# Patient Record
Sex: MALE | Race: WHITE | NOT HISPANIC OR LATINO | URBAN - METROPOLITAN AREA
[De-identification: names, ages, dates, MRNs, and addresses within clinical notes are randomized per-mention and may not be internally consistent; named-entity substitution may affect disease eponyms.]

---

## 2023-03-22 ENCOUNTER — OFFICE VISIT (OUTPATIENT)
Dept: FAMILY MEDICINE CLINIC | Facility: CLINIC | Age: 78
End: 2023-03-22
Payer: MEDICAID

## 2023-03-22 VITALS
RESPIRATION RATE: 16 BRPM | WEIGHT: 169 LBS | BODY MASS INDEX: 24.2 KG/M2 | OXYGEN SATURATION: 99 % | HEIGHT: 70 IN | SYSTOLIC BLOOD PRESSURE: 132 MMHG | DIASTOLIC BLOOD PRESSURE: 62 MMHG | TEMPERATURE: 97.3 F | HEART RATE: 73 BPM

## 2023-03-22 DIAGNOSIS — R76.12 POSITIVE QUANTIFERON-TB GOLD TEST: ICD-10-CM

## 2023-03-22 DIAGNOSIS — R39.15 URINARY URGENCY: ICD-10-CM

## 2023-03-22 DIAGNOSIS — R35.0 URINARY FREQUENCY: ICD-10-CM

## 2023-03-22 DIAGNOSIS — M54.50 LUMBAR SPINE PAIN: ICD-10-CM

## 2023-03-22 DIAGNOSIS — M54.16 LUMBAR RADICULOPATHY: ICD-10-CM

## 2023-03-22 DIAGNOSIS — Z11.1 VISIT FOR TB SKIN TEST: ICD-10-CM

## 2023-03-22 DIAGNOSIS — I10 BENIGN ESSENTIAL HTN: Primary | ICD-10-CM

## 2023-03-22 DIAGNOSIS — Z12.5 SCREENING PSA (PROSTATE SPECIFIC ANTIGEN): ICD-10-CM

## 2023-03-22 DIAGNOSIS — E55.9 VITAMIN D DEFICIENCY: ICD-10-CM

## 2023-03-22 PROCEDURE — 1159F MED LIST DOCD IN RCRD: CPT | Performed by: PHYSICIAN ASSISTANT

## 2023-03-22 PROCEDURE — 1160F RVW MEDS BY RX/DR IN RCRD: CPT | Performed by: PHYSICIAN ASSISTANT

## 2023-03-22 PROCEDURE — 99204 OFFICE O/P NEW MOD 45 MIN: CPT | Performed by: PHYSICIAN ASSISTANT

## 2023-03-22 RX ORDER — INDAPAMIDE 1.25 MG/1
1.25 TABLET, FILM COATED ORAL DAILY
Qty: 30 TABLET | Refills: 1 | Status: SHIPPED | OUTPATIENT
Start: 2023-03-22

## 2023-03-22 RX ORDER — AMLODIPINE BESYLATE 10 MG/1
10 TABLET ORAL DAILY
Qty: 30 TABLET | Refills: 1 | Status: SHIPPED | OUTPATIENT
Start: 2023-03-22

## 2023-03-22 RX ORDER — LISINOPRIL 10 MG/1
10 TABLET ORAL DAILY
Qty: 30 TABLET | Refills: 1 | Status: SHIPPED | OUTPATIENT
Start: 2023-03-22

## 2023-03-22 NOTE — PROGRESS NOTES
"Subjective   Alex Negron is a 78 y.o. male.     History of Present Illness   Alex Negron 78 y.o. male who presents today for a new patient appointment.    he has a history of There is no problem list on file for this patient.  .  he is here to establish care I reviewed the PFSH recorded today by my MA/LPN staff.   he   He has been feeling well.   he has overall felt well.  He has Primary Hypertension and well controlled on current medication and Vitamin D deficiency and will update labs for continued management.  he has been compliant with current medications have reviewed them.  The patient denies medication side effects.  Will refill medications. /62   Pulse 73   Temp 97.3 °F (36.3 °C)   Resp 16   Ht 178 cm (70.08\")   Wt 76.7 kg (169 lb)   SpO2 99%   BMI 24.19 kg/m²     No results found for this or any previous visit.          Here from [facility] ---Feb 2   Daughter is translating today  No prior surgery    Concern about HTN---10mg Amlodpine, 2.5mg Indapimide, 10mg ?Lisinopril    Alex Negron 78 y.o. male presents for evaluation and treatment of  low back pain. The patient first noted symptoms 10 years ago. It was not related to nothing in particular but has a history of DDD of L-Spine and was .  The pain intensity is mild and moderate , and is located midline lower , lumbar, sacroiliac, posterior thigh, anterior thigh and right leg. The pain is described as burning and occurs prolonged walking and laying on back ---some pain ROM. The symptoms are progressive. Symptoms are exacerbated by prolonged walking and ROM; laying on back; . Factors which relieve the pain include Physical Therapy treatment and back exercises. Other associated symptoms include denies pain radiating down left leg, denies motor loss, denies sensory loss, denies N/T in legs and denies weakness in legs. Previous history of symptoms: the problem is long-standing. Treatment efforts have included Physical " Therapy treatment , with some relief.--helps to lay on his side.  He is taking Ibuprofen 200mg to 400mg    X-Ray  Interpretation report in house X-rays that I personally viewed    Relevant Clinical Issues/Diagnoses/Indications: Chronic low back pain        Clinical Findings: Atherosclerosis incidentally noted in his abdominal aorta, scoliosis of lumbar spine, significant degenerative changes at every level lumbar, retrolisthesis L1-3, no compression fracture          Comparative Data: None          Date of Previous X-ray:    Change on current X-ray:        Prolonged walking gets burning pain right lower leg.   Has to rest.  Has right lumbar pain  Burning right lower leg  Onset 10 yrs ago  Can get sciatic pain right --all down----posterior thigh and anterior posterior lower leg  No prior surgery  --some help PT  The following portions of the patient's history were reviewed and updated as appropriate: allergies, current medications, past family history, past medical history, past social history, past surgical history and problem list.    Review of Systems   Constitutional: Negative for activity change, appetite change and unexpected weight change.   HENT: Negative for nosebleeds and trouble swallowing.    Eyes: Negative for pain and visual disturbance.   Respiratory: Negative for chest tightness, shortness of breath and wheezing.    Cardiovascular: Negative for chest pain and palpitations.   Gastrointestinal: Negative for abdominal pain and blood in stool.   Endocrine: Negative.    Genitourinary: Positive for frequency and urgency. Negative for difficulty urinating and hematuria.   Musculoskeletal: Positive for back pain. Negative for joint swelling.   Skin: Negative for color change and rash.   Allergic/Immunologic: Negative.    Neurological: Negative for syncope and speech difficulty.   Hematological: Negative for adenopathy.   Psychiatric/Behavioral: Negative for agitation and confusion.   All other systems  reviewed and are negative.      Objective   Physical Exam  Vitals and nursing note reviewed.   Constitutional:       General: He is not in acute distress.     Appearance: Normal appearance. He is well-developed. He is not ill-appearing or diaphoretic.   HENT:      Head: Normocephalic.      Right Ear: Tympanic membrane, ear canal and external ear normal.      Left Ear: Tympanic membrane, ear canal and external ear normal.      Nose: Nose normal.      Mouth/Throat:      Pharynx: No oropharyngeal exudate or posterior oropharyngeal erythema.   Eyes:      General: No scleral icterus.     Conjunctiva/sclera: Conjunctivae normal.      Pupils: Pupils are equal, round, and reactive to light.   Neck:      Vascular: No carotid bruit.   Cardiovascular:      Rate and Rhythm: Normal rate and regular rhythm.      Pulses: Normal pulses.      Heart sounds: Normal heart sounds. No murmur heard.  Pulmonary:      Effort: Pulmonary effort is normal.      Breath sounds: Normal breath sounds. No rales.   Abdominal:      General: Abdomen is flat. Bowel sounds are normal.      Palpations: Abdomen is soft.      Tenderness: There is no abdominal tenderness.   Musculoskeletal:         General: No deformity. Normal range of motion.      Cervical back: Normal range of motion and neck supple.      Right lower leg: No edema.      Left lower leg: No edema.   Skin:     General: Skin is warm and dry.      Findings: No rash.   Neurological:      General: No focal deficit present.      Mental Status: He is alert and oriented to person, place, and time.      Sensory: No sensory deficit.      Motor: No weakness.   Psychiatric:         Mood and Affect: Mood normal. Affect is not inappropriate.         Behavior: Behavior normal.         Thought Content: Thought content normal.         Judgment: Judgment normal.         Assessment & Plan   Diagnoses and all orders for this visit:    1. Benign essential HTN (Primary)    2. Lumbar spine pain    3. Lumbar  radiculopathy    Other orders  -     indapamide (LOZOL) 1.25 MG tablet; Take 1 tablet by mouth Daily. For BP  Dispense: 30 tablet; Refill: 1  -     amLODIPine (NORVASC) 10 MG tablet; Take 1 tablet by mouth Daily. For BP  Dispense: 30 tablet; Refill: 1  -     lisinopril (Prinivil) 10 MG tablet; Take 1 tablet by mouth Daily. For BP  Dispense: 30 tablet; Refill: 1      Will write bp meds --do separate Rx for now  Need TB test  Has had vaccines  See urologist---suspect BPH  Plan, Alex Negron, was seen today.  he was seen for HTN and continue medication and Vitamin D deficiency and will update labs .  bp checks-update me  F/u few weeks  Refer PT

## 2023-03-25 LAB
25(OH)D3+25(OH)D2 SERPL-MCNC: 36.5 NG/ML (ref 30–100)
ALBUMIN SERPL-MCNC: 4.7 G/DL (ref 3.7–4.7)
ALBUMIN/GLOB SERPL: 1.9 {RATIO} (ref 1.2–2.2)
ALP SERPL-CCNC: 112 IU/L (ref 44–121)
ALT SERPL-CCNC: 7 IU/L (ref 0–44)
APPEARANCE UR: ABNORMAL
AST SERPL-CCNC: 12 IU/L (ref 0–40)
BACTERIA #/AREA URNS HPF: ABNORMAL /[HPF]
BACTERIA UR CULT: NO GROWTH
BACTERIA UR CULT: NORMAL
BASOPHILS # BLD AUTO: 0.1 X10E3/UL (ref 0–0.2)
BASOPHILS NFR BLD AUTO: 1 %
BILIRUB SERPL-MCNC: <0.2 MG/DL (ref 0–1.2)
BILIRUB UR QL STRIP: NEGATIVE
BUN SERPL-MCNC: 34 MG/DL (ref 8–27)
BUN/CREAT SERPL: 27 (ref 10–24)
CALCIUM SERPL-MCNC: 9.9 MG/DL (ref 8.6–10.2)
CASTS URNS QL MICRO: ABNORMAL /LPF
CHLORIDE SERPL-SCNC: 103 MMOL/L (ref 96–106)
CHOLEST SERPL-MCNC: 191 MG/DL (ref 100–199)
CO2 SERPL-SCNC: 23 MMOL/L (ref 20–29)
COLOR UR: YELLOW
CREAT SERPL-MCNC: 1.26 MG/DL (ref 0.76–1.27)
EGFRCR SERPLBLD CKD-EPI 2021: 59 ML/MIN/1.73
EOSINOPHIL # BLD AUTO: 0.2 X10E3/UL (ref 0–0.4)
EOSINOPHIL NFR BLD AUTO: 2 %
EPI CELLS #/AREA URNS HPF: ABNORMAL /HPF (ref 0–10)
ERYTHROCYTE [DISTWIDTH] IN BLOOD BY AUTOMATED COUNT: 13.7 % (ref 11.6–15.4)
FOLATE SERPL-MCNC: 19.2 NG/ML
GAMMA INTERFERON BACKGROUND BLD IA-ACNC: 7.84 IU/ML
GLOBULIN SER CALC-MCNC: 2.5 G/DL (ref 1.5–4.5)
GLUCOSE SERPL-MCNC: 101 MG/DL (ref 70–99)
GLUCOSE UR QL STRIP: NEGATIVE
HCT VFR BLD AUTO: 36.5 % (ref 37.5–51)
HDLC SERPL-MCNC: 38 MG/DL
HGB BLD-MCNC: 12.2 G/DL (ref 13–17.7)
HGB UR QL STRIP: ABNORMAL
IMM GRANULOCYTES # BLD AUTO: 0 X10E3/UL (ref 0–0.1)
IMM GRANULOCYTES NFR BLD AUTO: 0 %
KETONES UR QL STRIP: NEGATIVE
LDLC SERPL CALC-MCNC: 118 MG/DL (ref 0–99)
LEUKOCYTE ESTERASE UR QL STRIP: ABNORMAL
LYMPHOCYTES # BLD AUTO: 2.7 X10E3/UL (ref 0.7–3.1)
LYMPHOCYTES NFR BLD AUTO: 31 %
M TB IFN-G BLD-IMP: POSITIVE
M TB IFN-G CD4+ T-CELLS BLD-ACNC: >10 IU/ML
M TBIFN-G CD4+ CD8+T-CELLS BLD-ACNC: >10 IU/ML
MAGNESIUM SERPL-MCNC: 2.2 MG/DL (ref 1.6–2.3)
MCH RBC QN AUTO: 29 PG (ref 26.6–33)
MCHC RBC AUTO-ENTMCNC: 33.4 G/DL (ref 31.5–35.7)
MCV RBC AUTO: 87 FL (ref 79–97)
MICRO URNS: ABNORMAL
MITOGEN IGNF BLD-ACNC: >10 IU/ML
MONOCYTES # BLD AUTO: 0.6 X10E3/UL (ref 0.1–0.9)
MONOCYTES NFR BLD AUTO: 7 %
NEUTROPHILS # BLD AUTO: 5.2 X10E3/UL (ref 1.4–7)
NEUTROPHILS NFR BLD AUTO: 59 %
NITRITE UR QL STRIP: NEGATIVE
PH UR STRIP: 5.5 [PH] (ref 5–7.5)
PLATELET # BLD AUTO: 544 X10E3/UL (ref 150–450)
POTASSIUM SERPL-SCNC: 5.1 MMOL/L (ref 3.5–5.2)
PROT SERPL-MCNC: 7.2 G/DL (ref 6–8.5)
PROT UR QL STRIP: ABNORMAL
PSA SERPL-MCNC: 1.9 NG/ML (ref 0–4)
QUANTIFERON INCUBATION: ABNORMAL
RBC # BLD AUTO: 4.21 X10E6/UL (ref 4.14–5.8)
RBC #/AREA URNS HPF: ABNORMAL /HPF (ref 0–2)
SERVICE CMNT-IMP: ABNORMAL
SODIUM SERPL-SCNC: 143 MMOL/L (ref 134–144)
SP GR UR STRIP: 1.02 (ref 1–1.03)
TRIGL SERPL-MCNC: 200 MG/DL (ref 0–149)
TSH SERPL DL<=0.005 MIU/L-ACNC: 1.28 UIU/ML (ref 0.45–4.5)
UROBILINOGEN UR STRIP-MCNC: 0.2 MG/DL (ref 0.2–1)
VIT B12 SERPL-MCNC: 453 PG/ML (ref 232–1245)
VLDLC SERPL CALC-MCNC: 35 MG/DL (ref 5–40)
WBC # BLD AUTO: 8.7 X10E3/UL (ref 3.4–10.8)
WBC #/AREA URNS HPF: ABNORMAL /HPF (ref 0–5)

## 2023-03-27 ENCOUNTER — TELEPHONE (OUTPATIENT)
Dept: FAMILY MEDICINE CLINIC | Facility: CLINIC | Age: 78
End: 2023-03-27
Payer: MEDICAID

## 2023-03-27 NOTE — TELEPHONE ENCOUNTER
I added Labs.  I was not able to contact [facility] they were closed. I will not be in tomorrow to try back.  Thanks

## 2023-03-27 NOTE — TELEPHONE ENCOUNTER
----- Message from Jyothi Song PA-C sent at 3/27/2023  6:30 PM EDT -----  Add hemoglobin A1c, for elevated glucose of 101, add ferritin and iron profile to labs done. Concern for low hemoglobin and mildly elevated platelets.  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  Will have to copy Health Dept on this.  They have a TB clinic.  Urine culture is negative and he does have blood in his urine and definitely needs to see the urologist.  Mild impairment of kidney functions and advised observation with avoiding ibuprofen and Aleve, anti-inflammatories, NSAIDs.  Cholesterol is mildly elevated and will discuss at appointment.  I will have to send reportable disease form to state.  I will order CXR---filling out form----will need to be seen at TB clinic?------ need MA to call health department.  I called his daughter and will bring by for chest x-ray this week

## 2023-03-30 LAB
HBA1C MFR BLD: 6.4 % (ref 4.8–5.6)
SPECIMEN STATUS: NORMAL
WRITTEN AUTHORIZATION: NORMAL

## 2023-03-31 NOTE — PROGRESS NOTES
Called patient's daughter (Radha) and LVM to have her call back to let us know if her father would want an MRI ordered to further evaluate back problems.  Will let   Jyothi Song know, so she can order if they want

## 2023-04-12 ENCOUNTER — OFFICE VISIT (OUTPATIENT)
Dept: FAMILY MEDICINE CLINIC | Facility: CLINIC | Age: 78
End: 2023-04-12
Payer: MEDICAID

## 2023-04-12 VITALS
OXYGEN SATURATION: 97 % | HEART RATE: 71 BPM | TEMPERATURE: 97.2 F | SYSTOLIC BLOOD PRESSURE: 120 MMHG | DIASTOLIC BLOOD PRESSURE: 64 MMHG | RESPIRATION RATE: 16 BRPM | BODY MASS INDEX: 25.62 KG/M2 | HEIGHT: 70 IN | WEIGHT: 179 LBS

## 2023-04-12 DIAGNOSIS — R06.83 SNORING: ICD-10-CM

## 2023-04-12 DIAGNOSIS — I10 BENIGN ESSENTIAL HTN: Primary | ICD-10-CM

## 2023-04-12 DIAGNOSIS — E78.2 HYPERLIPIDEMIA, MIXED: ICD-10-CM

## 2023-04-12 DIAGNOSIS — D75.839 THROMBOCYTOSIS: ICD-10-CM

## 2023-04-12 DIAGNOSIS — N28.9 RENAL INSUFFICIENCY: ICD-10-CM

## 2023-04-12 DIAGNOSIS — M51.36 DDD (DEGENERATIVE DISC DISEASE), LUMBAR: ICD-10-CM

## 2023-04-12 DIAGNOSIS — R73.01 IMPAIRED FASTING GLUCOSE: ICD-10-CM

## 2023-04-12 DIAGNOSIS — D64.9 LOW HEMOGLOBIN: ICD-10-CM

## 2023-04-12 PROBLEM — M51.369 DDD (DEGENERATIVE DISC DISEASE), LUMBAR: Status: ACTIVE | Noted: 2023-04-12

## 2023-04-12 PROCEDURE — 1160F RVW MEDS BY RX/DR IN RCRD: CPT | Performed by: PHYSICIAN ASSISTANT

## 2023-04-12 PROCEDURE — 3074F SYST BP LT 130 MM HG: CPT | Performed by: PHYSICIAN ASSISTANT

## 2023-04-12 PROCEDURE — 3078F DIAST BP <80 MM HG: CPT | Performed by: PHYSICIAN ASSISTANT

## 2023-04-12 PROCEDURE — 1159F MED LIST DOCD IN RCRD: CPT | Performed by: PHYSICIAN ASSISTANT

## 2023-04-12 PROCEDURE — 99214 OFFICE O/P EST MOD 30 MIN: CPT | Performed by: PHYSICIAN ASSISTANT

## 2023-04-12 RX ORDER — LISINOPRIL 10 MG/1
10 TABLET ORAL DAILY
Qty: 90 TABLET | Refills: 1 | Status: SHIPPED | OUTPATIENT
Start: 2023-04-12

## 2023-04-12 RX ORDER — AMLODIPINE BESYLATE 10 MG/1
10 TABLET ORAL DAILY
Qty: 90 TABLET | Refills: 1 | Status: SHIPPED | OUTPATIENT
Start: 2023-04-12

## 2023-04-12 RX ORDER — ROSUVASTATIN CALCIUM 10 MG/1
10 TABLET, COATED ORAL DAILY
Qty: 90 TABLET | Refills: 3 | Status: SHIPPED | OUTPATIENT
Start: 2023-04-12

## 2023-04-12 RX ORDER — INDAPAMIDE 1.25 MG/1
1.25 TABLET, FILM COATED ORAL DAILY
Qty: 90 TABLET | Refills: 1 | Status: SHIPPED | OUTPATIENT
Start: 2023-04-12

## 2023-04-12 NOTE — LETTER
April 12, 2023     Patient: Alex Negron   YOB: 1945   Date of Visit: 4/12/2023       To Whom It May Concern:     Alex Negron received a chest xray in our office due to Positive Quantiferon test and xray was negative.  X-ray notes no active disease of the chest,       Sincerely,        Jyothi Song PA-C    CC:   No Recipients

## 2023-04-12 NOTE — PROGRESS NOTES
"Dottie Negron is a 78 y.o. male.     History of Present Illness    Since the last visit, he has overall felt well.  He has Primary Hypertension and well controlled on current medication, Impaired fasting glucose and will monitor labs to watch for DMII, Hyperlipidemia and will start medication plan for treatment.  I will order follow up labs and Asthma and remains under care of their specialist.  he has been compliant with current medications have reviewed them.  The patient denies medication side effects.  Will refill medications. /64   Pulse 71   Temp 97.2 °F (36.2 °C) (Oral)   Resp 16   Ht 178 cm (70.08\")   Wt 81.2 kg (179 lb)   SpO2 97%   BMI 25.63 kg/m²   Here from [facility] ---Feb 2   Daughter is translating today  No prior surgery  Results for orders placed or performed in visit on 03/22/23   Urine Culture - Urine, Urine, Clean Catch    Specimen: Urine, Clean Catch    UR   Result Value Ref Range    Urine Culture Final report     Result 1 No growth    Comprehensive metabolic panel    Specimen: Blood   Result Value Ref Range    Glucose 101 (H) 70 - 99 mg/dL    BUN 34 (H) 8 - 27 mg/dL    Creatinine 1.26 0.76 - 1.27 mg/dL    EGFR Result 59 (L) >59 mL/min/1.73    BUN/Creatinine Ratio 27 (H) 10 - 24    Sodium 143 134 - 144 mmol/L    Potassium 5.1 3.5 - 5.2 mmol/L    Chloride 103 96 - 106 mmol/L    Total CO2 23 20 - 29 mmol/L    Calcium 9.9 8.6 - 10.2 mg/dL    Total Protein 7.2 6.0 - 8.5 g/dL    Albumin 4.7 3.7 - 4.7 g/dL    Globulin 2.5 1.5 - 4.5 g/dL    A/G Ratio 1.9 1.2 - 2.2    Total Bilirubin <0.2 0.0 - 1.2 mg/dL    Alkaline Phosphatase 112 44 - 121 IU/L    AST (SGOT) 12 0 - 40 IU/L    ALT (SGPT) 7 0 - 44 IU/L   Lipid panel    Specimen: Blood   Result Value Ref Range    Total Cholesterol 191 100 - 199 mg/dL    Triglycerides 200 (H) 0 - 149 mg/dL    HDL Cholesterol 38 (L) >39 mg/dL    VLDL Cholesterol Christian 35 5 - 40 mg/dL    LDL Chol Calc (NIH) 118 (H) 0 - 99 mg/dL   TSH    Specimen: " Blood   Result Value Ref Range    TSH 1.280 0.450 - 4.500 uIU/mL   QuantiFERON TB Gold    Specimen: Blood   Result Value Ref Range    QuantiFERON Incubation Incubation performed.     QuantiFERON Criteria Comment     QUANTIFERON TB1 AG VALUE >10.00 IU/mL    QUANTIFERON TB2 AG VALUE >10.00 IU/mL    QuantiFERON Nil Value 7.84 IU/mL    QuantiFERON Mitogen Value >10.00 IU/mL    QUANTIFERON-TB GOLD PLUS Positive (A) Negative   PSA Screen    Specimen: Blood   Result Value Ref Range    PSA 1.9 0.0 - 4.0 ng/mL   Vitamin B12    Specimen: Blood   Result Value Ref Range    Vitamin B-12 453 232 - 1,245 pg/mL   Folate    Specimen: Blood   Result Value Ref Range    Folate 19.2 >3.0 ng/mL   Vitamin D,25-Hydroxy    Specimen: Blood   Result Value Ref Range    25 Hydroxy, Vitamin D 36.5 30.0 - 100.0 ng/mL   Magnesium    Specimen: Blood   Result Value Ref Range    Magnesium 2.2 1.6 - 2.3 mg/dL   Microscopic Examination -   Result Value Ref Range    WBC, UA 11-30 (A) 0 - 5 /hpf    RBC, UA 3-10 (A) 0 - 2 /hpf    Epithelial Cells (non renal) 0-10 0 - 10 /hpf    Casts None seen None seen /lpf    Bacteria, UA None seen None seen/Few   Hemoglobin A1c    UR   Result Value Ref Range    Hemoglobin A1C 6.4 (H) 4.8 - 5.6 %   Specimen Status Report    UR   Result Value Ref Range    Specimen Status Comment    Written Authorization    UR   Result Value Ref Range    Written Authorization Comment    CBC and Differential    Specimen: Blood   Result Value Ref Range    WBC 8.7 3.4 - 10.8 x10E3/uL    RBC 4.21 4.14 - 5.80 x10E6/uL    Hemoglobin 12.2 (L) 13.0 - 17.7 g/dL    Hematocrit 36.5 (L) 37.5 - 51.0 %    MCV 87 79 - 97 fL    MCH 29.0 26.6 - 33.0 pg    MCHC 33.4 31.5 - 35.7 g/dL    RDW 13.7 11.6 - 15.4 %    Platelets 544 (H) 150 - 450 x10E3/uL    Neutrophil Rel % 59 Not Estab. %    Lymphocyte Rel % 31 Not Estab. %    Monocyte Rel % 7 Not Estab. %    Eosinophil Rel % 2 Not Estab. %    Basophil Rel % 1 Not Estab. %    Neutrophils Absolute 5.2 1.4 - 7.0  x10E3/uL    Lymphocytes Absolute 2.7 0.7 - 3.1 x10E3/uL    Monocytes Absolute 0.6 0.1 - 0.9 x10E3/uL    Eosinophils Absolute 0.2 0.0 - 0.4 x10E3/uL    Basophils Absolute 0.1 0.0 - 0.2 x10E3/uL    Immature Granulocyte Rel % 0 Not Estab. %    Immature Grans Absolute 0.0 0.0 - 0.1 x10E3/uL   Urinalysis With Microscopic - Urine, Clean Catch    Specimen: Urine, Clean Catch   Result Value Ref Range    Specific Gravity, UA 1.018 1.005 - 1.030    pH, UA 5.5 5.0 - 7.5    Color, UA Yellow Yellow    Appearance, UA Cloudy (A) Clear    Leukocytes, UA 1+ (A) Negative    Protein 1+ (A) Negative/Trace    Glucose, UA Negative Negative    Ketones Negative Negative    Blood, UA 3+ (A) Negative    Bilirubin, UA Negative Negative    Urobilinogen, UA 0.2 0.2 - 1.0 mg/dL    Nitrite, UA Negative Negative    Microscopic Examination See below:    Here with daughter interpreting    144/85, 154/85, 132/88, 142,/92  Noted some chol atherosclerosis in his aorta on his chest x-ray.    The 10-year ASCVD risk score (Betty DK, et al., 2019) is: 29.1%    Values used to calculate the score:      Age: 78 years      Sex: Male      Is Non- : No      Diabetic: No      Tobacco smoker: No      Systolic Blood Pressure: 120 mmHg      Is BP treated: Yes      HDL Cholesterol: 38 mg/dL      Total Cholesterol: 191 mg/dL    Snoring----not restful sleep  The following portions of the patient's history were reviewed and updated as appropriate: allergies, current medications, past family history, past medical history, past social history, past surgical history and problem list.    Review of Systems   Constitutional: Negative for activity change, appetite change and unexpected weight change.   HENT: Negative for nosebleeds and trouble swallowing.    Eyes: Negative for pain and visual disturbance.   Respiratory: Negative for chest tightness, shortness of breath and wheezing.    Cardiovascular: Negative for chest pain and palpitations.    Gastrointestinal: Negative for abdominal pain and blood in stool.   Endocrine: Negative.    Genitourinary: Positive for frequency and urgency. Negative for difficulty urinating and hematuria.   Musculoskeletal: Positive for back pain. Negative for joint swelling.   Skin: Negative for color change and rash.   Allergic/Immunologic: Negative.    Neurological: Negative for syncope and speech difficulty.   Hematological: Negative for adenopathy.   Psychiatric/Behavioral: Negative for agitation and confusion.   All other systems reviewed and are negative.      Objective   Physical Exam  Vitals and nursing note reviewed.   Constitutional:       General: He is not in acute distress.     Appearance: Normal appearance. He is well-developed. He is not ill-appearing or diaphoretic.   HENT:      Head: Normocephalic.      Right Ear: Tympanic membrane, ear canal and external ear normal.      Left Ear: Tympanic membrane, ear canal and external ear normal.      Nose: Nose normal.      Mouth/Throat:      Pharynx: No oropharyngeal exudate or posterior oropharyngeal erythema.   Eyes:      General: No scleral icterus.     Conjunctiva/sclera: Conjunctivae normal.      Pupils: Pupils are equal, round, and reactive to light.   Neck:      Vascular: No carotid bruit.   Cardiovascular:      Rate and Rhythm: Normal rate and regular rhythm.      Pulses: Normal pulses.      Heart sounds: Normal heart sounds. No murmur heard.  Pulmonary:      Effort: Pulmonary effort is normal.      Breath sounds: Normal breath sounds. No rales.   Abdominal:      General: Abdomen is flat. Bowel sounds are normal.      Palpations: Abdomen is soft.      Tenderness: There is no abdominal tenderness.   Musculoskeletal:         General: No deformity. Normal range of motion.      Cervical back: Normal range of motion and neck supple.      Right lower leg: No edema.      Left lower leg: No edema.   Skin:     General: Skin is warm and dry.      Findings: No rash.    Neurological:      General: No focal deficit present.      Mental Status: He is alert and oriented to person, place, and time.      Sensory: No sensory deficit.      Motor: No weakness.   Psychiatric:         Mood and Affect: Mood normal. Affect is not inappropriate.         Behavior: Behavior normal.         Thought Content: Thought content normal.         Judgment: Judgment normal.         Assessment & Plan   Diagnoses and all orders for this visit:    1. Benign essential HTN (Primary)  -     Comprehensive metabolic panel; Future  -     Lipid panel; Future  -     Hemoglobin A1c; Future  -     CBC & Differential; Future    2. Hyperlipidemia, mixed  -     Comprehensive metabolic panel; Future  -     Lipid panel; Future  -     Hemoglobin A1c; Future  -     CBC & Differential; Future    3. DDD (degenerative disc disease), lumbar  -     Ambulatory Referral to Physical Therapy Evaluate and treat    4. Low hemoglobin  -     CBC & Differential  -     Ferritin  -     Iron Profile  -     Comprehensive metabolic panel; Future  -     Lipid panel; Future  -     Hemoglobin A1c; Future  -     CBC & Differential; Future    5. Impaired fasting glucose  -     Comprehensive metabolic panel; Future  -     Lipid panel; Future  -     Hemoglobin A1c; Future  -     CBC & Differential; Future    6. Renal insufficiency      Plan to repeat his CBC could not add on ferritin and iron profile with last labs from 3/22/2023 also want to recheck platelet count due to value being 544  Talked about the threshold of diabetes at 6.4% for his hemoglobin I4i----ka is already watching his diet and active--  Plan to check hemoglobin A1c again in 3 months  Health department in the [facility] has been contacted through the reportable disease form for the positive QuantiFERON gold test noting his chest x-ray to follow through was negative he has no active coughing or signs of TB  Due to the atherosclerosis noted in his aorta on his chest x-ray and  cholesterol score high  He does have an appointment with urology for his voiding symptoms from last visit and microscopic hematuria positive  Plan, Alex Negron, was seen today.  he was seen for HTN and continue medication, Imparied fasting glucose and plan follow up labs, diet, and exercise, Hyperlipidemia with new medication plan and Vitamin D deficiency and supplemented.

## 2023-04-13 LAB
BASOPHILS # BLD AUTO: 0.04 10*3/MM3 (ref 0–0.2)
BASOPHILS NFR BLD AUTO: 0.5 % (ref 0–1.5)
EOSINOPHIL # BLD AUTO: 0.17 10*3/MM3 (ref 0–0.4)
EOSINOPHIL NFR BLD AUTO: 2 % (ref 0.3–6.2)
ERYTHROCYTE [DISTWIDTH] IN BLOOD BY AUTOMATED COUNT: 14 % (ref 12.3–15.4)
FERRITIN SERPL-MCNC: 37 NG/ML (ref 30–400)
HCT VFR BLD AUTO: 36.9 % (ref 37.5–51)
HGB BLD-MCNC: 12.1 G/DL (ref 13–17.7)
IMM GRANULOCYTES # BLD AUTO: 0.01 10*3/MM3 (ref 0–0.05)
IMM GRANULOCYTES NFR BLD AUTO: 0.1 % (ref 0–0.5)
IRON SATN MFR SERPL: 30 % (ref 20–50)
IRON SERPL-MCNC: 121 MCG/DL (ref 59–158)
LYMPHOCYTES # BLD AUTO: 2.72 10*3/MM3 (ref 0.7–3.1)
LYMPHOCYTES NFR BLD AUTO: 31.8 % (ref 19.6–45.3)
MCH RBC QN AUTO: 28.9 PG (ref 26.6–33)
MCHC RBC AUTO-ENTMCNC: 32.8 G/DL (ref 31.5–35.7)
MCV RBC AUTO: 88.1 FL (ref 79–97)
MONOCYTES # BLD AUTO: 0.63 10*3/MM3 (ref 0.1–0.9)
MONOCYTES NFR BLD AUTO: 7.4 % (ref 5–12)
NEUTROPHILS # BLD AUTO: 4.99 10*3/MM3 (ref 1.7–7)
NEUTROPHILS NFR BLD AUTO: 58.2 % (ref 42.7–76)
NRBC BLD AUTO-RTO: 0 /100 WBC (ref 0–0.2)
PLATELET # BLD AUTO: 519 10*3/MM3 (ref 140–450)
RBC # BLD AUTO: 4.19 10*6/MM3 (ref 4.14–5.8)
TIBC SERPL-MCNC: 399 MCG/DL
UIBC SERPL-MCNC: 278 MCG/DL (ref 112–346)
WBC # BLD AUTO: 8.56 10*3/MM3 (ref 3.4–10.8)

## 2023-05-15 ENCOUNTER — LAB (OUTPATIENT)
Dept: LAB | Facility: HOSPITAL | Age: 78
End: 2023-05-15
Payer: MEDICAID

## 2023-05-15 ENCOUNTER — CONSULT (OUTPATIENT)
Dept: ONCOLOGY | Facility: CLINIC | Age: 78
End: 2023-05-15
Payer: MEDICAID

## 2023-05-15 VITALS
SYSTOLIC BLOOD PRESSURE: 120 MMHG | OXYGEN SATURATION: 95 % | WEIGHT: 169.8 LBS | BODY MASS INDEX: 24.31 KG/M2 | RESPIRATION RATE: 16 BRPM | TEMPERATURE: 98 F | DIASTOLIC BLOOD PRESSURE: 71 MMHG | HEIGHT: 70 IN | HEART RATE: 81 BPM

## 2023-05-15 DIAGNOSIS — D64.9 LOW HEMOGLOBIN: ICD-10-CM

## 2023-05-15 DIAGNOSIS — R79.89 ELEVATED PLATELET COUNT: Primary | ICD-10-CM

## 2023-05-15 DIAGNOSIS — D75.839 THROMBOCYTOSIS: Primary | ICD-10-CM

## 2023-05-15 LAB
BASOPHILS # BLD AUTO: 0.04 10*3/MM3 (ref 0–0.2)
BASOPHILS NFR BLD AUTO: 0.5 % (ref 0–1.5)
DEPRECATED RDW RBC AUTO: 44.1 FL (ref 37–54)
EOSINOPHIL # BLD AUTO: 0.19 10*3/MM3 (ref 0–0.4)
EOSINOPHIL NFR BLD AUTO: 2.2 % (ref 0.3–6.2)
ERYTHROCYTE [DISTWIDTH] IN BLOOD BY AUTOMATED COUNT: 13.4 % (ref 12.3–15.4)
HCT VFR BLD AUTO: 38.1 % (ref 37.5–51)
HGB BLD-MCNC: 12.5 G/DL (ref 13–17.7)
IMM GRANULOCYTES # BLD AUTO: 0.03 10*3/MM3 (ref 0–0.05)
IMM GRANULOCYTES NFR BLD AUTO: 0.3 % (ref 0–0.5)
LYMPHOCYTES # BLD AUTO: 2.42 10*3/MM3 (ref 0.7–3.1)
LYMPHOCYTES NFR BLD AUTO: 27.5 % (ref 19.6–45.3)
MCH RBC QN AUTO: 29.5 PG (ref 26.6–33)
MCHC RBC AUTO-ENTMCNC: 32.8 G/DL (ref 31.5–35.7)
MCV RBC AUTO: 89.9 FL (ref 79–97)
MONOCYTES # BLD AUTO: 0.67 10*3/MM3 (ref 0.1–0.9)
MONOCYTES NFR BLD AUTO: 7.6 % (ref 5–12)
NEUTROPHILS NFR BLD AUTO: 5.44 10*3/MM3 (ref 1.7–7)
NEUTROPHILS NFR BLD AUTO: 61.9 % (ref 42.7–76)
NRBC BLD AUTO-RTO: 0 /100 WBC (ref 0–0.2)
PLATELET # BLD AUTO: 474 10*3/MM3 (ref 140–450)
PMV BLD AUTO: 8.6 FL (ref 6–12)
RBC # BLD AUTO: 4.24 10*6/MM3 (ref 4.14–5.8)
WBC NRBC COR # BLD: 8.79 10*3/MM3 (ref 3.4–10.8)

## 2023-05-15 PROCEDURE — 99244 OFF/OP CNSLTJ NEW/EST MOD 40: CPT | Performed by: INTERNAL MEDICINE

## 2023-05-15 PROCEDURE — 3074F SYST BP LT 130 MM HG: CPT | Performed by: INTERNAL MEDICINE

## 2023-05-15 PROCEDURE — 85025 COMPLETE CBC W/AUTO DIFF WBC: CPT

## 2023-05-15 PROCEDURE — 3078F DIAST BP <80 MM HG: CPT | Performed by: INTERNAL MEDICINE

## 2023-05-15 PROCEDURE — 36415 COLL VENOUS BLD VENIPUNCTURE: CPT

## 2023-05-15 PROCEDURE — 1126F AMNT PAIN NOTED NONE PRSNT: CPT | Performed by: INTERNAL MEDICINE

## 2023-05-15 RX ORDER — FERROUS SULFATE 325(65) MG
325 TABLET ORAL
Qty: 30 TABLET | Refills: 2 | Status: SHIPPED | OUTPATIENT
Start: 2023-05-15

## 2023-05-15 RX ORDER — TAMSULOSIN HYDROCHLORIDE 0.4 MG/1
1 CAPSULE ORAL
COMMUNITY
Start: 2023-04-28

## 2023-05-15 NOTE — PROGRESS NOTES
I do advise that the you call the patient's daughter and ask if it is okay to refer to gastroenterology to evaluate source of bleeding in the GI tract?

## 2023-05-15 NOTE — LETTER
May 15, 2023       No Recipients    Patient: Alex Negron   YOB: 1945   Date of Visit: 5/15/2023       Dear Dr. Das Recipients:    Thank you for referring Alex Negron to me for evaluation. Below are the relevant portions of my assessment and plan of care.    If you have questions, please do not hesitate to call me. I look forward to following Alex along with you.         Sincerely,        Emil Mary MD        CC:   No Recipients    Emil Mary MD  05/15/23 1018  Sign when Signing Visit    Subjective      REASON FOR CONSULTATION: Elevated platelet count  Provide an opinion on any further workup or treatment                             Requesting practitioner: Jyothi Song    RECORDS OBTAINED:  Records of the patients history including those obtained from the referring provider were reviewed and summarized in detail.      History of Present Illness   This is a 78-year-old man with hypertension, hyperlipidemia, degenerative disc disease of the lumbar spine, impaired fasting glucose and mild renal insufficiency seen today at the request of his primary care provider for evaluation of an elevated platelet count.  Reviewing his recent data since 3/22/2023 the patient has had a mildly elevated platelet count in the upper 400s/lower 500,000 range.  He has been mildly anemic with hemoglobin 12.1-12.5 MCV 88-89.  White blood cell count and differential unremarkable.  Additional labs have recently shown iron saturation 30%, ferritin 37, B12 453, folate 19.2, creatinine 1.26/BUN 34, total protein 7.2/globulin 2.5.    The patient recently immigrated from [facility].  He feels well denies fevers, chills, shortness of breath, cough, weight loss, lymphadenopathy, changes in the bowel habits, blood in the stool.  He has urinary frequency undergoing evaluation by urology-scheduled for CT abdomen/pelvis and cystoscopy.  He smoked but quit smoking approximately 20 years ago.  He has never had a  "colonoscopy.      Past Medical History:   Diagnosis Date   • DDD (degenerative disc disease), lumbar    • History of low back pain    • Hypertension         History reviewed. No pertinent surgical history.     Current Outpatient Medications on File Prior to Visit   Medication Sig Dispense Refill   • amLODIPine (NORVASC) 10 MG tablet Take 1 tablet by mouth Daily. For BP 90 tablet 1   • indapamide (LOZOL) 1.25 MG tablet Take 1 tablet by mouth Daily. For BP 90 tablet 1   • lisinopril (Prinivil) 10 MG tablet Take 1 tablet by mouth Daily. For BP 90 tablet 1   • rosuvastatin (Crestor) 10 MG tablet Take 1 tablet by mouth Daily. For cholesterol 90 tablet 3     No current facility-administered medications on file prior to visit.        ALLERGIES:  No Known Allergies     Social History     Socioeconomic History   • Marital status:    Tobacco Use   • Smoking status: Former     Types: Cigarettes   • Smokeless tobacco: Never   Vaping Use   • Vaping Use: Never used   Substance and Sexual Activity   • Alcohol use: Not Currently   • Drug use: Never   • Sexual activity: Defer        History reviewed. No pertinent family history.     Review of Systems   Constitutional: Negative.    HENT: Negative.    Eyes: Negative.    Respiratory: Negative.    Cardiovascular: Negative.    Gastrointestinal: Negative.    Genitourinary: Positive for frequency. Negative for hematuria and urgency.   Musculoskeletal: Negative.    Allergic/Immunologic: Negative.    Neurological: Negative.    Hematological: Negative.    Psychiatric/Behavioral: Negative.         Objective      Vitals:    05/15/23 0952   BP: 120/71   Pulse: 81   Resp: 16   Temp: 98 °F (36.7 °C)   TempSrc: Infrared   SpO2: 95%   Weight: 77 kg (169 lb 12.8 oz)   Height: 178 cm (70.08\")   PainSc: 0-No pain         5/15/2023     9:57 AM   Current Status   ECOG score 0       Physical Exam    CONSTITUTIONAL: pleasant well-developed adult man  HEENT: no icterus, no thrush, moist " membranes  LYMPH: no cervical or supraclavicular lad  CV: RRR, S1S2, no murmur  RESP: cta bilat, no wheezing, no rales  GI: soft, non-tender, no splenomegaly, +bs  MUSC: no edema, normal gait  NEURO: alert and oriented x3, normal strength  PSYCH: normal mood and affect    RECENT LABS:  Hematology WBC   Date Value Ref Range Status   05/15/2023 8.79 3.40 - 10.80 10*3/mm3 Final   04/12/2023 8.56 3.40 - 10.80 10*3/mm3 Final     RBC   Date Value Ref Range Status   05/15/2023 4.24 4.14 - 5.80 10*6/mm3 Final   04/12/2023 4.19 4.14 - 5.80 10*6/mm3 Final     Hemoglobin   Date Value Ref Range Status   05/15/2023 12.5 (L) 13.0 - 17.7 g/dL Final     Hematocrit   Date Value Ref Range Status   05/15/2023 38.1 37.5 - 51.0 % Final     Platelets   Date Value Ref Range Status   05/15/2023 474 (H) 140 - 450 10*3/mm3 Final        Lab Results   Component Value Date    GLUCOSE 101 (H) 03/22/2023    BUN 34 (H) 03/22/2023    CREATININE 1.26 03/22/2023    EGFRRESULT 59 (L) 03/22/2023    BCR 27 (H) 03/22/2023    K 5.1 03/22/2023    CO2 23 03/22/2023    CALCIUM 9.9 03/22/2023    PROTENTOTREF 7.2 03/22/2023    ALBUMIN 4.7 03/22/2023    BILITOT <0.2 03/22/2023    AST 12 03/22/2023    ALT 7 03/22/2023         Assessment & Plan     *Mild thrombocytosis  *Mild normocytic anemia  *Low iron stores  *Urinary frequency under evaluation by urology (CT abdomen/pelvis and cystoscopy planned)    Hematology plan/recommendations:  I discussed with the patient and daughter that thrombocytosis of this degree can occur from multiple possible etiologies often inflammatory finding less likely myeloproliferative process given the normal white blood cell count and lack of polycythemia etc.  His iron stores are technically normal but low normal, and since iron deficiency as such, because of thrombocytosis I recommended as first course of action a trial of oral iron for 2 months to see if the platelet count improves.  I will repeat in 2 months his CBC ferritin iron  profile along with an ESR and DEBRA.  If there is no improvement in the platelet count with better iron stores further evaluation can be entertained.  Since malignancy can also cause thrombocytosis would recommend he be up-to-date on all cancer screening tests and will follow-up results of the urinary frequency evaluation per urology.    Thank you for allowing me to participate in the care of this pleasant patient.

## 2023-05-15 NOTE — PROGRESS NOTES
Subjective     REASON FOR CONSULTATION: Elevated platelet count  Provide an opinion on any further workup or treatment                             Requesting practitioner: Jyothi Song    RECORDS OBTAINED:  Records of the patients history including those obtained from the referring provider were reviewed and summarized in detail.      History of Present Illness   This is a 78-year-old man with hypertension, hyperlipidemia, degenerative disc disease of the lumbar spine, impaired fasting glucose and mild renal insufficiency seen today at the request of his primary care provider for evaluation of an elevated platelet count.  Reviewing his recent data since 3/22/2023 the patient has had a mildly elevated platelet count in the upper 400s/lower 500,000 range.  He has been mildly anemic with hemoglobin 12.1-12.5 MCV 88-89.  White blood cell count and differential unremarkable.  Additional labs have recently shown iron saturation 30%, ferritin 37, B12 453, folate 19.2, creatinine 1.26/BUN 34, total protein 7.2/globulin 2.5.    The patient recently immigrated from [facility].  He feels well denies fevers, chills, shortness of breath, cough, weight loss, lymphadenopathy, changes in the bowel habits, blood in the stool.  He has urinary frequency undergoing evaluation by urology-scheduled for CT abdomen/pelvis and cystoscopy.  He smoked but quit smoking approximately 20 years ago.  He has never had a colonoscopy.      Past Medical History:   Diagnosis Date   • DDD (degenerative disc disease), lumbar    • History of low back pain    • Hypertension         History reviewed. No pertinent surgical history.     Current Outpatient Medications on File Prior to Visit   Medication Sig Dispense Refill   • amLODIPine (NORVASC) 10 MG tablet Take 1 tablet by mouth Daily. For BP 90 tablet 1   • indapamide (LOZOL) 1.25 MG tablet Take 1 tablet by mouth Daily. For BP 90 tablet 1   • lisinopril (Prinivil) 10 MG tablet Take 1 tablet by mouth Daily.  "For BP 90 tablet 1   • rosuvastatin (Crestor) 10 MG tablet Take 1 tablet by mouth Daily. For cholesterol 90 tablet 3     No current facility-administered medications on file prior to visit.        ALLERGIES:  No Known Allergies     Social History     Socioeconomic History   • Marital status:    Tobacco Use   • Smoking status: Former     Types: Cigarettes   • Smokeless tobacco: Never   Vaping Use   • Vaping Use: Never used   Substance and Sexual Activity   • Alcohol use: Not Currently   • Drug use: Never   • Sexual activity: Defer        History reviewed. No pertinent family history.     Review of Systems   Constitutional: Negative.    HENT: Negative.    Eyes: Negative.    Respiratory: Negative.    Cardiovascular: Negative.    Gastrointestinal: Negative.    Genitourinary: Positive for frequency. Negative for hematuria and urgency.   Musculoskeletal: Negative.    Allergic/Immunologic: Negative.    Neurological: Negative.    Hematological: Negative.    Psychiatric/Behavioral: Negative.         Objective     Vitals:    05/15/23 0952   BP: 120/71   Pulse: 81   Resp: 16   Temp: 98 °F (36.7 °C)   TempSrc: Infrared   SpO2: 95%   Weight: 77 kg (169 lb 12.8 oz)   Height: 178 cm (70.08\")   PainSc: 0-No pain         5/15/2023     9:57 AM   Current Status   ECOG score 0       Physical Exam    CONSTITUTIONAL: pleasant well-developed adult man  HEENT: no icterus, no thrush, moist membranes  LYMPH: no cervical or supraclavicular lad  CV: RRR, S1S2, no murmur  RESP: cta bilat, no wheezing, no rales  GI: soft, non-tender, no splenomegaly, +bs  MUSC: no edema, normal gait  NEURO: alert and oriented x3, normal strength  PSYCH: normal mood and affect    RECENT LABS:  Hematology WBC   Date Value Ref Range Status   05/15/2023 8.79 3.40 - 10.80 10*3/mm3 Final   04/12/2023 8.56 3.40 - 10.80 10*3/mm3 Final     RBC   Date Value Ref Range Status   05/15/2023 4.24 4.14 - 5.80 10*6/mm3 Final   04/12/2023 4.19 4.14 - 5.80 10*6/mm3 Final "     Hemoglobin   Date Value Ref Range Status   05/15/2023 12.5 (L) 13.0 - 17.7 g/dL Final     Hematocrit   Date Value Ref Range Status   05/15/2023 38.1 37.5 - 51.0 % Final     Platelets   Date Value Ref Range Status   05/15/2023 474 (H) 140 - 450 10*3/mm3 Final        Lab Results   Component Value Date    GLUCOSE 101 (H) 03/22/2023    BUN 34 (H) 03/22/2023    CREATININE 1.26 03/22/2023    EGFRRESULT 59 (L) 03/22/2023    BCR 27 (H) 03/22/2023    K 5.1 03/22/2023    CO2 23 03/22/2023    CALCIUM 9.9 03/22/2023    PROTENTOTREF 7.2 03/22/2023    ALBUMIN 4.7 03/22/2023    BILITOT <0.2 03/22/2023    AST 12 03/22/2023    ALT 7 03/22/2023         Assessment & Plan     *Mild thrombocytosis  *Mild normocytic anemia  *Low iron stores  *Urinary frequency under evaluation by urology (CT abdomen/pelvis and cystoscopy planned)    Hematology plan/recommendations:  I discussed with the patient and daughter that thrombocytosis of this degree can occur from multiple possible etiologies often inflammatory finding less likely myeloproliferative process given the normal white blood cell count and lack of polycythemia etc.  His iron stores are technically normal but low normal, and since iron deficiency as such, because of thrombocytosis I recommended as first course of action a trial of oral iron for 2 months to see if the platelet count improves.  I will repeat in 2 months his CBC ferritin iron profile along with an ESR and DEBRA.  If there is no improvement in the platelet count with better iron stores further evaluation can be entertained.  Since malignancy can also cause thrombocytosis would recommend he be up-to-date on all cancer screening tests and will follow-up results of the urinary frequency evaluation per urology.    Thank you for allowing me to participate in the care of this pleasant patient.

## 2023-05-17 ENCOUNTER — TELEPHONE (OUTPATIENT)
Dept: FAMILY MEDICINE CLINIC | Facility: CLINIC | Age: 78
End: 2023-05-17

## 2023-05-30 DIAGNOSIS — I70.0 AORTIC ATHEROSCLEROSIS: ICD-10-CM

## 2023-05-30 DIAGNOSIS — I70.0 ABDOMINAL AORTIC ATHEROSCLEROSIS: Primary | ICD-10-CM

## 2023-05-31 ENCOUNTER — APPOINTMENT (OUTPATIENT)
Dept: SLEEP MEDICINE | Facility: HOSPITAL | Age: 78
End: 2023-05-31
Payer: MEDICAID

## 2023-05-31 ENCOUNTER — PRE-ADMISSION TESTING (OUTPATIENT)
Dept: PREADMISSION TESTING | Facility: HOSPITAL | Age: 78
End: 2023-05-31
Payer: MEDICAID

## 2023-05-31 VITALS
OXYGEN SATURATION: 95 % | HEIGHT: 70 IN | SYSTOLIC BLOOD PRESSURE: 150 MMHG | DIASTOLIC BLOOD PRESSURE: 82 MMHG | TEMPERATURE: 98.5 F | WEIGHT: 169.1 LBS | RESPIRATION RATE: 18 BRPM | BODY MASS INDEX: 24.21 KG/M2 | HEART RATE: 76 BPM

## 2023-05-31 LAB
ANION GAP SERPL CALCULATED.3IONS-SCNC: 13.5 MMOL/L (ref 5–15)
BUN SERPL-MCNC: 29 MG/DL (ref 8–23)
BUN/CREAT SERPL: 24.6 (ref 7–25)
CALCIUM SPEC-SCNC: 9.6 MG/DL (ref 8.6–10.5)
CHLORIDE SERPL-SCNC: 103 MMOL/L (ref 98–107)
CO2 SERPL-SCNC: 23.5 MMOL/L (ref 22–29)
CREAT SERPL-MCNC: 1.18 MG/DL (ref 0.76–1.27)
DEPRECATED RDW RBC AUTO: 40.3 FL (ref 37–54)
EGFRCR SERPLBLD CKD-EPI 2021: 64 ML/MIN/1.73
ERYTHROCYTE [DISTWIDTH] IN BLOOD BY AUTOMATED COUNT: 12.8 % (ref 12.3–15.4)
GLUCOSE SERPL-MCNC: 90 MG/DL (ref 65–99)
HCT VFR BLD AUTO: 35.1 % (ref 37.5–51)
HGB BLD-MCNC: 11.9 G/DL (ref 13–17.7)
MCH RBC QN AUTO: 29.5 PG (ref 26.6–33)
MCHC RBC AUTO-ENTMCNC: 33.9 G/DL (ref 31.5–35.7)
MCV RBC AUTO: 87.1 FL (ref 79–97)
PLATELET # BLD AUTO: 429 10*3/MM3 (ref 140–450)
PMV BLD AUTO: 8.6 FL (ref 6–12)
POTASSIUM SERPL-SCNC: 4.5 MMOL/L (ref 3.5–5.2)
QT INTERVAL: 399 MS
RBC # BLD AUTO: 4.03 10*6/MM3 (ref 4.14–5.8)
SODIUM SERPL-SCNC: 140 MMOL/L (ref 136–145)
WBC NRBC COR # BLD: 9.22 10*3/MM3 (ref 3.4–10.8)

## 2023-05-31 PROCEDURE — 93005 ELECTROCARDIOGRAM TRACING: CPT

## 2023-05-31 PROCEDURE — 85027 COMPLETE CBC AUTOMATED: CPT

## 2023-05-31 PROCEDURE — 80048 BASIC METABOLIC PNL TOTAL CA: CPT

## 2023-05-31 PROCEDURE — 36415 COLL VENOUS BLD VENIPUNCTURE: CPT

## 2023-05-31 NOTE — DISCHARGE INSTRUCTIONS
Take the following medications the morning of surgery with a small sip of water:  AMLODIPINE    ARRIVE FOR SURGERY AT 3:00 PM      If you are on prescription narcotic pain medication to control your pain you may also take that medication the morning of surgery.    General Instructions:  Do not eat or drink anything after 7:00 AM before surgery.  Infants may have breast milk up to four hours before surgery.  Infants drinking formula may drink formula up to six hours before surgery.   Patients who avoid smoking, chewing tobacco and alcohol for 4 weeks prior to surgery have a reduced risk of post-operative complications.  Quit smoking as many days before surgery as you can.  Do not smoke, use chewing tobacco or drink alcohol the day of surgery.   If applicable bring your C-PAP/ BI-PAP machine in with you to preop day of surgery.  Bring any papers given to you in the doctor’s office.  Wear clean comfortable clothes.  Do not wear contact lenses, false eyelashes or make-up.  Bring a case for your glasses.   Bring crutches or walker if applicable.  Remove all piercings.  Leave jewelry and any other valuables at home.  Hair extensions with metal clips must be removed prior to surgery.  The Pre-Admission Testing nurse will instruct you to bring medications if unable to obtain an accurate list in Pre-Admission Testing.        If you were given a blood bank ID arm band remember to bring it with you the day of surgery.    Preventing a Surgical Site Infection:  For 2 to 3 days before surgery, avoid shaving with a razor because the razor can irritate skin and make it easier to develop an infection.    Any areas of open skin can increase the risk of a post-operative wound infection by allowing bacteria to enter and travel throughout the body.  Notify your surgeon if you have any skin wounds / rashes even if it is not near the expected surgical site.  The area will need assessed to determine if surgery should be delayed until it is  healed.  The night prior to surgery shower using a fresh bar of anti-bacterial soap (such as Dial) and clean washcloth.  Sleep in a clean bed with clean clothing.  Do not allow pets to sleep with you.  Shower on the morning of surgery using a fresh bar of anti-bacterial soap (such as Dial) and clean washcloth.  Dry with a clean towel and dress in clean clothing.  Ask your surgeon if you will be receiving antibiotics prior to surgery.  Make sure you, your family, and all healthcare providers clean their hands with soap and water or an alcohol based hand  before caring for you or your wound.    Day of surgery:  Your arrival time is approximately two hours before your scheduled surgery time.  Upon arrival, a Pre-op nurse and Anesthesiologist will review your health history, obtain vital signs, and answer questions you may have.  The only belongings needed at this time will be your home medications and if applicable your C-PAP/BI-PAP machine.  A Pre-op nurse will start an IV and you may receive medication in preparation for surgery, including something to help you relax.      Please be aware that surgery does come with discomfort.  We want to make every effort to control your discomfort so please discuss any uncontrolled symptoms with your nurse.   Your doctor will most likely have prescribed pain medications.      If you are going home after surgery you will receive individualized written care instructions before being discharged.  A responsible adult must drive you to and from the hospital on the day of your surgery and stay with you for 24 hours.  Discharge prescriptions can be filled by the hospital pharmacy during regular pharmacy hours.  If you are having surgery late in the day/evening your prescription may be e-prescribed to your pharmacy.  Please verify your pharmacy hours or chose a 24 hour pharmacy to avoid not having access to your prescription because your pharmacy has closed for the day.    If you  are staying overnight following surgery, you will be transported to your hospital room following the recovery period.  [facility] has all private rooms.    If you have any questions please call Pre-Admission Testing at [phone].  Deductibles and co-payments are collected on the day of service. Please be prepared to pay the required co-pay, deductible or deposit on the day of service as defined by your plan.    Call your surgeon immediately if you experience any of the following symptoms:  Sore Throat  Shortness of Breath or difficulty breathing  Cough  Chills  Body soreness or muscle pain  Headache  Fever  New loss of taste or smell  Do not arrive for your surgery ill.  Your procedure will need to be rescheduled to another time.  You will need to call your physician before the day of surgery to avoid any unnecessary exposure to hospital staff as well as other patients.

## 2023-06-07 ENCOUNTER — ANESTHESIA EVENT (OUTPATIENT)
Dept: PERIOP | Facility: HOSPITAL | Age: 78
End: 2023-06-07
Payer: MEDICAID

## 2023-06-07 ENCOUNTER — HOSPITAL ENCOUNTER (OUTPATIENT)
Facility: HOSPITAL | Age: 78
Setting detail: OBSERVATION
Discharge: HOME | End: 2023-06-08
Attending: UROLOGY | Admitting: UROLOGY
Payer: MEDICAID

## 2023-06-07 ENCOUNTER — ANESTHESIA (OUTPATIENT)
Dept: PERIOP | Facility: HOSPITAL | Age: 78
End: 2023-06-07
Payer: MEDICAID

## 2023-06-07 DIAGNOSIS — N32.89 BLADDER MASS: Primary | ICD-10-CM

## 2023-06-07 PROCEDURE — 88307 TISSUE EXAM BY PATHOLOGIST: CPT | Performed by: UROLOGY

## 2023-06-07 PROCEDURE — G0378 HOSPITAL OBSERVATION PER HR: HCPCS

## 2023-06-07 PROCEDURE — 25010000002 CEFAZOLIN IN DEXTROSE 2-4 GM/100ML-% SOLUTION: Performed by: UROLOGY

## 2023-06-07 PROCEDURE — 25010000002 PROPOFOL 10 MG/ML EMULSION: Performed by: ANESTHESIOLOGY

## 2023-06-07 RX ORDER — ROSUVASTATIN CALCIUM 10 MG/1
10 TABLET, COATED ORAL DAILY
Status: DISCONTINUED | OUTPATIENT
Start: 2023-06-08 | End: 2023-06-08 | Stop reason: HOSPADM

## 2023-06-07 RX ORDER — HYDROMORPHONE HYDROCHLORIDE 1 MG/ML
0.25 INJECTION, SOLUTION INTRAMUSCULAR; INTRAVENOUS; SUBCUTANEOUS
Status: DISCONTINUED | OUTPATIENT
Start: 2023-06-07 | End: 2023-06-07 | Stop reason: HOSPADM

## 2023-06-07 RX ORDER — PROMETHAZINE HYDROCHLORIDE 25 MG/1
25 TABLET ORAL ONCE AS NEEDED
Status: DISCONTINUED | OUTPATIENT
Start: 2023-06-07 | End: 2023-06-07 | Stop reason: HOSPADM

## 2023-06-07 RX ORDER — HYDROCODONE BITARTRATE AND ACETAMINOPHEN 5; 325 MG/1; MG/1
1 TABLET ORAL ONCE AS NEEDED
Status: DISCONTINUED | OUTPATIENT
Start: 2023-06-07 | End: 2023-06-07 | Stop reason: HOSPADM

## 2023-06-07 RX ORDER — SODIUM CHLORIDE 0.9 % (FLUSH) 0.9 %
3-10 SYRINGE (ML) INJECTION AS NEEDED
Status: DISCONTINUED | OUTPATIENT
Start: 2023-06-07 | End: 2023-06-07 | Stop reason: HOSPADM

## 2023-06-07 RX ORDER — OXYCODONE AND ACETAMINOPHEN 7.5; 325 MG/1; MG/1
1 TABLET ORAL EVERY 4 HOURS PRN
Qty: 20 TABLET | Refills: 0 | Status: SHIPPED | OUTPATIENT
Start: 2023-06-07

## 2023-06-07 RX ORDER — AMLODIPINE BESYLATE 10 MG/1
10 TABLET ORAL DAILY
Status: DISCONTINUED | OUTPATIENT
Start: 2023-06-08 | End: 2023-06-08 | Stop reason: HOSPADM

## 2023-06-07 RX ORDER — SODIUM CHLORIDE 0.9 % (FLUSH) 0.9 %
3 SYRINGE (ML) INJECTION EVERY 12 HOURS SCHEDULED
Status: DISCONTINUED | OUTPATIENT
Start: 2023-06-07 | End: 2023-06-07 | Stop reason: HOSPADM

## 2023-06-07 RX ORDER — MAGNESIUM HYDROXIDE 1200 MG/15ML
LIQUID ORAL AS NEEDED
Status: DISCONTINUED | OUTPATIENT
Start: 2023-06-07 | End: 2023-06-07 | Stop reason: HOSPADM

## 2023-06-07 RX ORDER — AMOXICILLIN 250 MG
2 CAPSULE ORAL 2 TIMES DAILY
Status: DISCONTINUED | OUTPATIENT
Start: 2023-06-07 | End: 2023-06-08 | Stop reason: HOSPADM

## 2023-06-07 RX ORDER — SODIUM CHLORIDE 9 MG/ML
100 INJECTION, SOLUTION INTRAVENOUS CONTINUOUS
Status: DISCONTINUED | OUTPATIENT
Start: 2023-06-07 | End: 2023-06-08 | Stop reason: HOSPADM

## 2023-06-07 RX ORDER — DROPERIDOL 2.5 MG/ML
0.62 INJECTION, SOLUTION INTRAMUSCULAR; INTRAVENOUS
Status: DISCONTINUED | OUTPATIENT
Start: 2023-06-07 | End: 2023-06-07 | Stop reason: HOSPADM

## 2023-06-07 RX ORDER — HYDRALAZINE HYDROCHLORIDE 20 MG/ML
5 INJECTION INTRAMUSCULAR; INTRAVENOUS
Status: DISCONTINUED | OUTPATIENT
Start: 2023-06-07 | End: 2023-06-07 | Stop reason: HOSPADM

## 2023-06-07 RX ORDER — CEPHALEXIN 500 MG/1
500 CAPSULE ORAL 3 TIMES DAILY
Qty: 21 CAPSULE | Refills: 0 | Status: SHIPPED | OUTPATIENT
Start: 2023-06-07 | End: 2023-06-14

## 2023-06-07 RX ORDER — HYDROMORPHONE HYDROCHLORIDE 1 MG/ML
0.5 INJECTION, SOLUTION INTRAMUSCULAR; INTRAVENOUS; SUBCUTANEOUS
Status: DISCONTINUED | OUTPATIENT
Start: 2023-06-07 | End: 2023-06-08 | Stop reason: HOSPADM

## 2023-06-07 RX ORDER — DIPHENHYDRAMINE HYDROCHLORIDE 50 MG/ML
12.5 INJECTION INTRAMUSCULAR; INTRAVENOUS
Status: DISCONTINUED | OUTPATIENT
Start: 2023-06-07 | End: 2023-06-07 | Stop reason: HOSPADM

## 2023-06-07 RX ORDER — MIDAZOLAM HYDROCHLORIDE 1 MG/ML
0.5 INJECTION INTRAMUSCULAR; INTRAVENOUS
Status: DISCONTINUED | OUTPATIENT
Start: 2023-06-07 | End: 2023-06-07 | Stop reason: HOSPADM

## 2023-06-07 RX ORDER — FENTANYL CITRATE 50 UG/ML
50 INJECTION, SOLUTION INTRAMUSCULAR; INTRAVENOUS ONCE AS NEEDED
Status: DISCONTINUED | OUTPATIENT
Start: 2023-06-07 | End: 2023-06-07 | Stop reason: HOSPADM

## 2023-06-07 RX ORDER — LABETALOL HYDROCHLORIDE 5 MG/ML
5 INJECTION, SOLUTION INTRAVENOUS
Status: DISCONTINUED | OUTPATIENT
Start: 2023-06-07 | End: 2023-06-07 | Stop reason: HOSPADM

## 2023-06-07 RX ORDER — NALOXONE HCL 0.4 MG/ML
0.2 VIAL (ML) INJECTION AS NEEDED
Status: DISCONTINUED | OUTPATIENT
Start: 2023-06-07 | End: 2023-06-07 | Stop reason: HOSPADM

## 2023-06-07 RX ORDER — EPHEDRINE SULFATE 50 MG/ML
5 INJECTION, SOLUTION INTRAVENOUS ONCE AS NEEDED
Status: DISCONTINUED | OUTPATIENT
Start: 2023-06-07 | End: 2023-06-07 | Stop reason: HOSPADM

## 2023-06-07 RX ORDER — PHENAZOPYRIDINE HYDROCHLORIDE 200 MG/1
200 TABLET, FILM COATED ORAL 3 TIMES DAILY PRN
Qty: 30 TABLET | Refills: 0 | Status: SHIPPED | OUTPATIENT
Start: 2023-06-07

## 2023-06-07 RX ORDER — LIDOCAINE HYDROCHLORIDE 20 MG/ML
INJECTION, SOLUTION INFILTRATION; PERINEURAL AS NEEDED
Status: DISCONTINUED | OUTPATIENT
Start: 2023-06-07 | End: 2023-06-07 | Stop reason: SURG

## 2023-06-07 RX ORDER — SODIUM CHLORIDE 9 MG/ML
40 INJECTION, SOLUTION INTRAVENOUS AS NEEDED
Status: DISCONTINUED | OUTPATIENT
Start: 2023-06-07 | End: 2023-06-08 | Stop reason: HOSPADM

## 2023-06-07 RX ORDER — LIDOCAINE HYDROCHLORIDE 10 MG/ML
0.5 INJECTION, SOLUTION EPIDURAL; INFILTRATION; INTRACAUDAL; PERINEURAL ONCE AS NEEDED
Status: DISCONTINUED | OUTPATIENT
Start: 2023-06-07 | End: 2023-06-07 | Stop reason: HOSPADM

## 2023-06-07 RX ORDER — NALOXONE HCL 0.4 MG/ML
0.1 VIAL (ML) INJECTION
Status: DISCONTINUED | OUTPATIENT
Start: 2023-06-07 | End: 2023-06-08 | Stop reason: HOSPADM

## 2023-06-07 RX ORDER — SODIUM CHLORIDE 0.9 % (FLUSH) 0.9 %
10 SYRINGE (ML) INJECTION AS NEEDED
Status: DISCONTINUED | OUTPATIENT
Start: 2023-06-07 | End: 2023-06-08 | Stop reason: HOSPADM

## 2023-06-07 RX ORDER — PROMETHAZINE HYDROCHLORIDE 25 MG/1
25 SUPPOSITORY RECTAL ONCE AS NEEDED
Status: DISCONTINUED | OUTPATIENT
Start: 2023-06-07 | End: 2023-06-07 | Stop reason: HOSPADM

## 2023-06-07 RX ORDER — IPRATROPIUM BROMIDE AND ALBUTEROL SULFATE 2.5; .5 MG/3ML; MG/3ML
3 SOLUTION RESPIRATORY (INHALATION) ONCE AS NEEDED
Status: DISCONTINUED | OUTPATIENT
Start: 2023-06-07 | End: 2023-06-07 | Stop reason: HOSPADM

## 2023-06-07 RX ORDER — FENTANYL CITRATE 50 UG/ML
25 INJECTION, SOLUTION INTRAMUSCULAR; INTRAVENOUS
Status: DISCONTINUED | OUTPATIENT
Start: 2023-06-07 | End: 2023-06-07 | Stop reason: HOSPADM

## 2023-06-07 RX ORDER — ONDANSETRON 4 MG/1
4 TABLET, FILM COATED ORAL EVERY 6 HOURS PRN
Status: DISCONTINUED | OUTPATIENT
Start: 2023-06-07 | End: 2023-06-08 | Stop reason: HOSPADM

## 2023-06-07 RX ORDER — FLUMAZENIL 0.1 MG/ML
0.2 INJECTION INTRAVENOUS AS NEEDED
Status: DISCONTINUED | OUTPATIENT
Start: 2023-06-07 | End: 2023-06-07 | Stop reason: HOSPADM

## 2023-06-07 RX ORDER — OXYCODONE AND ACETAMINOPHEN 7.5; 325 MG/1; MG/1
1 TABLET ORAL EVERY 4 HOURS PRN
Status: DISCONTINUED | OUTPATIENT
Start: 2023-06-07 | End: 2023-06-08 | Stop reason: HOSPADM

## 2023-06-07 RX ORDER — ONDANSETRON 2 MG/ML
4 INJECTION INTRAMUSCULAR; INTRAVENOUS ONCE AS NEEDED
Status: DISCONTINUED | OUTPATIENT
Start: 2023-06-07 | End: 2023-06-07 | Stop reason: HOSPADM

## 2023-06-07 RX ORDER — FAMOTIDINE 10 MG/ML
20 INJECTION, SOLUTION INTRAVENOUS ONCE
Status: COMPLETED | OUTPATIENT
Start: 2023-06-07 | End: 2023-06-07

## 2023-06-07 RX ORDER — SODIUM CHLORIDE, SODIUM LACTATE, POTASSIUM CHLORIDE, CALCIUM CHLORIDE 600; 310; 30; 20 MG/100ML; MG/100ML; MG/100ML; MG/100ML
9 INJECTION, SOLUTION INTRAVENOUS CONTINUOUS
Status: DISCONTINUED | OUTPATIENT
Start: 2023-06-07 | End: 2023-06-07

## 2023-06-07 RX ORDER — SODIUM CHLORIDE 0.9 % (FLUSH) 0.9 %
3 SYRINGE (ML) INJECTION EVERY 12 HOURS SCHEDULED
Status: DISCONTINUED | OUTPATIENT
Start: 2023-06-07 | End: 2023-06-08 | Stop reason: HOSPADM

## 2023-06-07 RX ORDER — ONDANSETRON 2 MG/ML
4 INJECTION INTRAMUSCULAR; INTRAVENOUS EVERY 6 HOURS PRN
Status: DISCONTINUED | OUTPATIENT
Start: 2023-06-07 | End: 2023-06-08 | Stop reason: HOSPADM

## 2023-06-07 RX ORDER — HYDROCODONE BITARTRATE AND ACETAMINOPHEN 7.5; 325 MG/1; MG/1
1 TABLET ORAL EVERY 4 HOURS PRN
Status: DISCONTINUED | OUTPATIENT
Start: 2023-06-07 | End: 2023-06-07 | Stop reason: HOSPADM

## 2023-06-07 RX ORDER — TAMSULOSIN HYDROCHLORIDE 0.4 MG/1
0.4 CAPSULE ORAL DAILY
Status: DISCONTINUED | OUTPATIENT
Start: 2023-06-08 | End: 2023-06-08 | Stop reason: HOSPADM

## 2023-06-07 RX ORDER — PROPOFOL 10 MG/ML
VIAL (ML) INTRAVENOUS AS NEEDED
Status: DISCONTINUED | OUTPATIENT
Start: 2023-06-07 | End: 2023-06-07 | Stop reason: SURG

## 2023-06-07 RX ORDER — LISINOPRIL 10 MG/1
10 TABLET ORAL DAILY
Status: DISCONTINUED | OUTPATIENT
Start: 2023-06-08 | End: 2023-06-08 | Stop reason: HOSPADM

## 2023-06-07 RX ORDER — INDAPAMIDE 1.25 MG/1
1.25 TABLET, FILM COATED ORAL DAILY
Status: DISCONTINUED | OUTPATIENT
Start: 2023-06-08 | End: 2023-06-08 | Stop reason: HOSPADM

## 2023-06-07 RX ORDER — CEFAZOLIN SODIUM 2 G/100ML
2 INJECTION, SOLUTION INTRAVENOUS ONCE
Status: COMPLETED | OUTPATIENT
Start: 2023-06-07 | End: 2023-06-07

## 2023-06-07 RX ADMIN — FAMOTIDINE 20 MG: 10 INJECTION INTRAVENOUS at 18:11

## 2023-06-07 RX ADMIN — DOCUSATE SODIUM 50MG AND SENNOSIDES 8.6MG 2 TABLET: 8.6; 5 TABLET, FILM COATED ORAL at 21:02

## 2023-06-07 RX ADMIN — SODIUM CHLORIDE, POTASSIUM CHLORIDE, SODIUM LACTATE AND CALCIUM CHLORIDE 9 ML/HR: 600; 310; 30; 20 INJECTION, SOLUTION INTRAVENOUS at 18:11

## 2023-06-07 RX ADMIN — CEFAZOLIN SODIUM 2 G: 2 INJECTION, SOLUTION INTRAVENOUS at 18:11

## 2023-06-07 RX ADMIN — LIDOCAINE HYDROCHLORIDE 100 MG: 20 INJECTION, SOLUTION INFILTRATION; PERINEURAL at 18:24

## 2023-06-07 RX ADMIN — OXYCODONE HYDROCHLORIDE AND ACETAMINOPHEN 1 TABLET: 7.5; 325 TABLET ORAL at 21:03

## 2023-06-07 RX ADMIN — PROPOFOL 200 MG: 10 INJECTION, EMULSION INTRAVENOUS at 18:24

## 2023-06-07 NOTE — OP NOTE
CYSTOSCOPY TRANSURETHRAL RESECTION OF BLADDER TUMOR  Procedure Note    Alex Negron  6/7/2023    Pre-op Diagnosis:   Bladder Cancer    Post-op Diagnosis:     Post-Op Diagnosis Codes:     * Bladder cancer [C67.9]    Procedure(s):  TRANSURETHRAL RESECTION OF BLADDER TUMOR    Surgeon(s):  Robert Haque MD    Anesthesia: General    Staff:   Circulator: Marcella Aguilar RN  Scrub Person: Magalys Lou    Estimated Blood Loss: 100ml    Specimens:                Order Name Source Comment Collection Info Order Time   TISSUE PATHOLOGY EXAM Urinary Bladder  Collected By: Robert Haque MD 6/7/2023  6:53 PM     Release to patient   Routine Release              Drains:   Urethral Catheter Silicone 24 Fr. (Active)       Findings: extensive bladder tumor posterior wall with multiple bladder diverticulum.  Trigone not involved.  Thin-walled bladder with extensive trabeculation making resection difficult.    Complications: None apparent    Indications: 78-year-old gentleman with bladder cancer and gross hematuria.  Bladder mass measures about 4 cm involving the whole posterior wall and extensive diverticular involvement.  Much more appreciably larger than what his preoperative cystoscopy showed.    Procedure: Patient was taken the operative suite given general endotracheal anesthesia.  Placed in comfortable supine then lithotomy position.  Prepped and draped in a sterile fashion.  Panendoscopy was performed after surgical timeout.  The resectoscope was placed.  The urethra was normal.  The prostatic urethra showed lateral lobe enlargement.  High bladder neck.  The trigone was normal.  He had extensive vascularity throughout the bladder and the bladder wall and he was actively bleeding from this large tumor on the posterior wall.  It mid it did measure about 4 cm.  The resectoscope was utilized to resect the mass in its entirety.  The bladder wall was very thin with significant trabeculation multiple  diverticulum I was careful to resect as much of the tumor as I could.  We got pretty deep into a lot of the bladder muscle and probably into the extravesical space posteriorly but the bladder muscle was largely intact in most areas and I felt comfortable with our resection.  A lot of diverticulum had some tumor and that we had to gently scrape out and cauterize.  I could not appreciate anything involving the trigone and the lateral walls.  The tumor was sent off to pathology.  A 24 [place] three-way catheter was placed to continuous irrigation and he was taken recovery.  My initial plan was to let him be discharged but he will be staying tonight.      Robert Haque MD     Date: 6/7/2023  Time: 19:30 EDT

## 2023-06-07 NOTE — H&P
[facility] and Physical    Patient Care Team:  Jyothi Song PA-C as PCP - General (Family Medicine)  Jyothi Song PA-C as Referring Physician (Family Medicine)  Emil Mary MD as Consulting Physician (Hematology and Oncology)    Chief complaint blood in the urine    Subjective     Patient is a 78 y.o. male presents with male immigrant from [facility] who is accompanied by her daughter who translated for him.  He has had gross hematuria.  His work-up showed a bladder mass measured about 4 cm at the base the bladder.  He now presents for resection.  He has had mild irritable urinary symptoms.     Review of Systems   The following systems were reviewed and negative;  respiratory, cardiovascular, and gastrointestinal    Past Medical History:   Diagnosis Date    Anxiety     Bladder cancer     Blind left eye     Blood in urine     TRACE    Burning with urination     DDD (degenerative disc disease), lumbar     GERD (gastroesophageal reflux disease)     History of low back pain     Hyperlipidemia     Hypertension     Urine frequency      Past Surgical History:   Procedure Laterality Date    DENTAL PROCEDURE      NO PAST SURGERIES       Family History   Problem Relation Age of Onset    Malig Hyperthermia Neg Hx      Social History     Tobacco Use    Smoking status: Former     Types: Cigarettes    Smokeless tobacco: Never    Tobacco comments:     QUIT 20 YEARS AGO   Vaping Use    Vaping Use: Never used   Substance Use Topics    Alcohol use: Not Currently    Drug use: Never       Meds:  Medications Prior to Admission   Medication Sig Dispense Refill Last Dose    amLODIPine (NORVASC) 10 MG tablet Take 1 tablet by mouth Daily. For BP 90 tablet 1 6/7/2023 at 0800    indapamide (LOZOL) 1.25 MG tablet Take 1 tablet by mouth Daily. For BP 90 tablet 1 5/31/2023    rosuvastatin (Crestor) 10 MG tablet Take 1 tablet by mouth Daily. For cholesterol 90 tablet 3 6/7/2023    ferrous sulfate 325 (65 FE) MG  "tablet Take 1 tablet by mouth Daily With Breakfast. 30 tablet 2 5/31/2023    lisinopril (Prinivil) 10 MG tablet Take 1 tablet by mouth Daily. For BP 90 tablet 1 5/31/2023    tamsulosin (FLOMAX) 0.4 MG capsule 24 hr capsule Take 1 capsule by mouth every night at bedtime.   5/31/2023       Allergies:  Patient has no known allergies.    Debilities:  Language barrier only    Objective     Vital Signs  Temp:  [98 °F (36.7 °C)] 98 °F (36.7 °C)  Heart Rate:  [60] 60  Resp:  [16] 16  BP: (159)/(73) 159/73  No intake or output data in the 24 hours ending 06/07/23 1641  Flowsheet Rows      Flowsheet Row First Filed Value   Admission Height 172.7 cm (68\") Documented at 06/07/2023 1453   Admission Weight 76 kg (167 lb 8.8 oz) Documented at 06/07/2023 1453             Physical Exam:     General Appearance:    Alert, cooperative, NAD   HEENT:    No trauma, pupils reactive, hearing intact   Back:     No CVA tenderness   Lungs:     Respirations unlabored, no wheezing    Heart:    RRR, intact peripheral pulses   Abdomen:     Soft, NDNT, no masses, no guarding   :  Penis normal testes normal   Extremities:   No edema, no deformity   Lymphatic:   No neck or groin LAD   Skin:   No bleeding, bruising or rashes   Neuro/Psych:   Orientation intact, mood/affect pleasant, no focal findings     Results Review:    I reviewed the patient's new clinical results.  Results for orders placed or performed in visit on 05/31/23   Basic Metabolic Panel    Specimen: Blood   Result Value Ref Range    Glucose 90 65 - 99 mg/dL    BUN 29 (H) 8 - 23 mg/dL    Creatinine 1.18 0.76 - 1.27 mg/dL    Sodium 140 136 - 145 mmol/L    Potassium 4.5 3.5 - 5.2 mmol/L    Chloride 103 98 - 107 mmol/L    CO2 23.5 22.0 - 29.0 mmol/L    Calcium 9.6 8.6 - 10.5 mg/dL    BUN/Creatinine Ratio 24.6 7.0 - 25.0    Anion Gap 13.5 5.0 - 15.0 mmol/L    eGFR 64.0 >60.0 mL/min/1.73   CBC (No Diff)    Specimen: Blood   Result Value Ref Range    WBC 9.22 3.40 - 10.80 10*3/mm3    RBC " 4.03 (L) 4.14 - 5.80 10*6/mm3    Hemoglobin 11.9 (L) 13.0 - 17.7 g/dL    Hematocrit 35.1 (L) 37.5 - 51.0 %    MCV 87.1 79.0 - 97.0 fL    MCH 29.5 26.6 - 33.0 pg    MCHC 33.9 31.5 - 35.7 g/dL    RDW 12.8 12.3 - 15.4 %    RDW-SD 40.3 37.0 - 54.0 fl    MPV 8.6 6.0 - 12.0 fL    Platelets 429 140 - 450 10*3/mm3   ECG 12 Lead   Result Value Ref Range    QT Interval 399 ms        Assessment:  Bladder mass    Plan:    Cystoscopy transurethral resection of 4 cm bladder base mass    I discussed the patient's findings and my recommendations with patient.   Risks, complications, outcomes and alternatives discussed with the patient at the bedside and office.    Robert Haque MD  06/07/23  16:41 EDT

## 2023-06-07 NOTE — ANESTHESIA PREPROCEDURE EVALUATION
Anesthesia Evaluation     Patient summary reviewed   no history of anesthetic complications:   NPO Solid Status: > 8 hours  NPO Liquid Status: > 2 hours           Airway   Mallampati: II  TM distance: >3 FB  Neck ROM: full  No difficulty expected  Dental      Pulmonary     breath sounds clear to auscultation  (+) a smoker Former,  (-) shortness of breath, recent URI  Cardiovascular   Exercise tolerance: good (4-7 METS)    Rhythm: regular  Rate: normal    (+) hypertensionhyperlipidemia  (-) past MI, dysrhythmias, angina      Neuro/Psych  (+) psychiatric history Anxiety  (-) seizures, CVA  GI/Hepatic/Renal/Endo    (+) GERD  (-)  obesity, no renal diseaseDiabetes: IFG.    Musculoskeletal     (+) back pain  (-) neck stiffness  Abdominal    Substance History      OB/GYN          Other   arthritis,   history of cancer (bladder)                    Anesthesia Plan    ASA 2     general     intravenous induction     Anesthetic plan, risks, benefits, and alternatives have been provided, discussed and informed consent has been obtained with: patient.    Use of blood products discussed with patient .      CODE STATUS:

## 2023-06-07 NOTE — ANESTHESIA PROCEDURE NOTES
Airway  Urgency: elective    Date/Time: 6/7/2023 6:27 PM  End Time:6/7/2023 6:27 PM  Airway not difficult    General Information and Staff    Patient location during procedure: OR  Anesthesiologist: Robert Cabello MD    Indications and Patient Condition  Indications for airway management: airway protection    Preoxygenated: yes  Mask difficulty assessment: 0 - not attempted    Final Airway Details  Final airway type: supraglottic airway      Successful airway: classic  Size 5     Number of attempts at approach: 1  Assessment: lips, teeth, and gum same as pre-op and atraumatic intubation

## 2023-06-08 ENCOUNTER — READMISSION MANAGEMENT (OUTPATIENT)
Dept: CALL CENTER | Facility: HOSPITAL | Age: 78
End: 2023-06-08
Payer: MEDICAID

## 2023-06-08 VITALS
HEART RATE: 76 BPM | TEMPERATURE: 97.2 F | WEIGHT: 167.55 LBS | BODY MASS INDEX: 25.39 KG/M2 | OXYGEN SATURATION: 93 % | DIASTOLIC BLOOD PRESSURE: 74 MMHG | RESPIRATION RATE: 16 BRPM | HEIGHT: 68 IN | SYSTOLIC BLOOD PRESSURE: 154 MMHG

## 2023-06-08 LAB
ANION GAP SERPL CALCULATED.3IONS-SCNC: 6 MMOL/L (ref 5–15)
BUN SERPL-MCNC: 26 MG/DL (ref 8–23)
BUN/CREAT SERPL: 25.7 (ref 7–25)
CALCIUM SPEC-SCNC: 8.7 MG/DL (ref 8.6–10.5)
CHLORIDE SERPL-SCNC: 103 MMOL/L (ref 98–107)
CO2 SERPL-SCNC: 29 MMOL/L (ref 22–29)
CREAT SERPL-MCNC: 1.01 MG/DL (ref 0.76–1.27)
DEPRECATED RDW RBC AUTO: 41 FL (ref 37–54)
EGFRCR SERPLBLD CKD-EPI 2021: 77.1 ML/MIN/1.73
ERYTHROCYTE [DISTWIDTH] IN BLOOD BY AUTOMATED COUNT: 13 % (ref 12.3–15.4)
GLUCOSE SERPL-MCNC: 91 MG/DL (ref 65–99)
HCT VFR BLD AUTO: 32.4 % (ref 37.5–51)
HGB BLD-MCNC: 10.9 G/DL (ref 13–17.7)
MCH RBC QN AUTO: 29.7 PG (ref 26.6–33)
MCHC RBC AUTO-ENTMCNC: 33.6 G/DL (ref 31.5–35.7)
MCV RBC AUTO: 88.3 FL (ref 79–97)
PLATELET # BLD AUTO: 397 10*3/MM3 (ref 140–450)
PMV BLD AUTO: 8.7 FL (ref 6–12)
POTASSIUM SERPL-SCNC: 4.1 MMOL/L (ref 3.5–5.2)
RBC # BLD AUTO: 3.67 10*6/MM3 (ref 4.14–5.8)
SODIUM SERPL-SCNC: 138 MMOL/L (ref 136–145)
WBC NRBC COR # BLD: 8.81 10*3/MM3 (ref 3.4–10.8)

## 2023-06-08 PROCEDURE — 80048 BASIC METABOLIC PNL TOTAL CA: CPT | Performed by: UROLOGY

## 2023-06-08 PROCEDURE — G0378 HOSPITAL OBSERVATION PER HR: HCPCS

## 2023-06-08 PROCEDURE — 85027 COMPLETE CBC AUTOMATED: CPT | Performed by: UROLOGY

## 2023-06-08 RX ADMIN — AMLODIPINE BESYLATE 10 MG: 10 TABLET ORAL at 09:16

## 2023-06-08 RX ADMIN — TAMSULOSIN HYDROCHLORIDE 0.4 MG: 0.4 CAPSULE ORAL at 09:16

## 2023-06-08 RX ADMIN — LISINOPRIL 10 MG: 10 TABLET ORAL at 09:16

## 2023-06-08 RX ADMIN — INDAPAMIDE 1.25 MG: 1.25 TABLET, FILM COATED ORAL at 09:16

## 2023-06-08 RX ADMIN — DOCUSATE SODIUM 50MG AND SENNOSIDES 8.6MG 2 TABLET: 8.6; 5 TABLET, FILM COATED ORAL at 09:19

## 2023-06-08 RX ADMIN — ROSUVASTATIN CALCIUM 10 MG: 10 TABLET, FILM COATED ORAL at 09:16

## 2023-06-08 RX ADMIN — SODIUM CHLORIDE 100 ML/HR: 9 INJECTION, SOLUTION INTRAVENOUS at 00:12

## 2023-06-08 NOTE — OUTREACH NOTE
Prep Survey      Flowsheet Row Responses   [facility] patient discharged from? [place]   Is LACE score < 7 ? Yes   Eligibility [facility]   Date of Admission 06/07/23   Date of Discharge 06/08/23   Discharge Disposition Home or Self Care   Discharge diagnosis TRANSURETHRAL RESECTION OF BLADDER TUMOR   Does the patient have one of the following disease processes/diagnoses(primary or secondary)? General Surgery   Does the patient have Home health ordered? No   Is there a DME ordered? No   Prep survey completed? Yes            Letitia VIZCARRA - Registered Nurse

## 2023-06-08 NOTE — ANESTHESIA POSTPROCEDURE EVALUATION
"Patient: Alex Negron    Procedure Summary       Date: 06/07/23 Room / Location: [facility] OR 01 / [facility] MAIN OR    Anesthesia Start: 1821 Anesthesia Stop: 1924    Procedure: TRANSURETHRAL RESECTION OF BLADDER TUMOR Diagnosis:       Bladder cancer      (Bladder Cancer)    Surgeons: Robert Haque MD Provider: Robert Cabello MD    Anesthesia Type: general ASA Status: 2            Anesthesia Type: general    Vitals  Vitals Value Taken Time   /76 06/07/23 2015   Temp 36.4 °C (97.5 °F) 06/07/23 2015   Pulse 56 06/07/23 2020   Resp     SpO2 96 % 06/07/23 2020   Vitals shown include unvalidated device data.        Post Anesthesia Care and Evaluation    Patient location during evaluation: bedside  Patient participation: complete - patient participated  Level of consciousness: sleepy but conscious  Pain score: 0  Pain management: adequate    Airway patency: patent  Anesthetic complications: No anesthetic complications    Cardiovascular status: acceptable  Respiratory status: acceptable  Hydration status: acceptable    Comments: /80 (BP Location: Right arm, Patient Position: Lying)   Pulse 62   Temp 36.4 °C (97.5 °F) (Oral)   Resp 16   Ht 172.7 cm (68\")   Wt 76 kg (167 lb 8.8 oz)   SpO2 91%   BMI 25.48 kg/m²       "

## 2023-06-08 NOTE — PLAN OF CARE
Goal Outcome Evaluation:   Patient arrived 06/07 after resection of Bladder Tumor. Speaks Greek. Daughter and tablet  for communication.  On continuous bladder irrigation. Pt tolerating well. Small discomfort on arrival, 1 percocet given. No other issues during the night, Will continue to monitor.

## 2023-06-08 NOTE — PROGRESS NOTES
Discharge Planning Assessment  [facility]     Patient Name: Alex Negron  MRN: [id number]  Today's Date: 6/8/2023    Admit Date: 6/7/2023    Plan: home with family   Discharge Needs Assessment    No documentation.                  Discharge Plan       Row Name 06/08/23 1627       Plan    Plan home with family    Final Discharge Disposition Code 01 - home or self-care    Final Note Discharged to home on 6/8/23. FARAZ Payne RN, CCP.                  Continued Care and Services - Discharged on 6/8/2023 Admission date: 6/7/2023 - Discharge disposition: Home or Self Care   Coordination has not been started for this encounter.       Expected Discharge Date and Time       Expected Discharge Date Expected Discharge Time    Jun 8, 2023            Demographic Summary    No documentation.                  Functional Status    No documentation.                  Psychosocial    No documentation.                  Abuse/Neglect    No documentation.                  Legal    No documentation.                  Substance Abuse    No documentation.                  Patient Forms    No documentation.                     Anjali Payne, RN     negative...

## 2023-06-08 NOTE — PROGRESS NOTES
Case Management Discharge Note      Final Note: Discharged to home on 6/8/23. FARAZ Payne RN, CCP.         Selected Continued Care - Discharged on 6/8/2023 Admission date: 6/7/2023 - Discharge disposition: Home or Self Care      Destination    No services have been selected for the patient.                Durable Medical Equipment    No services have been selected for the patient.                Dialysis/Infusion    No services have been selected for the patient.                Home Medical Care    No services have been selected for the patient.                Therapy    No services have been selected for the patient.                Community Resources    No services have been selected for the patient.                Community & DME    No services have been selected for the patient.                    Transportation Services  Private: Car    Final Discharge Disposition Code: 01 - home or self-care

## 2023-06-08 NOTE — DISCHARGE SUMMARY
Date of Discharge:  06/08/2023    Discharge Diagnosis: Bladder cancer pathology pending    Presenting Problem/History of Present Illness  Bladder mass [N32.89]     78-year-old gent with gross hematuria found to have a large mass in the posterior wall of his bladder for resection.  Hospital Course  Patient is a 78 y.o. male presented with a large bladder mass.  Transurethral resection.  This was done fairly late yesterday and it was a fairly large long resection deep in his bladder so three-way catheter was placed and he was irrigated through the night.  His urine is clear today his drip is at a minimum.  His catheter will be changed out to be sent home with an 18 [place] catheter..      Procedures Performed  Procedure(s):  TRANSURETHRAL RESECTION OF BLADDER TUMOR       Consults:   Consults       No orders found for last 30 day(s).            Pertinent Test Results: labs: Routine with pathology pending      Condition on Discharge: Good    Vital Signs  Temp:  [97 °F (36.1 °C)-98 °F (36.7 °C)] 97.2 °F (36.2 °C)  Heart Rate:  [56-72] 57  Resp:  [16] 16  BP: (118-168)/(64-86) 118/64    Physical Exam:     General Appearance:    Alert, cooperative, in no acute distress   Head:    Normocephalic, without obvious abnormality, atraumatic   Eyes:            Lids and lashes normal, conjunctivae and sclerae normal, no   icterus, no pallor, corneas clear, PERRLA   Ears:    Ears appear intact with no abnormalities noted   Throat:   No oral lesions, no thrush, oral mucosa moist   Neck:   No adenopathy, supple, trachea midline, no thyromegaly, no   carotid bruit, no JVD   Back:     No kyphosis present, no scoliosis present, no skin lesions,      erythema or scars, no tenderness to percussion or                   palpation,   range of motion normal   Lungs:     Clear to auscultation,respirations regular, even and                  unlabored    Heart:    Regular rhythm and normal rate, normal S1 and S2, no            murmur, no  gallop, no rub, no click   Chest Wall:    No abnormalities observed   Abdomen:     Normal bowel sounds, no masses, no organomegaly, soft        non-tender, non-distended, no guarding, no rebound                tenderness   Rectal:     Deferred   Extremities:   Moves all extremities well, no edema, no cyanosis, no             redness   Pulses:   Pulses palpable and equal bilaterally   Skin:   No bleeding, bruising or rash   Lymph nodes:   No palpable adenopathy   Neurologic:   Cranial nerves 2 - 12 grossly intact, sensation intact, DTR       present and equal bilaterally       Discharge Disposition  Home or Self Care    Discharge Medications     Discharge Medications        New Medications        Instructions Start Date   cephalexin 500 MG capsule  Commonly known as: KEFLEX   500 mg, Oral, 3 Times Daily      oxyCODONE-acetaminophen 7.5-325 MG per tablet  Commonly known as: Percocet   1 tablet, Oral, Every 4 Hours PRN      phenazopyridine 200 MG tablet  Commonly known as: Pyridium   200 mg, Oral, 3 Times Daily PRN             Continue These Medications        Instructions Start Date   amLODIPine 10 MG tablet  Commonly known as: NORVASC   10 mg, Oral, Daily, For BP      ferrous sulfate 325 (65 FE) MG tablet   325 mg, Oral, Daily With Breakfast      indapamide 1.25 MG tablet  Commonly known as: LOZOL   1.25 mg, Oral, Daily, For BP      lisinopril 10 MG tablet  Commonly known as: Prinivil   10 mg, Oral, Daily, For BP      rosuvastatin 10 MG tablet  Commonly known as: Crestor   10 mg, Oral, Daily, For cholesterol      tamsulosin 0.4 MG capsule 24 hr capsule  Commonly known as: FLOMAX   1 capsule, Oral, Every Night at Bedtime               Discharge Diet: Regular    Activity at Discharge: No exertional activity for 1 week    Follow-up Appointments  Future Appointments   Date Time Provider Department Center   6/26/2023  7:30 AM [facility]  [facility]   6/27/2023 12:20 PM Marshall Mayes MD [facility] CD [id number] [facility]   7/5/2023  10:30 AM Jyothi Song PA-C [facility] PC [id number] [facility]   7/17/2023  8:20 AM LAB CHAIR 2 [facility]  LAB [facility]   7/17/2023  8:40 AM Emil Mary MD [facility] CBC [facility]   8/23/2023 11:00 AM Pamela Cuellar MD [facility] SLP [facility] None     Additional Instructions for the Follow-ups that You Need to Schedule       Discharge Follow-up with Specified Provider: Dr Robert Haque; 1 Week   As directed      To: Dr Robert Haque    Follow Up: 1 Week                 Test Results Pending at Discharge  Pending Labs       Order Current Status    Tissue Pathology Exam In process             Robert Haque MD  06/08/23  07:40 EDT    Time: Discharge 30 min

## 2023-06-09 ENCOUNTER — TRANSITIONAL CARE MANAGEMENT TELEPHONE ENCOUNTER (OUTPATIENT)
Dept: CALL CENTER | Facility: HOSPITAL | Age: 78
End: 2023-06-09
Payer: MEDICAID

## 2023-06-09 LAB
LAB AP CASE REPORT: NORMAL
LAB AP INTRADEPARTMENTAL CONSULT: NORMAL
LAB AP SYNOPTIC CHECKLIST: NORMAL
PATH REPORT.FINAL DX SPEC: NORMAL
PATH REPORT.GROSS SPEC: NORMAL

## 2023-06-09 NOTE — OUTREACH NOTE
Call Center TCM Note      Flowsheet Row Responses   [facility] patient discharged from? [place]   Does the patient have one of the following disease processes/diagnoses(primary or secondary)? General Surgery   TCM attempt successful? Yes   Call start time 1032   Call end time 1042   Discharge diagnosis TRANSURETHRAL RESECTION OF BLADDER TUMOR   If primary language is not English what is the name and relationship or agency of  used? [employer]- Alex (ID# [id number])   Is patient permission given to speak with other caregiver? Yes   List who call center can speak with Radha Singletary (daughter)   Person spoke with today (if not patient) and relationship Radha Singletary (daughter)   Meds reviewed with patient/caregiver? Yes   Is the patient having any side effects they believe may be caused by any medication additions or changes? No   Does the patient have all medications related to this admission filled (includes all antibiotics, pain medications, etc.) Yes   Is the patient taking all medications as directed (includes completed medication regime)? Yes   Does the patient have an appointment with their PCP within 7 days of discharge? No   Nursing Interventions Routed TCM call to PCP office  [No appts. within TCM timeframe listed.]   Has home health visited the patient within 72 hours of discharge? N/A   Psychosocial issues? No   Did the patient receive a copy of their discharge instructions? Yes   Nursing interventions Reviewed instructions with patient   What is the patient's perception of their health status since discharge? Improving   Nursing interventions Nurse provided patient education   Is the patient /caregiver able to teach back basic post-op care? Keep incision areas clean,dry and protected, No tub bath, swimming, or hot tub until instructed by MD, Take showers only when approved by MD-sponge bathe until then, Drive as instructed by MD in discharge instructions   Is the patient/caregiver able  to teach back signs and symptoms of incisional infection? Increased redness, swelling or pain at the incisonal site, Increased drainage or bleeding, Fever   Is the patient/caregiver able to teach back steps to recovery at home? Eat a well-balance diet, Rest and rebuild strength, gradually increase activity   If the patient is a current smoker, are they able to teach back resources for cessation? Not a smoker   Is the patient/caregiver able to teach back the hierarchy of who to call/visit for symptoms/problems? PCP, Specialist, Home health nurse, Urgent Care, ED, 911 Yes   TCM call completed? Yes   Wrap up additional comments Dtr. reports doing but have not been contacted by Urology(Dr. Haque) for hospital f/u appt. Advised pt. to contact office, v/u.   Call end time 1042   Would this patient benefit from a Referral to Amb Social Work? No   Is the patient interested in additional calls from an ambulatory ?  NOTE:  applies to high risk patients requiring additional follow-up. No            Bobbi Romero RN    6/9/2023, 10:48 EDT

## 2023-07-05 PROBLEM — C67.9 MALIGNANT NEOPLASM OF URINARY BLADDER: Chronic | Status: ACTIVE | Noted: 2023-07-05

## 2023-07-17 PROBLEM — D50.9 IRON DEFICIENCY ANEMIA: Status: ACTIVE | Noted: 2023-04-12

## 2023-08-02 ENCOUNTER — OFFICE VISIT (OUTPATIENT)
Dept: FAMILY MEDICINE CLINIC | Facility: CLINIC | Age: 78
End: 2023-08-02
Payer: MEDICAID

## 2023-08-02 VITALS
SYSTOLIC BLOOD PRESSURE: 133 MMHG | WEIGHT: 171 LBS | TEMPERATURE: 97.7 F | HEIGHT: 68 IN | BODY MASS INDEX: 25.91 KG/M2 | HEART RATE: 88 BPM | DIASTOLIC BLOOD PRESSURE: 76 MMHG | OXYGEN SATURATION: 97 % | RESPIRATION RATE: 16 BRPM

## 2023-08-02 DIAGNOSIS — E55.9 VITAMIN D DEFICIENCY: ICD-10-CM

## 2023-08-02 DIAGNOSIS — Z85.51 HX OF BLADDER CANCER: ICD-10-CM

## 2023-08-02 DIAGNOSIS — E11.9 TYPE 2 DIABETES MELLITUS WITHOUT COMPLICATION, WITHOUT LONG-TERM CURRENT USE OF INSULIN: Primary | ICD-10-CM

## 2023-08-02 DIAGNOSIS — E78.2 HYPERLIPIDEMIA, MIXED: ICD-10-CM

## 2023-08-02 DIAGNOSIS — M51.36 DDD (DEGENERATIVE DISC DISEASE), LUMBAR: ICD-10-CM

## 2023-08-02 DIAGNOSIS — I10 BENIGN ESSENTIAL HTN: ICD-10-CM

## 2023-08-02 RX ORDER — ROSUVASTATIN CALCIUM 20 MG/1
20 TABLET, COATED ORAL DAILY
Qty: 90 TABLET | Refills: 3 | Status: SHIPPED | OUTPATIENT
Start: 2023-08-02

## 2023-08-16 ENCOUNTER — APPOINTMENT (OUTPATIENT)
Dept: OTHER | Facility: HOSPITAL | Age: 78
End: 2023-08-16
Payer: MEDICAID

## 2023-08-16 ENCOUNTER — HOSPITAL ENCOUNTER (OUTPATIENT)
Dept: CT IMAGING | Facility: HOSPITAL | Age: 78
Discharge: HOME | End: 2023-08-16
Payer: MEDICAID

## 2023-08-16 ENCOUNTER — HOSPITAL ENCOUNTER (OUTPATIENT)
Dept: MRI IMAGING | Facility: HOSPITAL | Age: 78
Discharge: HOME | End: 2023-08-16
Payer: MEDICAID

## 2023-08-16 DIAGNOSIS — I70.0 ABDOMINAL AORTIC ATHEROSCLEROSIS: ICD-10-CM

## 2023-08-16 DIAGNOSIS — I10 BENIGN ESSENTIAL HTN: ICD-10-CM

## 2023-08-16 DIAGNOSIS — C67.9 MALIGNANT NEOPLASM OF URINARY BLADDER, UNSPECIFIED SITE: Chronic | ICD-10-CM

## 2023-08-16 DIAGNOSIS — R91.1 LUNG NODULE SEEN ON IMAGING STUDY: ICD-10-CM

## 2023-08-16 DIAGNOSIS — R91.1 LUNG NODULE SEEN ON IMAGING STUDY: Primary | ICD-10-CM

## 2023-08-16 DIAGNOSIS — E78.2 HYPERLIPIDEMIA, MIXED: ICD-10-CM

## 2023-08-16 DIAGNOSIS — Z09 FOLLOW-UP EXAM: ICD-10-CM

## 2023-08-16 DIAGNOSIS — R73.01 IMPAIRED FASTING GLUCOSE: ICD-10-CM

## 2023-08-16 DIAGNOSIS — D75.839 THROMBOCYTOSIS: ICD-10-CM

## 2023-08-16 DIAGNOSIS — M51.36 DDD (DEGENERATIVE DISC DISEASE), LUMBAR: ICD-10-CM

## 2023-08-16 DIAGNOSIS — E55.9 VITAMIN D DEFICIENCY: ICD-10-CM

## 2023-08-16 PROCEDURE — 71250 CT THORAX DX C-: CPT

## 2023-08-16 PROCEDURE — 72148 MRI LUMBAR SPINE W/O DYE: CPT

## 2023-08-17 ENCOUNTER — HOSPITAL ENCOUNTER (OUTPATIENT)
Dept: ULTRASOUND IMAGING | Facility: HOSPITAL | Age: 78
Discharge: HOME | End: 2023-08-17
Admitting: PHYSICIAN ASSISTANT
Payer: MEDICAID

## 2023-08-17 PROCEDURE — 76706 US ABDL AORTA SCREEN AAA: CPT

## 2023-08-17 NOTE — PROGRESS NOTES
When I saw the patient he was having atypical chest discomfort with a normal ECG.  In light of the coronary artery calcifications it is reasonable to do a plain treadmill.  You can order it or I will order it if you would like.

## 2023-08-20 DIAGNOSIS — M51.36 DDD (DEGENERATIVE DISC DISEASE), LUMBAR: Primary | ICD-10-CM

## 2023-08-23 ENCOUNTER — OFFICE VISIT (OUTPATIENT)
Dept: SLEEP MEDICINE | Facility: HOSPITAL | Age: 78
End: 2023-08-23
Payer: MEDICAID

## 2023-08-23 VITALS
SYSTOLIC BLOOD PRESSURE: 112 MMHG | WEIGHT: 173 LBS | HEIGHT: 70 IN | HEART RATE: 73 BPM | OXYGEN SATURATION: 93 % | BODY MASS INDEX: 24.77 KG/M2 | DIASTOLIC BLOOD PRESSURE: 71 MMHG

## 2023-08-23 DIAGNOSIS — Z91.89 AT RISK FOR SLEEP APNEA: Primary | ICD-10-CM

## 2023-08-23 DIAGNOSIS — G47.8 NON-RESTORATIVE SLEEP: ICD-10-CM

## 2023-08-23 DIAGNOSIS — R06.83 SNORING: ICD-10-CM

## 2023-08-23 DIAGNOSIS — R51.9 MORNING HEADACHE: ICD-10-CM

## 2023-08-23 PROCEDURE — 3074F SYST BP LT 130 MM HG: CPT | Performed by: FAMILY MEDICINE

## 2023-08-23 PROCEDURE — G0463 HOSPITAL OUTPT CLINIC VISIT: HCPCS

## 2023-08-23 PROCEDURE — 1159F MED LIST DOCD IN RCRD: CPT | Performed by: FAMILY MEDICINE

## 2023-08-23 PROCEDURE — 3078F DIAST BP <80 MM HG: CPT | Performed by: FAMILY MEDICINE

## 2023-08-23 PROCEDURE — 1160F RVW MEDS BY RX/DR IN RCRD: CPT | Performed by: FAMILY MEDICINE

## 2023-08-23 PROCEDURE — 99204 OFFICE O/P NEW MOD 45 MIN: CPT | Performed by: FAMILY MEDICINE

## 2023-08-23 NOTE — PROGRESS NOTES
[facility] New Patient/Consultation       Reason for Consultation: Snoring      Patient Care Team:  Jyothi Song PA-C as PCP - General (Family Medicine)  Jyothi Song PA-C as Referring Physician (Family Medicine)  Emil Mary MD as Consulting Physician (Hematology and Oncology)  Robert Haque MD as Consulting Physician (Urology)  Marshall Mayes MD as Consulting Physician (Cardiology)  Pamela Cuellar MD as Consulting Physician (Sleep Medicine)      History of present illness:  Thank you for asking me to see your patient.  The patient is a 78 y.o. male with hypertension type 2 diabetes mellitus presents today with concern for sleep disorder.  No history of prior sleep study or tonsillectomy.  Bedtime wake time varies.  Patient reports snoring morning headaches waking up screaming at night.  BMI 24.8.  No family history of sleep apnea patient is aware of.  ESS of 8.  Some nonrestorative sleep.      Social History: No tobacco use no alcohol use no caffeine use no drug use    Allergies:  Patient has no known allergies.    Family History: BLAZE no       Current Outpatient Medications:     amLODIPine (NORVASC) 10 MG tablet, Take 1 tablet by mouth Daily. For BP, Disp: 90 tablet, Rfl: 1    ferrous sulfate 325 (65 FE) MG tablet, Take 1 tablet by mouth Daily With Breakfast., Disp: 30 tablet, Rfl: 2    gabapentin (NEURONTIN) 300 MG capsule, Start with 1 p.o. at at bedtime x1 day then 1 p.o. twice daily x1 day then 1 p.o. 3 times daily for lumbar nerve pain, Disp: 90 capsule, Rfl: 2    indapamide (LOZOL) 1.25 MG tablet, Take 1 tablet by mouth Daily. For BP, Disp: 90 tablet, Rfl: 1    lisinopril (Prinivil) 10 MG tablet, Take 1 tablet by mouth Daily. For BP, Disp: 90 tablet, Rfl: 1    phenazopyridine (Pyridium) 200 MG tablet, Take 1 tablet by mouth 3 (Three) Times a Day As Needed for Bladder Spasms., Disp: 30 tablet, Rfl: 0    rosuvastatin (Crestor) 20 MG tablet, Take 1 tablet by mouth Daily. For  "cholesterol, Disp: 90 tablet, Rfl: 3    tamsulosin (FLOMAX) 0.4 MG capsule 24 hr capsule, Take 1 capsule by mouth every night at bedtime., Disp: , Rfl:     Vital Signs:    Vitals:    08/23/23 0900   BP: 112/71   Pulse: 73   SpO2: 93%   Weight: 78.5 kg (173 lb)   Height: 178 cm (70.08\")      Body mass index is 24.77 kg/mý.  Neck Circumference: 16.25 inches      REVIEW OF SYSTEMS.  Full review of systems available on the intake form which is scanned in the media tab.  The relevant positive are noted below  Daytime excessive sleepiness with [facility] Sleepiness Scale :Total score: 8   Snoring  Frequent urination      Physical exam:  Vitals:    08/23/23 0900   BP: 112/71   Pulse: 73   SpO2: 93%   Weight: 78.5 kg (173 lb)   Height: 178 cm (70.08\")    Body mass index is 24.77 kg/mý. Neck Circumference: 16.25 inches  HEENT: Head is atraumatic, normocephalic  Eyes: pupils are round equal and reacting to light and accommodation, conjunctiva normal  Throat: tongue normal  NECK:Neck Circumference: 16.25 inches  RESPIRATORY SYSTEM: Regular respirations  CARDIOVASULAR SYSTEM: Regular rate  EXTREMITES: No cyanosis, clubbing  NEUROLOGICAL SYSTEM: Oriented x 3, no gross motor defects, gait normal      Impression:  1. At risk for sleep apnea    2. Non-restorative sleep    3. Snoring    4. Morning headache        Plan:    Good sleep hygiene measures should be maintained.    I discussed the pathophysiology of obstructive sleep apnea with the patient.  We discussed the adverse outcomes associated with untreated sleep-disordered breathing.  We discussed treatment modalities of obstructive sleep apnea including CPAP device. Sleep study will be scheduled to establish definitive diagnosis of sleep disorder breathing.   Caution during activities that require prolonged concentration is strongly advised.  Patient will be notified of sleep study results after sleep study is completed.  The patient is not opposed to treatment with CPAP device if " we confirm significant obstructive sleep apnea on polysomnography.    Thank you for allowing me to participate in your patient's care.    Pamela Cuellar MD  Sleep Medicine  08/23/23  11:33 EDT

## 2023-09-13 ENCOUNTER — HOSPITAL ENCOUNTER (OUTPATIENT)
Dept: SLEEP MEDICINE | Facility: HOSPITAL | Age: 78
End: 2023-09-13
Payer: MEDICAID

## 2023-09-13 PROCEDURE — 95806 SLEEP STUDY UNATT&RESP EFFT: CPT

## 2023-09-22 RX ORDER — FERROUS SULFATE 325(65) MG
TABLET ORAL
Qty: 30 TABLET | Refills: 2 | Status: SHIPPED | OUTPATIENT
Start: 2023-09-22

## 2023-09-25 ENCOUNTER — TELEPHONE (OUTPATIENT)
Dept: SLEEP MEDICINE | Facility: HOSPITAL | Age: 78
End: 2023-09-25

## 2023-09-25 NOTE — TELEPHONE ENCOUNTER
LM with sleep study results.  Patient was asked to call the sleep center if he had any additional questions.

## 2023-10-11 ENCOUNTER — PRE-ADMISSION TESTING (OUTPATIENT)
Dept: PREADMISSION TESTING | Facility: HOSPITAL | Age: 78
End: 2023-10-11
Payer: MEDICAID

## 2023-10-11 VITALS
BODY MASS INDEX: 25.2 KG/M2 | SYSTOLIC BLOOD PRESSURE: 119 MMHG | HEIGHT: 70 IN | RESPIRATION RATE: 20 BRPM | HEART RATE: 75 BPM | TEMPERATURE: 97.1 F | WEIGHT: 176 LBS | DIASTOLIC BLOOD PRESSURE: 71 MMHG | OXYGEN SATURATION: 96 %

## 2023-10-11 LAB
ANION GAP SERPL CALCULATED.3IONS-SCNC: 9.8 MMOL/L (ref 5–15)
BUN SERPL-MCNC: 26 MG/DL (ref 8–23)
BUN/CREAT SERPL: 24.8 (ref 7–25)
CALCIUM SPEC-SCNC: 10.4 MG/DL (ref 8.6–10.5)
CHLORIDE SERPL-SCNC: 103 MMOL/L (ref 98–107)
CO2 SERPL-SCNC: 25.2 MMOL/L (ref 22–29)
CREAT SERPL-MCNC: 1.05 MG/DL (ref 0.76–1.27)
DEPRECATED RDW RBC AUTO: 39.9 FL (ref 37–54)
EGFRCR SERPLBLD CKD-EPI 2021: 73.6 ML/MIN/1.73
ERYTHROCYTE [DISTWIDTH] IN BLOOD BY AUTOMATED COUNT: 12.9 % (ref 12.3–15.4)
GLUCOSE SERPL-MCNC: 99 MG/DL (ref 65–99)
HCT VFR BLD AUTO: 37.3 % (ref 37.5–51)
HGB BLD-MCNC: 12.4 G/DL (ref 13–17.7)
MCH RBC QN AUTO: 28.7 PG (ref 26.6–33)
MCHC RBC AUTO-ENTMCNC: 33.2 G/DL (ref 31.5–35.7)
MCV RBC AUTO: 86.3 FL (ref 79–97)
PLATELET # BLD AUTO: 444 10*3/MM3 (ref 140–450)
PMV BLD AUTO: 8.9 FL (ref 6–12)
POTASSIUM SERPL-SCNC: 4.7 MMOL/L (ref 3.5–5.2)
RBC # BLD AUTO: 4.32 10*6/MM3 (ref 4.14–5.8)
SODIUM SERPL-SCNC: 138 MMOL/L (ref 136–145)
WBC NRBC COR # BLD: 8.67 10*3/MM3 (ref 3.4–10.8)

## 2023-10-11 PROCEDURE — 36415 COLL VENOUS BLD VENIPUNCTURE: CPT

## 2023-10-11 PROCEDURE — 85027 COMPLETE CBC AUTOMATED: CPT

## 2023-10-11 PROCEDURE — 80048 BASIC METABOLIC PNL TOTAL CA: CPT

## 2023-10-11 RX ORDER — CALCIUM CARBONATE 500 MG/1
1 TABLET, CHEWABLE ORAL AS NEEDED
COMMUNITY

## 2023-10-11 RX ORDER — ACETAMINOPHEN 500 MG
500 TABLET ORAL EVERY 6 HOURS PRN
COMMUNITY

## 2023-10-11 NOTE — DISCHARGE INSTRUCTIONS
Take the following medications the morning of surgery with a small sip of water:    Gabapentin   amlodipine   crestor   tamsulosin    If you are on prescription narcotic pain medication to control your pain you may also take that medication the morning of surgery.    General Instructions:  Do not eat or drink anything after midnight the night before surgery.  Infants may have breast milk up to four hours before surgery.  Infants drinking formula may drink formula up to six hours before surgery.   Patients who avoid smoking, chewing tobacco and alcohol for 4 weeks prior to surgery have a reduced risk of post-operative complications.  Quit smoking as many days before surgery as you can.  Do not smoke, use chewing tobacco or drink alcohol the day of surgery.   If applicable bring your C-PAP/ BI-PAP machine in with you to preop day of surgery.  Bring any papers given to you in the doctor’s office.  Wear clean comfortable clothes.  Do not wear contact lenses, false eyelashes or make-up.  Bring a case for your glasses.   Bring crutches or walker if applicable.  Remove all piercings.  Leave jewelry and any other valuables at home.  Hair extensions with metal clips must be removed prior to surgery.  The Pre-Admission Testing nurse will instruct you to bring medications if unable to obtain an accurate list in Pre-Admission Testing.        If you were given a blood bank ID arm band remember to bring it with you the day of surgery.    Preventing a Surgical Site Infection:  For 2 to 3 days before surgery, avoid shaving with a razor because the razor can irritate skin and make it easier to develop an infection.    Any areas of open skin can increase the risk of a post-operative wound infection by allowing bacteria to enter and travel throughout the body.  Notify your surgeon if you have any skin wounds / rashes even if it is not near the expected surgical site.  The area will need assessed to determine if surgery should be  delayed until it is healed.  The night prior to surgery shower using a fresh bar of anti-bacterial soap (such as Dial) and clean washcloth.  Sleep in a clean bed with clean clothing.  Do not allow pets to sleep with you.  Shower on the morning of surgery using a fresh bar of anti-bacterial soap (such as Dial) and clean washcloth.  Dry with a clean towel and dress in clean clothing.  Ask your surgeon if you will be receiving antibiotics prior to surgery.  Make sure you, your family, and all healthcare providers clean their hands with soap and water or an alcohol based hand  before caring for you or your wound.    Day of surgery:10/18/2023   2 pm  Your arrival time is approximately two hours before your scheduled surgery time.  Upon arrival, a Pre-op nurse and Anesthesiologist will review your health history, obtain vital signs, and answer questions you may have.  The only belongings needed at this time will be your home medications and if applicable your C-PAP/BI-PAP machine.  A Pre-op nurse will start an IV and you may receive medication in preparation for surgery, including something to help you relax.      Please be aware that surgery does come with discomfort.  We want to make every effort to control your discomfort so please discuss any uncontrolled symptoms with your nurse.   Your doctor will most likely have prescribed pain medications.      If you are going home after surgery you will receive individualized written care instructions before being discharged.  A responsible adult must drive you to and from the hospital on the day of your surgery and stay with you for 24 hours.  Discharge prescriptions can be filled by the hospital pharmacy during regular pharmacy hours.  If you are having surgery late in the day/evening your prescription may be e-prescribed to your pharmacy.  Please verify your pharmacy hours or chose a 24 hour pharmacy to avoid not having access to your prescription because your  pharmacy has closed for the day.    If you are staying overnight following surgery, you will be transported to your hospital room following the recovery period.  [facility] has all private rooms.    If you have any questions please call Pre-Admission Testing at [phone].  Deductibles and co-payments are collected on the day of service. Please be prepared to pay the required co-pay, deductible or deposit on the day of service as defined by your plan.    Call your surgeon immediately if you experience any of the following symptoms:  Sore Throat  Shortness of Breath or difficulty breathing  Cough  Chills  Body soreness or muscle pain  Headache  Fever  New loss of taste or smell  Do not arrive for your surgery ill.  Your procedure will need to be rescheduled to another time.  You will need to call your physician before the day of surgery to avoid any unnecessary exposure to hospital staff as well as other patients.

## 2023-10-17 RX ORDER — CEFAZOLIN SODIUM 2 G/100ML
2000 INJECTION, SOLUTION INTRAVENOUS ONCE
Status: DISCONTINUED | OUTPATIENT
Start: 2023-10-17 | End: 2023-10-17

## 2023-10-18 ENCOUNTER — ANESTHESIA EVENT (OUTPATIENT)
Dept: PERIOP | Facility: HOSPITAL | Age: 78
End: 2023-10-18
Payer: MEDICAID

## 2023-10-18 ENCOUNTER — HOSPITAL ENCOUNTER (OUTPATIENT)
Facility: HOSPITAL | Age: 78
Setting detail: OBSERVATION
Discharge: HOME | End: 2023-10-19
Attending: UROLOGY | Admitting: UROLOGY
Payer: MEDICAID

## 2023-10-18 ENCOUNTER — ANESTHESIA (OUTPATIENT)
Dept: PERIOP | Facility: HOSPITAL | Age: 78
End: 2023-10-18
Payer: MEDICAID

## 2023-10-18 DIAGNOSIS — D49.4 BLADDER TUMOR: ICD-10-CM

## 2023-10-18 DIAGNOSIS — C67.8 MALIGNANT NEOPLASM OF OVERLAPPING SITES OF BLADDER: Primary | Chronic | ICD-10-CM

## 2023-10-18 PROBLEM — C67.9 BLADDER CANCER: Status: ACTIVE | Noted: 2023-10-18

## 2023-10-18 LAB
GLUCOSE BLDC GLUCOMTR-MCNC: 96 MG/DL (ref 70–130)
GLUCOSE BLDC GLUCOMTR-MCNC: 99 MG/DL (ref 70–130)

## 2023-10-18 PROCEDURE — 25010000002 PROPOFOL 10 MG/ML EMULSION: Performed by: NURSE ANESTHETIST, CERTIFIED REGISTERED

## 2023-10-18 PROCEDURE — G0378 HOSPITAL OBSERVATION PER HR: HCPCS

## 2023-10-18 PROCEDURE — 25010000002 CEFAZOLIN IN DEXTROSE 2-4 GM/100ML-% SOLUTION: Performed by: UROLOGY

## 2023-10-18 PROCEDURE — 25810000003 SODIUM CHLORIDE 0.9 % SOLUTION: Performed by: UROLOGY

## 2023-10-18 PROCEDURE — 25010000002 HYDROMORPHONE PER 4 MG: Performed by: NURSE ANESTHETIST, CERTIFIED REGISTERED

## 2023-10-18 PROCEDURE — 25010000002 ONDANSETRON PER 1 MG: Performed by: NURSE ANESTHETIST, CERTIFIED REGISTERED

## 2023-10-18 PROCEDURE — 88307 TISSUE EXAM BY PATHOLOGIST: CPT | Performed by: UROLOGY

## 2023-10-18 PROCEDURE — 82948 REAGENT STRIP/BLOOD GLUCOSE: CPT

## 2023-10-18 PROCEDURE — 25810000003 LACTATED RINGERS PER 1000 ML: Performed by: ANESTHESIOLOGY

## 2023-10-18 RX ORDER — HYDROCODONE BITARTRATE AND ACETAMINOPHEN 7.5; 325 MG/1; MG/1
1 TABLET ORAL EVERY 4 HOURS PRN
Status: DISCONTINUED | OUTPATIENT
Start: 2023-10-18 | End: 2023-10-18 | Stop reason: HOSPADM

## 2023-10-18 RX ORDER — FLUMAZENIL 0.1 MG/ML
0.2 INJECTION INTRAVENOUS AS NEEDED
Status: DISCONTINUED | OUTPATIENT
Start: 2023-10-18 | End: 2023-10-18 | Stop reason: HOSPADM

## 2023-10-18 RX ORDER — LIDOCAINE HYDROCHLORIDE 20 MG/ML
INJECTION, SOLUTION INFILTRATION; PERINEURAL AS NEEDED
Status: DISCONTINUED | OUTPATIENT
Start: 2023-10-18 | End: 2023-10-18 | Stop reason: SURG

## 2023-10-18 RX ORDER — IPRATROPIUM BROMIDE AND ALBUTEROL SULFATE 2.5; .5 MG/3ML; MG/3ML
3 SOLUTION RESPIRATORY (INHALATION) ONCE AS NEEDED
Status: DISCONTINUED | OUTPATIENT
Start: 2023-10-18 | End: 2023-10-18 | Stop reason: HOSPADM

## 2023-10-18 RX ORDER — ONDANSETRON 4 MG/1
4 TABLET, FILM COATED ORAL EVERY 6 HOURS PRN
Status: DISCONTINUED | OUTPATIENT
Start: 2023-10-18 | End: 2023-10-20 | Stop reason: HOSPADM

## 2023-10-18 RX ORDER — SODIUM CHLORIDE 9 MG/ML
40 INJECTION, SOLUTION INTRAVENOUS AS NEEDED
Status: DISCONTINUED | OUTPATIENT
Start: 2023-10-18 | End: 2023-10-20 | Stop reason: HOSPADM

## 2023-10-18 RX ORDER — MIDAZOLAM HYDROCHLORIDE 1 MG/ML
0.5 INJECTION INTRAMUSCULAR; INTRAVENOUS
Status: DISCONTINUED | OUTPATIENT
Start: 2023-10-18 | End: 2023-10-18 | Stop reason: HOSPADM

## 2023-10-18 RX ORDER — FENTANYL CITRATE 50 UG/ML
25 INJECTION, SOLUTION INTRAMUSCULAR; INTRAVENOUS
Status: DISCONTINUED | OUTPATIENT
Start: 2023-10-18 | End: 2023-10-18 | Stop reason: HOSPADM

## 2023-10-18 RX ORDER — SODIUM CHLORIDE 0.9 % (FLUSH) 0.9 %
10 SYRINGE (ML) INJECTION AS NEEDED
Status: DISCONTINUED | OUTPATIENT
Start: 2023-10-18 | End: 2023-10-20 | Stop reason: HOSPADM

## 2023-10-18 RX ORDER — LISINOPRIL 10 MG/1
10 TABLET ORAL DAILY
Status: DISCONTINUED | OUTPATIENT
Start: 2023-10-19 | End: 2023-10-20 | Stop reason: HOSPADM

## 2023-10-18 RX ORDER — AMLODIPINE BESYLATE 10 MG/1
10 TABLET ORAL DAILY
Status: DISCONTINUED | OUTPATIENT
Start: 2023-10-19 | End: 2023-10-20 | Stop reason: HOSPADM

## 2023-10-18 RX ORDER — SODIUM CHLORIDE, SODIUM LACTATE, POTASSIUM CHLORIDE, CALCIUM CHLORIDE 600; 310; 30; 20 MG/100ML; MG/100ML; MG/100ML; MG/100ML
9 INJECTION, SOLUTION INTRAVENOUS CONTINUOUS
Status: DISCONTINUED | OUTPATIENT
Start: 2023-10-18 | End: 2023-10-20 | Stop reason: HOSPADM

## 2023-10-18 RX ORDER — ONDANSETRON 2 MG/ML
4 INJECTION INTRAMUSCULAR; INTRAVENOUS ONCE AS NEEDED
Status: DISCONTINUED | OUTPATIENT
Start: 2023-10-18 | End: 2023-10-18 | Stop reason: HOSPADM

## 2023-10-18 RX ORDER — HYDROMORPHONE HYDROCHLORIDE 1 MG/ML
0.25 INJECTION, SOLUTION INTRAMUSCULAR; INTRAVENOUS; SUBCUTANEOUS
Status: DISCONTINUED | OUTPATIENT
Start: 2023-10-18 | End: 2023-10-18 | Stop reason: HOSPADM

## 2023-10-18 RX ORDER — LABETALOL HYDROCHLORIDE 5 MG/ML
5 INJECTION, SOLUTION INTRAVENOUS
Status: DISCONTINUED | OUTPATIENT
Start: 2023-10-18 | End: 2023-10-18 | Stop reason: HOSPADM

## 2023-10-18 RX ORDER — OXYCODONE HYDROCHLORIDE AND ACETAMINOPHEN 5; 325 MG/1; MG/1
1 TABLET ORAL EVERY 4 HOURS PRN
Status: DISCONTINUED | OUTPATIENT
Start: 2023-10-18 | End: 2023-10-20 | Stop reason: HOSPADM

## 2023-10-18 RX ORDER — DROPERIDOL 2.5 MG/ML
0.62 INJECTION, SOLUTION INTRAMUSCULAR; INTRAVENOUS
Status: DISCONTINUED | OUTPATIENT
Start: 2023-10-18 | End: 2023-10-18 | Stop reason: HOSPADM

## 2023-10-18 RX ORDER — ACETAMINOPHEN 500 MG
500 TABLET ORAL EVERY 6 HOURS PRN
Status: DISCONTINUED | OUTPATIENT
Start: 2023-10-18 | End: 2023-10-20 | Stop reason: HOSPADM

## 2023-10-18 RX ORDER — SODIUM CHLORIDE 0.9 % (FLUSH) 0.9 %
3 SYRINGE (ML) INJECTION EVERY 12 HOURS SCHEDULED
Status: DISCONTINUED | OUTPATIENT
Start: 2023-10-18 | End: 2023-10-20 | Stop reason: HOSPADM

## 2023-10-18 RX ORDER — SODIUM CHLORIDE 0.9 % (FLUSH) 0.9 %
3-10 SYRINGE (ML) INJECTION AS NEEDED
Status: DISCONTINUED | OUTPATIENT
Start: 2023-10-18 | End: 2023-10-18 | Stop reason: HOSPADM

## 2023-10-18 RX ORDER — PROMETHAZINE HYDROCHLORIDE 25 MG/1
25 SUPPOSITORY RECTAL ONCE AS NEEDED
Status: DISCONTINUED | OUTPATIENT
Start: 2023-10-18 | End: 2023-10-18 | Stop reason: HOSPADM

## 2023-10-18 RX ORDER — ONDANSETRON 2 MG/ML
INJECTION INTRAMUSCULAR; INTRAVENOUS AS NEEDED
Status: DISCONTINUED | OUTPATIENT
Start: 2023-10-18 | End: 2023-10-18 | Stop reason: SURG

## 2023-10-18 RX ORDER — HYDROCODONE BITARTRATE AND ACETAMINOPHEN 5; 325 MG/1; MG/1
1 TABLET ORAL ONCE AS NEEDED
Status: DISCONTINUED | OUTPATIENT
Start: 2023-10-18 | End: 2023-10-18 | Stop reason: HOSPADM

## 2023-10-18 RX ORDER — INDAPAMIDE 1.25 MG/1
1.25 TABLET ORAL DAILY
Status: DISCONTINUED | OUTPATIENT
Start: 2023-10-19 | End: 2023-10-20 | Stop reason: HOSPADM

## 2023-10-18 RX ORDER — OXYCODONE HYDROCHLORIDE AND ACETAMINOPHEN 5; 325 MG/1; MG/1
1-2 TABLET ORAL EVERY 6 HOURS PRN
Qty: 30 TABLET | Refills: 0 | Status: SHIPPED | OUTPATIENT
Start: 2023-10-18

## 2023-10-18 RX ORDER — CEFAZOLIN SODIUM 2 G/100ML
2000 INJECTION, SOLUTION INTRAVENOUS ONCE
Status: COMPLETED | OUTPATIENT
Start: 2023-10-18 | End: 2023-10-18

## 2023-10-18 RX ORDER — ONDANSETRON 2 MG/ML
4 INJECTION INTRAMUSCULAR; INTRAVENOUS EVERY 6 HOURS PRN
Status: DISCONTINUED | OUTPATIENT
Start: 2023-10-18 | End: 2023-10-20 | Stop reason: HOSPADM

## 2023-10-18 RX ORDER — FENTANYL CITRATE 50 UG/ML
50 INJECTION, SOLUTION INTRAMUSCULAR; INTRAVENOUS ONCE AS NEEDED
Status: DISCONTINUED | OUTPATIENT
Start: 2023-10-18 | End: 2023-10-18 | Stop reason: HOSPADM

## 2023-10-18 RX ORDER — GABAPENTIN 300 MG/1
300 CAPSULE ORAL EVERY MORNING
Status: DISCONTINUED | OUTPATIENT
Start: 2023-10-19 | End: 2023-10-20 | Stop reason: HOSPADM

## 2023-10-18 RX ORDER — PROMETHAZINE HYDROCHLORIDE 25 MG/1
25 TABLET ORAL ONCE AS NEEDED
Status: DISCONTINUED | OUTPATIENT
Start: 2023-10-18 | End: 2023-10-18 | Stop reason: HOSPADM

## 2023-10-18 RX ORDER — HYDRALAZINE HYDROCHLORIDE 20 MG/ML
5 INJECTION INTRAMUSCULAR; INTRAVENOUS
Status: DISCONTINUED | OUTPATIENT
Start: 2023-10-18 | End: 2023-10-18 | Stop reason: HOSPADM

## 2023-10-18 RX ORDER — CEPHALEXIN 500 MG/1
500 CAPSULE ORAL 3 TIMES DAILY
Qty: 21 CAPSULE | Refills: 0 | Status: SHIPPED | OUTPATIENT
Start: 2023-10-18 | End: 2023-10-25

## 2023-10-18 RX ORDER — SODIUM CHLORIDE 0.9 % (FLUSH) 0.9 %
3 SYRINGE (ML) INJECTION EVERY 12 HOURS SCHEDULED
Status: DISCONTINUED | OUTPATIENT
Start: 2023-10-18 | End: 2023-10-18 | Stop reason: HOSPADM

## 2023-10-18 RX ORDER — ROSUVASTATIN CALCIUM 20 MG/1
20 TABLET, COATED ORAL DAILY
Status: DISCONTINUED | OUTPATIENT
Start: 2023-10-19 | End: 2023-10-20 | Stop reason: HOSPADM

## 2023-10-18 RX ORDER — TAMSULOSIN HYDROCHLORIDE 0.4 MG/1
0.4 CAPSULE ORAL EVERY MORNING
Status: DISCONTINUED | OUTPATIENT
Start: 2023-10-19 | End: 2023-10-20 | Stop reason: HOSPADM

## 2023-10-18 RX ORDER — NALOXONE HCL 0.4 MG/ML
0.2 VIAL (ML) INJECTION AS NEEDED
Status: DISCONTINUED | OUTPATIENT
Start: 2023-10-18 | End: 2023-10-18 | Stop reason: HOSPADM

## 2023-10-18 RX ORDER — DIPHENHYDRAMINE HYDROCHLORIDE 50 MG/ML
12.5 INJECTION INTRAMUSCULAR; INTRAVENOUS
Status: DISCONTINUED | OUTPATIENT
Start: 2023-10-18 | End: 2023-10-18 | Stop reason: HOSPADM

## 2023-10-18 RX ORDER — EPHEDRINE SULFATE 50 MG/ML
5 INJECTION, SOLUTION INTRAVENOUS ONCE AS NEEDED
Status: DISCONTINUED | OUTPATIENT
Start: 2023-10-18 | End: 2023-10-18 | Stop reason: HOSPADM

## 2023-10-18 RX ORDER — LIDOCAINE HYDROCHLORIDE 10 MG/ML
0.5 INJECTION, SOLUTION INFILTRATION; PERINEURAL ONCE AS NEEDED
Status: DISCONTINUED | OUTPATIENT
Start: 2023-10-18 | End: 2023-10-18 | Stop reason: HOSPADM

## 2023-10-18 RX ORDER — PROPOFOL 10 MG/ML
VIAL (ML) INTRAVENOUS AS NEEDED
Status: DISCONTINUED | OUTPATIENT
Start: 2023-10-18 | End: 2023-10-18 | Stop reason: SURG

## 2023-10-18 RX ORDER — AMOXICILLIN 250 MG
2 CAPSULE ORAL 2 TIMES DAILY
Status: DISCONTINUED | OUTPATIENT
Start: 2023-10-18 | End: 2023-10-20 | Stop reason: HOSPADM

## 2023-10-18 RX ORDER — NALOXONE HCL 0.4 MG/ML
0.1 VIAL (ML) INJECTION
Status: DISCONTINUED | OUTPATIENT
Start: 2023-10-18 | End: 2023-10-20 | Stop reason: HOSPADM

## 2023-10-18 RX ORDER — FAMOTIDINE 10 MG/ML
20 INJECTION, SOLUTION INTRAVENOUS ONCE
Status: COMPLETED | OUTPATIENT
Start: 2023-10-18 | End: 2023-10-18

## 2023-10-18 RX ORDER — SODIUM CHLORIDE 9 MG/ML
100 INJECTION, SOLUTION INTRAVENOUS CONTINUOUS
Status: DISCONTINUED | OUTPATIENT
Start: 2023-10-18 | End: 2023-10-20 | Stop reason: HOSPADM

## 2023-10-18 RX ADMIN — SODIUM CHLORIDE 100 ML/HR: 9 INJECTION, SOLUTION INTRAVENOUS at 22:04

## 2023-10-18 RX ADMIN — LIDOCAINE HYDROCHLORIDE 100 MG: 20 INJECTION, SOLUTION INFILTRATION; PERINEURAL at 18:17

## 2023-10-18 RX ADMIN — PROPOFOL 160 MG: 10 INJECTION, EMULSION INTRAVENOUS at 18:17

## 2023-10-18 RX ADMIN — CEFAZOLIN SODIUM 2000 MG: 2 INJECTION, SOLUTION INTRAVENOUS at 18:03

## 2023-10-18 RX ADMIN — FAMOTIDINE 20 MG: 10 INJECTION INTRAVENOUS at 16:27

## 2023-10-18 RX ADMIN — SODIUM CHLORIDE, POTASSIUM CHLORIDE, SODIUM LACTATE AND CALCIUM CHLORIDE 9 ML/HR: 600; 310; 30; 20 INJECTION, SOLUTION INTRAVENOUS at 16:27

## 2023-10-18 RX ADMIN — ONDANSETRON 4 MG: 2 INJECTION INTRAMUSCULAR; INTRAVENOUS at 18:17

## 2023-10-18 RX ADMIN — HYDROMORPHONE HYDROCHLORIDE 0.25 MG: 1 INJECTION, SOLUTION INTRAMUSCULAR; INTRAVENOUS; SUBCUTANEOUS at 19:08

## 2023-10-18 RX ADMIN — Medication 3 ML: at 21:38

## 2023-10-18 RX ADMIN — HYDROMORPHONE HYDROCHLORIDE 0.25 MG: 1 INJECTION, SOLUTION INTRAMUSCULAR; INTRAVENOUS; SUBCUTANEOUS at 19:00

## 2023-10-18 NOTE — ANESTHESIA PROCEDURE NOTES
Airway  Urgency: elective    Date/Time: 10/18/2023 6:19 PM    General Information and Staff    Patient location during procedure: OR  CRNA/CAA: Konstantin Romero CRNA    Indications and Patient Condition  Indications for airway management: airway protection    Preoxygenated: yes  MILS not maintained throughout  Mask difficulty assessment: 0 - not attempted    Final Airway Details  Final airway type: supraglottic airway      Successful airway: LMA  Size 4     Number of attempts at approach: 1  Assessment: lips, teeth, and gum same as pre-op

## 2023-10-18 NOTE — ANESTHESIA PREPROCEDURE EVALUATION
Anesthesia Evaluation     Patient summary reviewed and Nursing notes reviewed   no history of anesthetic complications:   NPO Solid Status: > 8 hours  NPO Liquid Status: > 2 hours           Airway   Mallampati: II  TM distance: >3 FB  Neck ROM: full  No difficulty expected  Dental - normal exam     Pulmonary - negative pulmonary ROS and normal exam    breath sounds clear to auscultation  (+) a smoker (25 pack years) Former,sleep apnea  (-) shortness of breath, recent URI  Cardiovascular - negative cardio ROS and normal exam  Exercise tolerance: good (4-7 METS)    ECG reviewed  Rhythm: regular  Rate: normal    (+) hypertension, hyperlipidemia  (-) past MI, dysrhythmias, angina, orthopnea, PND, ALMENDAREZ    ROS comment: NORMAL ECG -  Sinus rhythm  No Prior Tracing for Comparison      Neuro/Psych- negative ROS  (+) psychiatric history Anxiety and PTSD  (-) seizures, CVA  GI/Hepatic/Renal/Endo - negative ROS   (+) GERD, diabetes mellitus (IFG) type 2  (-)  obesity    Musculoskeletal (-) negative ROS    (+) back pain  (-) neck stiffness  Abdominal    Substance History - negative use     OB/GYN negative ob/gyn ROS         Other - negative ROS  arthritis,   history of cancer (Bladder)                      Anesthesia Plan    ASA 2     general     intravenous induction     Anesthetic plan, risks, benefits, and alternatives have been provided, discussed and informed consent has been obtained with: patient.        CODE STATUS:

## 2023-10-18 NOTE — OP NOTE
CYSTOSCOPY TRANSURETHRAL RESECTION OF BLADDER TUMOR  Procedure Note    Alex Negron  10/18/2023    Pre-op Diagnosis:   Recurrent Bladder Cancer    Post-op Diagnosis:     Post-Op Diagnosis Codes:     * Bladder cancer [C67.9]    Procedure(s):  CYSTOSCOPY TRANSURETHRAL RESECTION OF BLADDER TUMOR    Surgeon(s):  Robert Haque MD    Anesthesia: General    Staff:   Circulator: Konstantin Song RN  Scrub Person: Mayito Sanderson    Estimated Blood Loss: 100ml    Specimens:                Order Name Source Comment Collection Info Order Time   TISSUE PATHOLOGY EXAM Urinary Bladder  Collected By: Robert Haque MD 10/18/2023  6:28 PM     Release to patient   Routine Release              Drains:   [REMOVED] Urethral Catheter Silicone 18 Fr. (Removed)       Findings: 2 papillary tumors one 3 cm tumor at the posterior wall and a second 2 to 3 cm tumor at the posterior wall dome junction.  Both look superficial.  A deep aggressive resection was performed.  No bladder perforation noted.  Some concern about bleeding so we will keep him overnight.    Complications: None apparent    Indications: 78-year-old gentleman with bladder cancer recently underwent chemotherapy intravesical induction.  Now presents for resection of 2 recurrent tumors.    Procedure: She was taken the operative suite given general anesthesia.  Placed lithotomy.  Prepped and draped in a sterile fashion.  Surgical timeout was performed.  The resectoscope was placed.  Tumor was seen at the posterior wall and at the posterior wall junction of the dome.  Using the loop resectoscope we began resecting the tumor first at the posterior wall.  It was very papillary but aggressive in appearance Michael somewhat of a sessile component to it that was easily resected and I got into for him some very deep resection.  I do not think there is any muscle invasion.  It seems to be involving a diverticulum.  There is no bladder perforation.  I cauterized all the  edges and the base.  Second tumor measured little bit under 3 cm closer to 2-2 and half centimeters I resected this in a similar fashion.  Once hemostasis was achieved.  I placed a 20 Jordanian three-way Bharat catheter to continuous irrigation he was taken recovery in stable condition.  I discussed the findings with his daughter.      Robert Haque MD     Date: 10/18/2023  Time: 18:57 EDT

## 2023-10-18 NOTE — H&P
[facility] Surgical History and Physical    Patient Care Team:  Jyothi Song PA-C as PCP - General (Family Medicine)  Jyothi Song PA-C as Referring Physician (Family Medicine)  Emil Mary MD as Consulting Physician (Hematology and Oncology)  Robert Haque MD as Consulting Physician (Urology)  Marshall Mayes MD as Consulting Physician (Cardiology)    Chief complaint bladder cancer    Subjective     Patient is a 78 y.o. male presents with recurrent bladder cancer status post intravesical chemotherapy.     Review of Systems   Pertinent items are noted in HPI    Past Medical History:   Diagnosis Date    Anxiety     Aortic atherosclerosis     Bladder cancer     Bladder mass 2023    Bladder tumor 2023    chemical chemo in MD office  weekly    Blind left eye     Blood in urine     TRACE    Burning with urination     DDD (degenerative disc disease), lumbar     GERD (gastroesophageal reflux disease)     History of low back pain     Hyperlipidemia     Hypertension     PTSD (post-traumatic stress disorder)     [place] start of [place]    Renal insufficiency     Sleep apnea     diagnosed but following up with PCP    Urine frequency      Past Surgical History:   Procedure Laterality Date    DENTAL PROCEDURE      ENDOSCOPY      TRANSURETHRAL RESECTION OF BLADDER TUMOR N/A 2023    Procedure: TRANSURETHRAL RESECTION OF BLADDER TUMOR;  Surgeon: Robert Haque MD;  Location: [facility];  Service: Urology;  Laterality: N/A;     Family History   Problem Relation Age of Onset    Malig Hyperthermia Neg Hx      Social History     Tobacco Use    Smoking status: Former     Packs/day: 1.00     Years: 25.00     Additional pack years: 0.00     Total pack years: 25.00     Types: Cigarettes     Quit date:      Years since quittin.8    Smokeless tobacco: Never   Vaping Use    Vaping Use: Never used   Substance Use Topics    Alcohol use: Never    Drug  use: Never       Meds:  Medications Prior to Admission   Medication Sig Dispense Refill Last Dose    acetaminophen (TYLENOL) 500 MG tablet Take 1 tablet by mouth Every 6 (Six) Hours As Needed for Mild Pain.   10/17/2023    amLODIPine (NORVASC) 10 MG tablet Take 1 tablet by mouth Daily. For BP (Patient taking differently: Take 1 tablet by mouth Every Morning. For BP) 90 tablet 1 10/18/2023    calcium carbonate (TUMS) 500 MG chewable tablet Chew 1 tablet As Needed for Indigestion or Heartburn.   10/18/2023    FeroSul 325 (65 Fe) MG tablet TAKE 1 TABLET BY MOUTH EVERY DAY WITH BREAKFAST 30 tablet 2 10/17/2023    gabapentin (NEURONTIN) 300 MG capsule Start with 1 p.o. at at bedtime x1 day then 1 p.o. twice daily x1 day then 1 p.o. 3 times daily for lumbar nerve pain (Patient taking differently: Take 1 capsule by mouth Every Morning. Start with 1 p.o. at at bedtime x1 day then 1 p.o. twice daily x1 day then 1 p.o. 3 times daily for lumbar nerve pain) 90 capsule 2 10/18/2023    indapamide (LOZOL) 1.25 MG tablet Take 1 tablet by mouth Daily. For BP (Patient taking differently: Take 1 tablet by mouth Every Morning. For BP) 90 tablet 1 10/17/2023    lisinopril (Prinivil) 10 MG tablet Take 1 tablet by mouth Daily. For BP (Patient taking differently: Take 1 tablet by mouth Every Morning. For BP) 90 tablet 1 10/17/2023    rosuvastatin (Crestor) 20 MG tablet Take 1 tablet by mouth Daily. For cholesterol (Patient taking differently: Take 1 tablet by mouth Every Morning. For cholesterol) 90 tablet 3 10/18/2023    tamsulosin (FLOMAX) 0.4 MG capsule 24 hr capsule Take 1 capsule by mouth Every Morning.   10/18/2023    NON FORMULARY Take 2 each by mouth Every Morning. Eye health          Allergies:  Patient has no known allergies.    Debilities:  None    Objective     Vital Signs  Temp:  [98 °F (36.7 °C)] 98 °F (36.7 °C)  Heart Rate:  [82] 82  Resp:  [16] 16  BP: (150)/(98) 150/98  No intake or output data in the 24 hours ending  10/18/23 1655       Physical Exam:     General Appearance:    Alert, cooperative, NAD   HEENT:    No trauma, pupils reactive, hearing intact   Back:     No CVA tenderness   Lungs:     Respirations unlabored, no wheezing    Heart:    RRR, intact peripheral pulses   Abdomen:     Soft, NDNT, no masses, no guarding   :  Penis normal testes normal   Extremities:   No edema, no deformity   Lymphatic:   No neck or groin LAD   Skin:   No bleeding, bruising or rashes   Neuro/Psych:   Orientation intact, mood/affect pleasant, no focal findings     Results Review:    I reviewed the patient's new clinical results.  Results for orders placed or performed during the hospital encounter of 10/18/23   POC Glucose Once    Specimen: Blood   Result Value Ref Range    Glucose 96 70 - 130 mg/dL        Assessment:  Recurrent bladder cancer    Plan:    TURBT    I discussed the patient's findings and my recommendations with patient.   Risks, complications, outcomes and alternatives discussed with the patient at the bedside and office.    Robert Haque MD  10/18/23  16:55 EDT

## 2023-10-19 ENCOUNTER — TELEPHONE (OUTPATIENT)
Dept: ONCOLOGY | Facility: CLINIC | Age: 78
End: 2023-10-19
Payer: MEDICAID

## 2023-10-19 VITALS
TEMPERATURE: 98.1 F | HEIGHT: 70 IN | BODY MASS INDEX: 25.2 KG/M2 | SYSTOLIC BLOOD PRESSURE: 131 MMHG | OXYGEN SATURATION: 92 % | WEIGHT: 176 LBS | RESPIRATION RATE: 16 BRPM | DIASTOLIC BLOOD PRESSURE: 67 MMHG | HEART RATE: 68 BPM

## 2023-10-19 LAB
ANION GAP SERPL CALCULATED.3IONS-SCNC: 7.3 MMOL/L (ref 5–15)
BUN SERPL-MCNC: 23 MG/DL (ref 8–23)
BUN/CREAT SERPL: 22.3 (ref 7–25)
CALCIUM SPEC-SCNC: 9.2 MG/DL (ref 8.6–10.5)
CHLORIDE SERPL-SCNC: 106 MMOL/L (ref 98–107)
CO2 SERPL-SCNC: 25.7 MMOL/L (ref 22–29)
CREAT SERPL-MCNC: 1.03 MG/DL (ref 0.76–1.27)
DEPRECATED RDW RBC AUTO: 42.3 FL (ref 37–54)
EGFRCR SERPLBLD CKD-EPI 2021: 75.3 ML/MIN/1.73
ERYTHROCYTE [DISTWIDTH] IN BLOOD BY AUTOMATED COUNT: 13.1 % (ref 12.3–15.4)
GLUCOSE SERPL-MCNC: 97 MG/DL (ref 65–99)
HCT VFR BLD AUTO: 34.1 % (ref 37.5–51)
HGB BLD-MCNC: 11.3 G/DL (ref 13–17.7)
MCH RBC QN AUTO: 29.2 PG (ref 26.6–33)
MCHC RBC AUTO-ENTMCNC: 33.1 G/DL (ref 31.5–35.7)
MCV RBC AUTO: 88.1 FL (ref 79–97)
PLATELET # BLD AUTO: 416 10*3/MM3 (ref 140–450)
PMV BLD AUTO: 8.6 FL (ref 6–12)
POTASSIUM SERPL-SCNC: 4.6 MMOL/L (ref 3.5–5.2)
RBC # BLD AUTO: 3.87 10*6/MM3 (ref 4.14–5.8)
SODIUM SERPL-SCNC: 139 MMOL/L (ref 136–145)
WBC NRBC COR # BLD: 8.99 10*3/MM3 (ref 3.4–10.8)

## 2023-10-19 PROCEDURE — 25010000002 HYDROMORPHONE 1 MG/ML SOLUTION: Performed by: UROLOGY

## 2023-10-19 PROCEDURE — 25810000003 SODIUM CHLORIDE 0.9 % SOLUTION: Performed by: UROLOGY

## 2023-10-19 PROCEDURE — G0378 HOSPITAL OBSERVATION PER HR: HCPCS

## 2023-10-19 PROCEDURE — 80048 BASIC METABOLIC PNL TOTAL CA: CPT | Performed by: UROLOGY

## 2023-10-19 PROCEDURE — 85027 COMPLETE CBC AUTOMATED: CPT | Performed by: UROLOGY

## 2023-10-19 RX ADMIN — SODIUM CHLORIDE 100 ML/HR: 9 INJECTION, SOLUTION INTRAVENOUS at 07:57

## 2023-10-19 RX ADMIN — HYDROMORPHONE HYDROCHLORIDE 1 MG: 1 INJECTION, SOLUTION INTRAMUSCULAR; INTRAVENOUS; SUBCUTANEOUS at 17:29

## 2023-10-19 RX ADMIN — Medication 3 ML: at 08:55

## 2023-10-19 RX ADMIN — LISINOPRIL 10 MG: 10 TABLET ORAL at 08:50

## 2023-10-19 RX ADMIN — INDAPAMIDE 1.25 MG: 1.25 TABLET, FILM COATED ORAL at 08:50

## 2023-10-19 RX ADMIN — ROSUVASTATIN CALCIUM 20 MG: 20 TABLET, FILM COATED ORAL at 08:50

## 2023-10-19 RX ADMIN — DOCUSATE SODIUM 50MG AND SENNOSIDES 8.6MG 2 TABLET: 8.6; 5 TABLET, FILM COATED ORAL at 08:50

## 2023-10-19 RX ADMIN — TAMSULOSIN HYDROCHLORIDE 0.4 MG: 0.4 CAPSULE ORAL at 08:50

## 2023-10-19 RX ADMIN — AMLODIPINE BESYLATE 10 MG: 10 TABLET ORAL at 08:50

## 2023-10-19 RX ADMIN — OXYCODONE HYDROCHLORIDE AND ACETAMINOPHEN 1 TABLET: 5; 325 TABLET ORAL at 16:01

## 2023-10-19 RX ADMIN — GABAPENTIN 300 MG: 300 CAPSULE ORAL at 08:50

## 2023-10-19 NOTE — PLAN OF CARE
Goal Outcome Evaluation:                      Patient is alert and oriented x 4, POD#1 of a TURBT, needs , bradycardic at times but vitals stable otherwise, room air, IVF infusing, no complaints of pain, 3 way dunham, CBI infusing, slow and clear, good output, safety precautions in use, plan of care ongoing.

## 2023-10-19 NOTE — PLAN OF CARE
Goal Outcome Evaluation:   Patient alert. Daughter at bedside. Primary RN used  and explained care to this patient. Patient denies pain. CBI dripping slowly, urine running clear.

## 2023-10-19 NOTE — DISCHARGE SUMMARY
Date of Discharge:  10/19/2023    Discharge Diagnosis: Bladder cancer pathology pending    Presenting Problem/History of Present Illness  Bladder cancer [C67.9]     78-year-old [place] male with bladder cancer for resection.  Hospital Course  Patient is a 78 y.o. male presented with 2 large tumors at the posterior wall and dome of his bladder that were resected.  I was little concerned about bleeding issues there were little oozing and he had pretty deep resection due to the recurrence.  After resection three-way catheter was placed he was kept overnight for observation.  This morning he was doing well so we will change his catheter out and he will be discharged home this afternoon.  We sent captions for postoperative antibiotics and pain meds yesterday so his daughter is already pick those up.  I did talk to his daughter today on the phone.  She has my phone number to get a hold of me if she has problems and she is going to bring him in next week to get his catheter out and go over his pathology..      Procedures Performed  Procedure(s):  CYSTOSCOPY TRANSURETHRAL RESECTION OF BLADDER TUMOR       Consults:   Consults       No orders found for last 30 day(s).            Pertinent Test Results: labs: Routine      Condition on Discharge: Good    Vital Signs  Temp:  [97.5 °F (36.4 °C)-98.2 °F (36.8 °C)] 98.1 °F (36.7 °C)  Heart Rate:  [58-79] 68  Resp:  [12-18] 16  BP: (119-150)/(60-96) 131/67    Physical Exam:     General Appearance:    Alert, cooperative, in no acute distress   Head:    Normocephalic, without obvious abnormality, atraumatic   Eyes:            Lids and lashes normal, conjunctivae and sclerae normal, no   icterus, no pallor, corneas clear, PERRLA   Ears:    Ears appear intact with no abnormalities noted   Throat:   No oral lesions, no thrush, oral mucosa moist   Neck:   No adenopathy, supple, trachea midline, no thyromegaly, no   carotid bruit, no JVD   Back:     No kyphosis present, no scoliosis  present, no skin lesions,      erythema or scars, no tenderness to percussion or                   palpation,   range of motion normal   Lungs:     Clear to auscultation,respirations regular, even and                  unlabored    Heart:    Regular rhythm and normal rate, normal S1 and S2, no            murmur, no gallop, no rub, no click   Chest Wall:    No abnormalities observed   Abdomen:     Normal bowel sounds, no masses, no organomegaly, soft        non-tender, non-distended, no guarding, no rebound                tenderness   Rectal:     Deferred   Extremities:   Moves all extremities well, no edema, no cyanosis, no             redness   Pulses:   Pulses palpable and equal bilaterally   Skin:   No bleeding, bruising or rash   Lymph nodes:   No palpable adenopathy   Neurologic:   Cranial nerves 2 - 12 grossly intact, sensation intact, DTR       present and equal bilaterally       Discharge Disposition  Home or Self Care    Discharge Medications     Discharge Medications        New Medications        Instructions Start Date   cephalexin 500 MG capsule  Commonly known as: KEFLEX   500 mg, Oral, 3 Times Daily      oxyCODONE-acetaminophen 5-325 MG per tablet  Commonly known as: PERCOCET   1-2 tablets, Oral, Every 6 Hours PRN             Changes to Medications        Instructions Start Date   amLODIPine 10 MG tablet  Commonly known as: NORVASC  What changed: when to take this   10 mg, Oral, Daily, For BP      gabapentin 300 MG capsule  Commonly known as: NEURONTIN  What changed:   how much to take  how to take this  when to take this   Start with 1 p.o. at at bedtime x1 day then 1 p.o. twice daily x1 day then 1 p.o. 3 times daily for lumbar nerve pain      indapamide 1.25 MG tablet  Commonly known as: LOZOL  What changed: when to take this   1.25 mg, Oral, Daily, For BP      lisinopril 10 MG tablet  Commonly known as: Prinivil  What changed: when to take this   10 mg, Oral, Daily, For BP      rosuvastatin 20 MG  tablet  Commonly known as: Crestor  What changed: when to take this   20 mg, Oral, Daily, For cholesterol             Continue These Medications        Instructions Start Date   acetaminophen 500 MG tablet  Commonly known as: TYLENOL   500 mg, Oral, Every 6 Hours PRN      calcium carbonate 500 MG chewable tablet  Commonly known as: TUMS   1 tablet, Oral, As Needed      FeroSul 325 (65 Fe) MG tablet  Generic drug: ferrous sulfate   TAKE 1 TABLET BY MOUTH EVERY DAY WITH BREAKFAST      NON FORMULARY   2 each, Oral, Every Morning, Eye health      tamsulosin 0.4 MG capsule 24 hr capsule  Commonly known as: FLOMAX   1 capsule, Oral, Every Morning               Discharge Diet: Regular    Activity at Discharge: No exertional activity until catheter is out    Follow-up Appointments  Future Appointments   Date Time Provider Department Center   11/9/2023  9:50 AM LAB CHAIR 6 [facility]NA [facility]  [facility]   11/9/2023 10:20 AM Emil Mary MD [facility]   11/24/2023  9:45 AM JACQUELINE CT 2  [facility]   7/9/2024  1:20 PM Marshall Mayes MD [facility]     Follow-up my office 1 week      Test Results Pending at Discharge  Pending Labs       Order Current Status    Tissue Pathology Exam In process             Robert Haque MD  10/19/23  16:54 EDT    Time: Discharge 20 min

## 2023-10-19 NOTE — ANESTHESIA POSTPROCEDURE EVALUATION
Patient: Alex Negron    Procedure Summary       Date: 10/18/23 Room / Location: [facility] OR  / [facility] MAIN OR    Anesthesia Start: 1809 Anesthesia Stop: 1853    Procedure: CYSTOSCOPY TRANSURETHRAL RESECTION OF BLADDER TUMOR Diagnosis:       Bladder cancer      (Recurrent Bladder Cancer)    Surgeons: Robert Haque MD Provider: Buddy Luna MD    Anesthesia Type: general ASA Status: 2            Anesthesia Type: general    Vitals  Vitals Value Taken Time   /85 10/18/23 2001   Temp 36.4 °C (97.5 °F) 10/18/23 1850   Pulse 64 10/18/23 2010   Resp 12 10/18/23 2000   SpO2 95 % 10/18/23 2010   Vitals shown include unfiled device data.        Post Anesthesia Care and Evaluation    Patient location during evaluation: PACU  Patient participation: complete - patient participated  Level of consciousness: awake  Pain management: adequate    Airway patency: patent  Anesthetic complications: No anesthetic complications  PONV Status: none  Cardiovascular status: acceptable  Respiratory status: acceptable  Hydration status: acceptable

## 2023-10-19 NOTE — PROGRESS NOTES
"  [facility] Progress Note    Chief Complaint:  none    Catheter issues- unable to understand much, daughter to bee here later today    Review of Systems:    Review of systems could not be obtained due to  unable to tanslate          Vital Signs  /60 (BP Location: Right arm, Patient Position: Lying)   Pulse 63   Temp 98.2 °F (36.8 °C) (Oral)   Resp 18   Ht 178 cm (70.08\")   Wt 79.8 kg (176 lb)   SpO2 94%   BMI 25.20 kg/m²     Physical Exam:     General Appearance:    Alert, cooperative, NAD   HEENT:    No trauma, pupils reactive, hearing intact   Back:     No CVA tenderness   Lungs:     Respirations unlabored, no wheezing    Heart:    RRR, intact peripheral pulses   Abdomen:     Soft and benign   :    Phallus nl   Extremities:   No edema, no deformity   Lymphatic:   No neck or groin LAD   Skin:   No bleeding, bruising or rashes   Neuro/Psych:   Orientation intact, mood/affect pleasant, no focal findings        Results Review:     I reviewed the patient's new clinical results.  Lab Results (last 24 hours)       Procedure Component Value Units Date/Time    Basic Metabolic Panel [[id number]]  (Normal) Collected: 10/19/23 0639    Specimen: Blood Updated: 10/19/23 0717     Glucose 97 mg/dL      BUN 23 mg/dL      Creatinine 1.03 mg/dL      Sodium 139 mmol/L      Potassium 4.6 mmol/L      Chloride 106 mmol/L      CO2 25.7 mmol/L      Calcium 9.2 mg/dL      BUN/Creatinine Ratio 22.3     Anion Gap 7.3 mmol/L      eGFR 75.3 mL/min/1.73     Narrative:      GFR Normal >60  Chronic Kidney Disease <60  Kidney Failure <15    The GFR formula is only valid for adults with stable renal function between ages 18 and 70.    CBC (No Diff) [[id number]]  (Abnormal) Collected: 10/19/23 0639    Specimen: Blood Updated: 10/19/23 0705     WBC 8.99 10*3/mm3      RBC 3.87 10*6/mm3      Hemoglobin 11.3 g/dL      Hematocrit 34.1 %      MCV 88.1 fL      MCH 29.2 pg      MCHC 33.1 g/dL      RDW 13.1 %      RDW-SD 42.3 fl      MPV 8.6 " fL      Platelets 416 10*3/mm3     Tissue Pathology Exam [[id number]] Collected: 10/18/23 1828    Specimen: Tissue from Urinary Bladder, Tissue from Urinary Bladder Updated: 10/18/23 2216    POC Glucose Once [[id number]]  (Normal) Collected: 10/18/23 1901    Specimen: Blood Updated: 10/18/23 1903     Glucose 99 mg/dL     POC Glucose Once [[id number]]  (Normal) Collected: 10/18/23 1456    Specimen: Blood Updated: 10/18/23 1457     Glucose 96 mg/dL           Imaging Results (Last 24 Hours)       ** No results found for the last 24 hours. **            Medication Review:   I have personally reviewed    Current Facility-Administered Medications:     acetaminophen (TYLENOL) tablet 500 mg, 500 mg, Oral, Q6H PRN, Robert Haque MD    amLODIPine (NORVASC) tablet 10 mg, 10 mg, Oral, Daily, Robert Haque MD    gabapentin (NEURONTIN) capsule 300 mg, 300 mg, Oral, QAM, Robert Haque MD    HYDROmorphone (DILAUDID) injection 1 mg, 1 mg, Intravenous, Q4H PRN **AND** naloxone (NARCAN) injection 0.1 mg, 0.1 mg, Intravenous, Q5 Min PRN, Robert Haque MD    indapamide (LOZOL) tablet 1.25 mg, 1.25 mg, Oral, Daily, Robert Haque MD    lactated ringers infusion, 9 mL/hr, Intravenous, Continuous, Prince Hernandez MD, Stopped at 10/18/23 2204    lisinopril (PRINIVIL,ZESTRIL) tablet 10 mg, 10 mg, Oral, Daily, Robert Haque MD    ondansetron (ZOFRAN) tablet 4 mg, 4 mg, Oral, Q6H PRN **OR** ondansetron (ZOFRAN) injection 4 mg, 4 mg, Intravenous, Q6H PRN, Robert Haque MD    oxyCODONE-acetaminophen (PERCOCET) 5-325 MG per tablet 1 tablet, 1 tablet, Oral, Q4H PRN, Robert Haque MD    rosuvastatin (CRESTOR) tablet 20 mg, 20 mg, Oral, Daily, Robert Haque MD    sennosides-docusate (PERICOLACE) 8.6-50 MG per tablet 2 tablet, 2 tablet, Oral, BID, Robert Haque MD    sodium chloride 0.9 % flush 10 mL, 10 mL, Intravenous, PRN, Robert Haque MD    sodium chloride 0.9  % flush 3 mL, 3 mL, Intravenous, Q12H, Robert Haque MD, 3 mL at 10/18/23 2138    sodium chloride 0.9 % infusion 40 mL, 40 mL, Intravenous, PRN, Robert Haque MD    sodium chloride 0.9 % infusion, 100 mL/hr, Intravenous, Continuous, Robert Haque MD, Last Rate: 100 mL/hr at 10/19/23 0717, 100 mL/hr at 10/19/23 0717    tamsulosin (FLOMAX) 24 hr capsule 0.4 mg, 0.4 mg, Oral, QAM, Robert Haque MD    Allergies:    Patient has no known allergies.    Assessment:    Active Problems:    Malignant neoplasm of urinary bladder    Bladder cancer       POD#1 TURBt    Plan:    Home later today with roly Haque MD    10/19/2023  07:41 EDT

## 2023-10-19 NOTE — CASE MANAGEMENT/SOCIAL WORK
Discharge Planning Assessment  [facility]     Patient Name: Alex Negron  MRN: [id number]  Today's Date: 10/19/2023    Admit Date: 10/18/2023    Plan: Home with family.   Discharge Needs Assessment       Row Name 10/19/23 1517       Living Environment    Current Living Arrangements home    Potentially Unsafe Housing Conditions none    Quality of Family Relationships unable to assess       Resource/Environmental Concerns    Resource/Environmental Concerns none    Transportation Concerns none       Transition Planning    Patient/Family Anticipates Transition to home with family    Patient/Family Anticipated Services at Transition none    Transportation Anticipated family or friend will provide       Discharge Needs Assessment    Readmission Within the Last 30 Days no previous admission in last 30 days    Equipment Currently Used at Home none    Concerns to be Addressed denies needs/concerns at this time;discharge planning    Anticipated Changes Related to Illness none    Equipment Needed After Discharge none                   Discharge Plan       Row Name 10/19/23 1518       Plan    Plan Home with family.    Roadmap to Recovery Yes    Provided Post Acute Provider List? Yes    Post Acute Provider List Home Health    Provided Post Acute Provider Quality & Resource List? Yes    Post Acute Provider Quality and Resource List Home Health    Delivered To Patient    Method of Delivery In person    Plan Comments Spoke with patient with the use of  at bedside. Introduced self and explained CCP role. Verified face sheet and local pharmacy is [facility]; enrolled in [facility]. Patient denies difficulty with medication cost/ management. Patient lives with son in 1 ½ story home with 3 JOSH. Patient denies prior HH, DME, and SNF history. Patient is IADL and still drives. Patient plans to return home with family to transport. Patient given road to recovery and HH/ SNF list. Patient denies discharge needs. Used Monique/ [id number]  for translation.                  Continued Care and Services - Admitted Since 10/18/2023    Coordination has not been started for this encounter.       Expected Discharge Date and Time       Expected Discharge Date Expected Discharge Time    Oct 20, 2023            Demographic Summary       Row Name 10/19/23 1516       General Information    Admission Type observation    Referral Source admission list    Reason for Consult discharge planning    Preferred Language Cape Verdean       Contact Information    Permission Granted to Share Info With                    Functional Status       Row Name 10/19/23 1517       Functional Status    Usual Activity Tolerance good    Current Activity Tolerance good       Functional Status, IADL    Medications independent    Meal Preparation independent    Housekeeping independent    Laundry independent    Shopping independent       Mental Status    General Appearance WDL WDL       Mental Status Summary    Recent Changes in Mental Status/Cognitive Functioning no changes       Employment/    Employment Status retired                   Psychosocial    No documentation.                  Abuse/Neglect    No documentation.                  Legal    No documentation.                  Substance Abuse    No documentation.                  Patient Forms    No documentation.                     Lida Mendoza RN

## 2023-10-19 NOTE — TELEPHONE ENCOUNTER
Caller: JEREMÍAS GO    Relationship to patient: Emergency Contact    Best call back number: [phone]    Chief complaint: R/S    Type of visit: LAB AND F/U    Requested date: 10-30     If rescheduling, when is the original appointment: 10-23    Additional notes: HUB UNABLE TO R/S  PT IN THE HOSPITAL

## 2023-10-20 ENCOUNTER — TRANSITIONAL CARE MANAGEMENT TELEPHONE ENCOUNTER (OUTPATIENT)
Dept: CALL CENTER | Facility: HOSPITAL | Age: 78
End: 2023-10-20
Payer: MEDICAID

## 2023-10-20 ENCOUNTER — READMISSION MANAGEMENT (OUTPATIENT)
Dept: CALL CENTER | Facility: HOSPITAL | Age: 78
End: 2023-10-20
Payer: MEDICAID

## 2023-10-20 NOTE — OUTREACH NOTE
Call Center TCM Note      Flowsheet Row Responses   [facility] patient discharged from? [place]   Does the patient have one of the following disease processes/diagnoses(primary or secondary)? General Surgery   TCM attempt successful? Yes   Call start time 1035   Call end time 1041   Discharge diagnosis s/p CYSTOSCOPY TRANSURETHRAL RESECTION OF BLADDER TUMOR   Is patient permission given to speak with other caregiver? Yes   List who call center can speak with Daughter   Meds reviewed with patient/caregiver? Yes   Is the patient having any side effects they believe may be caused by any medication additions or changes? No   Does the patient have all medications related to this admission filled (includes all antibiotics, pain medications, etc.) Yes   Is the patient taking all medications as directed (includes completed medication regime)? Yes   Comments Daughter prefers to call the office for hospital f/u appointment.   Does the patient have an appointment with their PCP within 7-14 days of discharge? No   Nursing Interventions Patient declined scheduling/rescheduling appointment at this time   Has home health visited the patient within 72 hours of discharge? N/A   Home health comments Caregiver at home.   Psychosocial issues? No   Did the patient receive a copy of their discharge instructions? Yes   Nursing interventions Reviewed instructions with patient   What is the patient's perception of their health status since discharge? Improving   Nursing interventions Nurse provided patient education   Is the patient/caregiver able to teach back the hierarchy of who to call/visit for symptoms/problems? PCP, Specialist, Home health nurse, Urgent Care, ED, 911 Yes   TCM call completed? Yes   Wrap up additional comments Daughter reports pt is well. Pt has caregivers to the home daily. Pt does reside with spouse and son. Pt has all medications.   Call end time 1041   Would this patient benefit from a Referral to Amb  Social Work? No   Is the patient interested in additional calls from an ambulatory ? No            Rosa Payne RN    10/20/2023, 10:42 EDT

## 2023-10-20 NOTE — CASE MANAGEMENT/SOCIAL WORK
Case Management Discharge Note      Final Note: dc home    Provided Post Acute Provider List?: Yes  Post Acute Provider List: Home Health  Provided Post Acute Provider Quality & Resource List?: Yes  Post Acute Provider Quality and Resource List: Home Health  Delivered To: Patient  Method of Delivery: In person    Selected Continued Care - Discharged on 10/19/2023 Admission date: 10/18/2023 - Discharge disposition: Home or Self Care      Destination    No services have been selected for the patient.                Durable Medical Equipment    No services have been selected for the patient.                Dialysis/Infusion    No services have been selected for the patient.                Home Medical Care    No services have been selected for the patient.                Therapy    No services have been selected for the patient.                Community Resources    No services have been selected for the patient.                Community & DME    No services have been selected for the patient.                         Final Discharge Disposition Code: 01 - home or self-care

## 2023-10-20 NOTE — OUTREACH NOTE
Prep Survey      Flowsheet Row Responses   [facility] patient discharged from? [place]   Is LACE score < 7 ? Yes   Eligibility [facility]   Date of Admission 10/18/23   Date of Discharge 10/19/23   Discharge Disposition Home or Self Care   Discharge diagnosis s/p CYSTOSCOPY TRANSURETHRAL RESECTION OF BLADDER TUMOR   Does the patient have one of the following disease processes/diagnoses(primary or secondary)? General Surgery   Does the patient have Home health ordered? No   Is there a DME ordered? No   Prep survey completed? Yes            Jayda Fisher Registered Nurse

## 2023-10-23 LAB
LAB AP CASE REPORT: NORMAL
LAB AP DIAGNOSIS COMMENT: NORMAL
LAB AP SYNOPTIC CHECKLIST: NORMAL
PATH REPORT.FINAL DX SPEC: NORMAL
PATH REPORT.GROSS SPEC: NORMAL

## 2023-11-01 ENCOUNTER — OFFICE VISIT (OUTPATIENT)
Dept: FAMILY MEDICINE CLINIC | Facility: CLINIC | Age: 78
End: 2023-11-01
Payer: MEDICAID

## 2023-11-01 VITALS
RESPIRATION RATE: 16 BRPM | TEMPERATURE: 97.6 F | SYSTOLIC BLOOD PRESSURE: 100 MMHG | OXYGEN SATURATION: 93 % | HEIGHT: 70 IN | DIASTOLIC BLOOD PRESSURE: 60 MMHG | WEIGHT: 179 LBS | HEART RATE: 89 BPM | BODY MASS INDEX: 25.62 KG/M2

## 2023-11-01 DIAGNOSIS — Z09 HOSPITAL DISCHARGE FOLLOW-UP: Primary | ICD-10-CM

## 2023-11-01 DIAGNOSIS — E11.9 TYPE 2 DIABETES MELLITUS WITHOUT COMPLICATION, WITHOUT LONG-TERM CURRENT USE OF INSULIN: ICD-10-CM

## 2023-11-01 DIAGNOSIS — D50.0 IRON DEFICIENCY ANEMIA DUE TO CHRONIC BLOOD LOSS: ICD-10-CM

## 2023-11-01 DIAGNOSIS — I10 BENIGN ESSENTIAL HTN: ICD-10-CM

## 2023-11-01 DIAGNOSIS — E78.2 HYPERLIPIDEMIA, MIXED: ICD-10-CM

## 2023-11-01 DIAGNOSIS — C67.9 MALIGNANT NEOPLASM OF URINARY BLADDER, UNSPECIFIED SITE: Chronic | ICD-10-CM

## 2023-11-01 DIAGNOSIS — M51.36 DDD (DEGENERATIVE DISC DISEASE), LUMBAR: ICD-10-CM

## 2023-11-01 DIAGNOSIS — Z12.11 SCREEN FOR COLON CANCER: ICD-10-CM

## 2023-11-01 RX ORDER — AMLODIPINE BESYLATE 5 MG/1
5 TABLET ORAL DAILY
Qty: 90 TABLET | Refills: 1 | Status: SHIPPED | OUTPATIENT
Start: 2023-11-01

## 2023-11-01 RX ORDER — GABAPENTIN 300 MG/1
CAPSULE ORAL
Qty: 270 CAPSULE | Refills: 1 | Status: SHIPPED | OUTPATIENT
Start: 2023-11-01

## 2023-11-01 NOTE — PROGRESS NOTES
Subjective   Alex Negron is a 78 y.o. male.     History of Present Illness     Alex Negron 78 y.o. male presents today for hosptial follow up.  he was treated 10-18- to 10-19-23 for resection of bladder cancer.  Dr. Karthikeyan Haque--resected 2 large tumors in the posterior wall and dome of the bladder..  I reviewed all of the labs and diagnostic testing and noted:  labs  The patient's medications were not changed: He was prescribed oxycodone for pain and cephalexin by Dr. Haque  Current outpatient and discharge medications have been reconciled for the patient.  Reviewed by: Jyothi Song PA-C  Did see Dr Cuellar--she notes on his 9/14/2023 sleep study that he has positional sleep apnea  he does have a follow up appointment with a specialist:     My notes  I saw him on 7/5/2023 noting blood pressure was controlled and continue same regimen updated labs----here today because he has hemoglobin A1c now of 6.5% and that type 2 diabetes   mixed hyperlipidemia we will update labs today to make sure LDL is less than 70 due to the atherosclerotic changes in his renal and aortic iliac arteries on CT from urology.      Saw hematologist Dr. 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  Status post transurethral resection 6/7/2025  Undergoing intravesicular therapy by urology     Hematology plan/recommendations:  Continue oral iron daily to replace iron stores  Repeat CBC ferritin iron profile 3 months  Iron deficiency may be secondary to microscopic hematuria from bladder tumor but would recommend a colonoscopy at some point once he has completed  intravesicular therapy  Path report from 10/18/2023 with high-grade papillary urothelial carcinoma and also noted the same and the second lesion    He is to have bladder infused chemo next---met Dr Haque last week    Saw cardio DR Mayes 6-27-23  SUMMARY/DISCUSSION  History of heart retention patient's blood pressure is good.  Atypical chest discomfort.  Unfortunately does not speak English and I always worry about the loss of translation.  His daughter was very attentive and frequently spoke so the patient would understand.  His chest discomfort only last seconds are very atypical.  His ECG that was done before surgery was completely normal.  In light of that I would just follow him clinically and see what evolves.  We will see him back in 6 to 12 months sooner if things change.  I did go over with them signs and symptoms to look for and when to contact me.  Daughter translating    Due for colon cancer screening  Has lumbar DDD--- gabapentin is helping his pain and he is ready to start physical therapy  The following portions of the patient's history were reviewed and updated as appropriate: allergies, current medications, past family history, past medical history, past social history, past surgical history, and problem list.    Review of Systems   Constitutional:  Negative for diaphoresis.   HENT:  Negative for nosebleeds and trouble swallowing.    Eyes:  Negative for blurred vision and visual disturbance.   Respiratory:  Negative for choking.    Gastrointestinal:  Negative for blood in stool.   Allergic/Immunologic: Negative for immunocompromised state.   Neurological:  Negative for facial asymmetry and speech difficulty.   Psychiatric/Behavioral:  Negative for self-injury and suicidal ideas.        Objective   Physical Exam  Vitals and nursing note reviewed.   Constitutional:       General: He is not in acute distress.     Appearance: Normal appearance. He is well-developed. He is not diaphoretic.   HENT:       Head: Normocephalic.   Eyes:      General: No scleral icterus.     Conjunctiva/sclera: Conjunctivae normal.      Pupils: Pupils are equal, round, and reactive to light.   Cardiovascular:      Rate and Rhythm: Normal rate and regular rhythm.      Pulses: Normal pulses.      Heart sounds: Normal heart sounds. No murmur heard.  Pulmonary:      Effort: Pulmonary effort is normal.      Breath sounds: Normal breath sounds.   Musculoskeletal:         General: Normal range of motion.      Cervical back: Normal range of motion and neck supple.      Right lower leg: No edema.      Left lower leg: No edema.   Skin:     General: Skin is warm and dry.      Findings: No rash.   Neurological:      Mental Status: He is alert and oriented to person, place, and time.   Psychiatric:         Mood and Affect: Mood normal. Affect is not inappropriate.         Behavior: Behavior normal.         Thought Content: Thought content normal.         Judgment: Judgment normal.           Assessment & Plan   Diagnoses and all orders for this visit:    1. Hospital discharge follow-up (Primary)    2. Benign essential HTN    3. Hyperlipidemia, mixed    4. Type 2 diabetes mellitus without complication, without long-term current use of insulin    5. Malignant neoplasm of urinary bladder, unspecified site    6. Iron deficiency anemia due to chronic blood loss    7. Screen for colon cancer  -     Ambulatory Referral to Gastroenterology    8. DDD (degenerative disc disease), lumbar  -     Ambulatory Referral to Physical Therapy Evaluate and treat  -     gabapentin (NEURONTIN) 300 MG capsule; 1 PO 3 times daily for lumbar nerve pain  Dispense: 270 capsule; Refill: 1    He is now on Flomax---bp too low today--reduce dose Amlodipine to 5mg;  need systolic bp >115   He has not started metformin for type 2 diabetes per our discussion 8-23  Due for colon cancer screening  Due for follow-up labs for thrombocytosis and low iron stores with Dr. Mary this month,  hematologist  Plan to update labs did raise dose of statin to 20 mg last visit to have LDL less than 70 is my goal  Continue gabapentin working well for lumbar DDD and made referral to PT for the lumbar pain

## 2023-11-04 LAB
ALBUMIN SERPL-MCNC: 4.3 G/DL (ref 3.8–4.8)
ALBUMIN/CREAT UR: 26 MG/G CREAT (ref 0–29)
ALBUMIN/GLOB SERPL: 1.4 {RATIO} (ref 1.2–2.2)
ALP SERPL-CCNC: 105 IU/L (ref 44–121)
ALT SERPL-CCNC: 28 IU/L (ref 0–44)
AST SERPL-CCNC: 21 IU/L (ref 0–40)
BILIRUB SERPL-MCNC: 0.3 MG/DL (ref 0–1.2)
BUN SERPL-MCNC: 30 MG/DL (ref 8–27)
BUN/CREAT SERPL: 27 (ref 10–24)
CALCIUM SERPL-MCNC: 10 MG/DL (ref 8.6–10.2)
CHLORIDE SERPL-SCNC: 101 MMOL/L (ref 96–106)
CHOLEST SERPL-MCNC: 134 MG/DL (ref 100–199)
CO2 SERPL-SCNC: 23 MMOL/L (ref 20–29)
CREAT SERPL-MCNC: 1.12 MG/DL (ref 0.76–1.27)
CREAT UR-MCNC: 92.6 MG/DL
EGFRCR SERPLBLD CKD-EPI 2021: 68 ML/MIN/1.73
GLOBULIN SER CALC-MCNC: 3 G/DL (ref 1.5–4.5)
GLUCOSE SERPL-MCNC: 94 MG/DL (ref 70–99)
HBA1C MFR BLD: 6.5 % (ref 4.8–5.6)
HDLC SERPL-MCNC: 48 MG/DL
LDLC SERPL CALC-MCNC: 71 MG/DL (ref 0–99)
MICROALBUMIN UR-MCNC: 24.1 UG/ML
POTASSIUM SERPL-SCNC: 4.9 MMOL/L (ref 3.5–5.2)
PROT SERPL-MCNC: 7.3 G/DL (ref 6–8.5)
SODIUM SERPL-SCNC: 139 MMOL/L (ref 134–144)
TRIGL SERPL-MCNC: 75 MG/DL (ref 0–149)
VLDLC SERPL CALC-MCNC: 15 MG/DL (ref 5–40)

## 2023-11-15 ENCOUNTER — TELEPHONE (OUTPATIENT)
Dept: FAMILY MEDICINE CLINIC | Facility: CLINIC | Age: 78
End: 2023-11-15
Payer: MEDICAID

## 2023-11-15 NOTE — TELEPHONE ENCOUNTER
Caller: JEREMÍAS GO    Relationship: Emergency Contact    Best call back number: [phone]    What was the call regarding: PATIENTS DAUGHTER STATED PATIENT HAS A BITTER TASTE IN HIS MOUTH, AND SHE WOULD LIKE TO SPEAK WITH SOMEONE REGARDING WHAT TO DO.    PLEASE CALL TO ADVISE.

## 2023-11-15 NOTE — TELEPHONE ENCOUNTER
Would have him ask urologist if could be from his chemo?   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   Initially underwent antibiotic cement spacer exchange, I&D and revision gastroc flap by PRS on 1/6.  Patient already had multiple recurrence and he had flank pus in his R knee.  Now s/p AKA on 1/10.   Gallium scan negative for ICD infection.  Awaiting repeat JOHN PAUL.    - start vancomycin 1250mg IV q24h (1st dose tonight at [facility]) and check level before 3rd dose (Friday night)  - f/u JOHN PAUL to r/o endocarditis  - f/u BCx (clears since 1/6)  - duration of abx will be 4 vs 6 weeks, pending JOHN PAUL       Team 2 will follow you.    Case d/w primary team.    Marta Ruffin MD, MS  Infectious Disease attending  work cell [phone]   For any questions during evening/weekend/holiday, please page ID on call

## 2023-11-15 NOTE — TELEPHONE ENCOUNTER
Daughter states that pt has tasted the bitterness in his mouth for awhile.   Daughter denied us of any inhalers.   Pt denied SOA, mouth swelling, SUAZO  Pt wanted to know could his medication be causing the bitterness in the mouth?

## 2023-11-15 NOTE — TELEPHONE ENCOUNTER
Called and LVM for pt's daughter, Radha, to call Urologist and ask if bitterness could be from Chemo.  CB and let us know if she needs anything else

## 2023-11-17 ENCOUNTER — PREP FOR SURGERY (OUTPATIENT)
Dept: OTHER | Facility: HOSPITAL | Age: 78
End: 2023-11-17
Payer: MEDICAID

## 2023-11-17 ENCOUNTER — TELEPHONE (OUTPATIENT)
Dept: GASTROENTEROLOGY | Facility: CLINIC | Age: 78
End: 2023-11-17
Payer: MEDICAID

## 2023-11-17 DIAGNOSIS — Z12.11 ENCOUNTER FOR SCREENING FOR MALIGNANT NEOPLASM OF COLON: Primary | ICD-10-CM

## 2023-11-17 NOTE — TELEPHONE ENCOUNTER
NO PERSONAL HX OF POLYPS    NO FAMILY HX OF POLYPS    NO FAMILY HX OF COLON CA    NO ASA OR BLOOD THINNERS        LIST OF  MEDICATIONS    LUTEIN AND ZEAXANTNIN  ROSUVASTATIN  FERROUS  LISINOPRIL  AMLODIPINE  INDAPAMIDE  TAMSULOSIN  GABAPENTIN  ACETAMINOPHEN                      OA QUESTIONNAIRE SCANNED IN MEDIA

## 2023-11-20 ENCOUNTER — TELEPHONE (OUTPATIENT)
Dept: GASTROENTEROLOGY | Facility: CLINIC | Age: 78
End: 2023-11-20
Payer: MEDICAID

## 2023-11-21 ENCOUNTER — TELEPHONE (OUTPATIENT)
Dept: GASTROENTEROLOGY | Facility: CLINIC | Age: 78
End: 2023-11-21
Payer: MEDICAID

## 2023-11-21 PROBLEM — Z12.11 ENCOUNTER FOR SCREENING FOR MALIGNANT NEOPLASM OF COLON: Status: ACTIVE | Noted: 2023-11-17

## 2023-11-21 NOTE — TELEPHONE ENCOUNTER
COLONOSCOPY on  3/14/2024  arrive at  10;30 . Sent prep instructions to pt mail address ....[email or web address]

## 2023-11-24 ENCOUNTER — HOSPITAL ENCOUNTER (OUTPATIENT)
Dept: CT IMAGING | Facility: HOSPITAL | Age: 78
Discharge: HOME | End: 2023-11-24
Admitting: PHYSICIAN ASSISTANT
Payer: MEDICAID

## 2023-11-24 DIAGNOSIS — R91.1 LUNG NODULE SEEN ON IMAGING STUDY: ICD-10-CM

## 2023-11-24 PROCEDURE — 71250 CT THORAX DX C-: CPT

## 2023-11-28 ENCOUNTER — HOSPITAL ENCOUNTER (OUTPATIENT)
Dept: PHYSICAL THERAPY | Facility: HOSPITAL | Age: 78
Discharge: HOME | End: 2023-11-28
Admitting: PHYSICIAN ASSISTANT
Payer: MEDICAID

## 2023-11-28 DIAGNOSIS — Z74.09 IMPAIRED FUNCTIONAL MOBILITY, BALANCE, GAIT, AND ENDURANCE: ICD-10-CM

## 2023-11-28 DIAGNOSIS — R29.3 POOR POSTURE: ICD-10-CM

## 2023-11-28 DIAGNOSIS — M54.9 RADIATING BACK PAIN: Primary | ICD-10-CM

## 2023-11-28 PROCEDURE — 97161 PT EVAL LOW COMPLEX 20 MIN: CPT

## 2023-11-28 PROCEDURE — 97110 THERAPEUTIC EXERCISES: CPT

## 2023-11-30 DIAGNOSIS — Z85.51 HX OF BLADDER CANCER: ICD-10-CM

## 2023-11-30 DIAGNOSIS — R91.1 LUNG NODULE SEEN ON IMAGING STUDY: Primary | ICD-10-CM

## 2023-12-06 NOTE — PROGRESS NOTES
Subjective     REASON FOR CONSULTATION: Elevated platelet count  Provide an opinion on any further workup or treatment                             Requesting practitioner: Jyothi Song    RECORDS OBTAINED:  Records of the patients history including those obtained from the referring provider were reviewed and summarized in detail.      History of Present Illness   This is a 78-year-old man with hypertension, hyperlipidemia, degenerative disc disease of the lumbar spine, impaired fasting glucose and mild renal insufficiency seen today at the request of his primary care provider for evaluation of an elevated platelet count.  Reviewing his recent data since 3/22/2023 the patient has had a mildly elevated platelet count in the upper 400s/lower 500,000 range.  He has been mildly anemic with hemoglobin 12.1-12.5 MCV 88-89.  White blood cell count and differential unremarkable.  Additional labs have recently shown iron saturation 30%, ferritin 37, B12 453, folate 19.2, creatinine 1.26/BUN 34, total protein 7.2/globulin 2.5.    The patient recently immigrated from [facility].  He feels well denies fevers, chills, shortness of breath, cough, weight loss, lymphadenopathy, changes in the bowel habits, blood in the stool.   He smoked but quit smoking approximately 20 years ago.  He has never had a colonoscopy.  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  He is undergoing intravesicular therapy.    At the time of his initial visit 5/15/2023 the patient was noted to be mildly iron deficient and started oral iron therapy which she has been tolerating well.    The patient returned today for follow-up.  He had recurrence of noninvasive bladder cancer treated with transurethral resection 10/18/2023 now receiving intravesicular chemotherapy.  The  patient stopped oral iron because of a metallic taste in the mouth.  He is not noticing hematuria or changes in the bowel habits.    Past Medical History:   Diagnosis Date    Anxiety     Aortic atherosclerosis     Bladder cancer     Bladder mass 06/07/2023    Bladder tumor 05/2023    chemical chemo in [place]  weekly    Blind left eye     Blood in urine     TRACE    Burning with urination     DDD (degenerative disc disease), lumbar     GERD (gastroesophageal reflux disease)     History of low back pain     Hyperlipidemia     Hypertension     PTSD (post-traumatic stress disorder)     [place]    Renal insufficiency     Sleep apnea 2023    diagnosed but following up with PCP    Urine frequency         Past Surgical History:   Procedure Laterality Date    DENTAL PROCEDURE      ENDOSCOPY      TRANSURETHRAL RESECTION OF BLADDER TUMOR N/A 06/07/2023    Procedure: TRANSURETHRAL RESECTION OF BLADDER TUMOR;  Surgeon: Robert Haque MD;  Location: [facility];  Service: Urology;  Laterality: N/A;    TRANSURETHRAL RESECTION OF BLADDER TUMOR N/A 10/18/2023    Procedure: CYSTOSCOPY TRANSURETHRAL RESECTION OF BLADDER TUMOR;  Surgeon: Robert Haque MD;  Location: [facility];  Service: Urology;  Laterality: N/A;        Current Outpatient Medications on File Prior to Visit   Medication Sig Dispense Refill    acetaminophen (TYLENOL) 500 MG tablet Take 1 tablet by mouth Every 6 (Six) Hours As Needed for Mild Pain.      amLODIPine (NORVASC) 5 MG tablet Take 1 tablet by mouth Daily. New lower dose---for BP 90 tablet 1    calcium carbonate (TUMS) 500 MG chewable tablet Chew 1 tablet As Needed for Indigestion or Heartburn.      FeroSul 325 (65 Fe) MG tablet TAKE 1 TABLET BY MOUTH EVERY DAY WITH BREAKFAST 30 tablet 2    gabapentin (NEURONTIN) 300 MG capsule 1 PO 3 times daily for lumbar nerve pain 270 capsule 1    indapamide (LOZOL) 1.25 MG tablet Take 1 tablet by mouth Daily.  For BP (Patient taking differently: Take 1 tablet by mouth Every Morning. For BP) 90 tablet 1    lisinopril (Prinivil) 10 MG tablet Take 1 tablet by mouth Daily. For BP (Patient taking differently: Take 1 tablet by mouth Every Morning. For BP) 90 tablet 1    NON FORMULARY Take 2 each by mouth Every Morning. Eye health      oxyCODONE-acetaminophen (PERCOCET) 5-325 MG per tablet Take 1-2 tablets by mouth Every 6 (Six) Hours As Needed for Moderate Pain. 30 tablet 0    tamsulosin (FLOMAX) 0.4 MG capsule 24 hr capsule Take 1 capsule by mouth Every Morning.      rosuvastatin (Crestor) 20 MG tablet Take 1 tablet by mouth Daily. For cholesterol (Patient not taking: Reported on 2023) 90 tablet 3     No current facility-administered medications on file prior to visit.        ALLERGIES:  No Known Allergies     Social History     Socioeconomic History    Marital status:    Tobacco Use    Smoking status: Former     Packs/day: 1.00     Years: 25.00     Additional pack years: 0.00     Total pack years: 25.00     Types: Cigarettes     Quit date:      Years since quittin.9    Smokeless tobacco: Never   Vaping Use    Vaping Use: Never used   Substance and Sexual Activity    Alcohol use: Never    Drug use: Never    Sexual activity: Defer        Family History   Problem Relation Age of Onset    Malig Hyperthermia Neg Hx         Review of Systems   Constitutional: Negative.    HENT: Negative.     Eyes: Negative.    Respiratory: Negative.     Cardiovascular: Negative.    Gastrointestinal: Negative.    Genitourinary:  Positive for frequency. Negative for hematuria and urgency.   Musculoskeletal: Negative.    Allergic/Immunologic: Negative.    Neurological: Negative.    Hematological: Negative.    Psychiatric/Behavioral: Negative.        ROS unchanged-2023  Objective     Vitals:    23 1023   BP: 134/80   Pulse: 71   Resp: 16   Temp: 97.8 °F (36.6 °C)   TempSrc: Temporal   SpO2: 93%   Weight: 83.1 kg (183 lb  "3.2 oz)   Height: 178 cm (70.08\")   PainSc: 0-No pain           12/8/2023    10:07 AM   Current Status   ECOG score 0       Physical Exam    CONSTITUTIONAL: pleasant well-developed adult man  HEENT: no icterus, no thrush, moist membranes  LYMPH: no cervical or supraclavicular lad  CV: RRR, S1S2, no murmur  RESP: cta bilat, no wheezing, no rales  GI: soft, non-tender, no splenomegaly, +bs  MUSC: no edema, normal gait  NEURO: alert and oriented x3, normal strength  PSYCH: normal mood and affect  Exam unchanged-12/8/2023  RECENT LABS:  Hematology WBC   Date Value Ref Range Status   12/08/2023 7.64 3.40 - 10.80 10*3/mm3 Final   04/12/2023 8.56 3.40 - 10.80 10*3/mm3 Final     RBC   Date Value Ref Range Status   12/08/2023 4.10 (L) 4.14 - 5.80 10*6/mm3 Final   04/12/2023 4.19 4.14 - 5.80 10*6/mm3 Final     Hemoglobin   Date Value Ref Range Status   12/08/2023 12.5 (L) 13.0 - 17.7 g/dL Final     Hematocrit   Date Value Ref Range Status   12/08/2023 37.0 (L) 37.5 - 51.0 % Final     Platelets   Date Value Ref Range Status   12/08/2023 398 140 - 450 10*3/mm3 Final        Lab Results   Component Value Date    GLUCOSE 94 11/03/2023    BUN 30 (H) 11/03/2023    CREATININE 1.12 11/03/2023    EGFRRESULT 68 11/03/2023    EGFR 75.3 10/19/2023    BCR 27 (H) 11/03/2023    K 4.9 11/03/2023    CO2 23 11/03/2023    CALCIUM 10.0 11/03/2023    PROTENTOTREF 7.3 11/03/2023    ALBUMIN 4.3 11/03/2023    BILITOT 0.3 11/03/2023    AST 21 11/03/2023    ALT 28 11/03/2023         Assessment & Plan     *Mild thrombocytosis-platelet count normalized  after iron replacement and transurethral resection of bladder tumor  *Mild normocytic anemia-hemoglobin stable 12.5  Ferritin 68/iron sat 21%, B12 453, folate 19.2, creatinine 1.18  *Low iron stores-patient discontinued oral iron because of a metallic taste  *High-grade 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  Status post transurethral resection 6/7/2025 and again 10/18/2023 " nonmuscle invasive disease  Undergoing intravesicular therapy with gemcitabine by urology    Hematology plan/recommendations:  Check ferritin and iron profile today-we will call the patient with results; if iron stores are adequate discontinue oral iron and recheck studies 3 to 4 months.

## 2023-12-08 ENCOUNTER — OFFICE VISIT (OUTPATIENT)
Dept: ONCOLOGY | Facility: CLINIC | Age: 78
End: 2023-12-08
Payer: MEDICAID

## 2023-12-08 ENCOUNTER — LAB (OUTPATIENT)
Dept: LAB | Facility: HOSPITAL | Age: 78
End: 2023-12-08
Payer: MEDICAID

## 2023-12-08 ENCOUNTER — TELEPHONE (OUTPATIENT)
Dept: ONCOLOGY | Facility: CLINIC | Age: 78
End: 2023-12-08
Payer: MEDICAID

## 2023-12-08 VITALS
RESPIRATION RATE: 16 BRPM | TEMPERATURE: 97.8 F | SYSTOLIC BLOOD PRESSURE: 134 MMHG | HEART RATE: 71 BPM | OXYGEN SATURATION: 93 % | WEIGHT: 183.2 LBS | DIASTOLIC BLOOD PRESSURE: 80 MMHG | BODY MASS INDEX: 26.23 KG/M2 | HEIGHT: 70 IN

## 2023-12-08 DIAGNOSIS — R79.89 ELEVATED PLATELET COUNT: ICD-10-CM

## 2023-12-08 DIAGNOSIS — D50.0 IRON DEFICIENCY ANEMIA DUE TO CHRONIC BLOOD LOSS: Primary | ICD-10-CM

## 2023-12-08 DIAGNOSIS — D50.0 IRON DEFICIENCY ANEMIA DUE TO CHRONIC BLOOD LOSS: ICD-10-CM

## 2023-12-08 LAB
BASOPHILS # BLD AUTO: 0.03 10*3/MM3 (ref 0–0.2)
BASOPHILS NFR BLD AUTO: 0.4 % (ref 0–1.5)
DEPRECATED RDW RBC AUTO: 45.9 FL (ref 37–54)
EOSINOPHIL # BLD AUTO: 0.25 10*3/MM3 (ref 0–0.4)
EOSINOPHIL NFR BLD AUTO: 3.3 % (ref 0.3–6.2)
ERYTHROCYTE [DISTWIDTH] IN BLOOD BY AUTOMATED COUNT: 14.3 % (ref 12.3–15.4)
FERRITIN SERPL-MCNC: 53.8 NG/ML (ref 30–400)
HCT VFR BLD AUTO: 37 % (ref 37.5–51)
HGB BLD-MCNC: 12.5 G/DL (ref 13–17.7)
IMM GRANULOCYTES # BLD AUTO: 0.02 10*3/MM3 (ref 0–0.05)
IMM GRANULOCYTES NFR BLD AUTO: 0.3 % (ref 0–0.5)
IRON 24H UR-MRATE: 74 MCG/DL (ref 59–158)
IRON SATN MFR SERPL: 23 % (ref 20–50)
LYMPHOCYTES # BLD AUTO: 2.44 10*3/MM3 (ref 0.7–3.1)
LYMPHOCYTES NFR BLD AUTO: 31.9 % (ref 19.6–45.3)
MCH RBC QN AUTO: 30.5 PG (ref 26.6–33)
MCHC RBC AUTO-ENTMCNC: 33.8 G/DL (ref 31.5–35.7)
MCV RBC AUTO: 90.2 FL (ref 79–97)
MONOCYTES # BLD AUTO: 0.48 10*3/MM3 (ref 0.1–0.9)
MONOCYTES NFR BLD AUTO: 6.3 % (ref 5–12)
NEUTROPHILS NFR BLD AUTO: 4.42 10*3/MM3 (ref 1.7–7)
NEUTROPHILS NFR BLD AUTO: 57.8 % (ref 42.7–76)
NRBC BLD AUTO-RTO: 0 /100 WBC (ref 0–0.2)
PLATELET # BLD AUTO: 398 10*3/MM3 (ref 140–450)
PMV BLD AUTO: 8.3 FL (ref 6–12)
RBC # BLD AUTO: 4.1 10*6/MM3 (ref 4.14–5.8)
TIBC SERPL-MCNC: 326 MCG/DL (ref 298–536)
TRANSFERRIN SERPL-MCNC: 233 MG/DL (ref 200–360)
WBC NRBC COR # BLD AUTO: 7.64 10*3/MM3 (ref 3.4–10.8)

## 2023-12-08 PROCEDURE — 3075F SYST BP GE 130 - 139MM HG: CPT | Performed by: INTERNAL MEDICINE

## 2023-12-08 PROCEDURE — 84466 ASSAY OF TRANSFERRIN: CPT

## 2023-12-08 PROCEDURE — 99213 OFFICE O/P EST LOW 20 MIN: CPT | Performed by: INTERNAL MEDICINE

## 2023-12-08 PROCEDURE — 1126F AMNT PAIN NOTED NONE PRSNT: CPT | Performed by: INTERNAL MEDICINE

## 2023-12-08 PROCEDURE — 83540 ASSAY OF IRON: CPT

## 2023-12-08 PROCEDURE — 36415 COLL VENOUS BLD VENIPUNCTURE: CPT

## 2023-12-08 PROCEDURE — 82728 ASSAY OF FERRITIN: CPT

## 2023-12-08 PROCEDURE — 85025 COMPLETE CBC W/AUTO DIFF WBC: CPT

## 2023-12-08 PROCEDURE — 3079F DIAST BP 80-89 MM HG: CPT | Performed by: INTERNAL MEDICINE

## 2023-12-08 NOTE — TELEPHONE ENCOUNTER
----- Message from Emil Mary MD sent at 12/8/2023 12:57 PM EST -----  PIP daughter iron levels okay for now-f/u 3-4 month NP cbc ferritin iron prof

## 2023-12-16 RX ORDER — LISINOPRIL 10 MG/1
10 TABLET ORAL EVERY MORNING
Qty: 90 TABLET | Refills: 0 | Status: SHIPPED | OUTPATIENT
Start: 2023-12-16

## 2023-12-16 RX ORDER — INDAPAMIDE 1.25 MG/1
1.25 TABLET ORAL DAILY
Qty: 90 TABLET | Refills: 1 | Status: SHIPPED | OUTPATIENT
Start: 2023-12-16

## 2023-12-18 RX ORDER — FERROUS SULFATE 325(65) MG
TABLET ORAL
Qty: 30 TABLET | Refills: 2 | Status: SHIPPED | OUTPATIENT
Start: 2023-12-18

## 2023-12-20 ENCOUNTER — HOSPITAL ENCOUNTER (OUTPATIENT)
Dept: PHYSICAL THERAPY | Facility: HOSPITAL | Age: 78
Setting detail: THERAPIES SERIES
Discharge: HOME | End: 2023-12-20
Payer: MEDICAID

## 2023-12-20 DIAGNOSIS — R29.3 POOR POSTURE: ICD-10-CM

## 2023-12-20 DIAGNOSIS — M54.9 RADIATING BACK PAIN: Primary | ICD-10-CM

## 2023-12-20 DIAGNOSIS — Z74.09 IMPAIRED FUNCTIONAL MOBILITY, BALANCE, GAIT, AND ENDURANCE: ICD-10-CM

## 2023-12-20 PROCEDURE — 97110 THERAPEUTIC EXERCISES: CPT | Performed by: PHYSICAL THERAPIST

## 2023-12-20 NOTE — THERAPY TREATMENT NOTE
Outpatient Physical Therapy Ortho Treatment Note  [facility]     Patient Name: Alex Negron  : 1945  MRN: [id number]  Today's Date: 2023      Visit Date: 2023    Visit Dx:    ICD-10-CM ICD-9-CM   1. Radiating back pain  M54.9 724.5   2. Impaired functional mobility, balance, gait, and endurance  Z74.09 V49.89   3. Poor posture  R29.3 781.92       Patient Active Problem List   Diagnosis    Benign essential HTN    Hyperlipidemia, mixed    DDD (degenerative disc disease), lumbar    Iron deficiency anemia    Impaired fasting glucose    Elevated platelet count    Bladder mass    Malignant neoplasm of urinary bladder    Type 2 diabetes mellitus without complication, without long-term current use of insulin    Bladder cancer    Encounter for screening for malignant neoplasm of colon        Past Medical History:   Diagnosis Date    Anxiety     Aortic atherosclerosis     Bladder cancer     Bladder mass 2023    Bladder tumor 2023    chemical chemo in [place] office  weekly    Blind left eye     Blood in urine     TRACE    Burning with urination     DDD (degenerative disc disease), lumbar     GERD (gastroesophageal reflux disease)     History of low back pain     Hyperlipidemia     Hypertension     PTSD (post-traumatic stress disorder)     [place]    Renal insufficiency     Sleep apnea     diagnosed but following up with PCP    Urine frequency         Past Surgical History:   Procedure Laterality Date    DENTAL PROCEDURE      ENDOSCOPY      TRANSURETHRAL RESECTION OF BLADDER TUMOR N/A 2023    Procedure: TRANSURETHRAL RESECTION OF BLADDER TUMOR;  Surgeon: Robert Haque MD;  Location: [facility];  Service: Urology;  Laterality: N/A;    TRANSURETHRAL RESECTION OF BLADDER TUMOR N/A 10/18/2023    Procedure: CYSTOSCOPY TRANSURETHRAL RESECTION OF BLADDER TUMOR;  Surgeon: Robert Haque MD;  Location: [facility];  Service:  Urology;  Laterality: N/A;                        PT Assessment/Plan       Row Name 12/20/23 1326          PT Assessment    Assessment Comments Mr. Negron retuns for his first follow up PT session for LBP/RLE pain. I pad intreprter used for ukranian language.  He reports adherence to HEP and improvement in his condition. Initated several LE flexibility/mobility activities, updating HEP accourdingly.  Overal, he reports improved sleep with less pain.  Suspect degeneative changes of the L spine such as stenosis.  He reports finishing his last chemo session for bladder cancer on 12/18/2023.  Mr. Negron continues to be a good candidate for skilled physical therapy.  -GJ        PT Plan    PT Plan Comments ipad for ukranian, assess response to new exercises, need to work on posterior chain strengthening, ? shoulde ext, lateral stepping, sit to stands, ? prone over pillow superman, standing HS curl vs. 3 D hip B, Possible STM to R poterior/lateral  hip girdle tissue,  -GJ               User Key  (r) = Recorded By, (t) = Taken By, (c) = Cosigned By      Initials Name Provider Type    GJ Prince Erazo, PT Physical Therapist                       OP Exercises       Row Name 12/20/23 1316 12/20/23 1300          Subjective    Subjective Comments -- I've been doing my exercises for the last 10 days and i think it is helping. My back pain is less, but not gone. I have pain down my R leg  -GJ        Total Minutes    41535 - PT Therapeutic Exercise Minutes 40  -GJ --        Exercise 1    Exercise Name 1 -- LTR  -GJ     Cueing 1 -- Verbal;Demo  -GJ     Reps 1 -- 10  -GJ     Time 1 -- 5 seconds  -GJ        Exercise 2    Exercise Name 2 -- HS 90/90 with DF stretch  -GJ     Cueing 2 -- Verbal;Demo  -GJ     Sets 2 -- 2  -GJ     Reps 2 -- 10  -GJ     Time 2 -- B  -GJ        Exercise 4    Exercise Name 4 -- nustep  -GJ     Time 4 -- 5 min  -GJ        Exercise 5    Exercise Name 5 -- seates swiss ball roll outs (3 way)  -GJ      Reps 5 -- 10 each  -GJ     Time 5 -- 5 s  -GJ        Exercise 6    Exercise Name 6 -- bridge  -GJ     Cueing 6 -- Verbal;Demo  -GJ     Reps 6 -- 15  -GJ     Time 6 -- 5s  -GJ        Exercise 7    Exercise Name 7 -- piriformis stretch  -GJ     Cueing 7 -- Verbal;Demo  -GJ     Reps 7 -- 3  -GJ     Time 7 -- 20s  -GJ        Exercise 8    Exercise Name 8 -- gastroc stretch, from step  -GJ     Cueing 8 -- Verbal;Demo  -GJ     Reps 8 -- 3  -GJ     Time 8 -- 20s  -GJ               User Key  (r) = Recorded By, (t) = Taken By, (c) = Cosigned By      Initials Name Provider Type    GJ Prince Erazo, PT Physical Therapist                                  PT OP Goals       Row Name 12/20/23 1300          PT Short Term Goals    STG Date to Achieve 12/28/23  -GJ     STG 1 Pt will report pain rated 4/10 at worst in order to demonstrate ability to return to normalized ADLs and functional activities.  -GJ     STG 1 Progress Ongoing  -GJ     STG 1 Progress Comments reporting 2/10  -GJ     STG 2 Pt will be independent with initial HEP for symptom management.  -GJ     STG 2 Progress Ongoing;Partially Met  -GJ     STG 2 Progress Comments pt reports adherence, will continue to monitor  -GJ        Long Term Goals    LTG Date to Achieve 01/27/24  -GJ     LTG 1 Pt will be independent and compliant with advanced HEP for long term management of symptoms and prevention of future occurrence.  -GJ     LTG 1 Progress Ongoing  -GJ     LTG 2 Pt will reduce level of perceived disability as measured by the Modified oswestry  to 40% in order to improve QOL.  -GJ     LTG 2 Progress Ongoing  -GJ     LTG 3 Pt will report >/= 50% improvement in sleep quality to inidicate improvements in QOL  -GJ     LTG 3 Progress Ongoing;Partially Met  -GJ     LTG 3 Progress Comments pt reports sleeping nearly all night long,will continue to monitor  -GJ     LTG 4 Pt will report ability to walk 1/2 mile w/o exacerbation of symptoms to indicate improvements in community  ambulation endurance.  -     LTG 4 Progress Ongoing  -     LTG 4 Progress Comments reports walking 700 meters pior to pain/cramping  -               User Key  (r) = Recorded By, (t) = Taken By, (c) = Cosigned By      Initials Name Provider Type    Prince Hernandez, PT Physical Therapist                    Therapy Education  Education Details: [id number], updated HEP.   reviewed activity modifications  Given: HEP, Symptoms/condition management, Pain management, Posture/body mechanics, Fall prevention and home safety, Mobility training  Program: Reinforced, New, Progressed  How Provided: Verbal, Demonstration, Written  Provided to: Patient  Level of Understanding: Teach back education performed, Verbalized, Demonstrated              Time Calculation:   Start Time: 1316  Stop Time: 1400  Time Calculation (min): 44 min  Timed Charges  06446 - PT Therapeutic Exercise Minutes: 40  Total Minutes  Timed Charges Total Minutes: 40   Total Minutes: 40  Therapy Charges for Today       Code Description Service Date Service Provider Modifiers Qty    63835076367 HC PT THER PROC EA 15 MIN 12/20/2023 Prince Erazo, PT GP 3                      Prince Erazo, PT  12/20/2023

## 2023-12-21 ENCOUNTER — TELEPHONE (OUTPATIENT)
Dept: GASTROENTEROLOGY | Facility: CLINIC | Age: 78
End: 2023-12-21
Payer: MEDICAID

## 2023-12-22 ENCOUNTER — TELEPHONE (OUTPATIENT)
Dept: GASTROENTEROLOGY | Facility: CLINIC | Age: 78
End: 2023-12-22
Payer: MEDICAID

## 2023-12-22 ENCOUNTER — HOSPITAL ENCOUNTER (OUTPATIENT)
Dept: PHYSICAL THERAPY | Facility: HOSPITAL | Age: 78
Setting detail: THERAPIES SERIES
Discharge: HOME | End: 2023-12-22
Payer: MEDICAID

## 2023-12-22 DIAGNOSIS — Z74.09 IMPAIRED FUNCTIONAL MOBILITY, BALANCE, GAIT, AND ENDURANCE: ICD-10-CM

## 2023-12-22 DIAGNOSIS — M54.9 RADIATING BACK PAIN: Primary | ICD-10-CM

## 2023-12-22 DIAGNOSIS — R29.3 POOR POSTURE: ICD-10-CM

## 2023-12-22 PROCEDURE — 97530 THERAPEUTIC ACTIVITIES: CPT

## 2023-12-22 PROCEDURE — 97110 THERAPEUTIC EXERCISES: CPT

## 2023-12-22 NOTE — THERAPY TREATMENT NOTE
Outpatient Physical Therapy Ortho Treatment Note  [facility]     Patient Name: Alex Negron  : 1945  MRN: [id number]  Today's Date: 2023      Visit Date: 2023    Visit Dx:    ICD-10-CM ICD-9-CM   1. Radiating back pain  M54.9 724.5   2. Impaired functional mobility, balance, gait, and endurance  Z74.09 V49.89   3. Poor posture  R29.3 781.92       Patient Active Problem List   Diagnosis    Benign essential HTN    Hyperlipidemia, mixed    DDD (degenerative disc disease), lumbar    Iron deficiency anemia    Impaired fasting glucose    Elevated platelet count    Bladder mass    Malignant neoplasm of urinary bladder    Type 2 diabetes mellitus without complication, without long-term current use of insulin    Bladder cancer    Encounter for screening for malignant neoplasm of colon        Past Medical History:   Diagnosis Date    Anxiety     Aortic atherosclerosis     Bladder cancer     Bladder mass 2023    Bladder tumor 2023    chemical chemo in [place] office  weekly    Blind left eye     Blood in urine     TRACE    Burning with urination     DDD (degenerative disc disease), lumbar     GERD (gastroesophageal reflux disease)     History of low back pain     Hyperlipidemia     Hypertension     PTSD (post-traumatic stress disorder)     [place]    Renal insufficiency     Sleep apnea     diagnosed but following up with PCP    Urine frequency         Past Surgical History:   Procedure Laterality Date    DENTAL PROCEDURE      ENDOSCOPY      TRANSURETHRAL RESECTION OF BLADDER TUMOR N/A 2023    Procedure: TRANSURETHRAL RESECTION OF BLADDER TUMOR;  Surgeon: Robert Haque MD;  Location: [facility];  Service: Urology;  Laterality: N/A;    TRANSURETHRAL RESECTION OF BLADDER TUMOR N/A 10/18/2023    Procedure: CYSTOSCOPY TRANSURETHRAL RESECTION OF BLADDER TUMOR;  Surgeon: Robert Haque MD;  Location: [facility];  Service:  Urology;  Laterality: N/A;                        PT Assessment/Plan       Row Name 12/22/23 1300          PT Assessment    Assessment Comments Alex reports reduced pain levels this date 2-3/10 from 6/10 at evaluation, remains compliant with HEP. Added exercises focused on core and posterior chain strengthening via shoulder ext + TrA, A-R, and STS with chest press. Cueing for weight shift,  foot placement and core engagement throughout standing ther ex. Pt. tolerated progressions well, tension and pain with 90/90 floss but reduced when cued for smaller DF range. iPad  for Ukraininan language used throughout treatment.  -        PT Plan    PT Plan Comments standing HS curls, retro monster walk?  -               User Key  (r) = Recorded By, (t) = Taken By, (c) = Cosigned By      Initials Name Provider Type     Erica Swift, PT Physical Therapist                       OP Exercises       Row Name 12/22/23 1300             Subjective    Subjective Comments It's okay  -         Subjective Pain    Able to rate subjective pain? yes  -      Pre-Treatment Pain Level 3  -         Total Minutes    75845 - PT Therapeutic Exercise Minutes 28  -MH      65793 - PT Therapeutic Activity Minutes 10  -MH         Exercise 1    Exercise Name 1 LTR  -MH      Cueing 1 Verbal;Demo  -MH      Reps 1 10  -MH      Time 1 5 seconds  -MH         Exercise 2    Exercise Name 2 HS 90/90 with DF stretch  -MH      Cueing 2 Verbal;Demo  -MH      Sets 2 2  -MH      Reps 2 10  -MH      Time 2 B  -MH         Exercise 4    Exercise Name 4 nustep  -MH      Cueing 4 Verbal  -MH      Time 4 5 min  -MH         Exercise 5    Exercise Name 5 seated swiss ball roll out  -MH      Cueing 5 Verbal  -MH      Reps 5 10ea  -MH      Time 5 5s  -MH         Exercise 6    Exercise Name 6 bridge  -MH      Cueing 6 Verbal;Demo  -MH      Sets 6 2  -MH      Reps 6 15  -MH      Time 6 5s  -MH         Exercise 7    Exercise Name 7 piriformis stretch   -MH      Cueing 7 Verbal;Demo  -MH      Reps 7 3e  -MH      Time 7 20s  -MH         Exercise 9    Exercise Name 9 shoulder ext + TrA  -MH      Cueing 9 Verbal;Demo  -MH      Reps 9 15  -MH      Time 9 2-3s  -MH      Additional Comments GTB  -MH         Exercise 10    Exercise Name 10 A-R  -MH      Cueing 10 Verbal;Demo  -MH      Reps 10 15ea  -MH      Time 10 2-3s  -MH      Additional Comments GTB  -         Exercise 11    Exercise Name 11 STS low blue mat  -      Cueing 11 Verbal;Demo  -MH      Reps 11 15  -MH      Time 11 L2 med ball chest press  -                User Key  (r) = Recorded By, (t) = Taken By, (c) = Cosigned By      Initials Name Provider Type    Erica Can, PT Physical Therapist                                  PT OP Goals       Row Name 12/22/23 1300          PT Short Term Goals    STG Date to Achieve 12/28/23  -     STG 1 Pt will report pain rated 4/10 at worst in order to demonstrate ability to return to normalized ADLs and functional activities.  -     STG 1 Progress Ongoing  -     STG 2 Pt will be independent with initial HEP for symptom management.  -     STG 2 Progress Ongoing;Partially Met  -        Long Term Goals    LTG Date to Achieve 01/27/24  -     LTG 1 Pt will be independent and compliant with advanced HEP for long term management of symptoms and prevention of future occurrence.  -     LTG 1 Progress Ongoing  -     LTG 2 Pt will reduce level of perceived disability as measured by the Modified oswestry  to 40% in order to improve QOL.  -     LTG 2 Progress Ongoing  -     LTG 3 Pt will report >/= 50% improvement in sleep quality to inidicate improvements in QOL  -     LTG 3 Progress Ongoing;Partially Met  -     LTG 4 Pt will report ability to walk 1/2 mile w/o exacerbation of symptoms to indicate improvements in community ambulation endurance.  -     LTG 4 Progress Ongoing  -               User Key  (r) = Recorded By, (t) = Taken By, (c) =  Cosigned By      Initials Name Provider Type     Erica Swift, PT Physical Therapist                    Therapy Education  Education Details: Updated HEP [id number]  Given: HEP, Symptoms/condition management  Program: Reinforced, Progressed  How Provided: Verbal, Demonstration, Written  Provided to: Patient  Level of Understanding: Teach back education performed, Verbalized, Demonstrated              Time Calculation:   Start Time: 1300  Stop Time: 1338  Time Calculation (min): 38 min  Total Timed Code Minutes- PT: 38 minute(s)  Timed Charges  98891 - PT Therapeutic Exercise Minutes: 28  91568 - PT Therapeutic Activity Minutes: 10  Total Minutes  Timed Charges Total Minutes: 38   Total Minutes: 38  Therapy Charges for Today       Code Description Service Date Service Provider Modifiers Qty    38393184424  PT THERAPEUTIC ACT EA 15 MIN 12/22/2023 Erica Swift, PT GP 1    [id number]  PT THER PROC EA 15 MIN 12/22/2023 Erica Swift, PT GP 2                      Erica Swift PT  12/22/2023

## 2023-12-27 ENCOUNTER — HOSPITAL ENCOUNTER (OUTPATIENT)
Dept: PHYSICAL THERAPY | Facility: HOSPITAL | Age: 78
Setting detail: THERAPIES SERIES
Discharge: HOME | End: 2023-12-27
Payer: MEDICAID

## 2023-12-27 DIAGNOSIS — M54.9 RADIATING BACK PAIN: Primary | ICD-10-CM

## 2023-12-27 DIAGNOSIS — Z74.09 IMPAIRED FUNCTIONAL MOBILITY, BALANCE, GAIT, AND ENDURANCE: ICD-10-CM

## 2023-12-27 DIAGNOSIS — R29.3 POOR POSTURE: ICD-10-CM

## 2023-12-27 PROCEDURE — 97110 THERAPEUTIC EXERCISES: CPT

## 2023-12-27 PROCEDURE — 97530 THERAPEUTIC ACTIVITIES: CPT

## 2023-12-27 NOTE — THERAPY TREATMENT NOTE
Outpatient Physical Therapy Ortho Treatment Note  [facility]     Patient Name: Alex Negron  : 1945  MRN: [id number]  Today's Date: 2023      Visit Date: 2023    Visit Dx:    ICD-10-CM ICD-9-CM   1. Radiating back pain  M54.9 724.5   2. Impaired functional mobility, balance, gait, and endurance  Z74.09 V49.89   3. Poor posture  R29.3 781.92       Patient Active Problem List   Diagnosis    Benign essential HTN    Hyperlipidemia, mixed    DDD (degenerative disc disease), lumbar    Iron deficiency anemia    Impaired fasting glucose    Elevated platelet count    Bladder mass    Malignant neoplasm of urinary bladder    Type 2 diabetes mellitus without complication, without long-term current use of insulin    Bladder cancer    Encounter for screening for malignant neoplasm of colon        Past Medical History:   Diagnosis Date    Anxiety     Aortic atherosclerosis     Bladder cancer     Bladder mass 2023    Bladder tumor 2023    chemical chemo in [place]  weekly    Blind left eye     Blood in urine     TRACE    Burning with urination     DDD (degenerative disc disease), lumbar     GERD (gastroesophageal reflux disease)     History of low back pain     Hyperlipidemia     Hypertension     PTSD (post-traumatic stress disorder)     [place]    Renal insufficiency     Sleep apnea     diagnosed but following up with PCP    Urine frequency         Past Surgical History:   Procedure Laterality Date    DENTAL PROCEDURE      ENDOSCOPY      TRANSURETHRAL RESECTION OF BLADDER TUMOR N/A 2023    Procedure: TRANSURETHRAL RESECTION OF BLADDER TUMOR;  Surgeon: Robert Haque MD;  Location: [facility];  Service: Urology;  Laterality: N/A;    TRANSURETHRAL RESECTION OF BLADDER TUMOR N/A 10/18/2023    Procedure: CYSTOSCOPY TRANSURETHRAL RESECTION OF BLADDER TUMOR;  Surgeon: Robert Haque MD;  Location: [facility];  Service:  Urology;  Laterality: N/A;                        PT Assessment/Plan       Row Name 12/27/23 1400          PT Assessment    Assessment Comments Pt returns reporting symptoms about the same since previous visit, but slightly improved since starting physical therapy. Continued with ROM/flexibility interventions and progression of core/LE  strengthening with good tolerance. Ipad [place]  utilized for the entirity of today's visit.  -CC        PT Plan    PT Plan Comments Consider qped cat cow, bird dog LE; update HEP  -CC               User Key  (r) = Recorded By, (t) = Taken By, (c) = Cosigned By      Initials Name Provider Type    Mary Fox, PT Physical Therapist                       OP Exercises       Row Name 12/27/23 1400             Subjective    Subjective Comments about the same as last time  -CC         Subjective Pain    Able to rate subjective pain? yes  -CC      Pre-Treatment Pain Level 3  -CC         Total Minutes    18399 - PT Therapeutic Exercise Minutes 30  -CC      15020 - PT Therapeutic Activity Minutes 10  -CC         Exercise 1    Exercise Name 1 LTR  -CC      Cueing 1 Verbal;Demo  -CC      Reps 1 10  -CC      Time 1 5 seconds  -CC         Exercise 2    Exercise Name 2 HS 90/90 with DF stretch  -CC      Cueing 2 Verbal;Demo  -CC      Sets 2 1 ea rolando  -CC      Reps 2 3  -CC      Time 2 20s  -CC         Exercise 4    Exercise Name 4 nustep  -CC      Cueing 4 Verbal  -CC      Time 4 5 min  -CC         Exercise 5    Exercise Name 5 seated swiss ball roll out  -CC      Cueing 5 Verbal  -CC      Reps 5 10ea  -CC      Time 5 5s  -CC         Exercise 6    Exercise Name 6 bridge  -CC      Cueing 6 Verbal;Demo  -CC      Sets 6 2  -CC      Reps 6 15  -CC      Time 6 5s  -CC         Exercise 7    Exercise Name 7 piriformis stretch  -CC      Cueing 7 Verbal;Demo  -CC      Reps 7 3e  -CC      Time 7 20s  -CC         Exercise 8    Exercise Name 8 side steps  -CC      Cueing 8 Verbal  -CC       Reps 8 5 laps  -CC      Time 8 GTB  -CC         Exercise 9    Exercise Name 9 shoulder ext + TrA  -CC      Cueing 9 Verbal;Demo  -CC      Sets 9 2  -CC      Reps 9 10  -CC      Time 9 2-3s  -CC      Additional Comments GTB  -CC         Exercise 10    Exercise Name 10 A-R  -CC      Cueing 10 Verbal;Demo  -CC      Reps 10 15ea  -CC      Time 10 2-3s  -CC      Additional Comments GTB  -CC         Exercise 11    Exercise Name 11 STS low blue mat  -CC      Cueing 11 Verbal;Demo  -CC      Sets 11 2  -CC      Reps 11 10  -CC      Time 11 5# DB OH press  -CC         Exercise 12    Exercise Name 12 monster walk  -CC      Cueing 12 Demo  -CC      Reps 12 5 laps fwd/bkwd  -CC      Time 12 GTB  -CC         Exercise 13    Exercise Name 13 standing hamstring curl  -CC      Cueing 13 Verbal;Demo  -CC      Sets 13 2 ea leg  -CC      Reps 13 10  -CC      Time 13 3#  -CC      Additional Comments cues to avoid hip flexion  -CC                User Key  (r) = Recorded By, (t) = Taken By, (c) = Cosigned By      Initials Name Provider Type    CC Mary Donis, PT Physical Therapist                                                    Time Calculation:   Start Time: 1426  Stop Time: 1506  Time Calculation (min): 40 min  Total Timed Code Minutes- PT: 40 minute(s)  Timed Charges  63704 - PT Therapeutic Exercise Minutes: 30  99601 - PT Therapeutic Activity Minutes: 10  Total Minutes  Timed Charges Total Minutes: 40   Total Minutes: 40  Therapy Charges for Today       Code Description Service Date Service Provider Modifiers Qty    31935133175 HC PT THER PROC EA 15 MIN 12/27/2023 Mary Donis, PT GP 2    [id number] HC PT THERAPEUTIC ACT EA 15 MIN 12/27/2023 Mary Donis, PT GP 1                      Mary Donis PT  12/27/2023

## 2023-12-29 ENCOUNTER — HOSPITAL ENCOUNTER (OUTPATIENT)
Dept: PHYSICAL THERAPY | Facility: HOSPITAL | Age: 78
Setting detail: THERAPIES SERIES
Discharge: HOME | End: 2023-12-29
Payer: MEDICAID

## 2023-12-29 DIAGNOSIS — R29.3 POOR POSTURE: ICD-10-CM

## 2023-12-29 DIAGNOSIS — Z74.09 IMPAIRED FUNCTIONAL MOBILITY, BALANCE, GAIT, AND ENDURANCE: ICD-10-CM

## 2023-12-29 DIAGNOSIS — M54.9 RADIATING BACK PAIN: Primary | ICD-10-CM

## 2023-12-29 PROCEDURE — 97530 THERAPEUTIC ACTIVITIES: CPT

## 2023-12-29 PROCEDURE — 97110 THERAPEUTIC EXERCISES: CPT

## 2023-12-29 NOTE — THERAPY TREATMENT NOTE
Outpatient Physical Therapy Ortho Treatment Note  [facility]     Patient Name: Alex Negron  : 1945  MRN: [id number]  Today's Date: 2023      Visit Date: 2023    Visit Dx:    ICD-10-CM ICD-9-CM   1. Radiating back pain  M54.9 724.5   2. Impaired functional mobility, balance, gait, and endurance  Z74.09 V49.89   3. Poor posture  R29.3 781.92       Patient Active Problem List   Diagnosis    Benign essential HTN    Hyperlipidemia, mixed    DDD (degenerative disc disease), lumbar    Iron deficiency anemia    Impaired fasting glucose    Elevated platelet count    Bladder mass    Malignant neoplasm of urinary bladder    Type 2 diabetes mellitus without complication, without long-term current use of insulin    Bladder cancer    Encounter for screening for malignant neoplasm of colon        Past Medical History:   Diagnosis Date    Anxiety     Aortic atherosclerosis     Bladder cancer     Bladder mass 2023    Bladder tumor 2023    chemical chemo in [place]  weekly    Blind left eye     Blood in urine     TRACE    Burning with urination     DDD (degenerative disc disease), lumbar     GERD (gastroesophageal reflux disease)     History of low back pain     Hyperlipidemia     Hypertension     PTSD (post-traumatic stress disorder)     [place] start [place]    Renal insufficiency     Sleep apnea     diagnosed but following up with PCP    Urine frequency         Past Surgical History:   Procedure Laterality Date    DENTAL PROCEDURE      ENDOSCOPY      TRANSURETHRAL RESECTION OF BLADDER TUMOR N/A 2023    Procedure: TRANSURETHRAL RESECTION OF BLADDER TUMOR;  Surgeon: Robert Haque MD;  Location: [facility];  Service: Urology;  Laterality: N/A;    TRANSURETHRAL RESECTION OF BLADDER TUMOR N/A 10/18/2023    Procedure: CYSTOSCOPY TRANSURETHRAL RESECTION OF BLADDER TUMOR;  Surgeon: Robert Haque MD;  Location: [facility];  Service:  Urology;  Laterality: N/A;                        PT Assessment/Plan       Row Name 12/29/23 1443          PT Assessment    Assessment Comments Pt returns to therapy today stating that he feels good and that his symptoms improve following therapy sessions and completion of HEP but return in the gaps between. He does report that he has improved since the start of therapy, however. Continued to progress ROM/flexibility exercises with good response. iPad  used throughout session for communication. Updated HEP this date.  -ZB        PT Plan    PT Plan Comments Assess response to addition of HEP exercises. Progress bird dog to involve UE, consider dead bugs, increasing resistance of exercises.  -ZB               User Key  (r) = Recorded By, (t) = Taken By, (c) = Cosigned By      Initials Name Provider Type    Jai Gallegos, PT Physical Therapist                       OP Exercises       Row Name 12/29/23 1400             Subjective    Subjective Comments I feel good  -ZB         Subjective Pain    Able to rate subjective pain? yes  -ZB      Pre-Treatment Pain Level 3  -ZB         Total Minutes    03444 - PT Therapeutic Exercise Minutes 30  -ZB      80451 - PT Therapeutic Activity Minutes 10  -ZB         Exercise 1    Exercise Name 1 LTR  -ZB      Cueing 1 Verbal;Demo  -ZB      Reps 1 10  -ZB      Time 1 5 seconds  -ZB         Exercise 2    Exercise Name 2 HS 90/90 with DF stretch  -ZB      Cueing 2 Verbal;Demo  -ZB      Sets 2 1 ea rolando  -ZB      Reps 2 3  -ZB      Time 2 20s  -ZB         Exercise 4    Exercise Name 4 nustep  -ZB      Cueing 4 Verbal  -ZB      Time 4 5 min  -ZB         Exercise 5    Exercise Name 5 seated swiss ball roll out  -ZB      Cueing 5 Verbal  -ZB      Reps 5 10ea  -ZB      Time 5 5s  -ZB         Exercise 6    Exercise Name 6 bridge  -ZB      Cueing 6 Verbal;Demo  -ZB      Sets 6 2  -ZB      Reps 6 15  -ZB      Time 6 5s  -ZB         Exercise 7    Exercise Name 7 piriformis stretch   -ZB      Cueing 7 Verbal;Demo  -ZB      Reps 7 3e  -ZB      Time 7 20s  -ZB                User Key  (r) = Recorded By, (t) = Taken By, (c) = Cosigned By      Initials Name Provider Type    Jai Gallegos, SANTOSH Physical Therapist                                  PT OP Goals       Row Name 12/29/23 1400          PT Short Term Goals    STG Date to Achieve 12/28/23  -ZB     STG 1 Pt will report pain rated 4/10 at worst in order to demonstrate ability to return to normalized ADLs and functional activities.  -ZB     STG 1 Progress Ongoing  -ZB     STG 2 Pt will be independent with initial HEP for symptom management.  -ZB     STG 2 Progress Ongoing;Partially Met  -ZB        Long Term Goals    LTG Date to Achieve 01/27/24  -ZB     LTG 1 Pt will be independent and compliant with advanced HEP for long term management of symptoms and prevention of future occurrence.  -ZB     LTG 1 Progress Ongoing  -ZB     LTG 2 Pt will reduce level of perceived disability as measured by the Modified oswestry  to 40% in order to improve QOL.  -ZB     LTG 2 Progress Ongoing  -ZB     LTG 3 Pt will report >/= 50% improvement in sleep quality to inidicate improvements in QOL  -ZB     LTG 3 Progress Ongoing;Partially Met  -ZB     LTG 4 Pt will report ability to walk 1/2 mile w/o exacerbation of symptoms to indicate improvements in community ambulation endurance.  -ZB     LTG 4 Progress Ongoing  -ZB               User Key  (r) = Recorded By, (t) = Taken By, (c) = Cosigned By      Initials Name Provider Type    Jai Gallegos PT Physical Therapist                    Therapy Education  Education Details: Updated HEP: [id number]  Given: HEP, Symptoms/condition management  Program: Reinforced, Progressed  How Provided: Verbal, Demonstration, Written  Provided to: Patient  Level of Understanding: Teach back education performed, Verbalized, Demonstrated              Time Calculation:   Start Time: 1430  Stop Time: 1510  Time Calculation (min): 40  min  Total Timed Code Minutes- PT: 40 minute(s)  Timed Charges  95313 - PT Therapeutic Exercise Minutes: 30  94822 - PT Therapeutic Activity Minutes: 10  Total Minutes  Timed Charges Total Minutes: 40   Total Minutes: 40  Therapy Charges for Today       Code Description Service Date Service Provider Modifiers Qty    75225044603  PT THER PROC EA 15 MIN 12/29/2023 Jai Rodriguez, PT GP 2    [id number]  PT THERAPEUTIC ACT EA 15 MIN 12/29/2023 Jai Rodriguez, PT GP 1                      Nancy Rodriguez, PT  12/29/2023

## 2024-01-03 ENCOUNTER — HOSPITAL ENCOUNTER (OUTPATIENT)
Dept: PHYSICAL THERAPY | Facility: HOSPITAL | Age: 79
Setting detail: THERAPIES SERIES
Discharge: HOME | End: 2024-01-03
Payer: MEDICAID

## 2024-01-03 DIAGNOSIS — Z74.09 IMPAIRED FUNCTIONAL MOBILITY, BALANCE, GAIT, AND ENDURANCE: ICD-10-CM

## 2024-01-03 DIAGNOSIS — R29.3 POOR POSTURE: ICD-10-CM

## 2024-01-03 DIAGNOSIS — M54.9 RADIATING BACK PAIN: Primary | ICD-10-CM

## 2024-01-03 PROCEDURE — 97110 THERAPEUTIC EXERCISES: CPT

## 2024-01-03 NOTE — THERAPY TREATMENT NOTE
Outpatient Physical Therapy Ortho Treatment Note  [facility]     Patient Name: Alex Negron  : 1945  MRN: [id number]  Today's Date: 1/3/2024      Visit Date: 2024    Visit Dx:    ICD-10-CM ICD-9-CM   1. Radiating back pain  M54.9 724.5   2. Impaired functional mobility, balance, gait, and endurance  Z74.09 V49.89   3. Poor posture  R29.3 781.92       Patient Active Problem List   Diagnosis    Benign essential HTN    Hyperlipidemia, mixed    DDD (degenerative disc disease), lumbar    Iron deficiency anemia    Impaired fasting glucose    Elevated platelet count    Bladder mass    Malignant neoplasm of urinary bladder    Type 2 diabetes mellitus without complication, without long-term current use of insulin    Bladder cancer    Encounter for screening for malignant neoplasm of colon        Past Medical History:   Diagnosis Date    Anxiety     Aortic atherosclerosis     Bladder cancer     Bladder mass 2023    Bladder tumor 2023    chemical chemo in [place]  weekly    Blind left eye     Blood in urine     TRACE    Burning with urination     DDD (degenerative disc disease), lumbar     GERD (gastroesophageal reflux disease)     History of low back pain     Hyperlipidemia     Hypertension     PTSD (post-traumatic stress disorder)     [place]    Renal insufficiency     Sleep apnea     diagnosed but following up with PCP    Urine frequency         Past Surgical History:   Procedure Laterality Date    DENTAL PROCEDURE      ENDOSCOPY      TRANSURETHRAL RESECTION OF BLADDER TUMOR N/A 2023    Procedure: TRANSURETHRAL RESECTION OF BLADDER TUMOR;  Surgeon: Robert Haque MD;  Location: [facility];  Service: Urology;  Laterality: N/A;    TRANSURETHRAL RESECTION OF BLADDER TUMOR N/A 10/18/2023    Procedure: CYSTOSCOPY TRANSURETHRAL RESECTION OF BLADDER TUMOR;  Surgeon: Robert Haque MD;  Location: [facility];  Service: Urology;   Laterality: N/A;                        PT Assessment/Plan       Row Name 01/03/24 1500          PT Assessment    Assessment Comments Mr. Negron returns to PT reporting ongoing improvement in symptoms since the start of PT. Due to 10 year history of pain and multiple variation in treatment methods while living in [facility], he feels PT has helped him the most. He feels his hip strength has slowly improved and this has lead to his success. Continued with current routine with mild resistance progressions and patient with noted fatigue following lateral stepping and monster walks. Intermittent cues needed to improve TA stability with standing tasks. Encouraged carry over with standing/walking tasks and TA activation as pt reports his activity tolerance is limited to 30 minutes. Mr. Negron remains a candidate for skilled PT.  -AQUILES        PT Plan    PT Plan Comments update HEP, PN? consider bird dog, box stepping, lateral step up + knee   -AQUILES               User Key  (r) = Recorded By, (t) = Taken By, (c) = Cosigned By      Initials Name Provider Type    Michelle Islas, PT Physical Therapist                       OP Exercises       Row Name 01/03/24 1400             Subjective    Subjective Comments I do feel like therapy is helping some. I still have pain when I stand/walk more than 30 minutes  -AQUILES         Subjective Pain    Able to rate subjective pain? yes  -AQUILES      Pre-Treatment Pain Level 2  -AQUILES         Total Minutes    12162 - PT Therapeutic Exercise Minutes 40  -AQUILES         Exercise 1    Exercise Name 1 LTR  -AQUILES      Cueing 1 Verbal;Demo  -AQUILES      Reps 1 10  -AQUILES      Time 1 5 seconds  -AQUILES         Exercise 4    Exercise Name 4 nustep  -AQUILES      Cueing 4 Verbal  -AQUILES      Time 4 5 min  -AQUILES         Exercise 6    Exercise Name 6 bridge  -AQUILES      Cueing 6 Verbal;Demo  -AQUILES      Sets 6 2  -AQUILES      Reps 6 15  -AQUILES      Time 6 5s  -AQUILES         Exercise 7    Exercise Name 7 piriformis stretch  -AQUILES      Cueing  7 Verbal;Demo  -AQIULES      Reps 7 3e  -AQUILES      Time 7 20s  -AQUILES         Exercise 8    Exercise Name 8 side steps  -AQUILES      Cueing 8 Verbal  -AQUILES      Reps 8 5 laps  -AQUILES      Time 8 GTB, below knees  -AQUILES         Exercise 9    Exercise Name 9 shoulder ext + TrA  -AQUILES      Cueing 9 Verbal;Demo  -AQUILES      Sets 9 2  -AQUILES      Reps 9 10  -AQUILES      Time 9 2-3s  -AQUILES      Additional Comments GTB  -AQUILES         Exercise 10    Exercise Name 10 A-R  -AQUILES      Cueing 10 Verbal;Demo  -AQUILES      Sets 10 2  -AQUILES      Reps 10 10e  -AQUILES      Time 10 2-3s  -AQUILES      Additional Comments GTB  -AQUILES         Exercise 11    Exercise Name 11 STS low blue mat  -AQUILES      Cueing 11 Verbal;Demo  -AQUILES      Sets 11 2  -AQUILES      Reps 11 10  -AQUILES      Time 11 5# DB OH press  -AQUILES         Exercise 12    Exercise Name 12 monster walk  -AQUILES      Cueing 12 Demo  -AQUILES      Reps 12 5 laps fwd/bkwd  -AQUILES      Time 12 GTB, below knees  -AQUILES         Exercise 13    Exercise Name 13 standing hamstring curl  -AQUILES      Additional Comments resume next session  -AQUILES                User Key  (r) = Recorded By, (t) = Taken By, (c) = Cosigned By      Initials Name Provider Type    Michelle Islas, PT Physical Therapist                                  PT OP Goals       Row Name 01/03/24 1600          PT Short Term Goals    STG Date to Achieve 12/28/23  -     STG 1 Pt will report pain rated 4/10 at worst in order to demonstrate ability to return to normalized ADLs and functional activities.  -     STG 1 Progress Ongoing  -     STG 2 Pt will be independent with initial HEP for symptom management.  -     STG 2 Progress Ongoing;Partially Met  -        Long Term Goals    LTG Date to Achieve 01/27/24  -     LTG 1 Pt will be independent and compliant with advanced HEP for long term management of symptoms and prevention of future occurrence.  -     LTG 1 Progress Ongoing  -     LTG 2 Pt will reduce level of perceived disability as measured by the Modified oswestry  to 40% in  order to improve QOL.  -AQUILES     LTG 2 Progress Ongoing  -AQUILES     LTG 3 Pt will report >/= 50% improvement in sleep quality to inidicate improvements in QOL  -AQUILES     LTG 3 Progress Ongoing;Partially Met  -AQUILES     LTG 4 Pt will report ability to walk 1/2 mile w/o exacerbation of symptoms to indicate improvements in community ambulation endurance.  -AQUILES     LTG 4 Progress Ongoing  -               User Key  (r) = Recorded By, (t) = Taken By, (c) = Cosigned By      Initials Name Provider Type    Michelle Islas, PT Physical Therapist                                   Time Calculation:   Start Time: 1450  Stop Time: 1530  Time Calculation (min): 40 min  Timed Charges  68331 - PT Therapeutic Exercise Minutes: 40  Total Minutes  Timed Charges Total Minutes: 40   Total Minutes: 40  Therapy Charges for Today       Code Description Service Date Service Provider Modifiers Qty    [id number]  PT THER PROC EA 15 MIN 1/3/2024 Michelle Howard, PT GP 3                      Michelle Howard PT  1/3/2024

## 2024-01-05 ENCOUNTER — HOSPITAL ENCOUNTER (OUTPATIENT)
Dept: PHYSICAL THERAPY | Facility: HOSPITAL | Age: 79
Setting detail: THERAPIES SERIES
Discharge: HOME | End: 2024-01-05
Payer: MEDICAID

## 2024-01-05 DIAGNOSIS — R29.3 POOR POSTURE: ICD-10-CM

## 2024-01-05 DIAGNOSIS — Z74.09 IMPAIRED FUNCTIONAL MOBILITY, BALANCE, GAIT, AND ENDURANCE: ICD-10-CM

## 2024-01-05 DIAGNOSIS — M54.9 RADIATING BACK PAIN: Primary | ICD-10-CM

## 2024-01-05 PROCEDURE — 97110 THERAPEUTIC EXERCISES: CPT

## 2024-01-05 NOTE — THERAPY PROGRESS REPORT/RE-CERT
Outpatient Physical Therapy Ortho Progress Note  [facility]     Patient Name: Alex Negron  : 1945  MRN: [id number]  Today's Date: 2024      Visit Date: 2024    Visit Dx:    ICD-10-CM ICD-9-CM   1. Radiating back pain  M54.9 724.5   2. Impaired functional mobility, balance, gait, and endurance  Z74.09 V49.89   3. Poor posture  R29.3 781.92       Patient Active Problem List   Diagnosis    Benign essential HTN    Hyperlipidemia, mixed    DDD (degenerative disc disease), lumbar    Iron deficiency anemia    Impaired fasting glucose    Elevated platelet count    Bladder mass    Malignant neoplasm of urinary bladder    Type 2 diabetes mellitus without complication, without long-term current use of insulin    Bladder cancer    Encounter for screening for malignant neoplasm of colon        Past Medical History:   Diagnosis Date    Anxiety     Aortic atherosclerosis     Bladder cancer     Bladder mass 2023    Bladder tumor 2023    chemical chemo in [place]  weekly    Blind left eye     Blood in urine     TRACE    Burning with urination     DDD (degenerative disc disease), lumbar     GERD (gastroesophageal reflux disease)     History of low back pain     Hyperlipidemia     Hypertension     PTSD (post-traumatic stress disorder)     [place]    Renal insufficiency     Sleep apnea     diagnosed but following up with PCP    Urine frequency         Past Surgical History:   Procedure Laterality Date    DENTAL PROCEDURE      ENDOSCOPY      TRANSURETHRAL RESECTION OF BLADDER TUMOR N/A 2023    Procedure: TRANSURETHRAL RESECTION OF BLADDER TUMOR;  Surgeon: Robert Haque MD;  Location: [facility];  Service: Urology;  Laterality: N/A;    TRANSURETHRAL RESECTION OF BLADDER TUMOR N/A 10/18/2023    Procedure: CYSTOSCOPY TRANSURETHRAL RESECTION OF BLADDER TUMOR;  Surgeon: Robert Haque MD;  Location: [facility];  Service: Urology;   Laterality: N/A;                        PT Assessment/Plan       Row Name 01/05/24 1200          PT Assessment    Functional Limitations Impaired gait;Limitation in home management;Limitations in community activities;Limitations in functional capacity and performance;Performance in leisure activities  -     Impairments Motor function;Muscle strength;Pain;Poor body mechanics;Posture;Range of motion;Endurance;Gait  -     Assessment Comments Alex Negron has been seen for 7 physical therapy sessions for LBP. Treatment has included therapeutic exercise, therapeutic activity, and patient education with home exercise program . Progress to physical therapy goals is fair as pt. Has met 2/2 STGs, and 1/4 LTGs. He continues to report overall improvement in condition since beginning therapy including reduced pain levels and improving hip strength. He demonstrates increased tolerance to higher level  challenges in clinic and reports ability to walk 1 km (0.6 miles) prior to onset of pain. He reports continued activity tolerance to 30 minutes. He will benefit from continued skilled physical therapy to address remaining impairments and functional limitations.  -     Please refer to paper survey for additional self-reported information No  -     Rehab Potential Good  -     Patient/caregiver participated in establishment of treatment plan and goals Yes  -     Patient would benefit from skilled therapy intervention Yes  -MH        PT Plan    PT Frequency 1x/week;2x/week  -     Predicted Duration of Therapy Intervention (PT) 4 visits  -     Planned CPT's? PT RE-EVAL: 70526;PT THER PROC EA 15 MIN: 97263;PT THER ACT EA 15 MIN: 18999;PT MANUAL THERAPY EA 15 MIN: 79551;PT NEUROMUSC RE-EDUCATION EA 15 MIN: 41736;PT GAIT TRAINING EA 15 MIN: 05255;PT SELF CARE/HOME MGMT/TRAIN EA 15: 69104;PT HOT OR COLD PACK TREAT MCARE;PT ELECTRICAL STIM UNATTEND: ;PT TRACTION LUMBAR: 56560  -     PT Plan Comments pt. reports  feeling good with current program - increase resistance as appropriate and improve form/technique over coming visits - likely D/C after last visit scheduled?; consider demo of appropriate stretches to add to home  -MH               User Key  (r) = Recorded By, (t) = Taken By, (c) = Cosigned By      Initials Name Provider Type    Erica Can, PT Physical Therapist                       OP Exercises       Row Name 01/05/24 1200             Subjective    Subjective Comments I'm feeling good, its getting better  -         Subjective Pain    Able to rate subjective pain? yes  -MH      Pre-Treatment Pain Level 1  -MH         Total Minutes    66152 - PT Therapeutic Exercise Minutes 40  -MH         Exercise 1    Exercise Name 1 LTR  -MH      Cueing 1 Verbal;Demo  -MH      Reps 1 10  -MH      Time 1 5 seconds  -MH         Exercise 4    Exercise Name 4 nustep  -MH      Cueing 4 Verbal  -MH      Time 4 5 min  -MH         Exercise 7    Exercise Name 7 piriformis stretch  -MH      Cueing 7 Verbal;Demo  -MH      Reps 7 3e  -MH      Time 7 20s  -MH         Exercise 8    Exercise Name 8 box steps  -MH      Cueing 8 Verbal;Demo  -MH      Reps 8 10ea  -MH      Time 8 GTB, below knees  -MH         Exercise 9    Exercise Name 9 shoulder ext + TrA  -MH      Cueing 9 Verbal;Demo  -MH      Sets 9 2  -MH      Reps 9 10  -MH      Time 9 2-3s  -MH      Additional Comments GTB  -MH         Exercise 10    Exercise Name 10 A-R  -MH      Cueing 10 Verbal;Demo  -MH      Sets 10 2  -MH      Reps 10 10e  -MH      Time 10 2-3s  -MH      Additional Comments GTB  -MH         Exercise 12    Exercise Name 12 monster walk  -MH      Cueing 12 Demo  -MH      Reps 12 5 laps fwd/bkwd  -MH      Time 12 GTB, below knees  -MH         Exercise 14    Exercise Name 14 high to low chop  -MH      Cueing 14 Verbal;Demo  -MH      Sets 14 2  -MH      Reps 14 10  -MH      Time 14 GTB  -MH                User Key  (r) = Recorded By, (t) = Taken By, (c) =  Cosigned By      Initials Name Provider Type     Erica Swift PT Physical Therapist                                  PT OP Goals       Row Name 01/05/24 1200          PT Short Term Goals    STG Date to Achieve 12/28/23  -     STG 1 Pt will report pain rated 4/10 at worst in order to demonstrate ability to return to normalized ADLs and functional activities.  -     STG 1 Progress Met  -     STG 1 Progress Comments very mild, at most pain 3/10  -     STG 2 Pt will be independent with initial HEP for symptom management.  -     STG 2 Progress Met  -     STG 2 Progress Comments reports compliance  -        Long Term Goals    LTG Date to Achieve 01/27/24  -     LTG 1 Pt will be independent and compliant with advanced HEP for long term management of symptoms and prevention of future occurrence.  -     LTG 1 Progress Ongoing  -     LTG 2 Pt will reduce level of perceived disability as measured by the Modified oswestry  to 40% in order to improve QOL.  -     LTG 2 Progress Ongoing  -     LTG 3 Pt will report >/= 50% improvement in sleep quality to inidicate improvements in QOL  -     LTG 3 Progress Ongoing;Partially Met  -     LTG 4 Pt will report ability to walk 1/2 mile w/o exacerbation of symptoms to indicate improvements in community ambulation endurance.  -     LTG 4 Progress Met  -     LTG 4 Progress Comments 1 km = 0.6 miles  -               User Key  (r) = Recorded By, (t) = Taken By, (c) = Cosigned By      Initials Name Provider Type     Erica Swift PT Physical Therapist                    Therapy Education  Given: Symptoms/condition management, Posture/body mechanics  Program: Reinforced  How Provided: Verbal, Demonstration  Provided to: Patient  Level of Understanding: Verbalized, Demonstrated              Time Calculation:   Start Time: 1248  Stop Time: 1328  Time Calculation (min): 40 min  Total Timed Code Minutes- PT: 40 minute(s)  Timed Charges  15126 - PT  Therapeutic Exercise Minutes: 40  Total Minutes  Timed Charges Total Minutes: 40   Total Minutes: 40  Therapy Charges for Today       Code Description Service Date Service Provider Modifiers Qty    78666611597 HC PT THER PROC EA 15 MIN 1/5/2024 Erica Swift, PT GP 3                      Erica Swift, PT  1/5/2024

## 2024-01-10 ENCOUNTER — HOSPITAL ENCOUNTER (OUTPATIENT)
Dept: PHYSICAL THERAPY | Facility: HOSPITAL | Age: 79
Setting detail: THERAPIES SERIES
Discharge: HOME | End: 2024-01-10
Payer: MEDICAID

## 2024-01-10 DIAGNOSIS — Z74.09 IMPAIRED FUNCTIONAL MOBILITY, BALANCE, GAIT, AND ENDURANCE: ICD-10-CM

## 2024-01-10 DIAGNOSIS — R29.3 POOR POSTURE: ICD-10-CM

## 2024-01-10 DIAGNOSIS — M54.9 RADIATING BACK PAIN: Primary | ICD-10-CM

## 2024-01-10 PROCEDURE — 97110 THERAPEUTIC EXERCISES: CPT

## 2024-01-10 NOTE — THERAPY TREATMENT NOTE
Outpatient Physical Therapy Ortho Treatment Note  [facility]     Patient Name: Alex Negron  : 1945  MRN: [id number]  Today's Date: 1/10/2024      Visit Date: 01/10/2024    Visit Dx:    ICD-10-CM ICD-9-CM   1. Radiating back pain  M54.9 724.5   2. Impaired functional mobility, balance, gait, and endurance  Z74.09 V49.89   3. Poor posture  R29.3 781.92       Patient Active Problem List   Diagnosis    Benign essential HTN    Hyperlipidemia, mixed    DDD (degenerative disc disease), lumbar    Iron deficiency anemia    Impaired fasting glucose    Elevated platelet count    Bladder mass    Malignant neoplasm of urinary bladder    Type 2 diabetes mellitus without complication, without long-term current use of insulin    Bladder cancer    Encounter for screening for malignant neoplasm of colon        Past Medical History:   Diagnosis Date    Anxiety     Aortic atherosclerosis     Bladder cancer     Bladder mass 2023    Bladder tumor 2023    chemical chemo in [place] office  weekly    Blind left eye     Blood in urine     TRACE    Burning with urination     DDD (degenerative disc disease), lumbar     GERD (gastroesophageal reflux disease)     History of low back pain     Hyperlipidemia     Hypertension     PTSD (post-traumatic stress disorder)     [place] start of [place]    Renal insufficiency     Sleep apnea     diagnosed but following up with PCP    Urine frequency         Past Surgical History:   Procedure Laterality Date    DENTAL PROCEDURE      ENDOSCOPY      TRANSURETHRAL RESECTION OF BLADDER TUMOR N/A 2023    Procedure: TRANSURETHRAL RESECTION OF BLADDER TUMOR;  Surgeon: Robert Haque MD;  Location: [facility];  Service: Urology;  Laterality: N/A;    TRANSURETHRAL RESECTION OF BLADDER TUMOR N/A 10/18/2023    Procedure: CYSTOSCOPY TRANSURETHRAL RESECTION OF BLADDER TUMOR;  Surgeon: Robert Haque MD;  Location: [facility];  Service: Urology;   Laterality: N/A;                        PT Assessment/Plan       Row Name 01/10/24 1500          PT Assessment    Assessment Comments Mr. Negron continues to tolerate sessions well with intermittent rest breaks needed throughout session secondary to B hip fatigued. Added stir the pots with emphasis on core stability. No changes made to HEP at this tra. Mr. Negron remains a candidate for skilled PT.  -AQUILES        PT Plan    PT Plan Comments response to last session, consider gobelt with TB above knees  -AQUILES               User Key  (r) = Recorded By, (t) = Taken By, (c) = Cosigned By      Initials Name Provider Type    Michelle Islas, PT Physical Therapist                       OP Exercises       Row Name 01/10/24 1400             Subjective    Subjective Comments I am still feeling good  -AQUILES         Total Minutes    53416 - PT Therapeutic Exercise Minutes 42  -AQUILES         Exercise 4    Exercise Name 4 nustep  -AQUILES      Cueing 4 Verbal  -AQUILES      Time 4 5 min  -AQUILES         Exercise 6    Exercise Name 6 bridge  -AQUILES      Cueing 6 Verbal;Demo  -AQUILES      Sets 6 2  -AQUILES      Reps 6 15  -AQUILES      Time 6 5s  -AQUILES      Additional Comments GTB, 10# DB  -AQUILES         Exercise 7    Exercise Name 7 piriformis stretch  -AQUILES      Cueing 7 Verbal;Demo  -AQUILES      Reps 7 3e  -AQUILES      Time 7 20s  -AQUILES         Exercise 8    Exercise Name 8 box steps  -AQUILES      Cueing 8 Verbal;Demo  -AQUILES      Reps 8 10ea  -AQUILES      Time 8 GTB, below knees  -AQUILES         Exercise 9    Exercise Name 9 stir the pots  -AQUILES      Cueing 9 Verbal;Demo  -AQUILES      Sets 9 2  -AQUILES      Reps 9 10  -AQUILES      Time 9 GTB  -AQUILES         Exercise 10    Exercise Name 10 A-R  -AQUILES      Cueing 10 Verbal;Demo  -AQUILES      Sets 10 2  -AQUILES      Reps 10 10e  -AQUILES      Time 10 2-3s  -AQUILES         Exercise 12    Exercise Name 12 monster walk  -AQUILES      Cueing 12 Demo  -AQUILES      Reps 12 5 laps fwd/bkwd  -AQUILES      Time 12 GTB, below knees  -AQUILES         Exercise 14    Exercise Name 14 high to low chop   -      Cueing 14 Verbal;Demo  -AQUILES      Sets 14 2  -      Reps 14 10  -AQUILES      Time 14 GTB  -                User Key  (r) = Recorded By, (t) = Taken By, (c) = Cosigned By      Initials Name Provider Type    Michelle Islas, PT Physical Therapist                                  PT OP Goals       Row Name 01/10/24 1500          PT Short Term Goals    STG Date to Achieve 12/28/23  -     STG 1 Pt will report pain rated 4/10 at worst in order to demonstrate ability to return to normalized ADLs and functional activities.  -     STG 1 Progress Met  -     STG 2 Pt will be independent with initial HEP for symptom management.  -     STG 2 Progress Met  -        Long Term Goals    LTG Date to Achieve 01/27/24  -     LTG 1 Pt will be independent and compliant with advanced HEP for long term management of symptoms and prevention of future occurrence.  -     LTG 1 Progress Ongoing  -     LTG 2 Pt will reduce level of perceived disability as measured by the Modified oswestry  to 40% in order to improve QOL.  -     LTG 2 Progress Ongoing  -     LTG 3 Pt will report >/= 50% improvement in sleep quality to inidicate improvements in QOL  -     LTG 3 Progress Ongoing;Partially Met  -     LTG 4 Pt will report ability to walk 1/2 mile w/o exacerbation of symptoms to indicate improvements in community ambulation endurance.  -     LTG 4 Progress Met  -               User Key  (r) = Recorded By, (t) = Taken By, (c) = Cosigned By      Initials Name Provider Type    Michelle Islas, PT Physical Therapist                                   Time Calculation:   Start Time: 1450  Stop Time: 1532  Time Calculation (min): 42 min  Timed Charges  62294 - PT Therapeutic Exercise Minutes: 42  Total Minutes  Timed Charges Total Minutes: 42   Total Minutes: 42  Therapy Charges for Today       Code Description Service Date Service Provider Modifiers Qty    05990962673  PT THER PROC EA 15 MIN  1/10/2024 Anita, Michelle Forde, PT GP 3                      Michelle Howard, PT  1/10/2024

## 2024-01-17 ENCOUNTER — HOSPITAL ENCOUNTER (OUTPATIENT)
Dept: PHYSICAL THERAPY | Facility: HOSPITAL | Age: 79
Setting detail: THERAPIES SERIES
Discharge: HOME | End: 2024-01-17
Payer: MEDICAID

## 2024-01-17 DIAGNOSIS — Z74.09 IMPAIRED FUNCTIONAL MOBILITY, BALANCE, GAIT, AND ENDURANCE: ICD-10-CM

## 2024-01-17 DIAGNOSIS — R29.3 POOR POSTURE: ICD-10-CM

## 2024-01-17 DIAGNOSIS — M54.9 RADIATING BACK PAIN: Primary | ICD-10-CM

## 2024-01-17 PROCEDURE — 97110 THERAPEUTIC EXERCISES: CPT

## 2024-01-17 NOTE — THERAPY TREATMENT NOTE
Outpatient Physical Therapy Ortho Treatment Note  [facility]     Patient Name: Alex Negron  : 1945  MRN: [id number]  Today's Date: 2024      Visit Date: 2024    Visit Dx:    ICD-10-CM ICD-9-CM   1. Radiating back pain  M54.9 724.5   2. Impaired functional mobility, balance, gait, and endurance  Z74.09 V49.89   3. Poor posture  R29.3 781.92       Patient Active Problem List   Diagnosis    Benign essential HTN    Hyperlipidemia, mixed    DDD (degenerative disc disease), lumbar    Iron deficiency anemia    Impaired fasting glucose    Elevated platelet count    Bladder mass    Malignant neoplasm of urinary bladder    Type 2 diabetes mellitus without complication, without long-term current use of insulin    Bladder cancer    Encounter for screening for malignant neoplasm of colon        Past Medical History:   Diagnosis Date    Anxiety     Aortic atherosclerosis     Bladder cancer     Bladder mass 2023    Bladder tumor 2023    chemical chemo in [place] office  weekly    Blind left eye     Blood in urine     TRACE    Burning with urination     DDD (degenerative disc disease), lumbar     GERD (gastroesophageal reflux disease)     History of low back pain     Hyperlipidemia     Hypertension     PTSD (post-traumatic stress disorder)     [place] start [place]    Renal insufficiency     Sleep apnea     diagnosed but following up with PCP    Urine frequency         Past Surgical History:   Procedure Laterality Date    DENTAL PROCEDURE      ENDOSCOPY      TRANSURETHRAL RESECTION OF BLADDER TUMOR N/A 2023    Procedure: TRANSURETHRAL RESECTION OF BLADDER TUMOR;  Surgeon: Robert Haque MD;  Location: [facility];  Service: Urology;  Laterality: N/A;    TRANSURETHRAL RESECTION OF BLADDER TUMOR N/A 10/18/2023    Procedure: CYSTOSCOPY TRANSURETHRAL RESECTION OF BLADDER TUMOR;  Surgeon: Robert Haque MD;  Location: [facility];  Service: Urology;   Laterality: N/A;                        PT Assessment/Plan       Row Name 01/17/24 1600          PT Assessment    Assessment Comments Alex Shankar is a 79 year old male seen for physical therapy treatment session for LBP. Today, pt. demonstrated great tolerance to established therex plan. Added STS with overhead reach w/ 10# DB with good tolerance. Pt. requiring cueing for speed of completion with focus on eccentric control and body mechanics. Attempted lateral stepping with GTB, however pt. reports increased pain. Attempted standing hip ABD however pt. still reporting pain. Deferred ABD at this time. Pt. remains a good candidate for skilled PT intervention.  -ER        PT Plan    PT Plan Comments Consider wall squat, revisit lateral stepping, PPT with march, mini lunge? Update HEP? may d/c at end of scheduled appts?  -ER               User Key  (r) = Recorded By, (t) = Taken By, (c) = Cosigned By      Initials Name Provider Type    ER Avis Aguilar, PT Physical Therapist                       OP Exercises       Row Name 01/17/24 1400             Subjective    Subjective Comments I am still feeling good  -ER         Total Minutes    84161 - PT Therapeutic Exercise Minutes 40  -ER         Exercise 1    Exercise Name 1 LTR  -ER      Cueing 1 Verbal;Demo  -ER      Reps 1 10  -ER      Time 1 5 seconds  -ER         Exercise 4    Exercise Name 4 nustep  -ER      Cueing 4 Verbal  -ER      Time 4 5 min  -ER         Exercise 6    Exercise Name 6 bridge  -ER      Cueing 6 Verbal;Demo  -ER      Sets 6 2  -ER      Reps 6 15  -ER      Time 6 5s  -ER      Additional Comments GTB, 10# DB  -ER         Exercise 7    Exercise Name 7 piriformis stretch  -ER      Cueing 7 Verbal;Demo  -ER      Reps 7 3e  -ER      Time 7 20s  -ER         Exercise 8    Exercise Name 8 box steps  -ER      Cueing 8 Verbal;Demo  -ER      Reps 8 10ea  -ER      Time 8 GTB, below knees  -ER         Exercise 9    Exercise Name 9 stir the pots  -ER      Cueing  9 Verbal;Demo  -ER      Sets 9 2  -ER      Reps 9 10  -ER      Time 9 GTB  -ER         Exercise 10    Exercise Name 10 A-R  -ER      Cueing 10 Verbal;Demo  -ER      Sets 10 2  -ER      Reps 10 10e  -ER      Time 10 2-3s  -ER         Exercise 12    Exercise Name 12 monster walk  -ER      Cueing 12 Demo  -ER      Reps 12 5 laps fwd/bkwd  -ER      Time 12 GTB, below knees  -ER         Exercise 13    Exercise Name 13 STS with overhead reach  -ER      Cueing 13 Verbal;Demo  -ER      Sets 13 2  -ER      Reps 13 10  -ER      Time 13 10# DB, low mat  -ER         Exercise 14    Exercise Name 14 high to low chop  -ER      Cueing 14 Verbal;Demo  -ER      Sets 14 2  -ER      Reps 14 10  -ER      Time 14 GTB  -ER         Exercise 15    Exercise Name 15 lateral stepping  -ER      Cueing 15 Verbal;Demo  -ER      Reps 15 1 lap  -ER      Additional Comments deferred due to bilat hip pain  -ER                User Key  (r) = Recorded By, (t) = Taken By, (c) = Cosigned By      Initials Name Provider Type    ER Avis Aguilar, PT Physical Therapist                                  PT OP Goals       Row Name 01/17/24 1600          PT Short Term Goals    STG Date to Achieve 12/28/23  -ER     STG 1 Pt will report pain rated 4/10 at worst in order to demonstrate ability to return to normalized ADLs and functional activities.  -ER     STG 1 Progress Met  -ER     STG 2 Pt will be independent with initial HEP for symptom management.  -ER     STG 2 Progress Met  -ER        Long Term Goals    LTG Date to Achieve 01/27/24  -ER     LTG 1 Pt will be independent and compliant with advanced HEP for long term management of symptoms and prevention of future occurrence.  -ER     LTG 1 Progress Ongoing  -ER     LTG 2 Pt will reduce level of perceived disability as measured by the Modified oswestry  to 40% in order to improve QOL.  -ER     LTG 2 Progress Ongoing  -ER     LTG 3 Pt will report >/= 50% improvement in sleep quality to inidicate improvements in  QOL  -ER     LTG 3 Progress Ongoing;Partially Met  -ER     LTG 4 Pt will report ability to walk 1/2 mile w/o exacerbation of symptoms to indicate improvements in community ambulation endurance.  -ER     LTG 4 Progress Met  -ER               User Key  (r) = Recorded By, (t) = Taken By, (c) = Cosigned By      Initials Name Provider Type    ER Avis Aguilar, PT Physical Therapist                    Therapy Education  Education Details: Reinforced HEP, discussed body mechanics and pain tolerance  Given: HEP, Symptoms/condition management, Pain management, Posture/body mechanics  Program: Reinforced  How Provided: Verbal, Demonstration  Provided to: Patient  Level of Understanding: Verbalized, Demonstrated              Time Calculation:   Start Time: 1445  Stop Time: 1525  Time Calculation (min): 40 min  Total Timed Code Minutes- PT: 40 minute(s)  Timed Charges  09818 - PT Therapeutic Exercise Minutes: 40  Total Minutes  Timed Charges Total Minutes: 40   Total Minutes: 40  Therapy Charges for Today       Code Description Service Date Service Provider Modifiers Qty    [id number] HC PT THER PROC EA 15 MIN 1/17/2024 Avis Aguilar, PT GP 3                      Avis Aguilar PT  1/17/2024

## 2024-01-23 DIAGNOSIS — M51.36 DDD (DEGENERATIVE DISC DISEASE), LUMBAR: ICD-10-CM

## 2024-01-23 RX ORDER — GABAPENTIN 300 MG/1
CAPSULE ORAL
Qty: 90 CAPSULE | Refills: 0 | Status: SHIPPED | OUTPATIENT
Start: 2024-01-23

## 2024-01-23 NOTE — TELEPHONE ENCOUNTER
Rx Refill Note  Requested Prescriptions     Pending Prescriptions Disp Refills    gabapentin (NEURONTIN) 300 MG capsule [Pharmacy Med Name: GABAPENTIN 300MG CAPSULES] 90 capsule      Sig: TAKE 1 CAPSULE BY MOUTH EVERY NIGHT AT BEDTIME X 1DAY THEN TAKE 1 TWICE DAILY X 1 DAY, THEN 1 THREE TIMES DAILY AS NEEDED FOR LUMBAR NERVE PAIN      Last office visit with prescribing clinician: 11/1/2023   Last telemedicine visit with prescribing clinician: Visit date not found   Next office visit with prescribing clinician: 2/1/2024                         Would you like a call back once the refill request has been completed: [] Yes [] No    If the office needs to give you a call back, can they leave a voicemail: [] Yes [] No    Demi Kramer MA  01/23/24, 10:09 EST

## 2024-01-24 ENCOUNTER — HOSPITAL ENCOUNTER (OUTPATIENT)
Dept: PHYSICAL THERAPY | Facility: HOSPITAL | Age: 79
Setting detail: THERAPIES SERIES
Discharge: HOME | End: 2024-01-24
Payer: MEDICAID

## 2024-01-24 DIAGNOSIS — M54.9 RADIATING BACK PAIN: Primary | ICD-10-CM

## 2024-01-24 DIAGNOSIS — Z74.09 IMPAIRED FUNCTIONAL MOBILITY, BALANCE, GAIT, AND ENDURANCE: ICD-10-CM

## 2024-01-24 DIAGNOSIS — R29.3 POOR POSTURE: ICD-10-CM

## 2024-01-24 PROCEDURE — 97110 THERAPEUTIC EXERCISES: CPT

## 2024-01-24 NOTE — THERAPY TREATMENT NOTE
Outpatient Physical Therapy Ortho Treatment Note  [facility]     Patient Name: Alex Negron  : 1945  MRN: [id number]  Today's Date: 2024      Visit Date: 2024    Visit Dx:    ICD-10-CM ICD-9-CM   1. Radiating back pain  M54.9 724.5   2. Impaired functional mobility, balance, gait, and endurance  Z74.09 V49.89   3. Poor posture  R29.3 781.92       Patient Active Problem List   Diagnosis    Benign essential HTN    Hyperlipidemia, mixed    DDD (degenerative disc disease), lumbar    Iron deficiency anemia    Impaired fasting glucose    Elevated platelet count    Bladder mass    Malignant neoplasm of urinary bladder    Type 2 diabetes mellitus without complication, without long-term current use of insulin    Bladder cancer    Encounter for screening for malignant neoplasm of colon        Past Medical History:   Diagnosis Date    Anxiety     Aortic atherosclerosis     Bladder cancer     Bladder mass 2023    Bladder tumor 2023    chemical chemo in [place]  weekly    Blind left eye     Blood in urine     TRACE    Burning with urination     DDD (degenerative disc disease), lumbar     GERD (gastroesophageal reflux disease)     History of low back pain     Hyperlipidemia     Hypertension     PTSD (post-traumatic stress disorder)     [place]    Renal insufficiency     Sleep apnea     diagnosed but following up with PCP    Urine frequency         Past Surgical History:   Procedure Laterality Date    DENTAL PROCEDURE      ENDOSCOPY      TRANSURETHRAL RESECTION OF BLADDER TUMOR N/A 2023    Procedure: TRANSURETHRAL RESECTION OF BLADDER TUMOR;  Surgeon: Robert Haque MD;  Location: [facility];  Service: Urology;  Laterality: N/A;    TRANSURETHRAL RESECTION OF BLADDER TUMOR N/A 10/18/2023    Procedure: CYSTOSCOPY TRANSURETHRAL RESECTION OF BLADDER TUMOR;  Surgeon: Robert Haque MD;  Location: [facility];  Service: Urology;   Laterality: N/A;                        PT Assessment/Plan       Row Name 01/24/24 1400          PT Assessment    Assessment Comments Mr. Negron reports ongoing benefit of skilled PT. Reviewed all previously performed exercises without complaint. Will finalize HEP and transition to independent management following next session.  -AQUILES        PT Plan    PT Plan Comments anticipate d/c  -AQUILES               User Key  (r) = Recorded By, (t) = Taken By, (c) = Cosigned By      Initials Name Provider Type    Michelle Islas, PT Physical Therapist                       OP Exercises       Row Name 01/24/24 1400             Subjective    Subjective Comments I am doing good  -AQUILES         Total Minutes    84903 - PT Therapeutic Exercise Minutes 40  -AQUILES         Exercise 4    Exercise Name 4 nustep  -AQUILES      Cueing 4 Verbal  -AQUILES      Time 4 5 min  -AQUILES         Exercise 6    Exercise Name 6 bridge  -AQUILES      Cueing 6 Verbal;Demo  -AQUILES      Sets 6 2  -AQUILES      Reps 6 15  -AQUILES      Time 6 5s  -AQUILES      Additional Comments GTB, 10# DB  -AQUILES         Exercise 7    Exercise Name 7 piriformis stretch  -AUQILES      Cueing 7 Verbal;Demo  -AQUILES      Reps 7 3e  -AQUILES      Time 7 20s  -AQUILES         Exercise 8    Exercise Name 8 box steps  -AQUILES      Cueing 8 Verbal;Demo  -AQUILES      Reps 8 10ea  -AQUILES      Time 8 GTB, below knees  -AQUILES         Exercise 9    Exercise Name 9 stir the pots  -AQUILES      Cueing 9 Verbal;Demo  -AQUILES      Sets 9 2  -AQUILES      Reps 9 10  -AQUILES      Time 9 GTB  -AQUILES         Exercise 10    Exercise Name 10 A-R  -AQUILES      Cueing 10 Verbal;Demo  -AQUILES      Sets 10 2  -AQUILES      Reps 10 10e  -AQUILES      Time 10 2-3s  -AQUILES      Additional Comments GTB  -AQUILES         Exercise 12    Exercise Name 12 monster walk  -AQUILES      Cueing 12 Demo  -AQUILES      Reps 12 5 laps fwd/bkwd  -AQUILES      Time 12 GTB, below knees  -AQUILES         Exercise 13    Exercise Name 13 STS with overhead reach  -AQUILES      Cueing 13 Verbal;Demo  -AQUILES      Sets 13 2  -AQUILES      Reps 13 10  -AQUILES      Time  13 10# DB, low mat  -AQUILES         Exercise 14    Exercise Name 14 high to low chop  -AQUILES      Cueing 14 Verbal;Demo  -AQUILES      Sets 14 2  -AQUILES      Reps 14 10  -AQUILES      Time 14 GTB  -AQUILES         Exercise 15    Exercise Name 15 lateral stepping  -AQUILES      Cueing 15 Verbal;Demo  -AQUILES      Reps 15 4 lap  -AQUILES      Time 15 GTB  -AQUILES                User Key  (r) = Recorded By, (t) = Taken By, (c) = Cosigned By      Initials Name Provider Type    Michelle Islas PT Physical Therapist                                  PT OP Goals       Row Name 01/24/24 1400          PT Short Term Goals    STG Date to Achieve 12/28/23  -     STG 1 Pt will report pain rated 4/10 at worst in order to demonstrate ability to return to normalized ADLs and functional activities.  -     STG 1 Progress Met  -AQUILES     STG 2 Pt will be independent with initial HEP for symptom management.  -     STG 2 Progress Met  -AQUILES        Long Term Goals    LTG Date to Achieve 01/27/24  -     LTG 1 Pt will be independent and compliant with advanced HEP for long term management of symptoms and prevention of future occurrence.  -AQUILES     LTG 1 Progress Ongoing  -AQUILES     LTG 2 Pt will reduce level of perceived disability as measured by the Modified oswestry  to 40% in order to improve QOL.  -     LTG 2 Progress Ongoing  -AQUILES     LTG 3 Pt will report >/= 50% improvement in sleep quality to inidicate improvements in QOL  -     LTG 3 Progress Ongoing;Partially Met  -AQUILES     LTG 4 Pt will report ability to walk 1/2 mile w/o exacerbation of symptoms to indicate improvements in community ambulation endurance.  -     LTG 4 Progress Met  -AQUILES               User Key  (r) = Recorded By, (t) = Taken By, (c) = Cosigned By      Initials Name Provider Type    Michelle Islas PT Physical Therapist                                   Time Calculation:   Start Time: 1445  Stop Time: 1525  Time Calculation (min): 40 min  Timed Charges  82737 - PT Therapeutic Exercise  Minutes: 40  Total Minutes  Timed Charges Total Minutes: 40   Total Minutes: 40  Therapy Charges for Today       Code Description Service Date Service Provider Modifiers Qty    84168954312  PT THER PROC EA 15 MIN 1/24/2024 Michelle Howard, PT GP 3                      Michelle Howard, PT  1/24/2024

## 2024-01-31 ENCOUNTER — HOSPITAL ENCOUNTER (OUTPATIENT)
Dept: PHYSICAL THERAPY | Facility: HOSPITAL | Age: 79
Setting detail: THERAPIES SERIES
Discharge: HOME | End: 2024-01-31
Payer: MEDICAID

## 2024-01-31 DIAGNOSIS — R29.3 POOR POSTURE: ICD-10-CM

## 2024-01-31 DIAGNOSIS — M54.9 RADIATING BACK PAIN: Primary | ICD-10-CM

## 2024-01-31 DIAGNOSIS — Z74.09 IMPAIRED FUNCTIONAL MOBILITY, BALANCE, GAIT, AND ENDURANCE: ICD-10-CM

## 2024-01-31 PROCEDURE — 97110 THERAPEUTIC EXERCISES: CPT

## 2024-01-31 NOTE — THERAPY DISCHARGE NOTE
Outpatient Physical Therapy Ortho Progress Note/Discharge Summary  [facility]     Patient Name: Alex Negron  : 1945  MRN: [id number]  Today's Date: 2024      Visit Date: 2024    Visit Dx:    ICD-10-CM ICD-9-CM   1. Radiating back pain  M54.9 724.5   2. Impaired functional mobility, balance, gait, and endurance  Z74.09 V49.89   3. Poor posture  R29.3 781.92       Patient Active Problem List   Diagnosis    Benign essential HTN    Hyperlipidemia, mixed    DDD (degenerative disc disease), lumbar    Iron deficiency anemia    Impaired fasting glucose    Elevated platelet count    Bladder mass    Malignant neoplasm of urinary bladder    Type 2 diabetes mellitus without complication, without long-term current use of insulin    Bladder cancer    Encounter for screening for malignant neoplasm of colon        Past Medical History:   Diagnosis Date    Anxiety     Aortic atherosclerosis     Bladder cancer     Bladder mass 2023    Bladder tumor 2023    chemical chemo in [place] office  weekly    Blind left eye     Blood in urine     TRACE    Burning with urination     DDD (degenerative disc disease), lumbar     GERD (gastroesophageal reflux disease)     History of low back pain     Hyperlipidemia     Hypertension     PTSD (post-traumatic stress disorder)     [place]    Renal insufficiency     Sleep apnea     diagnosed but following up with PCP    Urine frequency         Past Surgical History:   Procedure Laterality Date    DENTAL PROCEDURE      ENDOSCOPY      TRANSURETHRAL RESECTION OF BLADDER TUMOR N/A 2023    Procedure: TRANSURETHRAL RESECTION OF BLADDER TUMOR;  Surgeon: Robert Haque MD;  Location: [facility];  Service: Urology;  Laterality: N/A;    TRANSURETHRAL RESECTION OF BLADDER TUMOR N/A 10/18/2023    Procedure: CYSTOSCOPY TRANSURETHRAL RESECTION OF BLADDER TUMOR;  Surgeon: Robert Haque MD;  Location: [facility];   Service: Urology;  Laterality: N/A;                        PT Assessment/Plan       Row Name 01/31/24 1400          PT Assessment    Assessment Comments Alex Negron has been seen for 12 physical therapy sessions for LBP. Patient reports overall 60% improvement since the start of PT. He continues to have pain with original complaints but feels his overall tolerance has improved. Patient aware if he continues performing HEP consistently, his pain shoulder continue to decrease. Throughout his POC, treatment has included therapeutic exercise, therapeutic activity, and patient education with home exercise program. Progress to physical therapy goals is good as pt. Has met 2/2 STGs, and 2/4 LTGs. Patient unable to complete MICHAEL outcome measure secondary to inability to read english language. Patient demonstrates improved mechanics and B hip strength. Time spent discussing advanced HEP and appropriate progressions/modifications to make based on response following discharge and optimal frequency/duration to complete HEP over next several weeks-months. Patient verbalized understanding. He was discharged to an independent HEP and provided patient education to self-manage condition.  -AQUILES        PT Plan    PT Plan Comments discharged  -AQUILES               User Key  (r) = Recorded By, (t) = Taken By, (c) = Cosigned By      Initials Name Provider Type    Michelle Islas, PT Physical Therapist                         OP Exercises       Row Name 01/31/24 1400             Subjective    Subjective Comments I feel like I am a little better and I can try some of the exercises on my own  -AQUILES         Total Minutes    66083 - PT Therapeutic Exercise Minutes 30  -AQUILES         Exercise 4    Exercise Name 4 nustep  -AQUILES      Cueing 4 Verbal  -AQUILES      Time 4 5 min  -AQUILES         Exercise 8    Exercise Name 8 box steps  -AQUILES      Cueing 8 Verbal;Demo  -AQUILES      Reps 8 10ea  -AQUILES      Time 8 GTB, below knees  -AQUILES         Exercise 9     Exercise Name 9 stir the pots  -AQUILES      Cueing 9 Verbal;Demo  -AQUILES      Sets 9 2  -AQUILES      Reps 9 10  -AQUILES      Time 9 GTB  -AQUILES         Exercise 10    Exercise Name 10 A-R  -AQUILES      Cueing 10 Verbal;Demo  -AQUILES      Sets 10 2  -AQUILES      Reps 10 10e  -AQUILES      Time 10 2-3s  -AQUIELS      Additional Comments GTB  -AQUILES         Exercise 12    Exercise Name 12 monster walk  -AQUILES      Cueing 12 Demo  -AQUILES      Reps 12 5 laps fwd/bkwd  -AQUILES      Time 12 GTB, below knees  -AQUILES         Exercise 15    Exercise Name 15 lateral stepping  -AQUILES      Cueing 15 Verbal;Demo  -AQUILES      Reps 15 4 lap  -AQUILES      Time 15 GTB  -AQUILES                User Key  (r) = Recorded By, (t) = Taken By, (c) = Cosigned By      Initials Name Provider Type    Michelle Islas, PT Physical Therapist                                    PT OP Goals       Row Name 01/31/24 1400          PT Short Term Goals    STG Date to Achieve 12/28/23  -     STG 1 Pt will report pain rated 4/10 at worst in order to demonstrate ability to return to normalized ADLs and functional activities.  -AQUILES     STG 1 Progress Met  -     STG 2 Pt will be independent with initial HEP for symptom management.  -     STG 2 Progress Met  -        Long Term Goals    LTG Date to Achieve 01/27/24  -AQUILES     LTG 1 Pt will be independent and compliant with advanced HEP for long term management of symptoms and prevention of future occurrence.  -AQUILES     LTG 1 Progress Met  -AQUILES     LTG 2 Pt will reduce level of perceived disability as measured by the Modified oswestry  to 40% in order to improve QOL.  -AQUILES     LTG 2 Progress Not Met  -AQUILES     LTG 2 Progress Comments pt unable to read outcome measure  -     LTG 3 Pt will report >/= 50% improvement in sleep quality to inidicate improvements in QOL  -AQUILES     LTG 3 Progress Not Met  -AQUILES     LTG 3 Progress Comments 20% improvement  -     LTG 4 Pt will report ability to walk 1/2 mile w/o exacerbation of symptoms to indicate improvements in community  ambulation endurance.  -AQUILES     LTG 4 Progress Met  -AQUILES               User Key  (r) = Recorded By, (t) = Taken By, (c) = Cosigned By      Initials Name Provider Type    Michelle Islas, PT Physical Therapist                    Therapy Education  Education Details: finalized HEP  Given: HEP, Symptoms/condition management, Pain management, Posture/body mechanics  Program: Reinforced, Progressed  How Provided: Verbal, Demonstration, Written  Provided to: Patient  Level of Understanding: Verbalized, Demonstrated    Outcome Measure Options: Modified Oswestry         Time Calculation:   Start Time: 1445  Stop Time: 1515  Time Calculation (min): 30 min  Timed Charges  33769 - PT Therapeutic Exercise Minutes: 30  Total Minutes  Timed Charges Total Minutes: 30   Total Minutes: 30  Therapy Charges for Today       Code Description Service Date Service Provider Modifiers Qty    88789491506 HC PT THER PROC EA 15 MIN 1/31/2024 Michelle Howard, PT GP 2            PT G-Codes  Outcome Measure Options: Modified Oswestry     OP PT Discharge Summary  Date of Discharge: 01/31/24  Reason for Discharge: Maximum functional potential achieved  Outcomes Achieved: Patient able to partially acheive established goals  Discharge Destination: Home with home program      Michelle Howard, PT  1/31/2024

## 2024-02-14 ENCOUNTER — OFFICE VISIT (OUTPATIENT)
Dept: FAMILY MEDICINE CLINIC | Facility: CLINIC | Age: 79
End: 2024-02-14
Payer: MEDICAID

## 2024-02-14 VITALS
SYSTOLIC BLOOD PRESSURE: 118 MMHG | DIASTOLIC BLOOD PRESSURE: 60 MMHG | TEMPERATURE: 97.4 F | OXYGEN SATURATION: 94 % | HEART RATE: 82 BPM | HEIGHT: 70 IN | RESPIRATION RATE: 16 BRPM | BODY MASS INDEX: 26.63 KG/M2 | WEIGHT: 186 LBS

## 2024-02-14 DIAGNOSIS — D50.9 IRON DEFICIENCY ANEMIA, UNSPECIFIED IRON DEFICIENCY ANEMIA TYPE: ICD-10-CM

## 2024-02-14 DIAGNOSIS — C67.9 MALIGNANT NEOPLASM OF URINARY BLADDER, UNSPECIFIED SITE: Chronic | ICD-10-CM

## 2024-02-14 DIAGNOSIS — E78.2 HYPERLIPIDEMIA, MIXED: ICD-10-CM

## 2024-02-14 DIAGNOSIS — E55.9 VITAMIN D DEFICIENCY: ICD-10-CM

## 2024-02-14 DIAGNOSIS — E11.9 TYPE 2 DIABETES MELLITUS WITHOUT COMPLICATION, WITHOUT LONG-TERM CURRENT USE OF INSULIN: ICD-10-CM

## 2024-02-14 DIAGNOSIS — I10 BENIGN ESSENTIAL HTN: ICD-10-CM

## 2024-02-14 DIAGNOSIS — M51.36 DDD (DEGENERATIVE DISC DISEASE), LUMBAR: Primary | ICD-10-CM

## 2024-02-14 RX ORDER — LISINOPRIL 10 MG/1
10 TABLET ORAL EVERY MORNING
Qty: 90 TABLET | Refills: 0 | Status: SHIPPED | OUTPATIENT
Start: 2024-02-14

## 2024-02-19 ENCOUNTER — TELEPHONE (OUTPATIENT)
Dept: PAIN MEDICINE | Facility: CLINIC | Age: 79
End: 2024-02-19
Payer: MEDICAID

## 2024-02-19 NOTE — TELEPHONE ENCOUNTER
Called and left message with daughter who the phone number provided went to, she speaks english, letting her know we only have an IPAD live person interperter for the appointment

## 2024-03-01 ENCOUNTER — TELEPHONE (OUTPATIENT)
Dept: PAIN MEDICINE | Facility: CLINIC | Age: 79
End: 2024-03-01
Payer: MEDICAID

## 2024-03-04 ENCOUNTER — HOSPITAL ENCOUNTER (OUTPATIENT)
Dept: GENERAL RADIOLOGY | Facility: HOSPITAL | Age: 79
Discharge: HOME | End: 2024-03-04
Admitting: PHYSICIAN ASSISTANT
Payer: MEDICAID

## 2024-03-04 ENCOUNTER — OFFICE VISIT (OUTPATIENT)
Dept: PAIN MEDICINE | Facility: CLINIC | Age: 79
End: 2024-03-04
Payer: MEDICAID

## 2024-03-04 VITALS
DIASTOLIC BLOOD PRESSURE: 74 MMHG | WEIGHT: 185.2 LBS | HEIGHT: 70 IN | SYSTOLIC BLOOD PRESSURE: 112 MMHG | BODY MASS INDEX: 26.51 KG/M2 | RESPIRATION RATE: 18 BRPM | HEART RATE: 70 BPM | TEMPERATURE: 97.1 F | OXYGEN SATURATION: 93 %

## 2024-03-04 DIAGNOSIS — M47.816 LUMBAR FACET ARTHROPATHY: ICD-10-CM

## 2024-03-04 DIAGNOSIS — M51.37 DEGENERATION OF LUMBAR OR LUMBOSACRAL INTERVERTEBRAL DISC: ICD-10-CM

## 2024-03-04 DIAGNOSIS — M25.551 BILATERAL HIP PAIN: Primary | ICD-10-CM

## 2024-03-04 DIAGNOSIS — M54.16 LUMBAR RADICULOPATHY: ICD-10-CM

## 2024-03-04 DIAGNOSIS — M25.551 BILATERAL HIP PAIN: ICD-10-CM

## 2024-03-04 DIAGNOSIS — M25.552 BILATERAL HIP PAIN: ICD-10-CM

## 2024-03-04 DIAGNOSIS — M25.552 BILATERAL HIP PAIN: Primary | ICD-10-CM

## 2024-03-04 PROBLEM — M51.379 DEGENERATION OF LUMBAR OR LUMBOSACRAL INTERVERTEBRAL DISC: Status: ACTIVE | Noted: 2023-04-12

## 2024-03-04 PROCEDURE — 73521 X-RAY EXAM HIPS BI 2 VIEWS: CPT

## 2024-03-04 PROCEDURE — 3078F DIAST BP <80 MM HG: CPT | Performed by: PHYSICIAN ASSISTANT

## 2024-03-04 PROCEDURE — 99215 OFFICE O/P EST HI 40 MIN: CPT | Performed by: PHYSICIAN ASSISTANT

## 2024-03-04 PROCEDURE — 1125F AMNT PAIN NOTED PAIN PRSNT: CPT | Performed by: PHYSICIAN ASSISTANT

## 2024-03-04 PROCEDURE — 1160F RVW MEDS BY RX/DR IN RCRD: CPT | Performed by: PHYSICIAN ASSISTANT

## 2024-03-04 PROCEDURE — 3074F SYST BP LT 130 MM HG: CPT | Performed by: PHYSICIAN ASSISTANT

## 2024-03-04 PROCEDURE — 1159F MED LIST DOCD IN RCRD: CPT | Performed by: PHYSICIAN ASSISTANT

## 2024-03-04 RX ORDER — FERROUS SULFATE 325(65) MG
1 TABLET ORAL
COMMUNITY
Start: 2024-02-15

## 2024-03-04 NOTE — PROGRESS NOTES
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  NO fractures of the hips and he has good blood supply.  His complaints of hip pain is more than likely referred from his lumbar spine.

## 2024-03-04 NOTE — PROGRESS NOTES
"CHIEF COMPLAINT  Back pain    Subjective   Alex Negron is a 79 y.o. male.   He presents to the office for initial evaluation of low back pain. He was referred here by Jyothi Song PA-C .  This patient reports onset of lumbosacral spine pain over 10 years ago without precipitating trauma or inciting event.  He has noted gradual worsening of pain which has progressively worsened.  Reports pain that usually originates in the midline region the lumbar spine and extends in a bandlike distribution into the bilateral paraspinal muscles and into the hips bilaterally, right greater than left, with pain that also radiates into the lateral aspect of the right lower extremity terminating at the foot.  His primary pain complaint or bilateral hip pain, right greater than left which is described as a nagging sensation when seated however becomes very sharp and more of a \"squeezing\" sensation with walking or any type of activity.  He reports the pain within the right lower extremity as a burning sharp sensation with concomitant numbness and tingling particularly within the plantar aspect of the right medial arch.      He denies any history of cervical or lumbar spine surgery.    Patient has not participated in any type of interventional pain management nor had any procedures.    He has recently completed a formalized course of physical therapy which she feels was helpful and continues with physician guided HEP on a daily basis.  He is also currently taking gabapentin 300 mg p.o. 3 times daily with mixed results.     Patient did PT which helped and he still does exercise at home, patient denies back surgery and pain management in the past, patient is taking gabapentin but does  not know if this is helping.    This patient has a history of bladder cancer and has completed chemotherapy and recent scan shows no more signs of cancer.  He will be seen by his hematologist, Dr. Mary, every 3 months with an upcoming appointment " scheduled for 3/18/2024 for recent elevated platelet counts.    Pain today 4/10 VAS in severity.    Back Pain  This is a chronic problem. The current episode started more than 1 year ago. The problem occurs constantly. The problem is unchanged. The pain is present in the lumbar spine. The quality of the pain is described as aching and burning (Nagging/sharp). The pain radiates to the right thigh and right anterolateral lower leg. The pain is at a severity of 4/10. The pain is moderate. The pain is The same all the time. The symptoms are aggravated by standing and position (Activity). Associated symptoms include leg pain and numbness (walking). Pertinent negatives include no abdominal pain, dysuria, headaches or weakness.   Hip Pain   There was no injury mechanism. The pain is present in the left hip and right hip. The quality of the pain is described as aching. The pain is at a severity of 4/10. The pain is moderate. The pain has been Constant since onset. Associated symptoms include numbness (walking). He reports no foreign bodies present.        PEG Assessment   What number best describes your pain on average in the past week?5  What number best describes how, during the past week, pain has interfered with your enjoyment of life?4  What number best describes how, during the past week, pain has interfered with your general activity?  4        Current Outpatient Medications:     acetaminophen (TYLENOL) 500 MG tablet, Take 1 tablet by mouth Every 6 (Six) Hours As Needed for Mild Pain., Disp: , Rfl:     amLODIPine (NORVASC) 5 MG tablet, Take 1 tablet by mouth Daily. New lower dose---for BP, Disp: 90 tablet, Rfl: 1    calcium carbonate (TUMS) 500 MG chewable tablet, Chew 1 tablet As Needed for Indigestion or Heartburn., Disp: , Rfl:     FeroSul 325 (65 Fe) MG tablet, Take 1 tablet by mouth Daily With Breakfast., Disp: , Rfl:     gabapentin (NEURONTIN) 300 MG capsule, 1 PO THREE TIMES DAILY AS NEEDED FOR LUMBAR NERVE  PAIN, Disp: 90 capsule, Rfl: 0    indapamide (LOZOL) 1.25 MG tablet, TAKE 1 TABLET BY MOUTH DAILY FOR BLOOD PRESSURE, Disp: 90 tablet, Rfl: 1    lisinopril (PRINIVIL,ZESTRIL) 10 MG tablet, Take 1 tablet by mouth Every Morning. For BP, Disp: 90 tablet, Rfl: 0    NON FORMULARY, Take 2 each by mouth Every Morning. Eye health, Disp: , Rfl:     rosuvastatin (Crestor) 20 MG tablet, Take 1 tablet by mouth Daily. For cholesterol, Disp: 90 tablet, Rfl: 3    tamsulosin (FLOMAX) 0.4 MG capsule 24 hr capsule, Take 1 capsule by mouth Every Morning., Disp: , Rfl:     The following portions of the patient's history were reviewed and updated as appropriate: allergies, current medications, past family history, past medical history, past social history, past surgical history, and problem list.      REVIEW OF PERTINENT MEDICAL DATA    MRI EXAMINATION LUMBAR SPINE WITHOUT CONTRAST 8/18/2023     HISTORY: Back pain, right radiculopathy.     COMPARISON: No prior MRI of the lumbar spine is available for  comparison.     FINDINGS: There is a grade 1 retrolisthesis of L1 upon L2, L2 upon L3  and grade 1 anterolisthesis of L5 upon S1. The conus is at L1 and the  caudal aspect of the spinal cord appears unremarkable.     T12-L1: There is no evidence of disc bulge or herniation.     L1-L2: A mild central broad-based disc osteophyte complex is present  with no evidence of herniation.     L3-L4: Mild facet degenerative disease is present bilaterally. Mild  foraminal stenosis is present on the right secondary to loss of disc  height, retrolisthesis of L5 upon S1 and extension of disc-osteophyte  complex into the neural foramen.     L4-L5: Mild-to-moderate facet degenerative disease is present  bilaterally. A mild central disc-osteophyte complex is present with no  evidence of herniation. Mild foraminal stenosis is present on the left  secondary to loss of disc height and extension of a disc aspect complex  into the neural foramen.     L5-S1:  "Moderate-to-severe facet degenerative disease is present  bilaterally. A mild central broad-based disc osteophyte complex is  present with no evidence of herniation. Mild foraminal stenosis is  present bilaterally secondary to loss of disc height and extension of  disc osteophyte complex into the neural foramen.     IMPRESSION:  Multilevel degenerative disease involving the lumbar spine  is noted as described above with no evidence of a focal herniation. This  includes multilevel loss of disc height, endplate degenerative changes  and facet degenerative disease. There is a grade 1 retrolisthesis of L1  upon L2, L2 upon L3 and anterolisthesis of L5 upon S1. See above.     This report was finalized on 8/18/2023 11:19 AM by Dr. Vivek Gudino M.D.    Review of Systems   Constitutional:  Positive for activity change and fatigue.   Gastrointestinal:  Negative for abdominal pain, constipation and diarrhea.   Genitourinary:  Negative for difficulty urinating and dysuria.   Musculoskeletal:  Positive for back pain.   Neurological:  Positive for numbness (walking). Negative for dizziness, weakness, light-headedness and headaches.   Psychiatric/Behavioral:  Positive for sleep disturbance. Negative for agitation and suicidal ideas. The patient is not nervous/anxious.        I have reviewed and confirmed the accuracy of the ROS as documented by the MA/ENRIQUETAN/RN KAYKAY Oro    Vitals:    03/04/24 1010   BP: 112/74   BP Location: Right arm   Patient Position: Sitting   Cuff Size: Large Adult   Pulse: 70   Resp: 18   Temp: 97.1 °F (36.2 °C)   SpO2: 93%   Weight: 84 kg (185 lb 3.2 oz)   Height: 178 cm (70.08\")   PainSc:   4   PainLoc: Back         Objective       Physical Exam  Vitals and nursing note reviewed. Exam conducted with a chaperone present (Daughter who interprets).   Constitutional:       Appearance: Normal appearance.   HENT:      Head: Normocephalic.   Pulmonary:      Effort: Pulmonary effort is normal. "   Musculoskeletal:      Lumbar back: Spasms and tenderness (moderate pain to palpation over the bilateral lumbar facet joint spaces, R>L and over right SI joint space) present. Decreased range of motion (increased pain with flexion). Positive right straight leg raise test (mildly to knee only).        Back:    Skin:     General: Skin is warm and dry.   Neurological:      General: No focal deficit present.      Mental Status: He is alert and oriented to person, place, and time.      Cranial Nerves: Cranial nerves 2-12 are intact.      Sensory: Sensation is intact.      Motor: Motor function is intact.      Gait: Gait is intact.      Deep Tendon Reflexes:      Reflex Scores:       Patellar reflexes are 1+ on the right side and 1+ on the left side.       Achilles reflexes are 1+ on the right side and 1+ on the left side.  Psychiatric:         Mood and Affect: Mood normal.         Behavior: Behavior normal.         Thought Content: Thought content normal.         Judgment: Judgment normal.         Assessment & Plan   Diagnoses and all orders for this visit:    1. Bilateral hip pain (Primary)  -     XR Hips Bilateral With or Without Pelvis 2 View; Future    2. Degeneration of lumbar or lumbosacral intervertebral disc    3. Lumbar radiculopathy    4. Lumbar facet arthropathy        --- Alex Negron reports a pain score of 4.  Given his pain assessment as noted, treatment options were discussed and the following options were decided upon as a follow-up plan to address the patient's pain: continuation of current treatment plan for pain, patient not interested in pharmacological measures, and use of non-medical modalities (ice, heat, stretching and/or behavior modifications).    --- Follow-up in 4 weeks or sooner for further evaluation  --- Due to patient's report of persistent bilateral hip pain, right greater than left, I recommend proceeding with bilateral hip x-rays  --- The patient has been referred back to his  hematologist, Dr. Gaurang Mary for further evaluation of recent elevated platelet counts.  I will reach out to Dr. Mary in order to determine if the patient will be a candidate to proceed with lumbar epidural steroid injections in the future.  --- Based on the patient's mechanical low back pain he is also a candidate for consideration of diagnostic lumbar MBB's with consideration of radiofrequency ablation       Pain / Disability Scale    The scale used for measurement of pain and/or disability for this patient was the [place] back pain disability scale.  The score was 26 on 03/04/2024        I spent 45 minutes caring for Alex on this date of service. This time includes time spent by me in the following activities: preparing for the visit, reviewing tests, obtaining and/or reviewing a separately obtained history, performing a medically appropriate examination and/or evaluation, counseling and educating the patient/family/caregiver, ordering medications, tests, or procedures, referring and communicating with other health care professionals, documenting information in the medical record, and care coordination         MARKOS REPORT    As part of the patient's treatment plan, I am prescribing controlled substances. The patient has been made aware of appropriate use of such medications, including potential risk of somnolence, limited ability to drive and/or work safely, and the potential for dependence or overdose. It has also bee made clear that these medications are for use by this patient only, without concomitant use of alcohol or other substances unless prescribed.     Patient has completed prescribing agreement detailing terms of continued prescribing of controlled substances, including monitoring MARKOS reports, urine drug screening, and pill counts if necessary. The patient is aware that inappropriate use will results in cessation of prescribing such medications.    MARKOS report has been reviewed and scanned  into the patient's chart.    As the clinician, I personally reviewed the MARKOS from 3/4/2024 while the patient was in the office today.    History and physical exam exhibit continued safe and appropriate use of controlled substances.       Dictated utilizing Dragon dictation.

## 2024-03-05 ENCOUNTER — TELEPHONE (OUTPATIENT)
Dept: FAMILY MEDICINE CLINIC | Facility: CLINIC | Age: 79
End: 2024-03-05
Payer: MEDICAID

## 2024-03-05 NOTE — TELEPHONE ENCOUNTER
PATIENT'S DAUGHTER CALLED FOR LAB APPOINTMENT.    ATTEMPTED WARM TRANSFER    PLEASE CALL [phone]

## 2024-03-14 ENCOUNTER — ANESTHESIA EVENT (OUTPATIENT)
Dept: GASTROENTEROLOGY | Facility: HOSPITAL | Age: 79
End: 2024-03-14
Payer: MEDICAID

## 2024-03-14 ENCOUNTER — HOSPITAL ENCOUNTER (OUTPATIENT)
Facility: HOSPITAL | Age: 79
Setting detail: HOSPITAL OUTPATIENT SURGERY
Discharge: HOME | End: 2024-03-14
Attending: INTERNAL MEDICINE | Admitting: INTERNAL MEDICINE
Payer: MEDICAID

## 2024-03-14 ENCOUNTER — ANESTHESIA (OUTPATIENT)
Dept: GASTROENTEROLOGY | Facility: HOSPITAL | Age: 79
End: 2024-03-14
Payer: MEDICAID

## 2024-03-14 VITALS
SYSTOLIC BLOOD PRESSURE: 125 MMHG | HEART RATE: 57 BPM | DIASTOLIC BLOOD PRESSURE: 78 MMHG | HEIGHT: 70 IN | WEIGHT: 182.3 LBS | RESPIRATION RATE: 12 BRPM | OXYGEN SATURATION: 96 % | BODY MASS INDEX: 26.1 KG/M2

## 2024-03-14 DIAGNOSIS — Z12.11 ENCOUNTER FOR SCREENING FOR MALIGNANT NEOPLASM OF COLON: ICD-10-CM

## 2024-03-14 PROCEDURE — 25010000002 PROPOFOL 10 MG/ML EMULSION: Performed by: ANESTHESIOLOGY

## 2024-03-14 PROCEDURE — 45385 COLONOSCOPY W/LESION REMOVAL: CPT | Performed by: INTERNAL MEDICINE

## 2024-03-14 PROCEDURE — 25810000003 LACTATED RINGERS PER 1000 ML: Performed by: INTERNAL MEDICINE

## 2024-03-14 PROCEDURE — 88305 TISSUE EXAM BY PATHOLOGIST: CPT | Performed by: INTERNAL MEDICINE

## 2024-03-14 PROCEDURE — S0260 H&P FOR SURGERY: HCPCS | Performed by: INTERNAL MEDICINE

## 2024-03-14 RX ORDER — SODIUM CHLORIDE 0.9 % (FLUSH) 0.9 %
10 SYRINGE (ML) INJECTION AS NEEDED
Status: DISCONTINUED | OUTPATIENT
Start: 2024-03-14 | End: 2024-03-14 | Stop reason: HOSPADM

## 2024-03-14 RX ORDER — SODIUM CHLORIDE 0.9 % (FLUSH) 0.9 %
10 SYRINGE (ML) INJECTION EVERY 12 HOURS SCHEDULED
Status: DISCONTINUED | OUTPATIENT
Start: 2024-03-14 | End: 2024-03-14 | Stop reason: HOSPADM

## 2024-03-14 RX ORDER — SODIUM CHLORIDE 9 MG/ML
40 INJECTION, SOLUTION INTRAVENOUS AS NEEDED
Status: DISCONTINUED | OUTPATIENT
Start: 2024-03-14 | End: 2024-03-14 | Stop reason: HOSPADM

## 2024-03-14 RX ORDER — PROPOFOL 10 MG/ML
VIAL (ML) INTRAVENOUS AS NEEDED
Status: DISCONTINUED | OUTPATIENT
Start: 2024-03-14 | End: 2024-03-14 | Stop reason: SURG

## 2024-03-14 RX ORDER — SODIUM CHLORIDE, SODIUM LACTATE, POTASSIUM CHLORIDE, CALCIUM CHLORIDE 600; 310; 30; 20 MG/100ML; MG/100ML; MG/100ML; MG/100ML
30 INJECTION, SOLUTION INTRAVENOUS CONTINUOUS PRN
Status: DISCONTINUED | OUTPATIENT
Start: 2024-03-14 | End: 2024-03-14 | Stop reason: HOSPADM

## 2024-03-14 RX ORDER — LIDOCAINE HYDROCHLORIDE 20 MG/ML
INJECTION, SOLUTION INFILTRATION; PERINEURAL AS NEEDED
Status: DISCONTINUED | OUTPATIENT
Start: 2024-03-14 | End: 2024-03-14 | Stop reason: SURG

## 2024-03-14 RX ADMIN — PROPOFOL 150 MG: 10 INJECTION, EMULSION INTRAVENOUS at 12:37

## 2024-03-14 RX ADMIN — LIDOCAINE HYDROCHLORIDE 30 MG: 20 INJECTION, SOLUTION INFILTRATION; PERINEURAL at 12:32

## 2024-03-14 RX ADMIN — SODIUM CHLORIDE, POTASSIUM CHLORIDE, SODIUM LACTATE AND CALCIUM CHLORIDE 30 ML/HR: 600; 310; 30; 20 INJECTION, SOLUTION INTRAVENOUS at 11:47

## 2024-03-14 RX ADMIN — PROPOFOL 140 MG: 10 INJECTION, EMULSION INTRAVENOUS at 12:33

## 2024-03-14 NOTE — ANESTHESIA POSTPROCEDURE EVALUATION
Patient: Alex Negron    Procedure Summary       Date: 03/14/24 Room / Location:  [facility]    Anesthesia Start: 1232 Anesthesia Stop: 1255    Procedure: COLONOSCOPY TO CECUM WITH COLD SNARE POLYPECTOMIES Diagnosis:       Encounter for screening for malignant neoplasm of colon      (Encounter for screening for malignant neoplasm of colon [Z12.11])    Surgeons: Magdaleno Mena MD Provider: Марина Haque MD    Anesthesia Type: MAC ASA Status: 3            Anesthesia Type: MAC    Vitals  Vitals Value Taken Time   /78 03/14/24 1312   Temp     Pulse 61 03/14/24 1313   Resp 12 03/14/24 1311   SpO2 91 % 03/14/24 1313   Vitals shown include unfiled device data.        Post Anesthesia Care and Evaluation    Anesthetic complications: No anesthetic complications

## 2024-03-14 NOTE — H&P
[facility]  Pre Procedure History & Physical    Chief Complaint:   Time for my colonoscopy    Subjective     HPI:   79 y.o. male presenting to endoscopy unit today for screening c/s    Past Medical History:   Past Medical History:   Diagnosis Date    Anxiety     Aortic atherosclerosis     Bladder cancer     Bladder mass 06/07/2023    Bladder tumor 05/2023    chemical chemo in [place] office  weekly    Blind left eye     Blood in urine     TRACE    Burning with urination     DDD (degenerative disc disease), lumbar     GERD (gastroesophageal reflux disease)     History of low back pain     Hyperlipidemia     Hypertension     PTSD (post-traumatic stress disorder)     [place] of [place]    Renal insufficiency     Sleep apnea 2023    diagnosed but following up with PCP    Urine frequency        Family History:  Family History   Problem Relation Age of Onset    Malig Hyperthermia Neg Hx        Social History:   reports that he quit smoking about 18 years ago. His smoking use included cigarettes. He started smoking about 43 years ago. He has a 25 pack-year smoking history. He has never used smokeless tobacco. He reports that he does not drink alcohol and does not use drugs.    Medications:   Medications Prior to Admission   Medication Sig Dispense Refill Last Dose    amLODIPine (NORVASC) 5 MG tablet Take 1 tablet by mouth Daily. New lower dose---for BP 90 tablet 1 3/14/2024    calcium carbonate (TUMS) 500 MG chewable tablet Chew 1 tablet As Needed for Indigestion or Heartburn.   3/13/2024    FeroSul 325 (65 Fe) MG tablet Take 1 tablet by mouth Daily With Breakfast.   Past Week    gabapentin (NEURONTIN) 300 MG capsule 1 PO THREE TIMES DAILY AS NEEDED FOR LUMBAR NERVE PAIN 90 capsule 0 3/13/2024    indapamide (LOZOL) 1.25 MG tablet TAKE 1 TABLET BY MOUTH DAILY FOR BLOOD PRESSURE 90 tablet 1 3/14/2024    lisinopril (PRINIVIL,ZESTRIL) 10 MG tablet Take 1 tablet by mouth Every  "Morning. For BP 90 tablet 0 3/14/2024    rosuvastatin (Crestor) 20 MG tablet Take 1 tablet by mouth Daily. For cholesterol 90 tablet 3 Past Week    tamsulosin (FLOMAX) 0.4 MG capsule 24 hr capsule Take 1 capsule by mouth Every Morning.   3/13/2024    acetaminophen (TYLENOL) 500 MG tablet Take 1 tablet by mouth Every 6 (Six) Hours As Needed for Mild Pain.       NON FORMULARY Take 2 each by mouth Every Morning. Eye health   Unknown       Allergies:  Patient has no known allergies.    Objective     Blood pressure 137/79, pulse 72, resp. rate 16, height 178 cm (70.08\"), weight 82.7 kg (182 lb 4.8 oz), SpO2 97%.  Physical Exam:   General: patient awake, alert and cooperative    Assessment & Plan     Diagnosis:  screening    Anticipated Surgical Procedure:  Colonoscopy    The risks, benefits, and alternatives of this procedure have been discussed with the patient or the responsible party- the patient understands and agrees to proceed.                                                                 "

## 2024-03-14 NOTE — DISCHARGE INSTRUCTIONS
For the next 24 hours patient needs to be with a responsible adult.    For 24 hours DO NOT drive, operate machinery, appliances, drink alcohol, make important decisions or sign legal documents.    Start with a light or bland diet if you are feeling sick to your stomach otherwise advance to regular diet as tolerated.    Follow recommendations on procedure report if provided by your doctor.    Call Dr Mena for problems [phone]    Problems may include but not limited to: large amounts of bleeding, trouble breathing, repeated vomiting, severe unrelieved pain, fever or chills.

## 2024-03-14 NOTE — ANESTHESIA PREPROCEDURE EVALUATION
Anesthesia Evaluation     no history of anesthetic complications:                Airway   Mallampati: II  TM distance: >3 FB  Neck ROM: full  Dental - normal exam     Pulmonary    (+) a smoker Former,sleep apnea  (-) shortness of breath  Cardiovascular     (+) hypertension, hyperlipidemia      Neuro/Psych  GI/Hepatic/Renal/Endo    (+) GERD, renal disease- CRI, diabetes mellitus    Musculoskeletal     Abdominal    Substance History      OB/GYN          Other                    Anesthesia Plan    ASA 3     MAC     intravenous induction       CODE STATUS:

## 2024-03-15 LAB
LAB AP CASE REPORT: NORMAL
PATH REPORT.FINAL DX SPEC: NORMAL
PATH REPORT.GROSS SPEC: NORMAL

## 2024-03-16 NOTE — PROGRESS NOTES
Tubular adenoma colon polyps  Colonoscopy recall 3 years  Please call his daughter as he does not speak English thank you

## 2024-03-18 ENCOUNTER — TELEPHONE (OUTPATIENT)
Dept: ONCOLOGY | Facility: CLINIC | Age: 79
End: 2024-03-18
Payer: MEDICAID

## 2024-03-18 ENCOUNTER — LAB (OUTPATIENT)
Dept: LAB | Facility: HOSPITAL | Age: 79
End: 2024-03-18
Payer: MEDICAID

## 2024-03-18 ENCOUNTER — OFFICE VISIT (OUTPATIENT)
Dept: ONCOLOGY | Facility: CLINIC | Age: 79
End: 2024-03-18
Payer: MEDICAID

## 2024-03-18 VITALS
TEMPERATURE: 98.4 F | WEIGHT: 187.8 LBS | SYSTOLIC BLOOD PRESSURE: 110 MMHG | HEART RATE: 93 BPM | BODY MASS INDEX: 26.88 KG/M2 | DIASTOLIC BLOOD PRESSURE: 67 MMHG | HEIGHT: 70 IN | OXYGEN SATURATION: 95 % | RESPIRATION RATE: 16 BRPM

## 2024-03-18 DIAGNOSIS — D50.0 IRON DEFICIENCY ANEMIA DUE TO CHRONIC BLOOD LOSS: ICD-10-CM

## 2024-03-18 DIAGNOSIS — D50.0 IRON DEFICIENCY ANEMIA DUE TO CHRONIC BLOOD LOSS: Primary | ICD-10-CM

## 2024-03-18 LAB
BASOPHILS # BLD AUTO: 0.03 10*3/MM3 (ref 0–0.2)
BASOPHILS NFR BLD AUTO: 0.4 % (ref 0–1.5)
DEPRECATED RDW RBC AUTO: 42.9 FL (ref 37–54)
EOSINOPHIL # BLD AUTO: 0.22 10*3/MM3 (ref 0–0.4)
EOSINOPHIL NFR BLD AUTO: 2.9 % (ref 0.3–6.2)
ERYTHROCYTE [DISTWIDTH] IN BLOOD BY AUTOMATED COUNT: 13.1 % (ref 12.3–15.4)
FERRITIN SERPL-MCNC: 85.6 NG/ML (ref 30–400)
HCT VFR BLD AUTO: 38.4 % (ref 37.5–51)
HGB BLD-MCNC: 12.7 G/DL (ref 13–17.7)
IMM GRANULOCYTES # BLD AUTO: 0.01 10*3/MM3 (ref 0–0.05)
IMM GRANULOCYTES NFR BLD AUTO: 0.1 % (ref 0–0.5)
IRON 24H UR-MRATE: 102 MCG/DL (ref 59–158)
IRON SATN MFR SERPL: 29 % (ref 20–50)
LYMPHOCYTES # BLD AUTO: 2.22 10*3/MM3 (ref 0.7–3.1)
LYMPHOCYTES NFR BLD AUTO: 29.1 % (ref 19.6–45.3)
MCH RBC QN AUTO: 30 PG (ref 26.6–33)
MCHC RBC AUTO-ENTMCNC: 33.1 G/DL (ref 31.5–35.7)
MCV RBC AUTO: 90.8 FL (ref 79–97)
MONOCYTES # BLD AUTO: 0.41 10*3/MM3 (ref 0.1–0.9)
MONOCYTES NFR BLD AUTO: 5.4 % (ref 5–12)
NEUTROPHILS NFR BLD AUTO: 4.73 10*3/MM3 (ref 1.7–7)
NEUTROPHILS NFR BLD AUTO: 62.1 % (ref 42.7–76)
NRBC BLD AUTO-RTO: 0 /100 WBC (ref 0–0.2)
PLATELET # BLD AUTO: 422 10*3/MM3 (ref 140–450)
PMV BLD AUTO: 8.7 FL (ref 6–12)
RBC # BLD AUTO: 4.23 10*6/MM3 (ref 4.14–5.8)
TIBC SERPL-MCNC: 353 MCG/DL (ref 298–536)
TRANSFERRIN SERPL-MCNC: 237 MG/DL (ref 200–360)
WBC NRBC COR # BLD AUTO: 7.62 10*3/MM3 (ref 3.4–10.8)

## 2024-03-18 PROCEDURE — 99214 OFFICE O/P EST MOD 30 MIN: CPT | Performed by: NURSE PRACTITIONER

## 2024-03-18 PROCEDURE — 3078F DIAST BP <80 MM HG: CPT | Performed by: NURSE PRACTITIONER

## 2024-03-18 PROCEDURE — 3074F SYST BP LT 130 MM HG: CPT | Performed by: NURSE PRACTITIONER

## 2024-03-18 PROCEDURE — 84466 ASSAY OF TRANSFERRIN: CPT

## 2024-03-18 PROCEDURE — 85025 COMPLETE CBC W/AUTO DIFF WBC: CPT

## 2024-03-18 PROCEDURE — 36415 COLL VENOUS BLD VENIPUNCTURE: CPT

## 2024-03-18 PROCEDURE — 82728 ASSAY OF FERRITIN: CPT

## 2024-03-18 PROCEDURE — 83540 ASSAY OF IRON: CPT

## 2024-03-18 PROCEDURE — 1125F AMNT PAIN NOTED PAIN PRSNT: CPT | Performed by: NURSE PRACTITIONER

## 2024-03-18 RX ORDER — FERROUS SULFATE 325(65) MG
1 TABLET ORAL 2 TIMES WEEKLY
Qty: 30 TABLET | Refills: 3 | Status: SHIPPED | OUTPATIENT
Start: 2024-03-18

## 2024-03-18 RX ORDER — FERROUS SULFATE 325(65) MG
1 TABLET ORAL
Qty: 30 TABLET | OUTPATIENT
Start: 2024-03-18

## 2024-03-18 NOTE — PROGRESS NOTES
Subjective     REASON FOR CONSULTATION: Elevated platelet count  Provide an opinion on any further workup or treatment                             Requesting practitioner: Jyothi Song    RECORDS OBTAINED:  Records of the patients history including those obtained from the referring provider were reviewed and summarized in detail.      History of Present Illness   This is a 78-year-old man with hypertension, hyperlipidemia, degenerative disc disease of the lumbar spine, impaired fasting glucose and mild renal insufficiency seen today at the request of his primary care provider for evaluation of an elevated platelet count.  Reviewing his recent data since 3/22/2023 the patient has had a mildly elevated platelet count in the upper 400s/lower 500,000 range.  He has been mildly anemic with hemoglobin 12.1-12.5 MCV 88-89.  White blood cell count and differential unremarkable.  Additional labs have recently shown iron saturation 30%, ferritin 37, B12 453, folate 19.2, creatinine 1.26/BUN 34, total protein 7.2/globulin 2.5.    The patient recently immigrated from [facility].  He feels well denies fevers, chills, shortness of breath, cough, weight loss, lymphadenopathy, changes in the bowel habits, blood in the stool.   He smoked but quit smoking approximately 20 years ago.  He has never had a colonoscopy.  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  He is undergoing intravesicular therapy.    At the time of his initial visit 5/15/2023 the patient was noted to be mildly iron deficient and started oral iron therapy which he has been tolerating well.    He had recurrence of noninvasive bladder cancer treated with transurethral resection 10/18/2023 now receiving intravesicular chemotherapy.  The patient stopped oral iron because of a  metallic taste in the mouth.  He is not noticing hematuria or changes in the bowel habits.    Patient returns 3/18/2024 stating he has not been taking his oral iron up until the past 2 weeks at which time he restarted this.  He states it does cause a metallic taste in his mouth though not daily.  He has completed his intravascular chemotherapy for his recurrent noninvasive bladder cancer and his daughter reports no tumors seen on the last cystoscopy.  He has cystoscopies every 3 months.  He denies any known bleeding.  He tolerates the oral iron with regards to his bowels.    Past Medical History:   Diagnosis Date    Anxiety     Aortic atherosclerosis     Bladder cancer     Bladder mass 06/07/2023    Bladder tumor 05/2023    chemical chemo in MD office  weekly    Blind left eye     Blood in urine     TRACE    Burning with urination     DDD (degenerative disc disease), lumbar     GERD (gastroesophageal reflux disease)     History of low back pain     Hyperlipidemia     Hypertension     PTSD (post-traumatic stress disorder)     [place]    Renal insufficiency     Sleep apnea 2023    diagnosed but following up with PCP    Urine frequency         Past Surgical History:   Procedure Laterality Date    COLONOSCOPY N/A 3/14/2024    Procedure: COLONOSCOPY TO CECUM WITH COLD SNARE POLYPECTOMIES;  Surgeon: Magdaleno Mena MD;  Location: [facility] ENDOSCOPY;  Service: Gastroenterology;  Laterality: N/A;  PRE OP - SCREENING  -POST OP - COLON POLYPS,HEMORRHOIDS    DENTAL PROCEDURE      ENDOSCOPY      TRANSURETHRAL RESECTION OF BLADDER TUMOR N/A 06/07/2023    Procedure: TRANSURETHRAL RESECTION OF BLADDER TUMOR;  Surgeon: Robert Haque MD;  Location: [facility] OR;  Service: Urology;  Laterality: N/A;    TRANSURETHRAL RESECTION OF BLADDER TUMOR N/A 10/18/2023    Procedure: CYSTOSCOPY TRANSURETHRAL RESECTION OF BLADDER TUMOR;  Surgeon: Robert Haque MD;  Location: [facility] OR;   Service: Urology;  Laterality: N/A;        Current Outpatient Medications on File Prior to Visit   Medication Sig Dispense Refill    acetaminophen (TYLENOL) 500 MG tablet Take 1 tablet by mouth Every 6 (Six) Hours As Needed for Mild Pain.      amLODIPine (NORVASC) 5 MG tablet Take 1 tablet by mouth Daily. New lower dose---for BP 90 tablet 1    calcium carbonate (TUMS) 500 MG chewable tablet Chew 1 tablet As Needed for Indigestion or Heartburn.      FeroSul 325 (65 Fe) MG tablet Take 1 tablet by mouth Daily With Breakfast.      gabapentin (NEURONTIN) 300 MG capsule 1 PO THREE TIMES DAILY AS NEEDED FOR LUMBAR NERVE PAIN 90 capsule 0    indapamide (LOZOL) 1.25 MG tablet TAKE 1 TABLET BY MOUTH DAILY FOR BLOOD PRESSURE 90 tablet 1    lisinopril (PRINIVIL,ZESTRIL) 10 MG tablet Take 1 tablet by mouth Every Morning. For BP 90 tablet 0    NON FORMULARY Take 2 each by mouth Every Morning. Eye health      rosuvastatin (Crestor) 20 MG tablet Take 1 tablet by mouth Daily. For cholesterol 90 tablet 3    tamsulosin (FLOMAX) 0.4 MG capsule 24 hr capsule Take 1 capsule by mouth Every Morning.       No current facility-administered medications on file prior to visit.        ALLERGIES:  No Known Allergies     Social History     Socioeconomic History    Marital status:    Tobacco Use    Smoking status: Former     Current packs/day: 0.00     Average packs/day: 1 pack/day for 25.0 years (25.0 ttl pk-yrs)     Types: Cigarettes     Start date:      Quit date: 2006     Years since quittin.2    Smokeless tobacco: Never   Vaping Use    Vaping status: Never Used   Substance and Sexual Activity    Alcohol use: Never    Drug use: Never    Sexual activity: Defer        Family History   Problem Relation Age of Onset    Malig Hyperthermia Neg Hx         Review of Systems   Constitutional: Negative.    HENT: Negative.     Eyes: Negative.    Respiratory: Negative.     Cardiovascular: Negative.    Gastrointestinal: Negative.   "  Genitourinary:  Positive for frequency. Negative for hematuria and urgency.   Musculoskeletal: Negative.    Allergic/Immunologic: Negative.    Neurological: Negative.    Hematological: Negative.    Psychiatric/Behavioral: Negative.        ROS unchanged-12/8/2023  Objective     Vitals:    03/18/24 1112   BP: 110/67   Pulse: 93   Resp: 16   Temp: 98.4 °F (36.9 °C)   TempSrc: Temporal   SpO2: 95%   Weight: 85.2 kg (187 lb 12.8 oz)   Height: 178 cm (70.08\")           3/18/2024    10:52 AM   Current Status   ECOG score 0       Physical Exam    CONSTITUTIONAL: pleasant well-developed adult man  HEENT: no icterus, no thrush, moist membranes  RESP: Normal respiratory effort, no distress  MUSC: no edema, normal gait  NEURO: alert and oriented x3, normal strength  PSYCH: normal mood and affect  Exam unchanged- 03/18/2024    RECENT LABS:  Hematology WBC   Date Value Ref Range Status   03/18/2024 7.62 3.40 - 10.80 10*3/mm3 Final   04/12/2023 8.56 3.40 - 10.80 10*3/mm3 Final     RBC   Date Value Ref Range Status   03/18/2024 4.23 4.14 - 5.80 10*6/mm3 Final   04/12/2023 4.19 4.14 - 5.80 10*6/mm3 Final     Hemoglobin   Date Value Ref Range Status   03/18/2024 12.7 (L) 13.0 - 17.7 g/dL Final     Hematocrit   Date Value Ref Range Status   03/18/2024 38.4 37.5 - 51.0 % Final     Platelets   Date Value Ref Range Status   03/18/2024 422 140 - 450 10*3/mm3 Final        Lab Results   Component Value Date    GLUCOSE 94 11/03/2023    BUN 30 (H) 11/03/2023    CREATININE 1.12 11/03/2023    EGFRRESULT 68 11/03/2023    EGFR 75.3 10/19/2023    BCR 27 (H) 11/03/2023    K 4.9 11/03/2023    CO2 23 11/03/2023    CALCIUM 10.0 11/03/2023    PROTENTOTREF 7.3 11/03/2023    ALBUMIN 4.3 11/03/2023    BILITOT 0.3 11/03/2023    AST 21 11/03/2023    ALT 28 11/03/2023         Assessment & Plan     *Mild thrombocytosis-platelet count normalized  after iron replacement and transurethral resection of bladder tumor  *Mild normocytic anemia-hemoglobin stable " 12.7  Ferritin 85/iron sat 29 %, B12 453, folate 19.2, creatinine 1.18  *Low iron stores-patient discontinued oral iron because of a metallic taste  *High-grade 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  Status post transurethral resection 6/7/2025 and again 10/18/2023 nonmuscle invasive disease  Has completed intravesicular therapy with gemcitabine by urology  *Colonoscopy 3/14/2024 tubular adenoma polyps 3 year repeat planned    Hematology plan/recommendations:  Patient can continue oral iron, now at twice weekly dosing.  Return to the office in 4 months for review by Dr. Mary with repeat CBC, ferritin, iron profile

## 2024-03-19 ENCOUNTER — TELEPHONE (OUTPATIENT)
Dept: GASTROENTEROLOGY | Facility: CLINIC | Age: 79
End: 2024-03-19
Payer: MEDICAID

## 2024-03-19 NOTE — TELEPHONE ENCOUNTER
Patient called. Advised as per Dr. Mena's note. She verb understanding and will relay message to the patient.     Repeat scope in 3 yrs added to recall list.

## 2024-03-19 NOTE — TELEPHONE ENCOUNTER
----- Message from Magdaleno Mena MD sent at 3/16/2024  2:17 PM EDT -----  Tubular adenoma colon polyps  Colonoscopy recall 3 years  Please call his daughter as he does not speak English thank you

## 2024-03-21 ENCOUNTER — TELEPHONE (OUTPATIENT)
Dept: PAIN MEDICINE | Facility: CLINIC | Age: 79
End: 2024-03-21
Payer: MEDICAID

## 2024-03-21 NOTE — TELEPHONE ENCOUNTER
----- Message from ESCOBAR Rosario sent at 3/21/2024  3:30 PM EDT -----      Hello,  When seen in the office earlier this week Alex's platelet count was in normal range and has been within our system since May 2023.  His current iron studies are good and from our standpoint with regards to his current blood counts there is no issue with an epidural injection. We will see him back in 4 months or sooner if needed.    Thanks,  Eufemia CODY      ----- Message -----  From: Baron Oswald PA  Sent: 3/4/2024  12:25 PM EDT  To: ESCOBAR Rosario,    I had the pleasure of seeing Alex in my pain clinic this morning for complaints of low back and right leg pain--he was accompanied by his daughter who interpreted during the visit.    I am considering having him return for lumbar epidural steroid injection however it looks like his recent labs showed an elevated platelet count.      He is scheduled to see you on 3/18/24 for this issue and for routine follow up. Please advise after his visit if he will be a candidate to proceed with lumbar epidural steroid injections.    Thank you so much!    Baron Oswald PA-C

## 2024-04-01 ENCOUNTER — ANESTHESIA (OUTPATIENT)
Dept: INTERVENTIONAL RADIOLOGY/VASCULAR | Facility: HOSPITAL | Age: 79
End: 2024-04-01
Payer: MEDICAID

## 2024-04-01 ENCOUNTER — APPOINTMENT (OUTPATIENT)
Dept: GENERAL RADIOLOGY | Facility: HOSPITAL | Age: 79
End: 2024-04-01
Payer: MEDICAID

## 2024-04-01 ENCOUNTER — APPOINTMENT (OUTPATIENT)
Dept: CT IMAGING | Facility: HOSPITAL | Age: 79
End: 2024-04-01
Payer: MEDICAID

## 2024-04-01 ENCOUNTER — HOSPITAL ENCOUNTER (INPATIENT)
Facility: HOSPITAL | Age: 79
LOS: 15 days | Discharge: NURSING, REHAB OR LONG-TERM CARE | End: 2024-04-17
Attending: EMERGENCY MEDICINE | Admitting: RADIOLOGY
Payer: MEDICAID

## 2024-04-01 DIAGNOSIS — M79.2 CHRONIC NEUROPATHIC PAIN: ICD-10-CM

## 2024-04-01 DIAGNOSIS — R13.10 DYSPHAGIA, UNSPECIFIED TYPE: ICD-10-CM

## 2024-04-01 DIAGNOSIS — I69.354 FLACCID HEMIPLEGIA OF LEFT NONDOMINANT SIDE AS LATE EFFECT OF CEREBRAL INFARCTION: ICD-10-CM

## 2024-04-01 DIAGNOSIS — K92.0 COFFEE GROUND EMESIS: ICD-10-CM

## 2024-04-01 DIAGNOSIS — G89.29 CHRONIC NEUROPATHIC PAIN: ICD-10-CM

## 2024-04-01 DIAGNOSIS — I63.9 CEREBROVASCULAR ACCIDENT (CVA), UNSPECIFIED MECHANISM: Primary | ICD-10-CM

## 2024-04-01 DIAGNOSIS — K21.9 GASTROESOPHAGEAL REFLUX DISEASE, UNSPECIFIED WHETHER ESOPHAGITIS PRESENT: ICD-10-CM

## 2024-04-01 LAB
ABO GROUP BLD: NORMAL
ALBUMIN SERPL-MCNC: 4.1 G/DL (ref 3.5–5.2)
ALBUMIN/GLOB SERPL: 1.5 G/DL
ALP SERPL-CCNC: 98 U/L (ref 39–117)
ALT SERPL W P-5'-P-CCNC: 24 U/L (ref 1–41)
ANION GAP SERPL CALCULATED.3IONS-SCNC: 12 MMOL/L (ref 5–15)
APTT PPP: 32.7 SECONDS (ref 22.7–35.4)
AST SERPL-CCNC: 21 U/L (ref 1–40)
BASOPHILS # BLD AUTO: 0.04 10*3/MM3 (ref 0–0.2)
BASOPHILS NFR BLD AUTO: 0.5 % (ref 0–1.5)
BILIRUB SERPL-MCNC: 0.2 MG/DL (ref 0–1.2)
BLD GP AB SCN SERPL QL: NEGATIVE
BUN SERPL-MCNC: 33 MG/DL (ref 8–23)
BUN/CREAT SERPL: 23.1 (ref 7–25)
CALCIUM SPEC-SCNC: 10.1 MG/DL (ref 8.2–9.6)
CHLORIDE SERPL-SCNC: 104 MMOL/L (ref 98–107)
CO2 SERPL-SCNC: 24 MMOL/L (ref 22–29)
CREAT SERPL-MCNC: 1.43 MG/DL (ref 0.76–1.27)
DEPRECATED RDW RBC AUTO: 41.7 FL (ref 37–54)
EGFRCR SERPLBLD CKD-EPI 2021: 26.1 ML/MIN/1.73
EOSINOPHIL # BLD AUTO: 0.4 10*3/MM3 (ref 0–0.4)
EOSINOPHIL NFR BLD AUTO: 4.9 % (ref 0.3–6.2)
ERYTHROCYTE [DISTWIDTH] IN BLOOD BY AUTOMATED COUNT: 12.7 % (ref 12.3–15.4)
GLOBULIN UR ELPH-MCNC: 2.8 GM/DL
GLUCOSE BLDC GLUCOMTR-MCNC: 101 MG/DL (ref 70–130)
GLUCOSE SERPL-MCNC: 112 MG/DL (ref 65–99)
HCT VFR BLD AUTO: 36.4 % (ref 37.5–51)
HGB BLD-MCNC: 12 G/DL (ref 13–17.7)
HOLD SPECIMEN: NORMAL
HOLD SPECIMEN: NORMAL
IMM GRANULOCYTES # BLD AUTO: 0.02 10*3/MM3 (ref 0–0.05)
IMM GRANULOCYTES NFR BLD AUTO: 0.2 % (ref 0–0.5)
INR PPP: 0.97 (ref 0.9–1.1)
LYMPHOCYTES # BLD AUTO: 3.06 10*3/MM3 (ref 0.7–3.1)
LYMPHOCYTES NFR BLD AUTO: 37.4 % (ref 19.6–45.3)
MCH RBC QN AUTO: 29.9 PG (ref 26.6–33)
MCHC RBC AUTO-ENTMCNC: 33 G/DL (ref 31.5–35.7)
MCV RBC AUTO: 90.8 FL (ref 79–97)
MONOCYTES # BLD AUTO: 0.68 10*3/MM3 (ref 0.1–0.9)
MONOCYTES NFR BLD AUTO: 8.3 % (ref 5–12)
NEUTROPHILS NFR BLD AUTO: 3.98 10*3/MM3 (ref 1.7–7)
NEUTROPHILS NFR BLD AUTO: 48.7 % (ref 42.7–76)
NRBC BLD AUTO-RTO: 0 /100 WBC (ref 0–0.2)
PLATELET # BLD AUTO: 434 10*3/MM3 (ref 140–450)
PMV BLD AUTO: 9.2 FL (ref 6–12)
POTASSIUM SERPL-SCNC: 4.3 MMOL/L (ref 3.5–5.2)
PROT SERPL-MCNC: 6.9 G/DL (ref 6–8.5)
PROTHROMBIN TIME: 13.1 SECONDS (ref 11.7–14.2)
RBC # BLD AUTO: 4.01 10*6/MM3 (ref 4.14–5.8)
RH BLD: POSITIVE
SODIUM SERPL-SCNC: 140 MMOL/L (ref 136–145)
T&S EXPIRATION DATE: NORMAL
TROPONIN T SERPL HS-MCNC: 12 NG/L
WBC NRBC COR # BLD AUTO: 8.18 10*3/MM3 (ref 3.4–10.8)
WHOLE BLOOD HOLD COAG: NORMAL
WHOLE BLOOD HOLD SPECIMEN: NORMAL

## 2024-04-01 PROCEDURE — 25510000001 IOPAMIDOL PER 1 ML: Performed by: EMERGENCY MEDICINE

## 2024-04-01 PROCEDURE — C1769 GUIDE WIRE: HCPCS

## 2024-04-01 PROCEDURE — 85025 COMPLETE CBC W/AUTO DIFF WBC: CPT | Performed by: EMERGENCY MEDICINE

## 2024-04-01 PROCEDURE — 61645 PERQ ART M-THROMBECT &/NFS: CPT | Performed by: RADIOLOGY

## 2024-04-01 PROCEDURE — C1725 CATH, TRANSLUMIN NON-LASER: HCPCS

## 2024-04-01 PROCEDURE — 99285 EMERGENCY DEPT VISIT HI MDM: CPT

## 2024-04-01 PROCEDURE — 82565 ASSAY OF CREATININE: CPT

## 2024-04-01 PROCEDURE — 25010000002 HEPARIN (PORCINE) PER 1000 UNITS: Performed by: RADIOLOGY

## 2024-04-01 PROCEDURE — 0042T HC CT CEREBRAL PERFUSION W/WO CONTRAST: CPT

## 2024-04-01 PROCEDURE — 93005 ELECTROCARDIOGRAM TRACING: CPT | Performed by: EMERGENCY MEDICINE

## 2024-04-01 PROCEDURE — 85610 PROTHROMBIN TIME: CPT | Performed by: EMERGENCY MEDICINE

## 2024-04-01 PROCEDURE — 25010000002 GLYCOPYRROLATE 0.2 MG/ML SOLUTION: Performed by: NURSE ANESTHETIST, CERTIFIED REGISTERED

## 2024-04-01 PROCEDURE — 61645 PERQ ART M-THROMBECT &/NFS: CPT

## 2024-04-01 PROCEDURE — 70498 CT ANGIOGRAPHY NECK: CPT

## 2024-04-01 PROCEDURE — 86900 BLOOD TYPING SEROLOGIC ABO: CPT | Performed by: EMERGENCY MEDICINE

## 2024-04-01 PROCEDURE — C1884 EMBOLIZATION PROTECT SYST: HCPCS

## 2024-04-01 PROCEDURE — C1894 INTRO/SHEATH, NON-LASER: HCPCS

## 2024-04-01 PROCEDURE — 71045 X-RAY EXAM CHEST 1 VIEW: CPT

## 2024-04-01 PROCEDURE — 25010000002 TENECTEPLASE PER 50 MG: Performed by: PSYCHIATRY & NEUROLOGY

## 2024-04-01 PROCEDURE — 03CG3ZZ EXTIRPATION OF MATTER FROM INTRACRANIAL ARTERY, PERCUTANEOUS APPROACH: ICD-10-PCS | Performed by: RADIOLOGY

## 2024-04-01 PROCEDURE — C1876 STENT, NON-COA/NON-COV W/DEL: HCPCS

## 2024-04-01 PROCEDURE — 70496 CT ANGIOGRAPHY HEAD: CPT

## 2024-04-01 PROCEDURE — 85730 THROMBOPLASTIN TIME PARTIAL: CPT | Performed by: EMERGENCY MEDICINE

## 2024-04-01 PROCEDURE — C1757 CATH, THROMBECTOMY/EMBOLECT: HCPCS

## 2024-04-01 PROCEDURE — C1760 CLOSURE DEV, VASC: HCPCS

## 2024-04-01 PROCEDURE — 25010000002 PROPOFOL 10 MG/ML EMULSION: Performed by: NURSE ANESTHETIST, CERTIFIED REGISTERED

## 2024-04-01 PROCEDURE — C1887 CATHETER, GUIDING: HCPCS

## 2024-04-01 PROCEDURE — B3131ZZ FLUOROSCOPY OF RIGHT COMMON CAROTID ARTERY USING LOW OSMOLAR CONTRAST: ICD-10-PCS | Performed by: RADIOLOGY

## 2024-04-01 PROCEDURE — 037K3DZ DILATION OF RIGHT INTERNAL CAROTID ARTERY WITH INTRALUMINAL DEVICE, PERCUTANEOUS APPROACH: ICD-10-PCS | Performed by: RADIOLOGY

## 2024-04-01 PROCEDURE — 25810000003 SODIUM CHLORIDE 0.9 % SOLUTION 250 ML FLEX CONT: Performed by: NURSE ANESTHETIST, CERTIFIED REGISTERED

## 2024-04-01 PROCEDURE — 86850 RBC ANTIBODY SCREEN: CPT | Performed by: EMERGENCY MEDICINE

## 2024-04-01 PROCEDURE — 82948 REAGENT STRIP/BLOOD GLUCOSE: CPT

## 2024-04-01 PROCEDURE — 25010000002 PHENYLEPHRINE 10 MG/ML SOLUTION 5 ML VIAL: Performed by: NURSE ANESTHETIST, CERTIFIED REGISTERED

## 2024-04-01 PROCEDURE — 80053 COMPREHEN METABOLIC PANEL: CPT | Performed by: EMERGENCY MEDICINE

## 2024-04-01 PROCEDURE — 86901 BLOOD TYPING SEROLOGIC RH(D): CPT | Performed by: EMERGENCY MEDICINE

## 2024-04-01 PROCEDURE — 37215 TRANSCATH STENT CCA W/EPS: CPT | Performed by: RADIOLOGY

## 2024-04-01 PROCEDURE — 25010000002 PHENYLEPHRINE 10 MG/ML SOLUTION: Performed by: NURSE ANESTHETIST, CERTIFIED REGISTERED

## 2024-04-01 PROCEDURE — 93010 ELECTROCARDIOGRAM REPORT: CPT | Performed by: INTERNAL MEDICINE

## 2024-04-01 PROCEDURE — 84484 ASSAY OF TROPONIN QUANT: CPT | Performed by: EMERGENCY MEDICINE

## 2024-04-01 PROCEDURE — 3E03317 INTRODUCTION OF OTHER THROMBOLYTIC INTO PERIPHERAL VEIN, PERCUTANEOUS APPROACH: ICD-10-PCS | Performed by: PSYCHIATRY & NEUROLOGY

## 2024-04-01 RX ORDER — SODIUM CHLORIDE, SODIUM LACTATE, POTASSIUM CHLORIDE, CALCIUM CHLORIDE 600; 310; 30; 20 MG/100ML; MG/100ML; MG/100ML; MG/100ML
INJECTION, SOLUTION INTRAVENOUS CONTINUOUS PRN
Status: DISCONTINUED | OUTPATIENT
Start: 2024-04-01 | End: 2024-04-02 | Stop reason: SURG

## 2024-04-01 RX ORDER — SODIUM CHLORIDE 0.9 % (FLUSH) 0.9 %
10 SYRINGE (ML) INJECTION
Status: COMPLETED | OUTPATIENT
Start: 2024-04-01 | End: 2024-04-01

## 2024-04-01 RX ORDER — SODIUM CHLORIDE 9 MG/ML
40 INJECTION, SOLUTION INTRAVENOUS AS NEEDED
Status: DISCONTINUED | OUTPATIENT
Start: 2024-04-01 | End: 2024-04-17 | Stop reason: HOSPADM

## 2024-04-01 RX ORDER — BISACODYL 10 MG
10 SUPPOSITORY, RECTAL RECTAL DAILY PRN
Status: DISCONTINUED | OUTPATIENT
Start: 2024-04-01 | End: 2024-04-02

## 2024-04-01 RX ORDER — PROPOFOL 10 MG/ML
VIAL (ML) INTRAVENOUS AS NEEDED
Status: DISCONTINUED | OUTPATIENT
Start: 2024-04-01 | End: 2024-04-02 | Stop reason: SURG

## 2024-04-01 RX ORDER — ROCURONIUM BROMIDE 10 MG/ML
INJECTION, SOLUTION INTRAVENOUS AS NEEDED
Status: DISCONTINUED | OUTPATIENT
Start: 2024-04-01 | End: 2024-04-02 | Stop reason: SURG

## 2024-04-01 RX ORDER — AMOXICILLIN 250 MG
2 CAPSULE ORAL 2 TIMES DAILY
Status: DISCONTINUED | OUTPATIENT
Start: 2024-04-01 | End: 2024-04-02

## 2024-04-01 RX ORDER — SODIUM CHLORIDE 9 MG/ML
75 INJECTION, SOLUTION INTRAVENOUS CONTINUOUS
Status: DISCONTINUED | OUTPATIENT
Start: 2024-04-01 | End: 2024-04-04

## 2024-04-01 RX ORDER — SODIUM CHLORIDE 0.9 % (FLUSH) 0.9 %
10 SYRINGE (ML) INJECTION ONCE
Status: COMPLETED | OUTPATIENT
Start: 2024-04-01 | End: 2024-04-01

## 2024-04-01 RX ORDER — POLYETHYLENE GLYCOL 3350 17 G/17G
17 POWDER, FOR SOLUTION ORAL DAILY PRN
Status: DISCONTINUED | OUTPATIENT
Start: 2024-04-01 | End: 2024-04-02

## 2024-04-01 RX ORDER — SODIUM CHLORIDE 0.9 % (FLUSH) 0.9 %
10 SYRINGE (ML) INJECTION AS NEEDED
Status: DISCONTINUED | OUTPATIENT
Start: 2024-04-01 | End: 2024-04-17 | Stop reason: HOSPADM

## 2024-04-01 RX ORDER — BISACODYL 5 MG/1
5 TABLET, DELAYED RELEASE ORAL DAILY PRN
Status: DISCONTINUED | OUTPATIENT
Start: 2024-04-01 | End: 2024-04-02

## 2024-04-01 RX ORDER — SODIUM CHLORIDE 0.9 % (FLUSH) 0.9 %
10 SYRINGE (ML) INJECTION EVERY 12 HOURS SCHEDULED
Status: DISCONTINUED | OUTPATIENT
Start: 2024-04-01 | End: 2024-04-17 | Stop reason: HOSPADM

## 2024-04-01 RX ORDER — PHENYLEPHRINE HYDROCHLORIDE 10 MG/ML
INJECTION INTRAVENOUS AS NEEDED
Status: DISCONTINUED | OUTPATIENT
Start: 2024-04-01 | End: 2024-04-02 | Stop reason: SURG

## 2024-04-01 RX ORDER — LIDOCAINE HYDROCHLORIDE 20 MG/ML
INJECTION, SOLUTION INFILTRATION; PERINEURAL AS NEEDED
Status: DISCONTINUED | OUTPATIENT
Start: 2024-04-01 | End: 2024-04-02 | Stop reason: SURG

## 2024-04-01 RX ORDER — NITROGLYCERIN 0.4 MG/1
0.4 TABLET SUBLINGUAL
Status: DISCONTINUED | OUTPATIENT
Start: 2024-04-01 | End: 2024-04-17 | Stop reason: HOSPADM

## 2024-04-01 RX ADMIN — LIDOCAINE HYDROCHLORIDE 40 MG: 20 INJECTION, SOLUTION INFILTRATION; PERINEURAL at 23:11

## 2024-04-01 RX ADMIN — Medication 10 ML: at 22:22

## 2024-04-01 RX ADMIN — HEPARIN SODIUM: 1000 INJECTION INTRAVENOUS; SUBCUTANEOUS at 23:25

## 2024-04-01 RX ADMIN — HEPARIN SODIUM: 1000 INJECTION INTRAVENOUS; SUBCUTANEOUS at 23:24

## 2024-04-01 RX ADMIN — ROCURONIUM BROMIDE 100 MG: 10 INJECTION, SOLUTION INTRAVENOUS at 23:11

## 2024-04-01 RX ADMIN — PHENYLEPHRINE HYDROCHLORIDE 100 MCG: 10 INJECTION INTRAVENOUS at 23:22

## 2024-04-01 RX ADMIN — PHENYLEPHRINE HYDROCHLORIDE 100 MCG: 10 INJECTION INTRAVENOUS at 23:16

## 2024-04-01 RX ADMIN — TENECTEPLASE 20 MG: KIT at 22:22

## 2024-04-01 RX ADMIN — PHENYLEPHRINE HYDROCHLORIDE 100 MCG: 10 INJECTION INTRAVENOUS at 23:25

## 2024-04-01 RX ADMIN — IOPAMIDOL 95 ML: 755 INJECTION, SOLUTION INTRAVENOUS at 22:22

## 2024-04-01 RX ADMIN — IOPAMIDOL 50 ML: 755 INJECTION, SOLUTION INTRAVENOUS at 22:20

## 2024-04-01 RX ADMIN — PHENYLEPHRINE HYDROCHLORIDE 0.5 MCG/KG/MIN: 10 INJECTION, SOLUTION INTRAVENOUS at 23:20

## 2024-04-01 RX ADMIN — SODIUM CHLORIDE, POTASSIUM CHLORIDE, SODIUM LACTATE AND CALCIUM CHLORIDE: 600; 310; 30; 20 INJECTION, SOLUTION INTRAVENOUS at 23:06

## 2024-04-01 RX ADMIN — PHENYLEPHRINE HYDROCHLORIDE 100 MCG: 10 INJECTION INTRAVENOUS at 23:18

## 2024-04-01 RX ADMIN — PROPOFOL 200 MG: 10 INJECTION, EMULSION INTRAVENOUS at 23:11

## 2024-04-01 RX ADMIN — GLYCOPYRROLATE 0.2 MG: 0.2 INJECTION INTRAMUSCULAR; INTRAVENOUS at 23:51

## 2024-04-01 RX ADMIN — PHENYLEPHRINE HYDROCHLORIDE 100 MCG: 10 INJECTION INTRAVENOUS at 23:20

## 2024-04-01 NOTE — LETTER
EMS Transport Request  For use at [facility], [place], [place], [place], and [place] only   Patient Name: Alex Negron : 1945   Weight:78.9 kg (173 lb 15.1 oz) Pick-up Location: P599-1 BLS/ALS: BLS/ALS: BLS   Insurance: [place] MEDICAID Auth End Date: N/A   Pre-Cert #: D/C Summary complete:    Destination: Other SNF [facility]   Contact Precautions: None   Equipment (O2, Fluids, etc.): None   Arrive By Date/Time: 24, 10:00 PM Stretcher/WC: Stretcher   CM Requesting: Michelle Sheehan LCSW Ext: [phone]   Notes/Medical Necessity: CVA, immobile     ______________________________________________________________________    *Only 2 patient bags OR 1 carry-on size bag are permitted.  Wheelchairs and walkers CANNOT transported with the patient. Acknowledge: Yes

## 2024-04-02 ENCOUNTER — APPOINTMENT (OUTPATIENT)
Dept: MRI IMAGING | Facility: HOSPITAL | Age: 79
End: 2024-04-02
Payer: MEDICAID

## 2024-04-02 DIAGNOSIS — I65.21 RIGHT-SIDED EXTRACRANIAL CAROTID ARTERY STENOSIS: Primary | ICD-10-CM

## 2024-04-02 DIAGNOSIS — I63.9 ACUTE CVA (CEREBROVASCULAR ACCIDENT): ICD-10-CM

## 2024-04-02 LAB
ANION GAP SERPL CALCULATED.3IONS-SCNC: 13.5 MMOL/L (ref 5–15)
ARTERIAL PATENCY WRIST A: POSITIVE
ATMOSPHERIC PRESS: 739.3 MMHG
BASE EXCESS BLDA CALC-SCNC: -1.6 MMOL/L (ref 0–2)
BASOPHILS # BLD AUTO: 0.03 10*3/MM3 (ref 0–0.2)
BASOPHILS NFR BLD AUTO: 0.3 % (ref 0–1.5)
BDY SITE: ABNORMAL
BUN SERPL-MCNC: 28 MG/DL (ref 8–23)
BUN/CREAT SERPL: 20.9 (ref 7–25)
CALCIUM SPEC-SCNC: 9.1 MG/DL (ref 8.6–10.5)
CHLORIDE SERPL-SCNC: 102 MMOL/L (ref 98–107)
CHOLEST SERPL-MCNC: 117 MG/DL (ref 0–200)
CO2 BLDA-SCNC: 25.6 MMOL/L (ref 23–27)
CO2 SERPL-SCNC: 22.5 MMOL/L (ref 22–29)
CREAT SERPL-MCNC: 1.34 MG/DL (ref 0.76–1.27)
DEPRECATED RDW RBC AUTO: 41.4 FL (ref 37–54)
DEVICE COMMENT: ABNORMAL
EGFRCR SERPLBLD CKD-EPI 2021: 54.6 ML/MIN/1.73
EOSINOPHIL # BLD AUTO: 0.03 10*3/MM3 (ref 0–0.4)
EOSINOPHIL NFR BLD AUTO: 0.3 % (ref 0.3–6.2)
ERYTHROCYTE [DISTWIDTH] IN BLOOD BY AUTOMATED COUNT: 12.6 % (ref 12.3–15.4)
GAS FLOW AIRWAY: 15 LPM
GLUCOSE BLDC GLUCOMTR-MCNC: 150 MG/DL (ref 70–130)
GLUCOSE BLDC GLUCOMTR-MCNC: 163 MG/DL (ref 70–130)
GLUCOSE BLDC GLUCOMTR-MCNC: 187 MG/DL (ref 70–130)
GLUCOSE SERPL-MCNC: 177 MG/DL (ref 65–99)
HBA1C MFR BLD: 6.2 % (ref 4.8–5.6)
HCO3 BLDA-SCNC: 24.3 MMOL/L (ref 22–28)
HCT VFR BLD AUTO: 33.8 % (ref 37.5–51)
HDLC SERPL-MCNC: 38 MG/DL (ref 40–60)
HEMODILUTION: NO
HGB BLD-MCNC: 11.1 G/DL (ref 13–17.7)
IMM GRANULOCYTES # BLD AUTO: 0.04 10*3/MM3 (ref 0–0.05)
IMM GRANULOCYTES NFR BLD AUTO: 0.3 % (ref 0–0.5)
LDLC SERPL CALC-MCNC: 69 MG/DL (ref 0–100)
LDLC/HDLC SERPL: 1.88 {RATIO}
LYMPHOCYTES # BLD AUTO: 2 10*3/MM3 (ref 0.7–3.1)
LYMPHOCYTES NFR BLD AUTO: 17.1 % (ref 19.6–45.3)
MCH RBC QN AUTO: 29.8 PG (ref 26.6–33)
MCHC RBC AUTO-ENTMCNC: 32.8 G/DL (ref 31.5–35.7)
MCV RBC AUTO: 90.9 FL (ref 79–97)
MODALITY: ABNORMAL
MONOCYTES # BLD AUTO: 0.38 10*3/MM3 (ref 0.1–0.9)
MONOCYTES NFR BLD AUTO: 3.2 % (ref 5–12)
NEUTROPHILS NFR BLD AUTO: 78.8 % (ref 42.7–76)
NEUTROPHILS NFR BLD AUTO: 9.25 10*3/MM3 (ref 1.7–7)
NRBC BLD AUTO-RTO: 0 /100 WBC (ref 0–0.2)
PA ADP PRP-ACNC: 7 PRU (ref 194–418)
PCO2 BLDA: 44.7 MM HG (ref 35–45)
PH BLDA: 7.34 PH UNITS (ref 7.35–7.45)
PLATELET # BLD AUTO: 343 10*3/MM3 (ref 140–450)
PMV BLD AUTO: 9.4 FL (ref 6–12)
PO2 BLDA: 77.8 MM HG (ref 80–100)
POTASSIUM SERPL-SCNC: 4.6 MMOL/L (ref 3.5–5.2)
QT INTERVAL: 430 MS
QTC INTERVAL: 433 MS
RBC # BLD AUTO: 3.72 10*6/MM3 (ref 4.14–5.8)
SAO2 % BLDCOA: 94.5 % (ref 92–98.5)
SET MECH RESP RATE: 24
SODIUM SERPL-SCNC: 138 MMOL/L (ref 136–145)
TRIGL SERPL-MCNC: 38 MG/DL (ref 0–150)
VLDLC SERPL-MCNC: 10 MG/DL (ref 5–40)
WBC NRBC COR # BLD AUTO: 11.73 10*3/MM3 (ref 3.4–10.8)

## 2024-04-02 PROCEDURE — 25010000002 ONDANSETRON PER 1 MG: Performed by: NURSE ANESTHETIST, CERTIFIED REGISTERED

## 2024-04-02 PROCEDURE — 85025 COMPLETE CBC W/AUTO DIFF WBC: CPT | Performed by: INTERNAL MEDICINE

## 2024-04-02 PROCEDURE — 25010000002 LABETALOL 5 MG/ML SOLUTION: Performed by: NURSE ANESTHETIST, CERTIFIED REGISTERED

## 2024-04-02 PROCEDURE — 25810000003 SODIUM CHLORIDE 0.9 % SOLUTION: Performed by: RADIOLOGY

## 2024-04-02 PROCEDURE — 25010000002 EPTIFIBATIDE PER 5 MG: Performed by: RADIOLOGY

## 2024-04-02 PROCEDURE — 82948 REAGENT STRIP/BLOOD GLUCOSE: CPT

## 2024-04-02 PROCEDURE — 4A03X5D MEASUREMENT OF ARTERIAL FLOW, INTRACRANIAL, EXTERNAL APPROACH: ICD-10-PCS | Performed by: RADIOLOGY

## 2024-04-02 PROCEDURE — 99223 1ST HOSP IP/OBS HIGH 75: CPT | Performed by: PSYCHIATRY & NEUROLOGY

## 2024-04-02 PROCEDURE — 0 IODIXANOL PER 1 ML: Performed by: RADIOLOGY

## 2024-04-02 PROCEDURE — 25010000002 ONDANSETRON PER 1 MG: Performed by: INTERNAL MEDICINE

## 2024-04-02 PROCEDURE — 80061 LIPID PANEL: CPT | Performed by: RADIOLOGY

## 2024-04-02 PROCEDURE — 36600 WITHDRAWAL OF ARTERIAL BLOOD: CPT

## 2024-04-02 PROCEDURE — 25010000002 SUGAMMADEX 200 MG/2ML SOLUTION: Performed by: NURSE ANESTHETIST, CERTIFIED REGISTERED

## 2024-04-02 PROCEDURE — 85576 BLOOD PLATELET AGGREGATION: CPT | Performed by: PSYCHIATRY & NEUROLOGY

## 2024-04-02 PROCEDURE — 80048 BASIC METABOLIC PNL TOTAL CA: CPT | Performed by: RADIOLOGY

## 2024-04-02 PROCEDURE — 0 GADOBENATE DIMEGLUMINE 529 MG/ML SOLUTION: Performed by: INTERNAL MEDICINE

## 2024-04-02 PROCEDURE — 70553 MRI BRAIN STEM W/O & W/DYE: CPT

## 2024-04-02 PROCEDURE — 25010000002 NICARDIPINE 2.5 MG/ML SOLUTION: Performed by: RADIOLOGY

## 2024-04-02 PROCEDURE — 82803 BLOOD GASES ANY COMBINATION: CPT

## 2024-04-02 PROCEDURE — 99024 POSTOP FOLLOW-UP VISIT: CPT | Performed by: NURSE PRACTITIONER

## 2024-04-02 PROCEDURE — 94799 UNLISTED PULMONARY SVC/PX: CPT

## 2024-04-02 PROCEDURE — 25810000003 LACTATED RINGERS PER 1000 ML: Performed by: NURSE ANESTHETIST, CERTIFIED REGISTERED

## 2024-04-02 PROCEDURE — A9577 INJ MULTIHANCE: HCPCS | Performed by: INTERNAL MEDICINE

## 2024-04-02 PROCEDURE — 83036 HEMOGLOBIN GLYCOSYLATED A1C: CPT | Performed by: RADIOLOGY

## 2024-04-02 PROCEDURE — 25010000002 EPTIFIBATIDE PER 5 MG: Performed by: NURSE ANESTHETIST, CERTIFIED REGISTERED

## 2024-04-02 RX ORDER — ASPIRIN 325 MG
325 TABLET ORAL DAILY
Status: DISCONTINUED | OUTPATIENT
Start: 2024-04-02 | End: 2024-04-03

## 2024-04-02 RX ORDER — PROMETHAZINE HYDROCHLORIDE 25 MG/1
25 TABLET ORAL ONCE AS NEEDED
Status: DISCONTINUED | OUTPATIENT
Start: 2024-04-02 | End: 2024-04-03

## 2024-04-02 RX ORDER — EPTIFIBATIDE 20 MG/10ML
135 INJECTION INTRAVENOUS ONCE
Status: DISCONTINUED | OUTPATIENT
Start: 2024-04-02 | End: 2024-04-02

## 2024-04-02 RX ORDER — LABETALOL HYDROCHLORIDE 5 MG/ML
INJECTION, SOLUTION INTRAVENOUS AS NEEDED
Status: DISCONTINUED | OUTPATIENT
Start: 2024-04-02 | End: 2024-04-02 | Stop reason: SURG

## 2024-04-02 RX ORDER — EPTIFIBATIDE 0.75 MG/ML
6.5 INJECTION, SOLUTION INTRAVENOUS CONTINUOUS
Status: DISCONTINUED | OUTPATIENT
Start: 2024-04-02 | End: 2024-04-02

## 2024-04-02 RX ORDER — BISACODYL 5 MG/1
5 TABLET, DELAYED RELEASE ORAL DAILY PRN
Status: DISCONTINUED | OUTPATIENT
Start: 2024-04-02 | End: 2024-04-14

## 2024-04-02 RX ORDER — ROSUVASTATIN CALCIUM 20 MG/1
20 TABLET, COATED ORAL DAILY
COMMUNITY
End: 2024-04-17 | Stop reason: HOSPADM

## 2024-04-02 RX ORDER — AMOXICILLIN 250 MG
2 CAPSULE ORAL 2 TIMES DAILY
Status: DISCONTINUED | OUTPATIENT
Start: 2024-04-02 | End: 2024-04-14

## 2024-04-02 RX ORDER — IPRATROPIUM BROMIDE AND ALBUTEROL SULFATE 2.5; .5 MG/3ML; MG/3ML
3 SOLUTION RESPIRATORY (INHALATION) ONCE AS NEEDED
Status: DISCONTINUED | OUTPATIENT
Start: 2024-04-02 | End: 2024-04-03

## 2024-04-02 RX ORDER — NALOXONE HCL 0.4 MG/ML
0.2 VIAL (ML) INJECTION AS NEEDED
Status: DISCONTINUED | OUTPATIENT
Start: 2024-04-02 | End: 2024-04-03

## 2024-04-02 RX ORDER — EPHEDRINE SULFATE 50 MG/ML
5 INJECTION, SOLUTION INTRAVENOUS ONCE AS NEEDED
Status: DISCONTINUED | OUTPATIENT
Start: 2024-04-02 | End: 2024-04-03

## 2024-04-02 RX ORDER — CALCIUM CARBONATE 500 MG/1
2 TABLET, CHEWABLE ORAL 3 TIMES DAILY PRN
Status: DISCONTINUED | OUTPATIENT
Start: 2024-04-02 | End: 2024-04-17 | Stop reason: HOSPADM

## 2024-04-02 RX ORDER — ATORVASTATIN CALCIUM 80 MG/1
80 TABLET, FILM COATED ORAL NIGHTLY
Status: DISCONTINUED | OUTPATIENT
Start: 2024-04-02 | End: 2024-04-14

## 2024-04-02 RX ORDER — EPTIFIBATIDE 0.75 MG/ML
INJECTION, SOLUTION INTRAVENOUS CONTINUOUS PRN
Status: DISCONTINUED | OUTPATIENT
Start: 2024-04-02 | End: 2024-04-02 | Stop reason: SURG

## 2024-04-02 RX ORDER — IODIXANOL 320 MG/ML
200 INJECTION, SOLUTION INTRAVASCULAR
Status: COMPLETED | OUTPATIENT
Start: 2024-04-02 | End: 2024-04-02

## 2024-04-02 RX ORDER — PROMETHAZINE HYDROCHLORIDE 25 MG/1
25 SUPPOSITORY RECTAL ONCE AS NEEDED
Status: DISCONTINUED | OUTPATIENT
Start: 2024-04-02 | End: 2024-04-03

## 2024-04-02 RX ORDER — GABAPENTIN 300 MG/1
300 CAPSULE ORAL 3 TIMES DAILY
COMMUNITY
End: 2024-04-17 | Stop reason: HOSPADM

## 2024-04-02 RX ORDER — ASPIRIN 325 MG
325 TABLET ORAL DAILY
Status: DISCONTINUED | OUTPATIENT
Start: 2024-04-03 | End: 2024-04-02

## 2024-04-02 RX ORDER — DROPERIDOL 2.5 MG/ML
0.62 INJECTION, SOLUTION INTRAMUSCULAR; INTRAVENOUS
Status: DISCONTINUED | OUTPATIENT
Start: 2024-04-02 | End: 2024-04-03

## 2024-04-02 RX ORDER — CLOPIDOGREL BISULFATE 75 MG/1
75 TABLET ORAL DAILY
Status: DISCONTINUED | OUTPATIENT
Start: 2024-04-03 | End: 2024-04-02

## 2024-04-02 RX ORDER — LABETALOL HYDROCHLORIDE 5 MG/ML
5 INJECTION, SOLUTION INTRAVENOUS
Status: DISCONTINUED | OUTPATIENT
Start: 2024-04-02 | End: 2024-04-03

## 2024-04-02 RX ORDER — POLYETHYLENE GLYCOL 3350 17 G/17G
17 POWDER, FOR SOLUTION ORAL DAILY PRN
Status: DISCONTINUED | OUTPATIENT
Start: 2024-04-02 | End: 2024-04-14

## 2024-04-02 RX ORDER — LISINOPRIL 10 MG/1
10 TABLET ORAL DAILY
COMMUNITY
End: 2024-04-17 | Stop reason: HOSPADM

## 2024-04-02 RX ORDER — FENTANYL CITRATE 50 UG/ML
25 INJECTION, SOLUTION INTRAMUSCULAR; INTRAVENOUS
Status: DISCONTINUED | OUTPATIENT
Start: 2024-04-02 | End: 2024-04-03

## 2024-04-02 RX ORDER — BISACODYL 10 MG
10 SUPPOSITORY, RECTAL RECTAL DAILY PRN
Status: DISCONTINUED | OUTPATIENT
Start: 2024-04-02 | End: 2024-04-14

## 2024-04-02 RX ORDER — DIPHENHYDRAMINE HYDROCHLORIDE 50 MG/ML
12.5 INJECTION INTRAMUSCULAR; INTRAVENOUS
Status: DISCONTINUED | OUTPATIENT
Start: 2024-04-02 | End: 2024-04-03

## 2024-04-02 RX ORDER — AMLODIPINE BESYLATE 10 MG/1
10 TABLET ORAL DAILY
Status: ON HOLD | COMMUNITY
End: 2024-04-17

## 2024-04-02 RX ORDER — ONDANSETRON 2 MG/ML
4 INJECTION INTRAMUSCULAR; INTRAVENOUS EVERY 4 HOURS PRN
Status: DISCONTINUED | OUTPATIENT
Start: 2024-04-02 | End: 2024-04-17 | Stop reason: HOSPADM

## 2024-04-02 RX ORDER — ONDANSETRON 2 MG/ML
INJECTION INTRAMUSCULAR; INTRAVENOUS AS NEEDED
Status: DISCONTINUED | OUTPATIENT
Start: 2024-04-02 | End: 2024-04-02 | Stop reason: SURG

## 2024-04-02 RX ORDER — ATORVASTATIN CALCIUM 80 MG/1
80 TABLET, FILM COATED ORAL NIGHTLY
Status: DISCONTINUED | OUTPATIENT
Start: 2024-04-02 | End: 2024-04-02

## 2024-04-02 RX ORDER — HYDRALAZINE HYDROCHLORIDE 20 MG/ML
5 INJECTION INTRAMUSCULAR; INTRAVENOUS
Status: DISCONTINUED | OUTPATIENT
Start: 2024-04-02 | End: 2024-04-03

## 2024-04-02 RX ORDER — ACETAMINOPHEN 160 MG/5ML
650 SOLUTION ORAL EVERY 6 HOURS PRN
Status: DISCONTINUED | OUTPATIENT
Start: 2024-04-02 | End: 2024-04-17 | Stop reason: HOSPADM

## 2024-04-02 RX ORDER — CLOPIDOGREL BISULFATE 75 MG/1
75 TABLET ORAL DAILY
Status: DISCONTINUED | OUTPATIENT
Start: 2024-04-03 | End: 2024-04-17 | Stop reason: HOSPADM

## 2024-04-02 RX ORDER — ASPIRIN 300 MG/1
300 SUPPOSITORY RECTAL DAILY
Status: DISCONTINUED | OUTPATIENT
Start: 2024-04-03 | End: 2024-04-02

## 2024-04-02 RX ORDER — INDAPAMIDE 1.25 MG/1
1.25 TABLET ORAL EVERY MORNING
COMMUNITY
End: 2024-04-17 | Stop reason: HOSPADM

## 2024-04-02 RX ORDER — FLUMAZENIL 0.1 MG/ML
0.2 INJECTION INTRAVENOUS AS NEEDED
Status: DISCONTINUED | OUTPATIENT
Start: 2024-04-02 | End: 2024-04-03

## 2024-04-02 RX ORDER — FERROUS SULFATE 325(65) MG
325 TABLET ORAL
COMMUNITY
End: 2024-04-17 | Stop reason: HOSPADM

## 2024-04-02 RX ORDER — FAMOTIDINE 20 MG/1
20 TABLET, FILM COATED ORAL
Status: DISCONTINUED | OUTPATIENT
Start: 2024-04-02 | End: 2024-04-05

## 2024-04-02 RX ORDER — CLOPIDOGREL BISULFATE 75 MG/1
300 TABLET ORAL ONCE
Status: COMPLETED | OUTPATIENT
Start: 2024-04-02 | End: 2024-04-02

## 2024-04-02 RX ORDER — TAMSULOSIN HYDROCHLORIDE 0.4 MG/1
1 CAPSULE ORAL NIGHTLY
COMMUNITY
End: 2024-04-17 | Stop reason: HOSPADM

## 2024-04-02 RX ORDER — ONDANSETRON 2 MG/ML
4 INJECTION INTRAMUSCULAR; INTRAVENOUS ONCE AS NEEDED
Status: COMPLETED | OUTPATIENT
Start: 2024-04-02 | End: 2024-04-02

## 2024-04-02 RX ORDER — ASPIRIN 300 MG/1
300 SUPPOSITORY RECTAL DAILY
Status: DISCONTINUED | OUTPATIENT
Start: 2024-04-02 | End: 2024-04-03

## 2024-04-02 RX ORDER — GLYCOPYRROLATE 0.2 MG/ML
INJECTION INTRAMUSCULAR; INTRAVENOUS AS NEEDED
Status: DISCONTINUED | OUTPATIENT
Start: 2024-04-01 | End: 2024-04-02 | Stop reason: SURG

## 2024-04-02 RX ADMIN — GADOBENATE DIMEGLUMINE 17 ML: 529 INJECTION, SOLUTION INTRAVENOUS at 09:50

## 2024-04-02 RX ADMIN — ANTACID TABLETS 2 TABLET: 500 TABLET, CHEWABLE ORAL at 20:09

## 2024-04-02 RX ADMIN — ACETAMINOPHEN 650 MG: 160 SOLUTION ORAL at 20:09

## 2024-04-02 RX ADMIN — ONDANSETRON 4 MG: 2 INJECTION INTRAMUSCULAR; INTRAVENOUS at 23:33

## 2024-04-02 RX ADMIN — ATORVASTATIN CALCIUM 80 MG: 80 TABLET, FILM COATED ORAL at 20:09

## 2024-04-02 RX ADMIN — SODIUM CHLORIDE, POTASSIUM CHLORIDE, SODIUM LACTATE AND CALCIUM CHLORIDE: 600; 310; 30; 20 INJECTION, SOLUTION INTRAVENOUS at 01:10

## 2024-04-02 RX ADMIN — EPTIFIBATIDE 6.5 ML/HR: 0.75 INJECTION INTRAVENOUS at 03:13

## 2024-04-02 RX ADMIN — LABETALOL HYDROCHLORIDE 5 MG: 5 INJECTION, SOLUTION INTRAVENOUS at 01:29

## 2024-04-02 RX ADMIN — Medication 10 ML: at 08:34

## 2024-04-02 RX ADMIN — Medication 10 ML: at 02:25

## 2024-04-02 RX ADMIN — NICARDIPINE HYDROCHLORIDE 5 MG/HR: 25 INJECTION, SOLUTION INTRAVENOUS at 20:55

## 2024-04-02 RX ADMIN — NICARDIPINE HYDROCHLORIDE 5 MG/HR: 25 INJECTION, SOLUTION INTRAVENOUS at 09:00

## 2024-04-02 RX ADMIN — SODIUM CHLORIDE 75 ML/HR: 9 INJECTION, SOLUTION INTRAVENOUS at 02:26

## 2024-04-02 RX ADMIN — FAMOTIDINE 20 MG: 20 TABLET, FILM COATED ORAL at 18:54

## 2024-04-02 RX ADMIN — SODIUM CHLORIDE 75 ML/HR: 9 INJECTION, SOLUTION INTRAVENOUS at 17:09

## 2024-04-02 RX ADMIN — IODIXANOL 164 ML: 320 INJECTION, SOLUTION INTRAVASCULAR at 02:07

## 2024-04-02 RX ADMIN — SUGAMMADEX 200 MG: 100 INJECTION, SOLUTION INTRAVENOUS at 01:34

## 2024-04-02 RX ADMIN — ASPIRIN 325 MG: 325 TABLET ORAL at 14:38

## 2024-04-02 RX ADMIN — EPTIFIBATIDE 6.5 ML/HR: 0.75 INJECTION INTRAVENOUS at 00:08

## 2024-04-02 RX ADMIN — NICARDIPINE HYDROCHLORIDE 5 MG/HR: 25 INJECTION, SOLUTION INTRAVENOUS at 17:08

## 2024-04-02 RX ADMIN — NICARDIPINE HYDROCHLORIDE 5 MG/HR: 25 INJECTION, SOLUTION INTRAVENOUS at 06:50

## 2024-04-02 RX ADMIN — ONDANSETRON 4 MG: 2 INJECTION INTRAMUSCULAR; INTRAVENOUS at 05:13

## 2024-04-02 RX ADMIN — ONDANSETRON 4 MG: 2 INJECTION INTRAMUSCULAR; INTRAVENOUS at 01:22

## 2024-04-02 RX ADMIN — ONDANSETRON 4 MG: 2 INJECTION INTRAMUSCULAR; INTRAVENOUS at 18:46

## 2024-04-02 RX ADMIN — Medication 10 ML: at 20:09

## 2024-04-02 RX ADMIN — CLOPIDOGREL BISULFATE 300 MG: 75 TABLET, FILM COATED ORAL at 14:38

## 2024-04-02 NOTE — NURSING NOTE
Patient remains in ICU. Patient MRI completed this morning. Patient is now Q1 hr neuro checks for stroke. Patient off integrelin and now on plavix and aspirin. Patient has a cortrak at 110.  Patient to have P2Y12 lab draw 8 hours after plavix given. Patient has had adequate UOP.  Patient alert and oriented X4. Patient follows commands. Patient left side weak and unable to move, however did report that he felt a little sensation on that side. Patient started on Diabetisource AC.

## 2024-04-02 NOTE — NURSING NOTE
Pre-procedure NIH: 23  Decision time: 2220  In room: 2304  Procedure start: 2318  Arterial access puncture time: 2318  Guide catheter placement time: 2323  First thrombectomy device placement time (first pass): 2333  Subsequent device placement time: 2nd- 0037, 3rd- 0056, 4th- 0109  Time of recanalization: 0056  Final TICI Flow: 2C  Procedure end time: 0123    **These values were verbally verified with physician performing procedure.**

## 2024-04-02 NOTE — ED PROVIDER NOTES
EMERGENCY DEPARTMENT MD ATTESTATION NOTE    Room Number:  03/03  PCP: Jyothi Song, PASHANAE  Independent Historians: Patient, Family, and EMS    HPI:  A complete HPI/ROS/PMH/PSH/SH/FH are unobtainable due to: Altered Mental Status    Chronic or social conditions impacting patient care (Social Determinants of Health): None      Context: Alex Negron is a 79 y.o. male with a medical history of unknown history who presents to the ED c/o acute left-sided weakness with slurred speech and facial droop.  EMS reports 45 minutes prior to arrival the patient developed slurred speech, left-sided weakness, and facial droop.  EMS reports the patient only speaks Greek.  He was not able to provide much history and route.  Family arrived shortly after his arrival and gave his history.  They report he is not on any blood thinners.  This was a witnessed event.        Review of prior external notes (non-ED) -and- Review of prior external test results outside of this encounter: Extensive review of the EPIC system as well as [facility] reveals no prior visit notes and no prior diagnostic studies available for review.    Prescription drug monitoring program review:     N/A      PHYSICAL EXAM    I have reviewed the triage vital signs and nursing notes.    ED Triage Vitals [04/01/24 2155]   Temp Heart Rate Resp BP SpO2   -- 63 28 130/85 91 %      Temp src Heart Rate Source Patient Position BP Location FiO2 (%)   -- Monitor Lying Right arm --       Physical Exam  GENERAL: Awake, alert, cutely ill appearing  SKIN: Warm, dry  HENT: Normocephalic, atraumatic  EYES: no scleral icterus  CV: regular rhythm, regular rate  RESPIRATORY: normal effort, lungs clear  ABDOMEN: soft, nontender, nondistended  MUSCULOSKELETAL: no deformity  NEURO: alert, slurred speech, left-sided facial droop, severe left arm and left leg weakness            MEDICATIONS GIVEN IN ER  Medications   sodium chloride 0.9 % flush 10 mL (has no administration in time  range)   sodium chloride 0.9 % bolus 500 mL (has no administration in time range)   iopamidol (ISOVUE-370) 76 % injection 50 mL (50 mL Intravenous Given 4/1/24 2220)   iopamidol (ISOVUE-370) 76 % injection 95 mL (95 mL Intravenous Given 4/1/24 2222)   sodium chloride 0.9 % flush 10 mL (10 mL Intravenous Given 4/1/24 2222)     Followed by   tenecteplase for stroke (TNKASE) injection 20 mg (20 mg Intravenous Given 4/1/24 2222)     Followed by   sodium chloride 0.9 % flush 10 mL (10 mL Intravenous Given 4/1/24 2222)         ORDERS PLACED DURING THIS VISIT:  Orders Placed This Encounter   Procedures    CT Angiogram Head w AI Analysis of LVO    CT Angiogram Neck    CT CEREBRAL PERFUSION WITH & WITHOUT CONTRAST    XR Chest 1 View    [place] Draw    Comprehensive Metabolic Panel    Protime-INR    aPTT    Single High Sensitivity Troponin T    CBC Auto Differential    NPO Diet NPO Type: Strict NPO    Initiate Department's Acute Stroke Process (Team D, Code 19, etc.)    Perform NIH Stroke Scale    Measure Actual Weight    Notify Provider    Notify Provider for SBP Greater Than 140 for Hemorrhagic Stroke Patient    Head of Bed 30 Degrees or Less    Undress and Gown    Continuous Pulse Oximetry    Vital Signs    Neuro Checks    No Hypotonic Fluids    Nursing Dysphagia Screening (Complete Prior to Giving anything PO)    RN to Place Order SLP Consult (IF swallow screen failed) - Eval & Treat Choosing Reason of RN Dysphagia Screen Failed    Follow Department Guidelines - Notify Pharmacy of Thrombolytic Therapy Administration    NIHSS Assessment - Prior to Thrombolytic Administration    Neuro Checks Per Post-Thrombolytic Therapy Flowsheet    Notify Provider and Activate Thrombolytic Induced Angioedema Order Set (see comments)    Vital Signs Per Post-Thrombolytic Therapy Flowsheet    Maintain Blood Pressure Per Listed Parameters    No Arterial Punctures, IM Injections or Invasive Procedures For 24 Hours    No Anticoagulant /  Antiplatelet Agents For 24 Hours    Inpatient Neurology Consult Stroke    Inpatient Neurology Consult Stroke    Pulmonology (on-call MD unless specified)    Oxygen Therapy- Nasal Cannula; Titrate 1-6 LPM Per SpO2; 90 - 95%    POC Glucose Once    POC Glucose Once    ECG 12 Lead Stroke Evaluation    Type & Screen    Insert Large-Bore Peripheral IV - RIGHT AC Preferred    CBC & Differential    Green Top (Gel)    Lavender Top    Gold Top - SST    Light Blue Top         PROCEDURES  Procedures      Critical care provider statement:    Critical care time (minutes): 50.   Critical care time was exclusive of:  Separately billable procedures and treating other patients   Critical care was necessary to treat or prevent imminent or life-threatening deterioration of the following conditions:  CNS Failure   Critical care was time spent personally by me on the following activities:  Development of treatment plan with patient or surrogate, discussions with consultants, evaluation of patient's response to treatment, examination of patient, obtaining history from patient or surrogate, ordering and performing treatments and interventions, ordering and review of laboratory studies, ordering and review of radiographic studies, pulse oximetry, re-evaluation of patient's condition and review of old charts. Critical Care indicators:        PROGRESS, DATA ANALYSIS, CONSULTS, AND MEDICAL DECISION MAKING  All labs have been independently interpreted by me.  All radiology studies have been reviewed by me. All EKG's have been independently viewed and interpreted by me.  Discussion below represents my analysis of pertinent findings related to patient's condition, differential diagnosis, treatment plan and final disposition.    Differential diagnosis includes but is not limited to intracranial hemorrhage, stroke, seizure, TIA.    Clinical Scores:                   ED Course as of 04/01/24 2248   Mon Apr 01, 2024 2158 I discussed with   Marcell with stroke neurology.  Team D process has already been initiated.  The patient is not on blood thinners and is normally functional.  He walks, talks, drives on his own.  He is going for CT imaging.  History obtained from son at the bedside [TR]   2222 The patient has an LVO.  They are activating OR for intervention. [TR]   2233 TNK has been given [TR]   2240 Discussing with Dr. Felipe with pulmonary ICU.  He agrees to admit. [TR]   2240 Discussing with Dr. Faith.  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  Request ICU admission. [TR]   2242 EKG          EKG time: 2240  Rhythm/Rate: Normal sinus, rate 61  P waves and NM: Normal P, prolonged NM  QRS, axis: Narrow QRS, normal axis  ST and T waves: No acute    Independently Interpreted by me  No prior EKG available for comparison   [TR]      ED Course User Index  [TR] Kleber Frank MD       MDM: Initiating our stroke process.  He is a tPA candidate and he is within the window.  We will evaluate for any cranial hemorrhage.  Will speak with stroke neurology.  He will require admission today.      COMPLEXITY OF CARE  The patient requires admission.    Please note that portions of this document were completed with a voice recognition program.    Note Disclaimer: At [facility], we believe that sharing information builds trust and better relationships. You are receiving this note because you recently visited [facility]. It is possible you will see health information before a provider has talked with you about it. This kind of information can be easy to misunderstand. To help you fully understand what it means for your health, we urge you to discuss this note with your provider.         Kleber Frank MD  04/01/24 8120

## 2024-04-02 NOTE — SIGNIFICANT NOTE
04/02/24 1213   OTHER   Discipline speech language pathologist   Rehab Time/Intention   Session Not Performed other (see comments)  (Discussed with RN. Patient not appropriate for swallow eval at this time. SLP to follow for eval readiness.)

## 2024-04-02 NOTE — ED PROVIDER NOTES
EMERGENCY DEPARTMENT ENCOUNTER  Room Number:  SARAH/SARAH  PCP: Jyothi Song, LUIS  Independent Historians: Family and EMS      HPI:  Chief Complaint: had concerns including Extremity Weakness.       A complete HPI/ROS/PMH/PSH/SH/FH are unobtainable due to: Altered Mental Status and Language barrier    Chronic or social conditions impacting patient care (Social Determinants of Health): Education (Literacy, Language, Early Childhood Education, Vocational Training, Higher Education)      Context: Alex Negron is a 79 y.o. male with a medical history of hypertension  who presents to the ED c/o acute left-sided weakness and slurred speech.  He arrives via EMS, speaks Brazilian, does not speak English.  Family arrived a short time after he did, his reported baseline is that he is active.  Lives with wife and son.  15 minutes prior to EMS arrival tonight family called because he acutely developed slurred speech, left-sided weakness, dropped something he was holding in his left hand.  He is not anticoagulated.  No history of stroke.  He did report a headache to family.    History and exam very difficult due to language barrier.      Review of prior external notes (non-ED) -and- Review of prior external test results outside of this encounter: Extensive [facility] EPIC system [facility] reveals no prior visit notes and no prior diagnostic studies available for review.        PAST MEDICAL HISTORY  Active Ambulatory Problems     Diagnosis Date Noted    No Active Ambulatory Problems     Resolved Ambulatory Problems     Diagnosis Date Noted    No Resolved Ambulatory Problems     No Additional Past Medical History         PAST SURGICAL HISTORY  No past surgical history on file.      FAMILY HISTORY  No family history on file.      SOCIAL HISTORY  Social History     Socioeconomic History    Marital status:          ALLERGIES  Patient has no known allergies.      REVIEW OF SYSTEMS  Review of  Systems  Included in HPI  All systems reviewed and negative except for those discussed in HPI.      PHYSICAL EXAM    I have reviewed the triage vital signs and nursing notes.    ED Triage Vitals [04/01/24 2155]   Temp Heart Rate Resp BP SpO2   -- 63 28 130/85 91 %      Temp src Heart Rate Source Patient Position BP Location FiO2 (%)   -- Monitor Lying Right arm --       Physical Exam  GENERAL: alert,  SKIN: Warm, dry  HENT: Normocephalic, atraumatic  EYES: no scleral icterus  CV: regular rhythm, regular rate  RESPIRATORY: normal effort, coarse lung sounds bilaterally  ABDOMEN: nondistended  MUSCULOSKELETAL: no deformity  NEURO: alert, oriented to self, aphasic, left-sided extremity weakness, left-sided neglect            LAB RESULTS  Recent Results (from the past 24 hour(s))   POC Glucose Once    Collection Time: 04/01/24 10:04 PM    Specimen: Blood   Result Value Ref Range    Glucose 101 70 - 130 mg/dL   Comprehensive Metabolic Panel    Collection Time: 04/01/24 10:06 PM    Specimen: Blood   Result Value Ref Range    Glucose 112 (H) 65 - 99 mg/dL    BUN 33 (H) 8 - 23 mg/dL    Creatinine 1.43 (H) 0.76 - 1.27 mg/dL    Sodium 140 136 - 145 mmol/L    Potassium 4.3 3.5 - 5.2 mmol/L    Chloride 104 98 - 107 mmol/L    CO2 24.0 22.0 - 29.0 mmol/L    Calcium 10.1 (H) 8.2 - 9.6 mg/dL    Total Protein 6.9 6.0 - 8.5 g/dL    Albumin 4.1 3.5 - 5.2 g/dL    ALT (SGPT) 24 1 - 41 U/L    AST (SGOT) 21 1 - 40 U/L    Alkaline Phosphatase 98 39 - 117 U/L    Total Bilirubin 0.2 0.0 - 1.2 mg/dL    Globulin 2.8 gm/dL    A/G Ratio 1.5 g/dL    BUN/Creatinine Ratio 23.1 7.0 - 25.0    Anion Gap 12.0 5.0 - 15.0 mmol/L    eGFR 26.1 (L) >60.0 mL/min/1.73   Protime-INR    Collection Time: 04/01/24 10:06 PM    Specimen: Blood   Result Value Ref Range    Protime 13.1 11.7 - 14.2 Seconds    INR 0.97 0.90 - 1.10   aPTT    Collection Time: 04/01/24 10:06 PM    Specimen: Blood   Result Value Ref Range    PTT 32.7 22.7 - 35.4 seconds   Single High  Sensitivity Troponin T    Collection Time: 04/01/24 10:06 PM    Specimen: Blood   Result Value Ref Range    HS Troponin T 12 <22 ng/L   Type & Screen    Collection Time: 04/01/24 10:06 PM    Specimen: Blood   Result Value Ref Range    ABO Type A     RH type Positive     Antibody Screen Negative     T&S Expiration Date 4/4/2024 11:59:59 PM    Green Top (Gel)    Collection Time: 04/01/24 10:06 PM   Result Value Ref Range    Extra Tube Hold for add-ons.    Lavender Top    Collection Time: 04/01/24 10:06 PM   Result Value Ref Range    Extra Tube hold for add-on    Gold Top - SST    Collection Time: 04/01/24 10:06 PM   Result Value Ref Range    Extra Tube Hold for add-ons.    Light Blue Top    Collection Time: 04/01/24 10:06 PM   Result Value Ref Range    Extra Tube Hold for add-ons.    CBC Auto Differential    Collection Time: 04/01/24 10:06 PM    Specimen: Blood   Result Value Ref Range    WBC 8.18 3.40 - 10.80 10*3/mm3    RBC 4.01 (L) 4.14 - 5.80 10*6/mm3    Hemoglobin 12.0 (L) 13.0 - 17.7 g/dL    Hematocrit 36.4 (L) 37.5 - 51.0 %    MCV 90.8 79.0 - 97.0 fL    MCH 29.9 26.6 - 33.0 pg    MCHC 33.0 31.5 - 35.7 g/dL    RDW 12.7 12.3 - 15.4 %    RDW-SD 41.7 37.0 - 54.0 fl    MPV 9.2 6.0 - 12.0 fL    Platelets 434 140 - 450 10*3/mm3    Neutrophil % 48.7 42.7 - 76.0 %    Lymphocyte % 37.4 19.6 - 45.3 %    Monocyte % 8.3 5.0 - 12.0 %    Eosinophil % 4.9 0.3 - 6.2 %    Basophil % 0.5 0.0 - 1.5 %    Immature Grans % 0.2 0.0 - 0.5 %    Neutrophils, Absolute 3.98 1.70 - 7.00 10*3/mm3    Lymphocytes, Absolute 3.06 0.70 - 3.10 10*3/mm3    Monocytes, Absolute 0.68 0.10 - 0.90 10*3/mm3    Eosinophils, Absolute 0.40 0.00 - 0.40 10*3/mm3    Basophils, Absolute 0.04 0.00 - 0.20 10*3/mm3    Immature Grans, Absolute 0.02 0.00 - 0.05 10*3/mm3    nRBC 0.0 0.0 - 0.2 /100 WBC         RADIOLOGY  XR Chest 1 View    Result Date: 4/1/2024  SINGLE VIEW OF THE CHEST  HISTORY: Acute stroke protocol  COMPARISON: None available.  FINDINGS: There is  cardiomegaly. There is no vascular congestion. There is calcification of the aorta. Bibasilar atelectasis versus scarring suspected. No pneumothorax or large effusion is seen.      Bibasilar atelectasis versus scarring.  This report was finalized on 4/1/2024 10:55 PM by Dr. Kristal Sanford M.D on Workstation: [id number]         MEDICATIONS GIVEN IN ER  Medications   sodium chloride 0.9 % flush 10 mL (has no administration in time range)   sodium chloride 0.9 % bolus 500 mL (has no administration in time range)   sodium chloride 0.9 % infusion (has no administration in time range)   nitroglycerin (NITROSTAT) SL tablet 0.4 mg (has no administration in time range)   sodium chloride 0.9 % flush 10 mL (has no administration in time range)   sodium chloride 0.9 % flush 10 mL (has no administration in time range)   sodium chloride 0.9 % infusion 40 mL (has no administration in time range)   sennosides-docusate (PERICOLACE) 8.6-50 MG per tablet 2 tablet (has no administration in time range)     And   polyethylene glycol (MIRALAX) packet 17 g (has no administration in time range)     And   bisacodyl (DULCOLAX) EC tablet 5 mg (has no administration in time range)     And   bisacodyl (DULCOLAX) suppository 10 mg (has no administration in time range)   iopamidol (ISOVUE-370) 76 % injection 50 mL (50 mL Intravenous Given 4/1/24 2220)   iopamidol (ISOVUE-370) 76 % injection 95 mL (95 mL Intravenous Given 4/1/24 2222)   sodium chloride 0.9 % flush 10 mL (10 mL Intravenous Given 4/1/24 2222)     Followed by   tenecteplase for stroke (TNKASE) injection 20 mg (20 mg Intravenous Given 4/1/24 2222)     Followed by   sodium chloride 0.9 % flush 10 mL (10 mL Intravenous Given 4/1/24 2222)         ORDERS PLACED DURING THIS VISIT:  Orders Placed This Encounter   Procedures    CT Angiogram Head w AI Analysis of LVO    CT Angiogram Neck    CT CEREBRAL PERFUSION WITH & WITHOUT CONTRAST    XR Chest 1 View    IR Perc Mech Thromb Prim Nonc  Art Ini    [facility] Draw    Comprehensive Metabolic Panel    Protime-INR    aPTT    Single High Sensitivity Troponin T    CBC Auto Differential    Basic Metabolic Panel    CBC Auto Differential    NPO Diet NPO Type: Strict NPO    Initiate Department's Acute Stroke Process (Team D, Code 19, etc.)    Perform NIH Stroke Scale    Measure Actual Weight    Notify Provider    Notify Provider for SBP Greater Than 140 for Hemorrhagic Stroke Patient    Head of Bed 30 Degrees or Less    Undress and Gown    Vital Signs    Neuro Checks    No Hypotonic Fluids    Nursing Dysphagia Screening (Complete Prior to Giving anything PO)    RN to Place Order SLP Consult (IF swallow screen failed) - Eval & Treat Choosing Reason of RN Dysphagia Screen Failed    Follow Department Guidelines - Notify Pharmacy of Thrombolytic Therapy Administration    NIHSS Assessment - Prior to Thrombolytic Administration    Neuro Checks Per Post-Thrombolytic Therapy Flowsheet    Notify Provider and Activate Thrombolytic Induced Angioedema Order Set (see comments)    Vital Signs Per Post-Thrombolytic Therapy Flowsheet    Maintain Blood Pressure Per Listed Parameters    No Arterial Punctures, IM Injections or Invasive Procedures For 24 Hours    No Anticoagulant / Antiplatelet Agents For 24 Hours    Vital Signs Every Hour and Per Hospital Policy Based on Patient Condition    Telemetry - Place Orders & Notify Provider of Results When Patient Experiences Acute Chest Pain, Dysrhythmia or Respiratory Distress    Continuous Pulse Oximetry    Height & Weight    Daily Weights    Intake & Output    Oral Care - Patient Not on NPPV & Not Intubated    Target Arousal Level RASS 0 to -1    Use Mobility Guidelines for Advancement of Activity    Saline Lock & Maintain IV Access    Place Sequential Compression Device    Maintain Sequential Compression Device    Code Status and Medical Interventions:    Inpatient Neurology Consult Stroke    Inpatient Neurology Consult Stroke     Pulmonology (on-call MD unless specified)    Oxygen Therapy- Nasal Cannula; Titrate 1-6 LPM Per SpO2; 90 - 95%    POC Glucose Once    POC Glucose Once    ECG 12 Lead Stroke Evaluation    Type & Screen    Insert Large-Bore Peripheral IV - RIGHT AC Preferred    Insert Peripheral IV    Inpatient Admission    CBC & Differential    Green Top (Gel)    Lavender Top    Gold Top - SST    Light Blue Top    CBC & Differential         OUTPATIENT MEDICATION MANAGEMENT:  Current Facility-Administered Medications Ordered in Epic   Medication Dose Route Frequency Provider Last Rate Last Admin    sennosides-docusate (PERICOLACE) 8.6-50 MG per tablet 2 tablet  2 tablet Oral BID Shiv Felipe MD        And    polyethylene glycol (MIRALAX) packet 17 g  17 g Oral Daily PRN Shiv Felipe MD        And    bisacodyl (DULCOLAX) EC tablet 5 mg  5 mg Oral Daily PRN Shiv Felipe MD        And    bisacodyl (DULCOLAX) suppository 10 mg  10 mg Rectal Daily PRN Shiv Felipe MD        nitroglycerin (NITROSTAT) SL tablet 0.4 mg  0.4 mg Sublingual Q5 Min PRN Shiv Felipe MD        sodium chloride 0.9 % bolus 500 mL  500 mL Intravenous Once Kleber Frank MD        sodium chloride 0.9 % flush 10 mL  10 mL Intravenous PRN Kleber Frank MD        sodium chloride 0.9 % flush 10 mL  10 mL Intravenous Q12H Shiv Felipe MD        sodium chloride 0.9 % flush 10 mL  10 mL Intravenous PRN Shiv Felipe MD        sodium chloride 0.9 % infusion 40 mL  40 mL Intravenous PRN Shiv Felipe MD        sodium chloride 0.9 % infusion  75 mL/hr Intravenous Continuous Shiv Felipe MD         No current [facility]-ordered outpatient medications on file.         PROCEDURES  Procedures            PROGRESS, DATA ANALYSIS, CONSULTS, AND MEDICAL DECISION MAKING  All labs have been independently interpreted by me.  All radiology studies have been reviewed by me. All EKG's have been independently viewed and interpreted by me.  Discussion below represents my  analysis of pertinent findings related to patient's condition, differential diagnosis, treatment plan and final disposition.    DIFFERENTIAL      Differential diagnosis for altered mental status includes but is not limited to:  - vital sign abnormalities such as HTN encephalopathy, hypotension, hypoxemia, hypercarbia, heat stroke  - toxic/metabolic pathology such as hypoglycemia, DKA, hypo/hyper-natremia, thyroid storm, myxedema coma, medication side effect (either intentional or accidental)  - infectious etiology  - intracranial pathology such as stroke, seizure, intracranial mass, intracranial hemorrhage  - psychiatric pathology        Clinical Scores:           Total (NIH Stroke Scale): 23      ED Course as of 04/01/24 2324   Mon Apr 01, 2024 2158 I discussed with Dr. Faith with stroke neurology.  Team D process has already been initiated.  The patient is not on blood thinners and is normally functional.  He walks, talks, drives on his own.  He is going for CT imaging.  History obtained from son at the bedside [TR]   2222 The patient has an LVO.  They are activating OR for intervention. [TR]   2233 TNK has been given [TR]   2240 Discussing with Dr. Felipe with pulmonary ICU.  He agrees to admit. [TR]   2240 Discussing with Dr. Faith.  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  Request ICU admission. [TR]   2242 EKG          EKG time: 2240  Rhythm/Rate: Normal sinus, rate 61  P waves and ND: Normal P, prolonged ND  QRS, axis: Narrow QRS, normal axis  ST and T waves: No acute    Independently Interpreted by me  No prior EKG available for comparison   [TR]      ED Course User Index  [TR] Kleber Frank MD       Patient was taken to the OR for attempted thrombectomy.      AS OF 23:24 EDT VITALS:    BP - 162/85  HR - 62  TEMP -    O2 SATS - 92%    COMPLEXITY OF CARE  The patient requires admission.      DIAGNOSIS  Final diagnoses:   Cerebrovascular accident (CVA), unspecified mechanism          DISPOSITION  ED Disposition       ED Disposition   Decision to Admit    Condition   --    Comment   Level of Care: Critical Care [6]   Diagnosis: Acute CVA (cerebrovascular accident) [[id number]]   Certification: I Certify That Inpatient Hospital Services Are Medically Necessary For Greater Than 2 Midnights                    FOLLOW UP  No follow-up provider specified.            Please note that portions of this document were completed with a voice recognition program.    Note Disclaimer: At [facility], we believe that sharing information builds trust and better relationships. You are receiving this note because you recently visited [facility]. It is possible you will see health information before a provider has talked with you about it. This kind of information can be easy to misunderstand. To help you fully understand what it means for your health, we urge you to discuss this note with your provider.         Cassia Morales PA-C  04/01/24 1390

## 2024-04-02 NOTE — PROGRESS NOTES
[facility] INPATIENT PROGRESS NOTE         [facility] INTENSIVE CARE    2024      PATIENT IDENTIFICATION:  Name: Alex Negron ADMIT: 2024   : 1945  PCP: Jyothi Song PA-C    MRN: [id number] LOS: 1 days   AGE/SEX: 79 y.o. male  ROOM: [facility]                     LOS 1    Reason for visit: Acute stroke      SUBJECTIVE:      Able to follow commands right.  Dysarthric no with new issues overnight.  Plan for MRI noted.  On integrilin and Cardene drip. I am seeing the patient for the first time today.  All patient problems are new to me.      Objective   OBJECTIVE:    Vital Sign Min/Max for last 24 hours  Temp  Min: 94.5 °F (34.7 °C)  Max: 100 °F (37.8 °C)   BP  Min: 108/68  Max: 162/85   Pulse  Min: 58  Max: 109   Resp  Min: 18  Max: 28   SpO2  Min: 87 %  Max: 100 %   No data recorded   Weight  Min: 81.7 kg (180 lb 3.2 oz)  Max: 84.5 kg (186 lb 4.6 oz)    Vitals:    24 0700 24 0730 24 0800 24 0830   BP: 140/65 131/67 134/66 131/70   Pulse: 80 81 81 78   Resp:       Temp:       TempSrc:       SpO2: 95% 93% 94% 91%   Weight:       Height:                24  2159 24  0418   Weight: 81.7 kg (180 lb 3.2 oz) 84.5 kg (186 lb 4.6 oz)       Body mass index is 25.98 kg/m².                          Body mass index is 25.98 kg/m².    Intake/Output Summary (Last 24 hours) at 2024 0938  Last data filed at 2024 0540  Gross per 24 hour   Intake 1184.99 ml   Output 350 ml   Net 834.99 ml         Exam:  GEN:  No distress, appears stated age  EYES:   PERRL, anicteric sclerae  ENT:    External ears/nose normal, OP clear  NECK:  No adenopathy, midline trachea  LUNGS: Normal chest on inspection, palpation and auscultation  CV:  Normal S1S2, without murmur  ABD:  Nontender, nondistended, no hepatosplenomegaly, +BS  EXT:  No edema.  No cyanosis or clubbing.  No mottling and normal cap refill.    Assessment     Scheduled meds:  [START ON 4/3/2024] aspirin, 325 mg, Oral,  Daily   Or  [START ON 4/3/2024] aspirin, 300 mg, Rectal, Daily  atorvastatin, 80 mg, Oral, Nightly  gadobenate dimeglumine, 17 mL, Intravenous, Once in imaging  senna-docusate sodium, 2 tablet, Oral, BID  sodium chloride, 500 mL, Intravenous, Once  sodium chloride, 10 mL, Intravenous, Q12H      IV meds:                      eptifibatide, 6.5 mL/hr, Last Rate: 6.5 mL/hr (04/02/24 0313)  niCARdipine, 5-15 mg/hr, Last Rate: 5 mg/hr (04/02/24 0650)  sodium chloride, 75 mL/hr, Last Rate: 75 mL/hr (04/02/24 0226)      Data Review:  Results from last 7 days   Lab Units 04/02/24 0333 04/01/24 2206   SODIUM mmol/L 138 140   POTASSIUM mmol/L 4.6 4.3   CHLORIDE mmol/L 102 104   CO2 mmol/L 22.5 24.0   BUN mg/dL 28* 33*   CREATININE mg/dL 1.34* 1.43*   GLUCOSE mg/dL 177* 112*   CALCIUM mg/dL 9.1 10.1*         Estimated Creatinine Clearance: 55.2 mL/min (A) (by C-G formula based on SCr of 1.34 mg/dL (H)).  Results from last 7 days   Lab Units 04/02/24 0333 04/01/24 2206   WBC 10*3/mm3 11.73* 8.18   HEMOGLOBIN g/dL 11.1* 12.0*   PLATELETS 10*3/mm3 343 434     Results from last 7 days   Lab Units 04/01/24 2206   INR  0.97     Results from last 7 days   Lab Units 04/01/24 2206   ALT (SGPT) U/L 24   AST (SGOT) U/L 21     Results from last 7 days   Lab Units 04/02/24  0251   PH, ARTERIAL pH units 7.343*   PO2 ART mm Hg 77.8*   PCO2, ARTERIAL mm Hg 44.7   HCO3 ART mmol/L 24.3             Hemoglobin A1C   Date/Time Value Ref Range Status   04/02/2024 0333 6.20 (H) 4.80 - 5.60 % Final     Glucose   Date/Time Value Ref Range Status   04/02/2024 0204 150 (H) 70 - 130 mg/dL Final   04/01/2024 2204 101 70 - 130 mg/dL Final         Imaging reviewed  Chest x-ray 4/1 shows bibasilar atelectasis    CT head 4/1 reviewed  1. No acute intracranial hemorrhage. However, the patient has a  hyperdense right MCA.  2. Area of cerebral blood flow less than 30% within the right MCA  territory measuring 55 cc, with corresponding Tmax greater than  6  seconds, measuring up to 173 cc.  3. 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 The  patient has some faint opacification of the right M1, although there is  thrombus identified within it. There is opacification of the right M2  branches.  4. Marked irregularity of the intracranial distal right vertebral  artery, which may reflect dissection, as well as a bulbous outpouching  which may represent pseudoaneurysm.              Active Hospital Problems    Diagnosis  POA    **Acute CVA (cerebrovascular accident) [I63.9]  Yes    Right-sided extracranial carotid artery stenosis [I65.21]  Yes      Resolved Hospital Problems   No resolved problems to display.         ASSESSMENT:  Acute CVA with right carotid occlusion: Status post tPA and right carotid stent  Left-sided hemiparesis  Acute renal insufficiency  Elevated blood pressure      PLAN:  Has shown some improvement neurologically.  Continue neurochecks per protocol.  Goal blood pressure systolic less than 140.  On Cardene drip.  Aspirin and statin for secondary stroke prevention.  On Integrilin drip.  Plan for MRI.  Discussed with nursing staff at bedside.      CCT: 35 min    Martin Garnett MD  Pulmonary and Critical Care Medicine  [facility], [facility]  4/2/2024    09:38 EDT

## 2024-04-02 NOTE — SIGNIFICANT NOTE
04/02/24 1313   OTHER   Discipline occupational therapist   Rehab Time/Intention   Session Not Performed other (see comments)  (Bedrest orders in chart, will f/u tomorrow pending clearance for OT eval.)   Recommendation   OT - Next Appointment 04/03/24

## 2024-04-02 NOTE — CONSULTS
"Nutrition Services    Patient Name:  Alex Negron  YOB: 1945  MRN: [id number]  Admit Date:  4/1/2024    Assessment Date:  04/02/24    Comment: Nutrition assessment initiated due to TF/Cortrak consult. Pt was admitted with Acute CVA, now S/P ALEA Origin Stent with distal protection after M1 and A2 Mechanical Thrombectomy, L sided hemiparesis. Not safe for po intake, ND Cortrak placed with daughter at bedside to help give him directions in his native language. Labs, meds, skin reviewed. Glucoses are elevated (150-187), Hgba1c 6.2    Plan/Recommendations:  Begin TF's with Diabetisource AC at 25mL/hr, adv as tolerates to goal of 70m/hr  Diet per SLP when appropriate  May need some SSI coverage    Will follow clinical course, nutrition needs.    CLINICAL NUTRITION ASSESSMENT      Reason for Assessment Cortrak Placement, Physician Consult, TF Assessment    Diagnosis/Problem Acute CVA, now S/P ALEA Origin Stent with distal protection after M1 and A2 Mechanical Thrombectomy, L sided hemiparesis   Medical & Surgical Hx History reviewed. No pertinent past medical history.    History reviewed. No pertinent surgical history.     Current Problems dysphagia     Encounter Information        Nutrition History    Food Preferences    Supplements    Factors Affecting Intake altered mental status, swallow impairment   Tests/Procedures CT scan, MRI, X-Ray     Anthropometrics        Current Height   Current Weight  BMI kg/m2 Height: 180.3 cm (71\")  Weight: 84.5 kg (186 lb 4.6 oz) (04/02/24 0418)  Body mass index is 25.98 kg/m².     Adj BMI (if applicable)    BMI Category Overweight (25 - 29.9)       Admission Weight 81.7kg   Ideal Body Weight (IBW) 166lb     Adj IBW (if applicable)    Usual Body Weight (UBW) unknown   Weight Change/Trend Unknown       Weight history: Wt Readings from Last 30 Encounters:   04/02/24 0418 84.5 kg (186 lb 4.6 oz)   04/01/24 2159 81.7 kg (180 lb 3.2 oz)        Estimated/Assessed Needs      " "  Energy Requirements    Weight for Calculation 81.7kg   Method for Estimation  25 kcal/kg   EST Needs (kcal/day) 2042       Protein Requirements    Weight for Calculation 81.7kg   EST Protein Needs (g/kg) 1.0 - 1.2 gm/kg   EST Daily Needs (g/day) 82-98       Fluid Requirements     Method for Estimation 1 mL/kcal    Estimated Needs (mL/day)        Fluid Deficit    Current Na Level (mEq/L)    Desired Na Level (mEq/L)    Estimated Fluid Deficit (L)       Labs        Pertinent Labs    Results from last 7 days   Lab Units 04/02/24  0333 04/01/24  2206   SODIUM mmol/L 138 140   POTASSIUM mmol/L 4.6 4.3   CHLORIDE mmol/L 102 104   CO2 mmol/L 22.5 24.0   BUN mg/dL 28* 33*   CREATININE mg/dL 1.34* 1.43*   CALCIUM mg/dL 9.1 10.1*   BILIRUBIN mg/dL  --  0.2   ALK PHOS U/L  --  98   ALT (SGPT) U/L  --  24   AST (SGOT) U/L  --  21   GLUCOSE mg/dL 177* 112*     Results from last 7 days   Lab Units 04/02/24  0333 04/01/24  2206   HEMOGLOBIN g/dL 11.1* 12.0*   HEMATOCRIT % 33.8* 36.4*   WBC 10*3/mm3 11.73* 8.18   TRIGLYCERIDES mg/dL 38  --    ALBUMIN g/dL  --  4.1     Results from last 7 days   Lab Units 04/02/24 0333 04/01/24  2206   INR   --  0.97   APTT seconds  --  32.7   PLATELETS 10*3/mm3 343 434     No results found for: \"COVID19\"  Lab Results   Component Value Date    HGBA1C 6.20 (H) 04/02/2024          Medications            Scheduled Medications aspirin, 325 mg, Oral, Daily   Or  aspirin, 300 mg, Rectal, Daily  atorvastatin, 80 mg, Oral, Nightly  clopidogrel, 300 mg, Oral, Once  [START ON 4/3/2024] clopidogrel, 75 mg, Oral, Daily  senna-docusate sodium, 2 tablet, Oral, BID  sodium chloride, 500 mL, Intravenous, Once  sodium chloride, 10 mL, Intravenous, Q12H        Infusions niCARdipine, 5-15 mg/hr, Last Rate: 5 mg/hr (04/02/24 0650)  sodium chloride, 75 mL/hr, Last Rate: 75 mL/hr (04/02/24 0226)        PRN Medications   senna-docusate sodium **AND** polyethylene glycol **AND** bisacodyl **AND** bisacodyl    Calcium " Replacement - Follow Nurse / BPA Driven Protocol    diphenhydrAMINE    droperidol **OR** droperidol    ePHEDrine    fentanyl    flumazenil    hydrALAZINE    ipratropium-albuterol    labetalol    Magnesium Standard Dose Replacement - Follow Nurse / BPA Driven Protocol    naloxone    nitroglycerin    Phosphorus Replacement - Follow Nurse / BPA Driven Protocol    Potassium Replacement - Follow Nurse / BPA Driven Protocol    promethazine **OR** promethazine    sodium chloride    sodium chloride    sodium chloride     Physical Findings          Physical Appearance disoriented, hemiparesis , on oxygen therapy, somnolent   Oral/Mouth Cavity tooth or teeth missing   Edema  not assessed   Gastrointestinal hypoactive bowel sounds, last bowel movement: pending   Skin  bruising   Tubes/Drains/Lines Cortrak, ND tube, bridle in place   NFPE No clinical signs of muscle wasting or fat loss   --  Current Nutrition Orders & Evaluation of Intake       Oral Nutrition     Food Allergies NKFA   Current PO Diet NPO Diet NPO Type: Tube Feeding   Supplement    PO Evaluation     Trending % PO Intake    --  Nutrition Diagnosis        Nutrition Dx Problem 1 Problem: Inadequate Oral Intake  Etiology: Medical Diagnosis - CVA  Dysphagia  Signs/Symptoms: NPO    Comment:      INTERVENTION / PLAN OF CARE  Intervention Goal        Intervention Goal(s) Maintain nutrition status, Meet estimated needs, Disease management/therapy, Initiate feeding/diet, Initiate TF/PN, Transition TF to PO, and Maintain weight     Nutrition Intervention        RD Action Continue to monitor, Care plan reviewed, and Recommend/order: EN     Prescription         Diet     Supplement/Snack    EN/PN     Prescription Ordered       Enteral Prescription:     Enteral Route ND    TF Delivery Method Continuous    Enteral Product Diabetisource AC    Modular None    Propofol Rate/Kcal     TF Start Rate 20    TF Goal Rate 70    Free Water Flush 30mL q 4 hr    TF Provision at Goal: 2016  kcal, 101 gm protein, 1377 mL free water + 180 mL water flushes         Calories 99 % needs met         Protein  102 % needs met         Fluid (mL) 1557    Prescription Ordered Yes     Monitor/Evaluation        Monitor Per protocol   Discharge Plan Pending clinical course   Education Will instruct as appropriate     RD to follow per protocol.       Electronically signed by:  Lise Song RD  04/02/24 13:51 EDT

## 2024-04-02 NOTE — PROGRESS NOTES
[facility] Acute Inpt Rehab Note     Referral received via stroke order set.  Please note this is a screening only, rehab admissions will not actively be evaluating this patient.  If felt patient is appropriate for our services once therapies begin, please call our office at [phone], to initiate a full referral.    Thank you,   Rizwana Serrato RN   Rehab Admission Nurse

## 2024-04-02 NOTE — ANESTHESIA PROCEDURE NOTES
Airway  Urgency: elective    Date/Time: 4/1/2024 11:13 PM  Airway not difficult    General Information and Staff    Patient location during procedure: OR  Anesthesiologist: Triston Andrade MD  CRNA/CAA: Jennie Rice CRNA    Indications and Patient Condition  Indications for airway management: airway protection    Preoxygenated: yes  Mask difficulty assessment: 1 - vent by mask    Final Airway Details  Final airway type: endotracheal airway      Successful airway: ETT  Cuffed: yes   Successful intubation technique: direct laryngoscopy  Facilitating devices/methods: intubating stylet  Endotracheal tube insertion site: oral  Blade: Serrato  Blade size: 2  ETT size (mm): 7.5  Cormack-Lehane Classification: grade I - full view of glottis  Placement verified by: chest auscultation and capnometry   Cuff volume (mL): 7  Measured from: gums  ETT/EBT to gums (cm): 21  Number of attempts at approach: 1  Assessment: lips, teeth, and gum same as pre-op and atraumatic intubation

## 2024-04-02 NOTE — PROGRESS NOTES
[facility] NEUROSURGERY INTRACRANIAL PROGRESS NOTE    PATIENT IDENTIFICATION:   Name:  Alex Negron      MRN:  [id number]     79 y.o.  male               Cc: POD # 1 s/p ALEA Origin Stent with distal protection after M1 and A2 Mechanical Thrombectomy       Subjective     Kosovan  used at bedside    Interval History: No significant overnight events.  Patient does report intermittent headache.      Objective     Vital signs in last 24 hours:  Temp:  [94.5 °F (34.7 °C)-100 °F (37.8 °C)] 99.1 °F (37.3 °C)  Heart Rate:  [] 85  Resp:  [16-28] 16  BP: (108-162)/(59-92) 141/59      Intake/Output this shift:  I/O this shift:  In: -   Out: 400 [Urine:400]      Intake/Output last 3 shifts:  I/O last 3 completed shifts:  In: 1185 [I.V.:1185]  Out: 350 [Urine:350]    LABS:  Results from last 7 days   Lab Units 04/02/24  0333 04/01/24  2206   WBC 10*3/mm3 11.73* 8.18   HEMOGLOBIN g/dL 11.1* 12.0*   HEMATOCRIT % 33.8* 36.4*   PLATELETS 10*3/mm3 343 434     Results from last 7 days   Lab Units 04/02/24  0333 04/01/24  2206   SODIUM mmol/L 138 140   POTASSIUM mmol/L 4.6 4.3   CHLORIDE mmol/L 102 104   CO2 mmol/L 22.5 24.0   BUN mg/dL 28* 33*   CREATININE mg/dL 1.34* 1.43*   CALCIUM mg/dL 9.1 10.1*   BILIRUBIN mg/dL  --  0.2   ALK PHOS U/L  --  98   ALT (SGPT) U/L  --  24   AST (SGOT) U/L  --  21   GLUCOSE mg/dL 177* 112*          IMAGING STUDIES:  MRI Brain:  IMPRESSION:  Multiple acute infarcts are appreciated, the largest of  which involves the parietal occipital and occipital temporal region on  the right posterolaterally. Smaller acute infarcts are appreciated  involving the head of the caudate nucleus on the right involving the  medial aspect of the thalamus on the right. There is mild-to-moderate  enhancement related to the infarcts noted above. Smaller infarcts  without enhancement are appreciated involving the right cerebellar  hemisphere posteriorly, medial aspect of the left cerebellar hemisphere  and  "the posterior and medial aspects of the left temporal lobe.    I personally viewed the patient's chart, it was also reviewed by and discussed with Dr Rubio    Meds reviewed/changed: Yes  Lipitor 80 mg p.o. nightly  Cardene drip    Physical Exam:    General:  Awake, alert & oriented x 3.  Patient speaks Mosotho,   CN III, IV, VI:  PERRL, right gaze preference  CN VII: Left-sided facial droop  Motor: Moves right side spontaneously, withdraws to pain to the left upper and lower extremity  Station & Gait: Bedrest  Extremities:  Wearing SCD's  Skin:  Right groin site-dressing CDI, soft, no bruising or hematoma noted      Assessment & Plan     ASSESSMENT:      Acute CVA (cerebrovascular accident)    Right-sided extracranial carotid artery stenosis    POD # 1 s/p ALEA Origin Stent with distal protection after M1 and A2 Mechanical Thrombectomy.  Mosotho  used, patient has complained of intermittent headache but otherwise appears to be doing well.  Does have left-sided weakness.  Neurology following for stroke.    PLAN:   -Medical management per ICU  -Patient will need office follow-up in 6 months with Dr. Rubio with a carotid Doppler at that time    I discussed the patient's findings and my recommendations with patient, nursing staff, and Dr. Rubio    During patient visit, I utilized appropriate personal protective equipment including  gloves.  Appropriate PPE was worn during the entire visit.  Hand hygiene was completed before and after.      LOS: 1 day       Kristina Urena, APRN  4/2/2024  10:24 EDT    \"Dictated utilizing Dragon dictation\".      "

## 2024-04-02 NOTE — ED NOTES
Pt taken to IR via stretcher, O2 and monitor. Family members updated on status. Some minor movement noted to pt's LLE, but still cannot move it on command. No admission assignment for pt at this time.

## 2024-04-02 NOTE — ANESTHESIA POSTPROCEDURE EVALUATION
Patient: Alex Negron    Procedure Summary       Date: 04/01/24 Room / Location: [facility] INTERVENTIONAL    Anesthesia Start: 2305 Anesthesia Stop: 04/02/24 0212    Procedure: IR PERC MECH THROMB PRIM NONC ART INI Diagnosis: (Emergent stroke)    Scheduled Providers:  Provider: Triston Andrade MD    Anesthesia Type: general ASA Status: 4 - Emergent            Anesthesia Type: general    Vitals  Vitals Value Taken Time   /70 04/02/24 0346   Temp 35.7 °C (96.26 °F) 04/02/24 0348   Pulse 94 04/02/24 0348   Resp 24 04/02/24 0245   SpO2 98 % 04/02/24 0348   Vitals shown include unfiled device data.        Post Anesthesia Care and Evaluation    Patient location during evaluation: bedside  Pain management: adequate    Airway patency: patent  Anesthetic complications: No anesthetic complications    Cardiovascular status: acceptable  Respiratory status: acceptable  Hydration status: acceptable

## 2024-04-02 NOTE — BRIEF OP NOTE
Progress Note    Alex Negron      Pre-op Diagnosis:   Acute Stroke       Post-Op Diagnosis Codes:  ALEA Occlusion with underlying Carotid Artery Stenosis    Procedure/CPT® Codes:    ALEA Origin Stent with distal protection after M1 and A2 Mechanical Thrombectomy    Anesthesia: General ETT Anesthesia    Staff:   Radiology Nurse: Dipika Mireles RN  Radiologist: Dago Rubio MD  Vascular Radiology Technician: Bayron Guadalupe         Estimated Blood Loss: 200ml    Urine Voided: * No values recorded between 4/1/2024 12:00 AM and 4/2/2024  1:44 AM *    Specimens:                None          Drains: * No LDAs found *    Findings: Occluded ALEA origin with underlying 85% stenosis. Occluded distal M1 and A2 segments with successful MT and TICI 2c flow achieved. Integrilin drip started after bolus for stent placement. Official report to follow.    Complications: None encountered.          Dago Rubio MD     Date: 4/2/2024  Time: 01:44 EDT

## 2024-04-02 NOTE — CONSULTS
"Neurology Consult Note    Consult Date: 4/2/2024    Referring MD: Dr. Felipe    Reason for Consult I have been asked to see the patient in neurological consultation to render advice and opinion regarding stroke    Alex Negron is a 79 y.o. male refugee from [facility] with no significant medical history who presented to the emergency department yesterday after a witnessed onset of right MCA syndrome.  His daughter reports that he collapsed with left-sided weakness.  He was brought to the emergency department and underwent team D imaging with CT head, CT perfusion and CTA.  The studies revealed a right proximal internal carotid artery occlusion with terminal ICA occlusion extending into the middle cerebral artery.  Perfusion showed approximately 55 cc core infarct.  Patient received tenecteplase.  He underwent endovascular intervention with Dr. Rubio who placed a right carotid stent cleared the ICA and MCA with TICI 3C flow.  Postoperatively he was extubated.  This morning he has persistent neglect and left-sided weakness.    History reviewed. No pertinent past medical history.    Exam  /66   Pulse 81   Temp 99.3 °F (37.4 °C) (Rectal)   Resp 24   Ht 180.3 cm (71\")   Wt 84.5 kg (186 lb 4.6 oz)   SpO2 94%   BMI 25.98 kg/m²   Gen: NAD, vitals reviewed  MS: Arouses to voice, left neglect, severe  CN: Pupils 3 mm, reactive, right gaze preference, left facial droop to moderate dysarthria  Motor: Moving right side spontaneously, trace withdrawal to pain left upper extremity, triple flexion to pain left lower extremity  Reflexes: Right plantar downgoing, left plantar upgoing with triple flexion    DATA:    Lab Results   Component Value Date    GLUCOSE 177 (H) 04/02/2024    CALCIUM 9.1 04/02/2024     04/02/2024    K 4.6 04/02/2024    CO2 22.5 04/02/2024     04/02/2024    BUN 28 (H) 04/02/2024    CREATININE 1.34 (H) 04/02/2024    BCR 20.9 04/02/2024    ANIONGAP 13.5 04/02/2024     Lab Results "   Component Value Date    WBC 11.73 (H) 04/02/2024    HGB 11.1 (L) 04/02/2024    HCT 33.8 (L) 04/02/2024    MCV 90.9 04/02/2024     04/02/2024       Lab review: Creatinine 1.34, WBC 11.7 hemoglobin 11.2    Imaging review: I personally reviewed his CT head, CTA and CT perfusion performed last night as discussed above and discussed the studies with the reading radiologist.    Diagnoses:  Stroke due to right carotid occlusion  Received tenecteplase in the emergency department  Status post right carotid stent    Pre-stroke MRS: 0  NIHSS: 12    Comment: Right carotid occlusion with tandem lesion in the MCA status post thrombectomy and stenting.  Neurologically severely affected but much improved compared to initial presentation.    PLAN:  BP goal less than 140 systolic  Stat MRI this morning  Depending on MRI results consideration of transitioning from Integrilin to DAPT    High risk    Addendum: MRI reviewed. Predominantly right temporal stroke. No significant ICH. Ok to stop Integrilin and start Plavix 300mg and . Check P2Y12 tonight 8 hours after plavix administration.

## 2024-04-02 NOTE — ED NOTES
Nursing report ED to floor  Alex Negron  79 y.o.  male    HPI :  HPI (Adult)  Stated Reason for Visit: Called EMS for pt slurring works and having left sided weakness starting at approx 2015 tonight.  Pt is [place]e speaking and not able to follow any commands for EMS  History Obtained From: EMS  Duration (Hours): 1  Medications/Treatments Prior to Arrival: none    Chief Complaint  Chief Complaint   Patient presents with    Extremity Weakness       Admitting doctor:   No admitting provider for patient encounter.    Admitting diagnosis:   The encounter diagnosis was Cerebrovascular accident (CVA), unspecified mechanism.    Code status:   Current Code Status       Date Active Code Status Order ID Comments User Context       4/1/2024 2308 CPR (Attempt to Resuscitate) [id number]  Shiv Felipe MD ED        Question Answer    Code Status (Patient has no pulse and is not breathing) CPR (Attempt to Resuscitate)    Medical Interventions (Patient has pulse or is breathing) Full Support                    Allergies:   Patient has no known allergies.    Isolation:   No active isolations    Intake and Output  No intake or output data in the 24 hours ending 04/01/24 2310    Weight:       04/01/24 2159   Weight: 81.7 kg (180 lb 3.2 oz)       Most recent vitals:   Vitals:    04/01/24 2255 04/01/24 2256 04/01/24 2300 04/01/24 2301   BP:  124/84  162/85   BP Location:       Patient Position:       Pulse: 58 59 70 62   Resp:       SpO2: 95% (!) 87% 94% 92%   Weight:           Active LDAs/IV Access:   Lines, Drains & Airways       Active LDAs       Name Placement date Placement time Site Days    Peripheral IV 04/01/24 2158 Right Antecubital 04/01/24 2158  Antecubital  less than 1    Peripheral IV 04/01/24 2158 Anterior;Left Forearm 04/01/24 2158  Forearm  less than 1                    Labs (abnormal labs have a star):   Labs Reviewed   COMPREHENSIVE METABOLIC PANEL - Abnormal; Notable for the following components:        Result Value    Glucose 112 (*)     BUN 33 (*)     Creatinine 1.43 (*)     Calcium 10.1 (*)     eGFR 26.1 (*)     All other components within normal limits    Narrative:     GFR Normal >60  Chronic Kidney Disease <60  Kidney Failure <15    The GFR formula is only valid for adults with stable renal function between ages 18 and 70.   CBC WITH AUTO DIFFERENTIAL - Abnormal; Notable for the following components:    RBC 4.01 (*)     Hemoglobin 12.0 (*)     Hematocrit 36.4 (*)     All other components within normal limits   PROTIME-INR - Normal   APTT - Normal   SINGLE HS TROPONIN T - Normal    Narrative:     High Sensitive Troponin T Reference Range:  <14.0 ng/L- Negative Female for AMI  <22.0 ng/L- Negative Male for AMI  >=14 - Abnormal Female indicating possible myocardial injury.  >=22 - Abnormal Male indicating possible myocardial injury.   Clinicians would have to utilize clinical acumen, EKG, Troponin, and serial changes to determine if it is an Acute Myocardial Infarction or myocardial injury due to an underlying chronic condition.        POCT GLUCOSE FINGERSTICK - Normal   RAINBOW DRAW    Narrative:     The following orders were created for panel order [place] Draw.  Procedure                               Abnormality         Status                     ---------                               -----------         ------                     Green Top (Gel)[[id number]]                                  In process                 Lavender Top[[id number]]                                     In process                 Gold Top - SST[[id number]]                                   In process                 Light Blue Top[[id number]]                                   In process                   Please view results for these tests on the individual orders.   BASIC METABOLIC PANEL   BASIC METABOLIC PANEL   CBC WITH AUTO DIFFERENTIAL   POCT GLUCOSE FINGERSTICK   TYPE AND SCREEN   CBC AND DIFFERENTIAL    Narrative:     The  following orders were created for panel order CBC & Differential.  Procedure                               Abnormality         Status                     ---------                               -----------         ------                     CBC Auto Differential[[id number]]        Abnormal            Final result                 Please view results for these tests on the individual orders.   GREEN TOP   LAVENDER TOP   GOLD TOP - SST   LIGHT BLUE TOP   CBC AND DIFFERENTIAL    Narrative:     The following orders were created for panel order CBC & Differential.  Procedure                               Abnormality         Status                     ---------                               -----------         ------                     CBC Auto Differential[[id number]]                                                         Please view results for these tests on the individual orders.       EKG:   ECG 12 Lead Stroke Evaluation    (Results Pending)       Meds given in ED:   Medications   sodium chloride 0.9 % flush 10 mL (has no administration in time range)   sodium chloride 0.9 % bolus 500 mL (has no administration in time range)   sodium chloride 0.9 % infusion (has no administration in time range)   nitroglycerin (NITROSTAT) SL tablet 0.4 mg (has no administration in time range)   sodium chloride 0.9 % flush 10 mL (has no administration in time range)   sodium chloride 0.9 % flush 10 mL (has no administration in time range)   sodium chloride 0.9 % infusion 40 mL (has no administration in time range)   sennosides-docusate (PERICOLACE) 8.6-50 MG per tablet 2 tablet (has no administration in time range)     And   polyethylene glycol (MIRALAX) packet 17 g (has no administration in time range)     And   bisacodyl (DULCOLAX) EC tablet 5 mg (has no administration in time range)     And   bisacodyl (DULCOLAX) suppository 10 mg (has no administration in time range)   iopamidol (ISOVUE-370) 76 % injection 50 mL (50 mL Intravenous  Given 4/1/24 2220)   iopamidol (ISOVUE-370) 76 % injection 95 mL (95 mL Intravenous Given 4/1/24 2222)   sodium chloride 0.9 % flush 10 mL (10 mL Intravenous Given 4/1/24 2222)     Followed by   tenecteplase for stroke (TNKASE) injection 20 mg (20 mg Intravenous Given 4/1/24 2222)     Followed by   sodium chloride 0.9 % flush 10 mL (10 mL Intravenous Given 4/1/24 2222)       Imaging results:  XR Chest 1 View    Result Date: 4/1/2024  Bibasilar atelectasis versus scarring.  This report was finalized on 4/1/2024 10:55 PM by Dr. Kristal Sanford M.D on Workstation: [id number]       Ambulatory status:   - bedrest    Social issues:   Social History     Socioeconomic History    Marital status:        Peripheral Neurovascular  Peripheral Neurovascular (Adult)  Peripheral Neurovascular WDL: WDL    Neuro Cognitive  Neuro Cognitive (Adult)  Cognitive/Neuro/Behavioral WDL: .WDL except, speech, motor response  Level of Consciousness: Responds to Voice  Speech: slurred  Mood/Behavior: hypoactive (quiet, withdrawn)  Last Known Well Date: 04/01/24  Last Known Well Time: 2200 (witnessed by caregiver)  Additional Documentation: Last Known Well Date (Row), Last Known Well Time (Row)  Motor Response  Motor Response: left motor response, LUE motor response, LLE motor response  Left Motor Response: facial droop  LUE Motor Response: no response to stimuli  LLE Motor Response: no response to stimuli  [place] Coma Scale  Best Eye Response: 4-->(E4) spontaneous  Best Motor Response: 6-->(M6) obeys commands  Best Verbal Response: 2-->(V2) incomprehensible speech  [place] Coma Scale Score: 12  NIH Stroke Scale  Interval: baseline  1a. Level of Consciousness: 1-->Not alert, but arousable by minor stimulation to obey, answer, or respond  1b. LOC Questions: 2-->Answers neither question correctly  1c. LOC Commands: 1-->Performs one task correctly  2. Best Gaze: 1-->Partial gaze palsy, gaze is abnormal in one or both eyes, but forced  deviation or total gaze paresis is not present  3. Visual: 0-->No visual loss  4. Facial Palsy: 3-->Complete paralysis of one or both sides (absence of facial movement in the upper and lower face)  5a. Motor Arm, Left: 4-->No movement  5b. Motor Arm, Right: 0-->No drift, limb holds 90 (or 45) degrees for full 10 secs  6a. Motor Leg, Left: 4-->No movement  6b. Motor Leg, Right: 0-->No drift, leg holds 30 degree position for full 5 secs  7. Limb Ataxia: 1-->Present in one limb  8. Sensory: 0-->Normal, no sensory loss  9. Best Language: 2-->Severe aphasia, all communication is through fragmentary expression, great need for inference, questioning, and guessing by the listener. Range of information that can be exchanged is limited, listener carries burden of. . . (see row details)  10. Dysarthria: 2-->Severe dysarthria, patients speech is so slurred as to be unintelligible in the absence of or out of proportion to any dysphasia, or is mute/anarthric  11. Extinction and Inattention (formerly Neglect): 2-->Profound theresa-inattention/extinction more than 1 modality  Total (NIH Stroke Scale): 23    Learning  Learning Assessment (Adult)  Learning Readiness and Ability: cognitive limitation noted, language barrier identified  Education Provided  Person Taught: family member/friend  Teaching Method: verbal instruction  Teaching Focus: symptom/problem overview, diagnostic test, medical device/equipment use  Education Outcome Evaluation: eager to learn    Respiratory  Respiratory (Adult)  Airway WDL: WDL  Respiratory WDL  Respiratory WDL: .WDL except, cough  Cough Type: loose    Abdominal Pain       Pain Assessments       NIH Stroke Scale  NIH Stroke Scale  Interval: baseline  1a. Level of Consciousness: 1-->Not alert, but arousable by minor stimulation to obey, answer, or respond  1b. LOC Questions: 2-->Answers neither question correctly  1c. LOC Commands: 1-->Performs one task correctly  2. Best Gaze: 1-->Partial gaze palsy,  gaze is abnormal in one or both eyes, but forced deviation or total gaze paresis is not present  3. Visual: 0-->No visual loss  4. Facial Palsy: 3-->Complete paralysis of one or both sides (absence of facial movement in the upper and lower face)  5a. Motor Arm, Left: 4-->No movement  5b. Motor Arm, Right: 0-->No drift, limb holds 90 (or 45) degrees for full 10 secs  6a. Motor Leg, Left: 4-->No movement  6b. Motor Leg, Right: 0-->No drift, leg holds 30 degree position for full 5 secs  7. Limb Ataxia: 1-->Present in one limb  8. Sensory: 0-->Normal, no sensory loss  9. Best Language: 2-->Severe aphasia, all communication is through fragmentary expression, great need for inference, questioning, and guessing by the listener. Range of information that can be exchanged is limited, listener carries burden of. . . (see row details)  10. Dysarthria: 2-->Severe dysarthria, patients speech is so slurred as to be unintelligible in the absence of or out of proportion to any dysphasia, or is mute/anarthric  11. Extinction and Inattention (formerly Neglect): 2-->Profound theresa-inattention/extinction more than 1 modality  Total (NIH Stroke Scale): 23    Rosa Angeles RN  04/01/24 23:10 EDT

## 2024-04-02 NOTE — PLAN OF CARE
Goal Outcome Evaluation:  Nutrition assessment complete, Cortrak placed, TF's to start today, RD following. Diet per SLP when appropriate     Problem: Aspiration (Enteral Nutrition)  Goal: Absence of Aspiration Signs and Symptoms  Outcome: Ongoing, Progressing     Problem: Device-Related Complication Risk (Enteral Nutrition)  Goal: Safe, Effective Therapy Delivery  Outcome: Ongoing, Progressing     Problem: Feeding Intolerance (Enteral Nutrition)  Goal: Feeding Tolerance  Outcome: Ongoing, Progressing  Intervention: Prevent and Manage Feeding Intolerance  Flowsheets (Taken 4/2/2024 8226)  Nutrition Support Management: tube feeding initiated

## 2024-04-02 NOTE — ANESTHESIA PREPROCEDURE EVALUATION
Anesthesia Evaluation     NPO Solid Status: Waived due to emergency  NPO Liquid Status: Waived due to emergency           Airway   Mallampati: II  Dental    (+) lower dentures    Pulmonary - normal exam   Cardiovascular - normal exam        Neuro/Psych  (+) CVA residual symptoms  GI/Hepatic/Renal/Endo      Musculoskeletal     Abdominal    Substance History      OB/GYN          Other                    Anesthesia Plan    ASA 4 - emergent     general       Anesthetic plan, risks, benefits, and alternatives have been provided, discussed and informed consent has been obtained with: patient.    CODE STATUS:

## 2024-04-02 NOTE — H&P
H&P NOTE    Patient Identification:  Alex Negron  79 y.o.  male  1945  [id number]                CC: Acute left-sided weakness    History of Present Illness:  39-year-old gentleman with no known medical history as such presented with left-sided weakness slurred speech and left-sided facial droop.  Symptoms started 45 minutes prior to arrival to the ER.  He is not on any anticoagulation.  This was a witnessed event.  In the ER patient was deemed a team D protocol.  Thrombolytics were administered after consultation with neurology.  Patient now going for mechanical thrombectomy.  We were asked to admit to the ICU.  Currently patient unable to give me any meaningful history due to language barrier but was able to communicate through patient's daughter.  Patient did have headache on per ER physician admission.  CTA per ER physician did not show any acute bleed.  Denied any chest pain or nausea or vomiting at this time      Review of Systems  Limited with patient's current condition and language barrier.  Past Medical History:  No past medical history on file.    Past Surgical History:  No past surgical history on file.     Home Meds:  (Not in a hospital admission)      Allergies:  No Known Allergies    Social History:   Social History     Socioeconomic History    Marital status:        Family History:  No family history on file.    Physical Exam:  /87   Pulse 63   Resp 28   Wt 81.7 kg (180 lb 3.2 oz)   SpO2 91%  There is no height or weight on file to calculate BMI. 91% 81.7 kg (180 lb 3.2 oz)  Physical Exam  Patient is examined using the personal protective equipment as per guidelines from infection control for this particular patient as enacted.  Hand hygiene was performed before and after patient interaction.  Well-developed normal body habitus  Eyes normal conjunctivae and pupils reactive to light  ENT Mallampati between 3 and 4 normal nasal exam  Neck midline trachea no thyromegaly  Chest  "no labored breathing clear  CVS regular rate and rhythm no lower extremity edema  Abdomen soft nontender no hepatosplenomegaly  CNS left hemiparesis with aphasia  Skin no rashes no nodules  Psych Limited due to aphasia  Musculoskeletal no cyanosis no clubbing normal range of motion        LABS:  Lab Results   Component Value Date    CALCIUM 10.1 (H) 04/01/2024     Results from last 7 days   Lab Units 04/01/24  2206   SODIUM mmol/L 140   POTASSIUM mmol/L 4.3   CHLORIDE mmol/L 104   CO2 mmol/L 24.0   BUN mg/dL 33*   CREATININE mg/dL 1.43*   GLUCOSE mg/dL 112*   CALCIUM mg/dL 10.1*   WBC 10*3/mm3 8.18   HEMOGLOBIN g/dL 12.0*   PLATELETS 10*3/mm3 434   ALT (SGPT) U/L 24   AST (SGOT) U/L 21     Lab Results   Component Value Date    TROPONINT 12 04/01/2024     Results from last 7 days   Lab Units 04/01/24  2206   HSTROP T ng/L 12                     Results from last 7 days   Lab Units 04/01/24  2206   INR  0.97         No results found for: \"TSH\"  CrCl cannot be calculated (Unknown ideal weight.).         Imaging: I personally visualized the images of scans/x-rays performed within last 3 days.      Assessment:  Acute CVA  Left-sided hemiparesis  Acute renal insufficiency  Elevated blood pressure    Recommendations:  Post tPA protocol  Neurochecks every hours per protocol  Neurology has been consulted in the emergency room.  Further stroke workup per neurology  Blood pressure management keep systolic less than 180  Normal saline will be initiated watch creatinine closely  ICU core measures  Discussed with patient's family at bedside in detail          Shiv Felipe MD  4/1/2024  23:04 EDT      Much of this encounter note is an electronic transcription/translation of spoken language to printed text using Dragon Software.  "

## 2024-04-02 NOTE — SIGNIFICANT NOTE
04/02/24 0836   OTHER   Discipline physical therapist   Rehab Time/Intention   Session Not Performed other (see comments)  (PT will wait for 24 hours post intervention per protocol to initiate therapy, will follow up tomorrow)   Recommendation   PT - Next Appointment 04/03/24

## 2024-04-03 ENCOUNTER — APPOINTMENT (OUTPATIENT)
Dept: GENERAL RADIOLOGY | Facility: HOSPITAL | Age: 79
End: 2024-04-03
Payer: MEDICAID

## 2024-04-03 ENCOUNTER — ANESTHESIA (OUTPATIENT)
Dept: GASTROENTEROLOGY | Facility: HOSPITAL | Age: 79
End: 2024-04-03
Payer: MEDICAID

## 2024-04-03 ENCOUNTER — TELEPHONE (OUTPATIENT)
Dept: NEUROSURGERY | Facility: CLINIC | Age: 79
End: 2024-04-03
Payer: MEDICAID

## 2024-04-03 ENCOUNTER — ANESTHESIA EVENT (OUTPATIENT)
Dept: GASTROENTEROLOGY | Facility: HOSPITAL | Age: 79
End: 2024-04-03
Payer: MEDICAID

## 2024-04-03 VITALS — HEART RATE: 95 BPM | SYSTOLIC BLOOD PRESSURE: 89 MMHG | OXYGEN SATURATION: 92 % | DIASTOLIC BLOOD PRESSURE: 58 MMHG

## 2024-04-03 PROBLEM — K21.9 GASTROESOPHAGEAL REFLUX DISEASE: Status: ACTIVE | Noted: 2024-04-01

## 2024-04-03 PROBLEM — K92.0 COFFEE GROUND EMESIS: Status: ACTIVE | Noted: 2024-04-01

## 2024-04-03 LAB
ALBUMIN SERPL-MCNC: 3.1 G/DL (ref 3.5–5.2)
ALBUMIN/GLOB SERPL: 1.1 G/DL
ALP SERPL-CCNC: 71 U/L (ref 39–117)
ALT SERPL W P-5'-P-CCNC: 15 U/L (ref 1–41)
ANION GAP SERPL CALCULATED.3IONS-SCNC: 10 MMOL/L (ref 5–15)
ANION GAP SERPL CALCULATED.3IONS-SCNC: 11 MMOL/L (ref 5–15)
ARTERIAL PATENCY WRIST A: ABNORMAL
AST SERPL-CCNC: 30 U/L (ref 1–40)
ATMOSPHERIC PRESS: 734.8 MMHG
BASE EXCESS BLDA CALC-SCNC: 1.6 MMOL/L (ref 0–2)
BASOPHILS # BLD AUTO: 0.02 10*3/MM3 (ref 0–0.2)
BASOPHILS # BLD AUTO: 0.04 10*3/MM3 (ref 0–0.2)
BASOPHILS NFR BLD AUTO: 0.1 % (ref 0–1.5)
BASOPHILS NFR BLD AUTO: 0.2 % (ref 0–1.5)
BDY SITE: ABNORMAL
BILIRUB SERPL-MCNC: 0.4 MG/DL (ref 0–1.2)
BUN SERPL-MCNC: 23 MG/DL (ref 8–23)
BUN SERPL-MCNC: 26 MG/DL (ref 8–23)
BUN/CREAT SERPL: 15.6 (ref 7–25)
BUN/CREAT SERPL: 17.8 (ref 7–25)
CALCIUM SPEC-SCNC: 7.9 MG/DL (ref 8.6–10.5)
CALCIUM SPEC-SCNC: 8.5 MG/DL (ref 8.6–10.5)
CHLORIDE SERPL-SCNC: 105 MMOL/L (ref 98–107)
CHLORIDE SERPL-SCNC: 109 MMOL/L (ref 98–107)
CO2 BLDA-SCNC: 27.1 MMOL/L (ref 23–27)
CO2 SERPL-SCNC: 21 MMOL/L (ref 22–29)
CO2 SERPL-SCNC: 23 MMOL/L (ref 22–29)
CREAT BLDA-MCNC: 1.4 MG/DL (ref 0.6–1.3)
CREAT SERPL-MCNC: 1.46 MG/DL (ref 0.76–1.27)
CREAT SERPL-MCNC: 1.47 MG/DL (ref 0.76–1.27)
D-LACTATE SERPL-SCNC: 1.8 MMOL/L (ref 0.5–2)
DEPRECATED RDW RBC AUTO: 42.2 FL (ref 37–54)
DEPRECATED RDW RBC AUTO: 42.8 FL (ref 37–54)
DEVICE COMMENT: ABNORMAL
EGFRCR SERPLBLD CKD-EPI 2021: 48.8 ML/MIN/1.73
EGFRCR SERPLBLD CKD-EPI 2021: 49.2 ML/MIN/1.73
EOSINOPHIL # BLD AUTO: 0 10*3/MM3 (ref 0–0.4)
EOSINOPHIL # BLD AUTO: 0 10*3/MM3 (ref 0–0.4)
EOSINOPHIL NFR BLD AUTO: 0 % (ref 0.3–6.2)
EOSINOPHIL NFR BLD AUTO: 0 % (ref 0.3–6.2)
ERYTHROCYTE [DISTWIDTH] IN BLOOD BY AUTOMATED COUNT: 12.7 % (ref 12.3–15.4)
ERYTHROCYTE [DISTWIDTH] IN BLOOD BY AUTOMATED COUNT: 13 % (ref 12.3–15.4)
GASTROCULT GAST QL: POSITIVE
GLOBULIN UR ELPH-MCNC: 2.7 GM/DL
GLUCOSE BLDC GLUCOMTR-MCNC: 124 MG/DL (ref 70–130)
GLUCOSE BLDC GLUCOMTR-MCNC: 130 MG/DL (ref 70–130)
GLUCOSE BLDC GLUCOMTR-MCNC: 148 MG/DL (ref 70–130)
GLUCOSE BLDC GLUCOMTR-MCNC: 179 MG/DL (ref 70–130)
GLUCOSE BLDC GLUCOMTR-MCNC: 194 MG/DL (ref 70–130)
GLUCOSE SERPL-MCNC: 126 MG/DL (ref 65–99)
GLUCOSE SERPL-MCNC: 191 MG/DL (ref 65–99)
HCO3 BLDA-SCNC: 25.9 MMOL/L (ref 22–28)
HCT VFR BLD AUTO: 28.8 % (ref 37.5–51)
HCT VFR BLD AUTO: 28.8 % (ref 37.5–51)
HCT VFR BLD AUTO: 31.9 % (ref 37.5–51)
HEMODILUTION: NO
HGB BLD-MCNC: 10.5 G/DL (ref 13–17.7)
HGB BLD-MCNC: 9.4 G/DL (ref 13–17.7)
HGB BLD-MCNC: 9.5 G/DL (ref 13–17.7)
IMM GRANULOCYTES # BLD AUTO: 0.06 10*3/MM3 (ref 0–0.05)
IMM GRANULOCYTES # BLD AUTO: 0.1 10*3/MM3 (ref 0–0.05)
IMM GRANULOCYTES NFR BLD AUTO: 0.3 % (ref 0–0.5)
IMM GRANULOCYTES NFR BLD AUTO: 0.6 % (ref 0–0.5)
INHALED O2 CONCENTRATION: 100 %
LYMPHOCYTES # BLD AUTO: 1.31 10*3/MM3 (ref 0.7–3.1)
LYMPHOCYTES # BLD AUTO: 1.65 10*3/MM3 (ref 0.7–3.1)
LYMPHOCYTES NFR BLD AUTO: 6.8 % (ref 19.6–45.3)
LYMPHOCYTES NFR BLD AUTO: 9.8 % (ref 19.6–45.3)
MCH RBC QN AUTO: 29.8 PG (ref 26.6–33)
MCH RBC QN AUTO: 30.2 PG (ref 26.6–33)
MCHC RBC AUTO-ENTMCNC: 32.9 G/DL (ref 31.5–35.7)
MCHC RBC AUTO-ENTMCNC: 33 G/DL (ref 31.5–35.7)
MCV RBC AUTO: 90.6 FL (ref 79–97)
MCV RBC AUTO: 91.4 FL (ref 79–97)
MODALITY: ABNORMAL
MONOCYTES # BLD AUTO: 0.63 10*3/MM3 (ref 0.1–0.9)
MONOCYTES # BLD AUTO: 0.93 10*3/MM3 (ref 0.1–0.9)
MONOCYTES NFR BLD AUTO: 3.3 % (ref 5–12)
MONOCYTES NFR BLD AUTO: 5.5 % (ref 5–12)
NEUTROPHILS NFR BLD AUTO: 14.2 10*3/MM3 (ref 1.7–7)
NEUTROPHILS NFR BLD AUTO: 17.16 10*3/MM3 (ref 1.7–7)
NEUTROPHILS NFR BLD AUTO: 83.9 % (ref 42.7–76)
NEUTROPHILS NFR BLD AUTO: 89.5 % (ref 42.7–76)
NRBC BLD AUTO-RTO: 0 /100 WBC (ref 0–0.2)
NRBC BLD AUTO-RTO: 0 /100 WBC (ref 0–0.2)
O2 A-A PPRESDIFF RESPIRATORY: 0.4 MMHG
PCO2 BLDA: 38.8 MM HG (ref 35–45)
PEEP RESPIRATORY: 10 CM[H2O]
PH BLDA: 7.43 PH UNITS (ref 7.35–7.45)
PH GAST: 7 [PH]
PLATELET # BLD AUTO: 311 10*3/MM3 (ref 140–450)
PLATELET # BLD AUTO: 351 10*3/MM3 (ref 140–450)
PMV BLD AUTO: 9.5 FL (ref 6–12)
PMV BLD AUTO: 9.6 FL (ref 6–12)
PO2 BLDA: 287 MM HG (ref 80–100)
POTASSIUM SERPL-SCNC: 4.1 MMOL/L (ref 3.5–5.2)
POTASSIUM SERPL-SCNC: 4.3 MMOL/L (ref 3.5–5.2)
PROCALCITONIN SERPL-MCNC: 1.06 NG/ML (ref 0–0.25)
PROT SERPL-MCNC: 5.8 G/DL (ref 6–8.5)
RBC # BLD AUTO: 3.15 10*6/MM3 (ref 4.14–5.8)
RBC # BLD AUTO: 3.52 10*6/MM3 (ref 4.14–5.8)
SAO2 % BLDCOA: 99.9 % (ref 92–98.5)
SET MECH RESP RATE: 14
SODIUM SERPL-SCNC: 138 MMOL/L (ref 136–145)
SODIUM SERPL-SCNC: 141 MMOL/L (ref 136–145)
TOTAL RATE: 20 BREATHS/MINUTE
VENTILATOR MODE: ABNORMAL
VT ON VENT VENT: 500 ML
WBC NRBC COR # BLD AUTO: 16.92 10*3/MM3 (ref 3.4–10.8)
WBC NRBC COR # BLD AUTO: 19.18 10*3/MM3 (ref 3.4–10.8)

## 2024-04-03 PROCEDURE — 94761 N-INVAS EAR/PLS OXIMETRY MLT: CPT

## 2024-04-03 PROCEDURE — 94003 VENT MGMT INPAT SUBQ DAY: CPT

## 2024-04-03 PROCEDURE — 25010000002 PROPOFOL 10 MG/ML EMULSION: Performed by: ANESTHESIOLOGY

## 2024-04-03 PROCEDURE — 84145 PROCALCITONIN (PCT): CPT | Performed by: INTERNAL MEDICINE

## 2024-04-03 PROCEDURE — 43235 EGD DIAGNOSTIC BRUSH WASH: CPT | Performed by: INTERNAL MEDICINE

## 2024-04-03 PROCEDURE — 25810000003 SODIUM CHLORIDE 0.9 % SOLUTION: Performed by: RADIOLOGY

## 2024-04-03 PROCEDURE — 25010000002 FENTANYL CITRATE (PF) 50 MCG/ML SOLUTION: Performed by: INTERNAL MEDICINE

## 2024-04-03 PROCEDURE — 71045 X-RAY EXAM CHEST 1 VIEW: CPT

## 2024-04-03 PROCEDURE — 82271 OCCULT BLOOD OTHER SOURCES: CPT | Performed by: INTERNAL MEDICINE

## 2024-04-03 PROCEDURE — 94799 UNLISTED PULMONARY SVC/PX: CPT

## 2024-04-03 PROCEDURE — 97163 PT EVAL HIGH COMPLEX 45 MIN: CPT

## 2024-04-03 PROCEDURE — 99221 1ST HOSP IP/OBS SF/LOW 40: CPT | Performed by: INTERNAL MEDICINE

## 2024-04-03 PROCEDURE — 85025 COMPLETE CBC W/AUTO DIFF WBC: CPT | Performed by: RADIOLOGY

## 2024-04-03 PROCEDURE — 85018 HEMOGLOBIN: CPT | Performed by: INTERNAL MEDICINE

## 2024-04-03 PROCEDURE — 97167 OT EVAL HIGH COMPLEX 60 MIN: CPT

## 2024-04-03 PROCEDURE — 82948 REAGENT STRIP/BLOOD GLUCOSE: CPT

## 2024-04-03 PROCEDURE — 25010000002 PHENYLEPHRINE 10 MG/ML SOLUTION: Performed by: ANESTHESIOLOGY

## 2024-04-03 PROCEDURE — 0DJ08ZZ INSPECTION OF UPPER INTESTINAL TRACT, VIA NATURAL OR ARTIFICIAL OPENING ENDOSCOPIC: ICD-10-PCS | Performed by: INTERNAL MEDICINE

## 2024-04-03 PROCEDURE — 82803 BLOOD GASES ANY COMBINATION: CPT | Performed by: INTERNAL MEDICINE

## 2024-04-03 PROCEDURE — 87040 BLOOD CULTURE FOR BACTERIA: CPT | Performed by: INTERNAL MEDICINE

## 2024-04-03 PROCEDURE — 80053 COMPREHEN METABOLIC PANEL: CPT | Performed by: INTERNAL MEDICINE

## 2024-04-03 PROCEDURE — 97535 SELF CARE MNGMENT TRAINING: CPT

## 2024-04-03 PROCEDURE — 85014 HEMATOCRIT: CPT | Performed by: INTERNAL MEDICINE

## 2024-04-03 PROCEDURE — 25010000002 PIPERACILLIN SOD-TAZOBACTAM PER 1 G: Performed by: INTERNAL MEDICINE

## 2024-04-03 PROCEDURE — 25010000002 SUCCINYLCHOLINE PER 20 MG: Performed by: ANESTHESIOLOGY

## 2024-04-03 PROCEDURE — 97110 THERAPEUTIC EXERCISES: CPT

## 2024-04-03 PROCEDURE — 99232 SBSQ HOSP IP/OBS MODERATE 35: CPT | Performed by: PSYCHIATRY & NEUROLOGY

## 2024-04-03 PROCEDURE — 85025 COMPLETE CBC W/AUTO DIFF WBC: CPT | Performed by: INTERNAL MEDICINE

## 2024-04-03 PROCEDURE — 36600 WITHDRAWAL OF ARTERIAL BLOOD: CPT | Performed by: INTERNAL MEDICINE

## 2024-04-03 PROCEDURE — 25010000002 PROPOFOL 10 MG/ML EMULSION: Performed by: INTERNAL MEDICINE

## 2024-04-03 PROCEDURE — 25010000002 NICARDIPINE 2.5 MG/ML SOLUTION: Performed by: RADIOLOGY

## 2024-04-03 PROCEDURE — 83605 ASSAY OF LACTIC ACID: CPT | Performed by: INTERNAL MEDICINE

## 2024-04-03 PROCEDURE — 94002 VENT MGMT INPAT INIT DAY: CPT

## 2024-04-03 RX ORDER — PROPOFOL 10 MG/ML
VIAL (ML) INTRAVENOUS AS NEEDED
Status: DISCONTINUED | OUTPATIENT
Start: 2024-04-03 | End: 2024-04-03 | Stop reason: SURG

## 2024-04-03 RX ORDER — FENTANYL CITRATE 50 UG/ML
50 INJECTION, SOLUTION INTRAMUSCULAR; INTRAVENOUS
Status: DISCONTINUED | OUTPATIENT
Start: 2024-04-03 | End: 2024-04-05

## 2024-04-03 RX ORDER — SODIUM CHLORIDE, SODIUM LACTATE, POTASSIUM CHLORIDE, CALCIUM CHLORIDE 600; 310; 30; 20 MG/100ML; MG/100ML; MG/100ML; MG/100ML
30 INJECTION, SOLUTION INTRAVENOUS CONTINUOUS
Status: CANCELLED | OUTPATIENT
Start: 2024-04-03

## 2024-04-03 RX ORDER — SODIUM CHLORIDE 9 MG/ML
INJECTION, SOLUTION INTRAVENOUS CONTINUOUS PRN
Status: DISCONTINUED | OUTPATIENT
Start: 2024-04-03 | End: 2024-04-03 | Stop reason: SURG

## 2024-04-03 RX ORDER — LIDOCAINE HYDROCHLORIDE 20 MG/ML
INJECTION, SOLUTION INFILTRATION; PERINEURAL AS NEEDED
Status: DISCONTINUED | OUTPATIENT
Start: 2024-04-03 | End: 2024-04-03 | Stop reason: SURG

## 2024-04-03 RX ORDER — PHENYLEPHRINE HYDROCHLORIDE 10 MG/ML
INJECTION INTRAVENOUS AS NEEDED
Status: DISCONTINUED | OUTPATIENT
Start: 2024-04-03 | End: 2024-04-03 | Stop reason: SURG

## 2024-04-03 RX ORDER — SUCCINYLCHOLINE CHLORIDE 20 MG/ML
INJECTION INTRAMUSCULAR; INTRAVENOUS AS NEEDED
Status: DISCONTINUED | OUTPATIENT
Start: 2024-04-03 | End: 2024-04-03 | Stop reason: SURG

## 2024-04-03 RX ORDER — CHLORHEXIDINE GLUCONATE ORAL RINSE 1.2 MG/ML
15 SOLUTION DENTAL EVERY 12 HOURS SCHEDULED
Status: DISCONTINUED | OUTPATIENT
Start: 2024-04-03 | End: 2024-04-09

## 2024-04-03 RX ORDER — PANTOPRAZOLE SODIUM 40 MG/10ML
80 INJECTION, POWDER, LYOPHILIZED, FOR SOLUTION INTRAVENOUS ONCE
Status: COMPLETED | OUTPATIENT
Start: 2024-04-03 | End: 2024-04-03

## 2024-04-03 RX ORDER — ASPIRIN 81 MG/1
81 TABLET, CHEWABLE ORAL DAILY
Status: DISCONTINUED | OUTPATIENT
Start: 2024-04-03 | End: 2024-04-17 | Stop reason: HOSPADM

## 2024-04-03 RX ADMIN — PANTOPRAZOLE SODIUM 40 MG: 40 INJECTION, POWDER, LYOPHILIZED, FOR SOLUTION INTRAVENOUS at 18:05

## 2024-04-03 RX ADMIN — PROPOFOL INJECTABLE EMULSION 35 MCG/KG/MIN: 10 INJECTION, EMULSION INTRAVENOUS at 23:50

## 2024-04-03 RX ADMIN — DOCUSATE SODIUM 50MG AND SENNOSIDES 8.6MG 2 TABLET: 8.6; 5 TABLET, FILM COATED ORAL at 08:36

## 2024-04-03 RX ADMIN — ATORVASTATIN CALCIUM 80 MG: 80 TABLET, FILM COATED ORAL at 20:22

## 2024-04-03 RX ADMIN — PROPOFOL INJECTABLE EMULSION 35 MCG/KG/MIN: 10 INJECTION, EMULSION INTRAVENOUS at 18:43

## 2024-04-03 RX ADMIN — PIPERACILLIN SODIUM AND TAZOBACTAM SODIUM 3.38 G: 3; .375 INJECTION, POWDER, LYOPHILIZED, FOR SOLUTION INTRAVENOUS at 20:22

## 2024-04-03 RX ADMIN — CHLORHEXIDINE GLUCONATE 15 ML: 1.2 RINSE ORAL at 20:29

## 2024-04-03 RX ADMIN — SODIUM CHLORIDE 75 ML/HR: 9 INJECTION, SOLUTION INTRAVENOUS at 04:41

## 2024-04-03 RX ADMIN — PROPOFOL INJECTABLE EMULSION 25 MCG/KG/MIN: 10 INJECTION, EMULSION INTRAVENOUS at 13:42

## 2024-04-03 RX ADMIN — ACETAMINOPHEN 650 MG: 160 SOLUTION ORAL at 18:05

## 2024-04-03 RX ADMIN — NICARDIPINE HYDROCHLORIDE 15 MG/HR: 25 INJECTION, SOLUTION INTRAVENOUS at 04:37

## 2024-04-03 RX ADMIN — PROPOFOL 50 MG: 10 INJECTION, EMULSION INTRAVENOUS at 13:27

## 2024-04-03 RX ADMIN — SODIUM CHLORIDE: 9 INJECTION, SOLUTION INTRAVENOUS at 13:20

## 2024-04-03 RX ADMIN — NICARDIPINE HYDROCHLORIDE 15 MG/HR: 25 INJECTION, SOLUTION INTRAVENOUS at 03:00

## 2024-04-03 RX ADMIN — CLOPIDOGREL BISULFATE 75 MG: 75 TABLET, FILM COATED ORAL at 08:36

## 2024-04-03 RX ADMIN — PANTOPRAZOLE SODIUM 40 MG: 40 INJECTION, POWDER, LYOPHILIZED, FOR SOLUTION INTRAVENOUS at 08:35

## 2024-04-03 RX ADMIN — LIDOCAINE HYDROCHLORIDE 60 MG: 20 INJECTION, SOLUTION INFILTRATION; PERINEURAL at 13:24

## 2024-04-03 RX ADMIN — PIPERACILLIN AND TAZOBACTAM 3.38 G: 3; .375 INJECTION, POWDER, FOR SOLUTION INTRAVENOUS at 16:40

## 2024-04-03 RX ADMIN — NICARDIPINE HYDROCHLORIDE 5 MG/HR: 25 INJECTION, SOLUTION INTRAVENOUS at 10:20

## 2024-04-03 RX ADMIN — PHENYLEPHRINE HYDROCHLORIDE 200 MCG: 10 INJECTION INTRAVENOUS at 13:25

## 2024-04-03 RX ADMIN — FAMOTIDINE 20 MG: 20 TABLET, FILM COATED ORAL at 18:05

## 2024-04-03 RX ADMIN — SODIUM CHLORIDE 75 ML/HR: 9 INJECTION, SOLUTION INTRAVENOUS at 18:31

## 2024-04-03 RX ADMIN — FENTANYL CITRATE 50 MCG: 50 INJECTION, SOLUTION INTRAMUSCULAR; INTRAVENOUS at 22:21

## 2024-04-03 RX ADMIN — SUCCINYLCHOLINE CHLORIDE 140 MG: 20 INJECTION, SOLUTION INTRAMUSCULAR; INTRAVENOUS; PARENTERAL at 13:24

## 2024-04-03 RX ADMIN — PROPOFOL 200 MCG/KG/MIN: 10 INJECTION, EMULSION INTRAVENOUS at 13:26

## 2024-04-03 RX ADMIN — PROPOFOL 150 MG: 10 INJECTION, EMULSION INTRAVENOUS at 13:24

## 2024-04-03 RX ADMIN — Medication 10 ML: at 08:36

## 2024-04-03 RX ADMIN — DOCUSATE SODIUM 50MG AND SENNOSIDES 8.6MG 2 TABLET: 8.6; 5 TABLET, FILM COATED ORAL at 20:22

## 2024-04-03 RX ADMIN — ACETAMINOPHEN 650 MG: 160 SOLUTION ORAL at 03:01

## 2024-04-03 RX ADMIN — Medication 10 ML: at 20:23

## 2024-04-03 RX ADMIN — FAMOTIDINE 20 MG: 20 TABLET, FILM COATED ORAL at 06:33

## 2024-04-03 RX ADMIN — ASPIRIN 81 MG: 81 TABLET, CHEWABLE ORAL at 08:36

## 2024-04-03 RX ADMIN — PANTOPRAZOLE SODIUM 80 MG: 40 INJECTION, POWDER, LYOPHILIZED, FOR SOLUTION INTRAVENOUS at 08:34

## 2024-04-03 NOTE — PROGRESS NOTES
Was called by the nursing staff because patient has coffee-ground emesis  Hemoglobin is still stable at 10  Patient is hemodynamically stable if any he is hypertensive  Patient supposed to be started on aspirin and Plavix today because he just had a stent in the distal portion of the right internal carotid artery  A gastric occult test was done and it came back positive confirming the upper GI bleed  Patient is supposed to have the aspirin and the Plavix this early afternoon  Fusion indicated at this point but need to have a GI evaluation ASAP because patient has a recent stent and antiplatelet therapy is very essential for the fresh stent patency

## 2024-04-03 NOTE — PROGRESS NOTES
"Nutrition Services    Patient Name:  Alex Negron  YOB: 1945  MRN: [id number]  Admit Date:  4/1/2024    Assessment Date:  04/03/24    Comment: TF's stopped early this am due to coffee grd emesis. GI consulted and plans to do an EGD.  Cortrak was also occluded earlier today - I was able to unkink it and it is now patent for RN to give his meds. Glucoses remain elevated (191-194/148), On protonix drip and IVF    Plan/Recommendations:  Restart TF's with Diabetisource AC at 25mL/hr, adv as tolerates to goal of 70m/hr when cleared by GI  Antiemetics as needed  Diet per SLP when appropriate  May need some SSI coverage    Will follow clinical course, nutrition needs.    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, L sided hemiparesis   Medical & Surgical Hx History reviewed. No pertinent past medical history.    History reviewed. No pertinent surgical history.     Current Problems Dysphagia, GI Bleed     Encounter Information        Nutrition History    Food Preferences    Supplements    Factors Affecting Intake altered mental status, swallow impairment   Tests/Procedures EGD     Anthropometrics        Current Height   Current Weight  BMI kg/m2 Height: 180.3 cm (71\")  Weight: 85.6 kg (188 lb 11.4 oz) (04/03/24 0434)  Body mass index is 26.32 kg/m².     Adj BMI (if applicable)    BMI Category Overweight (25 - 29.9)       Admission Weight 81.7kg   Ideal Body Weight (IBW) 166lb     Adj IBW (if applicable)    Usual Body Weight (UBW) unknown   Weight Change/Trend Unknown       Weight history: Wt Readings from Last 30 Encounters:   04/03/24 0434 85.6 kg (188 lb 11.4 oz)   04/02/24 1344 84.5 kg (186 lb 4.6 oz)   04/02/24 0418 84.5 kg (186 lb 4.6 oz)   04/01/24 2159 81.7 kg (180 lb 3.2 oz)        Estimated/Assessed Needs        Energy Requirements    Weight for " "Calculation 81.7kg   Method for Estimation  25 kcal/kg   EST Needs (kcal/day) 2042       Protein Requirements    Weight for Calculation 81.7kg   EST Protein Needs (g/kg) 1.0 - 1.2 gm/kg   EST Daily Needs (g/day) 82-98       Fluid Requirements     Method for Estimation 1 mL/kcal    Estimated Needs (mL/day)        Fluid Deficit    Current Na Level (mEq/L)    Desired Na Level (mEq/L)    Estimated Fluid Deficit (L)       Labs        Pertinent Labs    Results from last 7 days   Lab Units 04/03/24  0205 04/02/24 0333 04/01/24 2209 04/01/24 2206   SODIUM mmol/L 138 138  --  140   POTASSIUM mmol/L 4.3 4.6  --  4.3   CHLORIDE mmol/L 105 102  --  104   CO2 mmol/L 23.0 22.5  --  24.0   BUN mg/dL 26* 28*  --  33*   CREATININE mg/dL 1.46* 1.34* 1.40* 1.43*   CALCIUM mg/dL 8.5* 9.1  --  10.1*   BILIRUBIN mg/dL  --   --   --  0.2   ALK PHOS U/L  --   --   --  98   ALT (SGPT) U/L  --   --   --  24   AST (SGOT) U/L  --   --   --  21   GLUCOSE mg/dL 191* 177*  --  112*     Results from last 7 days   Lab Units 04/03/24  1032 04/03/24 0205 04/02/24 0333 04/01/24 2206   HEMOGLOBIN g/dL 9.4* 10.5* 11.1* 12.0*   HEMATOCRIT % 28.8* 31.9* 33.8* 36.4*   WBC 10*3/mm3  --  19.18* 11.73* 8.18   TRIGLYCERIDES mg/dL  --   --  38  --    ALBUMIN g/dL  --   --   --  4.1     Results from last 7 days   Lab Units 04/03/24 0205 04/02/24 0333 04/01/24 2206   INR   --   --  0.97   APTT seconds  --   --  32.7   PLATELETS 10*3/mm3 351 343 434     No results found for: \"COVID19\"  Lab Results   Component Value Date    HGBA1C 6.20 (H) 04/02/2024          Medications            Scheduled Medications aspirin, 81 mg, Nasogastric, Daily  atorvastatin, 80 mg, Nasogastric, Nightly  clopidogrel, 75 mg, Nasogastric, Daily  famotidine, 20 mg, Oral, BID AC  senna-docusate sodium, 2 tablet, Nasogastric, BID  sodium chloride, 500 mL, Intravenous, Once  sodium chloride, 10 mL, Intravenous, Q12H        Infusions niCARdipine, 5-15 mg/hr, Last Rate: 5 mg/hr (04/03/24 " 1020)  pantoprazole, 40 mg, Last Rate: 40 mg (04/03/24 8548)  sodium chloride, 75 mL/hr, Last Rate: 75 mL/hr (04/03/24 2829)        PRN Medications   acetaminophen    senna-docusate sodium **AND** polyethylene glycol **AND** bisacodyl **AND** bisacodyl    calcium carbonate    Calcium Replacement - Follow Nurse / BPA Driven Protocol    diphenhydrAMINE    droperidol **OR** droperidol    ePHEDrine    fentanyl    flumazenil    hydrALAZINE    ipratropium-albuterol    labetalol    Magnesium Standard Dose Replacement - Follow Nurse / BPA Driven Protocol    naloxone    nitroglycerin    ondansetron    Phosphorus Replacement - Follow Nurse / BPA Driven Protocol    Potassium Replacement - Follow Nurse / BPA Driven Protocol    promethazine **OR** promethazine    sodium chloride    sodium chloride    sodium chloride     Physical Findings          Physical Appearance disoriented, hemiparesis , on oxygen therapy, somnolent   Oral/Mouth Cavity tooth or teeth missing   Edema  not assessed   Gastrointestinal hypoactive bowel sounds, last bowel movement: pending   Skin  bruising   Tubes/Drains/Lines Cortrak, ND tube, bridle in place   NFPE No clinical signs of muscle wasting or fat loss   --  Current Nutrition Orders & Evaluation of Intake       Oral Nutrition     Food Allergies NKFA   Current PO Diet NPO Diet NPO Type: Tube Feeding   Supplement    PO Evaluation     Trending % PO Intake    --  Nutrition Diagnosis        Nutrition Dx Problem 1 Problem: Inadequate Oral Intake  Etiology: Medical Diagnosis - CVA  Dysphagia  Signs/Symptoms: NPO    Comment:      INTERVENTION / PLAN OF CARE  Intervention Goal        Intervention Goal(s) Maintain nutrition status, Meet estimated needs, Disease management/therapy, Initiate feeding/diet, Initiate TF/PN, Transition TF to PO, and Maintain weight     Nutrition Intervention        RD Action Continue to monitor, Care plan reviewed, and Recommend/order: EN     Prescription         Diet      Supplement/Snack    EN/PN     Prescription Ordered       Enteral Prescription:     Enteral Route ND    TF Delivery Method Continuous    Enteral Product Diabetisource AC    Modular None    Propofol Rate/Kcal     TF Start Rate 20    TF Goal Rate 70    Free Water Flush 30mL q 4 hr    TF Provision at Goal: 2016 kcal, 101 gm protein, 1377 mL free water + 180 mL water flushes         Calories 99 % needs met         Protein  102 % needs met         Fluid (mL) 1557    Prescription Ordered No, recommended     Monitor/Evaluation        Monitor Per protocol   Discharge Plan Pending clinical course   Education Will instruct as appropriate     RD to follow per protocol.       Electronically signed by:  Lise Song RD  04/03/24 11:44 EDT

## 2024-04-03 NOTE — PROGRESS NOTES
Nutrition Services    Patient Name:  Alex Negron  YOB: 1945  MRN: [id number]  Admit Date:  4/1/2024    RN contacted RD in regards to pt tube coiling in pt's mouth after EGD was completed. This RD repositioned tube back to ND @ 110 cm via Cortrak, bridled in place. Restart TF's with Diabetisource AC @ 25 mL/hr and advance as tolerated to goal of 70 mL/hr when cleared by GI.     Electronically signed by:  Leslie Bunch RDN, K[place]LEY  04/03/24 14:52 EDT

## 2024-04-03 NOTE — NURSING NOTE
Provided [place] language stroke education handout (with English translation) to Alexa RN caring for this pt. Alexa stated she would give it to pt's wife.

## 2024-04-03 NOTE — ANESTHESIA PREPROCEDURE EVALUATION
Anesthesia Evaluation     no history of anesthetic complications:   NPO Solid Status: > 8 hours  NPO Liquid Status: > 2 hours           Airway   Mallampati: IV  Small opening and Possible difficult intubation  Dental - normal exam     Pulmonary - negative pulmonary ROS   (+) ,decreased breath sounds  (-) COPD, asthma, sleep apnea, not a smoker    PE comment: Nonlabored; SpO2 85-89% on O2 at 10L/min per NC  Cardiovascular - negative cardio ROS    Rhythm: regular  Rate: abnormal    (-) hypertension, valvular problems/murmurs, past MI, CAD, dysrhythmias, angina    PE comment: tachycardic    Neuro/Psych- negative ROS  (-) seizures, TIA, CVA  GI/Hepatic/Renal/Endo    (+) GERD, GI bleeding (Coffee Ground Emesis)   (-) liver disease, no renal disease, diabetes, no thyroid disorder    Musculoskeletal (-) negative ROS    Abdominal    Substance History      OB/GYN          Other        ROS/Med Hx Other: Pt speaks B[place]an and history obtained with assistance of                Anesthesia Plan    ASA 4 - emergent     general     (History and consent (including discussed of R/B/A) obtained with assistance of  via iPad video conference)  intravenous induction     Anesthetic plan, risks, benefits, and alternatives have been provided, discussed and informed consent has been obtained with: patient and spouse/significant other.    CODE STATUS:    Code Status (Patient has no pulse and is not breathing): CPR (Attempt to Resuscitate)  Medical Interventions (Patient has pulse or is breathing): Full Support

## 2024-04-03 NOTE — TELEPHONE ENCOUNTER
----- Message from ESCOBAR Dutton sent at 4/2/2024 12:49 PM EDT -----  Patient will need 6 month hospital follow up appointment with Dr Rubio. She will need carotid doppler(I will order).    Thank you,    Mag

## 2024-04-03 NOTE — THERAPY EVALUATION
Patient Name: Alex Negron  : 1945    MRN: [id number]                              Today's Date: 4/3/2024       Admit Date: 2024    Visit Dx:     ICD-10-CM ICD-9-CM   1. Cerebrovascular accident (CVA), unspecified mechanism  I63.9 434.91   2. Coffee ground emesis  K92.0 578.0   3. Gastroesophageal reflux disease, unspecified whether esophagitis present  K21.9 530.81     Patient Active Problem List   Diagnosis    Acute CVA (cerebrovascular accident)    Right-sided extracranial carotid artery stenosis    Coffee ground emesis    Gastroesophageal reflux disease     History reviewed. No pertinent past medical history.  History reviewed. No pertinent surgical history.   General Information       Row Name 24 1014          Physical Therapy Time and Intention    Document Type evaluation  -PC     Mode of Treatment physical therapy;occupational therapy;co-treatment  -       Row Name 24 1014          General Information    Patient Profile Reviewed yes  -PC     Prior Level of Function independent:  -PC     Existing Precautions/Restrictions fall  -PC     Barriers to Rehab medically complex;language barrier  -PC       Row Name 24 1014          Living Environment    People in Home spouse  -PC       Row Name 24 1014          Cognition    Orientation Status (Cognition) unable/difficult to assess  language barrier, using ipad , per spouse pt is aware of what happened and where he is  -PC       Row Name 24 1014          Safety Issues, Functional Mobility    Safety Issues Affecting Function (Mobility) impulsivity;insight into deficits/self-awareness  -     Impairments Affecting Function (Mobility) balance;coordination;endurance/activity tolerance;grasp;strength;postural/trunk control;muscle tone abnormal;motor control  -     Comment, Safety Issues/Impairments (Mobility) Co treatment medically appropriate and necessary due to patient acuity level, activity tolerance and safety  of patient and staff. Evaluation established to achieve all goals in POC.  -PC               User Key  (r) = Recorded By, (t) = Taken By, (c) = Cosigned By      Initials Name Provider Type    PC Sidra Arce, PT Physical Therapist                   Mobility       Row Name 04/03/24 1016          Bed Mobility    Bed Mobility supine-sit;sit-supine  -PC     Supine-Sit [place] (Bed Mobility) maximum assist (25% patient effort);2 person assist  -PC     Sit-Supine [place] (Bed Mobility) maximum assist (25% patient effort);2 person assist  -PC     Assistive Device (Bed Mobility) draw sheet;head of bed elevated  -PC       Row Name 04/03/24 1016          Transfers    Comment, (Transfers) not safe to attempt due to lethargy, poor sitting balance, weakness  -PC               User Key  (r) = Recorded By, (t) = Taken By, (c) = Cosigned By      Initials Name Provider Type    PC Sidra Arce, PT Physical Therapist                   Obj/Interventions       Row Name 04/03/24 1017          Range of Motion Comprehensive    General Range of Motion bilateral lower extremity ROM WNL  -PC     Comment, General Range of Motion PROM LLE WFL, AROM RLE WFL  -PC       Row Name 04/03/24 1017          Strength Comprehensive (MMT)    Comment, General Manual Muscle Testing (MMT) Assessment RLE WNL, LLE no active movement, L side inattention, neglect, cannot track past midline  -PC       Row Name 04/03/24 1017          Motor Skills    Motor Skills coordination;functional endurance;muscle tone;neuro-muscular function  -PC     Coordination gross motor deficit;left;lower extremity;severe impairment  -PC     Functional Endurance poor  -PC     Muscle Tone left;lower extremity(s);severe impairment;flaccidity  -PC     Neuromuscular Function left;lower extremity;severe impairment  -PC     Therapeutic Exercise --  PROM L UE and LLE with heel cord stretch  -PC       Row Name 04/03/24 1017          Balance    Balance Assessment sitting static  balance;sitting dynamic balance  -     Static Sitting Balance moderate assist  -PC     Dynamic Sitting Balance moderate assist;maximum assist  -PC     Position, Sitting Balance supported;sitting edge of bed  -PC     Balance Interventions occupation based/functional task;sitting;moderate challenge;core stability exercise;dynamic reaching;motor strategy training;multisensory training  -     Comment, Balance worked in sitting on balance, trunk control  -       Row Name 04/03/24 1017          Sensory Assessment (Somatosensory)    Sensory Assessment (Somatosensory) left LE  -PC     Left LE Sensory Assessment general sensation;impaired  -PC               User Key  (r) = Recorded By, (t) = Taken By, (c) = Cosigned By      Initials Name Provider Type    PC Sidra Arce, PT Physical Therapist                   Goals/Plan       Row Name 04/03/24 1027          Bed Mobility Goal 1 (PT)    Activity/Assistive Device (Bed Mobility Goal 1, PT) sit to supine/supine to sit  -PC     [place] Level/Cues Needed (Bed Mobility Goal 1, PT) moderate assist (50-74% patient effort)  -PC     Time Frame (Bed Mobility Goal 1, PT) 2 weeks  -       Row Name 04/03/24 1027          Transfer Goal 1 (PT)    Activity/Assistive Device (Transfer Goal 1, PT) sit-to-stand/stand-to-sit  -PC     [place] Level/Cues Needed (Transfer Goal 1, PT) moderate assist (50-74% patient effort)  -PC     Time Frame (Transfer Goal 1, PT) 2 weeks  -       Row Name 04/03/24 1027          Problem Specific Goal 1 (PT)    Problem Specific Goal 1 (PT) Pt will sit edge of bed for 5 min with CGA  -PC     Time Frame (Problem Specific Goal 1, PT) 2 weeks  -       Row Name 04/03/24 1027          Therapy Assessment/Plan (PT)    Planned Therapy Interventions (PT) balance training;bed mobility training;gait training;transfer training;strengthening;neuromuscular re-education;stretching;patient/family education  -               User Key  (r) = Recorded By, (t) =  Taken By, (c) = Cosigned By      Initials Name Provider Type    PC Sidra Arce, PT Physical Therapist                   Clinical Impression       Row Name 04/03/24 1022          Pain    Pre/Posttreatment Pain Comment pt did not c/o pain, or show outward appearance of pain  -PC       Row Name 04/03/24 1022          Plan of Care Review    Plan of Care Reviewed With patient  -PC     Outcome Evaluation Pt is a 78 yo male adm with R CVA, underwent R carotid stent and mechanical thrombectomy, presents with L side weakness, impaired sensation, impaired balance, deficits with trunk control, contributing to severe impairment with functional mobility, he is also having some vomiting with blood present and will be having an EGD. Per wife, pt was independent prior to this event. Ipad  used to communicate with wife and pt, pt was able to follow simple commands with R side, needed max a x 2 to sit edge of bed and mod a to maintain balance with L lean. Pt will cont to benefit from PT to address deficits and anticipate need for rehab at discharge.  -PC       Row Name 04/03/24 1022          Therapy Assessment/Plan (PT)    Rehab Potential (PT) fair, will monitor progress closely  -PC     Criteria for Skilled Interventions Met (PT) yes;meets criteria  -PC     Therapy Frequency (PT) 6 times/wk  -PC       Row Name 04/03/24 1022          Positioning and Restraints    Pre-Treatment Position in bed  -PC     Post Treatment Position bed  -PC     In Bed supine;call light within reach;encouraged to call for assist;exit alarm on;with family/caregiver  -PC               User Key  (r) = Recorded By, (t) = Taken By, (c) = Cosigned By      Initials Name Provider Type    PC Sidra Arce, PT Physical Therapist                   Outcome Measures       Row Name 04/03/24 1029          How much help from another person do you currently need...    Turning from your back to your side while in flat bed without using bedrails? 1  -PC      Moving from lying on back to sitting on the side of a flat bed without bedrails? 1  -PC     Moving to and from a bed to a chair (including a wheelchair)? 1  -PC     Standing up from a chair using your arms (e.g., wheelchair, bedside chair)? 1  -PC     Climbing 3-5 steps with a railing? 1  -PC     To walk in hospital room? 1  -PC     AM-PAC 6 Clicks Score (PT) 6  -PC     Highest Level of Mobility Goal 2 --> Bed activities/dependent transfer  -PC       Row Name 04/03/24 1030          Modified Lilian Scale    Modified [place] Scale 5 - Severe disability.  Bedridden, incontinent, and requiring constant nursing care and attention.  -PC       Row Name 04/03/24 1030          Functional Assessment    Outcome Measure Options Modified Lilian  -PC               User Key  (r) = Recorded By, (t) = Taken By, (c) = Cosigned By      Initials Name Provider Type    PC Sidra Arce, PT Physical Therapist                                 Physical Therapy Education       Title: PT OT SLP Therapies (In Progress)       Topic: Physical Therapy (In Progress)       Point: Mobility training (In Progress)       Learning Progress Summary             Patient Acceptance, E,D, NR by PC at 4/3/2024 1030   Family Acceptance, E,D, NR by PC at 4/3/2024 1030                         Point: Home exercise program (In Progress)       Learning Progress Summary             Patient Acceptance, E,D, NR by PC at 4/3/2024 1030   Family Acceptance, E,D, NR by PC at 4/3/2024 1030                         Point: Body mechanics (In Progress)       Learning Progress Summary             Patient Acceptance, E,D, NR by PC at 4/3/2024 1030   Family Acceptance, E,D, NR by PC at 4/3/2024 1030                         Point: Precautions (In Progress)       Learning Progress Summary             Patient Acceptance, E,D, NR by PC at 4/3/2024 1030   Family Acceptance, E,D, NR by PC at 4/3/2024 1030                                         User Key       Initials Effective Dates  Name Provider Type Discipline     06/16/21 -  Sidra Arce PT Physical Therapist PT                  PT Recommendation and Plan  Planned Therapy Interventions (PT): balance training, bed mobility training, gait training, transfer training, strengthening, neuromuscular re-education, stretching, patient/family education  Plan of Care Reviewed With: patient  Outcome Evaluation: Pt is a 78 yo male adm with R CVA, underwent R carotid stent and mechanical thrombectomy, presents with L side weakness, impaired sensation, impaired balance, deficits with trunk control, contributing to severe impairment with functional mobility, he is also having some vomiting with blood present and will be having an EGD. Per wife, pt was independent prior to this event. Ipad  used to communicate with wife and pt, pt was able to follow simple commands with R side, needed max a x 2 to sit edge of bed and mod a to maintain balance with L lean. Pt will cont to benefit from PT to address deficits and anticipate need for rehab at discharge.     Time Calculation:         PT Charges       Row Name 04/03/24 1031             Time Calculation    Start Time 0940  -PC      Stop Time 1003  -PC      Time Calculation (min) 23 min  -PC      PT Received On 04/03/24  -PC      PT - Next Appointment 04/04/24  -PC      PT Goal Re-Cert Due Date 04/17/24  -PC                User Key  (r) = Recorded By, (t) = Taken By, (c) = Cosigned By      Initials Name Provider Type    PC Sidra Arce, PT Physical Therapist                  Therapy Charges for Today       Code Description Service Date Service Provider Modifiers Qty    02141051978 HC PT EVAL HIGH COMPLEXITY 3 4/3/2024 Sidra Arce, PT GP 1    [id number]  PT THER PROC EA 15 MIN 4/3/2024 Sidra Arce PT GP 1            PT G-Codes  Outcome Measure Options: Modified [place]  AM-PAC 6 Clicks Score (PT): 6  Modified Lilian Scale: 5 - Severe disability.  Bedridden, incontinent, and requiring  constant nursing care and attention.  PT Discharge Summary  Anticipated Discharge Disposition (PT): inpatient rehabilitation facility    Sidra Arce, PT  4/3/2024

## 2024-04-03 NOTE — ANESTHESIA PROCEDURE NOTES
Airway  Urgency: elective    Date/Time: 4/3/2024 1:24 PM  Airway not difficult    General Information and Staff    Patient location during procedure: OR  Anesthesiologist: Joseph Barrow MD  CRNA/CAA: Rodolfo Ram CRNA    Indications and Patient Condition  Indications for airway management: airway protection    Preoxygenated: yes  Mask difficulty assessment: 1 - vent by mask    Final Airway Details  Final airway type: endotracheal airway      Successful airway: ETT  Cuffed: yes   Successful intubation technique: direct laryngoscopy  Endotracheal tube insertion site: oral  Blade: [place]  Blade size: 3  Cormack-Lehane Classification: grade I - full view of glottis  Placement verified by: chest auscultation and capnometry   Measured from: lips  ETT/EBT  to lips (cm): 22  Number of attempts at approach: 1

## 2024-04-03 NOTE — PLAN OF CARE
Goal Outcome Evaluation:              Outcome Evaluation: Hold swallow eval. Will check back Friday.

## 2024-04-03 NOTE — PROGRESS NOTES
"DOS: 4/3/2024  NAME: Alex Negron   : 1945  PCP: Jyothi Song, LUIS  Chief Complaint   Patient presents with    Extremity Weakness       Chief complaint: Stroke  Subjective: Coffee-ground emesis overnight.  On aspirin and Plavix for carotid stent.    Objective:  Vital signs: /68   Pulse 116   Temp 100 °F (37.8 °C) (Oral)   Resp 18   Ht 180.3 cm (71\")   Wt 85.6 kg (188 lb 11.4 oz)   SpO2 90%   BMI 26.32 kg/m²    Gen: NAD, vitals reviewed, tachycardic  MS: Asleep  Motor: Spontaneously moving the left upper extremity    Lab Results   Component Value Date    GLUCOSE 191 (H) 2024    CALCIUM 8.5 (L) 2024     2024    K 4.3 2024    CO2 23.0 2024     2024    BUN 26 (H) 2024    CREATININE 1.46 (H) 2024    BCR 17.8 2024    ANIONGAP 10.0 2024     Lab Results   Component Value Date    WBC 19.18 (H) 2024    HGB 10.5 (L) 2024    HCT 31.9 (L) 2024    MCV 90.6 2024     2024       Imaging results: MRI yesterday personally reviewed and shows predominantly right MCA inferior division stroke which is fairly patchy without significant hemorrhagic conversion.    Lab results: WBC 19, hemoglobin 10.5, creatinine 1.5. P2Y12 7    Diagnoses:  Stroke due to right carotid occlusion  Received tenecteplase in the emergency department  Status post right carotid stent  Coffee-ground emesis    Comment: From a stroke standpoint has been stable.  Medically he has appeared more comfortable and overnight complained of some burning chest pain.  He had coffee-ground emesis but hemoglobin is fairly stable.  I suspect he had some irritation from the core track which was placed around the time of stopping Integrilin. P2Y12 likely falsely low since we checked it around 8 hours since stopping Integrilin. I will repeat this tomorrow to make sure it remains therapeutic.    Plan:  Continue aspirin and Plavix for new carotid " stent  GI consulted for coffee ground emesis. I suspect esophagitis or some sort of irritation from the Cor-trak which was placed on Integrilin  Statin  Continue supportive care    Management discussed with nursing staff.

## 2024-04-03 NOTE — PLAN OF CARE
Goal Outcome Evaluation:  Plan of Care Reviewed With: patient           Outcome Evaluation: Pt is a 80 yo male adm with R CVA, underwent R carotid stent and mechanical thrombectomy, presents with L side weakness, impaired sensation, impaired balance, deficits with trunk control, contributing to severe impairment with functional mobility, he is also having some vomiting with blood present and will be having an EGD. Per wife, pt was independent prior to this event. Ipad  used to communicate with wife and pt, pt was able to follow simple commands with R side, needed max a x 2 to sit edge of bed and mod a to maintain balance with L lean. Pt will cont to benefit from PT to address deficits and anticipate need for rehab at discharge.      Anticipated Discharge Disposition (PT): inpatient rehabilitation facility

## 2024-04-03 NOTE — PLAN OF CARE
Goal Outcome Evaluation:         Patient remains in ICU. Patient had an EGD completed today due to second episode of coffee ground emesis. Sample drawn, positive for occult blood. Patient Sats hanging out in mid 80 range on non rebreather prior to procedure. Procedure completed and patient remains intubated due to sats. Patient peep on 7.5 on 50% O2. Patient has cortrak at 110. TF's restarted per GI. Patient in restraints. Adequate UOP. Patient had 17 beat run of multifocal pvc's.

## 2024-04-03 NOTE — CONSULTS
[facility]  Initial Inpatient Consult Note    Referring Provider: [facility] intensive [facility]    Reason for Consultation: Hematemesis, heme positive gastric aspirate, anticoagulation needs for stent placement    Subjective     History of present illness:    79 y.o. male had a recent stroke requiring neurosurgical intervention for an R ICA occlusion with stent placement and thrombectomy.  Patient is [place] and  is used to converse with patient and his family.  Evidently has history of reflux for which he uses Tums routinely and upon initiation of tube feeds yesterday experienced heartburn with subsequent episode of coffee-ground emesis last p.m. and this a.m.  Hemoglobin of 10.5 with hematocrit of 31.9.  Per RN patient is currently on Plavix.  Patient denies prior history of peptic ulcer disease or current abdominal pain.    Past Medical History:  History reviewed. No pertinent past medical history.  Past Surgical History:  History reviewed. No pertinent surgical history.   Social History:   Social History     Tobacco Use    Smoking status: Former     Current packs/day: 0.00     Types: Cigarettes     Quit date:      Years since quittin.2    Smokeless tobacco: Never   Substance Use Topics    Alcohol use: Not Currently      Family History:  History reviewed. No pertinent family history.    Home Meds:  Medications Prior to Admission   Medication Sig Dispense Refill Last Dose    amLODIPine (NORVASC) 10 MG tablet Take 1 tablet by mouth Daily.       ferrous sulfate 325 (65 FE) MG tablet Take 1 tablet by mouth Daily With Breakfast.       gabapentin (NEURONTIN) 300 MG capsule Take 1 capsule by mouth 3 (Three) Times a Day.       indapamide (LOZOL) 1.25 MG tablet Take 1 tablet by mouth Every Morning.       lisinopril (PRINIVIL,ZESTRIL) 10 MG tablet Take 1 tablet by mouth Daily.       rosuvastatin (CRESTOR) 20 MG tablet Take 1 tablet by mouth Daily.       tamsulosin (FLOMAX) 0.4 MG capsule  24 hr capsule Take 1 capsule by mouth Every Night.        Current Meds:   aspirin, 81 mg, Nasogastric, Daily  atorvastatin, 80 mg, Nasogastric, Nightly  clopidogrel, 75 mg, Nasogastric, Daily  famotidine, 20 mg, Oral, BID AC  senna-docusate sodium, 2 tablet, Nasogastric, BID  sodium chloride, 500 mL, Intravenous, Once  sodium chloride, 10 mL, Intravenous, Q12H      Allergies:  No Known Allergies  Review of Systems  Review of systems could not be obtained due to   patient sedation status.     Objective     Vital Signs  Temp:  [98.3 °F (36.8 °C)-100 °F (37.8 °C)] 100 °F (37.8 °C)  Heart Rate:  [] 116  Resp:  [16-18] 18  BP: (123-154)/(58-86) 136/68  Physical Exam:  General Appearance:    Alert, cooperative, in no acute distress   Head:    Normocephalic, without obvious abnormality, atraumatic   Eyes:          conjunctivae and sclerae normal, no   icterus   Throat:   no thrush, oral mucosa moist   Neck:   Supple, no adenopathy   Lungs:     Clear to auscultation bilaterally    Heart:    Regular rhythm and normal rate    Chest Wall:    No abnormalities observed   Abdomen:     Soft, nondistended, nontender; normal bowel sounds   Extremities:   no edema, no redness   Skin:   No bruising or rash   Psychiatric:  normal mood and insight     Results Review:   I reviewed the patient's new clinical results.    Results from last 7 days   Lab Units 04/03/24  0205 04/02/24 0333 04/01/24 2206   WBC 10*3/mm3 19.18* 11.73* 8.18   HEMOGLOBIN g/dL 10.5* 11.1* 12.0*   HEMATOCRIT % 31.9* 33.8* 36.4*   PLATELETS 10*3/mm3 351 343 434     Results from last 7 days   Lab Units 04/03/24  0205 04/02/24  0333 04/01/24  2209 04/01/24  2206   SODIUM mmol/L 138 138  --  140   POTASSIUM mmol/L 4.3 4.6  --  4.3   CHLORIDE mmol/L 105 102  --  104   CO2 mmol/L 23.0 22.5  --  24.0   BUN mg/dL 26* 28*  --  33*   CREATININE mg/dL 1.46* 1.34* 1.40* 1.43*   CALCIUM mg/dL 8.5* 9.1  --  10.1*   BILIRUBIN mg/dL  --   --   --  0.2   ALK PHOS U/L  --   --  "  --  98   ALT (SGPT) U/L  --   --   --  24   AST (SGOT) U/L  --   --   --  21   GLUCOSE mg/dL 191* 177*  --  112*     Results from last 7 days   Lab Units 04/01/24  2206   INR  0.97     No results found for: \"LIPASE\"    Radiology:  MRI Brain With & Without Contrast   Final Result   Multiple acute infarcts are appreciated, the largest of   which involves the parietal occipital and occipital temporal region on   the right posterolaterally. Smaller acute infarcts are appreciated   involving the head of the caudate nucleus on the right involving the   medial aspect of the thalamus on the right. There is mild-to-moderate   enhancement related to the infarcts noted above. Smaller infarcts   without enhancement are appreciated involving the right cerebellar   hemisphere posteriorly, medial aspect of the left cerebellar hemisphere   and the posterior and medial aspects of the left temporal lobe.       This report was finalized on 4/2/2024 12:49 PM by Dr. Vivek Gudino M.D   on Workstation: [id number]          XR Chest 1 View   Final Result   Bibasilar atelectasis versus scarring.       This report was finalized on 4/1/2024 10:55 PM by Dr. Kristal Sanford M.D on Workstation: [id number]          CT Angiogram Head w AI Analysis of LVO   Final Result   1. No acute intracranial hemorrhage. However, the patient has a   hyperdense right MCA.   2. Area of cerebral blood flow less than 30% within the right MCA   territory measuring 55 cc, with corresponding Tmax greater than 6   seconds, measuring up to 173 cc.   3. 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 The   patient has some faint opacification of the right M1, although there is   thrombus identified within it. There is opacification of the right M2   branches.   4. Marked irregularity of the intracranial distal right vertebral   artery, which may reflect dissection, as well as a bulbous outpouching   which may represent pseudoaneurysm.           This " report was finalized on 4/2/2024 12:42 AM by Dr. Kristal Sanford M.D on Workstation: [email or web address]          CT Angiogram Neck   Final Result   1. No acute intracranial hemorrhage. However, the patient has a   hyperdense right MCA.   2. Area of cerebral blood flow less than 30% within the right MCA   territory measuring 55 cc, with corresponding Tmax greater than 6   seconds, measuring up to 173 cc.   3. 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 The   patient has some faint opacification of the right M1, although there is   thrombus identified within it. There is opacification of the right M2   branches.   4. Marked irregularity of the intracranial distal right vertebral   artery, which may reflect dissection, as well as a bulbous outpouching   which may represent pseudoaneurysm.           This report was finalized on 4/2/2024 12:42 AM by Dr. Kristla Sanford M.D on Workstation: [email or web address]          CT CEREBRAL PERFUSION WITH & WITHOUT CONTRAST   Final Result   1. No acute intracranial hemorrhage. However, the patient has a   hyperdense right MCA.   2. Area of cerebral blood flow less than 30% within the right MCA   territory measuring 55 cc, with corresponding Tmax greater than 6   seconds, measuring up to 173 cc.   3. 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 The   patient has some faint opacification of the right M1, although there is   thrombus identified within it. There is opacification of the right M2   branches.   4. Marked irregularity of the intracranial distal right vertebral   artery, which may reflect dissection, as well as a bulbous outpouching   which may represent pseudoaneurysm.           This report was finalized on 4/2/2024 12:42 AM by Dr. Kristal Sanford M.D on Workstation: [id number]          [facility] Thromb Prim Nonc Art Ini    (Results Pending)       Assessment & Plan   Active Hospital Problems    Diagnosis     **Acute CVA  (cerebrovascular accident)     Right-sided extracranial carotid artery stenosis        Assessment:  Coffee-ground emesis, heme positive aspirate: Suspect upper GI tract bleeding source with possible Tarsha-Morataya tear, peptic ulcer, or of other etiology.  Recent R ICA stent placement requiring an coagulation therapy    Plan:  Offered EGD to further assess with the understanding there may be some limitations given his anticoagulated status.  Reviewed procedure with patient and family at bedside explained the potential risk involved they voiced understanding and agreed to proceed.  Monitor H&H  Pending endoscopic findings may initiate PPI      I discussed the patients findings and my recommendations with patient, family, and nursing staff.    Redd Guidry MD

## 2024-04-03 NOTE — ANESTHESIA POSTPROCEDURE EVALUATION
"Patient: Alex Negron    Procedure Summary       Date: 04/03/24 Room / Location:  [facility]    Anesthesia Start: 1315 Anesthesia Stop: 1335    Procedure: ESOPHAGOGASTRODUODENOSCOPY AT BEDSIDE (Esophagus) Diagnosis:       Esophageal ulceration      Hiatal hernia      History of pyloroplasty      (Coffee ground emesis [K92.0])      (Gastroesophageal reflux disease, unspecified whether esophagitis present [K21.9])    Surgeons: Redd Guidry MD Provider: Joseph Barrow MD    Anesthesia Type: general ASA Status: 4 - Emergent            Anesthesia Type: general    Vitals  Vitals Value Taken Time   /71 04/03/24 1340   Temp     Pulse 85 04/03/24 1346   Resp     SpO2 86 % 04/03/24 1346   Vitals shown include unfiled device data.        Post Anesthesia Care and Evaluation    Patient location during evaluation: bedside  Patient participation: complete - patient cannot participate  Post-procedure mental status: sedated.  Pain management: adequate    Airway patency: patent  Anesthetic complications: No anesthetic complications  PONV Status: none  Cardiovascular status: acceptable  Respiratory status: acceptable, ventilator and ETT  Hydration status: acceptable    Comments: /65   Pulse 107   Temp 37.7 °C (99.9 °F) (Oral)   Resp 18   Ht 180.3 cm (71\")   Wt 85.6 kg (188 lb 11.4 oz)   SpO2 91%   BMI 26.32 kg/m²       "

## 2024-04-03 NOTE — THERAPY EVALUATION
"Patient Name: Alex Negron  : 1945    MRN: [id number]                              Today's Date: 4/3/2024       Admit Date: 2024    Visit Dx:     ICD-10-CM ICD-9-CM   1. Cerebrovascular accident (CVA), unspecified mechanism  I63.9 434.91   2. Coffee ground emesis  K92.0 578.0   3. Gastroesophageal reflux disease, unspecified whether esophagitis present  K21.9 530.81     Patient Active Problem List   Diagnosis    Acute CVA (cerebrovascular accident)    Right-sided extracranial carotid artery stenosis    Coffee ground emesis    Gastroesophageal reflux disease     History reviewed. No pertinent past medical history.  History reviewed. No pertinent surgical history.   General Information       [facility] Name 24 1224          OT Time and Intention    Document Type evaluation  -     Mode of Treatment physical therapy;occupational therapy;co-treatment  2/2 acuity of stroke, ICU setting, safe mobilization of pt  -       Row Name 24 1224          General Information    Patient Profile Reviewed yes  -SM     Prior Level of Function independent:;ADL's;community mobility  \"no issues\" prior to stroke per spouse.  -     Existing Precautions/Restrictions fall;NPO;oxygen therapy device and L/min  10L HF O2  -     Barriers to Rehab medically complex;language barrier  Japanese  used (ID#[id number])  -       Row Name 24 1224          Living Environment    People in Home spouse  -       Row Name 24 1224          Cognition    Orientation Status (Cognition) unable/difficult to assess;oriented x 3   utlized, spouse reports pt is aware of details of his situation and where he is.  -       Row Name 24 1224          Safety Issues, Functional Mobility    Safety Issues Affecting Function (Mobility) impulsivity;insight into deficits/self-awareness;judgment;problem-solving;sequencing abilities  -     Impairments Affecting Function (Mobility) " balance;coordination;endurance/activity tolerance;grasp;strength;postural/trunk control;muscle tone abnormal;motor control;cognition;visual/perceptual;sensation/sensory awareness;shortness of breath  -     Comment, Safety Issues/Impairments (Mobility) PT/OT cotreatment medically appropriate and necessary due to patient acuity level, to maximize therapeutic benefit due to impaired act tolerance, and for safety of patient and staff. Treatment focused on progression of care and goals established in POC.  -               User Key  (r) = Recorded By, (t) = Taken By, (c) = Cosigned By      Initials Name Provider Type     Yasmine Martin OT Occupational Therapist                     Mobility/ADL's       Row Name 04/03/24 1227          Bed Mobility    Supine-Sit [place] (Bed Mobility) maximum assist (25% patient effort);2 person assist  -     Sit-Supine [place] (Bed Mobility) maximum assist (25% patient effort);2 person assist  -     Assistive Device (Bed Mobility) draw sheet;head of bed elevated  -       Row Name 04/03/24 1227          Transfers    Comment, (Transfers) not appropriate to attempt today due to sitting balance, weakness.  -       Row Name 04/03/24 1227          Activities of Daily Living    BADL Assessment/Intervention lower body dressing;grooming;upper body dressing;toileting  -       Row Name 04/03/24 1227          Lower Body Dressing Assessment/Training    [place] Level (Lower Body Dressing) don;socks;dependent (less than 25% patient effort)  -     Position (Lower Body Dressing) supine  -       Row Name 04/03/24 1227          Grooming Assessment/Training    [place] Level (Grooming) wash face, hands;verbal cues;nonverbal cues (demo/gesture);minimum assist (75% patient effort)  -     Assistive Devices (Grooming) hand over hand  -     Position (Grooming) edge of bed sitting  -       Row Name 04/03/24 1227          Upper Body Dressing Assessment/Training     [place] Level (Upper Body Dressing) don;pull-over garment;maximum assist (25% patient effort);verbal cues;nonverbal cues (demo/gesture)  -     Position (Upper Body Dressing) edge of bed sitting  -[facility] Name 04/03/24 1227          Toileting Assessment/Training    [place] Level (Toileting) dependent (less than 25% patient effort)  -     Position (Toileting) supine  -     Comment, (Toileting) not currently safe to transfer to bathroom or BSC  -               User Key  (r) = Recorded By, (t) = Taken By, (c) = Cosigned By      Initials Name Provider Type     Yasmine Martin OT Occupational Therapist                   Obj/Interventions       [facility] Name 04/03/24 1228          Sensory Assessment (Somatosensory)    Sensory Assessment (Somatosensory) left UE  -     Left UE Sensory Assessment general sensation;light touch awareness;absent;unable/difficult to assess  no response to painful stimuli  -[facility] Name 04/03/24 1228          Vision Assessment/Intervention    Visual Processing Deficit theresa-inattention/neglect, left  -     Vision Assessment Comment correctly able to identify how many fingers are held up on R side, only minimal attention to L side today with cues.  -[facility] Name 04/03/24 1228          Range of Motion Comprehensive    Comment, General Range of Motion LUE no AROM, PROM WFL, RUE AROM WFL.  -[facility] Name 04/03/24 1228          Strength Comprehensive (MMT)    Comment, General Manual Muscle Testing (MMT) Assessment LUE 0/5, RUE appears strong with mobility but not formally MMT.  -[facility] Name 04/03/24 1228          Motor Skills    Motor Skills functional endurance;muscle tone;glenohumeral joint subluxation;neuro-muscular function  -     Functional Endurance poor  -     Muscle Tone left;upper extremity(s);flaccidity  -     Neuromuscular Function left;upper extremity;severe impairment  -[facility] Name 04/03/24 1228          Balance    Static Sitting  Balance minimal assist  -SM     Dynamic Sitting Balance moderate assist;maximum assist  -SM     Position, Sitting Balance sitting edge of bed  -       Row Name 04/03/24 1228          Glenohumeral Joint Subluxation    Glenohumeral Joint Subluxation left;anterior;minimal subluxation  positioning in bed on pillow  -SM               User Key  (r) = Recorded By, (t) = Taken By, (c) = Cosigned By      Initials Name Provider Type    Yasmine Kirkpatrick OT Occupational Therapist                   Goals/Plan       Row Name 04/03/24 1235          Bed Mobility Goal 1 (OT)    Activity/Assistive Device (Bed Mobility Goal 1, OT) sit to supine/supine to sit  -SM     [place] Level/Cues Needed (Bed Mobility Goal 1, OT) moderate assist (50-74% patient effort)  -SM     Time Frame (Bed Mobility Goal 1, OT) short term goal (STG);2 weeks  -SM     Strategies/Barriers (Bed Mobility Goal 1, OT) in prep for ADLs EOB and in prep to transfers.  -SM     Progress/Outcomes (Bed Mobility Goal 1, OT) goal ongoing  -[facility] Name 04/03/24 1235          Transfer Goal 1 (OT)    Activity/Assistive Device (Transfer Goal 1, OT) sit-to-stand/stand-to-sit;bed-to-chair/chair-to-bed;commode, bedside without drop arms  -SM     [place] Level/Cues Needed (Transfer Goal 1, OT) maximum assist (25-49% patient effort)  -SM     Time Frame (Transfer Goal 1, OT) short term goal (STG);2 weeks  -SM     Progress/Outcome (Transfer Goal 1, OT) goal ongoing  -[facility] Name 04/03/24 1235          Dressing Goal 1 (OT)    Activity/Device (Dressing Goal 1, OT) upper body dressing  -SM     [place]/Cues Needed (Dressing Goal 1, OT) moderate assist (50-74% patient effort)  -SM     Time Frame (Dressing Goal 1, OT) short term goal (STG);2 weeks  -SM     Progress/Outcome (Dressing Goal 1, OT) goal ongoing  -[facility] Name 04/03/24 1235          Grooming Goal 1 (OT)    Activity/Device (Grooming Goal 1, OT) grooming skills, all  -SM     [place]  (Grooming Goal 1, OT) standby assist  -     Time Frame (Grooming Goal 1, OT) short term goal (STG);2 weeks  -SM     Progress/Outcome (Grooming Goal 1, OT) goal ongoing  -[facility] Name 04/03/24 1235          Strength Goal 1 (OT)    Strength Goal 1 (OT) Pt to improve LUE strength to 1/5 MMT (trace) in prepatory to participate in functional tasks.  -SM     Time Frame (Strength Goal 1, OT) short term goal (STG);2 weeks  -SM     Progress/Outcome (Strength Goal 1, OT) goal ongoing  -[facility] Name 04/03/24 1235          Therapy Assessment/Plan (OT)    Planned Therapy Interventions (OT) activity tolerance training;adaptive equipment training;functional balance retraining;occupation/activity based interventions;cognitive/visual perception retraining;neuromuscular control/coordination retraining;patient/caregiver education/training;transfer/mobility retraining;strengthening exercise;ROM/therapeutic exercise;orthotic fabrication/fitting/training;IADL retraining;BADL retraining  -               User Key  (r) = Recorded By, (t) = Taken By, (c) = Cosigned By      Initials Name Provider Type     Yasmine Martin OT Occupational Therapist                   Clinical Impression       Row Name 04/03/24 1232          Pain Assessment    Additional Documentation Pain Scale: FACES Pre/Post-Treatment (Group)  -SM       Row Name 04/03/24 1232          Pain Scale: FACES Pre/Post-Treatment    Pain: FACES Scale, Pretreatment 0-->no hurt  -     Posttreatment Pain Rating 0-->no hurt  -SM       Row Name 04/03/24 1232          Plan of Care Review    Plan of Care Reviewed With patient;spouse  -     Outcome Evaluation Pt is a 79 y.o male admitted 4/1 with acute L sided weakness, slurred speech, facial droop. He is s/p mechanical thrombectomy amd R carotid stent on 4/1. Now found to have GI bleed. Pt participated in OT today. He is following 1 step commands via the . He has multiple deficits from CVA including flaccid  LUE/LLE, decreased L sided sensation, L neglect, decreased postural control overall limiting his ADL and functional mobility engagement/independence. Pt would benefit from continued OT to address deficits. Pt was able to sit EOB this session with X2 person assist.  -       Row Name 04/03/24 1232          Therapy Assessment/Plan (OT)    Rehab Potential (OT) good, to achieve stated therapy goals  -     Criteria for Skilled Therapeutic Interventions Met (OT) yes;skilled treatment is necessary  -     Therapy Frequency (OT) 5 times/wk  -       Row Name 04/03/24 1232          Therapy Plan Review/Discharge Plan (OT)    Anticipated Discharge Disposition (OT) inpatient rehabilitation facility  -       Row Name 04/03/24 1232          Vital Signs    Pre Systolic BP Rehab 133  -SM     Pre Treatment Diastolic BP 69  -SM     Pretreatment Heart Rate (beats/min) 101  -SM     Posttreatment Heart Rate (beats/min) 96  -SM     Pre SpO2 (%) 88  -SM     O2 Delivery Pre Treatment hi-flow  -SM     Post SpO2 (%) 91  -SM     O2 Delivery Post Treatment hi-flow  -       Row Name 04/03/24 1232          Positioning and Restraints    Pre-Treatment Position in bed  -SM     Post Treatment Position bed  -SM     In Bed fowlers;call light within reach;encouraged to call for assist;exit alarm on;notified nsg;with family/caregiver  -               User Key  (r) = Recorded By, (t) = Taken By, (c) = Cosigned By      Initials Name Provider Type     Yasmine Martin, OT Occupational Therapist                   Outcome Measures       Row Name 04/03/24 1238          How much help from another is currently needed...    Putting on and taking off regular lower body clothing? 1  -SM     Bathing (including washing, rinsing, and drying) 2  -SM     Toileting (which includes using toilet bed pan or urinal) 1  -SM     Putting on and taking off regular upper body clothing 2  -SM     Taking care of personal grooming (such as brushing teeth) 2  -SM      Eating meals 1  -SM     AM-PAC 6 Clicks Score (OT) 9  -SM       Row Name 04/03/24 1029          How much help from another person do you currently need...    Turning from your back to your side while in flat bed without using bedrails? 1  -PC     Moving from lying on back to sitting on the side of a flat bed without bedrails? 1  -PC     Moving to and from a bed to a chair (including a wheelchair)? 1  -PC     Standing up from a chair using your arms (e.g., wheelchair, bedside chair)? 1  -PC     Climbing 3-5 steps with a railing? 1  -PC     To walk in hospital room? 1  -PC     AM-PAC 6 Clicks Score (PT) 6  -PC     Highest Level of Mobility Goal 2 --> Bed activities/dependent transfer  -PC       Row Name 04/03/24 1238 04/03/24 1030       Modified Louann Scale    Modified Louann Scale 4 - Moderately severe disability.  Unable to walk without assistance, and unable to attend to own bodily needs without assistance.  - 5 - Severe disability.  Bedridden, incontinent, and requiring constant nursing care and attention.  -PC      Row Name 04/03/24 1238 04/03/24 1030       Functional Assessment    Outcome Measure Options AM-PAC 6 Clicks Daily Activity (OT)  - Modified Louann  -PC              User Key  (r) = Recorded By, (t) = Taken By, (c) = Cosigned By      Initials Name Provider Type    PC Sidra Arce, PT Physical Therapist    Yasmine Kirkpatrick, OT Occupational Therapist                    Occupational Therapy Education       Title: PT OT SLP Therapies (In Progress)       Topic: Occupational Therapy (In Progress)       Point: ADL training (Not Started)       Description:   Instruct learner(s) on proper safety adaptation and remediation techniques during self care or transfers.   Instruct in proper use of assistive devices.                  Learner Progress:  Not documented in this visit.              Point: Home exercise program (Not Started)       Description:   Instruct learner(s) on appropriate technique for  monitoring, assisting and/or progressing therapeutic exercises/activities.                  Learner Progress:  Not documented in this visit.              Point: Precautions (Not Started)       Description:   Instruct learner(s) on prescribed precautions during self-care and functional transfers.                  Learner Progress:  Not documented in this visit.              Point: Body mechanics (Done)       Description:   Instruct learner(s) on proper positioning and spine alignment during self-care, functional mobility activities and/or exercises.                  Learning Progress Summary             Family Acceptance, E, VU by  at 4/3/2024 7324    Comment: OT goals, POC, positioning of LUE for safety post CVA for subluxation                                         User Key       Initials Effective Dates Name Provider Type Discipline    DRE 04/02/20 -  Yasmine Martin, BUDDY Occupational Therapist OT                  OT Recommendation and Plan  Planned Therapy Interventions (OT): activity tolerance training, adaptive equipment training, functional balance retraining, occupation/activity based interventions, cognitive/visual perception retraining, neuromuscular control/coordination retraining, patient/caregiver education/training, transfer/mobility retraining, strengthening exercise, ROM/therapeutic exercise, orthotic fabrication/fitting/training, IADL retraining, BADL retraining  Therapy Frequency (OT): 5 times/wk  Plan of Care Review  Plan of Care Reviewed With: patient, spouse  Outcome Evaluation: Pt is a 79 y.o male admitted 4/1 with acute L sided weakness, slurred speech, facial droop. He is s/p mechanical thrombectomy amd R carotid stent on 4/1. Now found to have GI bleed. Pt participated in OT today. He is following 1 step commands via the . He has multiple deficits from CVA including flaccid LUE/LLE, decreased L sided sensation, L neglect, decreased postural control overall limiting his ADL and  functional mobility engagement/independence. Pt would benefit from continued OT to address deficits. Pt was able to sit EOB this session with X2 person assist.     Time Calculation:   Evaluation Complexity (OT)  Review Occupational Profile/Medical/Therapy History Complexity: extensive/high complexity  Assessment, Occupational Performance/Identification of Deficit Complexity: 5 or more performance deficits  Clinical Decision Making Complexity (OT): comprehensive assessment/high complexity  Overall Complexity of Evaluation (OT): high complexity     Time Calculation- OT       Row Name 04/03/24 1240             Time Calculation- OT    OT Start Time 0940  -SM      OT Stop Time 1003  -SM      OT Time Calculation (min) 23 min  -SM      Total Timed Code Minutes- OT 15 minute(s)  -SM      OT Received On 04/03/24  -      OT - Next Appointment 04/04/24  -      OT Goal Re-Cert Due Date 04/17/24  -         Timed Charges    59825 - OT Self Care/Mgmt Minutes 15  -SM         Untimed Charges    OT Eval/Re-eval Minutes 8  -SM         Total Minutes    Timed Charges Total Minutes 15  -SM      Untimed Charges Total Minutes 8  -SM       Total Minutes 23  -SM                User Key  (r) = Recorded By, (t) = Taken By, (c) = Cosigned By      Initials Name Provider Type     Yasmine Martin, OT Occupational Therapist                  Therapy Charges for Today       Code Description Service Date Service Provider Modifiers Qty    [id number]  OT SELF CARE/MGMT/TRAIN EA 15 MIN 4/3/2024 Yasmine Martin OT GO 1    [id number]  OT EVAL HIGH COMPLEXITY 3 4/3/2024 Yasmine Martin OT GO 1                 Yasmine Martin OT  4/3/2024

## 2024-04-03 NOTE — PLAN OF CARE
Goal Outcome Evaluation:  Plan of Care Reviewed With: patient, spouse           Outcome Evaluation: Pt is a 79 y.o male admitted 4/1 with acute L sided weakness, slurred speech, facial droop. He is s/p mechanical thrombectomy amd R carotid stent on 4/1. Now found to have GI bleed. Pt participated in OT today. He is following 1 step commands via the . He has multiple deficits from CVA including flaccid LUE/LLE, decreased L sided sensation, L neglect, decreased postural control overall limiting his ADL and functional mobility engagement/independence. Pt would benefit from continued OT to address deficits. Pt was able to sit EOB this session with X2 person assist.      Anticipated Discharge Disposition (OT): inpatient rehabilitation facility

## 2024-04-03 NOTE — PROGRESS NOTES
[facility] INPATIENT PROGRESS NOTE         [facility] INTENSIVE CARE    4/3/2024      PATIENT IDENTIFICATION:  Name: Alex Negron ADMIT: 2024   : 1945  PCP: Jyothi Song PA-C    MRN: [id number] LOS: 2 days   AGE/SEX: 79 y.o. male  ROOM: [facility]                     LOS 2    Reason for visit: Acute stroke      SUBJECTIVE:      Noted issues with coffee-ground emesis overnight.  Plan for PPI drip.  Awaiting GI input.  Aspirin and Plavix on hold currently.  Likely secondary to gastritis or placement of Cortrak while on Integrilin.  Resume aspirin and Plavix if okay with GI.      Objective   OBJECTIVE:    Vital Sign Min/Max for last 24 hours  Temp  Min: 98.3 °F (36.8 °C)  Max: 100 °F (37.8 °C)   BP  Min: 123/59  Max: 154/75   Pulse  Min: 70  Max: 120   Resp  Min: 16  Max: 18   SpO2  Min: 86 %  Max: 98 %   No data recorded   Weight  Min: 84.5 kg (186 lb 4.6 oz)  Max: 85.6 kg (188 lb 11.4 oz)    Vitals:    24 0830 24 0900 24 0930 24 1020   BP: 133/70 134/72 133/69 125/69   Pulse: 100 103 97 96   Resp:       Temp:       TempSrc:       SpO2: 92% 90% 90%    Weight:       Height:                24  0418 24  1344 24  0434   Weight: 84.5 kg (186 lb 4.6 oz) 84.5 kg (186 lb 4.6 oz) 85.6 kg (188 lb 11.4 oz)       Body mass index is 26.32 kg/m².                          Body mass index is 26.32 kg/m².    Intake/Output Summary (Last 24 hours) at 4/3/2024 1020  Last data filed at 4/3/2024 0438  Gross per 24 hour   Intake 2355 ml   Output 1125 ml   Net 1230 ml         Exam:  GEN:  No distress, appears stated age  EYES:   PERRL, anicteric sclerae  ENT:    External ears/nose normal, OP clear  NECK:  No adenopathy, midline trachea  LUNGS: Normal chest on inspection, palpation and auscultation  CV:  Normal S1S2, without murmur  ABD:  Nontender, nondistended, no hepatosplenomegaly, +BS  EXT:  No edema.  No cyanosis or clubbing.  No mottling and normal cap  refill.    Assessment     Scheduled meds:  aspirin, 81 mg, Nasogastric, Daily  atorvastatin, 80 mg, Nasogastric, Nightly  clopidogrel, 75 mg, Nasogastric, Daily  famotidine, 20 mg, Oral, BID AC  senna-docusate sodium, 2 tablet, Nasogastric, BID  sodium chloride, 500 mL, Intravenous, Once  sodium chloride, 10 mL, Intravenous, Q12H      IV meds:                      niCARdipine, 5-15 mg/hr, Last Rate: 5 mg/hr (04/03/24 1020)  pantoprazole, 40 mg, Last Rate: 40 mg (04/03/24 0835)  sodium chloride, 75 mL/hr, Last Rate: 75 mL/hr (04/03/24 0441)      Data Review:  Results from last 7 days   Lab Units 04/03/24 0205 04/02/24 0333 04/01/24 2209 04/01/24 2206   SODIUM mmol/L 138 138  --  140   POTASSIUM mmol/L 4.3 4.6  --  4.3   CHLORIDE mmol/L 105 102  --  104   CO2 mmol/L 23.0 22.5  --  24.0   BUN mg/dL 26* 28*  --  33*   CREATININE mg/dL 1.46* 1.34* 1.40* 1.43*   GLUCOSE mg/dL 191* 177*  --  112*   CALCIUM mg/dL 8.5* 9.1  --  10.1*         Estimated Creatinine Clearance: 51.3 mL/min (A) (by C-G formula based on SCr of 1.46 mg/dL (H)).  Results from last 7 days   Lab Units 04/03/24 0205 04/02/24 0333 04/01/24 2206   WBC 10*3/mm3 19.18* 11.73* 8.18   HEMOGLOBIN g/dL 10.5* 11.1* 12.0*   PLATELETS 10*3/mm3 351 343 434     Results from last 7 days   Lab Units 04/01/24 2206   INR  0.97     Results from last 7 days   Lab Units 04/01/24 2206   ALT (SGPT) U/L 24   AST (SGOT) U/L 21     Results from last 7 days   Lab Units 04/02/24  0251   PH, ARTERIAL pH units 7.343*   PO2 ART mm Hg 77.8*   PCO2, ARTERIAL mm Hg 44.7   HCO3 ART mmol/L 24.3             Hemoglobin A1C   Date/Time Value Ref Range Status   04/02/2024 0333 6.20 (H) 4.80 - 5.60 % Final     Glucose   Date/Time Value Ref Range Status   04/03/2024 0554 194 (H) 70 - 130 mg/dL Final   04/03/2024 0004 179 (H) 70 - 130 mg/dL Final   04/02/2024 1752 163 (H) 70 - 130 mg/dL Final   04/02/2024 1108 187 (H) 70 - 130 mg/dL Final   04/02/2024 0204 150 (H) 70 - 130 mg/dL Final    04/01/2024 2204 101 70 - 130 mg/dL Final         Imaging reviewed  Chest x-ray 4/1 shows bibasilar atelectasis      MRI brain 4/2 reviewed            Active Hospital Problems    Diagnosis  POA    **Acute CVA (cerebrovascular accident) [I63.9]  Yes    Right-sided extracranial carotid artery stenosis [I65.21]  Yes    Coffee ground emesis [K92.0]  Unknown    Gastroesophageal reflux disease [K21.9]  Unknown      Resolved Hospital Problems   No resolved problems to display.         ASSESSMENT:  Acute CVA with right carotid occlusion: Status post tPA and right carotid stent  Left-sided hemiparesis  Acute renal insufficiency  Elevated blood pressure   coffee-ground emesis                PLAN:  Has shown some improvement neurologically.  Continue neurochecks per protocol.  Goal blood pressure systolic less than 140.  On Cardene drip.  Aspirin and statin for secondary stroke prevention.  Suspect coffee-ground emesis due to gastritis versus bleeding from Cortrak placed while on blood thinner.  If okay with GI will resume aspirin and Plavix.  Started on PPI drip.  GI input pending.  Discussed with Dr. Faith.  Discussed with family.  Discussed with nursing staff at bedside.      CCT: 36 min    Martin Garnett MD  Pulmonary and Critical Care Medicine  [facility], [facility]  4/3/2024    10:20 EDT       Addendum:  Post endoscopy procedure with gastroenterology anesthesia left patient on the ventilator.  Said that he had significant oxygen requirements.  Chest x-ray shows increased vascular congestion versus developing consolidation.  Will send sputum for culture and go ahead and start on antibiotic with Zosyn.  Check procalcitonin and lactic acid.  Sepsis order bundle.    Electronically signed by Martin Garnett MD, 04/03/24, 2:23 PM EDT.

## 2024-04-04 ENCOUNTER — APPOINTMENT (OUTPATIENT)
Dept: CARDIOLOGY | Facility: HOSPITAL | Age: 79
End: 2024-04-04
Payer: MEDICAID

## 2024-04-04 ENCOUNTER — APPOINTMENT (OUTPATIENT)
Dept: GENERAL RADIOLOGY | Facility: HOSPITAL | Age: 79
End: 2024-04-04
Payer: MEDICAID

## 2024-04-04 LAB
ANION GAP SERPL CALCULATED.3IONS-SCNC: 9.2 MMOL/L (ref 5–15)
AORTIC DIMENSIONLESS INDEX: 0.9 (DI)
ASCENDING AORTA: 3.1 CM
ATMOSPHERIC PRESS: 736.3 MMHG
BASE EXCESS BLDV CALC-SCNC: -1.6 MMOL/L (ref -2–2)
BDY SITE: ABNORMAL
BH CV ECHO MEAS - ACS: 2.15 CM
BH CV ECHO MEAS - AO MAX PG: 13 MMHG
BH CV ECHO MEAS - AO MEAN PG: 7 MMHG
BH CV ECHO MEAS - AO ROOT DIAM: 3.4 CM
BH CV ECHO MEAS - AO V2 MAX: 180 CM/SEC
BH CV ECHO MEAS - AO V2 VTI: 35.5 CM
BH CV ECHO MEAS - AVA(I,D): 2.4 CM2
BH CV ECHO MEAS - EDV(CUBED): 43.5 ML
BH CV ECHO MEAS - EDV(MOD-SP2): 99 ML
BH CV ECHO MEAS - EDV(MOD-SP4): 107 ML
BH CV ECHO MEAS - EF(MOD-BP): 59.7 %
BH CV ECHO MEAS - EF(MOD-SP2): 58.6 %
BH CV ECHO MEAS - EF(MOD-SP4): 58.9 %
BH CV ECHO MEAS - ESV(CUBED): 11.2 ML
BH CV ECHO MEAS - ESV(MOD-SP2): 41 ML
BH CV ECHO MEAS - ESV(MOD-SP4): 44 ML
BH CV ECHO MEAS - FS: 36.4 %
BH CV ECHO MEAS - IVS/LVPW: 0.97 CM
BH CV ECHO MEAS - IVSD: 1.19 CM
BH CV ECHO MEAS - LAT PEAK E' VEL: 10.1 CM/SEC
BH CV ECHO MEAS - LV MASS(C)D: 137.6 GRAMS
BH CV ECHO MEAS - LV MAX PG: 10.6 MMHG
BH CV ECHO MEAS - LV MEAN PG: 6 MMHG
BH CV ECHO MEAS - LV V1 MAX: 163 CM/SEC
BH CV ECHO MEAS - LV V1 VTI: 30.5 CM
BH CV ECHO MEAS - LVIDD: 3.5 CM
BH CV ECHO MEAS - LVIDS: 2.24 CM
BH CV ECHO MEAS - LVOT AREA: 2.8 CM2
BH CV ECHO MEAS - LVOT DIAM: 1.88 CM
BH CV ECHO MEAS - LVPWD: 1.22 CM
BH CV ECHO MEAS - MED PEAK E' VEL: 6.3 CM/SEC
BH CV ECHO MEAS - MR MAX PG: 104.2 MMHG
BH CV ECHO MEAS - MR MAX VEL: 510.4 CM/SEC
BH CV ECHO MEAS - MV A DUR: 0.12 SEC
BH CV ECHO MEAS - MV A MAX VEL: 128 CM/SEC
BH CV ECHO MEAS - MV DEC SLOPE: 525.6 CM/SEC2
BH CV ECHO MEAS - MV DEC TIME: 0.2 SEC
BH CV ECHO MEAS - MV E MAX VEL: 78.4 CM/SEC
BH CV ECHO MEAS - MV E/A: 0.61
BH CV ECHO MEAS - MV MAX PG: 8.1 MMHG
BH CV ECHO MEAS - MV MEAN PG: 2.29 MMHG
BH CV ECHO MEAS - MV P1/2T: 53.1 MSEC
BH CV ECHO MEAS - MV V2 VTI: 27 CM
BH CV ECHO MEAS - MVA(P1/2T): 4.1 CM2
BH CV ECHO MEAS - MVA(VTI): 3.2 CM2
BH CV ECHO MEAS - PA ACC TIME: 0.08 SEC
BH CV ECHO MEAS - PA V2 MAX: 118.3 CM/SEC
BH CV ECHO MEAS - PULM A REVS DUR: 0.1 SEC
BH CV ECHO MEAS - PULM A REVS VEL: 47.7 CM/SEC
BH CV ECHO MEAS - PULM DIAS VEL: 56.1 CM/SEC
BH CV ECHO MEAS - PULM S/D: 0.91
BH CV ECHO MEAS - PULM SYS VEL: 50.8 CM/SEC
BH CV ECHO MEAS - QP/QS: 0.39
BH CV ECHO MEAS - RAP SYSTOLE: 3 MMHG
BH CV ECHO MEAS - RV MAX PG: 3.2 MMHG
BH CV ECHO MEAS - RV V1 MAX: 90.1 CM/SEC
BH CV ECHO MEAS - RV V1 VTI: 14.1 CM
BH CV ECHO MEAS - RVOT DIAM: 1.74 CM
BH CV ECHO MEAS - RVSP: 30 MMHG
BH CV ECHO MEAS - SUP REN AO DIAM: 2.9 CM
BH CV ECHO MEAS - SV(LVOT): 85.1 ML
BH CV ECHO MEAS - SV(MOD-SP2): 58 ML
BH CV ECHO MEAS - SV(MOD-SP4): 63 ML
BH CV ECHO MEAS - SV(RVOT): 33.4 ML
BH CV ECHO MEAS - TAPSE (>1.6): 2.25 CM
BH CV ECHO MEAS - TR MAX PG: 26.9 MMHG
BH CV ECHO MEAS - TR MAX VEL: 259.5 CM/SEC
BH CV ECHO MEASUREMENTS AVERAGE E/E' RATIO: 9.56
BH CV XLRA - RV BASE: 3.1 CM
BH CV XLRA - RV LENGTH: 8.2 CM
BH CV XLRA - RV MID: 2.8 CM
BH CV XLRA - TDI S': 14.5 CM/SEC
BUN SERPL-MCNC: 26 MG/DL (ref 8–23)
BUN/CREAT SERPL: 18.3 (ref 7–25)
CALCIUM SPEC-SCNC: 7.5 MG/DL (ref 8.6–10.5)
CHLORIDE SERPL-SCNC: 112 MMOL/L (ref 98–107)
CO2 BLDA-SCNC: 24.6 MMOL/L (ref 23–27)
CO2 SERPL-SCNC: 19.8 MMOL/L (ref 22–29)
CREAT SERPL-MCNC: 1.42 MG/DL (ref 0.76–1.27)
DEPRECATED RDW RBC AUTO: 40.2 FL (ref 37–54)
DEVICE COMMENT: ABNORMAL
EGFRCR SERPLBLD CKD-EPI 2021: 50.9 ML/MIN/1.73
ERYTHROCYTE [DISTWIDTH] IN BLOOD BY AUTOMATED COUNT: 12.3 % (ref 12.3–15.4)
GLUCOSE BLDC GLUCOMTR-MCNC: 120 MG/DL (ref 70–130)
GLUCOSE BLDC GLUCOMTR-MCNC: 121 MG/DL (ref 70–130)
GLUCOSE BLDC GLUCOMTR-MCNC: 132 MG/DL (ref 70–130)
GLUCOSE BLDC GLUCOMTR-MCNC: 141 MG/DL (ref 70–130)
GLUCOSE SERPL-MCNC: 126 MG/DL (ref 65–99)
HCO3 BLDV-SCNC: 23.4 MMOL/L (ref 22–28)
HCT VFR BLD AUTO: 26.6 % (ref 37.5–51)
HCT VFR BLD AUTO: 27.3 % (ref 37.5–51)
HGB BLD-MCNC: 8.8 G/DL (ref 13–17.7)
HGB BLD-MCNC: 8.8 G/DL (ref 13–17.7)
INHALED O2 CONCENTRATION: 50 %
LEFT ATRIUM VOLUME INDEX: 19.8 ML/M2
MCH RBC QN AUTO: 29.1 PG (ref 26.6–33)
MCHC RBC AUTO-ENTMCNC: 32.2 G/DL (ref 31.5–35.7)
MCV RBC AUTO: 90.4 FL (ref 79–97)
MODALITY: ABNORMAL
MRSA DNA SPEC QL NAA+PROBE: NORMAL
PCO2 BLDV: 39.8 MM HG (ref 41–51)
PEEP RESPIRATORY: 8 CM[H2O]
PH BLDV: 7.38 PH UNITS (ref 7.31–7.41)
PLATELET # BLD AUTO: 274 10*3/MM3 (ref 140–450)
PMV BLD AUTO: 10.1 FL (ref 6–12)
PO2 BLDV: 49.4 MM HG (ref 35–45)
POTASSIUM SERPL-SCNC: 4.1 MMOL/L (ref 3.5–5.2)
RBC # BLD AUTO: 3.02 10*6/MM3 (ref 4.14–5.8)
SAO2 % BLDCOV: 83.8 % (ref 45–75)
SET MECH RESP RATE: 14
SINUS: 3.1 CM
SODIUM SERPL-SCNC: 141 MMOL/L (ref 136–145)
STJ: 2.6 CM
TOTAL RATE: 23 BREATHS/MINUTE
VENTILATOR MODE: AC
VT ON VENT VENT: 500 ML
WBC NRBC COR # BLD AUTO: 13.56 10*3/MM3 (ref 3.4–10.8)

## 2024-04-04 PROCEDURE — 99232 SBSQ HOSP IP/OBS MODERATE 35: CPT | Performed by: PSYCHIATRY & NEUROLOGY

## 2024-04-04 PROCEDURE — 87077 CULTURE AEROBIC IDENTIFY: CPT | Performed by: INTERNAL MEDICINE

## 2024-04-04 PROCEDURE — 87641 MR-STAPH DNA AMP PROBE: CPT | Performed by: INTERNAL MEDICINE

## 2024-04-04 PROCEDURE — 25010000002 PROPOFOL 10 MG/ML EMULSION: Performed by: ANESTHESIOLOGY

## 2024-04-04 PROCEDURE — 71045 X-RAY EXAM CHEST 1 VIEW: CPT

## 2024-04-04 PROCEDURE — 85014 HEMATOCRIT: CPT | Performed by: INTERNAL MEDICINE

## 2024-04-04 PROCEDURE — 94799 UNLISTED PULMONARY SVC/PX: CPT

## 2024-04-04 PROCEDURE — 82803 BLOOD GASES ANY COMBINATION: CPT

## 2024-04-04 PROCEDURE — 87186 SC STD MICRODIL/AGAR DIL: CPT | Performed by: INTERNAL MEDICINE

## 2024-04-04 PROCEDURE — 87070 CULTURE OTHR SPECIMN AEROBIC: CPT | Performed by: INTERNAL MEDICINE

## 2024-04-04 PROCEDURE — 85027 COMPLETE CBC AUTOMATED: CPT | Performed by: RADIOLOGY

## 2024-04-04 PROCEDURE — 25010000002 FENTANYL CITRATE (PF) 50 MCG/ML SOLUTION: Performed by: INTERNAL MEDICINE

## 2024-04-04 PROCEDURE — 82948 REAGENT STRIP/BLOOD GLUCOSE: CPT

## 2024-04-04 PROCEDURE — 93306 TTE W/DOPPLER COMPLETE: CPT | Performed by: INTERNAL MEDICINE

## 2024-04-04 PROCEDURE — 80048 BASIC METABOLIC PNL TOTAL CA: CPT | Performed by: INTERNAL MEDICINE

## 2024-04-04 PROCEDURE — 87205 SMEAR GRAM STAIN: CPT | Performed by: INTERNAL MEDICINE

## 2024-04-04 PROCEDURE — 93306 TTE W/DOPPLER COMPLETE: CPT

## 2024-04-04 PROCEDURE — 94761 N-INVAS EAR/PLS OXIMETRY MLT: CPT

## 2024-04-04 PROCEDURE — 25010000002 FUROSEMIDE PER 20 MG: Performed by: INTERNAL MEDICINE

## 2024-04-04 PROCEDURE — 99232 SBSQ HOSP IP/OBS MODERATE 35: CPT | Performed by: INTERNAL MEDICINE

## 2024-04-04 PROCEDURE — 94003 VENT MGMT INPAT SUBQ DAY: CPT

## 2024-04-04 PROCEDURE — 85018 HEMOGLOBIN: CPT | Performed by: INTERNAL MEDICINE

## 2024-04-04 PROCEDURE — 25010000002 PIPERACILLIN SOD-TAZOBACTAM PER 1 G: Performed by: INTERNAL MEDICINE

## 2024-04-04 RX ORDER — LIDOCAINE HYDROCHLORIDE 20 MG/ML
JELLY TOPICAL ONCE
Status: COMPLETED | OUTPATIENT
Start: 2024-04-04 | End: 2024-04-04

## 2024-04-04 RX ORDER — FUROSEMIDE 10 MG/ML
40 INJECTION INTRAMUSCULAR; INTRAVENOUS EVERY 12 HOURS
Status: COMPLETED | OUTPATIENT
Start: 2024-04-04 | End: 2024-04-04

## 2024-04-04 RX ADMIN — FENTANYL CITRATE 50 MCG: 50 INJECTION, SOLUTION INTRAMUSCULAR; INTRAVENOUS at 08:32

## 2024-04-04 RX ADMIN — FAMOTIDINE 20 MG: 20 TABLET, FILM COATED ORAL at 08:17

## 2024-04-04 RX ADMIN — PANTOPRAZOLE SODIUM 40 MG: 40 INJECTION, POWDER, LYOPHILIZED, FOR SOLUTION INTRAVENOUS at 10:21

## 2024-04-04 RX ADMIN — FUROSEMIDE 40 MG: 10 INJECTION, SOLUTION INTRAMUSCULAR; INTRAVENOUS at 20:11

## 2024-04-04 RX ADMIN — PANTOPRAZOLE SODIUM 40 MG: 40 INJECTION, POWDER, LYOPHILIZED, FOR SOLUTION INTRAVENOUS at 22:14

## 2024-04-04 RX ADMIN — FENTANYL CITRATE 50 MCG: 50 INJECTION, SOLUTION INTRAMUSCULAR; INTRAVENOUS at 18:37

## 2024-04-04 RX ADMIN — ACETAMINOPHEN 650 MG: 160 SOLUTION ORAL at 23:04

## 2024-04-04 RX ADMIN — PROPOFOL INJECTABLE EMULSION 45 MCG/KG/MIN: 10 INJECTION, EMULSION INTRAVENOUS at 04:00

## 2024-04-04 RX ADMIN — CLOPIDOGREL BISULFATE 75 MG: 75 TABLET, FILM COATED ORAL at 08:17

## 2024-04-04 RX ADMIN — ACETAMINOPHEN 650 MG: 160 SOLUTION ORAL at 13:53

## 2024-04-04 RX ADMIN — PANTOPRAZOLE SODIUM 40 MG: 40 INJECTION, POWDER, LYOPHILIZED, FOR SOLUTION INTRAVENOUS at 04:57

## 2024-04-04 RX ADMIN — PANTOPRAZOLE SODIUM 40 MG: 40 INJECTION, POWDER, LYOPHILIZED, FOR SOLUTION INTRAVENOUS at 16:25

## 2024-04-04 RX ADMIN — FENTANYL CITRATE 50 MCG: 50 INJECTION, SOLUTION INTRAMUSCULAR; INTRAVENOUS at 04:00

## 2024-04-04 RX ADMIN — CHLORHEXIDINE GLUCONATE 15 ML: 1.2 RINSE ORAL at 20:56

## 2024-04-04 RX ADMIN — PROPOFOL INJECTABLE EMULSION 35 MCG/KG/MIN: 10 INJECTION, EMULSION INTRAVENOUS at 17:39

## 2024-04-04 RX ADMIN — FUROSEMIDE 40 MG: 10 INJECTION, SOLUTION INTRAMUSCULAR; INTRAVENOUS at 08:17

## 2024-04-04 RX ADMIN — FENTANYL CITRATE 50 MCG: 50 INJECTION, SOLUTION INTRAMUSCULAR; INTRAVENOUS at 11:26

## 2024-04-04 RX ADMIN — DOCUSATE SODIUM 50MG AND SENNOSIDES 8.6MG 2 TABLET: 8.6; 5 TABLET, FILM COATED ORAL at 20:11

## 2024-04-04 RX ADMIN — FENTANYL CITRATE 50 MCG: 50 INJECTION, SOLUTION INTRAMUSCULAR; INTRAVENOUS at 06:09

## 2024-04-04 RX ADMIN — PIPERACILLIN SODIUM AND TAZOBACTAM SODIUM 3.38 G: 3; .375 INJECTION, POWDER, LYOPHILIZED, FOR SOLUTION INTRAVENOUS at 04:57

## 2024-04-04 RX ADMIN — FENTANYL CITRATE 50 MCG: 50 INJECTION, SOLUTION INTRAMUSCULAR; INTRAVENOUS at 01:47

## 2024-04-04 RX ADMIN — PIPERACILLIN SODIUM AND TAZOBACTAM SODIUM 3.38 G: 3; .375 INJECTION, POWDER, LYOPHILIZED, FOR SOLUTION INTRAVENOUS at 13:34

## 2024-04-04 RX ADMIN — LIDOCAINE HYDROCHLORIDE: 20 JELLY TOPICAL at 16:54

## 2024-04-04 RX ADMIN — CHLORHEXIDINE GLUCONATE 15 ML: 1.2 RINSE ORAL at 08:16

## 2024-04-04 RX ADMIN — DOCUSATE SODIUM 50MG AND SENNOSIDES 8.6MG 2 TABLET: 8.6; 5 TABLET, FILM COATED ORAL at 08:17

## 2024-04-04 RX ADMIN — PIPERACILLIN SODIUM AND TAZOBACTAM SODIUM 3.38 G: 3; .375 INJECTION, POWDER, LYOPHILIZED, FOR SOLUTION INTRAVENOUS at 20:22

## 2024-04-04 RX ADMIN — PANTOPRAZOLE SODIUM 40 MG: 40 INJECTION, POWDER, LYOPHILIZED, FOR SOLUTION INTRAVENOUS at 00:17

## 2024-04-04 RX ADMIN — FENTANYL CITRATE 50 MCG: 50 INJECTION, SOLUTION INTRAMUSCULAR; INTRAVENOUS at 23:18

## 2024-04-04 RX ADMIN — PROPOFOL INJECTABLE EMULSION 35 MCG/KG/MIN: 10 INJECTION, EMULSION INTRAVENOUS at 13:34

## 2024-04-04 RX ADMIN — ATORVASTATIN CALCIUM 80 MG: 80 TABLET, FILM COATED ORAL at 20:11

## 2024-04-04 RX ADMIN — FAMOTIDINE 20 MG: 20 TABLET, FILM COATED ORAL at 17:39

## 2024-04-04 RX ADMIN — PROPOFOL INJECTABLE EMULSION 35 MCG/KG/MIN: 10 INJECTION, EMULSION INTRAVENOUS at 08:32

## 2024-04-04 RX ADMIN — PROPOFOL INJECTABLE EMULSION 40 MCG/KG/MIN: 10 INJECTION, EMULSION INTRAVENOUS at 22:42

## 2024-04-04 RX ADMIN — ASPIRIN 81 MG: 81 TABLET, CHEWABLE ORAL at 08:17

## 2024-04-04 RX ADMIN — FENTANYL CITRATE 50 MCG: 50 INJECTION, SOLUTION INTRAMUSCULAR; INTRAVENOUS at 20:56

## 2024-04-04 RX ADMIN — Medication 10 ML: at 08:18

## 2024-04-04 RX ADMIN — Medication 10 ML: at 20:11

## 2024-04-04 NOTE — PROGRESS NOTES
[facility]  Inpatient Progress Note    Reason for Follow Up: Coffee-ground emesis, need for anticoagulation    Subjective     Interval History:   Hemoglobin 8.8 hematocrit 27.3.  Discussed with RN.  Discussed with neurology, discussed with daughter at bedside.  Patient still intubated on a weaning protocol.  Tube feeds running at 40 cc/h.    Current Facility-Administered Medications:     acetaminophen (TYLENOL) 160 MG/5ML oral solution 650 mg, 650 mg, Oral, Q6H PRN, Kristina Urena APRN, 650 mg at 04/03/24 1805    aspirin chewable tablet 81 mg, 81 mg, Nasogastric, Daily, Emil Faith MD, 81 mg at 04/04/24 0817    atorvastatin (LIPITOR) tablet 80 mg, 80 mg, Nasogastric, Nightly, Shiv Felipe MD, 80 mg at 04/03/24 2022    sennosides-docusate (PERICOLACE) 8.6-50 MG per tablet 2 tablet, 2 tablet, Nasogastric, BID, 2 tablet at 04/04/24 0817 **AND** polyethylene glycol (MIRALAX) packet 17 g, 17 g, Oral, Daily PRN **AND** bisacodyl (DULCOLAX) EC tablet 5 mg, 5 mg, Oral, Daily PRN **AND** bisacodyl (DULCOLAX) suppository 10 mg, 10 mg, Rectal, Daily PRN, Shiv Felipe MD    calcium carbonate (TUMS) chewable tablet 500 mg (200 mg elemental), 2 tablet, Oral, TID PRN, Gina Power MD, 2 tablet at 04/02/24 2009    Calcium Replacement - Follow Nurse / BPA Driven Protocol, , Does not apply, PRN, Martin Garnett MD    chlorhexidine (PERIDEX) 0.12 % solution 15 mL, 15 mL, Mouth/Throat, Q12H, Martin Garnett MD, 15 mL at 04/04/24 0816    clopidogrel (PLAVIX) tablet 75 mg, 75 mg, Nasogastric, Daily, Shiv Felipe MD, 75 mg at 04/04/24 0817    famotidine (PEPCID) tablet 20 mg, 20 mg, Oral, BID AC, Gina Power MD, 20 mg at 04/04/24 0817    fentaNYL citrate (PF) (SUBLIMAZE) injection 50 mcg, 50 mcg, Intravenous, Q2H PRN, Martin Garnett MD, 50 mcg at 04/04/24 0832    furosemide (LASIX) injection 40 mg, 40 mg, Intravenous, Q12H, Martin Garnett MD, 40 mg at  04/04/24 0817    Magnesium Standard Dose Replacement - Follow Nurse / BPA Driven Protocol, , Does not apply, PRN, Martin Garnett MD    niCARdipine (CARDENE) 25 mg in 250 mL NS infusion kit, 5-15 mg/hr, Intravenous, Titrated, Dago Rubio MD, Stopped at 04/03/24 1300    nitroglycerin (NITROSTAT) SL tablet 0.4 mg, 0.4 mg, Sublingual, Q5 Min PRN, Dago Rubio MD    ondansetron (ZOFRAN) injection 4 mg, 4 mg, Intravenous, Q4H PRN, Gina Power MD, 4 mg at 04/02/24 2333    [COMPLETED] pantoprazole (PROTONIX) injection 80 mg, 80 mg, Intravenous, Once, 80 mg at 04/03/24 0834 **AND** pantoprazole (PROTONIX) 40 mg in sodium chloride 0.9 % 100 mL (0.4 mg/mL) MBP, 40 mg, Intravenous, Continuous, Martin Garnett MD, Last Rate: 20 mL/hr at 04/04/24 0457, 40 mg at 04/04/24 0457    Phosphorus Replacement - Follow Nurse / BPA Driven Protocol, , Does not apply, PRN, Martin Garnett MD    piperacillin-tazobactam (ZOSYN) 3.375 g IVPB in 100 mL NS MBP (CD), 3.375 g, Intravenous, Q8H, Martin Garnett MD, 3.375 g at 04/04/24 0457    Potassium Replacement - Follow Nurse / BPA Driven Protocol, , Does not apply, PRNTamir Mark Edwin, MD    propofol (DIPRIVAN) infusion 10 mg/mL 100 mL, 5-50 mcg/kg/min, Intravenous, Titrated, Joseph Barrow MD, Last Rate: 17.98 mL/hr at 04/04/24 0832, 35 mcg/kg/min at 04/04/24 0832    propofol (DIPRIVAN) infusion 10 mg/mL 100 mL, 5-50 mcg/kg/min, Intravenous, Titrated, Martin Garnett MD, Last Rate: 17.98 mL/hr at 04/03/24 1843, 35 mcg/kg/min at 04/03/24 1843    sodium chloride 0.9 % bolus 500 mL, 500 mL, Intravenous, Once, Dago Rubio MD    sodium chloride 0.9 % flush 10 mL, 10 mL, Intravenous, PRN, Dago Rubio MD    sodium chloride 0.9 % flush 10 mL, 10 mL, Intravenous, Q12H, Dago Rubio MD, 10 mL at 04/04/24 0818    sodium chloride 0.9 % flush 10 mL, 10 mL, Intravenous, PRN, Dago Rubio MD    sodium chloride 0.9 % infusion 40 mL, 40  mL, Intravenous, PRN, Dago Rubio MD    sodium chloride 0.9 % infusion, 75 mL/hr, Intravenous, Continuous, Dago Rubio MD, Last Rate: 75 mL/hr at 04/03/24 1831, 75 mL/hr at 04/03/24 1831  Review of Systems:    Review of systems could not be obtained due to  patient intubated.    Objective     Vital Signs  Temp:  [98.6 °F (37 °C)-101.2 °F (38.4 °C)] 99.9 °F (37.7 °C)  Heart Rate:  [] 61  Resp:  [16-22] 16  BP: ()/(56-79) 125/79  FiO2 (%):  [50 %-98 %] 50 %  Body mass index is 26.97 kg/m².    Intake/Output Summary (Last 24 hours) at 4/4/2024 0833  Last data filed at 4/4/2024 0500  Gross per 24 hour   Intake 3427.54 ml   Output 750 ml   Net 2677.54 ml     No intake/output data recorded.     Physical Exam:   General: patient awake, alert and cooperative   Eyes: Normal lids and lashes, no scleral icterus   Neck: supple, normal ROM   Skin: warm and dry, not jaundiced   Cardiovascular: regular rhythm and rate, no murmurs auscultated   Pulm: clear to auscultation bilaterally, regular and unlabored   Abdomen: soft, nontender, nondistended; normal bowel sounds   Extremities: no rash or edema   Psychiatric: Normal mood and behavior; memory intact     Results Review:     I reviewed the patient's new clinical results.    Results from last 7 days   Lab Units 04/04/24  0402 04/03/24 1612 04/03/24  1032 04/03/24  0205   WBC 10*3/mm3 13.56* 16.92*  --  19.18*   HEMOGLOBIN g/dL 8.8* 9.5* 9.4* 10.5*   HEMATOCRIT % 27.3* 28.8* 28.8* 31.9*   PLATELETS 10*3/mm3 274 311  --  351     Results from last 7 days   Lab Units 04/04/24  0402 04/03/24  1612 04/03/24  0205 04/01/24 2209 04/01/24 2206   SODIUM mmol/L 141 141 138   < > 140   POTASSIUM mmol/L 4.1 4.1 4.3   < > 4.3   CHLORIDE mmol/L 112* 109* 105   < > 104   CO2 mmol/L 19.8* 21.0* 23.0   < > 24.0   BUN mg/dL 26* 23 26*   < > 33*   CREATININE mg/dL 1.42* 1.47* 1.46*   < > 1.43*   CALCIUM mg/dL 7.5* 7.9* 8.5*   < > 10.1*   BILIRUBIN mg/dL  --  0.4  --   --  0.2  "  ALK PHOS U/L  --  71  --   --  98   ALT (SGPT) U/L  --  15  --   --  24   AST (SGOT) U/L  --  30  --   --  21   GLUCOSE mg/dL 126* 126* 191*   < > 112*    < > = values in this interval not displayed.     Results from last 7 days   Lab Units 04/01/24  2206   INR  0.97     No results found for: \"LIPASE\"    Radiology:  XR Chest 1 View   Final Result   No significant interval change.       This report was finalized on 4/4/2024 5:28 AM by Dr. Kristal Sanford M.D on Workstation: [id number]          XR Chest 1 View   Final Result   As described.       This report was finalized on 4/3/2024 2:24 PM by Dr. Prince So M.D on Workstation: [id number]          MRI Brain With & Without Contrast   Final Result   Multiple acute infarcts are appreciated, the largest of   which involves the parietal occipital and occipital temporal region on   the right posterolaterally. Smaller acute infarcts are appreciated   involving the head of the caudate nucleus on the right involving the   medial aspect of the thalamus on the right. There is mild-to-moderate   enhancement related to the infarcts noted above. Smaller infarcts   without enhancement are appreciated involving the right cerebellar   hemisphere posteriorly, medial aspect of the left cerebellar hemisphere   and the posterior and medial aspects of the left temporal lobe.       This report was finalized on 4/2/2024 12:49 PM by Dr. Vivek Gudino M.D   on Workstation: [id number]           Perc [facility] Thromb Prim Nonc Art Ini   Final Result   Acute occlusion of the right internal carotid artery, right   A2 segment, right distal PCA and distal right M1 segment with subsequent   successful mechanical thrombectomy in all 3 territories and TICI 2c flow   achieved. Underlying 85% stenosis of the right ICA origin treated with   successful carotid artery stent placement under distal protection with   no residual narrowing seen. Initiation of dual antiplatelet therapy   required " with follow-up carotid Doppler sonography as described above.       All carotid stenosis measurements were made using NASCET criteria.           This report was finalized on 4/3/2024 11:52 AM by Dr. Dago Rubio M.D on Workstation: [id number]          XR Chest 1 View   Final Result   Bibasilar atelectasis versus scarring.       This report was finalized on 4/1/2024 10:55 PM by Dr. Kristal Sanford M.D on Workstation: [id number]          CT Angiogram Head w AI Analysis of LVO   Final Result   1. No acute intracranial hemorrhage. However, the patient has a   hyperdense right MCA.   2. Area of cerebral blood flow less than 30% within the right MCA   territory measuring 55 cc, with corresponding Tmax greater than 6   seconds, measuring up to 173 cc.   3. 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 The   patient has some faint opacification of the right M1, although there is   thrombus identified within it. There is opacification of the right M2   branches.   4. Marked irregularity of the intracranial distal right vertebral   artery, which may reflect dissection, as well as a bulbous outpouching   which may represent pseudoaneurysm.           This report was finalized on 4/2/2024 12:42 AM by Dr. Kristal Sanford M.D on Workstation: [id number]          CT Angiogram Neck   Final Result   1. No acute intracranial hemorrhage. However, the patient has a   hyperdense right MCA.   2. Area of cerebral blood flow less than 30% within the right MCA   territory measuring 55 cc, with corresponding Tmax greater than 6   seconds, measuring up to 173 cc.   3. 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 The   patient has some faint opacification of the right M1, although there is   thrombus identified within it. There is opacification of the right M2   branches.   4. Marked irregularity of the intracranial distal right vertebral   artery, which may reflect dissection, as well as a  bulbous outpouching   which may represent pseudoaneurysm.           This report was finalized on 4/2/2024 12:42 AM by Dr. Kristal Sanford M.D on Workstation: [id number]          CT CEREBRAL PERFUSION WITH & WITHOUT CONTRAST   Final Result   1. No acute intracranial hemorrhage. However, the patient has a   hyperdense right MCA.   2. Area of cerebral blood flow less than 30% within the right MCA   territory measuring 55 cc, with corresponding Tmax greater than 6   seconds, measuring up to 173 cc.   3. 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 The   patient has some faint opacification of the right M1, although there is   thrombus identified within it. There is opacification of the right M2   branches.   4. Marked irregularity of the intracranial distal right vertebral   artery, which may reflect dissection, as well as a bulbous outpouching   which may represent pseudoaneurysm.           This report was finalized on 4/2/2024 12:42 AM by Dr. Kristal Sanford M.D on Workstation: [id number]          XR Chest 1 View    (Results Pending)   XR Chest 1 View    (Results Pending)       Assessment & Plan     Active Hospital Problems    Diagnosis     **Acute CVA (cerebrovascular accident)     Right-sided extracranial carotid artery stenosis     Coffee ground emesis     Gastroesophageal reflux disease        Assessment:  Coffee-ground emesis: Evidence of distal esophageal ulcers on endoscopy, no biopsies obtained due to anticoagulated status.  R ICA stent placement that requires anticoagulation  Respiratory issues that warranted intubation        Plan:  Continue PPI  Continue Carafate suspension  Monitor H&H   continue tube feeds to meet nutritional requirements  I discussed the patients findings and my recommendations with family, nursing staff, and consulting provider.    Redd Guidry MD

## 2024-04-04 NOTE — SIGNIFICANT NOTE
04/04/24 1304   OTHER   Discipline physical therapist   Rehab Time/Intention   Session Not Performed unable to treat, medical status change  (pt on vent following EGD, PT will follow up tomorrow)   Recommendation   PT - Next Appointment 04/05/24

## 2024-04-04 NOTE — SIGNIFICANT NOTE
04/04/24 1246   OTHER   Discipline occupational therapist   Rehab Time/Intention   Session Not Performed unable to treat, medical status change  (Pt on vent following EGD. Will check back for OT when pt is medically appropriate.)   Recommendation   OT - Next Appointment 04/05/24

## 2024-04-04 NOTE — PROGRESS NOTES
"DOS: 2024  NAME: Alex Negron   : 1945  PCP: Jyothi Song, LUIS  Chief Complaint   Patient presents with    Extremity Weakness       Chief complaint: stroke  Subjective: EGD yesterday showed no source of bleeding. Remains intubated this morning    Examined with the help of the patient's daughter for translation  Objective:  Vital signs: /79   Pulse 61   Temp 99.9 °F (37.7 °C)   Resp 16   Ht 180.3 cm (71\")   Wt 87.7 kg (193 lb 5.5 oz)   SpO2 93%   BMI 26.97 kg/m²    Gen: NAD, vitals reviewed, propofol paused  MS: Arouses to voice, follows commands on the right side  CN: Pupils equal, conjugate gaze  Motor: Moving right side spontaneously, no response to pain left upper extremity, triple flexion to pain left lower extremity  Reflexes: Right plantar downgoing, left plantar upgoing    Laboratory results:  Lab Results   Component Value Date    GLUCOSE 126 (H) 2024    CALCIUM 7.5 (L) 2024     2024    K 4.1 2024    CO2 19.8 (L) 2024     (H) 2024    BUN 26 (H) 2024    CREATININE 1.42 (H) 2024    BCR 18.3 2024    ANIONGAP 9.2 2024     Lab Results   Component Value Date    WBC 13.56 (H) 2024    HGB 8.8 (L) 2024    HCT 27.3 (L) 2024    MCV 90.4 2024     2024     Lab Results   Component Value Date    LDL 69 2024         Lab 24  0333   HEMOGLOBIN A1C 6.20*        Review of labs: Creatinine 1.4, WBC 13.5, hemoglobin 8.8, LDL 69    I reviewed the EGD report and discussed with the performing physician as above.    Diagnoses:  Stroke due to right carotid occlusion  Received tenecteplase in the emergency department  Status post right carotid stent  Coffee-ground emesis    Comment: EGD showed some esophageal ulceration without evidence of recent bleeding.    Plan:  1.  Continue aspirin and Plavix, repeat P2Y12 tomorrow AM  2. High dose statin  3. Extubate when able    Management " discussed with Dr. Guidry.

## 2024-04-04 NOTE — PLAN OF CARE
Goal Outcome Evaluation:      Pt remains oett to Vent.  50% Fi02, 7.5 PEEP.  Pt had urinary retention, urology consult placed for dunham placement, Pt had hematuria post procedure, appears to be clearing. Family has been at bedside throuout the day, Diueresed with 40mg Lasix.

## 2024-04-04 NOTE — PROGRESS NOTES
[facility] INPATIENT PROGRESS NOTE         [facility] INTENSIVE CARE    2024      PATIENT IDENTIFICATION:  Name: Alex Negron ADMIT: 2024   : 1945  PCP: Jyothi Song PA-C    MRN: [id number] LOS: 3 days   AGE/SEX: 79 y.o. male  ROOM: [facility]                     LOS 3    Reason for visit: Acute stroke      SUBJECTIVE:      Got intubated during the EGD yesterday.  Looking at his x-ray is difficult to discern if this is a component of aspiration pneumonia or more likely pulmonary edema.  Chest x-ray shows more vascular congestion no true dense infiltrates.  Could have left basilar opacity developing.  Started on antibiotics yesterday.  Diuretics today and wean ventilator settings as able.  Discussed with patient's daughter at bedside.      Objective   OBJECTIVE:    Vital Sign Min/Max for last 24 hours  Temp  Min: 98.6 °F (37 °C)  Max: 101.2 °F (38.4 °C)   BP  Min: 89/58  Max: 138/75   Pulse  Min: 61  Max: 118   Resp  Min: 16  Max: 22   SpO2  Min: 88 %  Max: 100 %   No data recorded   Weight  Min: 87.7 kg (193 lb 5.5 oz)  Max: 87.7 kg (193 lb 5.5 oz)    Vitals:    24 0427 24 0500 24 0600 24 0705   BP:  106/57 125/79    Pulse: 64 61 73 61   Resp:    16   Temp:    99.9 °F (37.7 °C)   TempSrc:       SpO2: 92% 93% 93% 93%   Weight:  87.7 kg (193 lb 5.5 oz)     Height:                24  1344 24  0434 24  0500   Weight: 84.5 kg (186 lb 4.6 oz) 85.6 kg (188 lb 11.4 oz) 87.7 kg (193 lb 5.5 oz)       Body mass index is 26.97 kg/m².        Mode: VC+/AC  FiO2 (%):  [50 %-98 %] 50 %  S RR:  [14] 14  S VT:  [500 mL] 500 mL  PEEP/CPAP (cm H2O):  [7.5 cm H20-10 cm H20] 7.5 cm H20  MAP (cm H2O):  [10-16] 11           FiO2 (%): 50 %     Body mass index is 26.97 kg/m².    Intake/Output Summary (Last 24 hours) at 2024 0934  Last data filed at 2024 0500  Gross per 24 hour   Intake 3427.54 ml   Output 750 ml   Net 2677.54 ml         Exam:  GEN:  No distress,  appears stated age  EYES:   PERRL, anicteric sclerae  ENT:    External ears/nose normal, OP clear.  Orally intubated  NECK:  No adenopathy, midline trachea  LUNGS: Normal chest on inspection, palpation and diminished breath sounds bilaterally auscultation  CV:  Normal S1S2, without murmur  ABD:  Nontender, nondistended, no hepatosplenomegaly, +BS  EXT:  Trace edema.  No cyanosis or clubbing.  No mottling and normal cap refill.    Assessment     Scheduled meds:  aspirin, 81 mg, Nasogastric, Daily  atorvastatin, 80 mg, Nasogastric, Nightly  chlorhexidine, 15 mL, Mouth/Throat, Q12H  clopidogrel, 75 mg, Nasogastric, Daily  famotidine, 20 mg, Oral, BID AC  furosemide, 40 mg, Intravenous, Q12H  piperacillin-tazobactam, 3.375 g, Intravenous, Q8H  senna-docusate sodium, 2 tablet, Nasogastric, BID  sodium chloride, 500 mL, Intravenous, Once  sodium chloride, 10 mL, Intravenous, Q12H      IV meds:                      niCARdipine, 5-15 mg/hr, Last Rate: Stopped (04/03/24 1300)  pantoprazole, 40 mg, Last Rate: 40 mg (04/04/24 0457)  propofol, 5-50 mcg/kg/min, Last Rate: 35 mcg/kg/min (04/04/24 0832)  propofol, 5-50 mcg/kg/min, Last Rate: 35 mcg/kg/min (04/03/24 1843)  sodium chloride, 75 mL/hr, Last Rate: 75 mL/hr (04/03/24 1831)      Data Review:  Results from last 7 days   Lab Units 04/04/24  0402 04/03/24  1612 04/03/24  0205 04/02/24  0333 04/01/24  2209 04/01/24  2206   SODIUM mmol/L 141 141 138 138  --  140   POTASSIUM mmol/L 4.1 4.1 4.3 4.6  --  4.3   CHLORIDE mmol/L 112* 109* 105 102  --  104   CO2 mmol/L 19.8* 21.0* 23.0 22.5  --  24.0   BUN mg/dL 26* 23 26* 28*  --  33*   CREATININE mg/dL 1.42* 1.47* 1.46* 1.34* 1.40* 1.43*   GLUCOSE mg/dL 126* 126* 191* 177*  --  112*   CALCIUM mg/dL 7.5* 7.9* 8.5* 9.1  --  10.1*         Estimated Creatinine Clearance: 54 mL/min (A) (by C-G formula based on SCr of 1.42 mg/dL (H)).  Results from last 7 days   Lab Units 04/04/24  0402 04/03/24  1612 04/03/24  1032 04/03/24  0205  04/02/24  0333 04/01/24 2206   WBC 10*3/mm3 13.56* 16.92*  --  19.18* 11.73* 8.18   HEMOGLOBIN g/dL 8.8* 9.5* 9.4* 10.5* 11.1* 12.0*   PLATELETS 10*3/mm3 274 311  --  351 343 434     Results from last 7 days   Lab Units 04/01/24  2206   INR  0.97     Results from last 7 days   Lab Units 04/03/24  1612 04/01/24  2206   ALT (SGPT) U/L 15 24   AST (SGOT) U/L 30 21     Results from last 7 days   Lab Units 04/03/24  1532 04/02/24  0251   PH, ARTERIAL pH units 7.433 7.343*   PO2 ART mm Hg 287.0* 77.8*   PCO2, ARTERIAL mm Hg 38.8 44.7   HCO3 ART mmol/L 25.9 24.3     Results from last 7 days   Lab Units 04/03/24  1612   PROCALCITONIN ng/mL 1.06*   LACTATE mmol/L 1.8         Hemoglobin A1C   Date/Time Value Ref Range Status   04/02/2024 0333 6.20 (H) 4.80 - 5.60 % Final     Glucose   Date/Time Value Ref Range Status   04/04/2024 0613 132 (H) 70 - 130 mg/dL Final   04/03/2024 2343 124 70 - 130 mg/dL Final   04/03/2024 1842 130 70 - 130 mg/dL Final   04/03/2024 1128 148 (H) 70 - 130 mg/dL Final   04/03/2024 0554 194 (H) 70 - 130 mg/dL Final   04/03/2024 0004 179 (H) 70 - 130 mg/dL Final   04/02/2024 1752 163 (H) 70 - 130 mg/dL Final         Imaging reviewed  Chest x-ray 4/4 shows basilar atelectasis versus left infiltrate developing and vascular congestion.  Lines and tubes stable.      MRI brain 4/2 reviewed          Microbiology reviewed              Active Hospital Problems    Diagnosis  POA    **Acute CVA (cerebrovascular accident) [I63.9]  Yes    Right-sided extracranial carotid artery stenosis [I65.21]  Yes    Coffee ground emesis [K92.0]  Unknown    Gastroesophageal reflux disease [K21.9]  Unknown      Resolved Hospital Problems   No resolved problems to display.         ASSESSMENT:  Acute CVA with right carotid occlusion: Status post tPA and right carotid stent  Left-sided hemiparesis  Acute renal insufficiency  Elevated blood pressure   coffee-ground emesis  Left lower lobe pneumonia versus atelectasis  Pulmonary  edema              PLAN:  Required intubation and mechanical ventilation during EGD yesterday.  Chest x-ray suggest possibility of infiltrate.  Was started on antibiotic for possible pneumonia but chest x-ray this morning looks more like vascular congestion.  Will give dose of diuretics and see how he responds.  Will also get a 2D echo for further evaluation.  Make appropriate ventilator changes based on blood gas results.  Not ready for ventilator weaning this morning.  Off Cardene drip.  Discontinue sodium chloride infusion.  Continue neurochecks per protocol.  Goal blood pressure systolic less than 140.    Aspirin and statin for secondary stroke prevention.  Suspect coffee-ground emesis due to gastritis versus bleeding from Cortrak placed while on blood thinner.  Hemodynamically stable.  Monitor H&H.  Continue aspirin and Plavix  On PPI drip.  GI input appreciated.  Discussed with family.        CCT: 35 min    Martin Garnett MD  Pulmonary and Critical Care Medicine  [facility], [facility]  4/4/2024    09:34 EDT

## 2024-04-04 NOTE — CONSULTS
FIRST UROLOGY CONSULT      Patient Identification:  NAME:  Alex Negron  Age:  79 y.o.   Sex:  male   :  1945   MRN:  [id number]     Chief complaint: Left sided weakness    History of present illness:      Alex Negron is a 79 y.o. male with no documented medical history that presented to the ER on 24 with acute left sided weakness and slurred speech. Pt diagnosed and admitted to the hospital with an acute stroke and underwent an right carotid  stent and mechanical thrombectomy. Currently in ICU.  Bladder scan results yielded 586 cc in bladder this afternoon. Staff attempted catheterization with a 16 fr  coude but was unsuccessful. Urology was consulted for evaluation and treatment of urinary retention and difficult catheter placement. Unable to obtain medical history. Patient intubated and sedated. Family at bedside.     In hospital:  -XUAN, good UOP  -WBC - 13.56  -Creat - 1.42    Past medical history:  History reviewed. No pertinent past medical history.    Past surgical history:  History reviewed. No pertinent surgical history.    Allergies:  Patient has no known allergies.    Home medications:  Medications Prior to Admission   Medication Sig Dispense Refill Last Dose    amLODIPine (NORVASC) 10 MG tablet Take 1 tablet by mouth Daily.       ferrous sulfate 325 (65 FE) MG tablet Take 1 tablet by mouth Daily With Breakfast.       gabapentin (NEURONTIN) 300 MG capsule Take 1 capsule by mouth 3 (Three) Times a Day.       indapamide (LOZOL) 1.25 MG tablet Take 1 tablet by mouth Every Morning.       lisinopril (PRINIVIL,ZESTRIL) 10 MG tablet Take 1 tablet by mouth Daily.       rosuvastatin (CRESTOR) 20 MG tablet Take 1 tablet by mouth Daily.       tamsulosin (FLOMAX) 0.4 MG capsule 24 hr capsule Take 1 capsule by mouth Every Night.           Hospital medications:  aspirin, 81 mg, Nasogastric, Daily  atorvastatin, 80 mg, Nasogastric, Nightly  chlorhexidine, 15 mL, Mouth/Throat,  Q12H  clopidogrel, 75 mg, Nasogastric, Daily  famotidine, 20 mg, Oral, BID AC  furosemide, 40 mg, Intravenous, Q12H  lidocaine, , Topical, Once  piperacillin-tazobactam, 3.375 g, Intravenous, Q8H  senna-docusate sodium, 2 tablet, Nasogastric, BID  sodium chloride, 500 mL, Intravenous, Once  sodium chloride, 10 mL, Intravenous, Q12H      niCARdipine, 5-15 mg/hr, Last Rate: Stopped (24 1300)  pantoprazole, 40 mg, Last Rate: 40 mg (24 1021)  propofol, 5-50 mcg/kg/min, Last Rate: 35 mcg/kg/min (24 1334)  propofol, 5-50 mcg/kg/min, Last Rate: 35 mcg/kg/min (24 1843)  sodium chloride, 75 mL/hr, Last Rate: 75 mL/hr (24 1831)        acetaminophen    senna-docusate sodium **AND** polyethylene glycol **AND** bisacodyl **AND** bisacodyl    calcium carbonate    Calcium Replacement - Follow Nurse / BPA Driven Protocol    fentaNYL citrate (PF)    Magnesium Standard Dose Replacement - Follow Nurse / BPA Driven Protocol    nitroglycerin    ondansetron    Phosphorus Replacement - Follow Nurse / BPA Driven Protocol    Potassium Replacement - Follow Nurse / BPA Driven Protocol    sodium chloride    sodium chloride    sodium chloride    Family history:  History reviewed. No pertinent family history.    Social history:  Social History     Tobacco Use    Smoking status: Former     Current packs/day: 0.00     Types: Cigarettes     Quit date:      Years since quittin.2    Smokeless tobacco: Never   Substance Use Topics    Alcohol use: Not Currently    Drug use: Never       Review of systems:      12 point negative except as in HPI    Objective:  TMax 24 hours:   Temp (24hrs), Av.9 °F (37.7 °C), Min:98.6 °F (37 °C), Max:101.2 °F (38.4 °C)      Vitals Ranges:   Temp:  [98.6 °F (37 °C)-101.2 °F (38.4 °C)] 101.1 °F (38.4 °C)  Heart Rate:  [61-91] 78  Resp:  [16-22] 17  BP: ()/(56-83) 135/68  FiO2 (%):  [50 %] 50 %    Intake/Output Last 3 shifts:  I/O last 3 completed shifts:  In: 5762.5  [I.V.:5367.5; Other:150; NG/GT:245]  Out: 1175 [Urine:1175]     Physical Exam:    General Appearance:    Intubated, NAD   :    Bladder distended, pad saturation with urine   Neuro/Psych:   Sedated        Procedure     Family was instructed on need for urethral catheter for urinary retention  Perineal hygiene given  Patient was prepped and draped in sterile fashion.  Lidocaine topical jelly applied to urethral meatus  Using sterile technique a 20 f coude catheter was placed.  Balloon inflated with 10 mL of water  Closed drainage system attached  1000 mL of clear, orange urine immediately returned to bag  Patient tolerated well    Results review:   I reviewed the patient's new clinical results.    Data review:  Lab Results (last 24 hours)       Procedure Component Value Units Date/Time    POC Glucose Once [[id number]]  (Normal) Collected: 04/04/24 1149    Specimen: Blood Updated: 04/04/24 1150     Glucose 120 mg/dL     MRSA Screen, PCR (Inpatient) - Swab, Nares [[id number]]  (Normal) Collected: 04/04/24 1017    Specimen: Swab from Nares Updated: 04/04/24 1141     MRSA PCR No MRSA Detected    Narrative:      The negative predictive value of this diagnostic test is high and should only be used to consider de-escalating anti-MRSA therapy. A positive result may indicate colonization with MRSA and must be correlated clinically.    POC Glucose Once [[id number]]  (Abnormal) Collected: 04/04/24 0613    Specimen: Blood Updated: 04/04/24 0614     Glucose 132 mg/dL     Basic Metabolic Panel [[id number]]  (Abnormal) Collected: 04/04/24 0402    Specimen: Blood Updated: 04/04/24 0519     Glucose 126 mg/dL      BUN 26 mg/dL      Creatinine 1.42 mg/dL      Sodium 141 mmol/L      Potassium 4.1 mmol/L      Comment: Slight hemolysis detected by analyzer. Result may be falsely elevated.        Chloride 112 mmol/L      CO2 19.8 mmol/L      Calcium 7.5 mg/dL      BUN/Creatinine Ratio 18.3     Anion Gap 9.2 mmol/L      eGFR 50.9 mL/min/1.73      Narrative:      GFR Normal >60  Chronic Kidney Disease <60  Kidney Failure <15    The GFR formula is only valid for adults with stable renal function between ages 18 and 70.    CBC (No Diff) [[id number]]  (Abnormal) Collected: 04/04/24 0402    Specimen: Blood Updated: 04/04/24 0500     WBC 13.56 10*3/mm3      RBC 3.02 10*6/mm3      Hemoglobin 8.8 g/dL      Hematocrit 27.3 %      MCV 90.4 fL      MCH 29.1 pg      MCHC 32.2 g/dL      RDW 12.3 %      RDW-SD 40.2 fl      MPV 10.1 fL      Platelets 274 10*3/mm3     Blood Gas, Venous - [[id number]]  (Abnormal) Collected: 04/04/24 0350    Specimen: Venous Blood Updated: 04/04/24 0353     Site Right Brachial     pH, Venous 7.378 pH Units      pCO2, Venous 39.8 mm Hg      pO2, Venous 49.4 mm Hg      HCO3, Venous 23.4 mmol/L      Base Excess, Venous -1.6 mmol/L      Comment: Serial Number: [id number]:  [id number]        O2 Saturation, Venous 83.8 %      CO2 Content 24.6 mmol/L      Barometric Pressure for Blood Gas 736.3000 mmHg      Modality Adult Vent     FIO2 50 %      Ventilator Mode AC     Set Tidal Volume 500     Set Mech Resp Rate 14     Rate 23 Breaths/minute      PEEP 8     Device Comment etco2 33 sats 92%    POC Glucose Once [[id number]]  (Normal) Collected: 04/03/24 2343    Specimen: Blood Updated: 04/03/24 2344     Glucose 124 mg/dL     Blood Culture - Blood, Arm, Left [[id number]] Collected: 04/03/24 1621    Specimen: Blood from Arm, Left Updated: 04/03/24 2025    POC Glucose Once [[id number]]  (Normal) Collected: 04/03/24 1842    Specimen: Blood Updated: 04/03/24 1854     Glucose 130 mg/dL     Procalcitonin [[id number]]  (Abnormal) Collected: 04/03/24 1612    Specimen: Blood Updated: 04/03/24 1708     Procalcitonin 1.06 ng/mL     Narrative:      As a Marker for Sepsis (Non-Neonates):    1. <0.5 ng/mL represents a low risk of severe sepsis and/or septic shock.  2. >2 ng/mL represents a high risk of severe sepsis and/or septic shock.    As a Marker for Lower  "Respiratory Tract Infections that require antibiotic therapy:    PCT on Admission    Antibiotic Therapy       6-12 Hrs later    >0.5                Strongly Recommended  >0.25 - <0.5        Recommended   0.1 - 0.25          Discouraged              Remeasure/reassess PCT  <0.1                Strongly Discouraged     Remeasure/reassess PCT    As 28 day mortality risk marker: \"Change in Procalcitonin Result\" (>80% or <=80%) if Day 0 (or Day 1) and Day 4 values are available. Refer to [email or web address]    Change in PCT <=80%  A decrease of PCT levels below or equal to 80% defines a positive change in PCT test result representing a higher risk for 28-day all-cause mortality of patients diagnosed with severe sepsis for septic shock.    Change in PCT >80%  A decrease of PCT levels of more than 80% defines a negative change in PCT result representing a lower risk for 28-day all-cause mortality of patients diagnosed with severe sepsis or septic shock.       Comprehensive Metabolic Panel [[id number]]  (Abnormal) Collected: 04/03/24 1612    Specimen: Blood Updated: 04/03/24 1704     Glucose 126 mg/dL      BUN 23 mg/dL      Creatinine 1.47 mg/dL      Sodium 141 mmol/L      Potassium 4.1 mmol/L      Comment: Slight hemolysis detected by analyzer. Result may be falsely elevated.        Chloride 109 mmol/L      CO2 21.0 mmol/L      Calcium 7.9 mg/dL      Total Protein 5.8 g/dL      Albumin 3.1 g/dL      ALT (SGPT) 15 U/L      AST (SGOT) 30 U/L      Alkaline Phosphatase 71 U/L      Total Bilirubin 0.4 mg/dL      Globulin 2.7 gm/dL      A/G Ratio 1.1 g/dL      BUN/Creatinine Ratio 15.6     Anion Gap 11.0 mmol/L      eGFR 48.8 mL/min/1.73     Narrative:      GFR Normal >60  Chronic Kidney Disease <60  Kidney Failure <15    The GFR formula is only valid for adults with stable renal function between ages 18 and 70.    Lactic Acid, Plasma [[id number]]  (Normal) Collected: 04/03/24 1612    Specimen: Blood Updated: " 04/03/24 1648     Lactate 1.8 mmol/L     CBC & Differential [[id number]]  (Abnormal) Collected: 04/03/24 1612    Specimen: Blood Updated: 04/03/24 1639    Narrative:      The following orders were created for panel order CBC & Differential.  Procedure                               Abnormality         Status                     ---------                               -----------         ------                     CBC Auto Differential[[id number]]        Abnormal            Final result                 Please view results for these tests on the individual orders.    CBC Auto Differential [[id number]]  (Abnormal) Collected: 04/03/24 1612    Specimen: Blood Updated: 04/03/24 1639     WBC 16.92 10*3/mm3      RBC 3.15 10*6/mm3      Hemoglobin 9.5 g/dL      Hematocrit 28.8 %      MCV 91.4 fL      MCH 30.2 pg      MCHC 33.0 g/dL      RDW 12.7 %      RDW-SD 42.2 fl      MPV 9.6 fL      Platelets 311 10*3/mm3      Neutrophil % 83.9 %      Lymphocyte % 9.8 %      Monocyte % 5.5 %      Eosinophil % 0.0 %      Basophil % 0.2 %      Immature Grans % 0.6 %      Neutrophils, Absolute 14.20 10*3/mm3      Lymphocytes, Absolute 1.65 10*3/mm3      Monocytes, Absolute 0.93 10*3/mm3      Eosinophils, Absolute 0.00 10*3/mm3      Basophils, Absolute 0.04 10*3/mm3      Immature Grans, Absolute 0.10 10*3/mm3      nRBC 0.0 /100 WBC     Blood Culture - Blood, Arm, Left [[id number]] Collected: 04/03/24 1612    Specimen: Blood from Arm, Left Updated: 04/03/24 1620    Blood Gas, Arterial -Obtain on: Current Settings [[id number]]  (Abnormal) Collected: 04/03/24 1532    Specimen: Arterial Blood Updated: 04/03/24 1541     Site Left Brachial     Armando's Test N/A     pH, Arterial 7.433 pH units      pCO2, Arterial 38.8 mm Hg      pO2, Arterial 287.0 mm Hg      HCO3, Arterial 25.9 mmol/L      Base Excess, Arterial 1.6 mmol/L      Comment: Serial Number: [id number]:  [id number]        O2 Saturation, Arterial 99.9 %      A-a DO2 0.4 mmHg      CO2 Content 27.1  mmol/L      Barometric Pressure for Blood Gas 734.8000 mmHg      Modality Adult Vent     FIO2 100 %      Ventilator Mode VC     Set Tidal Volume 500     Set Mech Resp Rate 14     Rate 20 Breaths/minute      PEEP 10     Hemodilution No     Device Comment sat etco2 32             Imaging:  Imaging Results (Last 24 Hours)       Procedure Component Value Units Date/Time    XR Chest 1 View [[id number]] Collected: 04/04/24 0527     Updated: 04/04/24 0531    Narrative:      SINGLE VIEW OF THE CHEST     HISTORY: Stroke     COMPARISON: April 30, 2024     FINDINGS:  Tubes and lines are stable in position. There is cardiomegaly. There is  vascular congestion. Bibasilar consolidation is unchanged. There is a  left pleural effusion. No pneumothorax is seen.    Impression:      No significant interval change.     This report was finalized on 4/4/2024 5:28 AM by Dr. Kristal Sanford M.D on Workstation: [id number]                Assessment:     Acute urinary retention    Plan:     - No acute urologic surgical intervention recommended. Urinary retention is common for hospitalized patients due to the effects of recumbency, general anesthesia and polypharmacy.  - Recommend starting Tamsulosin 0.4 mg QD when patient able to tolerate PO medication as he may have an underlying large prostate  - Continue indwelling catheter for now  - Recommend voiding trial early AM of day of discharge or once patient is ambulatory.   - If unable to void and/or PVR >400 mL, replace dunham  - If the patient is discharged with an indwelling catheter, please arrange an outpatient consult in the urology office: [phone].  - Urology will sign off; please call with any questions/concerns or clinical change

## 2024-04-04 NOTE — PLAN OF CARE
Goal Outcome Evaluation:      When sedation turned down, patient will move right sided extremities spontaneously, and slight movemnet on the left side. Patient will withdraw in all extremities when awake and sedated, but does not follow commands. Labs stable, no acute events overnight. Bath done at 0100, no BM          Problem: Adult Inpatient Plan of Care  Goal: Plan of Care Review  Outcome: Ongoing, Progressing  Goal: Patient-Specific Goal (Individualized)  Outcome: Ongoing, Progressing  Goal: Absence of Hospital-Acquired Illness or Injury  Outcome: Ongoing, Progressing  Intervention: Identify and Manage Fall Risk  Recent Flowsheet Documentation  Taken 4/4/2024 0600 by Monique Mary RN  Safety Promotion/Fall Prevention:   safety round/check completed   room organization consistent   lighting adjusted   clutter free environment maintained  Taken 4/4/2024 0500 by Monique Mary RN  Safety Promotion/Fall Prevention:   safety round/check completed   room organization consistent   lighting adjusted   clutter free environment maintained  Taken 4/4/2024 0400 by Monique Mary RN  Safety Promotion/Fall Prevention:   safety round/check completed   room organization consistent   lighting adjusted   clutter free environment maintained  Taken 4/4/2024 0300 by Monique Mary RN  Safety Promotion/Fall Prevention:   room organization consistent   safety round/check completed   lighting adjusted   clutter free environment maintained  Taken 4/4/2024 0200 by Monique Mary RN  Safety Promotion/Fall Prevention:   room organization consistent   safety round/check completed   lighting adjusted   clutter free environment maintained  Taken 4/4/2024 0100 by Monique Mary RN  Safety Promotion/Fall Prevention:   room organization consistent   safety round/check completed   lighting adjusted   clutter free environment maintained  Taken 4/4/2024 0000 by Monique Mary RN  Safety Promotion/Fall Prevention:   room organization consistent    safety round/check completed   lighting adjusted   clutter free environment maintained  Taken 4/3/2024 2300 by Monique Mary RN  Safety Promotion/Fall Prevention:   safety round/check completed   room organization consistent   lighting adjusted   clutter free environment maintained  Taken 4/3/2024 2200 by Monique Mary RN  Safety Promotion/Fall Prevention:   safety round/check completed   room organization consistent   lighting adjusted   clutter free environment maintained  Taken 4/3/2024 2100 by Monique Mary RN  Safety Promotion/Fall Prevention:   safety round/check completed   room organization consistent   lighting adjusted   clutter free environment maintained  Taken 4/3/2024 2000 by Monique Mary RN  Safety Promotion/Fall Prevention:   safety round/check completed   room organization consistent   lighting adjusted   clutter free environment maintained  Intervention: Prevent Skin Injury  Recent Flowsheet Documentation  Taken 4/4/2024 0400 by Monique Mary RN  Body Position:   turned   tilted   left  Taken 4/4/2024 0200 by Monique Mary RN  Body Position:   turned   supine  Taken 4/4/2024 0000 by Monique Mary RN  Body Position:   turned   tilted   left  Taken 4/3/2024 2200 by Monique Mary RN  Body Position:   turned   tilted   right  Taken 4/3/2024 2000 by Monique Mary RN  Body Position:   turned   supine  Intervention: Prevent and Manage VTE (Venous Thromboembolism) Risk  Recent Flowsheet Documentation  Taken 4/4/2024 0400 by Monique Mary RN  VTE Prevention/Management:   bilateral   sequential compression devices on  Range of Motion: ROM (range of motion) performed  Taken 4/4/2024 0000 by Monique Mary RN  VTE Prevention/Management:   bilateral   sequential compression devices on  Range of Motion: ROM (range of motion) performed  Taken 4/3/2024 2000 by Moniuqe Mary RN  VTE Prevention/Management:   bilateral   sequential compression devices on  Range of Motion: ROM (range of motion)  performed  Intervention: Prevent Infection  Recent Flowsheet Documentation  Taken 4/4/2024 0600 by Monique Mary RN  Infection Prevention:   environmental surveillance performed   equipment surfaces disinfected   single patient room provided   rest/sleep promoted   personal protective equipment utilized   hand hygiene promoted  Taken 4/4/2024 0500 by Monique Mary RN  Infection Prevention:   environmental surveillance performed   equipment surfaces disinfected   single patient room provided   rest/sleep promoted   personal protective equipment utilized   hand hygiene promoted  Taken 4/4/2024 0400 by Monique Mary RN  Infection Prevention:   environmental surveillance performed   equipment surfaces disinfected   single patient room provided   rest/sleep promoted   personal protective equipment utilized   hand hygiene promoted  Taken 4/4/2024 0300 by Monique Mary RN  Infection Prevention:   environmental surveillance performed   equipment surfaces disinfected   hand hygiene promoted   personal protective equipment utilized   rest/sleep promoted   single patient room provided  Taken 4/4/2024 0200 by Monique Mary RN  Infection Prevention:   environmental surveillance performed   equipment surfaces disinfected   hand hygiene promoted   personal protective equipment utilized   rest/sleep promoted   single patient room provided  Taken 4/4/2024 0100 by Monique Mary RN  Infection Prevention:   environmental surveillance performed   equipment surfaces disinfected   hand hygiene promoted   personal protective equipment utilized   rest/sleep promoted   single patient room provided  Taken 4/4/2024 0000 by Monique Mary RN  Infection Prevention:   environmental surveillance performed   equipment surfaces disinfected   hand hygiene promoted   personal protective equipment utilized   rest/sleep promoted   single patient room provided  Taken 4/3/2024 2300 by Monique Mary RN  Infection Prevention:   environmental  surveillance performed   equipment surfaces disinfected   hand hygiene promoted   personal protective equipment utilized   single patient room provided   rest/sleep promoted  Taken 4/3/2024 2200 by Monique Mary RN  Infection Prevention:   environmental surveillance performed   equipment surfaces disinfected   hand hygiene promoted   personal protective equipment utilized   single patient room provided   rest/sleep promoted  Taken 4/3/2024 2100 by Monique Mary RN  Infection Prevention:   environmental surveillance performed   equipment surfaces disinfected   hand hygiene promoted   personal protective equipment utilized   single patient room provided   rest/sleep promoted  Taken 4/3/2024 2000 by Monique Mary RN  Infection Prevention:   environmental surveillance performed   equipment surfaces disinfected   hand hygiene promoted   personal protective equipment utilized   single patient room provided   rest/sleep promoted  Goal: Optimal Comfort and Wellbeing  Outcome: Ongoing, Progressing  Intervention: Provide Person-Centered Care  Recent Flowsheet Documentation  Taken 4/4/2024 0400 by Monique Mary RN  Trust Relationship/Rapport:   care explained   choices provided   emotional support provided   empathic listening provided   questions answered   reassurance provided   questions encouraged   thoughts/feelings acknowledged  Taken 4/4/2024 0000 by Monique Mary RN  Trust Relationship/Rapport:   care explained   choices provided   emotional support provided   empathic listening provided   reassurance provided   thoughts/feelings acknowledged   questions answered   questions encouraged  Taken 4/3/2024 2000 by Monique Mary RN  Trust Relationship/Rapport:   care explained   choices provided   emotional support provided   empathic listening provided   questions answered   questions encouraged   reassurance provided   thoughts/feelings acknowledged  Goal: Readiness for Transition of Care  Outcome: Ongoing,  Progressing     Problem: Skin Injury Risk Increased  Goal: Skin Health and Integrity  Outcome: Ongoing, Progressing  Intervention: Optimize Skin Protection  Recent Flowsheet Documentation  Taken 4/4/2024 0400 by Monique Mary RN  Head of Bed ([facility]) Positioning: HOB at 30-45 degrees  Taken 4/4/2024 0200 by Monique Mary RN  Head of Bed ([facility]) Positioning: HOB at 30-45 degrees  Taken 4/4/2024 0000 by Monique Mary RN  Head of Bed ([facility]) Positioning: HOB at 30-45 degrees  Taken 4/3/2024 2200 by Monique Mary RN  Head of Bed ([facility]) Positioning: HOB at 30-45 degrees  Taken 4/3/2024 2000 by Monique Mary RN  Head of Bed ([facility]) Positioning: HOB at 30-45 degrees     Problem: Aspiration (Enteral Nutrition)  Goal: Absence of Aspiration Signs and Symptoms  Outcome: Ongoing, Progressing  Intervention: Minimize Aspiration Risk  Recent Flowsheet Documentation  Taken 4/4/2024 0400 by Monique Mary RN  Head of Bed ([facility]) Positioning: HOB at 30-45 degrees  Taken 4/4/2024 0200 by Monique Mary RN  Head of Bed ([facility]) Positioning: HOB at 30-45 degrees  Taken 4/4/2024 0100 by Monique Mary RN  Oral Care:   tongue brushed   lip/mouth moisturizer applied   suction provided   teeth brushed - suction toothbrush  Taken 4/4/2024 0000 by Monique Mary RN  Head of Bed ([facility]) Positioning: HOB at 30-45 degrees  Taken 4/3/2024 2200 by Monique Mary RN  Head of Bed ([facility]) Positioning: HOB at 30-45 degrees  Taken 4/3/2024 2000 by Monique Mary RN  Head of Bed ([facility]) Positioning: HOB at 30-45 degrees     Problem: Device-Related Complication Risk (Enteral Nutrition)  Goal: Safe, Effective Therapy Delivery  Outcome: Ongoing, Progressing     Problem: Feeding Intolerance (Enteral Nutrition)  Goal: Feeding Tolerance  Outcome: Ongoing, Progressing     Problem: Fall Injury Risk  Goal: Absence of Fall and Fall-Related Injury  Outcome: Ongoing, Progressing  Intervention: Identify and Manage Contributors  Recent Flowsheet Documentation  Taken 4/4/2024 0600 by  Monique Mary RN  Medication Review/Management: medications reviewed  Taken 4/4/2024 0500 by Monique Mary RN  Medication Review/Management: medications reviewed  Taken 4/4/2024 0400 by Monique Mary RN  Medication Review/Management: medications reviewed  Taken 4/4/2024 0300 by Monique Mary RN  Medication Review/Management: medications reviewed  Taken 4/4/2024 0200 by Monique Mary RN  Medication Review/Management: medications reviewed  Taken 4/4/2024 0100 by Monique Mary RN  Medication Review/Management: medications reviewed  Taken 4/4/2024 0000 by Monique Mary RN  Medication Review/Management: medications reviewed  Taken 4/3/2024 2300 by Monique Mary RN  Medication Review/Management: medications reviewed  Taken 4/3/2024 2200 by Monique Mary RN  Medication Review/Management: medications reviewed  Taken 4/3/2024 2100 by Monique Mary RN  Medication Review/Management: medications reviewed  Taken 4/3/2024 2000 by Monique Mary RN  Medication Review/Management: medications reviewed  Intervention: Promote Injury-Free Environment  Recent Flowsheet Documentation  Taken 4/4/2024 0600 by Monique Mary RN  Safety Promotion/Fall Prevention:   safety round/check completed   room organization consistent   lighting adjusted   clutter free environment maintained  Taken 4/4/2024 0500 by Monique Mary RN  Safety Promotion/Fall Prevention:   safety round/check completed   room organization consistent   lighting adjusted   clutter free environment maintained  Taken 4/4/2024 0400 by Monique Mary RN  Safety Promotion/Fall Prevention:   safety round/check completed   room organization consistent   lighting adjusted   clutter free environment maintained  Taken 4/4/2024 0300 by Monique Mary RN  Safety Promotion/Fall Prevention:   room organization consistent   safety round/check completed   lighting adjusted   clutter free environment maintained  Taken 4/4/2024 0200 by Monique Mary RN  Safety Promotion/Fall  Prevention:   room organization consistent   safety round/check completed   lighting adjusted   clutter free environment maintained  Taken 4/4/2024 0100 by Monique Mary RN  Safety Promotion/Fall Prevention:   room organization consistent   safety round/check completed   lighting adjusted   clutter free environment maintained  Taken 4/4/2024 0000 by Monique Mary RN  Safety Promotion/Fall Prevention:   room organization consistent   safety round/check completed   lighting adjusted   clutter free environment maintained  Taken 4/3/2024 2300 by Monique Mary RN  Safety Promotion/Fall Prevention:   safety round/check completed   room organization consistent   lighting adjusted   clutter free environment maintained  Taken 4/3/2024 2200 by Monique Mary RN  Safety Promotion/Fall Prevention:   safety round/check completed   room organization consistent   lighting adjusted   clutter free environment maintained  Taken 4/3/2024 2100 by Monique Mary RN  Safety Promotion/Fall Prevention:   safety round/check completed   room organization consistent   lighting adjusted   clutter free environment maintained  Taken 4/3/2024 2000 by Monique Mary RN  Safety Promotion/Fall Prevention:   safety round/check completed   room organization consistent   lighting adjusted   clutter free environment maintained     Problem: Restraint, Nonviolent  Goal: Absence of Harm or Injury  Outcome: Ongoing, Progressing  Intervention: Implement Least Restrictive Safety Strategies  Recent Flowsheet Documentation  Taken 4/4/2024 0600 by Monique Mary RN  Medical Device Protection: IV pole/bag removed from visual field  Less Restrictive Alternative:   bed alarm in use   emotional support provided   sensory stimulation limited  De-Escalation Techniques:   increased round frequency   quiet time facilitated   stimulation decreased  Diversional Activities: television  Taken 4/4/2024 0500 by Monique Mary RN  Medical Device Protection: IV pole/bag  removed from visual field  Taken 4/4/2024 0400 by Monique Mary RN  Medical Device Protection: IV pole/bag removed from visual field  Less Restrictive Alternative:   bed alarm in use   emotional support provided   sensory stimulation limited  De-Escalation Techniques:   increased round frequency   quiet time facilitated   stimulation decreased  Diversional Activities: television  Taken 4/4/2024 0300 by Monique Mary RN  Medical Device Protection: IV pole/bag removed from visual field  Taken 4/4/2024 0200 by Monique Mary RN  Medical Device Protection: IV pole/bag removed from visual field  Less Restrictive Alternative:   bed alarm in use   emotional support provided   sensory stimulation limited  De-Escalation Techniques:   increased round frequency   quiet time facilitated   stimulation decreased  Diversional Activities: television  Taken 4/4/2024 0100 by Monique Mary RN  Medical Device Protection: IV pole/bag removed from visual field  Taken 4/4/2024 0000 by Monique Mary RN  Medical Device Protection: IV pole/bag removed from visual field  Less Restrictive Alternative:   bed alarm in use   emotional support provided  De-Escalation Techniques:   increased round frequency   quiet time facilitated   stimulation decreased  Diversional Activities: television  Taken 4/3/2024 2300 by Monique Mary RN  Medical Device Protection: IV pole/bag removed from visual field  Taken 4/3/2024 2200 by Monique Mary RN  Medical Device Protection: IV pole/bag removed from visual field  Less Restrictive Alternative:   bed alarm in use   emotional support provided  De-Escalation Techniques:   increased round frequency   quiet time facilitated   stimulation decreased  Diversional Activities: television  Taken 4/3/2024 2100 by Monique Mary RN  Medical Device Protection: IV pole/bag removed from visual field  Taken 4/3/2024 2000 by Monique Mary RN  Medical Device Protection: IV pole/bag removed from visual field  Less  Restrictive Alternative:   bed alarm in use   emotional support provided  De-Escalation Techniques:   increased round frequency   quiet time facilitated   stimulation decreased  Diversional Activities: television  Intervention: Protect Dignity, Rights, and Personal Wellbeing  Recent Flowsheet Documentation  Taken 4/4/2024 0400 by Monique Mary RN  Trust Relationship/Rapport:   care explained   choices provided   emotional support provided   empathic listening provided   questions answered   reassurance provided   questions encouraged   thoughts/feelings acknowledged  Taken 4/4/2024 0000 by Monique Mary RN  Trust Relationship/Rapport:   care explained   choices provided   emotional support provided   empathic listening provided   reassurance provided   thoughts/feelings acknowledged   questions answered   questions encouraged  Taken 4/3/2024 2000 by Monique Mary RN  Trust Relationship/Rapport:   care explained   choices provided   emotional support provided   empathic listening provided   questions answered   questions encouraged   reassurance provided   thoughts/feelings acknowledged  Intervention: Protect Skin and Joint Integrity  Recent Flowsheet Documentation  Taken 4/4/2024 0400 by Monique Mary RN  Body Position:   turned   tilted   left  Range of Motion: ROM (range of motion) performed  Taken 4/4/2024 0200 by Monique Mary RN  Body Position:   turned   supine  Taken 4/4/2024 0000 by Monique Mary RN  Body Position:   turned   tilted   left  Range of Motion: ROM (range of motion) performed  Taken 4/3/2024 2200 by Monique Mary RN  Body Position:   turned   tilted   right  Taken 4/3/2024 2000 by Monique Mary RN  Body Position:   turned   supine  Range of Motion: ROM (range of motion) performed     Problem: Communication Impairment (Mechanical Ventilation, Invasive)  Goal: Effective Communication  Outcome: Ongoing, Progressing     Problem: Device-Related Complication Risk (Mechanical Ventilation,  Invasive)  Goal: Optimal Device Function  Outcome: Ongoing, Progressing  Intervention: Optimize Device Care and Function  Recent Flowsheet Documentation  Taken 4/4/2024 0600 by Monique Mary RN  Airway Safety Measures:   manual resuscitator/mask at bedside   oxygen flowmeter at bedside   suction at bedside  Taken 4/4/2024 0500 by Monique Mary RN  Airway Safety Measures:   manual resuscitator/mask at bedside   oxygen flowmeter at bedside   suction at bedside  Taken 4/4/2024 0400 by Monique Mary RN  Airway Safety Measures:   manual resuscitator/mask at bedside   oxygen flowmeter at bedside   suction at bedside  Taken 4/4/2024 0300 by Monique Mary RN  Airway Safety Measures:   manual resuscitator/mask at bedside   oxygen flowmeter at bedside   suction at bedside  Taken 4/4/2024 0200 by Monique Mary RN  Airway Safety Measures:   manual resuscitator/mask at bedside   oxygen flowmeter at bedside   suction at bedside  Taken 4/4/2024 0100 by Monique Mary RN  Airway Safety Measures:   manual resuscitator/mask at bedside   oxygen flowmeter at bedside   suction at bedside  Taken 4/4/2024 0000 by Monique Mary RN  Airway Safety Measures:   manual resuscitator/mask at bedside   oxygen flowmeter at bedside   suction at bedside  Taken 4/3/2024 2300 by Monique Mary RN  Airway Safety Measures:   manual resuscitator/mask at bedside   oxygen flowmeter at bedside   suction at bedside  Taken 4/3/2024 2200 by Monique Mary RN  Airway Safety Measures:   manual resuscitator/mask at bedside   oxygen flowmeter at bedside   suction at bedside  Taken 4/3/2024 2100 by Monique Mary RN  Airway Safety Measures:   manual resuscitator/mask at bedside   oxygen flowmeter at bedside   suction at bedside  Taken 4/3/2024 2000 by Monique Mary RN  Airway Safety Measures:   manual resuscitator/mask at bedside   oxygen flowmeter at bedside   suction at bedside     Problem: Inability to Wean (Mechanical Ventilation, Invasive)  Goal:  Mechanical Ventilation Liberation  Outcome: Ongoing, Progressing  Intervention: Promote Extubation and Mechanical Ventilation Liberation  Recent Flowsheet Documentation  Taken 4/4/2024 0600 by Monique Mary RN  Medication Review/Management: medications reviewed  Taken 4/4/2024 0500 by Monique Mary RN  Medication Review/Management: medications reviewed  Taken 4/4/2024 0400 by Monique Mary RN  Medication Review/Management: medications reviewed  Taken 4/4/2024 0300 by Monique Mary RN  Medication Review/Management: medications reviewed  Taken 4/4/2024 0200 by Monique Mary RN  Medication Review/Management: medications reviewed  Taken 4/4/2024 0100 by Monique Mary RN  Medication Review/Management: medications reviewed  Taken 4/4/2024 0000 by Monique Mary RN  Medication Review/Management: medications reviewed  Taken 4/3/2024 2300 by Monique Mary RN  Medication Review/Management: medications reviewed  Taken 4/3/2024 2200 by Monique Mary RN  Medication Review/Management: medications reviewed  Taken 4/3/2024 2100 by Monique Mary RN  Medication Review/Management: medications reviewed  Taken 4/3/2024 2000 by Monique Mary RN  Medication Review/Management: medications reviewed     Problem: Nutrition Impairment (Mechanical Ventilation, Invasive)  Goal: Optimal Nutrition Delivery  Outcome: Ongoing, Progressing     Problem: Skin and Tissue Injury (Mechanical Ventilation, Invasive)  Goal: Absence of Device-Related Skin and Tissue Injury  Outcome: Ongoing, Progressing     Problem: Ventilator-Induced Lung Injury (Mechanical Ventilation, Invasive)  Goal: Absence of Ventilator-Induced Lung Injury  Outcome: Ongoing, Progressing  Intervention: Prevent Ventilator-Associated Pneumonia  Recent Flowsheet Documentation  Taken 4/4/2024 0400 by Monique Mary RN  Head of Bed ([facility]) Positioning: HOB at 30-45 degrees  Taken 4/4/2024 0200 by Monique Mary RN  Head of Bed ([facility]) Positioning: HOB at 30-45 degrees  Taken 4/4/2024  0100 by Monique Mary RN  Oral Care:   tongue brushed   lip/mouth moisturizer applied   suction provided   teeth brushed - suction toothbrush  Taken 4/4/2024 0000 by Monique Mary RN  Head of Bed ([facility]) Positioning: HOB at 30-45 degrees  Taken 4/3/2024 2200 by Monique Mary RN  Head of Bed ([facility]) Positioning: HOB at 30-45 degrees  Taken 4/3/2024 2000 by Monique Mary RN  Head of Bed ([facility]) Positioning: HOB at 30-45 degrees

## 2024-04-04 NOTE — PROGRESS NOTES
"Nutrition Services    Patient Name:  Alex Negron  YOB: 1945  MRN: [id number]  Admit Date:  4/1/2024    Assessment Date:  04/04/24    Comment: Results of EGD noted \"distal esophageal ulcers\".  He is now on the vent, sedated with propofol (providing ~475 lipid calories). Per MD note, plan on weaing as able, concern for aspiration vs pulm edema.  TF's resumed last night at 25mL/hr and adv to 40mL/hr this am. Seems to be tolerating - No further emesis. On protonix drip, pepcid. No BM yet.  Labsb, meds, skin reviewed.    Plan/Recommendations:  Continue TF's with Diabetisource AC, new goal with propofol on board is 55mL/hr  Continue minimal free water while on IVF  Diet per SLP when appropriate after extubation  May need some SSI coverage    Will follow clinical course, nutrition needs.    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, L sided hemiparesis   Medical & Surgical Hx History reviewed. No pertinent past medical history.    History reviewed. No pertinent surgical history.     Current Problems Dysphagia, GI Bleed     Encounter Information        Nutrition History    Food Preferences    Supplements    Factors Affecting Intake altered mental status, altered respiratory status, swallow impairment   Tests/Procedures EGD     Anthropometrics        Current Height   Current Weight  BMI kg/m2 Height: 180.3 cm (71\")  Weight: 87.7 kg (193 lb 5.5 oz) (04/04/24 0500)  Body mass index is 26.97 kg/m².     Adj BMI (if applicable)    BMI Category Overweight (25 - 29.9)       Admission Weight 81.7kg   Ideal Body Weight (IBW) 166lb     Adj IBW (if applicable)    Usual Body Weight (UBW) unknown   Weight Change/Trend Unknown       Weight history: Wt Readings from Last 30 Encounters:   04/04/24 0500 87.7 kg (193 lb 5.5 oz)   04/03/24 0434 85.6 kg (188 lb 11.4 oz) " "  04/02/24 1344 84.5 kg (186 lb 4.6 oz)   04/02/24 0418 84.5 kg (186 lb 4.6 oz)   04/01/24 2159 81.7 kg (180 lb 3.2 oz)        Estimated/Assessed Needs        Energy Requirements    Weight for Calculation 81.7kg   Method for Estimation  25 kcal/kg   EST Needs (kcal/day) 2042       Protein Requirements    Weight for Calculation 81.7kg   EST Protein Needs (g/kg) 1.0 - 1.2 gm/kg   EST Daily Needs (g/day) 82-98       Fluid Requirements     Method for Estimation 1 mL/kcal    Estimated Needs (mL/day)        Fluid Deficit    Current Na Level (mEq/L)    Desired Na Level (mEq/L)    Estimated Fluid Deficit (L)       Labs        Pertinent Labs    Results from last 7 days   Lab Units 04/04/24 0402 04/03/24 1612 04/03/24 0205 04/01/24 2209 04/01/24 2206   SODIUM mmol/L 141 141 138   < > 140   POTASSIUM mmol/L 4.1 4.1 4.3   < > 4.3   CHLORIDE mmol/L 112* 109* 105   < > 104   CO2 mmol/L 19.8* 21.0* 23.0   < > 24.0   BUN mg/dL 26* 23 26*   < > 33*   CREATININE mg/dL 1.42* 1.47* 1.46*   < > 1.43*   CALCIUM mg/dL 7.5* 7.9* 8.5*   < > 10.1*   BILIRUBIN mg/dL  --  0.4  --   --  0.2   ALK PHOS U/L  --  71  --   --  98   ALT (SGPT) U/L  --  15  --   --  24   AST (SGOT) U/L  --  30  --   --  21   GLUCOSE mg/dL 126* 126* 191*   < > 112*    < > = values in this interval not displayed.     Results from last 7 days   Lab Units 04/04/24 0402 04/03/24 1612 04/03/24 0205 04/02/24  0333   HEMOGLOBIN g/dL 8.8* 9.5*   < > 11.1*   HEMATOCRIT % 27.3* 28.8*   < > 33.8*   WBC 10*3/mm3 13.56* 16.92*   < > 11.73*   TRIGLYCERIDES mg/dL  --   --   --  38   ALBUMIN g/dL  --  3.1*  --   --     < > = values in this interval not displayed.     Results from last 7 days   Lab Units 04/04/24  0402 04/03/24  1612 04/03/24  0205 04/02/24  0333 04/01/24  2206   INR   --   --   --   --  0.97   APTT seconds  --   --   --   --  32.7   PLATELETS 10*3/mm3 274 311 351 343 434     No results found for: \"COVID19\"  Lab Results   Component Value Date    HGBA1C 6.20 " (H) 04/02/2024          Medications            Scheduled Medications aspirin, 81 mg, Nasogastric, Daily  atorvastatin, 80 mg, Nasogastric, Nightly  chlorhexidine, 15 mL, Mouth/Throat, Q12H  clopidogrel, 75 mg, Nasogastric, Daily  famotidine, 20 mg, Oral, BID AC  furosemide, 40 mg, Intravenous, Q12H  piperacillin-tazobactam, 3.375 g, Intravenous, Q8H  senna-docusate sodium, 2 tablet, Nasogastric, BID  sodium chloride, 500 mL, Intravenous, Once  sodium chloride, 10 mL, Intravenous, Q12H        Infusions niCARdipine, 5-15 mg/hr, Last Rate: Stopped (04/03/24 1300)  pantoprazole, 40 mg, Last Rate: 40 mg (04/04/24 0457)  propofol, 5-50 mcg/kg/min, Last Rate: 35 mcg/kg/min (04/04/24 0832)  propofol, 5-50 mcg/kg/min, Last Rate: 35 mcg/kg/min (04/03/24 1843)  sodium chloride, 75 mL/hr, Last Rate: 75 mL/hr (04/03/24 1831)        PRN Medications   acetaminophen    senna-docusate sodium **AND** polyethylene glycol **AND** bisacodyl **AND** bisacodyl    calcium carbonate    Calcium Replacement - Follow Nurse / BPA Driven Protocol    fentaNYL citrate (PF)    Magnesium Standard Dose Replacement - Follow Nurse / BPA Driven Protocol    nitroglycerin    ondansetron    Phosphorus Replacement - Follow Nurse / BPA Driven Protocol    Potassium Replacement - Follow Nurse / BPA Driven Protocol    sodium chloride    sodium chloride    sodium chloride     Physical Findings          Physical Appearance disoriented, hemiparesis , sedate, ventilator support   Oral/Mouth Cavity tooth or teeth missing   Edema  not assessed   Gastrointestinal hypoactive bowel sounds, last bowel movement: pending   Skin  bruising   Tubes/Drains/Lines Cortrak, ND tube, bridle in place   NFPE No clinical signs of muscle wasting or fat loss   --  Current Nutrition Orders & Evaluation of Intake       Oral Nutrition     Food Allergies NKFA   Current PO Diet NPO Diet NPO Type: Strict NPO   Supplement    PO Evaluation     Trending % PO Intake       Enteral Nutrition      Enteral Route ND    TF Delivery Method Continuous    Propofol Rate/Kcal 17.9 (475 kcals)    Current TF Order/Rate  Diabetisource AC @ 40 mL/hr    TF Goal Rate 70 mL/hr    Current Water Flush 30 mL Q 4 hr    Modular None    TF Residual  no or minimal residual    TF Tolerance tolerating    TF Observation Verified correct TF and water flush infusing per orders     Nutrition Diagnosis        Nutrition Dx Problem 1 Problem: Inadequate Oral Intake  Etiology: Medical Diagnosis - CVA  Dysphagia  Signs/Symptoms: NPO    Comment:      INTERVENTION / PLAN OF CARE  Intervention Goal        Intervention Goal(s) Maintain nutrition status, Meet estimated needs, Disease management/therapy, Initiate feeding/diet, Initiate TF/PN, Transition TF to PO, and Maintain weight     Nutrition Intervention        RD Action Continue to monitor, Care plan reviewed, and Recommend/order: EN     Prescription         Diet     Supplement/Snack    EN/PN     Prescription Ordered       Enteral Prescription:     Enteral Route ND    TF Delivery Method Continuous    Enteral Product Diabetisource AC    Modular None    Propofol Rate/Kcal 17.9 (475 kcals)    TF Start Rate 20    TF Goal Rate 55    Free Water Flush 30mL q 4 hr    TF Provision at Goal: 1584 kcal, 2059 kcals with propofol, 79 gm protein, 1082 mL free water + 180 mL water flushes         Calories 101 % needs met         Protein  96 % needs met         Fluid (mL) 1262mL    Prescription Ordered Yes     Monitor/Evaluation        Monitor Per protocol   Discharge Plan Pending clinical course   Education Will instruct as appropriate     RD to follow per protocol.       Electronically signed by:  Lise Song RD  04/04/24 09:16 EDT

## 2024-04-05 ENCOUNTER — APPOINTMENT (OUTPATIENT)
Dept: CT IMAGING | Facility: HOSPITAL | Age: 79
End: 2024-04-05
Payer: MEDICAID

## 2024-04-05 ENCOUNTER — APPOINTMENT (OUTPATIENT)
Dept: GENERAL RADIOLOGY | Facility: HOSPITAL | Age: 79
End: 2024-04-05
Payer: MEDICAID

## 2024-04-05 LAB
ANION GAP SERPL CALCULATED.3IONS-SCNC: 10 MMOL/L (ref 5–15)
ARTERIAL PATENCY WRIST A: POSITIVE
ATMOSPHERIC PRESS: 746.8 MMHG
ATMOSPHERIC PRESS: 747.5 MMHG
ATMOSPHERIC PRESS: 752.3 MMHG
BASE EXCESS BLDA CALC-SCNC: -4.1 MMOL/L (ref 0–2)
BASE EXCESS BLDA CALC-SCNC: 1 MMOL/L (ref 0–2)
BASE EXCESS BLDA CALC-SCNC: 1.1 MMOL/L (ref 0–2)
BDY SITE: ABNORMAL
BUN SERPL-MCNC: 38 MG/DL (ref 8–23)
BUN/CREAT SERPL: 19.5 (ref 7–25)
CALCIUM SPEC-SCNC: 7.3 MG/DL (ref 8.6–10.5)
CHLORIDE SERPL-SCNC: 108 MMOL/L (ref 98–107)
CO2 BLDA-SCNC: 22.1 MMOL/L (ref 23–27)
CO2 BLDA-SCNC: 25.3 MMOL/L (ref 23–27)
CO2 BLDA-SCNC: 26.4 MMOL/L (ref 23–27)
CO2 SERPL-SCNC: 24 MMOL/L (ref 22–29)
CREAT SERPL-MCNC: 1.95 MG/DL (ref 0.76–1.27)
DEPRECATED RDW RBC AUTO: 41 FL (ref 37–54)
DEVICE COMMENT: ABNORMAL
DEVICE COMMENT: ABNORMAL
EGFRCR SERPLBLD CKD-EPI 2021: 34.8 ML/MIN/1.73
ERYTHROCYTE [DISTWIDTH] IN BLOOD BY AUTOMATED COUNT: 12.6 % (ref 12.3–15.4)
GAS FLOW AIRWAY: 11 LPM
GLUCOSE BLDC GLUCOMTR-MCNC: 150 MG/DL (ref 70–130)
GLUCOSE BLDC GLUCOMTR-MCNC: 161 MG/DL (ref 70–130)
GLUCOSE BLDC GLUCOMTR-MCNC: 167 MG/DL (ref 70–130)
GLUCOSE SERPL-MCNC: 187 MG/DL (ref 65–99)
HCO3 BLDA-SCNC: 21 MMOL/L (ref 22–28)
HCO3 BLDA-SCNC: 24.3 MMOL/L (ref 22–28)
HCO3 BLDA-SCNC: 25.2 MMOL/L (ref 22–28)
HCT VFR BLD AUTO: 27.3 % (ref 37.5–51)
HEMODILUTION: NO
HGB BLD-MCNC: 8.9 G/DL (ref 13–17.7)
INHALED O2 CONCENTRATION: 30 %
INHALED O2 CONCENTRATION: 50 %
Lab: ABNORMAL
MCH RBC QN AUTO: 29.7 PG (ref 26.6–33)
MCHC RBC AUTO-ENTMCNC: 32.6 G/DL (ref 31.5–35.7)
MCV RBC AUTO: 91 FL (ref 79–97)
MODALITY: ABNORMAL
NOTIFIED WHO: ABNORMAL
O2 A-A PPRESDIFF RESPIRATORY: 0.3 MMHG
O2 A-A PPRESDIFF RESPIRATORY: 0.4 MMHG
PA ADP PRP-ACNC: 34 PRU (ref 194–418)
PAW @ PEAK INSP FLOW SETTING VENT: 16 CMH2O
PCO2 BLDA: 32.4 MM HG (ref 35–45)
PCO2 BLDA: 37.5 MM HG (ref 35–45)
PCO2 BLDA: 37.5 MM HG (ref 35–45)
PEEP RESPIRATORY: 8 CM[H2O]
PEEP RESPIRATORY: 8 CM[H2O]
PH BLDA: 7.36 PH UNITS (ref 7.35–7.45)
PH BLDA: 7.44 PH UNITS (ref 7.35–7.45)
PH BLDA: 7.48 PH UNITS (ref 7.35–7.45)
PLATELET # BLD AUTO: 270 10*3/MM3 (ref 140–450)
PMV BLD AUTO: 9.6 FL (ref 6–12)
PO2 BLDA: 104.8 MM HG (ref 80–100)
PO2 BLDA: 50.6 MM HG (ref 80–100)
PO2 BLDA: 62.7 MM HG (ref 80–100)
POTASSIUM SERPL-SCNC: 3.1 MMOL/L (ref 3.5–5.2)
POTASSIUM SERPL-SCNC: 3.4 MMOL/L (ref 3.5–5.2)
PSV: 8 CMH2O
RBC # BLD AUTO: 3 10*6/MM3 (ref 4.14–5.8)
READ BACK: YES
SAO2 % BLDCOA: 88.5 % (ref 92–98.5)
SAO2 % BLDCOA: 92.5 % (ref 92–98.5)
SAO2 % BLDCOA: 97.8 % (ref 92–98.5)
SET MECH RESP RATE: 14
SET MECH RESP RATE: 20
SODIUM SERPL-SCNC: 142 MMOL/L (ref 136–145)
TOTAL RATE: 16 BREATHS/MINUTE
TOTAL RATE: 18 BREATHS/MINUTE
TOTAL RATE: 20 BREATHS/MINUTE
VENTILATOR MODE: ABNORMAL
VENTILATOR MODE: AC
VT ON VENT VENT: 500 ML
WBC NRBC COR # BLD AUTO: 11.54 10*3/MM3 (ref 3.4–10.8)

## 2024-04-05 PROCEDURE — 25010000002 PIPERACILLIN SOD-TAZOBACTAM PER 1 G: Performed by: INTERNAL MEDICINE

## 2024-04-05 PROCEDURE — 74176 CT ABD & PELVIS W/O CONTRAST: CPT

## 2024-04-05 PROCEDURE — 97530 THERAPEUTIC ACTIVITIES: CPT

## 2024-04-05 PROCEDURE — 25010000002 HYDRALAZINE PER 20 MG: Performed by: INTERNAL MEDICINE

## 2024-04-05 PROCEDURE — 94761 N-INVAS EAR/PLS OXIMETRY MLT: CPT

## 2024-04-05 PROCEDURE — 82803 BLOOD GASES ANY COMBINATION: CPT

## 2024-04-05 PROCEDURE — 71045 X-RAY EXAM CHEST 1 VIEW: CPT

## 2024-04-05 PROCEDURE — 25810000003 SODIUM CHLORIDE 0.9 % SOLUTION: Performed by: RADIOLOGY

## 2024-04-05 PROCEDURE — 25010000002 PROPOFOL 10 MG/ML EMULSION: Performed by: ANESTHESIOLOGY

## 2024-04-05 PROCEDURE — 97535 SELF CARE MNGMENT TRAINING: CPT

## 2024-04-05 PROCEDURE — 25010000002 NICARDIPINE 2.5 MG/ML SOLUTION: Performed by: RADIOLOGY

## 2024-04-05 PROCEDURE — 92610 EVALUATE SWALLOWING FUNCTION: CPT

## 2024-04-05 PROCEDURE — 36600 WITHDRAWAL OF ARTERIAL BLOOD: CPT | Performed by: INTERNAL MEDICINE

## 2024-04-05 PROCEDURE — 80048 BASIC METABOLIC PNL TOTAL CA: CPT | Performed by: INTERNAL MEDICINE

## 2024-04-05 PROCEDURE — 36600 WITHDRAWAL OF ARTERIAL BLOOD: CPT

## 2024-04-05 PROCEDURE — 99232 SBSQ HOSP IP/OBS MODERATE 35: CPT | Performed by: INTERNAL MEDICINE

## 2024-04-05 PROCEDURE — 85576 BLOOD PLATELET AGGREGATION: CPT | Performed by: PSYCHIATRY & NEUROLOGY

## 2024-04-05 PROCEDURE — 82803 BLOOD GASES ANY COMBINATION: CPT | Performed by: INTERNAL MEDICINE

## 2024-04-05 PROCEDURE — 84132 ASSAY OF SERUM POTASSIUM: CPT | Performed by: INTERNAL MEDICINE

## 2024-04-05 PROCEDURE — 85027 COMPLETE CBC AUTOMATED: CPT | Performed by: RADIOLOGY

## 2024-04-05 PROCEDURE — 25010000002 FENTANYL CITRATE (PF) 50 MCG/ML SOLUTION: Performed by: INTERNAL MEDICINE

## 2024-04-05 PROCEDURE — 82948 REAGENT STRIP/BLOOD GLUCOSE: CPT

## 2024-04-05 PROCEDURE — 94799 UNLISTED PULMONARY SVC/PX: CPT

## 2024-04-05 PROCEDURE — 94003 VENT MGMT INPAT SUBQ DAY: CPT

## 2024-04-05 PROCEDURE — 99232 SBSQ HOSP IP/OBS MODERATE 35: CPT | Performed by: STUDENT IN AN ORGANIZED HEALTH CARE EDUCATION/TRAINING PROGRAM

## 2024-04-05 RX ORDER — POTASSIUM CHLORIDE 1.5 G/1.58G
40 POWDER, FOR SOLUTION ORAL EVERY 4 HOURS
Status: COMPLETED | OUTPATIENT
Start: 2024-04-05 | End: 2024-04-05

## 2024-04-05 RX ORDER — HYDROCODONE BITARTRATE AND ACETAMINOPHEN 5; 325 MG/1; MG/1
1 TABLET ORAL EVERY 6 HOURS PRN
Status: DISPENSED | OUTPATIENT
Start: 2024-04-05 | End: 2024-04-10

## 2024-04-05 RX ORDER — HYDRALAZINE HYDROCHLORIDE 20 MG/ML
10 INJECTION INTRAMUSCULAR; INTRAVENOUS EVERY 4 HOURS PRN
Status: DISCONTINUED | OUTPATIENT
Start: 2024-04-05 | End: 2024-04-08

## 2024-04-05 RX ORDER — FENTANYL CITRATE 50 UG/ML
50 INJECTION, SOLUTION INTRAMUSCULAR; INTRAVENOUS
Status: DISCONTINUED | OUTPATIENT
Start: 2024-04-05 | End: 2024-04-10

## 2024-04-05 RX ORDER — AMLODIPINE BESYLATE 5 MG/1
5 TABLET ORAL
Status: DISCONTINUED | OUTPATIENT
Start: 2024-04-06 | End: 2024-04-09

## 2024-04-05 RX ORDER — FAMOTIDINE 20 MG/1
20 TABLET, FILM COATED ORAL
Status: DISCONTINUED | OUTPATIENT
Start: 2024-04-05 | End: 2024-04-08

## 2024-04-05 RX ORDER — HYDRALAZINE HYDROCHLORIDE 20 MG/ML
20 INJECTION INTRAMUSCULAR; INTRAVENOUS EVERY 6 HOURS PRN
Status: DISCONTINUED | OUTPATIENT
Start: 2024-04-05 | End: 2024-04-05

## 2024-04-05 RX ADMIN — ATORVASTATIN CALCIUM 80 MG: 80 TABLET, FILM COATED ORAL at 21:31

## 2024-04-05 RX ADMIN — PROPOFOL INJECTABLE EMULSION 30 MCG/KG/MIN: 10 INJECTION, EMULSION INTRAVENOUS at 03:06

## 2024-04-05 RX ADMIN — Medication 10 ML: at 08:30

## 2024-04-05 RX ADMIN — FENTANYL CITRATE 50 MCG: 50 INJECTION, SOLUTION INTRAMUSCULAR; INTRAVENOUS at 23:59

## 2024-04-05 RX ADMIN — HYDRALAZINE HYDROCHLORIDE 20 MG: 20 INJECTION, SOLUTION INTRAMUSCULAR; INTRAVENOUS at 16:50

## 2024-04-05 RX ADMIN — Medication 10 ML: at 21:42

## 2024-04-05 RX ADMIN — FAMOTIDINE 20 MG: 20 TABLET, FILM COATED ORAL at 07:48

## 2024-04-05 RX ADMIN — HYDRALAZINE HYDROCHLORIDE 20 MG: 20 INJECTION, SOLUTION INTRAMUSCULAR; INTRAVENOUS at 10:06

## 2024-04-05 RX ADMIN — HYDRALAZINE HYDROCHLORIDE 10 MG: 20 INJECTION, SOLUTION INTRAMUSCULAR; INTRAVENOUS at 21:44

## 2024-04-05 RX ADMIN — FENTANYL CITRATE 50 MCG: 50 INJECTION, SOLUTION INTRAMUSCULAR; INTRAVENOUS at 15:45

## 2024-04-05 RX ADMIN — POTASSIUM CHLORIDE 40 MEQ: 1.5 POWDER, FOR SOLUTION ORAL at 15:27

## 2024-04-05 RX ADMIN — FENTANYL CITRATE 50 MCG: 50 INJECTION, SOLUTION INTRAMUSCULAR; INTRAVENOUS at 18:13

## 2024-04-05 RX ADMIN — POTASSIUM CHLORIDE 40 MEQ: 1.5 POWDER, FOR SOLUTION ORAL at 04:31

## 2024-04-05 RX ADMIN — PANTOPRAZOLE SODIUM 40 MG: 40 INJECTION, POWDER, LYOPHILIZED, FOR SOLUTION INTRAVENOUS at 14:31

## 2024-04-05 RX ADMIN — NICARDIPINE HYDROCHLORIDE 5 MG/HR: 25 INJECTION, SOLUTION INTRAVENOUS at 20:10

## 2024-04-05 RX ADMIN — POTASSIUM CHLORIDE 40 MEQ: 1.5 POWDER, FOR SOLUTION ORAL at 08:30

## 2024-04-05 RX ADMIN — ACETAMINOPHEN 650 MG: 160 SOLUTION ORAL at 10:07

## 2024-04-05 RX ADMIN — CHLORHEXIDINE GLUCONATE 15 ML: 1.2 RINSE ORAL at 21:39

## 2024-04-05 RX ADMIN — PANTOPRAZOLE SODIUM 40 MG: 40 INJECTION, POWDER, LYOPHILIZED, FOR SOLUTION INTRAVENOUS at 08:51

## 2024-04-05 RX ADMIN — HYDROCODONE BITARTRATE AND ACETAMINOPHEN 1 TABLET: 5; 325 TABLET ORAL at 13:37

## 2024-04-05 RX ADMIN — NICARDIPINE HYDROCHLORIDE 15 MG/HR: 25 INJECTION, SOLUTION INTRAVENOUS at 23:06

## 2024-04-05 RX ADMIN — PANTOPRAZOLE SODIUM 40 MG: 40 INJECTION, POWDER, LYOPHILIZED, FOR SOLUTION INTRAVENOUS at 20:26

## 2024-04-05 RX ADMIN — ASPIRIN 81 MG: 81 TABLET, CHEWABLE ORAL at 08:30

## 2024-04-05 RX ADMIN — POTASSIUM CHLORIDE 40 MEQ: 1.5 POWDER, FOR SOLUTION ORAL at 23:40

## 2024-04-05 RX ADMIN — CLOPIDOGREL BISULFATE 75 MG: 75 TABLET, FILM COATED ORAL at 08:30

## 2024-04-05 RX ADMIN — PIPERACILLIN SODIUM AND TAZOBACTAM SODIUM 3.38 G: 3; .375 INJECTION, POWDER, LYOPHILIZED, FOR SOLUTION INTRAVENOUS at 04:31

## 2024-04-05 RX ADMIN — PIPERACILLIN SODIUM AND TAZOBACTAM SODIUM 3.38 G: 3; .375 INJECTION, POWDER, LYOPHILIZED, FOR SOLUTION INTRAVENOUS at 13:27

## 2024-04-05 RX ADMIN — PANTOPRAZOLE SODIUM 40 MG: 40 INJECTION, POWDER, LYOPHILIZED, FOR SOLUTION INTRAVENOUS at 03:05

## 2024-04-05 RX ADMIN — POTASSIUM CHLORIDE 40 MEQ: 1.5 POWDER, FOR SOLUTION ORAL at 21:31

## 2024-04-05 RX ADMIN — CHLORHEXIDINE GLUCONATE 15 ML: 1.2 RINSE ORAL at 08:30

## 2024-04-05 RX ADMIN — FAMOTIDINE 20 MG: 20 TABLET, FILM COATED ORAL at 17:45

## 2024-04-05 RX ADMIN — ACETAMINOPHEN 650 MG: 160 SOLUTION ORAL at 21:47

## 2024-04-05 RX ADMIN — PIPERACILLIN SODIUM AND TAZOBACTAM SODIUM 3.38 G: 3; .375 INJECTION, POWDER, LYOPHILIZED, FOR SOLUTION INTRAVENOUS at 21:30

## 2024-04-05 RX ADMIN — FENTANYL CITRATE 50 MCG: 50 INJECTION, SOLUTION INTRAMUSCULAR; INTRAVENOUS at 05:28

## 2024-04-05 NOTE — PLAN OF CARE
Goal Outcome Evaluation:            Pt is Canadian speaking pt, able to follow commands on R, L side w/d only. Will not open eyes. Facial droop. Intubated w/ #8ETT, 23 lip-50%/7.5peep. cortrak@110 w/ diabetasource@55ml/h, w/ 30cc free water. Propofol@25ml/h, protonix gtt@8mg/h. Fentanyl prn given-see MAR. T-max-100.4-tylenol administered. Generalized edema (LUE >RUE) Bloody oral secretions. Pt had coude placed by urology yesterday. Dark red hematuria-req irrigation mult times throughout night.@0615 pt very distended and leaking blood/clots around catheter-bladder qtcd=798gw. Attempted irrigation w/ 10ccNS-unable to aspirate irrigation. Performed sterile technique-it took 60 cc w/ aggressive manipulation of catherter (deflate/reinflate balloon-now w/ 20cc NS in balloon) large amt of clots aspirated from bladder and immediate relief of distention. Paged Dr. Garnett-awaiting call back. Bmx2-large. SCD's in place. Family@BS and educated on dunham and irrigation needs.

## 2024-04-05 NOTE — CASE MANAGEMENT/SOCIAL WORK
Discharge Planning Assessment  [facility]     Patient Name: Alex Negron  MRN: [id number]  Today's Date: 4/5/2024    Admit Date: 4/1/2024    Plan: Pending referral to [facility]   Discharge Needs Assessment       Row Name 04/05/24 1035       Living Environment    People in Home spouse;child(maricarmen), adult;grandchild(maricarmen)    Current Living Arrangements home    Potentially Unsafe Housing Conditions none    Primary Care Provided by self    Provides Primary Care For no one    Family Caregiver if Needed child(maricarmen), adult    Quality of Family Relationships helpful;involved;supportive    Able to Return to Prior Arrangements yes       Resource/Environmental Concerns    Resource/Environmental Concerns none       Transition Planning    Patient/Family Anticipates Transition to home with family    Patient/Family Anticipated Services at Transition     Transportation Anticipated family or friend will provide       Discharge Needs Assessment    Readmission Within the Last 30 Days no previous admission in last 30 days    Equipment Currently Used at Home none    Concerns to be Addressed denies needs/concerns at this time;no discharge needs identified    Anticipated Changes Related to Illness none    Equipment Needed After Discharge none    Outpatient/Agency/Support Group Needs inpatient rehabilitation facility                   Discharge Plan       Row Name 04/05/24 1037       Plan    Plan Pending referral to [facility]    Plan Comments CCP met with patient's daughterRadha at bedside. Patient extubated this morning. CCP role explained and face sheet verified. Patient's PCP is DR. Song. Patient lives with his wife, son, daughter in law and grandson. Patient has 6 steps to the entrance of the home. Patient's bedroom and bathroom are on the main level. Patient is independent with his ADLs. Patient has in home caregivers 5-7 days/40 hours a week. CCP discussed acute rehab vs. subacute rehab. Patient's daughter is interested in BAR.  CCP provided SNF list incase patient does not meet criteria for BAR; patient's daughter verbalized understanding. Referral to [facility]. PT/OT eval pending since being extubated. Nayeli KIM                  Continued Care and Services - Admitted Since 4/1/2024    No active coordination exists for this encounter.          Demographic Summary       Row Name 04/05/24 1034       General Information    Admission Type inpatient    Arrived From emergency department    Referral Source admission list    Reason for Consult discharge planning    Preferred Language English                   Functional Status       Row Name 04/05/24 1035       Functional Status    Usual Activity Tolerance good    Current Activity Tolerance fair       Functional Status, IADL    Medications independent    Meal Preparation independent    Housekeeping independent    Laundry independent    Shopping independent       Mental Status    General Appearance WDL WDL       Mental Status Summary    Recent Changes in Mental Status/Cognitive Functioning no changes                   Psychosocial    No documentation.                  Abuse/Neglect    No documentation.                  Legal    No documentation.                  Substance Abuse    No documentation.                  Patient Forms    No documentation.                     JUDY Blackburn

## 2024-04-05 NOTE — PROGRESS NOTES
[facility] Progress Note  4/5/2024    CT results showing heterogeneous high attenuation contents in the dependent base of the  urinary bladder likely representing hemorrhage.    Continue CBI. Will place patient NPO at midnight in the event of worsening hematuria overnight requiring intervention. Urology will assess in the AM.     Suzan Briggs, ESCOBAR  04/05/24

## 2024-04-05 NOTE — PROGRESS NOTES
"      [place] [facility]         Dr Chaney Sayied   LOS: 4 days   Patient Care Team:  Jyothi Song PA-C as PCP - General (Family Medicine)    Chief Complaint: Acute hypoxemic respiratory failure with acute CVA multiple issues as listed below    Interval History: Patient currently on the vent 40% FiO2 PEEP 7.5.  On propofol and Protonix drip ongoing.  No signs of overt bleeding reported.  Patient currently tolerating SBT trial well.  Following simple commands.    REVIEW OF SYSTEMS:   Sedated intubated     Ventilator/Non-Invasive Ventilation Settings (From admission, onward)       Start     Ordered    04/04/24 0430  Ventilator - Vent Mode: AC/VC+; Rate: Other; Rate: 14; FiO2: 50%; PEEP: 7.5; Tidal Volume: mL; TV: 500  Continuous,   Status:  Canceled        Question Answer Comment   Vent Mode AC/VC+    Rate Other    Rate 14    FiO2 50%    PEEP 7.5    Tidal Volume mL            04/04/24 0429    04/03/24 1745  Ventilator - Vent Mode: AC/VC; Rate: Other; Rate: 14; FiO2: 50%; PEEP: 7.5; Tidal Volume: mL; TV: 500  Continuous,   Status:  Canceled        Question Answer Comment   Vent Mode AC/VC    Rate Other    Rate 14    FiO2 50%    PEEP 7.5    Tidal Volume mL            04/03/24 1746                      Vital Signs  Temp:  [99.1 °F (37.3 °C)-101.1 °F (38.4 °C)] 101.1 °F (38.4 °C)  Heart Rate:  [65-95] 87  Resp:  [15-18] 18  BP: ()/(57-87) 109/66  FiO2 (%):  [29 %-96 %] 29 %    Intake/Output Summary (Last 24 hours) at 4/5/2024 0932  Last data filed at 4/5/2024 0619  Gross per 24 hour   Intake 3482.5 ml   Output 3450 ml   Net 32.5 ml     Flowsheet Rows      Flowsheet Row First Filed Value   Admission Height 180.3 cm (71\") Documented at 04/02/2024 0430   Admission Weight 81.7 kg (180 lb 3.2 oz) Documented at 04/01/2024 2155            Physical Exam:  Patient is examined using the personal protective equipment as per guidelines from infection control for this particular patient as enacted.  Hand " hygiene was performed before and after patient interaction.   General Appearance:  Sedated intubated.  ET tube good position orally.  Following simple commands.  ENT ET tube good position orally  Neck midline trachea, no thyromegaly   Lungs:   Equal breath sounds on the vent    Heart:    Regular rhythm and normal rate, normal S1 and S2, no            murmur, no gallop, no rub, no click   Chest Wall:    No abnormalities observed   Abdomen:     Normal bowel sounds, no masses, no organomegaly, soft        nontender, nondistended, no guarding, no rebound                tenderness   Extremities:   Moves all extremities well, no edema, no cyanosis, no             redness  CNS Left hemiparesis  Skin no rashes no nodules  Musculoskeletal no cyanosis no clubbing      Results Review:        Results from last 7 days   Lab Units 04/05/24 0317 04/04/24 0402 04/03/24  1612   SODIUM mmol/L 142 141 141   POTASSIUM mmol/L 3.4* 4.1 4.1   CHLORIDE mmol/L 108* 112* 109*   CO2 mmol/L 24.0 19.8* 21.0*   BUN mg/dL 38* 26* 23   CREATININE mg/dL 1.95* 1.42* 1.47*   GLUCOSE mg/dL 187* 126* 126*   CALCIUM mg/dL 7.3* 7.5* 7.9*     Results from last 7 days   Lab Units 04/01/24  2206   HSTROP T ng/L 12     Results from last 7 days   Lab Units 04/05/24 0317 04/04/24 2020 04/04/24  0402 04/03/24  1612   WBC 10*3/mm3 11.54*  --  13.56* 16.92*   HEMOGLOBIN g/dL 8.9* 8.8* 8.8* 9.5*   HEMATOCRIT % 27.3* 26.6* 27.3* 28.8*   PLATELETS 10*3/mm3 270  --  274 311     Results from last 7 days   Lab Units 04/01/24  2206   INR  0.97   APTT seconds 32.7     Results from last 7 days   Lab Units 04/02/24  0333   CHOLESTEROL mg/dL 117         Results from last 7 days   Lab Units 04/02/24  0333   CHOLESTEROL mg/dL 117   TRIGLYCERIDES mg/dL 38   HDL CHOL mg/dL 38*   LDL CHOL mg/dL 69     Results from last 7 days   Lab Units 04/05/24  0844   PH, ARTERIAL pH units 7.436   PO2 ART mm Hg 62.7*   PCO2, ARTERIAL mm Hg 37.5   HCO3 ART mmol/L 25.2       I reviewed the  patient's new clinical results.  I personally viewed and interpreted the patient's chest x-ray.        Medication Review:   aspirin, 81 mg, Nasogastric, Daily  atorvastatin, 80 mg, Nasogastric, Nightly  chlorhexidine, 15 mL, Mouth/Throat, Q12H  clopidogrel, 75 mg, Nasogastric, Daily  famotidine, 20 mg, Nasogastric, BID AC  piperacillin-tazobactam, 3.375 g, Intravenous, Q8H  senna-docusate sodium, 2 tablet, Nasogastric, BID  sodium chloride, 500 mL, Intravenous, Once  sodium chloride, 10 mL, Intravenous, Q12H        niCARdipine, 5-15 mg/hr, Last Rate: Stopped (04/03/24 1300)  pantoprazole, 40 mg, Last Rate: 40 mg (04/05/24 0851)  propofol, 5-50 mcg/kg/min, Last Rate: Stopped (04/05/24 0725)        ASSESSMENT:   Acute CVA with right carotid occlusion: Status post tPA and right carotid stent  Left-sided hemiparesis  Acute renal insufficiency  Elevated blood pressure   coffee-ground emesis  Left lower lobe pneumonia versus atelectasis  Pulmonary edema    PLAN:  Events noted chart reviewed.  Attempt spontaneous breathing trial this morning currently tolerating well.  Reviewed weaning parameters.  Appear to be adequate.  Patient following simple commands.  Will wean and extubate from the vent as tolerated.  Diet per speech  Discontinue IV fluids  Concerns for GI bleed.  Hemoglobin stable.  Protonix drip per GI.  Stroke management per neurology.  On aspirin and Plavix per neurology.  Continue current antibiotics to cover for possible aspiration pneumonia  ICU core measures  Discussed with patient's daughter    Critical care time 35 minutes          Shiv Felipe MD  04/05/24  09:32 EDT

## 2024-04-05 NOTE — PROGRESS NOTES
"DOS: 2024  NAME: Alex Negron   : 1945  PCP: Jyothi Song, LUIS  Chief Complaint   Patient presents with    Extremity Weakness       Chief complaint: Left-sided weakness    Subjective: Patient has been seen and evaluated, no acute events overnight.  This is my first time evaluating the patient.  Patient has been extubated this morning.  GI following for possible GI bleed is on Protonix drip.    Initial consult note  Patient is a 79-year-old with no past medical history recent refugee from [facility] was initially brought to the ER on 2024 with right MCA syndrome he had a right ICA terminal occlusion with a perfusion deficits and core of 55 mL he was taken up for intervention and had a right ICA stent placed with Tici3 recanalization.    Objective:  Vital signs: /91   Pulse 90   Temp (!) 101.1 °F (38.4 °C)   Resp 18   Ht 180.3 cm (71\")   Wt 86.6 kg (190 lb 14.7 oz)   SpO2 91%   BMI 26.63 kg/m²    Gen: NAD, vitals reviewed  MS: Lethargic and drowsy, able to follow commands, comprehension intact, potation and fluency hard to assess  CN: visual acuity grossly normal, PERRL, right gaze preference crosses midline, left facial droop and moderate dysarthria  Motor: Right upper and right lower extremity at least 4+/5  Left upper and left lower extremity slight withdrawal    Laboratory results:  Lab Results   Component Value Date    GLUCOSE 187 (H) 2024    CALCIUM 7.3 (L) 2024     2024    K 3.4 (L) 2024    CO2 24.0 2024     (H) 2024    BUN 38 (H) 2024    CREATININE 1.95 (H) 2024    BCR 19.5 2024    ANIONGAP 10.0 2024     Lab Results   Component Value Date    WBC 11.54 (H) 2024    HGB 8.9 (L) 2024    HCT 27.3 (L) 2024    MCV 91.0 2024     2024     Lab Results   Component Value Date    LDL 69 2024         Lab 24  0333   HEMOGLOBIN A1C 6.20*        Review of labs:   Sodium " 142  Creatinine 1.95  Normal LFTs    P2 Y inhibition 34    WBC 11.54  Hemoglobin 8.9 and platelets 270    Hemoglobin on the lower end and Hemoccult occult positive    Review and interpretation of imaging:   CT head initially done on 4/1/2024 no acute hypodensity or hypodensity  CTA head and neck showed right ICA terminus occlusion  CT perfusion showed a core of 55 and a penumbra of 173 mL    MRI brain acute diffusion restriction along the cerebellar region and also the right MCA region with T2 flair changes no changes on the SWI    TTE 60% EF normal LAE no clot no thrombus    Diagnoses:  Acute right MCA infarct s/p mechanical thrombectomy with right ICA stent and Tici3 recanalization  Acute renal insufficiency  Coffee-ground hematemesis  Left lower lobe pneumonia    Acute right MCA infarct s/p mechanical thrombectomy with right ICA stent and Tici3   Considering patient also has right cerebellar infarct versus different vascular territory, neurology of the stroke is most likely ESUS  Systolic blood pressure goal less than 140  Patient had a right ICA stent placement so currently on aspirin and Plavix  Will check APLS labs and also need Zio patch on discharge    Patient does have low hemoglobin and coffee-ground habitus with fecal occult positive, has cooperated show esophageal ulcers high risk for GI bleed but he needs to continue aspirin Plavix because of the risk of stent closure.    Okay to transfer the patient to [facility] once medically stable as per ICU.      MDM   Reviewed: Previous charts, nursing notes and vitals   Reviewed: Previous labs and CT scan    Interpretation: Labs and CT scan   Total time providing critical care is :30-74 minutes. This excluded time spent performing separately reportable procedures and services  Consults :Neurology/Stroke    Please note that portions of this note were completed with a voice recognition program.     Sumanth Bryant MD  Neuro Hospitalist /Vascular  Neurology.

## 2024-04-05 NOTE — THERAPY TREATMENT NOTE
Patient Name: Alex Negron  : 1945    MRN: [id number]                              Today's Date: 2024       Admit Date: 2024    Visit Dx:     ICD-10-CM ICD-9-CM   1. Cerebrovascular accident (CVA), unspecified mechanism  I63.9 434.91   2. Coffee ground emesis  K92.0 578.0   3. Gastroesophageal reflux disease, unspecified whether esophagitis present  K21.9 530.81   4. 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 No pertinent past medical history.  Past Surgical History:   Procedure Laterality Date    ENDOSCOPY N/A 4/3/2024    Procedure: ESOPHAGOGASTRODUODENOSCOPY AT BEDSIDE;  Surgeon: Redd Guidry MD;  Location: [facility] ENDOSCOPY;  Service: Gastroenterology;  Laterality: N/A;  PRE-  GI BLEED  POST- HIATAL HERNIA, DISTAL ESOPHAGITIS ULCERS, GASTRITIS, PYLOROPLASTY      General Information       Row Name 24 1418          OT Time and Intention    Document Type therapy note (daily note)  -SM     Mode of Treatment occupational therapy;co-treatment  2/2 acuity of stroke, ICU setting, safe mobilization of pt, extubated this morning  -       Row Name 24 1418          General Information    Patient Profile Reviewed yes  -SM     Existing Precautions/Restrictions fall;NPO;oxygen therapy device and L/min  -SM     Barriers to Rehab language barrier  Syrian  used via ipad (#[id number])  -SM       Row Name 24 1418          Cognition    Orientation Status (Cognition) oriented x 3  -SM       Row Name 24 1418          Safety Issues, Functional Mobility    Impairments Affecting Function (Mobility) balance;coordination;endurance/activity tolerance;grasp;strength;postural/trunk control;muscle tone abnormal;motor  control;cognition;visual/perceptual;sensation/sensory awareness;shortness of breath  -               User Key  (r) = Recorded By, (t) = Taken By, (c) = Cosigned By      Initials Name Provider Type    Yasmine Kirkpatrick OT Occupational Therapist                     Mobility/ADL's       Row Name 04/05/24 1419          Bed Mobility    Supine-Sit [place] (Bed Mobility) maximum assist (25% patient effort);2 person assist;verbal cues  -     Sit-Supine [place] (Bed Mobility) maximum assist (25% patient effort);2 person assist;verbal cues  -     Assistive Device (Bed Mobility) draw sheet;head of bed elevated  -       Row Name 04/05/24 1419          Transfers    Comment, (Transfers) not yet safe to attempt but anticipate pt being able to initate in next few sessions.  -       Row Name 04/05/24 1419          Lower Body Dressing Assessment/Training    [place] Level (Lower Body Dressing) don;socks;dependent (less than 25% patient effort)  -       Row Name 04/05/24 1419          Grooming Assessment/Training    [place] Level (Grooming) wash face, hands;contact guard assist;verbal cues;nonverbal cues (demo/gesture)  -     Position (Grooming) edge of bed sitting  -     Comment, (Grooming) washes face to command, washed LUE to increase attention to LUE as well as sensory feedback with min cues.  -       Row Name 04/05/24 1419          Toileting Assessment/Training    [place] Level (Toileting) dependent (less than 25% patient effort);adjust/manage clothing;change pad/brief  -     Position (Toileting) supine  -               User Key  (r) = Recorded By, (t) = Taken By, (c) = Cosigned By      Initials Name Provider Type    Yasmine Kirkpatrick OT Occupational Therapist                   Obj/Interventions       Row Name 04/05/24 1421          Vision Assessment/Intervention    Visual Processing Deficit theresa-inattention/neglect, left  -     Vision Assessment Comment multiple cues for  visual scanning to L side, tactile cues for L cervical rotation, positioning aids used in bed to maintain midline.  -       Row Name 04/05/24 1421          Shoulder (Therapeutic Exercise)    Shoulder (Therapeutic Exercise) PROM (passive range of motion)  -     Shoulder PROM (Therapeutic Exercise) left;flexion;extension;aBduction;aDduction;10 repetitions;supine  -       Row Name 04/05/24 1421          Elbow/Forearm (Therapeutic Exercise)    Elbow/Forearm (Therapeutic Exercise) PROM (passive range of motion)  -     Elbow/Forearm PROM (Therapeutic Exercise) left;flexion;extension;10 repetitions;supine  -[facility] Name 04/05/24 1421          Wrist (Therapeutic Exercise)    Wrist (Therapeutic Exercise) PROM (passive range of motion)  -     Wrist PROM (Therapeutic Exercise) left;flexion;extension;10 repetitions  -[facility] Name 04/05/24 1421          Hand (Therapeutic Exercise)    Hand (Therapeutic Exercise) PROM (passive range of motion)  -     Hand PROM (Therapeutic Exercise) left;finger flexion;finger extension;10 repetitions  -[facility] Name 04/05/24 1421          Motor Skills    Functional Endurance poor  -     Therapeutic Exercise shoulder;elbow/forearm;wrist;hand  -[facility] Name 04/05/24 1421          Balance    Static Sitting Balance --  initally max A but quickly progressed to min A with occasional CGA with cues for RUE placement, gentle weight bearing through RUE.  -     Position, Sitting Balance sitting edge of bed  -     Balance Interventions sitting;occupation based/functional task  -               User Key  (r) = Recorded By, (t) = Taken By, (c) = Cosigned By      Initials Name Provider Type     Yasmine Martin OT Occupational Therapist                   Goals/Plan    No documentation.                  Clinical Impression       [facility] Name 04/05/24 1423          Pain Assessment    Pretreatment Pain Rating 0/10 - no pain  -     Posttreatment Pain Rating 0/10 - no pain  -        Row Name 04/05/24 1423          Plan of Care Review    Plan of Care Reviewed With patient;family  -     Outcome Evaluation Pt is able to participate in OT today, he was extubated a few hours prior to participation. He is following single step commands with use of Rwandan . Pt is able to sit EOB today with focus on activites to improve midline postural control, L side attention/scanning, bADL engagement. Pt continues to be weak and require X2 assist. LUE remains flaccid.  -       Row Name 04/05/24 1423          Therapy Plan Review/Discharge Plan (OT)    Anticipated Discharge Disposition (OT) inpatient rehabilitation facility  -       Row Name 04/05/24 1423          Vital Signs    Pre Systolic BP Rehab 138  -SM     Pre Treatment Diastolic BP 77  -SM     Pretreatment Heart Rate (beats/min) 93  -SM     Posttreatment Heart Rate (beats/min) 95  -SM     Pre SpO2 (%) 93  -SM     O2 Delivery Pre Treatment supplemental O2  -SM     Post SpO2 (%) 96  -SM     O2 Delivery Post Treatment supplemental O2  -SM       Row Name 04/05/24 1423          Positioning and Restraints    Pre-Treatment Position in bed  -SM     Post Treatment Position bed  -SM     In Bed fowlers;with nsg  -SM               User Key  (r) = Recorded By, (t) = Taken By, (c) = Cosigned By      Initials Name Provider Type    Yasmine Kirkpatrick, OT Occupational Therapist                   Outcome Measures       Row Name 04/05/24 1426          How much help from another is currently needed...    Putting on and taking off regular lower body clothing? 1  -SM     Bathing (including washing, rinsing, and drying) 2  -SM     Toileting (which includes using toilet bed pan or urinal) 1  -SM     Putting on and taking off regular upper body clothing 2  -SM     Taking care of personal grooming (such as brushing teeth) 2  -SM     Eating meals 1  -SM     AM-PAC 6 Clicks Score (OT) 9  -SM       Row Name 04/05/24 1427          Functional Assessment    Outcome  Measure Options AM-PAC 6 Clicks Daily Activity (OT)  -               User Key  (r) = Recorded By, (t) = Taken By, (c) = Cosigned By      Initials Name Provider Type    Yasmine Kirkpatrick OT Occupational Therapist                    Occupational Therapy Education       Title: PT OT SLP Therapies (In Progress)       Topic: Occupational Therapy (In Progress)       Point: ADL training (In Progress)       Description:   Instruct learner(s) on proper safety adaptation and remediation techniques during self care or transfers.   Instruct in proper use of assistive devices.                  Learning Progress Summary             Patient Nonacceptance, E, NL by KOFI at 4/4/2024 0014                         Point: Home exercise program (In Progress)       Description:   Instruct learner(s) on appropriate technique for monitoring, assisting and/or progressing therapeutic exercises/activities.                  Learning Progress Summary             Patient Nonacceptance, E, NL by KOFI at 4/4/2024 0014                         Point: Precautions (In Progress)       Description:   Instruct learner(s) on prescribed precautions during self-care and functional transfers.                  Learning Progress Summary             Patient Nonacceptance, E, NL by KOFI at 4/4/2024 0014                         Point: Body mechanics (In Progress)       Description:   Instruct learner(s) on proper positioning and spine alignment during self-care, functional mobility activities and/or exercises.                  Learning Progress Summary             Patient Nonacceptance, E, NL by KOFI at 4/4/2024 0014   Family Acceptance, E, VU by  at 4/3/2024 1239    Comment: OT goals, POC, positioning of LUE for safety post CVA for subluxation                                         User Key       Initials Effective Dates Name Provider Type Discipline    KOFI 08/01/22 -  Monique Mary, DYLON Registered Nurse Nurse     04/02/20 -  Yasmine Martin OT Occupational  Therapist OT                  OT Recommendation and Plan  Planned Therapy Interventions (OT): activity tolerance training, adaptive equipment training, functional balance retraining, occupation/activity based interventions, cognitive/visual perception retraining, neuromuscular control/coordination retraining, patient/caregiver education/training, transfer/mobility retraining, strengthening exercise, ROM/therapeutic exercise, orthotic fabrication/fitting/training, IADL retraining, BADL retraining  Therapy Frequency (OT): 5 times/wk  Plan of Care Review  Plan of Care Reviewed With: patient, family  Outcome Evaluation: Pt is able to participate in OT today, he was extubated a few hours prior to participation. He is following single step commands with use of Surinamese . Pt is able to sit EOB today with focus on activites to improve midline postural control, L side attention/scanning, bADL engagement. Pt continues to be weak and require X2 assist. LUE remains flaccid.     Time Calculation:   Evaluation Complexity (OT)  Review Occupational Profile/Medical/Therapy History Complexity: extensive/high complexity  Assessment, Occupational Performance/Identification of Deficit Complexity: 5 or more performance deficits  Clinical Decision Making Complexity (OT): comprehensive assessment/high complexity  Overall Complexity of Evaluation (OT): high complexity     Time Calculation- OT       Row Name 04/05/24 1426             Time Calculation- OT    OT Start Time 1123  -SM      OT Stop Time 1149  -SM      OT Time Calculation (min) 26 min  -SM      Total Timed Code Minutes- OT 26 minute(s)  -SM      OT Received On 04/05/24  -SM      OT - Next Appointment 04/08/24  -SM         Timed Charges    10405 - OT Therapeutic Activity Minutes 18  -SM      65499 - OT Self Care/Mgmt Minutes 8  -SM         Total Minutes    Timed Charges Total Minutes 26  -SM       Total Minutes 26  -SM                User Key  (r) = Recorded By, (t) =  Taken By, (c) = Cosigned By      Initials Name Provider Type     Yasmine Martin OT Occupational Therapist                  Therapy Charges for Today       Code Description Service Date Service Provider Modifiers Qty    59260786143  OT THERAPEUTIC ACT EA 15 MIN 4/5/2024 Yasmine Martin OT GO 1    01215622531  OT SELF CARE/MGMT/TRAIN EA 15 MIN 4/5/2024 Yasmine Martin OT GO 1                 Yasmine Martin OT  4/5/2024

## 2024-04-05 NOTE — THERAPY TREATMENT NOTE
Patient Name: Alex Negron  : 1945    MRN: [id number]                              Today's Date: 2024       Admit Date: 2024    Visit Dx:     ICD-10-CM ICD-9-CM   1. Cerebrovascular accident (CVA), unspecified mechanism  I63.9 434.91   2. Coffee ground emesis  K92.0 578.0   3. Gastroesophageal reflux disease, unspecified whether esophagitis present  K21.9 530.81   4. 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 No pertinent past medical history.  Past Surgical History:   Procedure Laterality Date    ENDOSCOPY N/A 4/3/2024    Procedure: ESOPHAGOGASTRODUODENOSCOPY AT BEDSIDE;  Surgeon: Redd Guidry MD;  Location: [facility] ENDOSCOPY;  Service: Gastroenterology;  Laterality: N/A;  PRE-  GI BLEED  POST- HIATAL HERNIA, DISTAL ESOPHAGITIS ULCERS, GASTRITIS, PYLOROPLASTY      General Information       Row Name 24 1614          Physical Therapy Time and Intention    Document Type therapy note (daily note)  -EM     Mode of Treatment co-treatment;occupational therapy;physical therapy;other (see comments)  -EM       Row Name 24 1614          General Information    Existing Precautions/Restrictions fall  pt extubated earlier this am  -EM     Barriers to Rehab language barrier  used  ipad  -EM       Row Name 24 1614          Cognition    Orientation Status (Cognition) oriented x 3  -EM       Row Name 24 1614          Safety Issues, Functional Mobility    Comment, Safety Issues/Impairments (Mobility) Co treatment medically appropriate and necessary due to patient acuity level, activity tolerance and safety of patient and staff. Treatment is focusing on progression of care and goals established in the POC.   -EM               User Key  (r) = Recorded By, (t) = Taken By, (c) = Cosigned By      Initials Name Provider Type    Lesia Vanegas PT Physical Therapist                   Mobility       Row Name 04/05/24 1616          Bed Mobility    Supine-Sit [place] (Bed Mobility) maximum assist (25% patient effort);2 person assist;verbal cues  -EM     Sit-Supine [place] (Bed Mobility) maximum assist (25% patient effort);2 person assist;verbal cues  -EM     Assistive Device (Bed Mobility) head of bed elevated;draw sheet  -EM               User Key  (r) = Recorded By, (t) = Taken By, (c) = Cosigned By      Initials Name Provider Type    Lesia Vanegas PT Physical Therapist                   Obj/Interventions       Row Name 04/05/24 1617          Motor Skills    Therapeutic Exercise other (see comments)  in supine: AAROM/PROM rolando AP, heel cord stretch  -EM       Row Name 04/05/24 1617          Balance    Static Sitting Balance minimal assist  initially maxA until positioned fully on EOB, pushing to L with RUE at times, better with R hand positioned in lap  -EM     Dynamic Sitting Balance moderate assist;maximum assist  -EM               User Key  (r) = Recorded By, (t) = Taken By, (c) = Cosigned By      Initials Name Provider Type    Lesia Vanegas PT Physical Therapist                   Goals/Plan    No documentation.                  Clinical Impression       Row Name 04/05/24 1619          Pain    Pretreatment Pain Rating 0/10 - no pain  -EM       Row Name 04/05/24 1619          Plan of Care Review    Plan of Care Reviewed With patient  -EM     Outcome Evaluation Pt in supine, used ipad , pt oriented x3. patient required maxAx2 for bed mobility, initial sitting balance was maxA but improved to Ming once positioned on EOB. Patient keeps L eye closed, does not attend to L side, decreased pushing to L when R hand positioned in lap. Recommend rehab at d/c, PT will continue to  follow.  -EM       Row Name 04/05/24 1619          Positioning and Restraints    Pre-Treatment Position in bed  -EM     Post Treatment Position bed  -EM     In Bed supine;call light within reach;with nsg  -EM               User Key  (r) = Recorded By, (t) = Taken By, (c) = Cosigned By      Initials Name Provider Type    Lesia Vanegas, PT Physical Therapist                   Outcome Measures       Row Name 04/05/24 1621          How much help from another person do you currently need...    Turning from your back to your side while in flat bed without using bedrails? 2  -EM     Moving from lying on back to sitting on the side of a flat bed without bedrails? 2  -EM     Moving to and from a bed to a chair (including a wheelchair)? 1  -EM     Standing up from a chair using your arms (e.g., wheelchair, bedside chair)? 1  -EM     Climbing 3-5 steps with a railing? 1  -EM     To walk in hospital room? 1  -EM     AM-PAC 6 Clicks Score (PT) 8  -EM     Highest Level of Mobility Goal 3 --> Sit at edge of bed  -EM       Row Name 04/05/24 1426          Functional Assessment    Outcome Measure Options AM-PAC 6 Clicks Daily Activity (OT)  -               User Key  (r) = Recorded By, (t) = Taken By, (c) = Cosigned By      Initials Name Provider Type    Lesia Vanegas, PT Physical Therapist    Yasmine Kirkpatrick, OT Occupational Therapist                                 Physical Therapy Education       Title: PT OT SLP Therapies (In Progress)       Topic: Physical Therapy (In Progress)       Point: Mobility training (In Progress)       Learning Progress Summary             Patient Acceptance, E, NR by EM at 4/5/2024 1622    Nonacceptance, E, NL by EN at 4/4/2024 0014    Acceptance, E,D, NR by PC at 4/3/2024 1030   Family Acceptance, E,D, NR by PC at 4/3/2024 1030                         Point: Home exercise program (In Progress)       Learning Progress Summary             Patient Nonacceptance, E, NL by EN at  4/4/2024 0014    Acceptance, E,D, NR by PC at 4/3/2024 1030   Family Acceptance, E,D, NR by PC at 4/3/2024 1030                         Point: Body mechanics (In Progress)       Learning Progress Summary             Patient Nonacceptance, E, NL by EN at 4/4/2024 0014    Acceptance, E,D, NR by PC at 4/3/2024 1030   Family Acceptance, E,D, NR by PC at 4/3/2024 1030                         Point: Precautions (In Progress)       Learning Progress Summary             Patient Nonacceptance, E, NL by EN at 4/4/2024 0014    Acceptance, E,D, NR by PC at 4/3/2024 1030   Family Acceptance, E,D, NR by PC at 4/3/2024 1030                                         User Key       Initials Effective Dates Name Provider Type Discipline    PC 06/16/21 -  Sidra Arce, PT Physical Therapist PT    EM 06/16/21 -  Lesia Judd, PT Physical Therapist PT    EN 08/01/22 -  Monique Mary RN Registered Nurse Nurse                  PT Recommendation and Plan     Plan of Care Reviewed With: patient  Outcome Evaluation: Pt in supine, used ipad , pt oriented x3. patient required maxAx2 for bed mobility, initial sitting balance was maxA but improved to Ming once positioned on EOB. Patient keeps L eye closed, does not attend to L side, decreased pushing to L when R hand positioned in lap. Recommend rehab at d/c, PT will continue to follow.     Time Calculation:         PT Charges       Row Name 04/05/24 1622             Time Calculation    Start Time 1123  -EM      Stop Time 1149  -EM      Time Calculation (min) 26 min  -EM      PT Received On 04/05/24  -EM      PT - Next Appointment 04/06/24  -EM         Time Calculation- PT    Total Timed Code Minutes- PT 26 minute(s)  -EM         Timed Charges    68738 - PT Therapeutic Activity Minutes 26  -EM         Total Minutes    Timed Charges Total Minutes 26  -EM       Total Minutes 26  -EM                User Key  (r) = Recorded By, (t) = Taken By, (c) = Cosigned By      Initials  Name Provider Type     Lesia Judd, PT Physical Therapist                  Therapy Charges for Today       Code Description Service Date Service Provider Modifiers Qty    72866942865 HC PT THERAPEUTIC ACT EA 15 MIN 4/5/2024 Lesia Judd, PT GP 2            PT G-Codes  Outcome Measure Options: AM-PAC 6 Clicks Daily Activity (OT)  AM-PAC 6 Clicks Score (PT): 8  AM-PAC 6 Clicks Score (OT): 9  Modified Etoile Scale: 4 - Moderately severe disability.  Unable to walk without assistance, and unable to attend to own bodily needs without assistance.       Lesia Judd, PT  4/5/2024

## 2024-04-05 NOTE — PROGRESS NOTES
[facility] Progress Note    Patient Identification:  NAME:  Alex Negron  Age:  79 y.o.   Sex:  male   :  1945   MRN:  [id number]     Patient's daughter at bedside. Speaks and understands English very well. After speaking with daughter she informed me that patient currently sees Dr. Haque with [facility] for a history of bladder cancer and BPH. Patient was recently rescheduled to have a routine outpatient cystoscopy but had to cancel due to patient being hospitalized. Nurse taking care of patient reports that patient started to developed GH overnight with a moderate amount of clots. Required frequent manual irrigation. Nurses note reports that patient began having clots around catheter and eventually unable to irrigate. The catheter balloon was deflated and catheter was aggressively manipulated and balloon reinflated. Catheter was then irrigated with a large amount of clots removed and immediate relief of distention. Urine output appears to be dark red with moderate size clots in catheter bag. Attempted irrigation but catheter would not flush. Opted for removal and initiation of CBI. Discussed with daughter at bedside. Patient recently started on Plavix and ASA following his stroke.     Procedure   20 cc removed from balloon and existing dunham catheter removed- large clot noted at catheter tip   Perineal hygiene given  Patient was prepped and draped in sterile fashion  Lidocaine topical jelly applied directly into urethral meatus  Using sterile technique a 22 ch 3-way Jana dunham catheter was placed with immediate return of light red urine  Balloon inflated with 30 mL of water  Bladder manually irrigated with 200 cc of sterile water with full return of light red output with no visible clots  Closed drainage system attached  CBI initiated   Catheter secured to leg with strap  Patient tolerated well    Assessment   Urinary retention  Gross hematuria  Hx CaP  BPH     Plan  - Continue CBI, titrate  to maintain a clear/light pink output, wean to off  - Manually irrigate PRN for suspected catheter obstruction  - CT abd/pelvis ordered to assess for any residual clot in bladder  - Urology will continue to follow. Call for any questions, concerns, or clinical change    Suzan Briggs, APRN  04/05/24  11:07 EDT

## 2024-04-05 NOTE — PLAN OF CARE
Goal Outcome Evaluation:  Plan of Care Reviewed With: patient           Outcome Evaluation: Clinical swallow eval completed. Recommend NPO, continued temporary means of alternate nutrition/meds alternate route. Ice chips with RN. SLP to follow for re-eval at bedside.      Anticipated Discharge Disposition (SLP): anticipate therapy at next level of care          SLP Swallowing Diagnosis: oral dysphagia, R/O pharyngeal dysphagia (04/05/24 3806)

## 2024-04-05 NOTE — PROGRESS NOTES
[facility]  Inpatient Progress Note    Reason for Follow Up:   Coffee-ground emesis, need for anticoagulation       Subjective     Interval History:        His hemoglobin is stable at 8.9.  I discussed his case with his daughter.  He has no abdominal pain though does not communicate well.  Patient was extubated today.  Tolerating his core track tube feedings.    Current Facility-Administered Medications:     acetaminophen (TYLENOL) 160 MG/5ML oral solution 650 mg, 650 mg, Oral, Q6H PRN, Kristina Urena APRN, 650 mg at 04/05/24 1007    aspirin chewable tablet 81 mg, 81 mg, Nasogastric, Daily, Emil Faith MD, 81 mg at 04/05/24 0830    atorvastatin (LIPITOR) tablet 80 mg, 80 mg, Nasogastric, Nightly, Shiv Felipe MD, 80 mg at 04/04/24 2011    sennosides-docusate (PERICOLACE) 8.6-50 MG per tablet 2 tablet, 2 tablet, Nasogastric, BID, 2 tablet at 04/04/24 2011 **AND** polyethylene glycol (MIRALAX) packet 17 g, 17 g, Oral, Daily PRN **AND** bisacodyl (DULCOLAX) EC tablet 5 mg, 5 mg, Oral, Daily PRN **AND** bisacodyl (DULCOLAX) suppository 10 mg, 10 mg, Rectal, Daily PRN, Shiv Felipe MD    calcium carbonate (TUMS) chewable tablet 500 mg (200 mg elemental), 2 tablet, Oral, TID PRN, Gina Power MD, 2 tablet at 04/02/24 2009    Calcium Replacement - Follow Nurse / BPA Driven Protocol, , Does not apply, PRN, Martin Garnett MD    chlorhexidine (PERIDEX) 0.12 % solution 15 mL, 15 mL, Mouth/Throat, Q12H, Martin Garnett MD, 15 mL at 04/05/24 0830    clopidogrel (PLAVIX) tablet 75 mg, 75 mg, Nasogastric, Daily, Shiv Felipe MD, 75 mg at 04/05/24 0830    famotidine (PEPCID) tablet 20 mg, 20 mg, Nasogastric, BID AC, Shiv Felipe MD    fentaNYL citrate (PF) (SUBLIMAZE) injection 50 mcg, 50 mcg, Intravenous, Q2H PRN, Martin Garnett MD, 50 mcg at 04/05/24 0528    hydrALAZINE (APRESOLINE) injection 20 mg, 20 mg, Intravenous, Q6H PRN, Shiv Felipe MD, 20  mg at 04/05/24 1006    Magnesium Standard Dose Replacement - Follow Nurse / BPA Driven Protocol, , Does not apply, PRN, Martin Garnett MD    niCARdipine (CARDENE) 25 mg in 250 mL NS infusion kit, 5-15 mg/hr, Intravenous, Titrated, Dago Rubio MD, Stopped at 04/03/24 1300    nitroglycerin (NITROSTAT) SL tablet 0.4 mg, 0.4 mg, Sublingual, Q5 Min PRN, Dago Rubio MD    ondansetron (ZOFRAN) injection 4 mg, 4 mg, Intravenous, Q4H PRN, Gina Power MD, 4 mg at 04/02/24 2333    [COMPLETED] pantoprazole (PROTONIX) injection 80 mg, 80 mg, Intravenous, Once, 80 mg at 04/03/24 0834 **AND** pantoprazole (PROTONIX) 40 mg in sodium chloride 0.9 % 100 mL (0.4 mg/mL) MBP, 40 mg, Intravenous, Continuous, Martin Garnett MD, Last Rate: 20 mL/hr at 04/05/24 0851, 40 mg at 04/05/24 0851    Phosphorus Replacement - Follow Nurse / BPA Driven Protocol, , Does not apply, PRTamir PEREZ Mark Edwin, MD    piperacillin-tazobactam (ZOSYN) 3.375 g IVPB in 100 mL NS MBP (CD), 3.375 g, Intravenous, Q8H, Martin Garnett MD, 3.375 g at 04/05/24 0431    Potassium Replacement - Follow Nurse / BPA Driven Protocol, , Does not apply, PRNTamir Mark Edwin, MD    propofol (DIPRIVAN) infusion 10 mg/mL 100 mL, 5-50 mcg/kg/min, Intravenous, Titrated, Joseph Barrow MD, Stopped at 04/05/24 0725    sodium chloride 0.9 % bolus 500 mL, 500 mL, Intravenous, Once, Dago Rubio MD    sodium chloride 0.9 % flush 10 mL, 10 mL, Intravenous, PRN, Dago Rubio MD    sodium chloride 0.9 % flush 10 mL, 10 mL, Intravenous, Q12H, Dago Rubio MD, 10 mL at 04/05/24 0830    sodium chloride 0.9 % flush 10 mL, 10 mL, Intravenous, PRN, Dago Rubio MD    sodium chloride 0.9 % infusion 40 mL, 40 mL, Intravenous, PRN, Dago Rubio MD  Review of Systems:    Review of systems could not be obtained due to  patient nonverbal.    Objective     Vital Signs  Temp:  [99.1 °F (37.3 °C)-101.1 °F (38.4 °C)] 101.1 °F (38.4  "°C)  Heart Rate:  [65-95] 90  Resp:  [15-18] 18  BP: ()/(57-91) 154/91  FiO2 (%):  [29 %-96 %] 29 %  Body mass index is 26.63 kg/m².    Intake/Output Summary (Last 24 hours) at 4/5/2024 1017  Last data filed at 4/5/2024 0619  Gross per 24 hour   Intake 3482.5 ml   Output 3450 ml   Net 32.5 ml     No intake/output data recorded.     Physical Exam:   General: patient not terribly verbal.   Eyes: Normal lids and lashes, no scleral icterus   Neck: supple, normal ROM   Skin: warm and dry, not jaundiced   Cardiovascular: regular rhythm and rate, no murmurs auscultated   Pulm: clear to auscultation bilaterally, regular and unlabored   Abdomen: soft, nontender, nondistended; normal bowel sounds   Extremities: no rash or edema   Psychiatric: Patient not terribly verbal.     Results Review:     I reviewed the patient's new clinical results.    Results from last 7 days   Lab Units 04/05/24 0317 04/04/24 2020 04/04/24 0402 04/03/24  1612   WBC 10*3/mm3 11.54*  --  13.56* 16.92*   HEMOGLOBIN g/dL 8.9* 8.8* 8.8* 9.5*   HEMATOCRIT % 27.3* 26.6* 27.3* 28.8*   PLATELETS 10*3/mm3 270  --  274 311     Results from last 7 days   Lab Units 04/05/24 0317 04/04/24 0402 04/03/24  1612 04/01/24  2209 04/01/24  2206   SODIUM mmol/L 142 141 141   < > 140   POTASSIUM mmol/L 3.4* 4.1 4.1   < > 4.3   CHLORIDE mmol/L 108* 112* 109*   < > 104   CO2 mmol/L 24.0 19.8* 21.0*   < > 24.0   BUN mg/dL 38* 26* 23   < > 33*   CREATININE mg/dL 1.95* 1.42* 1.47*   < > 1.43*   CALCIUM mg/dL 7.3* 7.5* 7.9*   < > 10.1*   BILIRUBIN mg/dL  --   --  0.4  --  0.2   ALK PHOS U/L  --   --  71  --  98   ALT (SGPT) U/L  --   --  15  --  24   AST (SGOT) U/L  --   --  30  --  21   GLUCOSE mg/dL 187* 126* 126*   < > 112*    < > = values in this interval not displayed.     Results from last 7 days   Lab Units 04/01/24  2206   INR  0.97     No results found for: \"LIPASE\"    Radiology:  XR Chest 1 View   Final Result   No significant interval change.       This " report was finalized on 4/5/2024 4:18 AM by Dr. Kristal Sanford M.D on Workstation: [id number]          XR Chest 1 View   Final Result   No significant interval change.       This report was finalized on 4/4/2024 5:28 AM by Dr. Kristal Sanford M.D on Workstation: [id number]          XR Chest 1 View   Final Result   As described.       This report was finalized on 4/3/2024 2:24 PM by Dr. Prince So M.D on Workstation: [id number]          MRI Brain With & Without Contrast   Final Result   Multiple acute infarcts are appreciated, the largest of   which involves the parietal occipital and occipital temporal region on   the right posterolaterally. Smaller acute infarcts are appreciated   involving the head of the caudate nucleus on the right involving the   medial aspect of the thalamus on the right. There is mild-to-moderate   enhancement related to the infarcts noted above. Smaller infarcts   without enhancement are appreciated involving the right cerebellar   hemisphere posteriorly, medial aspect of the left cerebellar hemisphere   and the posterior and medial aspects of the left temporal lobe.       This report was finalized on 4/2/2024 12:49 PM by Dr. Vivek Gudino M.D   on Workstation: [id number]          IR Perc [facility] Thromb Prim Nonc Art Ini   Final Result   Acute occlusion of the right internal carotid artery, right   A2 segment, right distal PCA and distal right M1 segment with subsequent   successful mechanical thrombectomy in all 3 territories and TICI 2c flow   achieved. Underlying 85% stenosis of the right ICA origin treated with   successful carotid artery stent placement under distal protection with   no residual narrowing seen. Initiation of dual antiplatelet therapy   required with follow-up carotid Doppler sonography as described above.       All carotid stenosis measurements were made using NASCET criteria.           This report was finalized on 4/3/2024 11:52 AM by Dr. Loza  LARA Rubio on Workstation: [id number]          XR Chest 1 View   Final Result   Bibasilar atelectasis versus scarring.       This report was finalized on 4/1/2024 10:55 PM by Dr. Kristal Sanford M.D on Workstation: [id number]          CT Angiogram Head w AI Analysis of LVO   Final Result   1. No acute intracranial hemorrhage. However, the patient has a   hyperdense right MCA.   2. Area of cerebral blood flow less than 30% within the right MCA   territory measuring 55 cc, with corresponding Tmax greater than 6   seconds, measuring up to 173 cc.   3. 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 The   patient has some faint opacification of the right M1, although there is   thrombus identified within it. There is opacification of the right M2   branches.   4. Marked irregularity of the intracranial distal right vertebral   artery, which may reflect dissection, as well as a bulbous outpouching   which may represent pseudoaneurysm.           This report was finalized on 4/2/2024 12:42 AM by Dr. Kristal Sanford M.D on Workstation: [id number]          CT Angiogram Neck   Final Result   1. No acute intracranial hemorrhage. However, the patient has a   hyperdense right MCA.   2. Area of cerebral blood flow less than 30% within the right MCA   territory measuring 55 cc, with corresponding Tmax greater than 6   seconds, measuring up to 173 cc.   3. 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 The   patient has some faint opacification of the right M1, although there is   thrombus identified within it. There is opacification of the right M2   branches.   4. Marked irregularity of the intracranial distal right vertebral   artery, which may reflect dissection, as well as a bulbous outpouching   which may represent pseudoaneurysm.           This report was finalized on 4/2/2024 12:42 AM by Dr. Kristal Sanford M.D on Workstation: [id number]          CT CEREBRAL PERFUSION WITH &  WITHOUT CONTRAST   Final Result   1. No acute intracranial hemorrhage. However, the patient has a   hyperdense right MCA.   2. Area of cerebral blood flow less than 30% within the right MCA   territory measuring 55 cc, with corresponding Tmax greater than 6   seconds, measuring up to 173 cc.   3. 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 The   patient has some faint opacification of the right M1, although there is   thrombus identified within it. There is opacification of the right M2   branches.   4. Marked irregularity of the intracranial distal right vertebral   artery, which may reflect dissection, as well as a bulbous outpouching   which may represent pseudoaneurysm.           This report was finalized on 4/2/2024 12:42 AM by Dr. Kristal Sanford M.D on Workstation: [id number]          XR Chest 1 View    (Results Pending)   XR Chest 1 View    (Results Pending)       Assessment & Plan     Active Hospital Problems    Diagnosis     **Acute CVA (cerebrovascular accident)     Right-sided extracranial carotid artery stenosis     Coffee ground emesis     Gastroesophageal reflux disease        Assessment/Recommendations:    Assessment:  Coffee-ground emesis: Evidence of distal esophageal ulcers on endoscopy, no biopsies obtained due to anticoagulated status.  R ICA stent placement that requires anticoagulation  The patient has recently been extubated.           Plan:  Continue PPI  Continue Carafate suspension  Monitor H&H   continue tube feeds to meet nutritional requirements  Consider having a swallow evaluation?    I discussed the patients findings and my recommendations with patient, family, and nursing staff.    Bayron Richard MD

## 2024-04-05 NOTE — PROGRESS NOTES
"Nutrition Services    Patient Name:  Alex Negron  YOB: 1945  MRN: [id number]  Admit Date:  4/1/2024    Assessment Date:  04/05/24    Comment: Extubated this morning. Cortrak was occluded this am, I was able to manipulate it and get it working again. TF resumed feeds and he is tolerating them. On protonix drip, pepcid.  Large BM today.  Labs, meds, skin reviewed. Glucoses 187/167/161. ? Add SSI.   Await readiness to try po.    Plan/Recommendations:  Increase TF's goal rate back to 70mL/hr with Diabetisource AC  Adjust free water to 25mL/hr  Diet per SLP when appropriate  May need some SSI coverage    Will follow clinical course, nutrition needs.    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, L sided hemiparesis   Medical & Surgical Hx History reviewed. No pertinent past medical history.    Past Surgical History:   Procedure Laterality Date    ENDOSCOPY N/A 4/3/2024    Procedure: ESOPHAGOGASTRODUODENOSCOPY AT BEDSIDE;  Surgeon: Redd Guidry MD;  Location: [facility] ENDOSCOPY;  Service: Gastroenterology;  Laterality: N/A;  PRE-  GI BLEED  POST- HIATAL HERNIA, DISTAL ESOPHAGITIS ULCERS, GASTRITIS, PYLOROPLASTY        Current Problems Dysphagia, GI Bleed     Encounter Information        Nutrition History    Food Preferences    Supplements    Factors Affecting Intake altered mental status, altered respiratory status, swallow impairment   Tests/Procedures EGD     Anthropometrics        Current Height   Current Weight  BMI kg/m2 Height: 180.3 cm (71\")  Weight: 86.6 kg (190 lb 14.7 oz) (04/05/24 0434)  Body mass index is 26.63 kg/m².     Adj BMI (if applicable)    BMI Category Overweight (25 - 29.9)       Admission Weight 81.7kg   Ideal Body Weight (IBW) 166lb     Adj IBW (if applicable)    Usual Body Weight (UBW) unknown   Weight Change/Trend " Unknown       Weight history: Wt Readings from Last 30 Encounters:   04/05/24 0434 86.6 kg (190 lb 14.7 oz)   04/04/24 1344 87.5 kg (193 lb)   04/04/24 0500 87.7 kg (193 lb 5.5 oz)   04/03/24 0434 85.6 kg (188 lb 11.4 oz)   04/02/24 1344 84.5 kg (186 lb 4.6 oz)   04/02/24 0418 84.5 kg (186 lb 4.6 oz)   04/01/24 2159 81.7 kg (180 lb 3.2 oz)        Estimated/Assessed Needs        Energy Requirements    Weight for Calculation 81.7kg   Method for Estimation  25 kcal/kg   EST Needs (kcal/day) 2042       Protein Requirements    Weight for Calculation 81.7kg   EST Protein Needs (g/kg) 1.0 - 1.2 gm/kg   EST Daily Needs (g/day) 82-98       Fluid Requirements     Method for Estimation 1 mL/kcal    Estimated Needs (mL/day)        Fluid Deficit    Current Na Level (mEq/L)    Desired Na Level (mEq/L)    Estimated Fluid Deficit (L)       Labs        Pertinent Labs    Results from last 7 days   Lab Units 04/05/24  0317 04/04/24  0402 04/03/24  1612 04/01/24  2209 04/01/24  2206   SODIUM mmol/L 142 141 141   < > 140   POTASSIUM mmol/L 3.4* 4.1 4.1   < > 4.3   CHLORIDE mmol/L 108* 112* 109*   < > 104   CO2 mmol/L 24.0 19.8* 21.0*   < > 24.0   BUN mg/dL 38* 26* 23   < > 33*   CREATININE mg/dL 1.95* 1.42* 1.47*   < > 1.43*   CALCIUM mg/dL 7.3* 7.5* 7.9*   < > 10.1*   BILIRUBIN mg/dL  --   --  0.4  --  0.2   ALK PHOS U/L  --   --  71  --  98   ALT (SGPT) U/L  --   --  15  --  24   AST (SGOT) U/L  --   --  30  --  21   GLUCOSE mg/dL 187* 126* 126*   < > 112*    < > = values in this interval not displayed.     Results from last 7 days   Lab Units 04/05/24  0317 04/04/24  0402 04/03/24  1612 04/03/24  0205 04/02/24  0333   HEMOGLOBIN g/dL 8.9*   < > 9.5*   < > 11.1*   HEMATOCRIT % 27.3*   < > 28.8*   < > 33.8*   WBC 10*3/mm3 11.54*   < > 16.92*   < > 11.73*   TRIGLYCERIDES mg/dL  --   --   --   --  38   ALBUMIN g/dL  --   --  3.1*  --   --     < > = values in this interval not displayed.     Results from last 7 days   Lab Units  "04/05/24  0317 04/04/24  0402 04/03/24  1612 04/03/24  0205 04/02/24  0333 04/01/24  2206   INR   --   --   --   --   --  0.97   APTT seconds  --   --   --   --   --  32.7   PLATELETS 10*3/mm3 270 274 311 351 343 434     No results found for: \"COVID19\"  Lab Results   Component Value Date    HGBA1C 6.20 (H) 04/02/2024          Medications            Scheduled Medications aspirin, 81 mg, Nasogastric, Daily  atorvastatin, 80 mg, Nasogastric, Nightly  chlorhexidine, 15 mL, Mouth/Throat, Q12H  clopidogrel, 75 mg, Nasogastric, Daily  famotidine, 20 mg, Nasogastric, BID AC  piperacillin-tazobactam, 3.375 g, Intravenous, Q8H  senna-docusate sodium, 2 tablet, Nasogastric, BID  sodium chloride, 500 mL, Intravenous, Once  sodium chloride, 10 mL, Intravenous, Q12H        Infusions niCARdipine, 5-15 mg/hr, Last Rate: Stopped (04/03/24 1300)  pantoprazole, 40 mg, Last Rate: 40 mg (04/05/24 0851)  propofol, 5-50 mcg/kg/min, Last Rate: Stopped (04/05/24 0725)        PRN Medications   acetaminophen    senna-docusate sodium **AND** polyethylene glycol **AND** bisacodyl **AND** bisacodyl    calcium carbonate    Calcium Replacement - Follow Nurse / BPA Driven Protocol    fentaNYL citrate (PF)    hydrALAZINE    Magnesium Standard Dose Replacement - Follow Nurse / BPA Driven Protocol    nitroglycerin    ondansetron    Phosphorus Replacement - Follow Nurse / BPA Driven Protocol    Potassium Replacement - Follow Nurse / BPA Driven Protocol    sodium chloride    sodium chloride    sodium chloride     Physical Findings          Physical Appearance alert, disoriented, hemiparesis , on oxygen therapy   Oral/Mouth Cavity tooth or teeth missing   Edema  2+ (mild)   Gastrointestinal hypoactive bowel sounds, last bowel movement: 4/5   Skin  bruising   Tubes/Drains/Lines Cortrak, ND tube, bridle in place   NFPE No clinical signs of muscle wasting or fat loss   --  Current Nutrition Orders & Evaluation of Intake       Oral Nutrition     Food " Allergies NKFA   Current PO Diet NPO Diet NPO Type: Strict NPO   Supplement    PO Evaluation     Trending % PO Intake       Enteral Nutrition     Enteral Route ND    TF Delivery Method Continuous    Propofol Rate/Kcal     Current TF Order/Rate  Diabetisource AC @ 55 mL/hr    TF Goal Rate 70 mL/hr    Current Water Flush 30 mL Q 4 hr    Modular None    TF Residual  no or minimal residual    TF Tolerance tolerating    TF Observation Verified correct TF and water flush infusing per orders     Nutrition Diagnosis        Nutrition Dx Problem 1 Problem: Inadequate Oral Intake  Etiology: Medical Diagnosis - CVA  Dysphagia  Signs/Symptoms: NPO    Comment:      INTERVENTION / PLAN OF CARE  Intervention Goal        Intervention Goal(s) Maintain nutrition status, Meet estimated needs, Disease management/therapy, Initiate feeding/diet, Initiate TF/PN, Transition TF to PO, and Maintain weight     Nutrition Intervention        RD Action Continue to monitor, Care plan reviewed, and Recommend/order: EN     Prescription         Diet     Supplement/Snack    EN/PN     Prescription Ordered       Enteral Prescription:     Enteral Route ND    TF Delivery Method Continuous    Enteral Product Diabetisource AC    Modular None    Propofol Rate/Kcal     TF Start Rate 20    TF Goal Rate 70    Free Water Flush 25mL q 1 hr    TF Provision at Goal: 2016kcal,  100=1 gm protein, 1377 mL free water + 600 mL water flushes         Calories 100 % needs met         Protein  102 % needs met         Fluid (mL) 1977mL    Prescription Ordered Yes     Monitor/Evaluation        Monitor Per protocol   Discharge Plan Pending clinical course   Education Will instruct as appropriate     RD to follow per protocol.       Electronically signed by:  Lise Song RD  04/05/24 10:52 EDT

## 2024-04-05 NOTE — PLAN OF CARE
Goal Outcome Evaluation:  Plan of Care Reviewed With: patient, family           Outcome Evaluation: Pt is able to participate in OT today, he was extubated a few hours prior to participation. He is following single step commands with use of Taiwanese . Pt is able to sit EOB today with focus on activites to improve midline postural control, L side attention/scanning, bADL engagement. Pt continues to be weak and require X2 assist. LUE remains flaccid.      Anticipated Discharge Disposition (OT): inpatient rehabilitation facility

## 2024-04-05 NOTE — THERAPY EVALUATION
Acute Care - Speech Language Pathology   Swallow Initial Evaluation [facility]     Patient Name: Alex Negron  : 1945  MRN: [id number]  Today's Date: 2024               Admit Date: 2024    Visit Dx:     ICD-10-CM ICD-9-CM   1. Cerebrovascular accident (CVA), unspecified mechanism  I63.9 434.91   2. Coffee ground emesis  K92.0 578.0   3. Gastroesophageal reflux disease, unspecified whether esophagitis present  K21.9 530.81   4. 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 No pertinent past medical history.  Past Surgical History:   Procedure Laterality Date    ENDOSCOPY N/A 4/3/2024    Procedure: ESOPHAGOGASTRODUODENOSCOPY AT BEDSIDE;  Surgeon: Redd Guidry MD;  Location: [facility] ENDOSCOPY;  Service: Gastroenterology;  Laterality: N/A;  PRE-  GI BLEED  POST- HIATAL HERNIA, DISTAL ESOPHAGITIS ULCERS, GASTRITIS, PYLOROPLASTY       SLP Recommendation and Plan  SLP Swallowing Diagnosis: oral dysphagia, R/O pharyngeal dysphagia (24)  SLP Diet Recommendation: NPO, other (see comments) (ice chips with RN only) (24)     SLP Rec. for Method of Medication Administration: meds via alternate route (24)     Monitor for Signs of Aspiration: yes, notify SLP if any concerns (24)  Recommended Diagnostics: reassess via clinical swallow evaluation (24)  Swallow Criteria for Skilled Therapeutic Interventions Met: demonstrates skilled criteria (24)  Anticipated Discharge Disposition (SLP): anticipate therapy at next level of care (24)  Rehab Potential/Prognosis, Swallowing: good, to achieve stated therapy goals (24)  Therapy Frequency (Swallow): PRN  "(04/05/24 1515)  Predicted Duration Therapy Intervention (Days): until discharge (04/05/24 1515)  Oral Care Recommendations: Oral Care BID/PRN (04/05/24 1515)                                        Plan of Care Reviewed With: patient  Outcome Evaluation: Clinical swallow eval completed. Recommend NPO, continued temporary means of alternate nutrition/meds alternate route. Ice chips with RN. SLP to follow for re-eval at bedside.      SWALLOW EVALUATION (Last 72 Hours)       SLP Adult Swallow Evaluation       Row Name 04/05/24 1515                   Rehab Evaluation    Document Type evaluation  -OC        Subjective Information no complaints  -OC        Patient Observations alert;cooperative;agree to therapy  -OC        Patient Effort good  -OC           General Information    Patient Profile Reviewed yes  -OC        Pertinent History Of Current Problem \"Patient is a 79-year-old with no past medical history [place] [facility] [place] [facility] [facility] [place] [facility] the ER on 4/2/2024 with right MCA syndrome he had a right ICA terminal occlusion with a perfusion deficits and core of 55 mL he was taken up for intervention and had a right ICA stent placed with Tici3 recanalization.\"  -OC        Current Method of Nutrition NPO;nasogastric feedings  -OC        Precautions/Limitations, Vision difficult to assess  -OC        Precautions/Limitations, Hearing WFL;for purposes of eval  -OC        Prior Level of Function-Communication unknown  -OC        Prior Level of Function-Swallowing unknown  -OC        Plans/Goals Discussed with patient and family  -OC        Barriers to Rehab medically complex  -OC        Patient's Goals for Discharge return to PO diet  -OC        Family Goals for Discharge patient able to return to PO diet  -OC           Pain Scale: Numbers Pre/Post-Treatment    Pretreatment Pain Rating 0/10 - no pain  -OC        Posttreatment Pain Rating 0/10 - no pain  -OC           Oral Motor Structure and Function    " Dentition Assessment natural, present and adequate;missing teeth  -OC        Secretion Management problems swallowing secretions  -OC        Mucosal Quality moist, healthy  -OC        Volitional Swallow delayed;weak  -OC        Volitional Cough weak  -OC           Oral Musculature and Cranial Nerve Assessment    Oral Motor General Assessment generalized oral motor weakness  -OC           Clinical Swallow Eval    Clinical Swallow Evaluation Summary  Ipad utilized for eval. Patient demonstrated generalized oral motor weakness. Weak vocal quality with limited verbalizations. Patient demonstrated adequate acceptance and manipulation with ice chips. Multiple swallows with ice chips, dealyed throat clearing. Cough X1. Reported the need/sensation to sneeze. further trials held at this time.  -OC           SLP Evaluation Clinical Impression    SLP Swallowing Diagnosis oral dysphagia;R/O pharyngeal dysphagia  -OC        Functional Impact risk of aspiration/pneumonia  -OC        Rehab Potential/Prognosis, Swallowing good, to achieve stated therapy goals  -OC        Swallow Criteria for Skilled Therapeutic Interventions Met demonstrates skilled criteria  -OC           Recommendations    Therapy Frequency (Swallow) PRN  -OC        Predicted Duration Therapy Intervention (Days) until discharge  -OC        SLP Diet Recommendation NPO;other (see comments)  ice chips with RN only  -OC        Recommended Diagnostics reassess via clinical swallow evaluation  -OC        Oral Care Recommendations Oral Care BID/PRN  -OC        SLP Rec. for Method of Medication Administration meds via alternate route  -OC        Monitor for Signs of Aspiration yes;notify SLP if any concerns  -OC        Anticipated Discharge Disposition (SLP) anticipate therapy at next level of care  -OC                  User Key  (r) = Recorded By, (t) = Taken By, (c) = Cosigned By      Initials Name Effective Dates    OC Card, LV Livingston 08/28/23 -                      EDUCATION  The patient has been educated in the following areas:   Dysphagia (Swallowing Impairment).              Time Calculation:    Time Calculation- SLP       Row Name 04/05/24 1610             Time Calculation- SLP    SLP Start Time 1610  -OC      SLP Received On 04/05/24  -OC         Untimed Charges    SLP Eval/Re-eval  ST Eval Oral Pharyng Swallow - 82000  -OC      56536-AW Eval Oral Pharyng Swallow Minutes 60  -OC         Total Minutes    Untimed Charges Total Minutes 60  -OC       Total Minutes 60  -OC                User Key  (r) = Recorded By, (t) = Taken By, (c) = Cosigned By      Initials Name Provider Type    Yara Solorzano SLP Speech and Language Pathologist                    Therapy Charges for Today       Code Description Service Date Service Provider Modifiers Qty    90269947219  ST EVAL ORAL PHARYNG SWALLOW 4 4/5/2024 Yara Claire SLP GN 1                 LV Ward  4/5/2024

## 2024-04-05 NOTE — PLAN OF CARE
Goal Outcome Evaluation:  Plan of Care Reviewed With: patient           Outcome Evaluation: Pt in supine, used ipad , pt oriented x3. patient required maxAx2 for bed mobility, initial sitting balance was maxA but improved to Ming once positioned on EOB. Patient keeps L eye closed, does not attend to L side, decreased pushing to L when R hand positioned in lap. Recommend rehab at d/c, PT will continue to follow.

## 2024-04-05 NOTE — PLAN OF CARE
Goal Outcome Evaluation:      Pt extubated this am, O2 sats in the low 90s on 4l per NC.  3-way dunham placed by urology and hooked to cbi, now draining pink urine.  No visible clots.  Plan for possible clot evacuation as ct showed hemmorhage in the bladder.  Pt failed bedside swallow with speech today, plan to reevaluate on Monday.  Worked with OT this afternoon.  Family has been  @ bedside.  Prn meds ordered for htn. PRN pain meds.

## 2024-04-06 ENCOUNTER — APPOINTMENT (OUTPATIENT)
Dept: GENERAL RADIOLOGY | Facility: HOSPITAL | Age: 79
End: 2024-04-06
Payer: MEDICAID

## 2024-04-06 LAB
BACTERIA SPEC RESP CULT: ABNORMAL
BACTERIA SPEC RESP CULT: ABNORMAL
DEPRECATED RDW RBC AUTO: 40.5 FL (ref 37–54)
ERYTHROCYTE [DISTWIDTH] IN BLOOD BY AUTOMATED COUNT: 13 % (ref 12.3–15.4)
GLUCOSE BLDC GLUCOMTR-MCNC: 146 MG/DL (ref 70–130)
GLUCOSE BLDC GLUCOMTR-MCNC: 158 MG/DL (ref 70–130)
GLUCOSE BLDC GLUCOMTR-MCNC: 196 MG/DL (ref 70–130)
GLUCOSE BLDC GLUCOMTR-MCNC: 208 MG/DL (ref 70–130)
GRAM STN SPEC: ABNORMAL
HCT VFR BLD AUTO: 25.9 % (ref 37.5–51)
HGB BLD-MCNC: 8.7 G/DL (ref 13–17.7)
MCH RBC QN AUTO: 29.1 PG (ref 26.6–33)
MCHC RBC AUTO-ENTMCNC: 33.6 G/DL (ref 31.5–35.7)
MCV RBC AUTO: 86.6 FL (ref 79–97)
PLATELET # BLD AUTO: 343 10*3/MM3 (ref 140–450)
PMV BLD AUTO: 8.9 FL (ref 6–12)
RBC # BLD AUTO: 2.99 10*6/MM3 (ref 4.14–5.8)
WBC NRBC COR # BLD AUTO: 14 10*3/MM3 (ref 3.4–10.8)

## 2024-04-06 PROCEDURE — 25010000002 FENTANYL CITRATE (PF) 50 MCG/ML SOLUTION: Performed by: INTERNAL MEDICINE

## 2024-04-06 PROCEDURE — 85598 HEXAGNAL PHOSPH PLTLT NEUTRL: CPT | Performed by: STUDENT IN AN ORGANIZED HEALTH CARE EDUCATION/TRAINING PROGRAM

## 2024-04-06 PROCEDURE — 85730 THROMBOPLASTIN TIME PARTIAL: CPT | Performed by: STUDENT IN AN ORGANIZED HEALTH CARE EDUCATION/TRAINING PROGRAM

## 2024-04-06 PROCEDURE — 99232 SBSQ HOSP IP/OBS MODERATE 35: CPT | Performed by: INTERNAL MEDICINE

## 2024-04-06 PROCEDURE — 71045 X-RAY EXAM CHEST 1 VIEW: CPT

## 2024-04-06 PROCEDURE — 85670 THROMBIN TIME PLASMA: CPT | Performed by: STUDENT IN AN ORGANIZED HEALTH CARE EDUCATION/TRAINING PROGRAM

## 2024-04-06 PROCEDURE — 86146 BETA-2 GLYCOPROTEIN ANTIBODY: CPT | Performed by: STUDENT IN AN ORGANIZED HEALTH CARE EDUCATION/TRAINING PROGRAM

## 2024-04-06 PROCEDURE — 25010000002 HYDRALAZINE PER 20 MG: Performed by: INTERNAL MEDICINE

## 2024-04-06 PROCEDURE — 86147 CARDIOLIPIN ANTIBODY EA IG: CPT | Performed by: STUDENT IN AN ORGANIZED HEALTH CARE EDUCATION/TRAINING PROGRAM

## 2024-04-06 PROCEDURE — 25010000002 PIPERACILLIN SOD-TAZOBACTAM PER 1 G: Performed by: INTERNAL MEDICINE

## 2024-04-06 PROCEDURE — 25010000002 NICARDIPINE 2.5 MG/ML SOLUTION: Performed by: RADIOLOGY

## 2024-04-06 PROCEDURE — 25810000003 SODIUM CHLORIDE 0.9 % SOLUTION: Performed by: RADIOLOGY

## 2024-04-06 PROCEDURE — 94761 N-INVAS EAR/PLS OXIMETRY MLT: CPT

## 2024-04-06 PROCEDURE — 99232 SBSQ HOSP IP/OBS MODERATE 35: CPT | Performed by: STUDENT IN AN ORGANIZED HEALTH CARE EDUCATION/TRAINING PROGRAM

## 2024-04-06 PROCEDURE — C9248 INJ, CLEVIDIPINE BUTYRATE: HCPCS | Performed by: INTERNAL MEDICINE

## 2024-04-06 PROCEDURE — 97530 THERAPEUTIC ACTIVITIES: CPT

## 2024-04-06 PROCEDURE — 85610 PROTHROMBIN TIME: CPT | Performed by: STUDENT IN AN ORGANIZED HEALTH CARE EDUCATION/TRAINING PROGRAM

## 2024-04-06 PROCEDURE — 25010000002 CLEVIDIPINE BUTYRATE PER 1 MG: Performed by: INTERNAL MEDICINE

## 2024-04-06 PROCEDURE — 85613 RUSSELL VIPER VENOM DILUTED: CPT | Performed by: STUDENT IN AN ORGANIZED HEALTH CARE EDUCATION/TRAINING PROGRAM

## 2024-04-06 PROCEDURE — 94799 UNLISTED PULMONARY SVC/PX: CPT

## 2024-04-06 PROCEDURE — 82948 REAGENT STRIP/BLOOD GLUCOSE: CPT

## 2024-04-06 PROCEDURE — 85027 COMPLETE CBC AUTOMATED: CPT | Performed by: INTERNAL MEDICINE

## 2024-04-06 RX ORDER — IPRATROPIUM BROMIDE AND ALBUTEROL SULFATE 2.5; .5 MG/3ML; MG/3ML
3 SOLUTION RESPIRATORY (INHALATION)
Status: DISCONTINUED | OUTPATIENT
Start: 2024-04-07 | End: 2024-04-11

## 2024-04-06 RX ADMIN — CLEVIPIDINE 21 MG/HR: 0.5 EMULSION INTRAVENOUS at 22:25

## 2024-04-06 RX ADMIN — NICARDIPINE HYDROCHLORIDE 10 MG/HR: 25 INJECTION, SOLUTION INTRAVENOUS at 05:07

## 2024-04-06 RX ADMIN — CHLORHEXIDINE GLUCONATE 15 ML: 1.2 RINSE ORAL at 20:49

## 2024-04-06 RX ADMIN — PIPERACILLIN SODIUM AND TAZOBACTAM SODIUM 3.38 G: 3; .375 INJECTION, POWDER, LYOPHILIZED, FOR SOLUTION INTRAVENOUS at 13:45

## 2024-04-06 RX ADMIN — FAMOTIDINE 20 MG: 20 TABLET, FILM COATED ORAL at 16:33

## 2024-04-06 RX ADMIN — PANTOPRAZOLE SODIUM 40 MG: 40 INJECTION, POWDER, LYOPHILIZED, FOR SOLUTION INTRAVENOUS at 05:53

## 2024-04-06 RX ADMIN — ASPIRIN 81 MG: 81 TABLET, CHEWABLE ORAL at 08:00

## 2024-04-06 RX ADMIN — HYDRALAZINE HYDROCHLORIDE 10 MG: 20 INJECTION, SOLUTION INTRAMUSCULAR; INTRAVENOUS at 22:18

## 2024-04-06 RX ADMIN — PANTOPRAZOLE SODIUM 40 MG: 40 INJECTION, POWDER, LYOPHILIZED, FOR SOLUTION INTRAVENOUS at 11:50

## 2024-04-06 RX ADMIN — CLEVIPIDINE 21 MG/HR: 0.5 EMULSION INTRAVENOUS at 20:48

## 2024-04-06 RX ADMIN — AMLODIPINE BESYLATE 5 MG: 5 TABLET ORAL at 08:00

## 2024-04-06 RX ADMIN — NICARDIPINE HYDROCHLORIDE 15 MG/HR: 25 INJECTION, SOLUTION INTRAVENOUS at 14:13

## 2024-04-06 RX ADMIN — CLEVIPIDINE 23 MG/HR: 0.5 EMULSION INTRAVENOUS at 18:24

## 2024-04-06 RX ADMIN — HYDRALAZINE HYDROCHLORIDE 10 MG: 20 INJECTION, SOLUTION INTRAMUSCULAR; INTRAVENOUS at 12:26

## 2024-04-06 RX ADMIN — PANTOPRAZOLE SODIUM 40 MG: 40 INJECTION, POWDER, LYOPHILIZED, FOR SOLUTION INTRAVENOUS at 22:18

## 2024-04-06 RX ADMIN — PANTOPRAZOLE SODIUM 40 MG: 40 INJECTION, POWDER, LYOPHILIZED, FOR SOLUTION INTRAVENOUS at 18:14

## 2024-04-06 RX ADMIN — Medication 10 ML: at 20:50

## 2024-04-06 RX ADMIN — CHLORHEXIDINE GLUCONATE 15 ML: 1.2 RINSE ORAL at 08:00

## 2024-04-06 RX ADMIN — Medication 10 ML: at 08:01

## 2024-04-06 RX ADMIN — PIPERACILLIN SODIUM AND TAZOBACTAM SODIUM 3.38 G: 3; .375 INJECTION, POWDER, LYOPHILIZED, FOR SOLUTION INTRAVENOUS at 20:49

## 2024-04-06 RX ADMIN — ACETAMINOPHEN 650 MG: 160 SOLUTION ORAL at 23:29

## 2024-04-06 RX ADMIN — NICARDIPINE HYDROCHLORIDE 10 MG/HR: 25 INJECTION, SOLUTION INTRAVENOUS at 00:52

## 2024-04-06 RX ADMIN — CLEVIPIDINE 2 MG/HR: 0.5 EMULSION INTRAVENOUS at 15:53

## 2024-04-06 RX ADMIN — NICARDIPINE HYDROCHLORIDE 15 MG/HR: 25 INJECTION, SOLUTION INTRAVENOUS at 11:35

## 2024-04-06 RX ADMIN — PIPERACILLIN SODIUM AND TAZOBACTAM SODIUM 3.38 G: 3; .375 INJECTION, POWDER, LYOPHILIZED, FOR SOLUTION INTRAVENOUS at 05:00

## 2024-04-06 RX ADMIN — NICARDIPINE HYDROCHLORIDE 10 MG/HR: 25 INJECTION, SOLUTION INTRAVENOUS at 08:57

## 2024-04-06 RX ADMIN — ATORVASTATIN CALCIUM 80 MG: 80 TABLET, FILM COATED ORAL at 20:49

## 2024-04-06 RX ADMIN — CLEVIPIDINE 15 MG/HR: 0.5 EMULSION INTRAVENOUS at 23:29

## 2024-04-06 RX ADMIN — CLOPIDOGREL BISULFATE 75 MG: 75 TABLET, FILM COATED ORAL at 08:00

## 2024-04-06 RX ADMIN — FENTANYL CITRATE 50 MCG: 50 INJECTION, SOLUTION INTRAMUSCULAR; INTRAVENOUS at 02:37

## 2024-04-06 RX ADMIN — PANTOPRAZOLE SODIUM 40 MG: 40 INJECTION, POWDER, LYOPHILIZED, FOR SOLUTION INTRAVENOUS at 00:51

## 2024-04-06 RX ADMIN — CLEVIPIDINE 23 MG/HR: 0.5 EMULSION INTRAVENOUS at 19:27

## 2024-04-06 NOTE — PROGRESS NOTES
CC: Spoke with Daughter using Welsh  Video    Patient resting in bed  NAD  In restraints    Afebrile,   WBC 14  Hgb 8.8 (8.9)   Cr 1.95    Abdomen soft  3 way dunham intact, phallus and srotum wnl  Hand irrigated 3 small-med clots    Plan:  Continue CBI, hand irrigate every 2 hours and prn for clots, titrate to keep clear  No acute urologic surgical intervention at this time   Will follow

## 2024-04-06 NOTE — PROGRESS NOTES
"DOS: 2024  NAME: Alex Negron   : 1945  PCP: Jyothi Song, PASHANAE  Chief Complaint   Patient presents with    Extremity Weakness       Chief complaint: Left-sided weakness    Subjective: Patient has been seen and evaluated, no acute events overnight.  Did not pass SLP evaluation has a Dobbhoff in place.  No more coffee-ground hematemesis and hemoglobin is fairly stable.  Patient is on nicardipine drip, okay to wean him off nicardipine drip and start him on oral medications.    Objective:  Vital signs: /79   Pulse 114   Temp 98.8 °F (37.1 °C)   Resp 20   Ht 180.3 cm (71\")   Wt 86.6 kg (190 lb 14.7 oz)   SpO2 92%   BMI 26.63 kg/m²    Gen: NAD, vitals reviewed  MS: Lethargic and drowsy, able to follow commands, comprehension intact, potation and fluency hard to assess  CN: visual acuity grossly normal, PERRL, right gaze preference crosses midline, left facial droop and moderate dysarthria  Motor: Right upper and right lower extremity at least 4+/5  Left upper and left lower extremity slight withdrawal    Laboratory results:  Lab Results   Component Value Date    GLUCOSE 187 (H) 2024    CALCIUM 7.3 (L) 2024     2024    K 3.1 (L) 2024    CO2 24.0 2024     (H) 2024    BUN 38 (H) 2024    CREATININE 1.95 (H) 2024    BCR 19.5 2024    ANIONGAP 10.0 2024     Lab Results   Component Value Date    WBC 14.00 (H) 2024    HGB 8.7 (L) 2024    HCT 25.9 (L) 2024    MCV 86.6 2024     2024     Lab Results   Component Value Date    LDL 69 2024         Lab 24  0333   HEMOGLOBIN A1C 6.20*        Review of labs:   Sodium 142  Creatinine 1.95  Potassium 3.1  Blood glucose 158  Normal LFTs    WBC 14 and hemoglobin 8.7 platelets 343    A1c 6.2 and LDL 69    Review and interpretation of imaging:   CT head initially done on 2024 no acute hypodensity or hypodensity  CTA head and neck showed right " ICA terminus occlusion  CT perfusion showed a core of 55 and a penumbra of 173 mL     MRI brain acute diffusion restriction along the right MCA and right MCA PCA region, right cerebellar region and also left PCA region     TTE 60% EF normal LAE no clot no thrombus     Diagnoses:  Acute right MCA infarct s/p mechanical thrombectomy with right ICA stent and Tici3 recanalization  Acute renal insufficiency  Hyperlipidemia on Lipitor 80 mg daily  Coffee-ground hematemesis  Left lower lobe pneumonia     Acute right MCA infarct s/p mechanical thrombectomy with right ICA stent and Tici3   Considering patient also has right cerebellar infarct/left PCA with different vascular territory involvement, etiology of the stroke is most likely ESUS  Systolic blood pressure goal less than 160  Patient had a right ICA stent placement so currently on aspirin and Plavix  APLS labs pending and will also need Zio patch on discharge she has been ordered  Continue PT OT eval's  I think patient's diet will progressively if we give him more time before evaluating for PEG     Patient does have low hemoglobin and coffee-ground habitus with fecal occult positive, has EGD which showed esophageal ulcers high risk for GI bleed but he needs to continue aspirin Plavix because of the risk of stent closure.     [place] [facility] [place] as per [place].  Okay to wean him off nicardipine drip and start him on oral medications    Patient's stroke workup is complete, neurology will sign off kindly call us back with any questions if necessary    MDM   Reviewed: Previous charts, nursing notes and vitals   Reviewed: Previous labs   Interpretation: Labs   Total time providing critical care is :30-74 minutes. This excluded time spent performing separately reportable procedures and services  Consults :Neurology/Stroke    Please note that portions of this note were completed with a voice recognition program.     Sumanth Arana  MD Manny  Neuro Hospitalist /Vascular Neurology.

## 2024-04-06 NOTE — THERAPY TREATMENT NOTE
Patient Name: Alex Negron  : 1945    MRN: [id number]                              Today's Date: 2024       Admit Date: 2024    Visit Dx:     ICD-10-CM ICD-9-CM   1. Cerebrovascular accident (CVA), unspecified mechanism  I63.9 434.91   2. Coffee ground emesis  K92.0 578.0   3. Gastroesophageal reflux disease, unspecified whether esophagitis present  K21.9 530.81   4. 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 No pertinent past medical history.  Past Surgical History:   Procedure Laterality Date    ENDOSCOPY N/A 4/3/2024    Procedure: ESOPHAGOGASTRODUODENOSCOPY AT BEDSIDE;  Surgeon: Redd Guidry MD;  Location: [facility] ENDOSCOPY;  Service: Gastroenterology;  Laterality: N/A;  PRE-  GI BLEED  POST- HIATAL HERNIA, DISTAL ESOPHAGITIS ULCERS, GASTRITIS, PYLOROPLASTY      General Information       Row Name 24 1131          Physical Therapy Time and Intention    Document Type therapy note (daily note)  -LW     Mode of Treatment individual therapy;physical therapy  -LW       Row Name 24 1131          General Information    Patient Profile Reviewed yes  -LW     Existing Precautions/Restrictions fall  -LW     Barriers to Rehab language barrier  used  ipad  -LW       Row Name 24 1131          Cognition    Orientation Status (Cognition) oriented x 3  -LW       Row Name 24 1131          Safety Issues, Functional Mobility    Safety Issues Affecting Function (Mobility) ability to follow commands;impulsivity;insight into deficits/self-awareness;judgment;safety precaution awareness;safety precautions follow-through/compliance;problem-solving;sequencing abilities  -LW     Impairments Affecting Function  (Mobility) endurance/activity tolerance;strength;pain  -LW               User Key  (r) = Recorded By, (t) = Taken By, (c) = Cosigned By      Initials Name Provider Type    Isa Machado PT Physical Therapist                   Mobility       Row Name 04/06/24 1134          Bed Mobility    Bed Mobility supine-sit;sit-supine  -LW     Supine-Sit Smyth (Bed Mobility) maximum assist (25% patient effort);2 person assist;verbal cues  -LW     Sit-Supine Smyth (Bed Mobility) maximum assist (25% patient effort);2 person assist;verbal cues  -LW     Assistive Device (Bed Mobility) head of bed elevated;draw sheet  -LW       Row Name 04/06/24 1134          Transfers    Comment, (Transfers) transfers not safe to attempt this date, sitting balance required maxA, strong L lean  -LW               User Key  (r) = Recorded By, (t) = Taken By, (c) = Cosigned By      Initials Name Provider Type    Isa Machado PT Physical Therapist                   Obj/Interventions       Row Name 04/06/24 1135          Motor Skills    Therapeutic Exercise other (see comments)  AP RLE x10  -LW       Row Name 04/06/24 1135          Balance    Static Sitting Balance maximum assist;moderate assist  improvement with RUE in lap, pushing towards L side  -LW     Position, Sitting Balance sitting edge of bed  -LW               User Key  (r) = Recorded By, (t) = Taken By, (c) = Cosigned By      Initials Name Provider Type    Isa Machado PT Physical Therapist                   Goals/Plan    No documentation.                  Clinical Impression       Row Name 04/06/24 1136          Pain    Pretreatment Pain Rating 0/10 - no pain  -LW     Posttreatment Pain Rating 0/10 - no pain  -LW       Row Name 04/06/24 1136          Plan of Care Review    Plan of Care Reviewed With patient  -LW     Progress no change  -LW     Outcome Evaluation Pt agreeable to working with PT, ipad  used. Pt required maxAx2 for bed mobility and sat EOB with  mod/maxA. His sitting balance improve with RUE in lap, other a strong pusher towads the left. Pt will benefit from continued skilled PT addressing limitations in functional mobility to maximize safety and independence.  -LW       Row Name 04/06/24 1136          Positioning and Restraints    Pre-Treatment Position in bed  -LW     Post Treatment Position bed  -LW     In Bed supine;call light within reach;encouraged to call for assist;exit alarm on;notified nsg  -LW               User Key  (r) = Recorded By, (t) = Taken By, (c) = Cosigned By      Initials Name Provider Type    Isa Machado, SANTOSH Physical Therapist                   Outcome Measures       Row Name 04/06/24 1138 04/06/24 0800       How much help from another person do you currently need...    Turning from your back to your side while in flat bed without using bedrails? 2  -LW 2  -VB    Moving from lying on back to sitting on the side of a flat bed without bedrails? 2  -LW 2  -VB    Moving to and from a bed to a chair (including a wheelchair)? 1  -LW 1  -VB    Standing up from a chair using your arms (e.g., wheelchair, bedside chair)? 1  -LW 1  -VB    Climbing 3-5 steps with a railing? 1  -LW 1  -VB    To walk in hospital room? 1  -LW 1  -VB    AM-PAC 6 Clicks Score (PT) 8  -LW 8  -VB    Highest Level of Mobility Goal 3 --> Sit at edge of bed  -LW 3 --> Sit at edge of bed  -VB      Row Name 04/06/24 1138          Functional Assessment    Outcome Measure Options AM-PAC 6 Clicks Basic Mobility (PT)  -LW               User Key  (r) = Recorded By, (t) = Taken By, (c) = Cosigned By      Initials Name Provider Type    VB Sloane Riley, RN Registered Nurse    Isa Machado, PT Physical Therapist                                 Physical Therapy Education       Title: PT OT SLP Therapies (In Progress)       Topic: Physical Therapy (In Progress)       Point: Mobility training (In Progress)       Learning Progress Summary             Patient Acceptance, E, NR by  EM at 4/5/2024 1622    Nonacceptance, E, NL by EN at 4/4/2024 0014    Acceptance, E,D, NR by PC at 4/3/2024 1030   Family Acceptance, E,D, NR by PC at 4/3/2024 1030                         Point: Home exercise program (In Progress)       Learning Progress Summary             Patient Acceptance, E,D, NR,NL by LW at 4/6/2024 1138    Nonacceptance, E, NL by EN at 4/4/2024 0014    Acceptance, E,D, NR by PC at 4/3/2024 1030   Family Acceptance, E,D, NR by PC at 4/3/2024 1030                         Point: Body mechanics (In Progress)       Learning Progress Summary             Patient Acceptance, E,D, NR,NL by LW at 4/6/2024 1138    Nonacceptance, E, NL by EN at 4/4/2024 0014    Acceptance, E,D, NR by PC at 4/3/2024 1030   Family Acceptance, E,D, NR by PC at 4/3/2024 1030                         Point: Precautions (In Progress)       Learning Progress Summary             Patient Acceptance, E,D, NR,NL by LW at 4/6/2024 1138    Nonacceptance, E, NL by EN at 4/4/2024 0014    Acceptance, E,D, NR by PC at 4/3/2024 1030   Family Acceptance, E,D, NR by PC at 4/3/2024 1030                                         User Key       Initials Effective Dates Name Provider Type Discipline    PC 06/16/21 -  Sidra Arce, PT Physical Therapist PT    EM 06/16/21 -  Lesia Judd, PT Physical Therapist PT    EN 08/01/22 -  Monique Mayr, RN Registered Nurse Nurse    LW 05/08/23 -  Isa Pierce, SANTOSH Physical Therapist PT                  PT Recommendation and Plan     Plan of Care Reviewed With: patient  Progress: no change  Outcome Evaluation: Pt agreeable to working with PT, ipad  used. Pt required maxAx2 for bed mobility and sat EOB with mod/maxA. His sitting balance improve with RUE in lap, other a strong pusher towads the left. Pt will benefit from continued skilled PT addressing limitations in functional mobility to maximize safety and independence.     Time Calculation:         PT Charges       Row Name 04/06/24  1139             Time Calculation    Start Time 1059  -LW      Stop Time 1117  -LW      Time Calculation (min) 18 min  -LW      PT Received On 04/06/24  -LW      PT - Next Appointment 04/08/24  -LW         Time Calculation- PT    Total Timed Code Minutes- PT 18 minute(s)  -LW         Timed Charges    03111 - PT Therapeutic Activity Minutes 18  -LW         Total Minutes    Timed Charges Total Minutes 18  -LW       Total Minutes 18  -LW                User Key  (r) = Recorded By, (t) = Taken By, (c) = Cosigned By      Initials Name Provider Type    Isa Machado, PT Physical Therapist                  Therapy Charges for Today       Code Description Service Date Service Provider Modifiers Qty    47893938934 HC PT THERAPEUTIC ACT EA 15 MIN 4/6/2024 Isa Pierce, PT GP 1            PT G-Codes  Outcome Measure Options: AM-PAC 6 Clicks Basic Mobility (PT)  AM-PAC 6 Clicks Score (PT): 8  AM-PAC 6 Clicks Score (OT): 9  Modified Lilian Scale: 4 - Moderately severe disability.  Unable to walk without assistance, and unable to attend to own bodily needs without assistance.       Isa Pierce PT  4/6/2024

## 2024-04-06 NOTE — PROGRESS NOTES
[facility]  Inpatient Progress Note    Reason for Follow Up: Coffee-ground emesis, esophageal ulcer    Subjective     Interval History:   Patient extubated doing well no further bleeding    Current Facility-Administered Medications:     acetaminophen (TYLENOL) 160 MG/5ML oral solution 650 mg, 650 mg, Oral, Q6H PRN, Kristina Urena APRN, 650 mg at 04/05/24 2147    amLODIPine (NORVASC) tablet 5 mg, 5 mg, Per PEG Tube, Q24H, Ja Tucker MD, 5 mg at 04/06/24 0800    aspirin chewable tablet 81 mg, 81 mg, Nasogastric, Daily, Emil Faith MD, 81 mg at 04/06/24 0800    atorvastatin (LIPITOR) tablet 80 mg, 80 mg, Nasogastric, Nightly, Shiv Felipe MD, 80 mg at 04/05/24 2131    sennosides-docusate (PERICOLACE) 8.6-50 MG per tablet 2 tablet, 2 tablet, Nasogastric, BID, 2 tablet at 04/04/24 2011 **AND** polyethylene glycol (MIRALAX) packet 17 g, 17 g, Oral, Daily PRN **AND** bisacodyl (DULCOLAX) EC tablet 5 mg, 5 mg, Oral, Daily PRN **AND** bisacodyl (DULCOLAX) suppository 10 mg, 10 mg, Rectal, Daily PRN, Shiv Felipe MD    calcium carbonate (TUMS) chewable tablet 500 mg (200 mg elemental), 2 tablet, Oral, TID PRN, Gina Power MD, 2 tablet at 04/02/24 2009    Calcium Replacement - Follow Nurse / BPA Driven Protocol, , Does not apply, PRN, Martin Garnett MD    chlorhexidine (PERIDEX) 0.12 % solution 15 mL, 15 mL, Mouth/Throat, Q12H, Martin Garnett MD, 15 mL at 04/06/24 0800    clopidogrel (PLAVIX) tablet 75 mg, 75 mg, Nasogastric, Daily, Shiv Felipe MD, 75 mg at 04/06/24 0800    famotidine (PEPCID) tablet 20 mg, 20 mg, Nasogastric, BID AC, Shiv Felipe MD, 20 mg at 04/05/24 1745    fentaNYL citrate (PF) (SUBLIMAZE) injection 50 mcg, 50 mcg, Intravenous, Q2H PRN, Shiv Felipe MD, 50 mcg at 04/06/24 0237    hydrALAZINE (APRESOLINE) injection 10 mg, 10 mg, Intravenous, Q4H PRN, Ja Tucker MD, 10 mg at 04/05/24 7237    HYDROcodone-acetaminophen (NORCO)  5-325 MG per tablet 1 tablet, 1 tablet, Oral, Q6H PRN, Shiv Felipe MD, 1 tablet at 04/05/24 1337    Magnesium Standard Dose Replacement - Follow Nurse / BPA Driven Protocol, , Does not apply, PRN, Martin Garnett MD    niCARdipine (CARDENE) 25 mg in 250 mL NS infusion kit, 5-15 mg/hr, Intravenous, Titrated, Dago Rubio MD, Last Rate: 150 mL/hr at 04/06/24 1135, 15 mg/hr at 04/06/24 1135    nitroglycerin (NITROSTAT) SL tablet 0.4 mg, 0.4 mg, Sublingual, Q5 Min PRN, Dago Rubio MD    ondansetron (ZOFRAN) injection 4 mg, 4 mg, Intravenous, Q4H PRN, Gina Power MD, 4 mg at 04/02/24 2333    [COMPLETED] pantoprazole (PROTONIX) injection 80 mg, 80 mg, Intravenous, Once, 80 mg at 04/03/24 0834 **AND** pantoprazole (PROTONIX) 40 mg in sodium chloride 0.9 % 100 mL (0.4 mg/mL) MBP, 40 mg, Intravenous, Continuous, Martin Garnett MD, Last Rate: 20 mL/hr at 04/06/24 1150, 40 mg at 04/06/24 1150    Phosphorus Replacement - Follow Nurse / BPA Driven Protocol, , Does not apply, PRTamir PEREZ Mark Edwin, MD    piperacillin-tazobactam (ZOSYN) 3.375 g IVPB in 100 mL NS MBP (CD), 3.375 g, Intravenous, Q8H, Martin Garnett MD, 3.375 g at 04/06/24 0500    Potassium Replacement - Follow Nurse / BPA Driven Protocol, , Does not apply, PRN, Martin Garnett MD    sodium chloride 0.9 % bolus 500 mL, 500 mL, Intravenous, Once, Dago Rubio MD    sodium chloride 0.9 % flush 10 mL, 10 mL, Intravenous, PRN, Dago Rubio MD    sodium chloride 0.9 % flush 10 mL, 10 mL, Intravenous, Q12H, Dago Rubio MD, 10 mL at 04/06/24 0801    sodium chloride 0.9 % flush 10 mL, 10 mL, Intravenous, PRN, Dago Rubio MD    sodium chloride 0.9 % infusion 40 mL, 40 mL, Intravenous, PRN, Dago Rubio MD  Review of Systems:    There is weakness and fatigue all other systems reviewed and negative    Objective     Vital Signs  Temp:  [98.1 °F (36.7 °C)-100.2 °F (37.9 °C)] 98.8 °F (37.1 °C)  Heart Rate:   "[] 123  Resp:  [20] 20  BP: (126-171)/() 168/89  Body mass index is 26.63 kg/m².    Intake/Output Summary (Last 24 hours) at 4/6/2024 1221  Last data filed at 4/6/2024 1135  Gross per 24 hour   Intake 2957 ml   Output 6075 ml   Net -3118 ml     I/O this shift:  In: -   Out: 500 [Urine:500]     Physical Exam:   General: patient awake, alert and cooperative   Eyes: Normal lids and lashes, no scleral icterus   Neck: supple, normal ROM   Skin: warm and dry, not jaundiced   Cardiovascular: regular rhythm and rate, no murmurs auscultated   Pulm: clear to auscultation bilaterally, regular and unlabored   Abdomen: soft, nontender, nondistended; normal bowel sounds   Extremities: no rash or edema   Psychiatric: Normal mood and behavior; memory intact     Results Review:     I reviewed the patient's new clinical results.    Results from last 7 days   Lab Units 04/06/24  0515 04/05/24 0317 04/04/24 2020 04/04/24  0402   WBC 10*3/mm3 14.00* 11.54*  --  13.56*   HEMOGLOBIN g/dL 8.7* 8.9* 8.8* 8.8*   HEMATOCRIT % 25.9* 27.3* 26.6* 27.3*   PLATELETS 10*3/mm3 343 270  --  274     Results from last 7 days   Lab Units 04/05/24  1451 04/05/24  0317 04/04/24  0402 04/03/24  1612 04/01/24  2209 04/01/24  2206   SODIUM mmol/L  --  142 141 141   < > 140   POTASSIUM mmol/L 3.1* 3.4* 4.1 4.1   < > 4.3   CHLORIDE mmol/L  --  108* 112* 109*   < > 104   CO2 mmol/L  --  24.0 19.8* 21.0*   < > 24.0   BUN mg/dL  --  38* 26* 23   < > 33*   CREATININE mg/dL  --  1.95* 1.42* 1.47*   < > 1.43*   CALCIUM mg/dL  --  7.3* 7.5* 7.9*   < > 10.1*   BILIRUBIN mg/dL  --   --   --  0.4  --  0.2   ALK PHOS U/L  --   --   --  71  --  98   ALT (SGPT) U/L  --   --   --  15  --  24   AST (SGOT) U/L  --   --   --  30  --  21   GLUCOSE mg/dL  --  187* 126* 126*   < > 112*    < > = values in this interval not displayed.     Results from last 7 days   Lab Units 04/01/24  2206   INR  0.97     No results found for: \"LIPASE\"    Radiology:  XR Chest 1 View "   Final Result   No significant change.       This report was finalized on 4/6/2024 6:53 AM by Dr. Prince So M.D on Workstation: [id number]          XR Chest 1 View   Final Result      CT Abdomen Pelvis Without Contrast   Final Result       1. Heterogeneous high attenuation contents in the dependent base of the   urinary bladder, likely hemorrhage. Air in the urinary bladder may be   iatrogenic. Correlate with urinalysis and possible cystoscopy.   2. Incomplete evaluated but simple appearing 1 cm left renal cyst with   scarring in the posterior left kidney. No renal or ureteral stone is   identified. No hydronephrosis or hydroureter.   3. Small hiatal hernia.   4. Mild diffuse anasarca.   5. Multifocal bibasilar opacities, right greater than left, likely   atelectasis and superimposed pneumonia with multifocal bronchial   plugging in the base of each lower lobe.       This report was finalized on 4/5/2024 1:31 PM by Parth Wasserman MD on   Workstation: [id number]          XR Chest 1 View   Final Result   No significant interval change.       This report was finalized on 4/5/2024 4:18 AM by Dr. Kristal Sanford M.D on Workstation: [id number]          XR Chest 1 View   Final Result   No significant interval change.       This report was finalized on 4/4/2024 5:28 AM by Dr. Kristal Sanford M.D on Workstation: [email or web address]          XR Chest 1 View   Final Result   As described.       This report was finalized on 4/3/2024 2:24 PM by Dr. Prince So M.D on Workstation: [id number]          MRI Brain With & Without Contrast   Final Result   Multiple acute infarcts are appreciated, the largest of   which involves the parietal occipital and occipital temporal region on   the right posterolaterally. Smaller acute infarcts are appreciated   involving the head of the caudate nucleus on the right involving the   medial aspect of the thalamus on the right. There is mild-to-moderate   enhancement related to  the infarcts noted above. Smaller infarcts   without enhancement are appreciated involving the right cerebellar   hemisphere posteriorly, medial aspect of the left cerebellar hemisphere   and the posterior and medial aspects of the left temporal lobe.       This report was finalized on 4/2/2024 12:49 PM by Dr. Vivek Gudino M.D   on Workstation: [id number]          IR Perc [facility] Thromb Prim Nonc Art Ini   Final Result   Acute occlusion of the right internal carotid artery, right   A2 segment, right distal PCA and distal right M1 segment with subsequent   successful mechanical thrombectomy in all 3 territories and TICI 2c flow   achieved. Underlying 85% stenosis of the right ICA origin treated with   successful carotid artery stent placement under distal protection with   no residual narrowing seen. Initiation of dual antiplatelet therapy   required with follow-up carotid Doppler sonography as described above.       All carotid stenosis measurements were made using NASCET criteria.           This report was finalized on 4/3/2024 11:52 AM by Dr. Dago Rubio M.D on Workstation: [id number]          XR Chest 1 View   Final Result   Bibasilar atelectasis versus scarring.       This report was finalized on 4/1/2024 10:55 PM by Dr. Kristal Sanford M.D on Workstation: [id number]          CT Angiogram Head w AI Analysis of LVO   Final Result   1. No acute intracranial hemorrhage. However, the patient has a   hyperdense right MCA.   2. Area of cerebral blood flow less than 30% within the right MCA   territory measuring 55 cc, with corresponding Tmax greater than 6   seconds, measuring up to 173 cc.   3. 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 The   patient has some faint opacification of the right M1, although there is   thrombus identified within it. There is opacification of the right M2   branches.   4. Marked irregularity of the intracranial distal right vertebral   artery, which may reflect  dissection, as well as a bulbous outpouching   which may represent pseudoaneurysm.           This report was finalized on 4/2/2024 12:42 AM by Dr. Kristal Sanford M.D on Workstation: [id number]          CT Angiogram Neck   Final Result   1. No acute intracranial hemorrhage. However, the patient has a   hyperdense right MCA.   2. Area of cerebral blood flow less than 30% within the right MCA   territory measuring 55 cc, with corresponding Tmax greater than 6   seconds, measuring up to 173 cc.   3. 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 The   patient has some faint opacification of the right M1, although there is   thrombus identified within it. There is opacification of the right M2   branches.   4. Marked irregularity of the intracranial distal right vertebral   artery, which may reflect dissection, as well as a bulbous outpouching   which may represent pseudoaneurysm.           This report was finalized on 4/2/2024 12:42 AM by Dr. Kristal Sanford M.D on Workstation: [id number]          CT CEREBRAL PERFUSION WITH & WITHOUT CONTRAST   Final Result   1. No acute intracranial hemorrhage. However, the patient has a   hyperdense right MCA.   2. Area of cerebral blood flow less than 30% within the right MCA   territory measuring 55 cc, with corresponding Tmax greater than 6   seconds, measuring up to 173 cc.   3. 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 The   patient has some faint opacification of the right M1, although there is   thrombus identified within it. There is opacification of the right M2   branches.   4. Marked irregularity of the intracranial distal right vertebral   artery, which may reflect dissection, as well as a bulbous outpouching   which may represent pseudoaneurysm.           This report was finalized on 4/2/2024 12:42 AM by Dr. Kristal Sanford M.D on Workstation: [id number]          XR Chest 1 View    (Results Pending)   XR Chest 1 View     (Results Pending)       Assessment & Plan     Active Hospital Problems    Diagnosis     **Acute CVA (cerebrovascular accident)     Right-sided extracranial carotid artery stenosis     Coffee ground emesis     Gastroesophageal reflux disease      Assessment:  Coffee-ground emesis: Evidence of distal esophageal ulcers on endoscopy, no biopsies obtained due to anticoagulated status.  R ICA stent placement that requires anticoagulation  The patient has recently been extubated.           Plan:  Continue PPI  Continue Carafate suspension  Monitor H&H   continue tube feeds to meet nutritional requirements  Swallow eval per primary team  I discussed the patients findings and my recommendations with patient and nursing staff.    Magdaleno Mena MD

## 2024-04-06 NOTE — PROGRESS NOTES
[facility] INPATIENT PROGRESS NOTE         [facility] INTENSIVE CARE    2024      PATIENT IDENTIFICATION:  Name: Alex Negron ADMIT: 2024   : 1945  PCP: Jyothi Song PA-C    MRN: [id number] LOS: 5 days   AGE/SEX: 79 y.o. male  ROOM: [facility]                     LOS 5    Reason for visit: Acute stroke      SUBJECTIVE:      Off ventilator and doing well.  Discussed with family at bedside.  Cardene drip for blood pressure control.  Bladder irrigation ongoing.      Objective   OBJECTIVE:    Vital Sign Min/Max for last 24 hours  Temp  Min: 98.1 °F (36.7 °C)  Max: 101.1 °F (38.4 °C)   BP  Min: 126/74  Max: 171/94   Pulse  Min: 87  Max: 147   Resp  Min: 18  Max: 20   SpO2  Min: 86 %  Max: 95 %   No data recorded   No data recorded    Vitals:    24 0645 24 0700 24 0715 24 0857   BP: 139/86 144/82 144/80 145/82   Pulse: 120 112 118 113   Resp:       Temp: 98.4 °F (36.9 °C) 98.8 °F (37.1 °C) 98.8 °F (37.1 °C)    TempSrc:       SpO2:  91%     Weight:       Height:                24  0500 24  1344 24  0434   Weight: 87.7 kg (193 lb 5.5 oz) 87.5 kg (193 lb) 86.6 kg (190 lb 14.7 oz)       Body mass index is 26.63 kg/m².                    FiO2 (%): 29 %     Body mass index is 26.63 kg/m².    Intake/Output Summary (Last 24 hours) at 2024 0906  Last data filed at 2024 0846  Gross per 24 hour   Intake 3032 ml   Output 6525 ml   Net -3493 ml         Exam:  GEN:  No distress, appears stated age  EYES:   PERRL, anicteric sclerae  ENT:    External ears/nose normal, OP clear.   NECK:  No adenopathy, midline trachea  LUNGS: Normal chest on inspection, palpation and diminished breath sounds bilaterally auscultation  CV:  Normal S1S2, without murmur  ABD:  Nontender, nondistended, no hepatosplenomegaly, +BS  EXT:  Trace edema.  No cyanosis or clubbing.  No mottling and normal cap refill.    Assessment     Scheduled meds:  amLODIPine, 5 mg, Per PEG Tube,  Q24H  aspirin, 81 mg, Nasogastric, Daily  atorvastatin, 80 mg, Nasogastric, Nightly  chlorhexidine, 15 mL, Mouth/Throat, Q12H  clopidogrel, 75 mg, Nasogastric, Daily  famotidine, 20 mg, Nasogastric, BID AC  piperacillin-tazobactam, 3.375 g, Intravenous, Q8H  senna-docusate sodium, 2 tablet, Nasogastric, BID  sodium chloride, 500 mL, Intravenous, Once  sodium chloride, 10 mL, Intravenous, Q12H      IV meds:                      niCARdipine, 5-15 mg/hr, Last Rate: 10 mg/hr (04/06/24 0857)  pantoprazole, 40 mg, Last Rate: 40 mg (04/06/24 0553)      Data Review:  Results from last 7 days   Lab Units 04/05/24  1451 04/05/24 0317 04/04/24 0402 04/03/24  1612 04/03/24  0205 04/02/24  0333   SODIUM mmol/L  --  142 141 141 138 138   POTASSIUM mmol/L 3.1* 3.4* 4.1 4.1 4.3 4.6   CHLORIDE mmol/L  --  108* 112* 109* 105 102   CO2 mmol/L  --  24.0 19.8* 21.0* 23.0 22.5   BUN mg/dL  --  38* 26* 23 26* 28*   CREATININE mg/dL  --  1.95* 1.42* 1.47* 1.46* 1.34*   GLUCOSE mg/dL  --  187* 126* 126* 191* 177*   CALCIUM mg/dL  --  7.3* 7.5* 7.9* 8.5* 9.1         Estimated Creatinine Clearance: 38.9 mL/min (A) (by C-G formula based on SCr of 1.95 mg/dL (H)).  Results from last 7 days   Lab Units 04/06/24  0515 04/05/24 0317 04/04/24 2020 04/04/24  0402 04/03/24  1612 04/03/24  1032 04/03/24  0205   WBC 10*3/mm3 14.00* 11.54*  --  13.56* 16.92*  --  19.18*   HEMOGLOBIN g/dL 8.7* 8.9* 8.8* 8.8* 9.5*   < > 10.5*   PLATELETS 10*3/mm3 343 270  --  274 311  --  351    < > = values in this interval not displayed.     Results from last 7 days   Lab Units 04/01/24  2206   INR  0.97     Results from last 7 days   Lab Units 04/03/24  1612 04/01/24  2206   ALT (SGPT) U/L 15 24   AST (SGOT) U/L 30 21     Results from last 7 days   Lab Units 04/05/24  2343 04/05/24  0844 04/05/24  0339 04/03/24  1532 04/02/24  0251   PH, ARTERIAL pH units 7.482* 7.436 7.356 7.433 7.343*   PO2 ART mm Hg 50.6* 62.7* 104.8* 287.0* 77.8*   PCO2, ARTERIAL mm Hg 32.4*  37.5 37.5 38.8 44.7   HCO3 ART mmol/L 24.3 25.2 21.0* 25.9 24.3     Results from last 7 days   Lab Units 04/03/24  1612   PROCALCITONIN ng/mL 1.06*   LACTATE mmol/L 1.8         Glucose   Date/Time Value Ref Range Status   04/06/2024 0622 158 (H) 70 - 130 mg/dL Final   04/06/2024 0022 196 (H) 70 - 130 mg/dL Final   04/05/2024 1745 150 (H) 70 - 130 mg/dL Final   04/05/2024 1046 161 (H) 70 - 130 mg/dL Final   04/05/2024 0623 167 (H) 70 - 130 mg/dL Final   04/04/2024 2321 141 (H) 70 - 130 mg/dL Final   04/04/2024 1816 121 70 - 130 mg/dL Final         Imaging reviewed  Chest x-ray 4/6 reviewed shows mild cardiac enlargement.  Vascular congestion is seen.  Bilateral pulmonary infiltrates.  Stable compared to previous.            2D echo reviewed: EF 60%.  Normal diastolic function.  RVSP less than 35.        MRI brain 4/2 reviewed          Microbiology reviewed  Gram-negative bacilli from respiratory culture 4/4  MRSA screen negative.  Blood cultures no growth to date            Active Hospital Problems    Diagnosis  POA    **Acute CVA (cerebrovascular accident) [I63.9]  Yes    Right-sided extracranial carotid artery stenosis [I65.21]  Yes    Coffee ground emesis [K92.0]  Unknown    Gastroesophageal reflux disease [K21.9]  Unknown      Resolved Hospital Problems   No resolved problems to display.         ASSESSMENT:  Acute CVA with right carotid occlusion: Status post tPA and right carotid stent  Left-sided hemiparesis  Bilateral infiltrates/pneumonia: Gram-negative in culture  Sepsis with shock  Acute renal insufficiency  Elevated blood pressure   Acute blood loss anemia  coffee-ground emesis  Left lower lobe pneumonia versus atelectasis  Pulmonary edema with volume overload  Gross hematuria            PLAN:  Successfully extubated doing well off ventilator.  Off Cardene drip.  Still with vascular congestion on chest x-ray but patient has responded well to diuretics.  Noted elevated creatinine yesterday.  Repeat labs are  pending.  Hold on further diuretic.  Continue antibiotic and encourage pulmonary toilet.  Keep head of the bed elevated for aspiration risk.  Continue neurochecks per protocol.  Goal blood pressure systolic less than 140.    Aspirin and statin for secondary stroke prevention.    Three-way Murillo placed by urology with continuous drainage for hematuria.  Continue PPI.  Control glucose.  Correct electrolytes as needed.  Repeat CMP this morning is still pending.  Mechanical DVT prophylaxis.  Discussed with family.  Encourage work with therapy.      CCT: 41 min    Martin Garnett MD  Pulmonary and Critical Care Medicine  [facility], [facility]  4/6/2024    09:06 EDT

## 2024-04-06 NOTE — PLAN OF CARE
Goal Outcome Evaluation:  Plan of Care Reviewed With: patient        Progress: no change  Outcome Evaluation: Pt agreeable to working with PT, ipad  used. Pt required maxAx2 for bed mobility and sat EOB with mod/maxA. His sitting balance improve with RUE in lap, other a strong pusher towads the left. Pt will benefit from continued skilled PT addressing limitations in functional mobility to maximize safety and independence.

## 2024-04-06 NOTE — PROGRESS NOTES
[facility]    Evaluation initiated. Chart reviewed and events noted. Currently remains on CBI. Pt. Max a x 2 for supine to sit and required Max A with sitting balance due to strong L lean. Trnasfer not safe to attempt.   Will continue to monitor for patient medical status and progress with therapies for determination of most appropriate level rehab at time of discharge.    Angelica Corbin RN  Rehab Admissions Coordinator  [phone]

## 2024-04-06 NOTE — PROGRESS NOTES
Elevated blood pressure despite Cardene drip.   adjusted as needed hydralazine to every 4 hours.  Added amlodipine 5 mg starting now.    Electronically signed by Ja Tucker MD, 04/05/24, 9:39 PM EDT.

## 2024-04-07 LAB
ANION GAP SERPL CALCULATED.3IONS-SCNC: 13 MMOL/L (ref 5–15)
BUN SERPL-MCNC: 27 MG/DL (ref 8–23)
BUN/CREAT SERPL: 25 (ref 7–25)
CALCIUM SPEC-SCNC: 7.8 MG/DL (ref 8.6–10.5)
CHLORIDE SERPL-SCNC: 114 MMOL/L (ref 98–107)
CO2 SERPL-SCNC: 23 MMOL/L (ref 22–29)
CREAT SERPL-MCNC: 1.08 MG/DL (ref 0.76–1.27)
DEPRECATED RDW RBC AUTO: 41.1 FL (ref 37–54)
EGFRCR SERPLBLD CKD-EPI 2021: 70.7 ML/MIN/1.73
ERYTHROCYTE [DISTWIDTH] IN BLOOD BY AUTOMATED COUNT: 12.9 % (ref 12.3–15.4)
GLUCOSE BLDC GLUCOMTR-MCNC: 130 MG/DL (ref 70–130)
GLUCOSE BLDC GLUCOMTR-MCNC: 142 MG/DL (ref 70–130)
GLUCOSE BLDC GLUCOMTR-MCNC: 153 MG/DL (ref 70–130)
GLUCOSE BLDC GLUCOMTR-MCNC: 235 MG/DL (ref 70–130)
GLUCOSE SERPL-MCNC: 144 MG/DL (ref 65–99)
HCT VFR BLD AUTO: 25.3 % (ref 37.5–51)
HGB BLD-MCNC: 8.3 G/DL (ref 13–17.7)
MAGNESIUM SERPL-MCNC: 2.4 MG/DL (ref 1.6–2.4)
MCH RBC QN AUTO: 28.7 PG (ref 26.6–33)
MCHC RBC AUTO-ENTMCNC: 32.8 G/DL (ref 31.5–35.7)
MCV RBC AUTO: 87.5 FL (ref 79–97)
PHOSPHATE SERPL-MCNC: 1.1 MG/DL (ref 2.5–4.5)
PLATELET # BLD AUTO: 407 10*3/MM3 (ref 140–450)
PMV BLD AUTO: 9.4 FL (ref 6–12)
POTASSIUM SERPL-SCNC: 3.4 MMOL/L (ref 3.5–5.2)
RBC # BLD AUTO: 2.89 10*6/MM3 (ref 4.14–5.8)
SODIUM SERPL-SCNC: 150 MMOL/L (ref 136–145)
WBC NRBC COR # BLD AUTO: 14.37 10*3/MM3 (ref 3.4–10.8)

## 2024-04-07 PROCEDURE — 25810000003 SODIUM CHLORIDE 0.9 % SOLUTION: Performed by: INTERNAL MEDICINE

## 2024-04-07 PROCEDURE — 25010000002 HYDRALAZINE PER 20 MG: Performed by: INTERNAL MEDICINE

## 2024-04-07 PROCEDURE — 94799 UNLISTED PULMONARY SVC/PX: CPT

## 2024-04-07 PROCEDURE — 85027 COMPLETE CBC AUTOMATED: CPT | Performed by: INTERNAL MEDICINE

## 2024-04-07 PROCEDURE — 25010000002 CEFTRIAXONE PER 250 MG: Performed by: INTERNAL MEDICINE

## 2024-04-07 PROCEDURE — 94761 N-INVAS EAR/PLS OXIMETRY MLT: CPT

## 2024-04-07 PROCEDURE — 94640 AIRWAY INHALATION TREATMENT: CPT

## 2024-04-07 PROCEDURE — 80048 BASIC METABOLIC PNL TOTAL CA: CPT | Performed by: INTERNAL MEDICINE

## 2024-04-07 PROCEDURE — 84100 ASSAY OF PHOSPHORUS: CPT | Performed by: INTERNAL MEDICINE

## 2024-04-07 PROCEDURE — 82948 REAGENT STRIP/BLOOD GLUCOSE: CPT

## 2024-04-07 PROCEDURE — 99232 SBSQ HOSP IP/OBS MODERATE 35: CPT | Performed by: INTERNAL MEDICINE

## 2024-04-07 PROCEDURE — 94760 N-INVAS EAR/PLS OXIMETRY 1: CPT

## 2024-04-07 PROCEDURE — 25010000002 FUROSEMIDE PER 20 MG: Performed by: INTERNAL MEDICINE

## 2024-04-07 PROCEDURE — 25010000002 PIPERACILLIN SOD-TAZOBACTAM PER 1 G: Performed by: INTERNAL MEDICINE

## 2024-04-07 PROCEDURE — 83735 ASSAY OF MAGNESIUM: CPT | Performed by: INTERNAL MEDICINE

## 2024-04-07 PROCEDURE — 94664 DEMO&/EVAL PT USE INHALER: CPT

## 2024-04-07 RX ORDER — POTASSIUM CHLORIDE 1.5 G/1.58G
40 POWDER, FOR SOLUTION ORAL EVERY 4 HOURS
Status: COMPLETED | OUTPATIENT
Start: 2024-04-07 | End: 2024-04-07

## 2024-04-07 RX ORDER — FUROSEMIDE 10 MG/ML
40 INJECTION INTRAMUSCULAR; INTRAVENOUS EVERY 12 HOURS
Status: DISCONTINUED | OUTPATIENT
Start: 2024-04-07 | End: 2024-04-08

## 2024-04-07 RX ORDER — FENTANYL/ROPIVACAINE/NS/PF 2-625MCG/1
15 PLASTIC BAG, INJECTION (ML) EPIDURAL
Qty: 750 ML | Refills: 0 | Status: DISPENSED | OUTPATIENT
Start: 2024-04-07 | End: 2024-04-07

## 2024-04-07 RX ADMIN — PANTOPRAZOLE SODIUM 40 MG: 40 INJECTION, POWDER, LYOPHILIZED, FOR SOLUTION INTRAVENOUS at 20:00

## 2024-04-07 RX ADMIN — CEFTRIAXONE SODIUM 1000 MG: 1 INJECTION, POWDER, FOR SOLUTION INTRAMUSCULAR; INTRAVENOUS at 10:25

## 2024-04-07 RX ADMIN — Medication 10 ML: at 20:46

## 2024-04-07 RX ADMIN — HYDRALAZINE HYDROCHLORIDE 10 MG: 20 INJECTION, SOLUTION INTRAMUSCULAR; INTRAVENOUS at 05:04

## 2024-04-07 RX ADMIN — IPRATROPIUM BROMIDE AND ALBUTEROL SULFATE 3 ML: .5; 3 SOLUTION RESPIRATORY (INHALATION) at 00:11

## 2024-04-07 RX ADMIN — ASPIRIN 81 MG: 81 TABLET, CHEWABLE ORAL at 08:27

## 2024-04-07 RX ADMIN — IPRATROPIUM BROMIDE AND ALBUTEROL SULFATE 3 ML: .5; 3 SOLUTION RESPIRATORY (INHALATION) at 19:28

## 2024-04-07 RX ADMIN — DOCUSATE SODIUM 50MG AND SENNOSIDES 8.6MG 2 TABLET: 8.6; 5 TABLET, FILM COATED ORAL at 08:27

## 2024-04-07 RX ADMIN — PANTOPRAZOLE SODIUM 40 MG: 40 INJECTION, POWDER, LYOPHILIZED, FOR SOLUTION INTRAVENOUS at 16:49

## 2024-04-07 RX ADMIN — CLOPIDOGREL BISULFATE 75 MG: 75 TABLET, FILM COATED ORAL at 08:27

## 2024-04-07 RX ADMIN — PIPERACILLIN SODIUM AND TAZOBACTAM SODIUM 3.38 G: 3; .375 INJECTION, POWDER, LYOPHILIZED, FOR SOLUTION INTRAVENOUS at 04:15

## 2024-04-07 RX ADMIN — POTASSIUM PHOSPHATE, MONOBASIC POTASSIUM PHOSPHATE, DIBASIC 15 MMOL: 224; 236 INJECTION, SOLUTION, CONCENTRATE INTRAVENOUS at 17:19

## 2024-04-07 RX ADMIN — PANTOPRAZOLE SODIUM 40 MG: 40 INJECTION, POWDER, LYOPHILIZED, FOR SOLUTION INTRAVENOUS at 03:45

## 2024-04-07 RX ADMIN — PANTOPRAZOLE SODIUM 40 MG: 40 INJECTION, POWDER, LYOPHILIZED, FOR SOLUTION INTRAVENOUS at 13:11

## 2024-04-07 RX ADMIN — AMLODIPINE BESYLATE 5 MG: 5 TABLET ORAL at 08:27

## 2024-04-07 RX ADMIN — CHLORHEXIDINE GLUCONATE 15 ML: 1.2 RINSE ORAL at 20:49

## 2024-04-07 RX ADMIN — IPRATROPIUM BROMIDE AND ALBUTEROL SULFATE 3 ML: .5; 3 SOLUTION RESPIRATORY (INHALATION) at 15:17

## 2024-04-07 RX ADMIN — FAMOTIDINE 20 MG: 20 TABLET, FILM COATED ORAL at 06:44

## 2024-04-07 RX ADMIN — Medication 10 ML: at 09:00

## 2024-04-07 RX ADMIN — POTASSIUM CHLORIDE 40 MEQ: 1.5 POWDER, FOR SOLUTION ORAL at 10:25

## 2024-04-07 RX ADMIN — FUROSEMIDE 40 MG: 10 INJECTION, SOLUTION INTRAMUSCULAR; INTRAVENOUS at 21:00

## 2024-04-07 RX ADMIN — IPRATROPIUM BROMIDE AND ALBUTEROL SULFATE 3 ML: .5; 3 SOLUTION RESPIRATORY (INHALATION) at 23:07

## 2024-04-07 RX ADMIN — IPRATROPIUM BROMIDE AND ALBUTEROL SULFATE 3 ML: .5; 3 SOLUTION RESPIRATORY (INHALATION) at 07:00

## 2024-04-07 RX ADMIN — FUROSEMIDE 40 MG: 10 INJECTION, SOLUTION INTRAMUSCULAR; INTRAVENOUS at 10:25

## 2024-04-07 RX ADMIN — POTASSIUM PHOSPHATE, MONOBASIC POTASSIUM PHOSPHATE, DIBASIC 15 MMOL: 224; 236 INJECTION, SOLUTION, CONCENTRATE INTRAVENOUS at 13:55

## 2024-04-07 RX ADMIN — IPRATROPIUM BROMIDE AND ALBUTEROL SULFATE 3 ML: .5; 3 SOLUTION RESPIRATORY (INHALATION) at 11:22

## 2024-04-07 RX ADMIN — CHLORHEXIDINE GLUCONATE 15 ML: 1.2 RINSE ORAL at 09:00

## 2024-04-07 RX ADMIN — PANTOPRAZOLE SODIUM 40 MG: 40 INJECTION, POWDER, LYOPHILIZED, FOR SOLUTION INTRAVENOUS at 08:58

## 2024-04-07 RX ADMIN — IPRATROPIUM BROMIDE AND ALBUTEROL SULFATE 3 ML: .5; 3 SOLUTION RESPIRATORY (INHALATION) at 04:06

## 2024-04-07 RX ADMIN — DOCUSATE SODIUM 50MG AND SENNOSIDES 8.6MG 2 TABLET: 8.6; 5 TABLET, FILM COATED ORAL at 20:46

## 2024-04-07 RX ADMIN — FAMOTIDINE 20 MG: 20 TABLET, FILM COATED ORAL at 17:23

## 2024-04-07 RX ADMIN — POTASSIUM CHLORIDE 40 MEQ: 1.5 POWDER, FOR SOLUTION ORAL at 06:44

## 2024-04-07 RX ADMIN — ATORVASTATIN CALCIUM 80 MG: 80 TABLET, FILM COATED ORAL at 20:46

## 2024-04-07 NOTE — PROGRESS NOTES
[facility]     Events noted. No therapies yet today. Will continue to monitor for patient medical status and progress with therapies for determination of most appropriate level rehab at time of discharge.     Angelica Corbin RN  Rehab Admissions Coordinator  [phone]

## 2024-04-07 NOTE — PLAN OF CARE
Goal Outcome Evaluation:                   VSS. CBI output clear/pink and recommended to slowdown cath input per provider. Tube feeds remain held. Blood glucose WNL.

## 2024-04-07 NOTE — PROGRESS NOTES
CC: Spoke with Daughter today      Patient resting in bed  NAD  In restraints     Afebrile, HR elevated   WBC 14.3  Hgb 8.3 (8.7)   Cr 1.08     Abdomen soft  NG tube intact  3 way dunham intact, phallus and srotum wnl  CBI running, urine with light pink tinged, improving  Nursing hand irrigated 2 small clots this am       Imaging:   CXR 4/7/24- worsening pulmonary pacification, pleural effusion  Concerning for worsening pulmonary edema vs pneumonia    Plan:  Continue CBI, hand irrigate every 2 hours and prn for clots, titrate to keep clear  No acute urologic surgical intervention at this time, discussed with daughter today   Will follow

## 2024-04-07 NOTE — PROGRESS NOTES
[facility] INPATIENT PROGRESS NOTE         [facility] INTENSIVE CARE    2024      PATIENT IDENTIFICATION:  Name: Alex Negron ADMIT: 2024   : 1945  PCP: Jyothi Song PA-C    MRN: [id number] LOS: 6 days   AGE/SEX: 79 y.o. male  ROOM: [facility]                     LOS 6    Reason for visit: Acute stroke      SUBJECTIVE:      More confused.  Chest x-ray shows increasing vascular congestion and signs of left pleural effusion in addition to multifocal infiltrates.  Discussed with family at bedside.  On Optiflow at 75% FiO2.      Objective   OBJECTIVE:    Vital Sign Min/Max for last 24 hours  Temp  Min: 95.5 °F (35.3 °C)  Max: 100.6 °F (38.1 °C)   BP  Min: 118/69  Max: 172/93   Pulse  Min: 101  Max: 128   Resp  Min: 20  Max: 22   SpO2  Min: 87 %  Max: 97 %   No data recorded   Weight  Min: 86.3 kg (190 lb 4.1 oz)  Max: 86.3 kg (190 lb 4.1 oz)    Vitals:    24 0700 24 0705 24 0715 24 0800   BP: 152/85  147/89 156/93   Pulse: 117 108 114 116   Resp: 22      Temp:    98.2 °F (36.8 °C)   TempSrc:    Oral   SpO2: 93% 97% 94% 96%   Weight:       Height:                24  1344 24  0434 24  0418   Weight: 87.5 kg (193 lb) 86.6 kg (190 lb 14.7 oz) 86.3 kg (190 lb 4.1 oz)       Body mass index is 26.54 kg/m².                    FiO2 (%): 29 %     Body mass index is 26.54 kg/m².    Intake/Output Summary (Last 24 hours) at 2024 0925  Last data filed at 2024 0856  Gross per 24 hour   Intake 3785.09 ml   Output 6150 ml   Net -2364.91 ml         Exam:  GEN:  No distress, appears stated age  EYES:   PERRL, anicteric sclerae  ENT:    External ears/nose normal, OP clear.   NECK:  No adenopathy, midline trachea  LUNGS: Normal chest on inspection, palpation and diminished breath sounds bilaterally auscultation  CV:  Normal S1S2, without murmur  ABD:  Nontender, nondistended, no hepatosplenomegaly, +BS  EXT:  Trace edema.  No cyanosis or clubbing.  No mottling  and normal cap refill.    Assessment     Scheduled meds:  amLODIPine, 5 mg, Per PEG Tube, Q24H  aspirin, 81 mg, Nasogastric, Daily  atorvastatin, 80 mg, Nasogastric, Nightly  chlorhexidine, 15 mL, Mouth/Throat, Q12H  clopidogrel, 75 mg, Nasogastric, Daily  famotidine, 20 mg, Nasogastric, BID AC  ipratropium-albuterol, 3 mL, Nebulization, Q4H - RT  piperacillin-tazobactam, 3.375 g, Intravenous, Q8H  potassium chloride, 40 mEq, Oral, Q4H  senna-docusate sodium, 2 tablet, Nasogastric, BID  sodium chloride, 500 mL, Intravenous, Once  sodium chloride, 10 mL, Intravenous, Q12H      IV meds:                      clevidipine, 2-32 mg/hr, Last Rate: Stopped (04/07/24 0145)  pantoprazole, 40 mg, Last Rate: 40 mg (04/07/24 0858)      Data Review:  Results from last 7 days   Lab Units 04/07/24 0413 04/05/24  1451 04/05/24 0317 04/04/24  0402 04/03/24  1612 04/03/24  0205   SODIUM mmol/L 150*  --  142 141 141 138   POTASSIUM mmol/L 3.4* 3.1* 3.4* 4.1 4.1 4.3   CHLORIDE mmol/L 114*  --  108* 112* 109* 105   CO2 mmol/L 23.0  --  24.0 19.8* 21.0* 23.0   BUN mg/dL 27*  --  38* 26* 23 26*   CREATININE mg/dL 1.08  --  1.95* 1.42* 1.47* 1.46*   GLUCOSE mg/dL 144*  --  187* 126* 126* 191*   CALCIUM mg/dL 7.8*  --  7.3* 7.5* 7.9* 8.5*         Estimated Creatinine Clearance: 69.9 mL/min (by C-G formula based on SCr of 1.08 mg/dL).  Results from last 7 days   Lab Units 04/07/24  0413 04/06/24  0515 04/05/24  0317 04/04/24  2020 04/04/24  0402 04/03/24  1612   WBC 10*3/mm3 14.37* 14.00* 11.54*  --  13.56* 16.92*   HEMOGLOBIN g/dL 8.3* 8.7* 8.9* 8.8* 8.8* 9.5*   PLATELETS 10*3/mm3 407 343 270  --  274 311     Results from last 7 days   Lab Units 04/01/24  2206   INR  0.97     Results from last 7 days   Lab Units 04/03/24  1612 04/01/24  2206   ALT (SGPT) U/L 15 24   AST (SGOT) U/L 30 21     Results from last 7 days   Lab Units 04/05/24  2343 04/05/24  0844 04/05/24  0339 04/03/24  1532 04/02/24  0251   PH, ARTERIAL pH units 7.482* 7.436  7.356 7.433 7.343*   PO2 ART mm Hg 50.6* 62.7* 104.8* 287.0* 77.8*   PCO2, ARTERIAL mm Hg 32.4* 37.5 37.5 38.8 44.7   HCO3 ART mmol/L 24.3 25.2 21.0* 25.9 24.3     Results from last 7 days   Lab Units 04/03/24  1612   PROCALCITONIN ng/mL 1.06*   LACTATE mmol/L 1.8         Glucose   Date/Time Value Ref Range Status   04/07/2024 0426 142 (H) 70 - 130 mg/dL Final   04/07/2024 0044 235 (H) 70 - 130 mg/dL Final   04/06/2024 1808 208 (H) 70 - 130 mg/dL Final   04/06/2024 1255 146 (H) 70 - 130 mg/dL Final   04/06/2024 0622 158 (H) 70 - 130 mg/dL Final   04/06/2024 0022 196 (H) 70 - 130 mg/dL Final   04/05/2024 1745 150 (H) 70 - 130 mg/dL Final         Imaging reviewed  Chest x-ray 4/6 reviewed shows mild cardiac enlargement.  Vascular congestion is seen and worse.  Left pleural effusion noted.  Bilateral pulmonary infiltrates.  Stable compared to previous.            2D echo reviewed: EF 60%.  Normal diastolic function.  RVSP less than 35.        MRI brain 4/2 reviewed          Microbiology reviewed  Klebsiella from respiratory culture 4/4  MRSA screen negative.  Blood cultures no growth to date            Active Hospital Problems    Diagnosis  POA    **Acute CVA (cerebrovascular accident) [I63.9]  Yes    Right-sided extracranial carotid artery stenosis [I65.21]  Yes    Coffee ground emesis [K92.0]  Unknown    Gastroesophageal reflux disease [K21.9]  Unknown      Resolved Hospital Problems   No resolved problems to display.         ASSESSMENT:  Acute CVA with right carotid occlusion: Status post tPA and right carotid stent  Left-sided hemiparesis  Bilateral infiltrates/pneumonia: Klebsiella in culture  Sepsis with shock  Acute renal insufficiency  Elevated blood pressure   Acute blood loss anemia  coffee-ground emesis  Left lower lobe pneumonia versus atelectasis  Pulmonary edema with volume overload  Gross hematuria            PLAN:  Successfully extubated doing well off ventilator.  On Cleviprex for blood pressure  control.  Suspect his significant blood pressure problems has been the cause of his difficulty with pulmonary edema.  With improved renal function will resume diuretic.  Continue antibiotic and encourage pulmonary toilet.  Narrow to Rocephin based on culture results.  Keep head of the bed elevated for aspiration risk.  Continue neurochecks per protocol.  Goal blood pressure systolic less than 140.    Aspirin and statin for secondary stroke prevention.    Three-way Murillo placed by urology with continuous drainage for hematuria.  Continue PPI.  Control glucose.  Correct electrolytes as needed.    Mechanical DVT prophylaxis.  Discussed with family.  Encourage work with therapy.      CCT: 37 min    Martin Garnett MD  Pulmonary and Critical Care Medicine  [facility], [facility]  4/7/2024    09:25 EDT

## 2024-04-07 NOTE — PROGRESS NOTES
[facility]  Inpatient Progress Note    Reason for Follow Up: Coffee-ground emesis    Subjective     Interval History:   Confused this morning, hemoglobin trending downward but no report of active bleeding    Current Facility-Administered Medications:     acetaminophen (TYLENOL) 160 MG/5ML oral solution 650 mg, 650 mg, Oral, Q6H PRN, Kristina Urena, APRN, 650 mg at 04/06/24 2329    amLODIPine (NORVASC) tablet 5 mg, 5 mg, Per PEG Tube, Q24H, Ja Tucker MD, 5 mg at 04/07/24 0827    aspirin chewable tablet 81 mg, 81 mg, Nasogastric, Daily, Emil Faith MD, 81 mg at 04/07/24 0827    atorvastatin (LIPITOR) tablet 80 mg, 80 mg, Nasogastric, Nightly, Shiv Felipe MD, 80 mg at 04/06/24 2049    sennosides-docusate (PERICOLACE) 8.6-50 MG per tablet 2 tablet, 2 tablet, Nasogastric, BID, 2 tablet at 04/07/24 0827 **AND** polyethylene glycol (MIRALAX) packet 17 g, 17 g, Oral, Daily PRN **AND** bisacodyl (DULCOLAX) EC tablet 5 mg, 5 mg, Oral, Daily PRN **AND** bisacodyl (DULCOLAX) suppository 10 mg, 10 mg, Rectal, Daily PRN, Shiv Felipe MD    calcium carbonate (TUMS) chewable tablet 500 mg (200 mg elemental), 2 tablet, Oral, TID PRN, Gina Power MD, 2 tablet at 04/02/24 2009    Calcium Replacement - Follow Nurse / BPA Driven Protocol, , Does not apply, Tamir LINDSEY Mark Edwin, MD    Calcium Replacement - Follow Nurse / BPA Driven Protocol, , Does not apply, Tamir LINDSEY Mark Edwin, MD    Calcium Replacement - Follow Nurse / BPA Driven Protocol, , Does not apply, Tamir LINDSEY Mark Edwin, MD    cefTRIAXone (ROCEPHIN) 1,000 mg in sodium chloride 0.9 % 100 mL MBP, 1,000 mg, Intravenous, Q24H, Martin Garnett MD, Last Rate: 200 mL/hr at 04/07/24 1025, 1,000 mg at 04/07/24 1025    chlorhexidine (PERIDEX) 0.12 % solution 15 mL, 15 mL, Mouth/Throat, Q12H, Martin Garnett MD, 15 mL at 04/07/24 0900    clevidipine (CLEVIPREX) infusion 0.5 mg/mL, 2-32 mg/hr,  Intravenous, Titrated, Martin Garnett MD, Stopped at 04/07/24 0145    clopidogrel (PLAVIX) tablet 75 mg, 75 mg, Nasogastric, Daily, Shiv Felipe MD, 75 mg at 04/07/24 0827    famotidine (PEPCID) tablet 20 mg, 20 mg, Nasogastric, BID AC, Shiv Felipe MD, 20 mg at 04/07/24 0644    fentaNYL citrate (PF) (SUBLIMAZE) injection 50 mcg, 50 mcg, Intravenous, Q2H PRN, Shiv Felipe MD, 50 mcg at 04/06/24 0237    furosemide (LASIX) injection 40 mg, 40 mg, Intravenous, Q12H, Martin Garnett MD, 40 mg at 04/07/24 1025    hydrALAZINE (APRESOLINE) injection 10 mg, 10 mg, Intravenous, Q4H PRN, Ja Tucker MD, 10 mg at 04/07/24 0504    HYDROcodone-acetaminophen (NORCO) 5-325 MG per tablet 1 tablet, 1 tablet, Oral, Q6H PRN, Shiv Felipe MD, 1 tablet at 04/05/24 1337    ipratropium-albuterol (DUO-NEB) nebulizer solution 3 mL, 3 mL, Nebulization, Q4H - RT, Ja Tcuker MD, 3 mL at 04/07/24 1122    Magnesium Low Dose Replacement - Follow Nurse / BPA Driven Protocol, , Does not apply, PRN, Martin Garnett MD    Magnesium Standard Dose Replacement - Follow Nurse / BPA Driven Protocol, , Does not apply, PRN, Martin Garnett MD    Magnesium Standard Dose Replacement - Follow Nurse / BPA Driven Protocol, , Does not apply, PRN, Martin Garnett MD    nitroglycerin (NITROSTAT) SL tablet 0.4 mg, 0.4 mg, Sublingual, Q5 Min PRN, Dago Rubio MD    ondansetron (ZOFRAN) injection 4 mg, 4 mg, Intravenous, Q4H PRN, Gina Power MD, 4 mg at 04/02/24 8193    [COMPLETED] pantoprazole (PROTONIX) injection 80 mg, 80 mg, Intravenous, Once, 80 mg at 04/03/24 0834 **AND** pantoprazole (PROTONIX) 40 mg in sodium chloride 0.9 % 100 mL (0.4 mg/mL) MBP, 40 mg, Intravenous, Continuous, Martin Garnett MD, Last Rate: 20 mL/hr at 04/07/24 0858, 40 mg at 04/07/24 0858    Phosphorus Replacement - Follow Nurse / BPA Driven Protocol, , Does not apply, PRN, Martin Garnett MD    Phosphorus Replacement - Follow  Nurse / BPA Driven Protocol, , Does not apply, Tamir LINDSEY Mark Edwin, MD    Phosphorus Replacement - Follow Nurse / BPA Driven Protocol, , Does not apply, Tamir LINDSEY Mark Edwin, MD    potassium phosphate 15 mmol in 0.9% normal saline 250 mL IVPB, 15 mmol, Intravenous, Q3H, Martin Garnett MD    Potassium Replacement - Follow Nurse / BPA Driven Protocol, , Does not apply, Tamir LINDSEY Mark Edwin, MD    Potassium Replacement - Follow Nurse / BPA Driven Protocol, , Does not apply, Tamir LINDSEY Mark Edwin, MD    Potassium Replacement - Follow Nurse / BPA Driven Protocol, , Does not apply, Tamir LINDSEY Mark Edwin, MD    sodium chloride 0.9 % bolus 500 mL, 500 mL, Intravenous, Once, Dago Rubio MD    sodium chloride 0.9 % flush 10 mL, 10 mL, Intravenous, PRN, Dago Rubio MD    sodium chloride 0.9 % flush 10 mL, 10 mL, Intravenous, Q12H, Dago Rubio MD, 10 mL at 04/07/24 0900    sodium chloride 0.9 % flush 10 mL, 10 mL, Intravenous, PRN, Dago Rubio MD    sodium chloride 0.9 % infusion 40 mL, 40 mL, Intravenous, PRN, Dago Rubio MD  Review of Systems:    There is weakness and fatigue all other systems reviewed and negative    Objective     Vital Signs  Temp:  [95.5 °F (35.3 °C)-100.6 °F (38.1 °C)] 98.2 °F (36.8 °C)  Heart Rate:  [101-128] 123  Resp:  [20-22] 20  BP: (118-166)/(67-95) 152/91  Body mass index is 26.54 kg/m².    Intake/Output Summary (Last 24 hours) at 4/7/2024 1236  Last data filed at 4/7/2024 1203  Gross per 24 hour   Intake 3785.09 ml   Output 4150 ml   Net -364.91 ml     I/O this shift:  In: -   Out: -350      Physical Exam:   General: patient awake, alert and cooperative   Eyes: Normal lids and lashes, no scleral icterus   Neck: supple, normal ROM   Skin: warm and dry, not jaundiced   Cardiovascular: regular rhythm and rate, no murmurs auscultated   Pulm: clear to auscultation bilaterally, regular and unlabored   Abdomen: soft, nontender, nondistended; normal  "bowel sounds   Extremities: no rash or edema   Psychiatric: Normal mood and behavior; memory intact     Results Review:     I reviewed the patient's new clinical results.    Results from last 7 days   Lab Units 04/07/24 0413 04/06/24 0515 04/05/24 0317   WBC 10*3/mm3 14.37* 14.00* 11.54*   HEMOGLOBIN g/dL 8.3* 8.7* 8.9*   HEMATOCRIT % 25.3* 25.9* 27.3*   PLATELETS 10*3/mm3 407 343 270     Results from last 7 days   Lab Units 04/07/24  0413 04/05/24  1451 04/05/24  0317 04/04/24  0402 04/03/24  1612 04/01/24 2209 04/01/24 2206   SODIUM mmol/L 150*  --  142 141 141   < > 140   POTASSIUM mmol/L 3.4* 3.1* 3.4* 4.1 4.1   < > 4.3   CHLORIDE mmol/L 114*  --  108* 112* 109*   < > 104   CO2 mmol/L 23.0  --  24.0 19.8* 21.0*   < > 24.0   BUN mg/dL 27*  --  38* 26* 23   < > 33*   CREATININE mg/dL 1.08  --  1.95* 1.42* 1.47*   < > 1.43*   CALCIUM mg/dL 7.8*  --  7.3* 7.5* 7.9*   < > 10.1*   BILIRUBIN mg/dL  --   --   --   --  0.4  --  0.2   ALK PHOS U/L  --   --   --   --  71  --  98   ALT (SGPT) U/L  --   --   --   --  15  --  24   AST (SGOT) U/L  --   --   --   --  30  --  21   GLUCOSE mg/dL 144*  --  187* 126* 126*   < > 112*    < > = values in this interval not displayed.     Results from last 7 days   Lab Units 04/01/24 2206   INR  0.97     No results found for: \"LIPASE\"    Radiology:  XR Chest 1 View   Final Result   FINDINGS AND IMPRESSION:   An enteric tube courses below the mid diaphragm with tip terminating   over the left upper quadrant.       Lungs are hypoinflated. Bibasilar pulmonary pacification is present,   left greater than right, which has worsened since chest radiograph   performed earlier today. Suggestion of at least a small left pleural   effusion. There is also pulmonary vascular congestion. Constellation of   findings are concerning for worsening asymmetric pulmonary edema versus   multifocal pneumonia in the appropriate clinical context and correlation   with patient history is recommended with " follow-up chest CT if   clinically indicated. No pneumothorax is seen. Cardiac silhouette is   accentuated by low lung volumes and patient rotation.       This report was finalized on 4/7/2024 12:58 AM by Dr. John Coley M.D   on Workstation: [id number]          XR Chest 1 View   Final Result   No significant change.       This report was finalized on 4/6/2024 6:53 AM by Dr. Prince So M.D on Workstation: [email or web address]          XR Chest 1 View   Final Result      CT Abdomen Pelvis Without Contrast   Final Result       1. Heterogeneous high attenuation contents in the dependent base of the   urinary bladder, likely hemorrhage. Air in the urinary bladder may be   iatrogenic. Correlate with urinalysis and possible cystoscopy.   2. Incomplete evaluated but simple appearing 1 cm left renal cyst with   scarring in the posterior left kidney. No renal or ureteral stone is   identified. No hydronephrosis or hydroureter.   3. Small hiatal hernia.   4. Mild diffuse anasarca.   5. Multifocal bibasilar opacities, right greater than left, likely   atelectasis and superimposed pneumonia with multifocal bronchial   plugging in the base of each lower lobe.       This report was finalized on 4/5/2024 1:31 PM by Parth Wasserman MD on   Workstation: [id number]          XR Chest 1 View   Final Result   No significant interval change.       This report was finalized on 4/5/2024 4:18 AM by Dr. Kristal Sanford M.D on Workstation: [id number]          XR Chest 1 View   Final Result   No significant interval change.       This report was finalized on 4/4/2024 5:28 AM by Dr. Kristal Sanford M.D on Workstation: [email or web address]          XR Chest 1 View   Final Result   As described.       This report was finalized on 4/3/2024 2:24 PM by Dr. Prince So M.D on Workstation: [id number]          MRI Brain With & Without Contrast   Final Result   Multiple acute infarcts are appreciated, the largest of   which involves the  parietal occipital and occipital temporal region on   the right posterolaterally. Smaller acute infarcts are appreciated   involving the head of the caudate nucleus on the right involving the   medial aspect of the thalamus on the right. There is mild-to-moderate   enhancement related to the infarcts noted above. Smaller infarcts   without enhancement are appreciated involving the right cerebellar   hemisphere posteriorly, medial aspect of the left cerebellar hemisphere   and the posterior and medial aspects of the left temporal lobe.       This report was finalized on 4/2/2024 12:49 PM by Dr. Vivek Gudino M.D   on Workstation: [id number]          IR Perc [facility] Thromb Prim Nonc Art Ini   Final Result   Acute occlusion of the right internal carotid artery, right   A2 segment, right distal PCA and distal right M1 segment with subsequent   successful mechanical thrombectomy in all 3 territories and TICI 2c flow   achieved. Underlying 85% stenosis of the right ICA origin treated with   successful carotid artery stent placement under distal protection with   no residual narrowing seen. Initiation of dual antiplatelet therapy   required with follow-up carotid Doppler sonography as described above.       All carotid stenosis measurements were made using NASCET criteria.           This report was finalized on 4/3/2024 11:52 AM by Dr. Dago Rubio M.D on Workstation: [id number]          XR Chest 1 View   Final Result   Bibasilar atelectasis versus scarring.       This report was finalized on 4/1/2024 10:55 PM by Dr. Kristal Sanford M.D on Workstation: [id number]          CT Angiogram Head w AI Analysis of LVO   Final Result   1. No acute intracranial hemorrhage. However, the patient has a   hyperdense right MCA.   2. Area of cerebral blood flow less than 30% within the right MCA   territory measuring 55 cc, with corresponding Tmax greater than 6   seconds, measuring up to 173 cc.   3. Occlusion of the right  internal carotid artery at its origin. The   patient has some faint opacification of the right M1, although there is   thrombus identified within it. There is opacification of the right M2   branches.   4. Marked irregularity of the intracranial distal right vertebral   artery, which may reflect dissection, as well as a bulbous outpouching   which may represent pseudoaneurysm.           This report was finalized on 4/2/2024 12:42 AM by Dr. Kristal Sanford M.D on Workstation: [email or web address]          CT Angiogram Neck   Final Result   1. No acute intracranial hemorrhage. However, the patient has a   hyperdense right MCA.   2. Area of cerebral blood flow less than 30% within the right MCA   territory measuring 55 cc, with corresponding Tmax greater than 6   seconds, measuring up to 173 cc.   3. 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 The   patient has some faint opacification of the right M1, although there is   thrombus identified within it. There is opacification of the right M2   branches.   4. Marked irregularity of the intracranial distal right vertebral   artery, which may reflect dissection, as well as a bulbous outpouching   which may represent pseudoaneurysm.           This report was finalized on 4/2/2024 12:42 AM by Dr. Kristal Sanford M.D on Workstation: [id number]          CT CEREBRAL PERFUSION WITH & WITHOUT CONTRAST   Final Result   1. No acute intracranial hemorrhage. However, the patient has a   hyperdense right MCA.   2. Area of cerebral blood flow less than 30% within the right MCA   territory measuring 55 cc, with corresponding Tmax greater than 6   seconds, measuring up to 173 cc.   3. 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 The   patient has some faint opacification of the right M1, although there is   thrombus identified within it. There is opacification of the right M2   branches.   4. Marked irregularity of the intracranial distal right  vertebral   artery, which may reflect dissection, as well as a bulbous outpouching   which may represent pseudoaneurysm.           This report was finalized on 4/2/2024 12:42 AM by Dr. Kristal Sanford M.D on Workstation: [id number]          XR Chest 1 View    (Results Pending)       Assessment & Plan     Active Hospital Problems    Diagnosis     **Acute CVA (cerebrovascular accident)     Right-sided extracranial carotid artery stenosis     Coffee ground emesis     Gastroesophageal reflux disease      ssessment:  Coffee-ground emesis: Evidence of distal esophageal ulcers on endoscopy, no biopsies obtained due to anticoagulated status.  R ICA stent placement that requires anticoagulation  The patient has recently been extubated.  More confusion today        Plan:  Continue PPI  Continue Carafate suspension  Monitor H&H   continue tube feeds to meet nutritional requirements  Swallow eval per primary team  His hemoglobin is trending downward we will follow 1 more day and see if it stable tomorrow  I discussed the patients findings and my recommendations with patient and nursing staff.    Magdaleno Mena MD

## 2024-04-08 ENCOUNTER — APPOINTMENT (OUTPATIENT)
Dept: GENERAL RADIOLOGY | Facility: HOSPITAL | Age: 79
End: 2024-04-08
Payer: MEDICAID

## 2024-04-08 LAB
ANION GAP SERPL CALCULATED.3IONS-SCNC: 16.8 MMOL/L (ref 5–15)
BACTERIA SPEC AEROBE CULT: NORMAL
BACTERIA SPEC AEROBE CULT: NORMAL
BUN SERPL-MCNC: 28 MG/DL (ref 8–23)
BUN/CREAT SERPL: 26.4 (ref 7–25)
CALCIUM SPEC-SCNC: 8.6 MG/DL (ref 8.6–10.5)
CHLORIDE SERPL-SCNC: 108 MMOL/L (ref 98–107)
CO2 SERPL-SCNC: 22.2 MMOL/L (ref 22–29)
CREAT SERPL-MCNC: 1.06 MG/DL (ref 0.76–1.27)
DEPRECATED RDW RBC AUTO: 45.4 FL (ref 37–54)
EGFRCR SERPLBLD CKD-EPI 2021: 72.3 ML/MIN/1.73
ERYTHROCYTE [DISTWIDTH] IN BLOOD BY AUTOMATED COUNT: 13.7 % (ref 12.3–15.4)
GLUCOSE BLDC GLUCOMTR-MCNC: 133 MG/DL (ref 70–130)
GLUCOSE BLDC GLUCOMTR-MCNC: 135 MG/DL (ref 70–130)
GLUCOSE BLDC GLUCOMTR-MCNC: 153 MG/DL (ref 70–130)
GLUCOSE BLDC GLUCOMTR-MCNC: 156 MG/DL (ref 70–130)
GLUCOSE BLDC GLUCOMTR-MCNC: 166 MG/DL (ref 70–130)
GLUCOSE SERPL-MCNC: 139 MG/DL (ref 65–99)
HCT VFR BLD AUTO: 32.5 % (ref 37.5–51)
HGB BLD-MCNC: 10.6 G/DL (ref 13–17.7)
MAGNESIUM SERPL-MCNC: 2.2 MG/DL (ref 1.6–2.4)
MCH RBC QN AUTO: 29.4 PG (ref 26.6–33)
MCHC RBC AUTO-ENTMCNC: 32.6 G/DL (ref 31.5–35.7)
MCV RBC AUTO: 90 FL (ref 79–97)
PHOSPHATE SERPL-MCNC: 3.3 MG/DL (ref 2.5–4.5)
PLATELET # BLD AUTO: 492 10*3/MM3 (ref 140–450)
PMV BLD AUTO: 9.5 FL (ref 6–12)
POTASSIUM SERPL-SCNC: 3.5 MMOL/L (ref 3.5–5.2)
POTASSIUM SERPL-SCNC: 4.3 MMOL/L (ref 3.5–5.2)
RBC # BLD AUTO: 3.61 10*6/MM3 (ref 4.14–5.8)
SODIUM SERPL-SCNC: 147 MMOL/L (ref 136–145)
TSH SERPL DL<=0.05 MIU/L-ACNC: 1.72 UIU/ML (ref 0.27–4.2)
WBC NRBC COR # BLD AUTO: 14.93 10*3/MM3 (ref 3.4–10.8)

## 2024-04-08 PROCEDURE — 97112 NEUROMUSCULAR REEDUCATION: CPT

## 2024-04-08 PROCEDURE — 84100 ASSAY OF PHOSPHORUS: CPT | Performed by: INTERNAL MEDICINE

## 2024-04-08 PROCEDURE — 83735 ASSAY OF MAGNESIUM: CPT

## 2024-04-08 PROCEDURE — 84132 ASSAY OF SERUM POTASSIUM: CPT | Performed by: INTERNAL MEDICINE

## 2024-04-08 PROCEDURE — 80048 BASIC METABOLIC PNL TOTAL CA: CPT

## 2024-04-08 PROCEDURE — 94761 N-INVAS EAR/PLS OXIMETRY MLT: CPT

## 2024-04-08 PROCEDURE — 99291 CRITICAL CARE FIRST HOUR: CPT | Performed by: NURSE PRACTITIONER

## 2024-04-08 PROCEDURE — 97110 THERAPEUTIC EXERCISES: CPT

## 2024-04-08 PROCEDURE — 94799 UNLISTED PULMONARY SVC/PX: CPT

## 2024-04-08 PROCEDURE — 99232 SBSQ HOSP IP/OBS MODERATE 35: CPT | Performed by: INTERNAL MEDICINE

## 2024-04-08 PROCEDURE — 84443 ASSAY THYROID STIM HORMONE: CPT | Performed by: NURSE PRACTITIONER

## 2024-04-08 PROCEDURE — 71045 X-RAY EXAM CHEST 1 VIEW: CPT

## 2024-04-08 PROCEDURE — 92526 ORAL FUNCTION THERAPY: CPT

## 2024-04-08 PROCEDURE — 82948 REAGENT STRIP/BLOOD GLUCOSE: CPT

## 2024-04-08 PROCEDURE — 94760 N-INVAS EAR/PLS OXIMETRY 1: CPT

## 2024-04-08 PROCEDURE — 94664 DEMO&/EVAL PT USE INHALER: CPT

## 2024-04-08 PROCEDURE — 97530 THERAPEUTIC ACTIVITIES: CPT

## 2024-04-08 PROCEDURE — 85027 COMPLETE CBC AUTOMATED: CPT

## 2024-04-08 PROCEDURE — 25010000002 CEFTRIAXONE PER 250 MG: Performed by: INTERNAL MEDICINE

## 2024-04-08 RX ORDER — PANTOPRAZOLE SODIUM 40 MG/10ML
40 INJECTION, POWDER, LYOPHILIZED, FOR SOLUTION INTRAVENOUS DAILY
Status: DISCONTINUED | OUTPATIENT
Start: 2024-04-08 | End: 2024-04-17 | Stop reason: HOSPADM

## 2024-04-08 RX ORDER — POTASSIUM CHLORIDE 1.5 G/1.58G
40 POWDER, FOR SOLUTION ORAL EVERY 4 HOURS
Status: COMPLETED | OUTPATIENT
Start: 2024-04-08 | End: 2024-04-08

## 2024-04-08 RX ORDER — HYDRALAZINE HYDROCHLORIDE 20 MG/ML
10 INJECTION INTRAMUSCULAR; INTRAVENOUS EVERY 4 HOURS PRN
Status: DISCONTINUED | OUTPATIENT
Start: 2024-04-08 | End: 2024-04-17 | Stop reason: HOSPADM

## 2024-04-08 RX ORDER — FUROSEMIDE 10 MG/ML
40 INJECTION INTRAMUSCULAR; INTRAVENOUS DAILY
Status: DISCONTINUED | OUTPATIENT
Start: 2024-04-09 | End: 2024-04-10

## 2024-04-08 RX ADMIN — AMLODIPINE BESYLATE 5 MG: 5 TABLET ORAL at 08:18

## 2024-04-08 RX ADMIN — ATORVASTATIN CALCIUM 80 MG: 80 TABLET, FILM COATED ORAL at 20:44

## 2024-04-08 RX ADMIN — CLOPIDOGREL BISULFATE 75 MG: 75 TABLET, FILM COATED ORAL at 08:18

## 2024-04-08 RX ADMIN — POTASSIUM CHLORIDE 40 MEQ: 1.5 POWDER, FOR SOLUTION ORAL at 04:04

## 2024-04-08 RX ADMIN — PANTOPRAZOLE SODIUM 40 MG: 40 INJECTION, POWDER, LYOPHILIZED, FOR SOLUTION INTRAVENOUS at 02:00

## 2024-04-08 RX ADMIN — ASPIRIN 81 MG: 81 TABLET, CHEWABLE ORAL at 08:18

## 2024-04-08 RX ADMIN — Medication 10 ML: at 08:19

## 2024-04-08 RX ADMIN — CHLORHEXIDINE GLUCONATE 15 ML: 1.2 RINSE ORAL at 08:19

## 2024-04-08 RX ADMIN — DOCUSATE SODIUM 50MG AND SENNOSIDES 8.6MG 2 TABLET: 8.6; 5 TABLET, FILM COATED ORAL at 08:19

## 2024-04-08 RX ADMIN — PANTOPRAZOLE SODIUM 40 MG: 40 INJECTION, POWDER, FOR SOLUTION INTRAVENOUS at 08:19

## 2024-04-08 RX ADMIN — PANTOPRAZOLE SODIUM 40 MG: 40 INJECTION, POWDER, LYOPHILIZED, FOR SOLUTION INTRAVENOUS at 06:00

## 2024-04-08 RX ADMIN — IPRATROPIUM BROMIDE AND ALBUTEROL SULFATE 3 ML: .5; 3 SOLUTION RESPIRATORY (INHALATION) at 03:50

## 2024-04-08 RX ADMIN — IPRATROPIUM BROMIDE AND ALBUTEROL SULFATE 3 ML: .5; 3 SOLUTION RESPIRATORY (INHALATION) at 08:25

## 2024-04-08 RX ADMIN — FAMOTIDINE 20 MG: 20 TABLET, FILM COATED ORAL at 08:19

## 2024-04-08 RX ADMIN — CHLORHEXIDINE GLUCONATE 15 ML: 1.2 RINSE ORAL at 20:45

## 2024-04-08 RX ADMIN — IPRATROPIUM BROMIDE AND ALBUTEROL SULFATE 3 ML: .5; 3 SOLUTION RESPIRATORY (INHALATION) at 11:24

## 2024-04-08 RX ADMIN — Medication 10 ML: at 20:44

## 2024-04-08 RX ADMIN — IPRATROPIUM BROMIDE AND ALBUTEROL SULFATE 3 ML: .5; 3 SOLUTION RESPIRATORY (INHALATION) at 15:41

## 2024-04-08 RX ADMIN — CEFTRIAXONE 2000 MG: 2 INJECTION, POWDER, FOR SOLUTION INTRAMUSCULAR; INTRAVENOUS at 11:20

## 2024-04-08 RX ADMIN — IPRATROPIUM BROMIDE AND ALBUTEROL SULFATE 3 ML: .5; 3 SOLUTION RESPIRATORY (INHALATION) at 22:51

## 2024-04-08 RX ADMIN — IPRATROPIUM BROMIDE AND ALBUTEROL SULFATE 3 ML: .5; 3 SOLUTION RESPIRATORY (INHALATION) at 20:02

## 2024-04-08 RX ADMIN — POTASSIUM CHLORIDE 40 MEQ: 1.5 POWDER, FOR SOLUTION ORAL at 08:18

## 2024-04-08 NOTE — PLAN OF CARE
Goal Outcome Evaluation:  Plan of Care Reviewed With: patient, daughter        Progress: improving    Pt continues to require RUE non violent soft limb restraint for safety & to prevent pulling of lines/equipment. Pt intermittently restless and requires frequent redirection. Pt was oriented x4 for the most part. NIH 17. LUE remains flaccid. CBI continuing with only 1 clot noted over PM. Pt HR tachy to 120's. Meeting BP goal with -150's.

## 2024-04-08 NOTE — PLAN OF CARE
Goal Outcome Evaluation:  Plan of Care Reviewed With: patient, daughter           Outcome Evaluation: Pt cont to present with no active movement L side, severe balance deficits, pt is following commands well, he was lethargic this morning but able to aprticipate with activity, aroused further with sitting edge of bed, PT will cont to follow      Anticipated Discharge Disposition (PT): inpatient rehabilitation facility

## 2024-04-08 NOTE — PROGRESS NOTES
DOS: 2024  NAME: Alex Negron   : 1945  PCP: Jyothi Song, LUIS    Chief Complaint   Patient presents with    Extremity Weakness        Stroke    Subjective: Pt seen in follow up, however the problem is new to me.  ST at bedside attempting swallow eval.  Daughter also at bedside and had several questions regarding his diagnosis, prognosis, and treatment plan.  Daughter feels he is slightly improving today as far as being more aware but still at times saying things that do not make sense.    Objective:  Vital signs:      Vitals:    24 1127 24 1230 24 1300 24 1330   BP:  155/96 150/86 141/98   BP Location:       Patient Position:       Pulse:  (!) 125 (!) 121 113   Resp:       Temp: 97.7 °F (36.5 °C)      TempSrc: Oral      SpO2:  96% 96% 95%   Weight:       Height:           Current Facility-Administered Medications:     acetaminophen (TYLENOL) 160 MG/5ML oral solution 650 mg, 650 mg, Oral, Q6H PRN, Kristina Urena, APRN, 650 mg at 24 2329    amLODIPine (NORVASC) tablet 5 mg, 5 mg, Per PEG Tube, Q24H, Ja Tucker MD, 5 mg at 24 0818    aspirin chewable tablet 81 mg, 81 mg, Nasogastric, Daily, Emil Faith MD, 81 mg at 24 0818    atorvastatin (LIPITOR) tablet 80 mg, 80 mg, Nasogastric, Nightly, Shiv Felipe MD, 80 mg at 24    sennosides-docusate (PERICOLACE) 8.6-50 MG per tablet 2 tablet, 2 tablet, Nasogastric, BID, 2 tablet at 24 0819 **AND** polyethylene glycol (MIRALAX) packet 17 g, 17 g, Oral, Daily PRN **AND** bisacodyl (DULCOLAX) EC tablet 5 mg, 5 mg, Oral, Daily PRN **AND** bisacodyl (DULCOLAX) suppository 10 mg, 10 mg, Rectal, Daily PRN, Shiv Felipe MD    calcium carbonate (TUMS) chewable tablet 500 mg (200 mg elemental), 2 tablet, Oral, TID PRN, Gina Power MD, 2 tablet at 24    Calcium Replacement - Follow Nurse / BPA Driven Protocol, , Does not apply, Tamir LINDSEY Mark Edwin, MD    Calcium  Replacement - Follow Nurse / BPA Driven Protocol, , Does not apply, Tamir LINDSEY Mark Edwin, MD    Calcium Replacement - Follow Nurse / BPA Driven Protocol, , Does not apply, Tamir LINDSEY Mark Edwin, MD    cefTRIAXone (ROCEPHIN) 2,000 mg in sodium chloride 0.9 % 100 mL MBP, 2,000 mg, Intravenous, Q24H, Martin Garnett MD, Last Rate: 200 mL/hr at 04/08/24 1120, 2,000 mg at 04/08/24 1120    chlorhexidine (PERIDEX) 0.12 % solution 15 mL, 15 mL, Mouth/Throat, Q12H, Martin Garnett MD, 15 mL at 04/08/24 0819    clevidipine (CLEVIPREX) infusion 0.5 mg/mL, 2-32 mg/hr, Intravenous, Titrated, Martin Garnett MD, Stopped at 04/07/24 0145    clopidogrel (PLAVIX) tablet 75 mg, 75 mg, Nasogastric, Daily, Shiv Felipe MD, 75 mg at 04/08/24 0818    fentaNYL citrate (PF) (SUBLIMAZE) injection 50 mcg, 50 mcg, Intravenous, Q2H PRN, Shiv Felipe MD, 50 mcg at 04/06/24 0237    [START ON 4/9/2024] furosemide (LASIX) injection 40 mg, 40 mg, Intravenous, Daily, Martin Garnett MD    hydrALAZINE (APRESOLINE) injection 10 mg, 10 mg, Intravenous, Q4H PRN, Martin Garnett MD    HYDROcodone-acetaminophen (NORCO) 5-325 MG per tablet 1 tablet, 1 tablet, Oral, Q6H PRN, Shiv Felipe MD, 1 tablet at 04/05/24 1337    ipratropium-albuterol (DUO-NEB) nebulizer solution 3 mL, 3 mL, Nebulization, Q4H - RT, Ja Tucker MD, 3 mL at 04/08/24 1124    Magnesium Low Dose Replacement - Follow Nurse / BPA Driven Protocol, , Does not apply, PRTamir PEREZ Mark Edwin, MD    Magnesium Standard Dose Replacement - Follow Nurse / BPA Driven Protocol, , Does not apply, PRN, Martin Garnett MD    Magnesium Standard Dose Replacement - Follow Nurse / BPA Driven Protocol, , Does not apply, PRN, Martin Garnett MD    nitroglycerin (NITROSTAT) SL tablet 0.4 mg, 0.4 mg, Sublingual, Q5 Min PRN, Dago Rubio MD    ondansetron (ZOFRAN) injection 4 mg, 4 mg, Intravenous, Q4H PRN, Gina Power MD, 4 mg at 04/02/24 9804     pantoprazole (PROTONIX) injection 40 mg, 40 mg, Intravenous, Daily, Tesfaye Ghosh MD, 40 mg at 04/08/24 0819    Phosphorus Replacement - Follow Nurse / BPA Driven Protocol, , Does not apply, Tamir LINDSEY Mark Edwin, MD    Phosphorus Replacement - Follow Nurse / BPA Driven Protocol, , Does not apply, Tamir LINDSEY Mark Edwin, MD    Phosphorus Replacement - Follow Nurse / BPA Driven Protocol, , Does not apply, Tamir LINDSEY Mark Edwin, MD    Potassium Replacement - Follow Nurse / BPA Driven Protocol, , Does not apply, Tamir LINDSEY Mark Edwin, MD    Potassium Replacement - Follow Nurse / BPA Driven Protocol, , Does not apply, Tamir LINDSEY Mark Edwin, MD    Potassium Replacement - Follow Nurse / BPA Driven Protocol, , Does not apply, Tamir LINDSEY Mark Edwin, MD    sodium chloride 0.9 % bolus 500 mL, 500 mL, Intravenous, Once, Dago Rubio MD    sodium chloride 0.9 % flush 10 mL, 10 mL, Intravenous, PRN, Dago Rubio MD    sodium chloride 0.9 % flush 10 mL, 10 mL, Intravenous, Q12H, Dago Rubio MD, 10 mL at 04/08/24 0819    sodium chloride 0.9 % flush 10 mL, 10 mL, Intravenous, PRN, Dago Rubio MD    sodium chloride 0.9 % infusion 40 mL, 40 mL, Intravenous, PRN, Dago Rubio MD    PRN meds    acetaminophen    senna-docusate sodium **AND** polyethylene glycol **AND** bisacodyl **AND** bisacodyl    calcium carbonate    Calcium Replacement - Follow Nurse / BPA Driven Protocol    Calcium Replacement - Follow Nurse / BPA Driven Protocol    Calcium Replacement - Follow Nurse / BPA Driven Protocol    fentaNYL citrate (PF)    hydrALAZINE    HYDROcodone-acetaminophen    Magnesium Low Dose Replacement - Follow Nurse / BPA Driven Protocol    Magnesium Standard Dose Replacement - Follow Nurse / BPA Driven Protocol    Magnesium Standard Dose Replacement - Follow Nurse / BPA Driven Protocol    nitroglycerin    ondansetron    Phosphorus Replacement - Follow Nurse / BPA Driven Protocol    Phosphorus  "Replacement - Follow Nurse / BPA Driven Protocol    Phosphorus Replacement - Follow Nurse / BPA Driven Protocol    Potassium Replacement - Follow Nurse / BPA Driven Protocol    Potassium Replacement - Follow Nurse / BPA Driven Protocol    Potassium Replacement - Follow Nurse / BPA Driven Protocol    sodium chloride    sodium chloride    sodium chloride    No current facility-administered medications on file prior to encounter.     Current Outpatient Medications on File Prior to Encounter   Medication Sig    amLODIPine (NORVASC) 10 MG tablet Take 1 tablet by mouth Daily.    ferrous sulfate 325 (65 FE) MG tablet Take 1 tablet by mouth Daily With Breakfast.    gabapentin (NEURONTIN) 300 MG capsule Take 1 capsule by mouth 3 (Three) Times a Day.    indapamide (LOZOL) 1.25 MG tablet Take 1 tablet by mouth Every Morning.    lisinopril (PRINIVIL,ZESTRIL) 10 MG tablet Take 1 tablet by mouth Daily.    rosuvastatin (CRESTOR) 20 MG tablet Take 1 tablet by mouth Daily.    tamsulosin (FLOMAX) 0.4 MG capsule 24 hr capsule Take 1 capsule by mouth Every Night.       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  Sensory: Diminished cold temperature sensation of LLE  Coordination: Unable to perform finger-to-nose testing on the left  Gait and station: Deferred  Rapid alternating movements: Unable to perform finger to thumb tapping on the left    Laboratory results:  No results found for: \"TSH\"  Lab Results " "  Component Value Date    HGBA1C 6.20 (H) 04/02/2024     No results found for: \"[id number]\"  Lab Results   Component Value Date    CHOL 117 04/02/2024     Lab Results   Component Value Date    TRIG 38 04/02/2024     Lab Results   Component Value Date    HDL 38 (L) 04/02/2024     Lab Results   Component Value Date    LDL 69 04/02/2024     Lab Results   Component Value Date    WBC 14.93 (H) 04/08/2024    HGB 10.6 (L) 04/08/2024    HCT 32.5 (L) 04/08/2024    MCV 90.0 04/08/2024     (H) 04/08/2024     Lab Results   Component Value Date    GLUCOSE 139 (H) 04/08/2024    BUN 28 (H) 04/08/2024    CREATININE 1.06 04/08/2024    BCR 26.4 (H) 04/08/2024    K 3.5 04/08/2024    CO2 22.2 04/08/2024    CALCIUM 8.6 04/08/2024    ALBUMIN 3.1 (L) 04/03/2024    AST 30 04/03/2024    ALT 15 04/03/2024     Lab Results   Component Value Date    PTT 32.7 04/01/2024     Lab Results   Component Value Date    INR 0.97 04/01/2024    PROTIME 13.1 04/01/2024     Brief Urine Lab Results       None            Review and interpretation of imaging:  XR Chest 1 View    Result Date: 4/7/2024  FINDINGS AND IMPRESSION: An enteric tube courses below the mid diaphragm with tip terminating over the left upper quadrant.  Lungs are hypoinflated. Bibasilar pulmonary pacification is present, left greater than right, which has worsened since chest radiograph performed earlier today. Suggestion of at least a small left pleural effusion. There is also pulmonary vascular congestion. Constellation of findings are concerning for worsening asymmetric pulmonary edema versus multifocal pneumonia in the appropriate clinical context and correlation with patient history is recommended with follow-up chest CT if clinically indicated. No pneumothorax is seen. Cardiac silhouette is accentuated by low lung volumes and patient rotation.  This report was finalized on 4/7/2024 12:58 AM by Dr. John Coley M.D on Workstation: [id number]      XR Chest 1 View    Result " Date: 4/6/2024  No significant change.  This report was finalized on 4/6/2024 6:53 AM by Dr. Prince So M.D on Workstation: [email or web address]      CT Abdomen Pelvis Without Contrast    Result Date: 4/5/2024   1. Heterogeneous high attenuation contents in the dependent base of the urinary bladder, likely hemorrhage. Air in the urinary bladder may be iatrogenic. Correlate with urinalysis and possible cystoscopy. 2. Incomplete evaluated but simple appearing 1 cm left renal cyst with scarring in the posterior left kidney. No renal or ureteral stone is identified. No hydronephrosis or hydroureter. 3. Small hiatal hernia. 4. Mild diffuse anasarca. 5. Multifocal bibasilar opacities, right greater than left, likely atelectasis and superimposed pneumonia with multifocal bronchial plugging in the base of each lower lobe.  This report was finalized on 4/5/2024 1:31 PM by Parth Wasserman MD on Workstation: [id number]      XR Chest 1 View    Result Date: 4/5/2024  No significant interval change.  This report was finalized on 4/5/2024 4:18 AM by Dr. Kristal Sanford M.D on Workstation: [email or web address]      XR Chest 1 View    Result Date: 4/4/2024  No significant interval change.  This report was finalized on 4/4/2024 5:28 AM by Dr. Kristal Sanford M.D on Workstation: [email or web address]      XR Chest 1 View    Result Date: 4/3/2024  As described.  This report was finalized on 4/3/2024 2:24 PM by Dr. Prince So M.D on Workstation: [id number]      IR [facility] Thromb Prim Nonc Art Ini    Result Date: 4/3/2024  Acute occlusion of the right internal carotid artery, right A2 segment, right distal PCA and distal right M1 segment with subsequent successful mechanical thrombectomy in all 3 territories and TICI 2c flow achieved. Underlying 85% stenosis of the right ICA origin treated with successful carotid artery stent placement under distal protection with no residual narrowing seen. Initiation of dual antiplatelet therapy required with  follow-up carotid Doppler sonography as described above.  All carotid stenosis measurements were made using NASCET criteria.   This report was finalized on 4/3/2024 11:52 AM by Dr. Dago Rubio M.D on Workstation: [id number]      MRI Brain With & Without Contrast    Result Date: 4/2/2024  Multiple acute infarcts are appreciated, the largest of which involves the parietal occipital and occipital temporal region on the right posterolaterally. Smaller acute infarcts are appreciated involving the head of the caudate nucleus on the right involving the medial aspect of the thalamus on the right. There is mild-to-moderate enhancement related to the infarcts noted above. Smaller infarcts without enhancement are appreciated involving the right cerebellar hemisphere posteriorly, medial aspect of the left cerebellar hemisphere and the posterior and medial aspects of the left temporal lobe.  This report was finalized on 4/2/2024 12:49 PM by Dr. Vivek Gudino M.D on Workstation: [id number]      CT Angiogram Head w AI Analysis of LVO    Result Date: 4/2/2024  1. No acute intracranial hemorrhage. However, the patient has a hyperdense right MCA. 2. Area of cerebral blood flow less than 30% within the right MCA territory measuring 55 cc, with corresponding Tmax greater than 6 seconds, measuring up to 173 cc. 3. 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 The patient has some faint opacification of the right M1, although there is thrombus identified within it. There is opacification of the right M2 branches. 4. Marked irregularity of the intracranial distal right vertebral artery, which may reflect dissection, as well as a bulbous outpouching which may represent pseudoaneurysm.   This report was finalized on 4/2/2024 12:42 AM by Dr. Kristal Sanford M.D on Workstation: [id number]      CT Angiogram Neck    Result Date: 4/2/2024  1. No acute intracranial hemorrhage. However, the patient has a hyperdense right  MCA. 2. Area of cerebral blood flow less than 30% within the right MCA territory measuring 55 cc, with corresponding Tmax greater than 6 seconds, measuring up to 173 cc. 3. 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 The patient has some faint opacification of the right M1, although there is thrombus identified within it. There is opacification of the right M2 branches. 4. Marked irregularity of the intracranial distal right vertebral artery, which may reflect dissection, as well as a bulbous outpouching which may represent pseudoaneurysm.   This report was finalized on 4/2/2024 12:42 AM by Dr. Kristal Sanford M.D on Workstation: [email or web address]      CT CEREBRAL PERFUSION WITH & WITHOUT CONTRAST    Result Date: 4/2/2024  1. No acute intracranial hemorrhage. However, the patient has a hyperdense right MCA. 2. Area of cerebral blood flow less than 30% within the right MCA territory measuring 55 cc, with corresponding Tmax greater than 6 seconds, measuring up to 173 cc. 3. 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 The patient has some faint opacification of the right M1, although there is thrombus identified within it. There is opacification of the right M2 branches. 4. Marked irregularity of the intracranial distal right vertebral artery, which may reflect dissection, as well as a bulbous outpouching which may represent pseudoaneurysm.   This report was finalized on 4/2/2024 12:42 AM by Dr. Kristal Sanford M.D on Workstation: [email or web address]      XR Chest 1 View    Result Date: 4/1/2024  Bibasilar atelectasis versus scarring.  This report was finalized on 4/1/2024 10:55 PM by Dr. Kristal Sanford M.D on Workstation: [email or web address]     Results for orders placed during the hospital encounter of 04/01/24    Adult Transthoracic Echo Complete W/ Cont if Necessary Per Protocol    Interpretation Summary    Left ventricular systolic function is normal. Left ventricular ejection fraction  appears to be 56 - 60%.    Left ventricular diastolic function was normal.    Estimated right ventricular systolic pressure from tricuspid regurgitation is normal (<35 mmHg).        Impression/Assessment:  This is a 79-year-old male with past medical history of hypertension, hyperlipidemia who presented to the hospital on 4/1/2024 with complaints of left-sided weakness, dysarthria, and left-sided facial drooping.  Symptoms have reportedly started around 45 minutes prior to his arrival to the ER.  He underwent CT/CTA/CTP imaging that revealed a right proximal internal carotid artery occlusion with terminal ICA occlusion extending into the middle cerebral artery.  His perfusion showed approximately a 55 cc core infarct.  He received IV TNK and was taken to the OR by Dr. Rubio who performed a R ICA origin stent with distal protection after M1 and A2 successful mechanical thrombectomy with TICI 2C flow achieved.  He was found to have an occluded R ICA origin with underlying 85% stenosis.  Postoperatively he has remained with persistent left-sided hemiaplasia and some left-sided neglect.    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  Multifocal bibasilar opacities right greater than left, likely atelectasis and superimposed PNA with multifocal bronchial plugging in the base of each lower lobe.  MRI Brain W/WO: Multiple acute infarcts in multiple vascular distributions with the largest within the parieto-occipital temporal region on the right.  2D Echo: Normal LA size and volume, LVSF normal, EF 56 to 60%, all LV wall segments contract normally, no AV stenosis, mild MVR.  EKG: NSR.    Plan:  -Intermittently disoriented and confused  today, suspect he now has a delirium component. Recommend delirium precautions.  -Hgb 10.6 today, urology and GI following.  -Continue with plans of DAPT (ASA/Plavix) for ICA stent, defer duration to endovascular surgery.  Zio patch at discharge ordered   Lipitor 80 mg, LDL 69  Check TSH and B12 levels  APL panel is pending  Neurochecks per stroke protocol  Normalize BP  Stroke Education  ROM/SCDs  PT/OT/ST-failed bedside swallow by  today.  Do not feel that he would be awake enough to complete VFSS.  They have recommended continuing n.p.o.  Will arrange f/u in the outpatient setting in the next 1-3 months.  Therapies as written. CCP for discharge planning. Call RRT for any acute neurological changes.   We will sign off, please call us back with any questions.    Evidence-based delirium precautions include:     1. Frequent reorientation, reminding patient not questioning patient   2. Shades up during day/down at night   3. TV off except for soft music only   4. Familiar objects at bedside   5. Encourage friends/family to spend the night   6. Minimize anticholingeric medications   7. Minimize polypharmacy   8. Minimize restraints, includes lines and/or foleys and/or feeding tubes as able   9. Minimize overnight checks/vitals to encourage restful consistent sleep patterns   10. Out of bed during day as much as possible     Case discussed with patient, daughter at bedside, , and Dr. Bryant, and he agrees with plan above.     Total critical care time: 30 minutes.    ESCOBAR Schmid

## 2024-04-08 NOTE — THERAPY TREATMENT NOTE
Patient Name: Alex Negron  : 1945    MRN: [id number]                              Today's Date: 2024       Admit Date: 2024    Visit Dx:     ICD-10-CM ICD-9-CM   1. Cerebrovascular accident (CVA), unspecified mechanism  I63.9 434.91   2. Coffee ground emesis  K92.0 578.0   3. Gastroesophageal reflux disease, unspecified whether esophagitis present  K21.9 530.81   4. 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 No pertinent past medical history.  Past Surgical History:   Procedure Laterality Date    ENDOSCOPY N/A 4/3/2024    Procedure: ESOPHAGOGASTRODUODENOSCOPY AT BEDSIDE;  Surgeon: Redd Guidry MD;  Location: [facility] ENDOSCOPY;  Service: Gastroenterology;  Laterality: N/A;  PRE-  GI BLEED  POST- HIATAL HERNIA, DISTAL ESOPHAGITIS ULCERS, GASTRITIS, PYLOROPLASTY      General Information       Row Name 24 1317          OT Time and Intention    Document Type therapy note (daily note)  -SM     Mode of Treatment occupational therapy;co-treatment  -       Row Name 24 1317          General Information    Patient Profile Reviewed yes  -SM     Existing Precautions/Restrictions fall;oxygen therapy device and L/min;NPO  5L  -SM     Barriers to Rehab language barrier  Niuean speaking, daughter present today and pt and daughter requesting family to translate.  -       Row Name 24 1317          Cognition    Orientation Status (Cognition) oriented x 3  -SM       Row Name 24 1317          Safety Issues, Functional Mobility    Impairments Affecting Function (Mobility) endurance/activity tolerance;strength;pain;balance;shortness of breath;visual/perceptual;sensation/sensory awareness;postural/trunk control;motor  control;muscle tone abnormal;grasp  -     Comment, Safety Issues/Impairments (Mobility) PT/OT cotreatment medically appropriate and necessary due to patient acuity level, to maximize therapeutic benefit due to impaired act tolerance, and for safety of patient and staff. Treatment focused on progression of care and goals established in POC.  -SM               User Key  (r) = Recorded By, (t) = Taken By, (c) = Cosigned By      Initials Name Provider Type    Yasmine Kirkpatrick OT Occupational Therapist                     Mobility/ADL's       Row Name 04/08/24 1319          Bed Mobility    Supine-Sit [place] (Bed Mobility) maximum assist (25% patient effort);2 person assist;verbal cues  -     Sit-Supine [place] (Bed Mobility) maximum assist (25% patient effort);2 person assist;dependent (less than 25% patient effort);verbal cues  -     Assistive Device (Bed Mobility) head of bed elevated;draw sheet  -       Row Name 04/08/24 1319          Transfers    Comment, (Transfers) not yet safe to attempt due to sitting balance.  -       Row Name 04/08/24 1319          Lower Body Dressing Assessment/Training    [place] Level (Lower Body Dressing) don;socks;dependent (less than 25% patient effort)  -     Position (Lower Body Dressing) supine  -       Row Name 04/08/24 1319          Grooming Assessment/Training    [place] Level (Grooming) oral care regimen;standby assist;verbal cues  -     Position (Grooming) sitting up in bed  -     Comment, (Grooming) with oral swab  -       Row Name 04/08/24 1319          Toileting Assessment/Training    [place] Level (Toileting) dependent (less than 25% patient effort)  -     Position (Toileting) supine  -               User Key  (r) = Recorded By, (t) = Taken By, (c) = Cosigned By      Initials Name Provider Type    Yasmine Kirkpatrick OT Occupational Therapist                   Obj/Interventions       Row Name 04/08/24 5601           Vision Assessment/Intervention    Visual Processing Deficit theresa-inattention/neglect, left  -     Vision Assessment Comment work on reaching with RUE to reach for cup placed at midline multiple reps, max cues, eyes deviated to R side.  -       Row Name 04/08/24 1320          Shoulder (Therapeutic Exercise)    Shoulder PROM (Therapeutic Exercise) left;flexion;extension;aBduction;aDduction;10 repetitions;supine  -[facility] Name 04/08/24 1320          Elbow/Forearm (Therapeutic Exercise)    Elbow/Forearm PROM (Therapeutic Exercise) left;flexion;extension;10 repetitions  -[facility] Name 04/08/24 1320          Wrist (Therapeutic Exercise)    Wrist PROM (Therapeutic Exercise) left;flexion;extension  -[facility] Name 04/08/24 1320          Hand (Therapeutic Exercise)    Hand PROM (Therapeutic Exercise) left;finger flexion;finger extension;10 repetitions  -[facility] Name 04/08/24 1320          Motor Skills    Motor Skills motor control/coordination interventions  -     Motor Control/Coordination Interventions weight-bearing activities  weight bearing down to L elbow for faciliation, sensory feedback, proximation of glenohumeral joint  -[facility] Name 04/08/24 1320          Balance    Static Sitting Balance moderate assist;maximum assist  -     Dynamic Sitting Balance moderate assist;maximum assist  -     Position, Sitting Balance sitting edge of bed  -     Comment, Balance heavy pushing to L side today, continued cues for placement on RUE on bed. Weight bearing down to RUE with attempt to correct.  -               User Key  (r) = Recorded By, (t) = Taken By, (c) = Cosigned By      Initials Name Provider Type     Yasmine Martin OT Occupational Therapist                   Goals/Plan    No documentation.                  Clinical Impression       [facility] Name 04/08/24 1322          Pain Assessment    Posttreatment Pain Rating 9/10  -     Pain Location - Side/Orientation Left  -     Pain  Location - shoulder  -     Pain Intervention(s) Rest;Elevated;Repositioned  -       Row Name 04/08/24 1322          Plan of Care Review    Plan of Care Reviewed With patient;daughter  -     Outcome Evaluation Pt participated in OT today, co treat with PT. He remains in ICU. He is following commands and able to sit EOB today but continues to have multiple deficits from acute CVA limiting ADL independence. Session today to focus on LUE facilation exercises (remains flaccid), postural control, visual attention/scanning to L side, pre transfer skills. Pt continues to require heavy assist of 2 to mobilize. He c/o of L shoulder pain today with weight bearing. Noted to have small L shoulder subluxation and would continue to benefit from OT to address LUE deficits, balance, ADL, transfers.  -       Row Name 04/08/24 1322          Therapy Plan Review/Discharge Plan (OT)    Anticipated Discharge Disposition (OT) inpatient rehabilitation facility  -       Row Name 04/08/24 1322          Vital Signs    Pre SpO2 (%) 98  -SM     O2 Delivery Pre Treatment hi-flow  -SM     Post SpO2 (%) 93  -SM     O2 Delivery Post Treatment hi-flow  -       Row Name 04/08/24 1322          Positioning and Restraints    Pre-Treatment Position in bed  -SM     Post Treatment Position bed  -SM     In Bed fowlers;call light within reach;encouraged to call for assist;exit alarm on;notified nsg;with family/caregiver  -               User Key  (r) = Recorded By, (t) = Taken By, (c) = Cosigned By      Initials Name Provider Type     Yasmine Martin, BUDDY Occupational Therapist                   Outcome Measures       Row Name 04/08/24 1321          How much help from another is currently needed...    Putting on and taking off regular lower body clothing? 1  -SM     Bathing (including washing, rinsing, and drying) 2  -SM     Toileting (which includes using toilet bed pan or urinal) 1  -SM     Putting on and taking off regular upper body  clothing 2  -SM     Taking care of personal grooming (such as brushing teeth) 2  -SM     Eating meals 1  -SM     AM-PAC 6 Clicks Score (OT) 9  -SM       Row Name 04/08/24 1148 04/08/24 0830       How much help from another person do you currently need...    Turning from your back to your side while in flat bed without using bedrails? 2  -PC 2  -XZ    Moving from lying on back to sitting on the side of a flat bed without bedrails? 1  -PC 1  -XZ    Moving to and from a bed to a chair (including a wheelchair)? 1  -PC 1  -XZ    Standing up from a chair using your arms (e.g., wheelchair, bedside chair)? 1  -PC 1  -XZ    Climbing 3-5 steps with a railing? 1  -PC 1  -XZ    To walk in hospital room? 1  -PC 1  -XZ    AM-PAC 6 Clicks Score (PT) 7  -PC 7  -XZ    Highest Level of Mobility Goal 2 --> Bed activities/dependent transfer  -PC 2 --> Bed activities/dependent transfer  -XZ      Row Name 04/08/24 0400          How much help from another person do you currently need...    Turning from your back to your side while in flat bed without using bedrails? 2  -KM     Moving from lying on back to sitting on the side of a flat bed without bedrails? 1  -KM     Moving to and from a bed to a chair (including a wheelchair)? 1  -KM     Standing up from a chair using your arms (e.g., wheelchair, bedside chair)? 1  -KM     Climbing 3-5 steps with a railing? 1  -KM     To walk in hospital room? 1  -KM     AM-PAC 6 Clicks Score (PT) 7  -KM     Highest Level of Mobility Goal 2 --> Bed activities/dependent transfer  -KM       Row Name 04/08/24 1325          Functional Assessment    Outcome Measure Options AM-PAC 6 Clicks Daily Activity (OT)  -SM               User Key  (r) = Recorded By, (t) = Taken By, (c) = Cosigned By      Initials Name Provider Type    PC Sidra Arce, PT Physical Therapist    Yasmine Kirkpatrick, OT Occupational Therapist    Claudia Suazo RN Registered Nurse    Amrita Yap RN Registered Nurse                     Occupational Therapy Education       Title: PT OT SLP Therapies (In Progress)       Topic: Occupational Therapy (In Progress)       Point: ADL training (In Progress)       Description:   Instruct learner(s) on proper safety adaptation and remediation techniques during self care or transfers.   Instruct in proper use of assistive devices.                  Learning Progress Summary             Patient Nonacceptance, E, NL by EN at 4/4/2024 0014                         Point: Home exercise program (In Progress)       Description:   Instruct learner(s) on appropriate technique for monitoring, assisting and/or progressing therapeutic exercises/activities.                  Learning Progress Summary             Patient Nonacceptance, E, NL by EN at 4/4/2024 0014                         Point: Precautions (In Progress)       Description:   Instruct learner(s) on prescribed precautions during self-care and functional transfers.                  Learning Progress Summary             Patient Nonacceptance, E, NL by KOFI at 4/4/2024 0014                         Point: Body mechanics (In Progress)       Description:   Instruct learner(s) on proper positioning and spine alignment during self-care, functional mobility activities and/or exercises.                  Learning Progress Summary             Patient Nonacceptance, E, NL by KOFI at 4/4/2024 0014   Family Acceptance, E, VU by  at 4/3/2024 1239    Comment: OT goals, POC, positioning of LUE for safety post CVA for subluxation                                         User Key       Initials Effective Dates Name Provider Type Discipline    KOFI 08/01/22 -  Monique Mary, DYLON Registered Nurse Nurse     04/02/20 -  Yasmnie Martin OT Occupational Therapist OT                  OT Recommendation and Plan  Planned Therapy Interventions (OT): activity tolerance training, adaptive equipment training, functional balance retraining, occupation/activity based interventions,  cognitive/visual perception retraining, neuromuscular control/coordination retraining, patient/caregiver education/training, transfer/mobility retraining, strengthening exercise, ROM/therapeutic exercise, orthotic fabrication/fitting/training, IADL retraining, BADL retraining  Therapy Frequency (OT): 5 times/wk  Plan of Care Review  Plan of Care Reviewed With: patient, daughter  Outcome Evaluation: Pt participated in OT today, co treat with PT. He remains in ICU. He is following commands and able to sit EOB today but continues to have multiple deficits from acute CVA limiting ADL independence. Session today to focus on LUE facilation exercises (remains flaccid), postural control, visual attention/scanning to L side, pre transfer skills. Pt continues to require heavy assist of 2 to mobilize. He c/o of L shoulder pain today with weight bearing. Noted to have small L shoulder subluxation and would continue to benefit from OT to address LUE deficits, balance, ADL, transfers.     Time Calculation:   Evaluation Complexity (OT)  Review Occupational Profile/Medical/Therapy History Complexity: extensive/high complexity  Assessment, Occupational Performance/Identification of Deficit Complexity: 5 or more performance deficits  Clinical Decision Making Complexity (OT): comprehensive assessment/high complexity  Overall Complexity of Evaluation (OT): high complexity     Time Calculation- OT       Row Name 04/08/24 1326             Time Calculation- OT    OT Start Time 1055  -SM      OT Stop Time 1120  -SM      OT Time Calculation (min) 25 min  -SM      Total Timed Code Minutes- OT 25 minute(s)  -SM      OT Received On 04/08/24  -SM      OT - Next Appointment 04/09/24  -         Timed Charges    60844 -  OT Neuromuscular Reeducation Minutes 13  -SM      73432 - OT Therapeutic Activity Minutes 12  -SM         Total Minutes    Timed Charges Total Minutes 25  -SM       Total Minutes 25  -SM                User Key  (r) = Recorded  By, (t) = Taken By, (c) = Cosigned By      Initials Name Provider Type     Yasmine Martin OT Occupational Therapist                  Therapy Charges for Today       Code Description Service Date Service Provider Modifiers Qty    27195933473 HC OT NEUROMUSC RE EDUCATION EA 15 MIN 4/8/2024 Yasmine Martin OT GO 1    [id number]  OT THERAPEUTIC ACT EA 15 MIN 4/8/2024 Yasmine Martin OT GO 1                 Yasmine Mratin OT  4/8/2024

## 2024-04-08 NOTE — PROGRESS NOTES
[facility] Acute Inpt Rehab    Continuing to follow progress with therapies.  Noted PT and OT co-treated due to patient acuity level, activity tolerance and Safety of patient and staff.  Patient not able to transfer or ambulate.  Patient would need to be able to tolerate 3 hours of therapy a day on acute rehab.  Will continue to follow.    Thank you,  Radha Hook RN  Rehab Admission Nurse

## 2024-04-08 NOTE — THERAPY RE-EVALUATION
Acute Care - Speech Language Pathology   Swallow Re-Evaluation [facility]     Patient Name: Alex Negron  : 1945  MRN: [id number]  Today's Date: 2024               Admit Date: 2024    Visit Dx:     ICD-10-CM ICD-9-CM   1. Cerebrovascular accident (CVA), unspecified mechanism  I63.9 434.91   2. Coffee ground emesis  K92.0 578.0   3. Gastroesophageal reflux disease, unspecified whether esophagitis present  K21.9 530.81   4. 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 No pertinent past medical history.  Past Surgical History:   Procedure Laterality Date    ENDOSCOPY N/A 4/3/2024    Procedure: ESOPHAGOGASTRODUODENOSCOPY AT BEDSIDE;  Surgeon: Redd Guidry MD;  Location: [facility] ENDOSCOPY;  Service: Gastroenterology;  Laterality: N/A;  PRE-  GI BLEED  POST- HIATAL HERNIA, DISTAL ESOPHAGITIS ULCERS, GASTRITIS, PYLOROPLASTY       SLP Recommendation and Plan  SLP Swallowing Diagnosis: suspected pharyngeal dysphagia, oral dysphagia (24)  SLP Diet Recommendation: NPO (ice chips with RN) (24)     SLP Rec. for Method of Medication Administration: meds via alternate route (24)     Monitor for Signs of Aspiration: yes, notify SLP if any concerns (24)  Recommended Diagnostics: reassess via clinical swallow evaluation (24)  Swallow Criteria for Skilled Therapeutic Interventions Met: demonstrates skilled criteria (24)  Anticipated Discharge Disposition (SLP): anticipate therapy at next level of care (24)  Rehab Potential/Prognosis, Swallowing: good, to achieve stated therapy goals (24)  Therapy Frequency (Swallow): PRN (24)  Predicted  "Duration Therapy Intervention (Days): until discharge (04/08/24 1100)  Oral Care Recommendations: Oral Care BID/PRN (04/08/24 1100)                                        Outcome Evaluation: Swallow re-evaluation completed. Continuing to recommend NPO as patient presents with consistent overt s/s of aspiration across all consistencies. Speech to follow for re-eval.      SWALLOW EVALUATION (Last 72 Hours)       SLP Adult Swallow Evaluation       Row Name 04/08/24 1100 04/05/24 4061                Rehab Evaluation    Document Type re-evaluation  -CR evaluation  -OC       Subjective Information no complaints  -CR no complaints  -OC       Patient Observations lethargic  -CR alert;cooperative;agree to therapy  -OC       Patient Effort adequate  -CR good  -OC       Symptoms Noted During/After Treatment none  -CR --          General Information    Patient Profile Reviewed yes  -CR yes  -OC       Pertinent History Of Current Problem -- \"Patient is a 79-year-old with no past medical history recent refugee from [facility] was initially brought to the ER on 4/2/2024 with right MCA syndrome he had a right ICA terminal occlusion with a perfusion deficits and core of 55 mL he was taken up for intervention and had a right ICA stent placed with Tici3 recanalization.\"  -OC       Current Method of Nutrition -- NPO;nasogastric feedings  -OC       Precautions/Limitations, Vision -- difficult to assess  -OC       Precautions/Limitations, Hearing -- WFL;for purposes of eval  -OC       Prior Level of Function-Communication -- unknown  -OC       Prior Level of Function-Swallowing -- unknown  -OC       Plans/Goals Discussed with -- patient and family  -OC       Barriers to Rehab -- medically complex  -OC       Patient's Goals for Discharge -- return to PO diet  -OC       Family Goals for Discharge -- patient able to return to PO diet  -OC          Pain Scale: Numbers Pre/Post-Treatment    Pretreatment Pain Rating 0/10 - no pain  -CR 0/10 - no " pain  -OC       Posttreatment Pain Rating -- 0/10 - no pain  -OC          Oral Motor Structure and Function    Dentition Assessment -- natural, present and adequate;missing teeth  -OC       Secretion Management -- problems swallowing secretions  -OC       Mucosal Quality -- moist, healthy  -OC       Volitional Swallow -- delayed;weak  -OC       Volitional Cough -- weak  -OC          Oral Musculature and Cranial Nerve Assessment    Oral Motor General Assessment -- generalized oral motor weakness  -OC          General Eating/Swallowing Observations    Respiratory Support Currently in Use high-flow nasal cannula  -CR --       O2 Liters 6L  -CR --          Clinical Swallow Eval    Clinical Swallow Evaluation Summary Swallow re-evaluation completed. Weened off Optiflow this date; currently on 6L highflow. Daughter at bedside pleasantly refused use of iPad  and served as  during assessment. Eyes remained closed during majority of assessment; opens eyes briefly when prompted. Difficulties following commands. Patient with delayed coughing following x1/2 ice chip trials. Immediate cough with honey by spoon. Continuing to recommend NPO as patient presents with consistent overt s/s of aspiration across all consistencies. Ice chips OK with RN only. Not a candidate for instrumental at this time secondary to lethargy and mental status. Speech to follow for re-eval.  -CR  Ipad utilized for eval. Patient demonstrated generalized oral motor weakness. Weak vocal quality with limited verbalizations. Patient demonstrated adequate acceptance and manipulation with ice chips. Multiple swallows with ice chips, dealyed throat clearing. Cough X1. Reported the need/sensation to sneeze. further trials held at this time.  -OC          SLP Evaluation Clinical Impression    SLP Swallowing Diagnosis suspected pharyngeal dysphagia;oral dysphagia  -CR oral dysphagia;R/O pharyngeal dysphagia  -OC       Functional  Impact risk of aspiration/pneumonia  -CR risk of aspiration/pneumonia  -OC       Rehab Potential/Prognosis, Swallowing good, to achieve stated therapy goals  -CR good, to achieve stated therapy goals  -OC       Swallow Criteria for Skilled Therapeutic Interventions Met demonstrates skilled criteria  -CR demonstrates skilled criteria  -OC          Recommendations    Therapy Frequency (Swallow) PRN  -CR PRN  -OC       Predicted Duration Therapy Intervention (Days) until discharge  -CR until discharge  -OC       SLP Diet Recommendation NPO  ice chips with RN  -CR NPO;other (see comments)  ice chips with RN only  -OC       Recommended Diagnostics reassess via clinical swallow evaluation  -CR reassess via clinical swallow evaluation  -OC       Oral Care Recommendations Oral Care BID/PRN  -CR Oral Care BID/PRN  -OC       SLP Rec. for Method of Medication Administration meds via alternate route  -CR meds via alternate route  -OC       Monitor for Signs of Aspiration yes;notify SLP if any concerns  -CR yes;notify SLP if any concerns  -OC       Anticipated Discharge Disposition (SLP) anticipate therapy at next level of care  -CR anticipate therapy at next level of care  -OC                 User Key  (r) = Recorded By, (t) = Taken By, (c) = Cosigned By      Initials Name Effective Dates    OC Yara Claire, LV 08/28/23 -     CR Hemalatha Jones, SLP 08/28/23 -                     EDUCATION  The patient has been educated in the following areas:   Dysphagia (Swallowing Impairment).              Time Calculation:    Time Calculation- SLP       Row Name 04/08/24 1117             Time Calculation- SLP    SLP Start Time 1000  -CR      SLP Received On 04/08/24  -CR         Untimed Charges    21387-IT Treatment Swallow Minutes 60  -CR         Total Minutes    Untimed Charges Total Minutes 60  -CR       Total Minutes 60  -CR                User Key  (r) = Recorded By, (t) = Taken By, (c) = Cosigned By      Initials Name Provider  Type    CR Hemalatha Jones SLP Speech and Language Pathologist                    Therapy Charges for Today       Code Description Service Date Service Provider Modifiers Qty    32133359277 HC ST TREATMENT SWALLOW 4 4/8/2024 Hemalatha Jones SLP GN 1                 LV Maria  4/8/2024

## 2024-04-08 NOTE — PLAN OF CARE
Goal Outcome Evaluation:              Outcome Evaluation: Swallow re-evaluation completed. Continuing to recommend NPO as patient presents with consistent overt s/s of aspiration across all consistencies. Meds via alternate route. Ice chips OK with RN only after oral care. Speech to follow for re-eval.

## 2024-04-08 NOTE — PROGRESS NOTES
[facility] INPATIENT PROGRESS NOTE         [facility] INTENSIVE CARE    2024      PATIENT IDENTIFICATION:  Name: Alex Negron ADMIT: 2024   : 1945  PCP: Jyothi Song PA-C    MRN: [id number] LOS: 7 days   AGE/SEX: 79 y.o. male  ROOM: [facility]                     LOS 7    Reason for visit: Acute stroke      SUBJECTIVE:      Oxygen requirements improving.  Responding well to diuretics.  Chest x-ray this morning is pending.  Discussed with family and nursing staff at bedside.  He is alert and oriented but still having some delirium per family.  On Optiflow down to 45% with saturations 97% at time of visit.  We probably could try him on high flow supplemental oxygen nasal cannula.      Objective   OBJECTIVE:    Vital Sign Min/Max for last 24 hours  Temp  Min: 97.9 °F (36.6 °C)  Max: 99.1 °F (37.3 °C)   BP  Min: 122/86  Max: 154/95   Pulse  Min: 100  Max: 129   Resp  Min: 18  Max: 22   SpO2  Min: 93 %  Max: 99 %   No data recorded   Weight  Min: 81.3 kg (179 lb 3.7 oz)  Max: 81.3 kg (179 lb 3.7 oz)    Vitals:    24 0800 24 0818 24 0825 24 0830   BP: 145/97 145/97  152/98   BP Location:       Patient Position:       Pulse: (!) 125 (!) 123 (!) 124 (!) 128   Resp:   20    Temp:       TempSrc:       SpO2: 95%  95% 96%   Weight:       Height:                24  0434 24  0418 24  0400   Weight: 86.6 kg (190 lb 14.7 oz) 86.3 kg (190 lb 4.1 oz) 81.3 kg (179 lb 3.7 oz)       Body mass index is 25 kg/m².                    FiO2 (%): 29 %     Body mass index is 25 kg/m².    Intake/Output Summary (Last 24 hours) at 2024 0951  Last data filed at 2024 0600  Gross per 24 hour   Intake 3095.67 ml   Output 7080 ml   Net -3984.33 ml         Exam:  GEN:  No distress, appears stated age  EYES:   PERRL, anicteric sclerae  ENT:    External ears/nose normal, OP clear.   NECK:  No adenopathy, midline trachea  LUNGS: Normal chest on inspection, palpation and  diminished breath sounds bilaterally auscultation  CV:  Normal S1S2, without murmur  ABD:  Nontender, nondistended, no hepatosplenomegaly, +BS  EXT:  No edema.  No cyanosis or clubbing.  No mottling and normal cap refill.    Assessment     Scheduled meds:  amLODIPine, 5 mg, Per PEG Tube, Q24H  aspirin, 81 mg, Nasogastric, Daily  atorvastatin, 80 mg, Nasogastric, Nightly  cefTRIAXone, 1,000 mg, Intravenous, Q24H  chlorhexidine, 15 mL, Mouth/Throat, Q12H  clopidogrel, 75 mg, Nasogastric, Daily  famotidine, 20 mg, Nasogastric, BID AC  furosemide, 40 mg, Intravenous, Q12H  ipratropium-albuterol, 3 mL, Nebulization, Q4H - RT  pantoprazole, 40 mg, Intravenous, Daily  senna-docusate sodium, 2 tablet, Nasogastric, BID  sodium chloride, 500 mL, Intravenous, Once  sodium chloride, 10 mL, Intravenous, Q12H      IV meds:                      clevidipine, 2-32 mg/hr, Last Rate: Stopped (04/07/24 0145)      Data Review:  Results from last 7 days   Lab Units 04/08/24  0246 04/07/24  0413 04/05/24  1451 04/05/24  0317 04/04/24  0402 04/03/24  1612   SODIUM mmol/L 147* 150*  --  142 141 141   POTASSIUM mmol/L 3.5 3.4* 3.1* 3.4* 4.1 4.1   CHLORIDE mmol/L 108* 114*  --  108* 112* 109*   CO2 mmol/L 22.2 23.0  --  24.0 19.8* 21.0*   BUN mg/dL 28* 27*  --  38* 26* 23   CREATININE mg/dL 1.06 1.08  --  1.95* 1.42* 1.47*   GLUCOSE mg/dL 139* 144*  --  187* 126* 126*   CALCIUM mg/dL 8.6 7.8*  --  7.3* 7.5* 7.9*         Estimated Creatinine Clearance: 67.1 mL/min (by C-G formula based on SCr of 1.06 mg/dL).  Results from last 7 days   Lab Units 04/08/24  0246 04/07/24  0413 04/06/24  0515 04/05/24  0317 04/04/24  2020 04/04/24  0402   WBC 10*3/mm3 14.93* 14.37* 14.00* 11.54*  --  13.56*   HEMOGLOBIN g/dL 10.6* 8.3* 8.7* 8.9* 8.8* 8.8*   PLATELETS 10*3/mm3 492* 407 343 270  --  274     Results from last 7 days   Lab Units 04/01/24  2206   INR  0.97     Results from last 7 days   Lab Units 04/03/24  1612 04/01/24  2206   ALT (SGPT) U/L 15 24    AST (SGOT) U/L 30 21     Results from last 7 days   Lab Units 04/05/24  2343 04/05/24  0844 04/05/24  0339 04/03/24  1532 04/02/24  0251   PH, ARTERIAL pH units 7.482* 7.436 7.356 7.433 7.343*   PO2 ART mm Hg 50.6* 62.7* 104.8* 287.0* 77.8*   PCO2, ARTERIAL mm Hg 32.4* 37.5 37.5 38.8 44.7   HCO3 ART mmol/L 24.3 25.2 21.0* 25.9 24.3     Results from last 7 days   Lab Units 04/03/24  1612   PROCALCITONIN ng/mL 1.06*   LACTATE mmol/L 1.8         Glucose   Date/Time Value Ref Range Status   04/08/2024 0617 135 (H) 70 - 130 mg/dL Final   04/08/2024 0016 133 (H) 70 - 130 mg/dL Final   04/07/2024 1733 130 70 - 130 mg/dL Final   04/07/2024 1138 153 (H) 70 - 130 mg/dL Final   04/07/2024 0426 142 (H) 70 - 130 mg/dL Final   04/07/2024 0044 235 (H) 70 - 130 mg/dL Final   04/06/2024 1808 208 (H) 70 - 130 mg/dL Final         Imaging reviewed  Chest x-ray 4/6 reviewed shows mild cardiac enlargement.  Vascular congestion is seen and worse.  Left pleural effusion noted.  Bilateral pulmonary infiltrates.  Stable compared to previous.            2D echo reviewed: EF 60%.  Normal diastolic function.  RVSP less than 35.        MRI brain 4/2 reviewed          Microbiology reviewed  Klebsiella from respiratory culture 4/4  MRSA screen negative.  Blood cultures no growth to date            Active Hospital Problems    Diagnosis  POA    **Acute CVA (cerebrovascular accident) [I63.9]  Yes    Right-sided extracranial carotid artery stenosis [I65.21]  Yes    Coffee ground emesis [K92.0]  Unknown    Gastroesophageal reflux disease [K21.9]  Unknown      Resolved Hospital Problems   No resolved problems to display.         ASSESSMENT:  Acute CVA with right carotid occlusion: Status post tPA and right carotid stent  Left-sided hemiparesis  Bilateral infiltrates/pneumonia: Klebsiella in culture  Sepsis with shock  Acute renal insufficiency  Elevated blood pressure   Acute blood loss anemia  coffee-ground emesis  Left lower lobe pneumonia versus  atelectasis  Pulmonary edema with volume overload  Gross hematuria            PLAN:  Successfully extubated doing well off ventilator.  Weaning oxygen with improved diuresis.  For Cleviprex.  Suspect his significant blood pressure problems has been the cause of his difficulty with pulmonary edema.  With improved renal function will resumed diuretic with good response.  Back down diuretics today.  Continue antibiotic and encourage pulmonary toilet.  Narrowed to Rocephin based on culture results.  Keep head of the bed elevated for aspiration risk.  Continue neurochecks per protocol.  Goal blood pressure systolic less than 140.    Aspirin and statin for secondary stroke prevention.    Three-way Murillo placed by urology with continuous drainage for hematuria.  Continue PPI.  Control glucose.  Correct electrolytes as needed.    Mechanical DVT prophylaxis.  Discussed with family.  Encourage work with therapy.    Discussed with multidisciplinary ICU team on rounds this morning.      CCT: 32 min    Martin Garnett MD  Pulmonary and Critical Care Medicine  [facility], [facility]  4/8/2024    09:51 EDT

## 2024-04-08 NOTE — THERAPY TREATMENT NOTE
Patient Name: Alex Negron  : 1945    MRN: [id number]                              Today's Date: 2024       Admit Date: 2024    Visit Dx:     ICD-10-CM ICD-9-CM   1. Cerebrovascular accident (CVA), unspecified mechanism  I63.9 434.91   2. Coffee ground emesis  K92.0 578.0   3. Gastroesophageal reflux disease, unspecified whether esophagitis present  K21.9 530.81   4. 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 No pertinent past medical history.  Past Surgical History:   Procedure Laterality Date    ENDOSCOPY N/A 4/3/2024    Procedure: ESOPHAGOGASTRODUODENOSCOPY AT BEDSIDE;  Surgeon: Redd Guidry MD;  Location: [facility] ENDOSCOPY;  Service: Gastroenterology;  Laterality: N/A;  PRE-  GI BLEED  POST- HIATAL HERNIA, DISTAL ESOPHAGITIS ULCERS, GASTRITIS, PYLOROPLASTY      General Information       Row Name 24 1139          Physical Therapy Time and Intention    Document Type therapy note (daily note)  -PC     Mode of Treatment co-treatment;occupational therapy;physical therapy;other (see comments)  -PC       Row Name 24 1139          General Information    Existing Precautions/Restrictions fall  -PC       Row Name 24 1139          Safety Issues, Functional Mobility    Comment, Safety Issues/Impairments (Mobility) Co treatment medically appropriate and necessary due to patient acuity level, activity tolerance and safety of patient and staff. Treatment is focusing on progression of care and goals established in the POC.  -PC               User Key  (r) = Recorded By, (t) = Taken By, (c) = Cosigned By      Initials Name Provider Type    PC Sidra Arce PT Physical Therapist                   Mobility       Row  Name 04/08/24 1139          Bed Mobility    Supine-Sit Arroyo (Bed Mobility) maximum assist (25% patient effort);2 person assist  -PC     Sit-Supine Arroyo (Bed Mobility) maximum assist (25% patient effort);dependent (less than 25% patient effort);2 person assist  -PC       Row Name 04/08/24 1139          Transfers    Comment, (Transfers) not safe to attempt today due to poor sitting balance, weakness  -PC               User Key  (r) = Recorded By, (t) = Taken By, (c) = Cosigned By      Initials Name Provider Type    PC Sidra Arce, PT Physical Therapist                   Obj/Interventions       Row Name 04/08/24 1140          Motor Skills    Motor Skills motor control/coordination interventions  -     Motor Control/Coordination Interventions therapeutic exercise/ROM;weight-bearing activities  -PC     Therapeutic Exercise --  PROM L side, AAROM RLE  -PC       Row Name 04/08/24 1140          Balance    Static Sitting Balance maximum assist;moderate assist  -PC     Dynamic Sitting Balance moderate assist;maximum assist  -PC     Position, Sitting Balance sitting edge of bed  -PC     Balance Interventions sitting;core stability exercise;dynamic reaching;weight shifting activity;supported  -PC     Comment, Balance worked on sitting on balance, trunk control, reaching, attending to L side, weight bearing, pt with strong L lean today, pushing to L, worked on midline, pt did better with RUE out to side today  -PC               User Key  (r) = Recorded By, (t) = Taken By, (c) = Cosigned By      Initials Name Provider Type    PC Sidra Arce, PT Physical Therapist                   Goals/Plan    No documentation.                  Clinical Impression       Row Name 04/08/24 1143          Pain    Pretreatment Pain Rating 9/10  -PC     Posttreatment Pain Rating 9/10  -PC     Pain Location - Side/Orientation Left  -PC     Pain Location - shoulder  -PC     Pain Intervention(s) Medication (See MAR);Repositioned   -PC       Row Name 04/08/24 1143          Plan of Care Review    Plan of Care Reviewed With patient;daughter  -PC     Outcome Evaluation Pt cont to present with no active movement L side, severe balance deficits, pt is following commands well, he was lethargic this morning but able to aprticipate with activity, aroused further with sitting edge of bed, PT will cont to follow  -PC       Row Name 04/08/24 1143          Positioning and Restraints    Pre-Treatment Position in bed  -PC     Post Treatment Position bed  -PC     In Bed supine;call light within reach;encouraged to call for assist;with family/caregiver  -PC     Restraints reapplied:;soft limb  -PC               User Key  (r) = Recorded By, (t) = Taken By, (c) = Cosigned By      Initials Name Provider Type    PC Sidra Arce, PT Physical Therapist                   Outcome Measures       Row Name 04/08/24 1148 04/08/24 0830       How much help from another person do you currently need...    Turning from your back to your side while in flat bed without using bedrails? 2  -PC 2  -XZ    Moving from lying on back to sitting on the side of a flat bed without bedrails? 1  -PC 1  -XZ    Moving to and from a bed to a chair (including a wheelchair)? 1  -PC 1  -XZ    Standing up from a chair using your arms (e.g., wheelchair, bedside chair)? 1  -PC 1  -XZ    Climbing 3-5 steps with a railing? 1  -PC 1  -XZ    To walk in hospital room? 1  -PC 1  -XZ    AM-PAC 6 Clicks Score (PT) 7  -PC 7  -XZ    Highest Level of Mobility Goal 2 --> Bed activities/dependent transfer  -PC 2 --> Bed activities/dependent transfer  -XZ      Row Name 04/08/24 0400 04/08/24 0000       How much help from another person do you currently need...    Turning from your back to your side while in flat bed without using bedrails? 2  -KM 2  -KM    Moving from lying on back to sitting on the side of a flat bed without bedrails? 1  -KM 1  -KM    Moving to and from a bed to a chair (including a  wheelchair)? 1  -KM 1  -KM    Standing up from a chair using your arms (e.g., wheelchair, bedside chair)? 1  -KM 1  -KM    Climbing 3-5 steps with a railing? 1  -KM 1  -KM    To walk in hospital room? 1  -KM 1  -KM    AM-PAC 6 Clicks Score (PT) 7  -KM 7  -KM    Highest Level of Mobility Goal 2 --> Bed activities/dependent transfer  -KM 2 --> Bed activities/dependent transfer  -KM              User Key  (r) = Recorded By, (t) = Taken By, (c) = Cosigned By      Initials Name Provider Type    PC Sidra Arce, PT Physical Therapist    Claudia Suazo, RN Registered Nurse    Amrita Yap RN Registered Nurse                                 Physical Therapy Education       Title: PT OT SLP Therapies (In Progress)       Topic: Physical Therapy (In Progress)       Point: Mobility training (In Progress)       Learning Progress Summary             Patient Acceptance, E,D, NL by PC at 4/8/2024 1149    Acceptance, E, NR by EM at 4/5/2024 1622    Nonacceptance, E, NL by EN at 4/4/2024 0014    Acceptance, E,D, NR by PC at 4/3/2024 1030   Family Acceptance, E,D, NR by PC at 4/3/2024 1030                         Point: Home exercise program (In Progress)       Learning Progress Summary             Patient Acceptance, E,D, NL by PC at 4/8/2024 1149    Acceptance, E,D, NR,NL by LW at 4/6/2024 1138    Nonacceptance, E, NL by EN at 4/4/2024 0014    Acceptance, E,D, NR by PC at 4/3/2024 1030   Family Acceptance, E,D, NR by PC at 4/3/2024 1030                         Point: Body mechanics (In Progress)       Learning Progress Summary             Patient Acceptance, E,D, NL by PC at 4/8/2024 1149    Acceptance, E,D, NR,NL by LW at 4/6/2024 1138    Nonacceptance, E, NL by EN at 4/4/2024 0014    Acceptance, E,D, NR by PC at 4/3/2024 1030   Family Acceptance, E,D, NR by PC at 4/3/2024 1030                         Point: Precautions (In Progress)       Learning Progress Summary             Patient Acceptance, E,D, NL by PC at  4/8/2024 1149    Acceptance, E,D, NR,NL by LW at 4/6/2024 1138    Nonacceptance, E, NL by EN at 4/4/2024 0014    Acceptance, E,D, NR by PC at 4/3/2024 1030   Family Acceptance, E,D, NR by PC at 4/3/2024 1030                                         User Key       Initials Effective Dates Name Provider Type Discipline    PC 06/16/21 -  Sidra Arce, PT Physical Therapist PT    EM 06/16/21 -  Lesia Judd, PT Physical Therapist PT    EN 08/01/22 -  Monique Mary, RN Registered Nurse Nurse    LW 05/08/23 -  Isa Pierce, PT Physical Therapist PT                  PT Recommendation and Plan  Planned Therapy Interventions (PT): balance training, bed mobility training, gait training, transfer training, strengthening, neuromuscular re-education, stretching, patient/family education  Plan of Care Reviewed With: patient, daughter  Outcome Evaluation: Pt cont to present with no active movement L side, severe balance deficits, pt is following commands well, he was lethargic this morning but able to aprticipate with activity, aroused further with sitting edge of bed, PT will cont to follow     Time Calculation:         PT Charges       Row Name 04/08/24 1149             Time Calculation    Start Time 1053  -PC      Stop Time 1120  -PC      Time Calculation (min) 27 min  -PC      PT Received On 04/08/24  -PC      PT - Next Appointment 04/09/24  -PC                User Key  (r) = Recorded By, (t) = Taken By, (c) = Cosigned By      Initials Name Provider Type    PC Sidra Arce, PT Physical Therapist                  Therapy Charges for Today       Code Description Service Date Service Provider Modifiers Qty    06031332159 HC PT THER PROC EA 15 MIN 4/8/2024 Sidra Arce, PT GP 2            PT G-Codes  Outcome Measure Options: AM-PAC 6 Clicks Basic Mobility (PT)  AM-PAC 6 Clicks Score (PT): 7  AM-PAC 6 Clicks Score (OT): 9  Modified [place] Scale: 4 - Moderately severe disability.  Unable to walk without assistance,  and unable to attend to own bodily needs without assistance.  PT Discharge Summary  Anticipated Discharge Disposition (PT): inpatient rehabilitation facility    Sidra Arce, PT  4/8/2024

## 2024-04-08 NOTE — PROGRESS NOTES
Enter Query Response Below      Query Response: Unable to determine              If applicable, please update the problem list.     Patient: Alex Negron        : 1945  Account: [id number]           Admit Date:         How to Respond to this query:       a. Click New Note     b. Answer query within the yellow box.                c. Update the Problem List, if applicable.      If you have any questions about this query contact me at: [email or web address]     ,    Progress note  documents diagnoses of sepsis with shock, pneumonia, acute blood loss anemia, acute renal insufficiency and elevated blood pressure.  Lactate (4/3) 1.8.  Patient has been treated with Cardene infusion (-4/3 @ 1300, -), Intubated 4/3 and placed on Propofol infusion (4/3 @ 1342 - @ 0725), Clevidipine infusion (-) and IV Hydralazine (-).  Blood pressure readings this stay:   -4/3 - 120-150s/50-70s (6477-5947), 89/58 (one-time reading @ 1334 during EGD with anesthesia ending @ 1335), with subsequent readings 117/65 (MAP 81), 110/62 (M79), 101/59 (M74), 100/59 (M77), 94/57 (M73)  - - 101/56 (MAP 74), 106/57 (M80), 125/79 (M106), 96/59 (M72)  - - 90s-130s/50-80s with MAP 70-100s (2990-1245), with blood pressure increasing after extubation to 130-170s/70-90s  There are no vasopressors used this stay, with the exception of Phenylephrine per anesthesia provider on .    After study, was shock clinically supported during this admission?    Septic shock was supported with additional clinical indicators:____________  Shock was not supported  Other- specify_____________  Unable to determine    By submitting this query, we are merely seeking further clarification of documentation to accurately reflect all conditions that you are monitoring, evaluating, treating or that extend the hospitalization or utilize additional resources of care. Please utilize your independent clinical judgment when  addressing the question(s) above.     This query and your response, once completed, will be entered into the legal medical record.    Sincerely,  Rosa CAMPA, RN  Clinical Documentation Integrity Program   [email or web address]

## 2024-04-08 NOTE — PLAN OF CARE
Goal Outcome Evaluation:  Plan of Care Reviewed With: patient, daughter           Outcome Evaluation: Pt participated in OT today, co treat with PT. He remains in ICU. He is following commands and able to sit EOB today but continues to have multiple deficits from acute CVA limiting ADL independence. Session today to focus on LUE facilation exercises (remains flaccid), postural control, visual attention/scanning to L side, pre transfer skills. Pt continues to require heavy assist of 2 to mobilize. He c/o of L shoulder pain today with weight bearing. Noted to have small L shoulder subluxation and would continue to benefit from OT to address LUE deficits, balance, ADL, transfers.      Anticipated Discharge Disposition (OT): inpatient rehabilitation facility

## 2024-04-08 NOTE — PROGRESS NOTES
Gastroenterology   Inpatient Progress Note    Reason for Follow Up: Coffee-ground emesis    Subjective     Interval History:   Hemoglobin 10.6 this morning was 8.3 yesterday    Patient did not receive blood yesterday    He is awake and alert.  Daughter at bedside    Current Facility-Administered Medications:     acetaminophen (TYLENOL) 160 MG/5ML oral solution 650 mg, 650 mg, Oral, Q6H PRN, Kristina Urena, APRN, 650 mg at 04/06/24 2329    amLODIPine (NORVASC) tablet 5 mg, 5 mg, Per PEG Tube, Q24H, Ja Tucker MD, 5 mg at 04/07/24 0827    aspirin chewable tablet 81 mg, 81 mg, Nasogastric, Daily, Emil Faith MD, 81 mg at 04/07/24 0827    atorvastatin (LIPITOR) tablet 80 mg, 80 mg, Nasogastric, Nightly, Shiv Felipe MD, 80 mg at 04/07/24 2046    sennosides-docusate (PERICOLACE) 8.6-50 MG per tablet 2 tablet, 2 tablet, Nasogastric, BID, 2 tablet at 04/07/24 2046 **AND** polyethylene glycol (MIRALAX) packet 17 g, 17 g, Oral, Daily PRN **AND** bisacodyl (DULCOLAX) EC tablet 5 mg, 5 mg, Oral, Daily PRN **AND** bisacodyl (DULCOLAX) suppository 10 mg, 10 mg, Rectal, Daily PRN, Shiv Felipe MD    calcium carbonate (TUMS) chewable tablet 500 mg (200 mg elemental), 2 tablet, Oral, TID PRN, Gina Power MD, 2 tablet at 04/02/24 2009    Calcium Replacement - Follow Nurse / BPA Driven Protocol, , Does not apply, Tamir LINDSEY Mark Edwin, MD    Calcium Replacement - Follow Nurse / BPA Driven Protocol, , Does not apply, Tamir LINDSEY Mark Edwin, MD    Calcium Replacement - Follow Nurse / BPA Driven Protocol, , Does not apply, Tamir LINDSEY Mark Edwin, MD    cefTRIAXone (ROCEPHIN) 1,000 mg in sodium chloride 0.9 % 100 mL MBP, 1,000 mg, Intravenous, Q24H, Martin Garnett MD, Last Rate: 200 mL/hr at 04/07/24 1025, 1,000 mg at 04/07/24 1025    chlorhexidine (PERIDEX) 0.12 % solution 15 mL, 15 mL, Mouth/Throat, Q12H, Martin Garnett MD, 15 mL at 04/07/24 2049    clevidipine (CLEVIPREX) infusion  0.5 mg/mL, 2-32 mg/hr, Intravenous, Titrated, Martin Garnett MD, Stopped at 04/07/24 0145    clopidogrel (PLAVIX) tablet 75 mg, 75 mg, Nasogastric, Daily, Shiv Felipe MD, 75 mg at 04/07/24 0827    famotidine (PEPCID) tablet 20 mg, 20 mg, Nasogastric, BID AC, Shiv Felipe MD, 20 mg at 04/07/24 1723    fentaNYL citrate (PF) (SUBLIMAZE) injection 50 mcg, 50 mcg, Intravenous, Q2H PRN, Shiv Felipe MD, 50 mcg at 04/06/24 0237    furosemide (LASIX) injection 40 mg, 40 mg, Intravenous, Q12H, Martin Garnett MD, 40 mg at 04/07/24 2100    hydrALAZINE (APRESOLINE) injection 10 mg, 10 mg, Intravenous, Q4H PRN, Ja Tucker MD, 10 mg at 04/07/24 0504    HYDROcodone-acetaminophen (NORCO) 5-325 MG per tablet 1 tablet, 1 tablet, Oral, Q6H PRN, Shiv Felipe MD, 1 tablet at 04/05/24 1337    ipratropium-albuterol (DUO-NEB) nebulizer solution 3 mL, 3 mL, Nebulization, Q4H - RT, Ja Tucker MD, 3 mL at 04/08/24 0350    Magnesium Low Dose Replacement - Follow Nurse / BPA Driven Protocol, , Does not apply, PRN, Martin Garnett MD    Magnesium Standard Dose Replacement - Follow Nurse / BPA Driven Protocol, , Does not apply, PRN, Martin Garnett MD    Magnesium Standard Dose Replacement - Follow Nurse / BPA Driven Protocol, , Does not apply, PRN, Martin Garnett MD    nitroglycerin (NITROSTAT) SL tablet 0.4 mg, 0.4 mg, Sublingual, Q5 Min PRN, Dago Rubio MD    ondansetron (ZOFRAN) injection 4 mg, 4 mg, Intravenous, Q4H PRN, Gina Power MD, 4 mg at 04/02/24 7563    [COMPLETED] pantoprazole (PROTONIX) injection 80 mg, 80 mg, Intravenous, Once, 80 mg at 04/03/24 0834 **AND** pantoprazole (PROTONIX) 40 mg in sodium chloride 0.9 % 100 mL (0.4 mg/mL) MBP, 40 mg, Intravenous, Continuous, Martin Garnett MD, Last Rate: 20 mL/hr at 04/08/24 0600, 40 mg at 04/08/24 0600    Phosphorus Replacement - Follow Nurse / BPA Driven Protocol, , Does not apply, PRN, Martin Garnett MD    Phosphorus  Replacement - Follow Nurse / BPA Driven Protocol, , Does not apply, Tamir LINDSEY Mark Edwin, MD    Phosphorus Replacement - Follow Nurse / BPA Driven Protocol, , Does not apply, Tamir LINDSEY Mark Edwin, MD    potassium chloride (KLOR-CON) packet 40 mEq, 40 mEq, Oral, Q4H, Shiv Felipe MD, 40 mEq at 04/08/24 0404    Potassium Replacement - Follow Nurse / BPA Driven Protocol, , Does not apply, Tamir LINDSEY Mark Edwin, MD    Potassium Replacement - Follow Nurse / BPA Driven Protocol, , Does not apply, Tamir LINDSEY Mark Edwin, MD    Potassium Replacement - Follow Nurse / BPA Driven Protocol, , Does not apply, Tamir LINDSEY Mark Edwin, MD    sodium chloride 0.9 % bolus 500 mL, 500 mL, Intravenous, Once, Dago Rubio MD    sodium chloride 0.9 % flush 10 mL, 10 mL, Intravenous, PRN, Dago Rubio MD    sodium chloride 0.9 % flush 10 mL, 10 mL, Intravenous, Q12H, Dago Rubio MD, 10 mL at 04/07/24 2046    sodium chloride 0.9 % flush 10 mL, 10 mL, Intravenous, PRN, Dago Rubio MD    sodium chloride 0.9 % infusion 40 mL, 40 mL, Intravenous, Joanne LINDSEY Richard, MD  Review of Systems:               The following systems were reviewed and negative;  gastrointestinal    Objective     Vital Signs  Temp:  [97.9 °F (36.6 °C)-99.1 °F (37.3 °C)] 98.7 °F (37.1 °C)  Heart Rate:  [103-129] 124  Resp:  [18-22] 22  BP: (138-156)/() 142/104  Body mass index is 25 kg/m².    Intake/Output Summary (Last 24 hours) at 4/8/2024 0734  Last data filed at 4/8/2024 0600  Gross per 24 hour   Intake 3055.67 ml   Output 5780 ml   Net -2724.33 ml     No intake/output data recorded.                Physical Exam:              General: patient awake, alert and cooperative              Eyes: no scleral icterus              Skin: warm and dry, not jaundiced              Abdomen: soft, nontender, nondistended; normal bowel sounds, no masses palpated, no periumbical lymphadenopathy              Psychiatric: Appropriate affect  "and behavior                Results Review:                I reviewed the patient's new clinical results.    Results from last 7 days   Lab Units 04/08/24  0246 04/07/24  0413 04/06/24  0515   WBC 10*3/mm3 14.93* 14.37* 14.00*   HEMOGLOBIN g/dL 10.6* 8.3* 8.7*   HEMATOCRIT % 32.5* 25.3* 25.9*   PLATELETS 10*3/mm3 492* 407 343     Results from last 7 days   Lab Units 04/08/24  0246 04/07/24  0413 04/05/24  1451 04/05/24  0317 04/04/24  0402 04/03/24  1612 04/01/24 2209 04/01/24 2206   SODIUM mmol/L 147* 150*  --  142   < > 141   < > 140   POTASSIUM mmol/L 3.5 3.4* 3.1* 3.4*   < > 4.1   < > 4.3   CHLORIDE mmol/L 108* 114*  --  108*   < > 109*   < > 104   CO2 mmol/L 22.2 23.0  --  24.0   < > 21.0*   < > 24.0   BUN mg/dL 28* 27*  --  38*   < > 23   < > 33*   CREATININE mg/dL 1.06 1.08  --  1.95*   < > 1.47*   < > 1.43*   CALCIUM mg/dL 8.6 7.8*  --  7.3*   < > 7.9*   < > 10.1*   BILIRUBIN mg/dL  --   --   --   --   --  0.4  --  0.2   ALK PHOS U/L  --   --   --   --   --  71  --  98   ALT (SGPT) U/L  --   --   --   --   --  15  --  24   AST (SGOT) U/L  --   --   --   --   --  30  --  21   GLUCOSE mg/dL 139* 144*  --  187*   < > 126*   < > 112*    < > = values in this interval not displayed.     Results from last 7 days   Lab Units 04/01/24 2206   INR  0.97     No results found for: \"LIPASE\"    Radiology:  XR Chest 1 View   Final Result   FINDINGS AND IMPRESSION:   An enteric tube courses below the mid diaphragm with tip terminating   over the left upper quadrant.       Lungs are hypoinflated. Bibasilar pulmonary pacification is present,   left greater than right, which has worsened since chest radiograph   performed earlier today. Suggestion of at least a small left pleural   effusion. There is also pulmonary vascular congestion. Constellation of   findings are concerning for worsening asymmetric pulmonary edema versus   multifocal pneumonia in the appropriate clinical context and correlation   with patient history is " recommended with follow-up chest CT if   clinically indicated. No pneumothorax is seen. Cardiac silhouette is   accentuated by low lung volumes and patient rotation.       This report was finalized on 4/7/2024 12:58 AM by Dr. John Coley M.D   on Workstation: [id number]          XR Chest 1 View   Final Result   No significant change.       This report was finalized on 4/6/2024 6:53 AM by Dr. Prince So M.D on Workstation: [id number]          XR Chest 1 View   Final Result      CT Abdomen Pelvis Without Contrast   Final Result       1. Heterogeneous high attenuation contents in the dependent base of the   urinary bladder, likely hemorrhage. Air in the urinary bladder may be   iatrogenic. Correlate with urinalysis and possible cystoscopy.   2. Incomplete evaluated but simple appearing 1 cm left renal cyst with   scarring in the posterior left kidney. No renal or ureteral stone is   identified. No hydronephrosis or hydroureter.   3. Small hiatal hernia.   4. Mild diffuse anasarca.   5. Multifocal bibasilar opacities, right greater than left, likely   atelectasis and superimposed pneumonia with multifocal bronchial   plugging in the base of each lower lobe.       This report was finalized on 4/5/2024 1:31 PM by Parth Wasserman MD on   Workstation: [id number]          XR Chest 1 View   Final Result   No significant interval change.       This report was finalized on 4/5/2024 4:18 AM by Dr. Kristal Sanford M.D on Workstation: [id number]          XR Chest 1 View   Final Result   No significant interval change.       This report was finalized on 4/4/2024 5:28 AM by Dr. Kristal Sanford M.D on Workstation: [id number]          XR Chest 1 View   Final Result   As described.       This report was finalized on 4/3/2024 2:24 PM by Dr. Prince So M.D on Workstation: [id number]          MRI Brain With & Without Contrast   Final Result   Multiple acute infarcts are appreciated, the largest of   which  involves the parietal occipital and occipital temporal region on   the right posterolaterally. Smaller acute infarcts are appreciated   involving the head of the caudate nucleus on the right involving the   medial aspect of the thalamus on the right. There is mild-to-moderate   enhancement related to the infarcts noted above. Smaller infarcts   without enhancement are appreciated involving the right cerebellar   hemisphere posteriorly, medial aspect of the left cerebellar hemisphere   and the posterior and medial aspects of the left temporal lobe.       This report was finalized on 4/2/2024 12:49 PM by Dr. Vivek Gudino M.D   on Workstation: [id number]          IR Perc [facility] Thromb Prim Nonc Art Ini   Final Result   Acute occlusion of the right internal carotid artery, right   A2 segment, right distal PCA and distal right M1 segment with subsequent   successful mechanical thrombectomy in all 3 territories and TICI 2c flow   achieved. Underlying 85% stenosis of the right ICA origin treated with   successful carotid artery stent placement under distal protection with   no residual narrowing seen. Initiation of dual antiplatelet therapy   required with follow-up carotid Doppler sonography as described above.       All carotid stenosis measurements were made using NASCET criteria.           This report was finalized on 4/3/2024 11:52 AM by Dr. Dago Rubio M.D on Workstation: [id number]          XR Chest 1 View   Final Result   Bibasilar atelectasis versus scarring.       This report was finalized on 4/1/2024 10:55 PM by Dr. Kristal Sanford M.D on Workstation: [id number]          CT Angiogram Head w AI Analysis of LVO   Final Result   1. No acute intracranial hemorrhage. However, the patient has a   hyperdense right MCA.   2. Area of cerebral blood flow less than 30% within the right MCA   territory measuring 55 cc, with corresponding Tmax greater than 6   seconds, measuring up to 173 cc.   3. Occlusion of the  right internal carotid artery at its origin. The   patient has some faint opacification of the right M1, although there is   thrombus identified within it. There is opacification of the right M2   branches.   4. Marked irregularity of the intracranial distal right vertebral   artery, which may reflect dissection, as well as a bulbous outpouching   which may represent pseudoaneurysm.           This report was finalized on 4/2/2024 12:42 AM by Dr. Kristal Sanford M.D on Workstation: [id number]          CT Angiogram Neck   Final Result   1. No acute intracranial hemorrhage. However, the patient has a   hyperdense right MCA.   2. Area of cerebral blood flow less than 30% within the right MCA   territory measuring 55 cc, with corresponding Tmax greater than 6   seconds, measuring up to 173 cc.   3. 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 The   patient has some faint opacification of the right M1, although there is   thrombus identified within it. There is opacification of the right M2   branches.   4. Marked irregularity of the intracranial distal right vertebral   artery, which may reflect dissection, as well as a bulbous outpouching   which may represent pseudoaneurysm.           This report was finalized on 4/2/2024 12:42 AM by Dr. Kristal Sanford M.D on Workstation: [id number]          CT CEREBRAL PERFUSION WITH & WITHOUT CONTRAST   Final Result   1. No acute intracranial hemorrhage. However, the patient has a   hyperdense right MCA.   2. Area of cerebral blood flow less than 30% within the right MCA   territory measuring 55 cc, with corresponding Tmax greater than 6   seconds, measuring up to 173 cc.   3. 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 The   patient has some faint opacification of the right M1, although there is   thrombus identified within it. There is opacification of the right M2   branches.   4. Marked irregularity of the intracranial distal right  vertebral   artery, which may reflect dissection, as well as a bulbous outpouching   which may represent pseudoaneurysm.           This report was finalized on 4/2/2024 12:42 AM by Dr. Kristal Sanford M.D on Workstation: [id number]          XR Chest 1 View    (Results Pending)       Assessment & Plan     Active Hospital Problems    Diagnosis     **Acute CVA (cerebrovascular accident)     Right-sided extracranial carotid artery stenosis     Coffee ground emesis     Gastroesophageal reflux disease        Assessment:  Coffee-ground emesis with EGD with distal esophageal ulcers  Right ICA stent placement with anticoagulation      Plan:  Continue PPI IV, once swallowing evaluation is performed okay to change this to p.o.  Carafate is being given through suspension  Trend H&H  Tube feeds as appropriate  Swallowing evaluation when able to perform  GI will sign off please contact us with any further recommendations needed or if any concern for rebleeding.  Patient should follow-up with Dr. Magdaleno Mena after discharge to determine timing of repeat EGD to assess for healing.  I discussed the patients findings and my recommendations with patient, family, and nursing staff.             Tesfaye Ghosh M.D.  [facility]  [address] [place] [address]  Office: [phone]

## 2024-04-08 NOTE — PROGRESS NOTES
"Nutrition Services    Patient Name:  Alex Negron  YOB: 1945  MRN: [id number]  Admit Date:  4/1/2024    Assessment Date:  04/08/24    Comment: SLP eval today, Pt still not appropriate to try po. TF's were held yesterday and overnight, RN not sure why. She has restarted them. Continues on CBI. Transitioning off protonix drip today.  Last BM 3/7.  Labs, meds, skin reviewed. Na+ 147, getting 25mL/hr free water, may need to increase. Glucoses 133/139/135. He has been as high as 208,? Add SSI. Await readiness to try po.    Plan/Recommendations:  Continue TF's at 70mL/hr with Diabetisource AC  Continue free water at 25mL/hr - may need more  Diet per SLP when appropriate  May need some SSI coverage    Will follow clinical course, nutrition needs.    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, L sided hemiparesis   Medical & Surgical Hx History reviewed. No pertinent past medical history.    Past Surgical History:   Procedure Laterality Date    ENDOSCOPY N/A 4/3/2024    Procedure: ESOPHAGOGASTRODUODENOSCOPY AT BEDSIDE;  Surgeon: Redd Guidry MD;  Location: [facility] ENDOSCOPY;  Service: Gastroenterology;  Laterality: N/A;  PRE-  GI BLEED  POST- HIATAL HERNIA, DISTAL ESOPHAGITIS ULCERS, GASTRITIS, PYLOROPLASTY        Current Problems Dysphagia, GI Bleed     Encounter Information        Nutrition History    Food Preferences    Supplements    Factors Affecting Intake altered mental status, altered respiratory status, swallow impairment   Tests/Procedures Clinical Swallow Evaluation     Anthropometrics        Current Height   Current Weight  BMI kg/m2 Height: 180.3 cm (71\")  Weight: 81.3 kg (179 lb 3.7 oz) (04/08/24 0400)  Body mass index is 25 kg/m².     Adj BMI (if applicable)    BMI Category Overweight (25 - 29.9)       Admission Weight 81.7kg "   Ideal Body Weight (IBW) 166lb     Adj IBW (if applicable)    Usual Body Weight (UBW) unknown   Weight Change/Trend Unknown       Weight history: Wt Readings from Last 30 Encounters:   04/08/24 0400 81.3 kg (179 lb 3.7 oz)   04/07/24 0418 86.3 kg (190 lb 4.1 oz)   04/05/24 0434 86.6 kg (190 lb 14.7 oz)   04/04/24 1344 87.5 kg (193 lb)   04/04/24 0500 87.7 kg (193 lb 5.5 oz)   04/03/24 0434 85.6 kg (188 lb 11.4 oz)   04/02/24 1344 84.5 kg (186 lb 4.6 oz)   04/02/24 0418 84.5 kg (186 lb 4.6 oz)   04/01/24 2159 81.7 kg (180 lb 3.2 oz)        Estimated/Assessed Needs        Energy Requirements    Weight for Calculation 81.7kg   Method for Estimation  25 kcal/kg   EST Needs (kcal/day) 2042       Protein Requirements    Weight for Calculation 81.7kg   EST Protein Needs (g/kg) 1.0 - 1.2 gm/kg   EST Daily Needs (g/day) 82-98       Fluid Requirements     Method for Estimation 1 mL/kcal    Estimated Needs (mL/day)        Fluid Deficit    Current Na Level (mEq/L) 147   Desired Na Level (mEq/L) 140   Estimated Fluid Deficit (L)  2.4L     Labs        Pertinent Labs    Results from last 7 days   Lab Units 04/08/24  0246 04/07/24  0413 04/05/24  1451 04/05/24  0317 04/04/24  0402 04/03/24  1612 04/01/24  2209 04/01/24  2206   SODIUM mmol/L 147* 150*  --  142   < > 141   < > 140   POTASSIUM mmol/L 3.5 3.4* 3.1* 3.4*   < > 4.1   < > 4.3   CHLORIDE mmol/L 108* 114*  --  108*   < > 109*   < > 104   CO2 mmol/L 22.2 23.0  --  24.0   < > 21.0*   < > 24.0   BUN mg/dL 28* 27*  --  38*   < > 23   < > 33*   CREATININE mg/dL 1.06 1.08  --  1.95*   < > 1.47*   < > 1.43*   CALCIUM mg/dL 8.6 7.8*  --  7.3*   < > 7.9*   < > 10.1*   BILIRUBIN mg/dL  --   --   --   --   --  0.4  --  0.2   ALK PHOS U/L  --   --   --   --   --  71  --  98   ALT (SGPT) U/L  --   --   --   --   --  15  --  24   AST (SGOT) U/L  --   --   --   --   --  30  --  21   GLUCOSE mg/dL 139* 144*  --  187*   < > 126*   < > 112*    < > = values in this interval not displayed.  "    Results from last 7 days   Lab Units 04/08/24  0246 04/07/24  0413 04/04/24  0402 04/03/24  1612 04/03/24  0205 04/02/24  0333   MAGNESIUM mg/dL 2.2 2.4  --   --   --   --    PHOSPHORUS mg/dL 3.3 1.1*   < >  --   --   --    HEMOGLOBIN g/dL 10.6* 8.3*   < > 9.5*   < > 11.1*   HEMATOCRIT % 32.5* 25.3*   < > 28.8*   < > 33.8*   WBC 10*3/mm3 14.93* 14.37*   < > 16.92*   < > 11.73*   TRIGLYCERIDES mg/dL  --   --   --   --   --  38   ALBUMIN g/dL  --   --   --  3.1*  --   --     < > = values in this interval not displayed.     Results from last 7 days   Lab Units 04/08/24  0246 04/07/24  0413 04/06/24  0515 04/05/24  0317 04/04/24  0402 04/02/24  0333 04/01/24  2206   INR   --   --   --   --   --   --  0.97   APTT seconds  --   --   --   --   --   --  32.7   PLATELETS 10*3/mm3 492* 407 343 270 274   < > 434    < > = values in this interval not displayed.     No results found for: \"COVID19\"  Lab Results   Component Value Date    HGBA1C 6.20 (H) 04/02/2024          Medications            Scheduled Medications amLODIPine, 5 mg, Per PEG Tube, Q24H  aspirin, 81 mg, Nasogastric, Daily  atorvastatin, 80 mg, Nasogastric, Nightly  cefTRIAXone, 2,000 mg, Intravenous, Q24H  chlorhexidine, 15 mL, Mouth/Throat, Q12H  clopidogrel, 75 mg, Nasogastric, Daily  [START ON 4/9/2024] furosemide, 40 mg, Intravenous, Daily  ipratropium-albuterol, 3 mL, Nebulization, Q4H - RT  pantoprazole, 40 mg, Intravenous, Daily  senna-docusate sodium, 2 tablet, Nasogastric, BID  sodium chloride, 500 mL, Intravenous, Once  sodium chloride, 10 mL, Intravenous, Q12H        Infusions clevidipine, 2-32 mg/hr, Last Rate: Stopped (04/07/24 0145)        PRN Medications   acetaminophen    senna-docusate sodium **AND** polyethylene glycol **AND** bisacodyl **AND** bisacodyl    calcium carbonate    Calcium Replacement - Follow Nurse / BPA Driven Protocol    Calcium Replacement - Follow Nurse / BPA Driven Protocol    Calcium Replacement - Follow Nurse / BPA Driven " Protocol    fentaNYL citrate (PF)    hydrALAZINE    HYDROcodone-acetaminophen    Magnesium Low Dose Replacement - Follow Nurse / BPA Driven Protocol    Magnesium Standard Dose Replacement - Follow Nurse / BPA Driven Protocol    Magnesium Standard Dose Replacement - Follow Nurse / BPA Driven Protocol    nitroglycerin    ondansetron    Phosphorus Replacement - Follow Nurse / BPA Driven Protocol    Phosphorus Replacement - Follow Nurse / BPA Driven Protocol    Phosphorus Replacement - Follow Nurse / BPA Driven Protocol    Potassium Replacement - Follow Nurse / BPA Driven Protocol    Potassium Replacement - Follow Nurse / BPA Driven Protocol    Potassium Replacement - Follow Nurse / BPA Driven Protocol    sodium chloride    sodium chloride    sodium chloride     Physical Findings          Physical Appearance alert, disoriented, hemiparesis , on oxygen therapy   Oral/Mouth Cavity tooth or teeth missing   Edema  1+ (trace), 2+ (mild)   Gastrointestinal last bowel movement: 4/7   Skin  blisters, bruising   Tubes/Drains/Lines Cortrak, ND tube, bridle in place   NFPE No clinical signs of muscle wasting or fat loss   --  Current Nutrition Orders & Evaluation of Intake       Oral Nutrition     Food Allergies NKFA   Current PO Diet NPO Diet NPO Type: Strict NPO   Supplement    PO Evaluation     Trending % PO Intake       Enteral Nutrition     Enteral Route ND    TF Delivery Method Continuous    Propofol Rate/Kcal     Current TF Order/Rate  Diabetisource AC @ 50 mL/hr    TF Goal Rate 70 mL/hr    Current Water Flush 25 mL Q 1 hr    Modular None    TF Residual  no or minimal residual    TF Tolerance tolerating    TF Observation Verified correct TF and water flush infusing per orders     Nutrition Diagnosis        Nutrition Dx Problem 1 Problem: Inadequate Oral Intake  Etiology: Medical Diagnosis - CVA  Dysphagia  Signs/Symptoms: NPO    Comment:      INTERVENTION / PLAN OF CARE  Intervention Goal        Intervention Goal(s)  Maintain nutrition status, Meet estimated needs, Disease management/therapy, Initiate feeding/diet, Initiate TF/PN, Transition TF to PO, and Maintain weight     Nutrition Intervention        RD Action Await initiation/advancement of PO diet, Continue to monitor, and Care plan reviewed     Prescription         Diet     Supplement/Snack    EN/PN     Prescription Ordered       Enteral Prescription:     Enteral Route ND    TF Delivery Method Continuous    Enteral Product Diabetisource AC    Modular None    Propofol Rate/Kcal     TF Start Rate 20    TF Goal Rate 70    Free Water Flush 25mL q 1 hr    TF Provision at Goal: 2016kcal, 101 gm protein, 1377 mL free water + 600 mL water flushes         Calories 100 % needs met         Protein  102 % needs met         Fluid (mL) 1977mL    Prescription Ordered Continue same per protocol, No changes at this time     Monitor/Evaluation        Monitor Per protocol   Discharge Plan Pending clinical course   Education Will instruct as appropriate     RD to follow per protocol.       Electronically signed by:  Lise Song RD  04/08/24 11:53 EDT

## 2024-04-08 NOTE — PLAN OF CARE
Goal Outcome Evaluation:    Patient remains in ICU, A/O x 3, confused to situation, restless at times, very forgetful, had some delirium; NIH 16 for the shift. Cont. CBI, irrigated PRN, only saw one little piece of clot from F/C. Hgb stable and trending up, switch protonix gtt to IV per GI MD. Resume TF at goal rate at the end of shift. Ice chip given per pt request. Had 2x bms; bath done today, afebrile. Not acute even happened during the shift. Family visited and updated given.

## 2024-04-08 NOTE — PROGRESS NOTES
"  [facility] Progress Note    Chief Complaint:  thirsty    Doing well and resting  Irrigation is weaning down    Review of Systems:    The following systems were reviewed and negative;  respiratory and cardiovascular          Vital Signs  /97   Pulse (!) 124   Temp 98.7 °F (37.1 °C) (Oral)   Resp 20   Ht 180.3 cm (71\")   Wt 81.3 kg (179 lb 3.7 oz)   SpO2 95%   BMI 25.00 kg/m²     Physical Exam:     General Appearance:    Alert, cooperative, NAD   HEENT:    No trauma, pupils reactive, hearing intact   Back:     No CVA tenderness   Lungs:     Respirations unlabored, no wheezing    Heart:    RRR, intact peripheral pulses   Abdomen:     Soft and benign   :    Phallus nl   Extremities:   No edema, no deformity   Lymphatic:   No neck or groin LAD   Skin:   No bleeding, bruising or rashes   Neuro/Psych:   Orientation intact, mood/affect pleasant, no focal findings        Results Review:     I reviewed the patient's new clinical results.  Lab Results (last 24 hours)       Procedure Component Value Units Date/Time    POC Glucose Once [[id number]]  (Abnormal) Collected: 04/08/24 0617    Specimen: Blood Updated: 04/08/24 0628     Glucose 135 mg/dL     Basic Metabolic Panel [[id number]]  (Abnormal) Collected: 04/08/24 0246    Specimen: Blood Updated: 04/08/24 0336     Glucose 139 mg/dL      BUN 28 mg/dL      Creatinine 1.06 mg/dL      Sodium 147 mmol/L      Potassium 3.5 mmol/L      Chloride 108 mmol/L      CO2 22.2 mmol/L      Calcium 8.6 mg/dL      BUN/Creatinine Ratio 26.4     Anion Gap 16.8 mmol/L      eGFR 72.3 mL/min/1.73     Narrative:      GFR Normal >60  Chronic Kidney Disease <60  Kidney Failure <15    The GFR formula is only valid for adults with stable renal function between ages 18 and 70.    Magnesium [[id number]]  (Normal) Collected: 04/08/24 0246    Specimen: Blood Updated: 04/08/24 0336     Magnesium 2.2 mg/dL     Phosphorus [[id number]]  (Normal) Collected: 04/08/24 0246    Specimen: Blood " Updated: 04/08/24 0335     Phosphorus 3.3 mg/dL     CBC (No Diff) [[id number]]  (Abnormal) Collected: 04/08/24 0246    Specimen: Blood Updated: 04/08/24 0318     WBC 14.93 10*3/mm3      RBC 3.61 10*6/mm3      Hemoglobin 10.6 g/dL      Hematocrit 32.5 %      MCV 90.0 fL      MCH 29.4 pg      MCHC 32.6 g/dL      RDW 13.7 %      RDW-SD 45.4 fl      MPV 9.5 fL      Platelets 492 10*3/mm3     POC Glucose Once [[id number]]  (Abnormal) Collected: 04/08/24 0016    Specimen: Blood Updated: 04/08/24 0022     Glucose 133 mg/dL     Blood Culture - Blood, Arm, Left [[id number]]  (Normal) Collected: 04/03/24 1621    Specimen: Blood from Arm, Left Updated: 04/07/24 2030     Blood Culture No growth at 4 days    POC Glucose Once [[id number]]  (Normal) Collected: 04/07/24 1733    Specimen: Blood Updated: 04/07/24 1734     Glucose 130 mg/dL     Blood Culture - Blood, Arm, Left [[id number]]  (Normal) Collected: 04/03/24 1612    Specimen: Blood from Arm, Left Updated: 04/07/24 1631     Blood Culture No growth at 4 days    POC Glucose Once [[id number]]  (Abnormal) Collected: 04/07/24 1138    Specimen: Blood Updated: 04/07/24 1139     Glucose 153 mg/dL     Phosphorus [[id number]]  (Abnormal) Collected: 04/07/24 0413    Specimen: Blood Updated: 04/07/24 1035     Phosphorus 1.1 mg/dL     Magnesium [[id number]]  (Normal) Collected: 04/07/24 0413    Specimen: Blood Updated: 04/07/24 1015     Magnesium 2.4 mg/dL           Imaging Results (Last 24 Hours)       Procedure Component Value Units Date/Time    XR Chest 1 View [[id number]] Resulted: 04/08/24 0409     Updated: 04/08/24 0409            Medication Review:   I have personally reviewed    Current Facility-Administered Medications:     acetaminophen (TYLENOL) 160 MG/5ML oral solution 650 mg, 650 mg, Oral, Q6H PRN, Kristina Urena, APRN, 650 mg at 04/06/24 2329    amLODIPine (NORVASC) tablet 5 mg, 5 mg, Per PEG Tube, Q24H, Ja Tucker MD, 5 mg at 04/08/24 0818    aspirin chewable tablet 81  mg, 81 mg, Nasogastric, Daily, Emil Faith MD, 81 mg at 04/08/24 0818    atorvastatin (LIPITOR) tablet 80 mg, 80 mg, Nasogastric, Nightly, Shiv Felipe MD, 80 mg at 04/07/24 2046    sennosides-docusate (PERICOLACE) 8.6-50 MG per tablet 2 tablet, 2 tablet, Nasogastric, BID, 2 tablet at 04/08/24 0819 **AND** polyethylene glycol (MIRALAX) packet 17 g, 17 g, Oral, Daily PRN **AND** bisacodyl (DULCOLAX) EC tablet 5 mg, 5 mg, Oral, Daily PRN **AND** bisacodyl (DULCOLAX) suppository 10 mg, 10 mg, Rectal, Daily PRN, Shiv Felipe MD    calcium carbonate (TUMS) chewable tablet 500 mg (200 mg elemental), 2 tablet, Oral, TID PRN, Gina Power MD, 2 tablet at 04/02/24 2009    Calcium Replacement - Follow Nurse / BPA Driven Protocol, , Does not apply, Tamir LINDSEY Mark Edwin, MD    Calcium Replacement - Follow Nurse / BPA Driven Protocol, , Does not apply, Tamir LINDSEY Mark Edwin, MD    Calcium Replacement - Follow Nurse / BPA Driven Protocol, , Does not apply, Tamir LINDSEY Mark Edwin, MD    cefTRIAXone (ROCEPHIN) 1,000 mg in sodium chloride 0.9 % 100 mL MBP, 1,000 mg, Intravenous, Q24H, Martin Garnett MD, Last Rate: 200 mL/hr at 04/07/24 1025, 1,000 mg at 04/07/24 1025    chlorhexidine (PERIDEX) 0.12 % solution 15 mL, 15 mL, Mouth/Throat, Q12H, Martin Garnett MD, 15 mL at 04/08/24 0819    clevidipine (CLEVIPREX) infusion 0.5 mg/mL, 2-32 mg/hr, Intravenous, Titrated, Martin Garnett MD, Stopped at 04/07/24 0145    clopidogrel (PLAVIX) tablet 75 mg, 75 mg, Nasogastric, Daily, Shiv Felipe MD, 75 mg at 04/08/24 0818    famotidine (PEPCID) tablet 20 mg, 20 mg, Nasogastric, BID AC, Shiv Felipe MD, 20 mg at 04/08/24 0819    fentaNYL citrate (PF) (SUBLIMAZE) injection 50 mcg, 50 mcg, Intravenous, Q2H PRN, Shiv Felipe MD, 50 mcg at 04/06/24 0237    furosemide (LASIX) injection 40 mg, 40 mg, Intravenous, Q12H, Martin Garnett MD, 40 mg at 04/07/24 2100    hydrALAZINE (APRESOLINE)  injection 10 mg, 10 mg, Intravenous, Q4H PRN, Ja Tucker MD, 10 mg at 04/07/24 0504    HYDROcodone-acetaminophen (NORCO) 5-325 MG per tablet 1 tablet, 1 tablet, Oral, Q6H PRN, Shiv Felipe MD, 1 tablet at 04/05/24 1337    ipratropium-albuterol (DUO-NEB) nebulizer solution 3 mL, 3 mL, Nebulization, Q4H - RT, Ja Tucker MD, 3 mL at 04/08/24 0825    Magnesium Low Dose Replacement - Follow Nurse / BPA Driven Protocol, , Does not apply, Tamir LINDSEY Mark Edwin, MD    Magnesium Standard Dose Replacement - Follow Nurse / BPA Driven Protocol, , Does not apply, Tamir LINDSEY Mark Edwin, MD    Magnesium Standard Dose Replacement - Follow Nurse / BPA Driven Protocol, , Does not apply, Tamir LINDSEY Mark Edwin, MD    nitroglycerin (NITROSTAT) SL tablet 0.4 mg, 0.4 mg, Sublingual, Q5 Min PRN, Dago Rubio MD    ondansetron (ZOFRAN) injection 4 mg, 4 mg, Intravenous, Q4H PRN, Gina Power MD, 4 mg at 04/02/24 2333    pantoprazole (PROTONIX) injection 40 mg, 40 mg, Intravenous, Daily, Tesfaye Ghosh MD, 40 mg at 04/08/24 0819    Phosphorus Replacement - Follow Nurse / BPA Driven Protocol, , Does not apply, Tamir LINDSEY Mark Edwin, MD    Phosphorus Replacement - Follow Nurse / BPA Driven Protocol, , Does not apply, Tamir LINDSEY Mark Edwin, MD    Phosphorus Replacement - Follow Nurse / BPA Driven Protocol, , Does not apply, Tamir LINDSEY Mark Edwin, MD    Potassium Replacement - Follow Nurse / BPA Driven Protocol, , Does not apply, Tamir LINDSEY Mark Edwin, MD    Potassium Replacement - Follow Nurse / BPA Driven Protocol, , Does not apply, Tamir LINDSEY Mark Edwin, MD    Potassium Replacement - Follow Nurse / BPA Driven Protocol, , Does not apply, Tamir LINDSEY Mark Edwin, MD    sodium chloride 0.9 % bolus 500 mL, 500 mL, Intravenous, Once, aDgo Rubio MD    sodium chloride 0.9 % flush 10 mL, 10 mL, Intravenous, PRN, Dago Rubio MD    sodium chloride 0.9 % flush 10 mL, 10 mL, Intravenous, Q12H,  Dago Rubio MD, 10 mL at 04/08/24 0819    sodium chloride 0.9 % flush 10 mL, 10 mL, Intravenous, PRN, Dago Rubio MD    sodium chloride 0.9 % infusion 40 mL, 40 mL, Intravenous, PRN, Dago Rubio MD    Allergies:    Patient has no known allergies.    Assessment:    Active Problems:    Acute CVA (cerebrovascular accident)    Right-sided extracranial carotid artery stenosis    Coffee ground emesis    Gastroesophageal reflux disease       Gross Hematuria likely prostatic bleeding      Plan:    Wean irrigation- leave in [place] for now- he is likely going to be in retention due to CVA       Robert Haque MD    4/8/2024  08:46 EDT

## 2024-04-09 LAB
ANION GAP SERPL CALCULATED.3IONS-SCNC: 13.1 MMOL/L (ref 5–15)
APTT HEX PL PPP: 6 SEC (ref 0–11)
APTT PPP: 25.7 SEC (ref 22.9–30.2)
B2 GLYCOPROT1 IGG SER-ACNC: <9 GPI IGG UNITS (ref 0–20)
B2 GLYCOPROT1 IGM SER-ACNC: <9 GPI IGM UNITS (ref 0–32)
BUN SERPL-MCNC: 37 MG/DL (ref 8–23)
BUN/CREAT SERPL: 33 (ref 7–25)
CALCIUM SPEC-SCNC: 8.9 MG/DL (ref 8.6–10.5)
CARDIOLIPIN IGG SER IA-ACNC: 15 GPL U/ML (ref 0–14)
CARDIOLIPIN IGM SER IA-ACNC: <9 MPL U/ML (ref 0–12)
CHLORIDE SERPL-SCNC: 111 MMOL/L (ref 98–107)
CO2 SERPL-SCNC: 21.9 MMOL/L (ref 22–29)
CREAT SERPL-MCNC: 1.12 MG/DL (ref 0.76–1.27)
DEPRECATED RDW RBC AUTO: 44 FL (ref 37–54)
EGFRCR SERPLBLD CKD-EPI 2021: 67.7 ML/MIN/1.73
ERYTHROCYTE [DISTWIDTH] IN BLOOD BY AUTOMATED COUNT: 13.4 % (ref 12.3–15.4)
GLUCOSE BLDC GLUCOMTR-MCNC: 165 MG/DL (ref 70–130)
GLUCOSE BLDC GLUCOMTR-MCNC: 168 MG/DL (ref 70–130)
GLUCOSE BLDC GLUCOMTR-MCNC: 182 MG/DL (ref 70–130)
GLUCOSE BLDC GLUCOMTR-MCNC: 190 MG/DL (ref 70–130)
GLUCOSE SERPL-MCNC: 175 MG/DL (ref 65–99)
HCT VFR BLD AUTO: 35 % (ref 37.5–51)
HGB BLD-MCNC: 11.5 G/DL (ref 13–17.7)
INR PPP: 1 (ref 0.9–1.2)
MAGNESIUM SERPL-MCNC: 2.7 MG/DL (ref 1.6–2.4)
MCH RBC QN AUTO: 29.7 PG (ref 26.6–33)
MCHC RBC AUTO-ENTMCNC: 32.9 G/DL (ref 31.5–35.7)
MCV RBC AUTO: 90.4 FL (ref 79–97)
PATH INTERP BLD-IMP: NORMAL
PATH INTERP SPEC-IMP: ABNORMAL
PLATELET # BLD AUTO: 646 10*3/MM3 (ref 140–450)
PMV BLD AUTO: 9.6 FL (ref 6–12)
POTASSIUM SERPL-SCNC: 4.1 MMOL/L (ref 3.5–5.2)
PROTHROMBIN TIME: 10.3 SEC (ref 9.1–12)
RBC # BLD AUTO: 3.87 10*6/MM3 (ref 4.14–5.8)
SCREEN DRVVT: 42.8 SEC (ref 0–47)
SODIUM SERPL-SCNC: 146 MMOL/L (ref 136–145)
THROMBIN TIME: 15.1 SEC (ref 0–23)
VIT B12 BLD-MCNC: 938 PG/ML (ref 211–946)
WBC NRBC COR # BLD AUTO: 14.83 10*3/MM3 (ref 3.4–10.8)

## 2024-04-09 PROCEDURE — 83735 ASSAY OF MAGNESIUM: CPT

## 2024-04-09 PROCEDURE — 97112 NEUROMUSCULAR REEDUCATION: CPT

## 2024-04-09 PROCEDURE — 94761 N-INVAS EAR/PLS OXIMETRY MLT: CPT

## 2024-04-09 PROCEDURE — 97110 THERAPEUTIC EXERCISES: CPT

## 2024-04-09 PROCEDURE — 97530 THERAPEUTIC ACTIVITIES: CPT

## 2024-04-09 PROCEDURE — 63710000001 INSULIN REGULAR HUMAN PER 5 UNITS: Performed by: INTERNAL MEDICINE

## 2024-04-09 PROCEDURE — 94664 DEMO&/EVAL PT USE INHALER: CPT

## 2024-04-09 PROCEDURE — 25010000002 CEFTRIAXONE PER 250 MG: Performed by: INTERNAL MEDICINE

## 2024-04-09 PROCEDURE — 94799 UNLISTED PULMONARY SVC/PX: CPT

## 2024-04-09 PROCEDURE — 82948 REAGENT STRIP/BLOOD GLUCOSE: CPT

## 2024-04-09 PROCEDURE — 80048 BASIC METABOLIC PNL TOTAL CA: CPT

## 2024-04-09 PROCEDURE — 82607 VITAMIN B-12: CPT | Performed by: NURSE PRACTITIONER

## 2024-04-09 PROCEDURE — 25010000002 FUROSEMIDE PER 20 MG: Performed by: INTERNAL MEDICINE

## 2024-04-09 PROCEDURE — 85027 COMPLETE CBC AUTOMATED: CPT

## 2024-04-09 RX ORDER — IBUPROFEN 600 MG/1
1 TABLET ORAL
Status: DISCONTINUED | OUTPATIENT
Start: 2024-04-09 | End: 2024-04-17 | Stop reason: HOSPADM

## 2024-04-09 RX ORDER — DEXTROSE MONOHYDRATE 25 G/50ML
25 INJECTION, SOLUTION INTRAVENOUS
Status: DISCONTINUED | OUTPATIENT
Start: 2024-04-09 | End: 2024-04-17 | Stop reason: HOSPADM

## 2024-04-09 RX ORDER — NICOTINE POLACRILEX 4 MG
15 LOZENGE BUCCAL
Status: DISCONTINUED | OUTPATIENT
Start: 2024-04-09 | End: 2024-04-17 | Stop reason: HOSPADM

## 2024-04-09 RX ORDER — AMLODIPINE BESYLATE 10 MG/1
10 TABLET ORAL
Status: DISCONTINUED | OUTPATIENT
Start: 2024-04-10 | End: 2024-04-11

## 2024-04-09 RX ADMIN — IPRATROPIUM BROMIDE AND ALBUTEROL SULFATE 3 ML: .5; 3 SOLUTION RESPIRATORY (INHALATION) at 03:21

## 2024-04-09 RX ADMIN — ASPIRIN 81 MG: 81 TABLET, CHEWABLE ORAL at 08:06

## 2024-04-09 RX ADMIN — AMLODIPINE BESYLATE 5 MG: 5 TABLET ORAL at 08:06

## 2024-04-09 RX ADMIN — INSULIN HUMAN 2 UNITS: 100 INJECTION, SOLUTION PARENTERAL at 14:04

## 2024-04-09 RX ADMIN — CHLORHEXIDINE GLUCONATE 15 ML: 1.2 RINSE ORAL at 08:07

## 2024-04-09 RX ADMIN — Medication 10 ML: at 08:08

## 2024-04-09 RX ADMIN — PANTOPRAZOLE SODIUM 40 MG: 40 INJECTION, POWDER, FOR SOLUTION INTRAVENOUS at 08:06

## 2024-04-09 RX ADMIN — FUROSEMIDE 40 MG: 10 INJECTION, SOLUTION INTRAMUSCULAR; INTRAVENOUS at 08:06

## 2024-04-09 RX ADMIN — ATORVASTATIN CALCIUM 80 MG: 80 TABLET, FILM COATED ORAL at 20:00

## 2024-04-09 RX ADMIN — CEFTRIAXONE 2000 MG: 2 INJECTION, POWDER, FOR SOLUTION INTRAMUSCULAR; INTRAVENOUS at 12:19

## 2024-04-09 RX ADMIN — IPRATROPIUM BROMIDE AND ALBUTEROL SULFATE 3 ML: .5; 3 SOLUTION RESPIRATORY (INHALATION) at 06:16

## 2024-04-09 RX ADMIN — Medication 10 ML: at 20:00

## 2024-04-09 RX ADMIN — CLOPIDOGREL BISULFATE 75 MG: 75 TABLET, FILM COATED ORAL at 08:06

## 2024-04-09 RX ADMIN — INSULIN HUMAN 2 UNITS: 100 INJECTION, SOLUTION PARENTERAL at 18:25

## 2024-04-09 NOTE — PROGRESS NOTES
[facility] Acute Inpt Rehab    Continuing to follow progress with therapies.  Patient low level with therapies. PT and OT co -treating for safety and impaired tolerance.  Noted patient in soft restraints for safety.  Patient will need to be restraint free for 24 hours to be considered for acute therapy.  Will continue to monitor.    Thank you,  Radha Hook, RN  Rehab Admission Nurse

## 2024-04-09 NOTE — THERAPY TREATMENT NOTE
Patient Name: Alex Negron  : 1945    MRN: [id number]                              Today's Date: 2024       Admit Date: 2024    Visit Dx:     ICD-10-CM ICD-9-CM   1. Cerebrovascular accident (CVA), unspecified mechanism  I63.9 434.91   2. Coffee ground emesis  K92.0 578.0   3. Gastroesophageal reflux disease, unspecified whether esophagitis present  K21.9 530.81   4. 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 No pertinent past medical history.  Past Surgical History:   Procedure Laterality Date    ENDOSCOPY N/A 4/3/2024    Procedure: ESOPHAGOGASTRODUODENOSCOPY AT BEDSIDE;  Surgeon: Redd Guidry MD;  Location: [facility] ENDOSCOPY;  Service: Gastroenterology;  Laterality: N/A;  PRE-  GI BLEED  POST- HIATAL HERNIA, DISTAL ESOPHAGITIS ULCERS, GASTRITIS, PYLOROPLASTY      General Information       Row Name 24 1243          OT Time and Intention    Document Type therapy note (daily note)  -SM     Mode of Treatment co-treatment;occupational therapy  medically complexity, acute stroke, remains in ICU.  -       Row Name 24 1243          General Information    Patient Profile Reviewed yes  -SM     Existing Precautions/Restrictions fall;NPO  -SM     Barriers to Rehab language barrier  Burmese  used (ID [id number])  -       Row Name 24 1243          Cognition    Orientation Status (Cognition) oriented x 3  -       Row Name 24 1243          Safety Issues, Functional Mobility    Impairments Affecting Function (Mobility) endurance/activity tolerance;strength;pain;balance;visual/perceptual;sensation/sensory awareness;postural/trunk control;motor control;muscle tone abnormal;grasp;cognition  -      Comment, Safety Issues/Impairments (Mobility) PT/OT cotreatment medically appropriate and necessary due to patient acuity level, to maximize therapeutic benefit due to impaired act tolerance, and for safety of patient and staff. Treatment focused on progression of care and goals established in POC.  -               User Key  (r) = Recorded By, (t) = Taken By, (c) = Cosigned By      Initials Name Provider Type    Yasmine Kirkpatrick OT Occupational Therapist                     Mobility/ADL's       Row Name 04/09/24 1245          Bed Mobility    Supine-Sit [place] (Bed Mobility) maximum assist (25% patient effort);2 person assist  -SM     Sit-Supine [place] (Bed Mobility) maximum assist (25% patient effort);2 person assist  -SM       Row Name 04/09/24 1245          Sit-Stand Transfer    Sit-Stand [place] (Transfers) maximum assist (25% patient effort);2 person assist  -SM     Comment, (Sit-Stand Transfer) Comes to a partial stand, LUE supported/LLE blocked. HHAX2.  -       Row Name 04/09/24 1245          Lower Body Dressing Assessment/Training    [place] Level (Lower Body Dressing) don;socks;dependent (less than 25% patient effort)  -               User Key  (r) = Recorded By, (t) = Taken By, (c) = Cosigned By      Initials Name Provider Type    Yasmine Kirkpatrick OT Occupational Therapist                   Obj/Interventions       Row Name 04/09/24 1245          Vision Assessment/Intervention    Vision Assessment Comment cues throughout session for visual tracking/attention to L side, family and  ipad placed on left side with pt infrequently attending to them. Cues for pt touching LUE and protecting LUE during transfers.  -       Row Name 04/09/24 1245          Shoulder (Therapeutic Exercise)    Shoulder PROM (Therapeutic Exercise) left;flexion;extension;aBduction;aDduction;external rotation;internal rotation;10 repetitions  -       Row Name 04/09/24 1246           Elbow/Forearm (Therapeutic Exercise)    Elbow/Forearm PROM (Therapeutic Exercise) left;flexion;extension;10 repetitions  -[facility] Name 04/09/24 1245          Wrist (Therapeutic Exercise)    Wrist PROM (Therapeutic Exercise) left;flexion;extension;10 repetitions  -[facility] Name 04/09/24 1245          Hand (Therapeutic Exercise)    Hand PROM (Therapeutic Exercise) left;finger flexion;finger extension;10 repetitions  -[facility] Name 04/09/24 1245          Motor Skills    Motor Control/Coordination Interventions weight-bearing activities  weight bearing down to L elbow for faciliation, sensory feedback, proximation of glenohumeral joint  -[facility] Name 04/09/24 1245          Balance    Balance Assessment standing static balance  -     Static Sitting Balance --  varies from Mod A to CGA. Cues for RUE hand placements, cues for midline orientation at trunk, pt sat for 1 min CGA at longest interval with continued cues.  -     Static Standing Balance maximum assist;2-person assist  -     Position/Device Used, Standing Balance supported  -               User Key  (r) = Recorded By, (t) = Taken By, (c) = Cosigned By      Initials Name Provider Type     Yasmine Martin OT Occupational Therapist                   Goals/Plan    No documentation.                  Clinical Impression       Row Name 04/09/24 1248          Pain Assessment    Pre/Posttreatment Pain Comment doesnt rate but reports some back pain  -     Pain Intervention(s) Repositioned;Ambulation/increased activity  -SM       Row Name 04/09/24 1248          Pain Scale: FACES Pre/Post-Treatment    Pain: FACES Scale, Pretreatment 4-->hurts little more  -     Posttreatment Pain Rating 4-->hurts little more  -SM       Row Name 04/09/24 1248          Plan of Care Review    Plan of Care Reviewed With patient;spouse  -     Outcome Evaluation Pt continues to work with OT, sleeping but able to arouse with extra time. Pt continues to work on  activities to improve sitting balance, faciliation of LUE movement, L sided attention in prep to participation in ADLs. Pt remains to have a flaccid LUE. He did sit unsupported for 1 min today CGA on EOB, otherwise needs fluccuating balance assist. Pt did initate standing for the first time since stroke with anticipated heavy assist of 2. Spouse present at bedside and very attentive.  -       Row Name 04/09/24 1248          Therapy Plan Review/Discharge Plan (OT)    Anticipated Discharge Disposition (OT) inpatient rehabilitation facility  -       Row Name 04/09/24 1248          Vital Signs    Pre SpO2 (%) --  on RA this session, > 90% throughout.  -       Row Name 04/09/24 1248          Positioning and Restraints    Pre-Treatment Position in bed  -SM     Post Treatment Position bed  -SM     In Bed fowlers;call light within reach;encouraged to call for assist;exit alarm on;notified nsg;with family/caregiver  -SM               User Key  (r) = Recorded By, (t) = Taken By, (c) = Cosigned By      Initials Name Provider Type    Yasmine Kirkpatrick, OT Occupational Therapist                   Outcome Measures       Row Name 04/09/24 1251          How much help from another is currently needed...    Putting on and taking off regular lower body clothing? 1  -SM     Bathing (including washing, rinsing, and drying) 2  -SM     Toileting (which includes using toilet bed pan or urinal) 1  -SM     Putting on and taking off regular upper body clothing 2  -SM     Taking care of personal grooming (such as brushing teeth) 2  -SM     Eating meals 1  -SM     AM-PAC 6 Clicks Score (OT) 9  -SM       Row Name 04/09/24 1044 04/09/24 0800       How much help from another person do you currently need...    Turning from your back to your side while in flat bed without using bedrails? 2  -PC 2  -XZ    Moving from lying on back to sitting on the side of a flat bed without bedrails? 1  -PC 1  -XZ    Moving to and from a bed to a chair  (including a wheelchair)? 1  -PC 1  -XZ    Standing up from a chair using your arms (e.g., wheelchair, bedside chair)? 1  -PC 1  -XZ    Climbing 3-5 steps with a railing? 1  -PC 1  -XZ    To walk in hospital room? 1  -PC 1  -XZ    AM-PAC 6 Clicks Score (PT) 7  -PC 7  -XZ    Highest Level of Mobility Goal 2 --> Bed activities/dependent transfer  -PC 2 --> Bed activities/dependent transfer  -XZ      Row Name 04/09/24 0400          How much help from another person do you currently need...    Turning from your back to your side while in flat bed without using bedrails? 2  -KM     Moving from lying on back to sitting on the side of a flat bed without bedrails? 1  -KM     Moving to and from a bed to a chair (including a wheelchair)? 1  -KM     Standing up from a chair using your arms (e.g., wheelchair, bedside chair)? 1  -KM     Climbing 3-5 steps with a railing? 1  -KM     To walk in hospital room? 1  -KM     AM-PAC 6 Clicks Score (PT) 7  -KM     Highest Level of Mobility Goal 2 --> Bed activities/dependent transfer  -KM       Row Name 04/09/24 1251          Functional Assessment    Outcome Measure Options AM-PAC 6 Clicks Daily Activity (OT)  -               User Key  (r) = Recorded By, (t) = Taken By, (c) = Cosigned By      Initials Name Provider Type    Sidra Sahu, PT Physical Therapist    Yasmine Kirkpatrick OT Occupational Therapist    Claudia Suazo RN Registered Nurse    Amrita Yap RN Registered Nurse                    Occupational Therapy Education       Title: PT OT SLP Therapies (In Progress)       Topic: Occupational Therapy (In Progress)       Point: ADL training (In Progress)       Description:   Instruct learner(s) on proper safety adaptation and remediation techniques during self care or transfers.   Instruct in proper use of assistive devices.                  Learning Progress Summary             Patient Nonacceptance, E, NL by KOFI at 4/4/2024 0014                         Point:  Home exercise program (In Progress)       Description:   Instruct learner(s) on appropriate technique for monitoring, assisting and/or progressing therapeutic exercises/activities.                  Learning Progress Summary             Patient Nonacceptance, E, NL by EN at 4/4/2024 0014                         Point: Precautions (In Progress)       Description:   Instruct learner(s) on prescribed precautions during self-care and functional transfers.                  Learning Progress Summary             Patient Nonacceptance, E, NL by EN at 4/4/2024 0014                         Point: Body mechanics (In Progress)       Description:   Instruct learner(s) on proper positioning and spine alignment during self-care, functional mobility activities and/or exercises.                  Learning Progress Summary             Patient Nonacceptance, E, NL by EN at 4/4/2024 0014   Family Acceptance, E, VU by  at 4/3/2024 1239    Comment: OT goals, POC, positioning of LUE for safety post CVA for subluxation                                         User Key       Initials Effective Dates Name Provider Type Discipline    KOFI 08/01/22 -  Monique Mary RN Registered Nurse Nurse     04/02/20 -  Yasmine Martin OT Occupational Therapist OT                  OT Recommendation and Plan  Planned Therapy Interventions (OT): activity tolerance training, adaptive equipment training, functional balance retraining, occupation/activity based interventions, cognitive/visual perception retraining, neuromuscular control/coordination retraining, patient/caregiver education/training, transfer/mobility retraining, strengthening exercise, ROM/therapeutic exercise, orthotic fabrication/fitting/training, IADL retraining, BADL retraining  Therapy Frequency (OT): 5 times/wk  Plan of Care Review  Plan of Care Reviewed With: patient, spouse  Outcome Evaluation: Pt continues to work with OT, sleeping but able to arouse with extra time. Pt continues to  work on activities to improve sitting balance, faciliation of LUE movement, L sided attention in prep to participation in ADLs. Pt remains to have a flaccid LUE. He did sit unsupported for 1 min today CGA on EOB, otherwise needs fluccuating balance assist. Pt did initate standing for the first time since stroke with anticipated heavy assist of 2. Spouse present at bedside and very attentive.     Time Calculation:   Evaluation Complexity (OT)  Review Occupational Profile/Medical/Therapy History Complexity: extensive/high complexity  Assessment, Occupational Performance/Identification of Deficit Complexity: 5 or more performance deficits  Clinical Decision Making Complexity (OT): comprehensive assessment/high complexity  Overall Complexity of Evaluation (OT): high complexity     Time Calculation- OT       Row Name 04/09/24 1252             Time Calculation- OT    OT Start Time 0934  -      OT Stop Time 0958  -      OT Time Calculation (min) 24 min  -      Total Timed Code Minutes- OT 24 minute(s)  -SM      OT Received On 04/09/24  -      OT - Next Appointment 04/10/24  -         Timed Charges    19305 -  OT Neuromuscular Reeducation Minutes 10  -SM      28150 - OT Therapeutic Activity Minutes 14  -SM         Total Minutes    Timed Charges Total Minutes 24  -SM       Total Minutes 24  -SM                User Key  (r) = Recorded By, (t) = Taken By, (c) = Cosigned By      Initials Name Provider Type     Yasmine Martin OT Occupational Therapist                  Therapy Charges for Today       Code Description Service Date Service Provider Modifiers Qty    94084301644 HC OT NEUROMUSC RE EDUCATION EA 15 MIN 4/8/2024 Yasmine Martin OT GO 1    68756764684 HC OT THERAPEUTIC ACT EA 15 MIN 4/8/2024 Yasmine Martin OT GO 1    09103650338 HC OT NEUROMUSC RE EDUCATION EA 15 MIN 4/9/2024 Yasmine Martin OT GO 1    28425534951 HC OT THERAPEUTIC ACT EA 15 MIN 4/9/2024 Yasmine Martin OT GO 1                  Yasmine Martin, OT  4/9/2024

## 2024-04-09 NOTE — PLAN OF CARE
Goal Outcome Evaluation:  Plan of Care Reviewed With: patient, spouse           Outcome Evaluation: Pt continues to work with OT, sleeping but able to arouse with extra time. Pt continues to work on activities to improve sitting balance, faciliation of LUE movement, L sided attention in prep to participation in ADLs. Pt remains to have a flaccid LUE. He did sit unsupported for 1 min today CGA on EOB, otherwise needs fluccuating balance assist. Pt did initate standing for the first time since stroke with anticipated heavy assist of 2. Spouse present at bedside and very attentive.      Anticipated Discharge Disposition (OT): inpatient rehabilitation facility

## 2024-04-09 NOTE — PLAN OF CARE
Goal Outcome Evaluation:  Plan of Care Reviewed With: patient, family        Progress: improving      RUE Non violent soft limb restraint remains in place d/t pt reaching for lines/devices. No s/s of injury noted. Neuro status improving. Pt much more alert, active, & interactive. NIH 16. Pt tolerated wean from 5L NC to RA. Meeting SBP goal of <160. Tolerating TF at goal rate. Na level improving with Q1 hr water flushes and morning Na level now 146. No BM over PM. UOP adequate and hematuria improving with CBI which is now running at a slow rate. No other events or changes over PM.

## 2024-04-09 NOTE — PROGRESS NOTES
Enter Query Response Below      Query Response:     Acute kidney injury (EBER) secondary to sepsis          If applicable, please update the problem list.     Patient: Alex Negron        : 1945  Account: [id number]           Admit Date:         How to Respond to this query:       a. Click New Note     b. Answer query within the yellow box.                c. Update the Problem List, if applicable.      If you have any questions about this query contact me at: [email or web address]     ,    H&P documents acute renal insufficiency.  Baseline creatinine is not documented.  Daily creatinine levels:     1.43     1.34  4/3   1.46     1.42 - urology consult for urinary retention     1.95 - gross hematuria noted     1.08     1.06     1.12  Treatment includes IV fluids -4/3 and IV antibiotics.  He is diagnosed with sepsis on .      Please clarify if the patient treated/monitored for one or more of the following:     Acute kidney injury (EBER) secondary to sepsis  Acute renal insufficiency only  Acute kidney injury (EBER) secondary to _______  Other- specify: __________  Unable to determine     EBER is defined by KDIGO as any of the following:    Increase in creatinine > 0.3 mg/dl within 48 hours or less; or    Increase in creatinine level to > 1.5x baseline (historical or measure), which is known or presumed to have occurred within the prior 7 days; or    Urine volume <0.5 ml/kg/h for 6 hours.  Reference: www. KIDGO.org    By submitting this query, we are merely seeking further clarification of documentation to accurately reflect all conditions that you are monitoring, evaluating, treating or that extend the hospitalization or utilize additional resources of care. Please utilize your independent clinical judgment when addressing the question(s) above.     This query and your response, once completed, will be entered into the legal medical record.    Sincerely,  Rosa CAMPA,  RN  Clinical Documentation Integrity Program   [email or web address]

## 2024-04-09 NOTE — PLAN OF CARE
Goal Outcome Evaluation:  Plan of Care Reviewed With: spouse, daughter   Patient remains in ICU, drowsy throughout the shift, less agitation, restless at time; following commands, but forgetful; NIH 16, still complete flaccied on the Left arm, Left toes would wiggle but no other movement. Tried to picking lines and tubes, used right pinky finger to pick his right nares and made the nose bleed a little, 2x2 placed to hold the pressure and it stopped bleed. Cont. CBI, irriated PRN, dont' see clots for the shift, did have pinkish uirne or clear yellow urine. Saw stopped breathing when sleeping, but his sat never dropped below 90%. VVS. SB/p maintain below 160 Cont TF at goal rate, afebrile, had 1x BM today, bath done.     Progress: improving

## 2024-04-09 NOTE — PROGRESS NOTES
[facility] INPATIENT PROGRESS NOTE         [facility] INTENSIVE CARE    2024      PATIENT IDENTIFICATION:  Name: Alex Negron ADMIT: 2024   : 1945  PCP: Jyothi Song PA-C    MRN: [id number] LOS: 8 days   AGE/SEX: 79 y.o. male  ROOM: [facility]                     LOS 8    Reason for visit: Acute stroke      SUBJECTIVE:      Looking better.  Oxygen requirements improving.  Restraints for safety of lines.  Still confused some.      Objective   OBJECTIVE:    Vital Sign Min/Max for last 24 hours  Temp  Min: 97.6 °F (36.4 °C)  Max: 98.7 °F (37.1 °C)   BP  Min: 129/91  Max: 161/104   Pulse  Min: 105  Max: 129   Resp  Min: 18  Max: 28   SpO2  Min: 91 %  Max: 100 %   No data recorded   Weight  Min: 80.3 kg (177 lb 0.5 oz)  Max: 80.3 kg (177 lb 0.5 oz)    Vitals:    24 0723 24 0800 24 0806 24 0815   BP:  158/96 158/96    BP Location:  Left arm     Patient Position:  Lying     Pulse:  110 107 109   Resp:  21     Temp: 98.7 °F (37.1 °C)      TempSrc: Axillary      SpO2:  91%  93%   Weight:       Height:                24  0418 24  0400 24  0400   Weight: 86.3 kg (190 lb 4.1 oz) 81.3 kg (179 lb 3.7 oz) 80.3 kg (177 lb 0.5 oz)       Body mass index is 24.69 kg/m².                    FiO2 (%): 29 %     Body mass index is 24.69 kg/m².    Intake/Output Summary (Last 24 hours) at 2024 0832  Last data filed at 2024 0600  Gross per 24 hour   Intake 2249 ml   Output 1100 ml   Net 1149 ml         Exam:  GEN:  No distress, appears stated age  EYES:   PERRL, anicteric sclerae  ENT:    External ears/nose normal, OP clear.   NECK:  No adenopathy, midline trachea  LUNGS: Normal chest on inspection, palpation and diminished breath sounds bilaterally auscultation  CV:  Normal S1S2, without murmur  ABD:  Nontender, nondistended, no hepatosplenomegaly, +BS  EXT:  No edema.  No cyanosis or clubbing.  No mottling and normal cap refill.    Assessment     Scheduled meds:   amLODIPine, 5 mg, Per PEG Tube, Q24H  aspirin, 81 mg, Nasogastric, Daily  atorvastatin, 80 mg, Nasogastric, Nightly  cefTRIAXone, 2,000 mg, Intravenous, Q24H  chlorhexidine, 15 mL, Mouth/Throat, Q12H  clopidogrel, 75 mg, Nasogastric, Daily  furosemide, 40 mg, Intravenous, Daily  ipratropium-albuterol, 3 mL, Nebulization, Q4H - RT  pantoprazole, 40 mg, Intravenous, Daily  senna-docusate sodium, 2 tablet, Nasogastric, BID  sodium chloride, 500 mL, Intravenous, Once  sodium chloride, 10 mL, Intravenous, Q12H      IV meds:                      clevidipine, 2-32 mg/hr, Last Rate: Stopped (04/07/24 0145)      Data Review:  Results from last 7 days   Lab Units 04/09/24  0415 04/08/24  1431 04/08/24  0246 04/07/24  0413 04/05/24  1451 04/05/24  0317 04/04/24  0402   SODIUM mmol/L 146*  --  147* 150*  --  142 141   POTASSIUM mmol/L 4.1 4.3 3.5 3.4* 3.1* 3.4* 4.1   CHLORIDE mmol/L 111*  --  108* 114*  --  108* 112*   CO2 mmol/L 21.9*  --  22.2 23.0  --  24.0 19.8*   BUN mg/dL 37*  --  28* 27*  --  38* 26*   CREATININE mg/dL 1.12  --  1.06 1.08  --  1.95* 1.42*   GLUCOSE mg/dL 175*  --  139* 144*  --  187* 126*   CALCIUM mg/dL 8.9  --  8.6 7.8*  --  7.3* 7.5*         Estimated Creatinine Clearance: 62.7 mL/min (by C-G formula based on SCr of 1.12 mg/dL).  Results from last 7 days   Lab Units 04/09/24  0415 04/08/24  0246 04/07/24  0413 04/06/24  0515 04/05/24  0317   WBC 10*3/mm3 14.83* 14.93* 14.37* 14.00* 11.54*   HEMOGLOBIN g/dL 11.5* 10.6* 8.3* 8.7* 8.9*   PLATELETS 10*3/mm3 646* 492* 407 343 270           Results from last 7 days   Lab Units 04/03/24  1612   ALT (SGPT) U/L 15   AST (SGOT) U/L 30     Results from last 7 days   Lab Units 04/05/24  2343 04/05/24  0844 04/05/24  0339 04/03/24  1532   PH, ARTERIAL pH units 7.482* 7.436 7.356 7.433   PO2 ART mm Hg 50.6* 62.7* 104.8* 287.0*   PCO2, ARTERIAL mm Hg 32.4* 37.5 37.5 38.8   HCO3 ART mmol/L 24.3 25.2 21.0* 25.9     Results from last 7 days   Lab Units 04/03/24  1612    PROCALCITONIN ng/mL 1.06*   LACTATE mmol/L 1.8         Glucose   Date/Time Value Ref Range Status   04/09/2024 0507 182 (H) 70 - 130 mg/dL Final   04/08/2024 2308 166 (H) 70 - 130 mg/dL Final   04/08/2024 1810 156 (H) 70 - 130 mg/dL Final   04/08/2024 1158 153 (H) 70 - 130 mg/dL Final   04/08/2024 0617 135 (H) 70 - 130 mg/dL Final   04/08/2024 0016 133 (H) 70 - 130 mg/dL Final   04/07/2024 1733 130 70 - 130 mg/dL Final         Imaging reviewed  Chest x-ray 4/6 reviewed shows mild cardiac enlargement.  Vascular congestion is seen and worse.  Left pleural effusion noted.  Bilateral pulmonary infiltrates.  Stable compared to previous.            2D echo reviewed: EF 60%.  Normal diastolic function.  RVSP less than 35.        MRI brain 4/2 reviewed          Microbiology reviewed  Klebsiella from respiratory culture 4/4  MRSA screen negative.  Blood cultures no growth to date            Active Hospital Problems    Diagnosis  POA    **Acute CVA (cerebrovascular accident) [I63.9]  Yes    Right-sided extracranial carotid artery stenosis [I65.21]  Yes    Coffee ground emesis [K92.0]  Unknown    Gastroesophageal reflux disease [K21.9]  Unknown      Resolved Hospital Problems   No resolved problems to display.         ASSESSMENT:  Acute CVA with right carotid occlusion: Status post tPA and right carotid stent  Left-sided hemiparesis  Bilateral infiltrates/pneumonia: Klebsiella in culture  Sepsis with shock  Acute renal insufficiency  Elevated blood pressure   Acute blood loss anemia  coffee-ground emesis  Left lower lobe pneumonia versus atelectasis  Pulmonary edema with volume overload  Gross hematuria            PLAN:  Weaning oxygen with improved diuresis.  Suspect his significant blood pressure problems has been the cause of his difficulty with pulmonary edema.  With improved renal function resumed diuretic with good response.    Continue antibiotic and encourage pulmonary toilet.     Keep head of the bed elevated for  aspiration risk.  Continue neurochecks per protocol.  Goal blood pressure systolic less than 160.    Aspirin and statin for secondary stroke prevention.    Three-way Murillo placed by urology with continuous drainage for hematuria.  Continue PPI.  Control glucose.  Correct electrolytes as needed.    Mechanical DVT prophylaxis.  Encourage work with therapy.    Discussed with multidisciplinary ICU team on rounds this morning.          Martin Garnett MD  Pulmonary and Critical Care Medicine  [facility], [facility]  4/9/2024    08:32 EDT

## 2024-04-09 NOTE — THERAPY TREATMENT NOTE
Patient Name: Alex Negron  : 1945    MRN: [id number]                              Today's Date: 2024       Admit Date: 2024    Visit Dx:     ICD-10-CM ICD-9-CM   1. Cerebrovascular accident (CVA), unspecified mechanism  I63.9 434.91   2. Coffee ground emesis  K92.0 578.0   3. Gastroesophageal reflux disease, unspecified whether esophagitis present  K21.9 530.81   4. 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 No pertinent past medical history.  Past Surgical History:   Procedure Laterality Date    ENDOSCOPY N/A 4/3/2024    Procedure: ESOPHAGOGASTRODUODENOSCOPY AT BEDSIDE;  Surgeon: Redd Guidry MD;  Location: [facility] ENDOSCOPY;  Service: Gastroenterology;  Laterality: N/A;  PRE-  GI BLEED  POST- HIATAL HERNIA, DISTAL ESOPHAGITIS ULCERS, GASTRITIS, PYLOROPLASTY      General Information       Row Name 24 1034          Physical Therapy Time and Intention    Document Type therapy note (daily note)  -PC     Mode of Treatment co-treatment;occupational therapy;physical therapy;other (see comments)  -PC       Row Name 24 1034          General Information    Existing Precautions/Restrictions fall  -PC       Row Name 24 1034          Safety Issues, Functional Mobility    Comment, Safety Issues/Impairments (Mobility) Co treatment medically appropriate and necessary due to patient acuity level, activity tolerance and safety of patient and staff. Treatment is focusing on progression of care and goals established in the POC.  -PC               User Key  (r) = Recorded By, (t) = Taken By, (c) = Cosigned By      Initials Name Provider Type    PC Sidra Arce PT Physical Therapist                   Mobility       Row  Name 04/09/24 1034          Bed Mobility    Supine-Sit [place] (Bed Mobility) maximum assist (25% patient effort);2 person assist  -PC     Sit-Supine [place] (Bed Mobility) maximum assist (25% patient effort);2 person assist  -PC       Row Name 04/09/24 1034          Sit-Stand Transfer    Sit-Stand [place] (Transfers) maximum assist (25% patient effort);2 person assist  -PC     Comment, (Sit-Stand Transfer) sit to stand with max a x 2, not able to come to full stand, but able to clear buttocks and put some weight through LEs  -PC               User Key  (r) = Recorded By, (t) = Taken By, (c) = Cosigned By      Initials Name Provider Type    PC Sidra Arce, PT Physical Therapist                   Obj/Interventions       Row Name 04/09/24 1035          Motor Skills    Therapeutic Exercise --  PROM LLE, passive stretch L heel cord and hamstring  -PC       Row Name 04/09/24 1035          Balance    Balance Interventions sitting;sit to stand;highly challenging;weight shifting activity;core stability exercise;trunk training exercise  -PC     Comment, Balance worked for 10 minutes in sitting edge of bed on sitting balance, midline, trunk control, pt able to maintain sitting balance for one minute with R UE extended out to side supporting pt, worked on tending to L, turning to left, looking to left  -PC       Row Name 04/09/24 1035          Neuromuscular Re-education    Interventions (Neuromuscular Re-education) facilitation/inhibition;joint approximation;weight shifting;weight bearing;quick stretch  -PC               User Key  (r) = Recorded By, (t) = Taken By, (c) = Cosigned By      Initials Name Provider Type    PC Sidra Arce, PT Physical Therapist                   Goals/Plan    No documentation.                  Clinical Impression       Row Name 04/09/24 1040          Pain    Pre/Posttreatment Pain Comment through , reports back pain  -PC     Pain Intervention(s) Repositioned  -PC        Row Name 04/09/24 1040          Plan of Care Review    Plan of Care Reviewed With patient;spouse  -PC     Progress improving  -PC     Outcome Evaluation Pt lethargic this morning, able to sit edge of bed with max a x 2, pt became more alert once sitting, he had L lean, worked today on facilitating movement L side, tending to L side, sitting balance with inhibiting R pushing, pt demonstrated some tone L LE today and was able to attempt standing, with max a x 2 he was able to clear buttocks but not come to a full stand. PT will cont to follow  -PC       Row Name 04/09/24 1040          Positioning and Restraints    Pre-Treatment Position in bed  -PC     Post Treatment Position bed  -PC     In Bed fowlers;call light within reach;encouraged to call for assist;exit alarm on;with family/caregiver  -PC               User Key  (r) = Recorded By, (t) = Taken By, (c) = Cosigned By      Initials Name Provider Type    PC Sidra Arce, PT Physical Therapist                   Outcome Measures       Row Name 04/09/24 1044 04/09/24 0400       How much help from another person do you currently need...    Turning from your back to your side while in flat bed without using bedrails? 2  -PC 2  -KM    Moving from lying on back to sitting on the side of a flat bed without bedrails? 1  -PC 1  -KM    Moving to and from a bed to a chair (including a wheelchair)? 1  -PC 1  -KM    Standing up from a chair using your arms (e.g., wheelchair, bedside chair)? 1  -PC 1  -KM    Climbing 3-5 steps with a railing? 1  -PC 1  -KM    To walk in hospital room? 1  -PC 1  -KM    AM-PAC 6 Clicks Score (PT) 7  -PC 7  -KM    Highest Level of Mobility Goal 2 --> Bed activities/dependent transfer  -PC 2 --> Bed activities/dependent transfer  -KM      Row Name 04/09/24 0000          How much help from another person do you currently need...    Turning from your back to your side while in flat bed without using bedrails? 2  -KM     Moving from lying on  back to sitting on the side of a flat bed without bedrails? 1  -KM     Moving to and from a bed to a chair (including a wheelchair)? 1  -KM     Standing up from a chair using your arms (e.g., wheelchair, bedside chair)? 1  -KM     Climbing 3-5 steps with a railing? 1  -KM     To walk in hospital room? 1  -KM     AM-PAC 6 Clicks Score (PT) 7  -KM     Highest Level of Mobility Goal 2 --> Bed activities/dependent transfer  -KM               User Key  (r) = Recorded By, (t) = Taken By, (c) = Cosigned By      Initials Name Provider Type    PC Sidra Arce, PT Physical Therapist    KM Amrita Shah RN Registered Nurse                                 Physical Therapy Education       Title: PT OT SLP Therapies (In Progress)       Topic: Physical Therapy (In Progress)       Point: Mobility training (In Progress)       Learning Progress Summary             Patient Acceptance, E,D, NR by PC at 4/9/2024 1045    Acceptance, E,D, NL by PC at 4/8/2024 1149    Acceptance, E, NR by EM at 4/5/2024 1622    Nonacceptance, E, NL by EN at 4/4/2024 0014    Acceptance, E,D, NR by PC at 4/3/2024 1030   Family Acceptance, E,D, NR by PC at 4/3/2024 1030                         Point: Home exercise program (In Progress)       Learning Progress Summary             Patient Acceptance, E,D, NR by PC at 4/9/2024 1045    Acceptance, E,D, NL by PC at 4/8/2024 1149    Acceptance, E,D, NR,NL by LW at 4/6/2024 1138    Nonacceptance, E, NL by EN at 4/4/2024 0014    Acceptance, E,D, NR by PC at 4/3/2024 1030   Family Acceptance, E,D, NR by PC at 4/3/2024 1030                         Point: Body mechanics (In Progress)       Learning Progress Summary             Patient Acceptance, E,D, NR by PC at 4/9/2024 1045    Acceptance, E,D, NL by PC at 4/8/2024 1149    Acceptance, E,D, NR,NL by LW at 4/6/2024 1138    Nonacceptance, E, NL by EN at 4/4/2024 0014    Acceptance, E,D, NR by PC at 4/3/2024 1030   Family Acceptance, E,D, NR by PC at 4/3/2024 1030                          Point: Precautions (In Progress)       Learning Progress Summary             Patient Acceptance, E,D, NR by PC at 4/9/2024 1045    Acceptance, E,D, NL by PC at 4/8/2024 1149    Acceptance, E,D, NR,NL by LW at 4/6/2024 1138    Nonacceptance, E, NL by EN at 4/4/2024 0014    Acceptance, E,D, NR by PC at 4/3/2024 1030   Family Acceptance, E,D, NR by PC at 4/3/2024 1030                                         User Key       Initials Effective Dates Name Provider Type Discipline    PC 06/16/21 -  Sidra Arce, PT Physical Therapist PT    EM 06/16/21 -  Lesia Judd PT Physical Therapist PT    EN 08/01/22 -  Monique Mary, RN Registered Nurse Nurse    LW 05/08/23 -  Isa Pierce, PT Physical Therapist PT                  PT Recommendation and Plan  Planned Therapy Interventions (PT): balance training, bed mobility training, gait training, transfer training, strengthening, neuromuscular re-education, stretching, patient/family education  Plan of Care Reviewed With: patient, spouse  Progress: improving  Outcome Evaluation: Pt lethargic this morning, able to sit edge of bed with max a x 2, pt became more alert once sitting, he had L lean, worked today on facilitating movement L side, tending to L side, sitting balance with inhibiting R pushing, pt demonstrated some tone L LE today and was able to attempt standing, with max a x 2 he was able to clear buttocks but not come to a full stand. PT will cont to follow     Time Calculation:         PT Charges       Row Name 04/09/24 1045             Time Calculation    Start Time 0934  -PC      Stop Time 0958  -PC      Time Calculation (min) 24 min  -PC      PT Received On 04/09/24  -PC      PT - Next Appointment 04/10/24  -PC                User Key  (r) = Recorded By, (t) = Taken By, (c) = Cosigned By      Initials Name Provider Type    PC Sidra Arce, PT Physical Therapist                  Therapy Charges for Today       Code Description  Service Date Service Provider Modifiers Qty    62531936207  PT THER PROC EA 15 MIN 4/8/2024 Sidra Arce, PT GP 2    98943560277 HC PT THER PROC EA 15 MIN 4/9/2024 Sidra Arce, PT GP 2            PT G-Codes  Outcome Measure Options: AM-PAC 6 Clicks Daily Activity (OT)  AM-PAC 6 Clicks Score (PT): 7  AM-PAC 6 Clicks Score (OT): 9  Modified Lilian Scale: 4 - Moderately severe disability.  Unable to walk without assistance, and unable to attend to own bodily needs without assistance.  PT Discharge Summary  Anticipated Discharge Disposition (PT): inpatient rehabilitation facility    Sidra Arce, SANTOSH  4/9/2024

## 2024-04-09 NOTE — PLAN OF CARE
Goal Outcome Evaluation:  Plan of Care Reviewed With: patient, spouse        Progress: improving  Outcome Evaluation: Pt lethargic this morning, able to sit edge of bed with max a x 2, pt became more alert once sitting, he had L lean, worked today on facilitating movement L side, tending to L side, sitting balance with inhibiting R pushing, pt demonstrated some tone L LE today and was able to attempt standing, with max a x 2 he was able to clear buttocks but not come to a full stand. PT will cont to follow      Anticipated Discharge Disposition (PT): inpatient rehabilitation facility

## 2024-04-10 ENCOUNTER — APPOINTMENT (OUTPATIENT)
Dept: GENERAL RADIOLOGY | Facility: HOSPITAL | Age: 79
End: 2024-04-10
Payer: MEDICAID

## 2024-04-10 LAB
ANION GAP SERPL CALCULATED.3IONS-SCNC: 13.4 MMOL/L (ref 5–15)
BUN SERPL-MCNC: 43 MG/DL (ref 8–23)
BUN/CREAT SERPL: 45.3 (ref 7–25)
CALCIUM SPEC-SCNC: 8.8 MG/DL (ref 8.6–10.5)
CHLORIDE SERPL-SCNC: 112 MMOL/L (ref 98–107)
CO2 SERPL-SCNC: 23.6 MMOL/L (ref 22–29)
CREAT SERPL-MCNC: 0.95 MG/DL (ref 0.76–1.27)
DEPRECATED RDW RBC AUTO: 43 FL (ref 37–54)
EGFRCR SERPLBLD CKD-EPI 2021: 82.4 ML/MIN/1.73
ERYTHROCYTE [DISTWIDTH] IN BLOOD BY AUTOMATED COUNT: 13.1 % (ref 12.3–15.4)
GLUCOSE BLDC GLUCOMTR-MCNC: 171 MG/DL (ref 70–130)
GLUCOSE BLDC GLUCOMTR-MCNC: 177 MG/DL (ref 70–130)
GLUCOSE BLDC GLUCOMTR-MCNC: 191 MG/DL (ref 70–130)
GLUCOSE SERPL-MCNC: 152 MG/DL (ref 65–99)
HCT VFR BLD AUTO: 33.3 % (ref 37.5–51)
HGB BLD-MCNC: 10.8 G/DL (ref 13–17.7)
MAGNESIUM SERPL-MCNC: 2.9 MG/DL (ref 1.6–2.4)
MCH RBC QN AUTO: 29 PG (ref 26.6–33)
MCHC RBC AUTO-ENTMCNC: 32.4 G/DL (ref 31.5–35.7)
MCV RBC AUTO: 89.3 FL (ref 79–97)
PLATELET # BLD AUTO: 714 10*3/MM3 (ref 140–450)
PMV BLD AUTO: 9.4 FL (ref 6–12)
POTASSIUM SERPL-SCNC: 3.8 MMOL/L (ref 3.5–5.2)
RBC # BLD AUTO: 3.73 10*6/MM3 (ref 4.14–5.8)
SODIUM SERPL-SCNC: 149 MMOL/L (ref 136–145)
WBC NRBC COR # BLD AUTO: 15.86 10*3/MM3 (ref 3.4–10.8)

## 2024-04-10 PROCEDURE — 80048 BASIC METABOLIC PNL TOTAL CA: CPT

## 2024-04-10 PROCEDURE — 83735 ASSAY OF MAGNESIUM: CPT

## 2024-04-10 PROCEDURE — 85027 COMPLETE CBC AUTOMATED: CPT

## 2024-04-10 PROCEDURE — 25010000002 HYDRALAZINE PER 20 MG: Performed by: INTERNAL MEDICINE

## 2024-04-10 PROCEDURE — 63710000001 INSULIN REGULAR HUMAN PER 5 UNITS: Performed by: INTERNAL MEDICINE

## 2024-04-10 PROCEDURE — 97530 THERAPEUTIC ACTIVITIES: CPT

## 2024-04-10 PROCEDURE — 92526 ORAL FUNCTION THERAPY: CPT | Performed by: SPEECH-LANGUAGE PATHOLOGIST

## 2024-04-10 PROCEDURE — 94799 UNLISTED PULMONARY SVC/PX: CPT

## 2024-04-10 PROCEDURE — 97112 NEUROMUSCULAR REEDUCATION: CPT

## 2024-04-10 PROCEDURE — 25010000002 CEFTRIAXONE PER 250 MG: Performed by: INTERNAL MEDICINE

## 2024-04-10 PROCEDURE — 71045 X-RAY EXAM CHEST 1 VIEW: CPT

## 2024-04-10 PROCEDURE — 97110 THERAPEUTIC EXERCISES: CPT

## 2024-04-10 PROCEDURE — 25010000002 FUROSEMIDE PER 20 MG: Performed by: INTERNAL MEDICINE

## 2024-04-10 PROCEDURE — 82948 REAGENT STRIP/BLOOD GLUCOSE: CPT

## 2024-04-10 PROCEDURE — 94761 N-INVAS EAR/PLS OXIMETRY MLT: CPT

## 2024-04-10 RX ADMIN — METOPROLOL TARTRATE 25 MG: 25 TABLET, FILM COATED ORAL at 13:35

## 2024-04-10 RX ADMIN — ATORVASTATIN CALCIUM 80 MG: 80 TABLET, FILM COATED ORAL at 21:03

## 2024-04-10 RX ADMIN — HYDRALAZINE HYDROCHLORIDE 10 MG: 20 INJECTION, SOLUTION INTRAMUSCULAR; INTRAVENOUS at 11:09

## 2024-04-10 RX ADMIN — IPRATROPIUM BROMIDE AND ALBUTEROL SULFATE 3 ML: .5; 3 SOLUTION RESPIRATORY (INHALATION) at 23:14

## 2024-04-10 RX ADMIN — ASPIRIN 81 MG: 81 TABLET, CHEWABLE ORAL at 09:11

## 2024-04-10 RX ADMIN — METOPROLOL TARTRATE 25 MG: 25 TABLET, FILM COATED ORAL at 21:03

## 2024-04-10 RX ADMIN — Medication 10 ML: at 21:04

## 2024-04-10 RX ADMIN — AMLODIPINE BESYLATE 10 MG: 10 TABLET ORAL at 09:11

## 2024-04-10 RX ADMIN — INSULIN HUMAN 2 UNITS: 100 INJECTION, SOLUTION PARENTERAL at 00:17

## 2024-04-10 RX ADMIN — Medication 10 ML: at 09:12

## 2024-04-10 RX ADMIN — FUROSEMIDE 40 MG: 10 INJECTION, SOLUTION INTRAMUSCULAR; INTRAVENOUS at 09:11

## 2024-04-10 RX ADMIN — DOCUSATE SODIUM 50MG AND SENNOSIDES 8.6MG 2 TABLET: 8.6; 5 TABLET, FILM COATED ORAL at 21:03

## 2024-04-10 RX ADMIN — CLOPIDOGREL BISULFATE 75 MG: 75 TABLET, FILM COATED ORAL at 09:11

## 2024-04-10 RX ADMIN — INSULIN HUMAN 2 UNITS: 100 INJECTION, SOLUTION PARENTERAL at 05:43

## 2024-04-10 RX ADMIN — CEFTRIAXONE 2000 MG: 2 INJECTION, POWDER, FOR SOLUTION INTRAMUSCULAR; INTRAVENOUS at 13:35

## 2024-04-10 RX ADMIN — IPRATROPIUM BROMIDE AND ALBUTEROL SULFATE 3 ML: .5; 3 SOLUTION RESPIRATORY (INHALATION) at 19:26

## 2024-04-10 NOTE — PLAN OF CARE
Goal Outcome Evaluation:  Plan of Care Reviewed With: patient, daughter           Outcome Evaluation: Pt seen this am for re-evlauation of swallow.  Pt demonstrated baseline wet throat clear.  Presented with honey thick and nectar thick liquids by cup/spoon.  Pt demonstrated audible multiple swallows.  Resulting throat clearing immediately following swallow.  Pt demonstrated increasing pain during eval reporting burning at insertion of catheter and cramping.  Pt pushing up with R arm against bed.  Deferred remainder of swallow eval.  Given s/s with thick liquids and possible difficulty with secretion management, recommend continue NPO w/ meds/nutrition via alternative feeding.  May continue with ice chips after oral care when alert and upright.  Will continue to re-eval swallow as appropriate.

## 2024-04-10 NOTE — PROGRESS NOTES
[facility] INPATIENT PROGRESS NOTE         [facility] INTENSIVE CARE    4/10/2024      PATIENT IDENTIFICATION:  Name: Alex Negron ADMIT: 2024   : 1945  PCP: Jyothi Song PA-C    MRN: [id number] LOS: 9 days   AGE/SEX: 79 y.o. male  ROOM: [facility]                     LOS 9    Reason for visit: Acute stroke      SUBJECTIVE:      Has made good progress over the last couple days.  On room air.  Discussed with family at bedside.  Tolerating tube feeds.  Still with left-sided paralysis but making some progress with therapy.  Can transfer out of intensive care.      Objective   OBJECTIVE:    Vital Sign Min/Max for last 24 hours  Temp  Min: 97.3 °F (36.3 °C)  Max: 98.9 °F (37.2 °C)   BP  Min: 107/90  Max: 157/89   Pulse  Min: 124  Max: 146   Resp  Min: 19  Max: 26   SpO2  Min: 91 %  Max: 96 %   No data recorded   Weight  Min: 79.7 kg (175 lb 11.3 oz)  Max: 79.7 kg (175 lb 11.3 oz)    Vitals:    04/10/24 0500 04/10/24 0600 04/10/24 0700 04/10/24 0805   BP: 121/96 107/90 139/98    BP Location:       Patient Position:       Pulse: (!) 125 (!) 124 (!) 129    Resp:     Temp:    98.3 °F (36.8 °C)   TempSrc:    Axillary   SpO2: 93% 93% 91%    Weight:       Height:                24  0400 24  0400 04/10/24  0400   Weight: 81.3 kg (179 lb 3.7 oz) 80.3 kg (177 lb 0.5 oz) 79.7 kg (175 lb 11.3 oz)       Body mass index is 24.51 kg/m².                    FiO2 (%): 29 %     Body mass index is 24.51 kg/m².    Intake/Output Summary (Last 24 hours) at 4/10/2024 0904  Last data filed at 4/10/2024 0600  Gross per 24 hour   Intake 2230.93 ml   Output 3375 ml   Net -1144.07 ml         Exam:  GEN:  No distress, appears stated age  EYES:   PERRL, anicteric sclerae  ENT:    External ears/nose normal, OP clear.   NECK:  No adenopathy, midline trachea  LUNGS: Normal chest on inspection, palpation and diminished breath sounds bilaterally auscultation  CV:  Normal S1S2, without murmur  ABD:  Nontender,  nondistended, no hepatosplenomegaly, +BS  EXT:  No edema.  No cyanosis or clubbing.  No mottling and normal cap refill.    Assessment     Scheduled meds:  amLODIPine, 10 mg, Per PEG Tube, Q24H  aspirin, 81 mg, Nasogastric, Daily  atorvastatin, 80 mg, Nasogastric, Nightly  cefTRIAXone, 2,000 mg, Intravenous, Q24H  clopidogrel, 75 mg, Nasogastric, Daily  furosemide, 40 mg, Intravenous, Daily  insulin regular, 2-7 Units, Subcutaneous, Q6H  ipratropium-albuterol, 3 mL, Nebulization, Q4H - RT  pantoprazole, 40 mg, Intravenous, Daily  senna-docusate sodium, 2 tablet, Nasogastric, BID  sodium chloride, 500 mL, Intravenous, Once  sodium chloride, 10 mL, Intravenous, Q12H      IV meds:                      clevidipine, 2-32 mg/hr, Last Rate: Stopped (04/07/24 0145)      Data Review:  Results from last 7 days   Lab Units 04/10/24  0704 04/09/24 0415 04/08/24  1431 04/08/24  0246 04/07/24  0413 04/05/24  1451 04/05/24  0317   SODIUM mmol/L 149* 146*  --  147* 150*  --  142   POTASSIUM mmol/L 3.8 4.1 4.3 3.5 3.4*   < > 3.4*   CHLORIDE mmol/L 112* 111*  --  108* 114*  --  108*   CO2 mmol/L 23.6 21.9*  --  22.2 23.0  --  24.0   BUN mg/dL 43* 37*  --  28* 27*  --  38*   CREATININE mg/dL 0.95 1.12  --  1.06 1.08  --  1.95*   GLUCOSE mg/dL 152* 175*  --  139* 144*  --  187*   CALCIUM mg/dL 8.8 8.9  --  8.6 7.8*  --  7.3*    < > = values in this interval not displayed.         Estimated Creatinine Clearance: 73.4 mL/min (by C-G formula based on SCr of 0.95 mg/dL).  Results from last 7 days   Lab Units 04/10/24  0704 04/09/24 0415 04/08/24  0246 04/07/24  0413 04/06/24  0515   WBC 10*3/mm3 15.86* 14.83* 14.93* 14.37* 14.00*   HEMOGLOBIN g/dL 10.8* 11.5* 10.6* 8.3* 8.7*   PLATELETS 10*3/mm3 714* 646* 492* 407 343     Results from last 7 days   Lab Units 04/06/24  1122   INR  1.0       Results from last 7 days   Lab Units 04/03/24  1612   ALT (SGPT) U/L 15   AST (SGOT) U/L 30     Results from last 7 days   Lab Units 04/05/24  7300  04/05/24  0844 04/05/24  0339 04/03/24  1532   PH, ARTERIAL pH units 7.482* 7.436 7.356 7.433   PO2 ART mm Hg 50.6* 62.7* 104.8* 287.0*   PCO2, ARTERIAL mm Hg 32.4* 37.5 37.5 38.8   HCO3 ART mmol/L 24.3 25.2 21.0* 25.9     Results from last 7 days   Lab Units 04/03/24  1612   PROCALCITONIN ng/mL 1.06*   LACTATE mmol/L 1.8         Glucose   Date/Time Value Ref Range Status   04/10/2024 0534 171 (H) 70 - 130 mg/dL Final   04/09/2024 2344 168 (H) 70 - 130 mg/dL Final   04/09/2024 1737 165 (H) 70 - 130 mg/dL Final   04/09/2024 1147 190 (H) 70 - 130 mg/dL Final   04/09/2024 0507 182 (H) 70 - 130 mg/dL Final   04/08/2024 2308 166 (H) 70 - 130 mg/dL Final   04/08/2024 1810 156 (H) 70 - 130 mg/dL Final         Imaging reviewed  Chest x-ray 4/6 reviewed shows mild cardiac enlargement.  Vascular congestion is seen and worse.  Left pleural effusion noted.  Bilateral pulmonary infiltrates.  Stable compared to previous.            2D echo reviewed: EF 60%.  Normal diastolic function.  RVSP less than 35.        MRI brain 4/2 reviewed          Microbiology reviewed  Klebsiella from respiratory culture 4/4  MRSA screen negative.  Blood cultures no growth to date            Active Hospital Problems    Diagnosis  POA    **Acute CVA (cerebrovascular accident) [I63.9]  Yes    Right-sided extracranial carotid artery stenosis [I65.21]  Yes    Coffee ground emesis [K92.0]  Unknown    Gastroesophageal reflux disease [K21.9]  Unknown      Resolved Hospital Problems   No resolved problems to display.         ASSESSMENT:  Acute CVA with right carotid occlusion: Status post tPA and right carotid stent  Left-sided hemiparesis  Bilateral infiltrates/pneumonia: Klebsiella in culture  Sepsis with shock  Acute renal insufficiency  Elevated blood pressure   Acute blood loss anemia  coffee-ground emesis  Left lower lobe pneumonia versus atelectasis  Pulmonary edema with volume overload  Gross hematuria            PLAN:  Weaning oxygen with improved  diuresis.  Now on room air.  Repeat chest x-ray for follow-up.  Suspect his significant blood pressure problems has been the cause of his difficulty with pulmonary edema.  Improved with diuretics.  Increasing hypernatremia and will hold diuretics today.  Continue antibiotic and encourage pulmonary toilet.     Keep head of the bed elevated for aspiration risk.  Continue neurochecks per protocol.  Goal blood pressure systolic less than 160.    Aspirin and statin for secondary stroke prevention.    Three-way Murillo placed by urology with continuous drainage for hematuria.  Continue PPI.  Control glucose.  Correct electrolytes as needed.    Mechanical DVT prophylaxis.  Encourage work with therapy.  Discussed with patient's daughter at bedside.    Discussed with multidisciplinary ICU team on rounds this morning.    Transfer out of intensive care.      Martin Garnett MD  Pulmonary and Critical Care Medicine  [facility], [facility]  4/10/2024    09:04 EDT

## 2024-04-10 NOTE — THERAPY EVALUATION
Patient Name: Alex Negron  : 1945    MRN: [id number]                              Today's Date: 4/10/2024       Admit Date: 2024    Visit Dx:     ICD-10-CM ICD-9-CM   1. Cerebrovascular accident (CVA), unspecified mechanism  I63.9 434.91   2. Coffee ground emesis  K92.0 578.0   3. Gastroesophageal reflux disease, unspecified whether esophagitis present  K21.9 530.81   4. 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 No pertinent past medical history.  Past Surgical History:   Procedure Laterality Date    ENDOSCOPY N/A 4/3/2024    Procedure: ESOPHAGOGASTRODUODENOSCOPY AT BEDSIDE;  Surgeon: Redd Guidry MD;  Location: [facility] ENDOSCOPY;  Service: Gastroenterology;  Laterality: N/A;  PRE-  GI BLEED  POST- HIATAL HERNIA, DISTAL ESOPHAGITIS ULCERS, GASTRITIS, PYLOROPLASTY      General Information       Row Name 04/10/24 1140          Physical Therapy Time and Intention    Document Type therapy note (daily note)  -PC     Mode of Treatment co-treatment;occupational therapy;physical therapy;other (see comments)  -PC       Row Name 04/10/24 1140          General Information    Existing Precautions/Restrictions fall  -PC       Row Name 04/10/24 1140          Safety Issues, Functional Mobility    Comment, Safety Issues/Impairments (Mobility) Co treatment medically appropriate and necessary due to patient acuity level, activity tolerance and safety of patient and staff. Treatment is focusing on progression of care and goals established in the POC.  -PC               User Key  (r) = Recorded By, (t) = Taken By, (c) = Cosigned By      Initials Name Provider Type    PC Sidra Arce PT Physical Therapist                   Mobility       Row  Name 04/10/24 1140          Bed Mobility    Supine-Sit [place] (Bed Mobility) maximum assist (25% patient effort);2 person assist  -PC     Sit-Supine [place] (Bed Mobility) maximum assist (25% patient effort);2 person assist  -PC     Assistive Device (Bed Mobility) draw sheet;head of bed elevated  -PC       Row Name 04/10/24 1140          Sit-Stand Transfer    Sit-Stand [place] (Transfers) moderate assist (50% patient effort);maximum assist (25% patient effort);2 person assist  -PC     Comment, (Sit-Stand Transfer) HHA x 2, pt able to stand twice, LLE blocked, pt able to tolerate some weight through LLE, performed some weight shifting in standing  -PC               User Key  (r) = Recorded By, (t) = Taken By, (c) = Cosigned By      Initials Name Provider Type    PC Sidra Arce, PT Physical Therapist                   Obj/Interventions       Row Name 04/10/24 1141          Motor Skills    Therapeutic Exercise --  PROM LLE working on facilitating active mm contraction  -PC       Row Name 04/10/24 1141          Balance    Static Sitting Balance moderate assist;minimal assist;contact guard  -PC     Dynamic Sitting Balance moderate assist;maximum assist  -PC     Position, Sitting Balance sitting edge of bed  -PC     Static Standing Balance moderate assist;maximum assist;2-person assist  -PC     Balance Interventions sitting;sit to stand;highly challenging;weight shifting activity;core stability exercise;trunk training exercise  -PC       Row Name 04/10/24 1141          Neuromuscular Re-education    Interventions (Neuromuscular Re-education) facilitation/inhibition;quick stretch;tapping;weight bearing;weight shifting  -PC               User Key  (r) = Recorded By, (t) = Taken By, (c) = Cosigned By      Initials Name Provider Type    PC Sidra Arce, PT Physical Therapist                   Goals/Plan    No documentation.                  Clinical Impression       Row Name 04/10/24 1143          Pain     Pretreatment Pain Rating 0/10 - no pain  -PC       Row Name 04/10/24 1143          Plan of Care Review    Plan of Care Reviewed With patient;daughter  -PC     Progress improving  -PC     Outcome Evaluation Pt awake and alert today, daughter present and assisting with translating. Pt able to tolerate sitting edge of bed and worked on balance, trunk control, L side attention, also worked on sit to stand with L knee blocked and weight shifting in standing, pt with good participation today, will cont to follow  -PC       Row Name 04/10/24 1143          Positioning and Restraints    Pre-Treatment Position in bed  -PC     Post Treatment Position bed  -PC     In Bed supine;call light within reach;encouraged to call for assist;exit alarm on  -PC     Restraints reapplied:;soft limb  -PC               User Key  (r) = Recorded By, (t) = Taken By, (c) = Cosigned By      Initials Name Provider Type    PC Sidra Arce, PT Physical Therapist                   Outcome Measures       Row Name 04/10/24 1145 04/10/24 0400       How much help from another person do you currently need...    Turning from your back to your side while in flat bed without using bedrails? 1  -PC 2  -KM    Moving from lying on back to sitting on the side of a flat bed without bedrails? 2  -PC 1  -KM    Moving to and from a bed to a chair (including a wheelchair)? 1  -PC 1  -KM    Standing up from a chair using your arms (e.g., wheelchair, bedside chair)? 2  -PC 1  -KM    Climbing 3-5 steps with a railing? 1  -PC 1  -KM    To walk in hospital room? 1  -PC 1  -KM    AM-PAC 6 Clicks Score (PT) 8  -PC 7  -KM    Highest Level of Mobility Goal 3 --> Sit at edge of bed  -PC 2 --> Bed activities/dependent transfer  -KM      Row Name 04/10/24 0000          How much help from another person do you currently need...    Turning from your back to your side while in flat bed without using bedrails? 2  -KM     Moving from lying on back to sitting on the side of a flat  bed without bedrails? 1  -KM     Moving to and from a bed to a chair (including a wheelchair)? 1  -KM     Standing up from a chair using your arms (e.g., wheelchair, bedside chair)? 1  -KM     Climbing 3-5 steps with a railing? 1  -KM     To walk in hospital room? 1  -KM     AM-PAC 6 Clicks Score (PT) 7  -KM     Highest Level of Mobility Goal 2 --> Bed activities/dependent transfer  -KM               User Key  (r) = Recorded By, (t) = Taken By, (c) = Cosigned By      Initials Name Provider Type    PC Sidra Arce, PT Physical Therapist    KM Amrita Shah RN Registered Nurse                                 Physical Therapy Education       Title: PT OT SLP Therapies (In Progress)       Topic: Physical Therapy (Done)       Point: Mobility training (Done)       Learning Progress Summary             Patient Acceptance, E,D, DU,NR by PC at 4/10/2024 1146    Acceptance, E,D, NR by PC at 4/9/2024 1045    Acceptance, E,D, NL by PC at 4/8/2024 1149    Acceptance, E, NR by EM at 4/5/2024 1622    Nonacceptance, E, NL by EN at 4/4/2024 0014    Acceptance, E,D, NR by PC at 4/3/2024 1030   Family Acceptance, E,D, DU,NR by PC at 4/10/2024 1146    Acceptance, E,D, NR by PC at 4/3/2024 1030                         Point: Home exercise program (Done)       Learning Progress Summary             Patient Acceptance, E,D, DU,NR by PC at 4/10/2024 1146    Acceptance, E,D, NR by PC at 4/9/2024 1045    Acceptance, E,D, NL by PC at 4/8/2024 1149    Acceptance, E,D, NR,NL by LW at 4/6/2024 1138    Nonacceptance, E, NL by EN at 4/4/2024 0014    Acceptance, E,D, NR by PC at 4/3/2024 1030   Family Acceptance, E,D, DU,NR by PC at 4/10/2024 1146    Acceptance, E,D, NR by PC at 4/3/2024 1030                         Point: Body mechanics (Done)       Learning Progress Summary             Patient Acceptance, E,D, DU,NR by PC at 4/10/2024 1146    Acceptance, E,D, NR by PC at 4/9/2024 1045    Acceptance, E,D, NL by PC at 4/8/2024 1149     Acceptance, E,D, NR,NL by LW at 4/6/2024 1138    Nonacceptance, E, NL by EN at 4/4/2024 0014    Acceptance, E,D, NR by PC at 4/3/2024 1030   Family Acceptance, E,D, DU,NR by PC at 4/10/2024 1146    Acceptance, E,D, NR by PC at 4/3/2024 1030                         Point: Precautions (Done)       Learning Progress Summary             Patient Acceptance, E,D, DU,NR by PC at 4/10/2024 1146    Acceptance, E,D, NR by PC at 4/9/2024 1045    Acceptance, E,D, NL by PC at 4/8/2024 1149    Acceptance, E,D, NR,NL by LW at 4/6/2024 1138    Nonacceptance, E, NL by EN at 4/4/2024 0014    Acceptance, E,D, NR by PC at 4/3/2024 1030   Family Acceptance, E,D, DU,NR by PC at 4/10/2024 1146    Acceptance, E,D, NR by PC at 4/3/2024 1030                                         User Key       Initials Effective Dates Name Provider Type Discipline    PC 06/16/21 -  Sidra Arce, PT Physical Therapist PT    EM 06/16/21 -  Lesia Judd PT Physical Therapist PT    EN 08/01/22 -  Monique Mary, RN Registered Nurse Nurse    LW 05/08/23 -  Isa Pierce, SANTOSH Physical Therapist PT                  PT Recommendation and Plan  Planned Therapy Interventions (PT): balance training, bed mobility training, gait training, transfer training, strengthening, neuromuscular re-education, stretching, patient/family education  Plan of Care Reviewed With: patient, daughter  Progress: improving  Outcome Evaluation: Pt awake and alert today, daughter present and assisting with translating. Pt able to tolerate sitting edge of bed and worked on balance, trunk control, L side attention, also worked on sit to stand with L knee blocked and weight shifting in standing, pt with good participation today, will cont to follow     Time Calculation:         PT Charges       Row Name 04/10/24 1146             Time Calculation    Start Time 0942  -PC      Stop Time 1006  -PC      Time Calculation (min) 24 min  -PC      PT Received On 04/10/24  -PC      PT - Next  Appointment 04/11/24  -PC                User Key  (r) = Recorded By, (t) = Taken By, (c) = Cosigned By      Initials Name Provider Type    PC Sidra Arce, PT Physical Therapist                  Therapy Charges for Today       Code Description Service Date Service Provider Modifiers Qty    [id number]  PT THER PROC EA 15 MIN 4/9/2024 Sidra Arce, PT GP 2    [id number]  PT THER PROC EA 15 MIN 4/10/2024 Sidra Arce, PT GP 2            PT G-Codes  Outcome Measure Options: AM-PAC 6 Clicks Daily Activity (OT)  AM-PAC 6 Clicks Score (PT): 8  AM-PAC 6 Clicks Score (OT): 9  Modified [place] Scale: 4 - Moderately severe disability.  Unable to walk without assistance, and unable to attend to own bodily needs without assistance.  PT Discharge Summary  Anticipated Discharge Disposition (PT): inpatient rehabilitation facility    Sidra Arce PT  4/10/2024

## 2024-04-10 NOTE — PROGRESS NOTES
"  [facility] Progress Note    Chief Complaint: Abdominal pain earlier    Had a large clot evacuated from his [place] earlier today and now his irrigation is going at a rapid rate again.  He had been doing pretty well over the last 48 hours.  I did talk to his daughter the other day.  His catheter is seated well and shows no signs of any migration that could start of any bleeding.  He does not seem to be in any distress now.  He is currently sleeping.    Review of Systems:    Review of systems could not be obtained due to  patient sedation status.          Vital Signs  /81   Pulse (!) 125   Temp 97.8 °F (36.6 °C) (Oral)   Resp 23   Ht 180.3 cm (71\")   Wt 79.7 kg (175 lb 11.3 oz)   SpO2 95%   BMI 24.51 kg/m²     Physical Exam:     General Appearance:    NAD   HEENT:    No trauma, pupils reactive, hearing intact   Back:     No CVA tenderness   Lungs:     Respirations unlabored, no wheezing    Heart:    RRR, intact peripheral pulses   Abdomen:   Soft no bladder distention   :    Penis normal Murillo catheter anchored seated appropriately   Extremities:   No edema, no deformity   Lymphatic:   No neck or groin LAD   Skin:   No bleeding, bruising or rashes   Neuro/Psych:   Orientation intact, mood/affect pleasant, no focal findings        Results Review:     I reviewed the patient's new clinical results.  Lab Results (last 24 hours)       Procedure Component Value Units Date/Time    POC Glucose Once [[id number]]  (Abnormal) Collected: 04/10/24 1113    Specimen: Blood Updated: 04/10/24 1140     Glucose 191 mg/dL     Magnesium [[id number]]  (Abnormal) Collected: 04/10/24 0704    Specimen: Blood Updated: 04/10/24 0742     Magnesium 2.9 mg/dL     Basic Metabolic Panel [[id number]]  (Abnormal) Collected: 04/10/24 0704    Specimen: Blood Updated: 04/10/24 0742     Glucose 152 mg/dL      BUN 43 mg/dL      Creatinine 0.95 mg/dL      Sodium 149 mmol/L      Potassium 3.8 mmol/L      Chloride 112 mmol/L      CO2 23.6 " mmol/L      Calcium 8.8 mg/dL      BUN/Creatinine Ratio 45.3     Anion Gap 13.4 mmol/L      eGFR 82.4 mL/min/1.73     Narrative:      GFR Normal >60  Chronic Kidney Disease <60  Kidney Failure <15    The GFR formula is only valid for adults with stable renal function between ages 18 and 70.    CBC (No Diff) [[id number]]  (Abnormal) Collected: 04/10/24 0704    Specimen: Blood Updated: 04/10/24 0724     WBC 15.86 10*3/mm3      RBC 3.73 10*6/mm3      Hemoglobin 10.8 g/dL      Hematocrit 33.3 %      MCV 89.3 fL      MCH 29.0 pg      MCHC 32.4 g/dL      RDW 13.1 %      RDW-SD 43.0 fl      MPV 9.4 fL      Platelets 714 10*3/mm3     POC Glucose Once [[id number]]  (Abnormal) Collected: 04/10/24 0534    Specimen: Blood Updated: 04/10/24 0535     Glucose 171 mg/dL     POC Glucose Once [[id number]]  (Abnormal) Collected: 04/09/24 2344    Specimen: Blood Updated: 04/09/24 2345     Glucose 168 mg/dL     Coag Studies Interp Report [[id number]] Collected: 04/06/24 1122    Specimen: Blood Updated: 04/09/24 1908     von Willebrand Interp Note     Comment: -------------------------------  COAGULATION:  ANTIPHOSPHOLIPID SYNDROME ASSESSMENT ASSESSMENT  A lupus anticoagulant is not detected. aCL antibody is  elevated, but does not meet the laboratory criteria for  antiphospholipid syndrome. Transient elevations of  antiphospholipid antibodies may occur secondary to  infections or other underlying causes and may not be  associated with an increased risk of thrombosis. aCL IgM and  B2GP1 IgM may spuriously elevate in the presence of  rheumatoid factor. As antibody titers may fluctuate with  time, repeat testing may be indicated depending on the  clinical presentation.  ANTIPHOSPHOLIPID SYNDROME ASSESSMENT SUMMARY  -  No evidence of a lupus anticoagulant or B2GP1 antibody. aCL  antibodies are elevated, but do not meet the laboratory  criteria for antiphospholipid syndrome. As antibody titers  may fluctuate with time, repeat testing may be  indicated if  antiphospholipid syndrome is suspected.  ANTIPHOSPHOLIPID SYNDROME ASSESSMENT DEFINITIONS  -  aCL- anticardiolipin (antibodies to cardiolipin); B2GP1-  antibodies to Beta-2 Glycoprotein 1; LA- lupus anticoagulant  (which is identified with the dRVVT and/or hexagonal  phospholipid neutralization assays); aPL- antibodies to  protein/phospholipid complexes such as LA, aCL, and B2GP1  antibodies; APS- antiphospholipid syndrome; DTI-direct  thrombin inhibitors.  MEDICAL DIRECTOR:  For questions regarding panel interpretation, please contact  Magdaleno Ashford M.D. at [employer] at  [phone].  -------------------------------  DISCLAIMER  These assessments and interpretations are provided as a  convenience in support of the physician-patient relationship  and are not intended to replace the physician's clinical  judgment. They are derived from national guidelines in  addition to other evidence and expert opinion. The clinician  should consider this information within the context of  clinical opinion and the individual patient.  SEE GUIDANCE FOR ANTIPHOSPHOLIPID SYNDROME ASSESSMENT:(1)  Alberto V et al. J Thromb Haemost. 2009; 7(10):8802-0622. (2)  Juan S et al. J Thromb Haemost. 2006;4(2):295-306. (3)  Yuniel DA et al. Blood. 2007;110(9): 2612-7953.       Narrative:      Performed at:  01 - [facility] / Digital  [address] [place] [address]  [id number]  : Rachele Schuster MD, Phone:  [phone]    POC Glucose Once [[id number]]  (Abnormal) Collected: 04/09/24 1737    Specimen: Blood Updated: 04/09/24 1738     Glucose 165 mg/dL     Antiphospholipid Syndrome [[id number]]  (Abnormal) Collected: 04/06/24 1122    Specimen: Blood Updated: 04/09/24 1709     aPTT 25.7 sec      Protime 10.3 sec      INR 1.0     Comment: Reference interval is for non-anticoagulated patients.  Suggested INR therapeutic range for Vitamin K  antagonist therapy:     Standard Dose (moderate intensity                     therapeutic range):       2.0 - 3.0     Higher intensity therapeutic range       2.5 - 3.5        Thrombin Time 15.1 sec      Dilute Viper Venom Time 42.8 sec      Hexagonal Phase Phospholipid 6 sec      Anticardiolipin IgG 15 GPL U/mL      Comment:                           Negative:              <15                            Indeterminate:     15 - 20                            Low-Med Positive: >20 - 80                            High Positive:         >80        Anticardiolipin IgM <9 MPL U/mL      Comment:                           Negative:              <13                            Indeterminate:     13 - 20                            Low-Med Positive: >20 - 80                            High Positive:         >80        Beta-2 Glyco 1 IgG <9 GPI IgG units      Comment: The reference interval reflects a 3SD or 99th percentile interval,  which is thought to represent a potentially clinically significant  result in accordance with the International Consensus Statement on  the classification criteria for definitive antiphospholipid syndrome  (APS). J Thromb Haem 2006;4:295-306.        Beta-2 Glyco 1 IgM <9 GPI IgM units      Comment: The reference interval reflects a 3SD or 99th percentile interval,  which is thought to represent a potentially clinically significant  result in accordance with the International Consensus Statement on  the classification criteria for definitive antiphospholipid syndrome  (APS). J Thromb Haem 2006;4:295-306.        Interpretation Comment     Comment: Please refer to the Coag Studies Interp Report.       Narrative:      Performed at:  01 - [facility] [address]  [id number]  : Trish Lin MD, Phone:  [phone]  Performed at:  02 - [facility] [address]  [id number]  : Daniel Espana PhD, Phone:  [phone]          Imaging Results (Last 24 Hours)       Procedure Component Value Units  Date/Time    XR Chest 1 View [[id number]] Collected: 04/10/24 1304     Updated: 04/10/24 1308    Narrative:      XR CHEST 1 VW-4/10/2024     HISTORY: Follow-up edema. Possible effusion.     Heart size is within normal limits. There is increased density in the  right lung base consistent with atelectasis and/or pneumonia. The right  base has cleared somewhat since the 4/8/2024 study. Left lung appears  relatively clear. NG tube courses into the stomach. No pneumothorax is  seen.        This report was finalized on 4/10/2024 1:05 PM by Dr. Jerry Mckinnon M.D on Workstation: [id number]               Medication Review:   I have personally reviewed    Current Facility-Administered Medications:     acetaminophen (TYLENOL) 160 MG/5ML oral solution 650 mg, 650 mg, Oral, Q6H PRN, Kristina Urena APRANA, 650 mg at 04/06/24 2329    amLODIPine (NORVASC) tablet 10 mg, 10 mg, Per PEG Tube, Q24H, Martin Garnett MD, 10 mg at 04/10/24 0911    aspirin chewable tablet 81 mg, 81 mg, Nasogastric, Daily, Emil Faith MD, 81 mg at 04/10/24 0911    atorvastatin (LIPITOR) tablet 80 mg, 80 mg, Nasogastric, Nightly, Shiv Felipe MD, 80 mg at 04/09/24 2000    sennosides-docusate (PERICOLACE) 8.6-50 MG per tablet 2 tablet, 2 tablet, Nasogastric, BID, 2 tablet at 04/08/24 0819 **AND** polyethylene glycol (MIRALAX) packet 17 g, 17 g, Oral, Daily PRN **AND** bisacodyl (DULCOLAX) EC tablet 5 mg, 5 mg, Oral, Daily PRN **AND** bisacodyl (DULCOLAX) suppository 10 mg, 10 mg, Rectal, Daily PRN, Shiv Felipe MD    calcium carbonate (TUMS) chewable tablet 500 mg (200 mg elemental), 2 tablet, Oral, TID PRN, Gina Power MD, 2 tablet at 04/02/24 2009    Calcium Replacement - Follow Nurse / BPA Driven Protocol, , Does not apply, Tamir LINDSEY Mark Edwin, MD    Calcium Replacement - Follow Nurse / BPA Driven Protocol, , Does not apply, Tamir LINDSEY Mark Edwin, MD    Calcium Replacement - Follow Nurse / BPA Driven  Protocol, , Does not apply, PRN, Martin Garnett MD    cefTRIAXone (ROCEPHIN) 2,000 mg in sodium chloride 0.9 % 100 mL MBP, 2,000 mg, Intravenous, Q24H, Martin Garnett MD, Last Rate: 200 mL/hr at 04/10/24 1335, 2,000 mg at 04/10/24 1335    clevidipine (CLEVIPREX) infusion 0.5 mg/mL, 2-32 mg/hr, Intravenous, Titrated, Martin Garnett MD, Stopped at 04/07/24 0145    clopidogrel (PLAVIX) tablet 75 mg, 75 mg, Nasogastric, Daily, Shiv Felipe MD, 75 mg at 04/10/24 0911    dextrose (D50W) (25 g/50 mL) IV injection 25 g, 25 g, Intravenous, Q15 Min PRN, Martin Garnett MD    dextrose (GLUTOSE) oral gel 15 g, 15 g, Oral, Q15 Min PRN, Martin Garnett MD    fentaNYL citrate (PF) (SUBLIMAZE) injection 50 mcg, 50 mcg, Intravenous, Q2H PRN, Shiv Felipe MD, 50 mcg at 04/06/24 0237    glucagon (GLUCAGEN) injection 1 mg, 1 mg, Intramuscular, Q15 Min PRN, Martin Garnett MD    hydrALAZINE (APRESOLINE) injection 10 mg, 10 mg, Intravenous, Q4H PRN, Martin Garnett MD, 10 mg at 04/10/24 1109    insulin regular (humuLIN R,novoLIN R) injection 2-7 Units, 2-7 Units, Subcutaneous, Q6H, Martin Garnett MD, 2 Units at 04/10/24 0543    ipratropium-albuterol (DUO-NEB) nebulizer solution 3 mL, 3 mL, Nebulization, Q4H - RT, Ja Tucker MD, 3 mL at 04/09/24 0616    Magnesium Low Dose Replacement - Follow Nurse / BPA Driven Protocol, , Does not apply, PRTamir PEREZ Mark Edwin, MD    Magnesium Standard Dose Replacement - Follow Nurse / BPA Driven Protocol, , Does not apply, PRNTamir Mark Edwin, MD    Magnesium Standard Dose Replacement - Follow Nurse / BPA Driven Protocol, , Does not apply, PRN, Martin Garnett MD    metoprolol tartrate (LOPRESSOR) tablet 25 mg, 25 mg, Oral, Q12H, Martin Garnett MD, 25 mg at 04/10/24 1335    nitroglycerin (NITROSTAT) SL tablet 0.4 mg, 0.4 mg, Sublingual, Q5 Min PRN, Dago Rubio MD    ondansetron (ZOFRAN) injection 4 mg, 4 mg, Intravenous, Q4H  VITORN, Gina Power MD, 4 mg at 04/02/24 2333    pantoprazole (PROTONIX) injection 40 mg, 40 mg, Intravenous, Daily, Tesfaye Ghosh MD, 40 mg at 04/09/24 0806    Phosphorus Replacement - Follow Nurse / BPA Driven Protocol, , Does not apply, Tamir LINDSEY Mark Edwin, MD    Phosphorus Replacement - Follow Nurse / BPA Driven Protocol, , Does not apply, Tamir LINDSEY Mark Edwin, MD    Phosphorus Replacement - Follow Nurse / BPA Driven Protocol, , Does not apply, Tamir LINDSEY Mark Edwin, MD    Potassium Replacement - Follow Nurse / BPA Driven Protocol, , Does not apply, Tamir LINDSEY Mark Edwin, MD    Potassium Replacement - Follow Nurse / BPA Driven Protocol, , Does not apply, Tamir LINDSEY Mark Edwin, MD    Potassium Replacement - Follow Nurse / BPA Driven Protocol, , Does not apply, Tamir LINDSEY Mark Edwin, MD    sodium chloride 0.9 % bolus 500 mL, 500 mL, Intravenous, Once, Dago Rubio MD    sodium chloride 0.9 % flush 10 mL, 10 mL, Intravenous, PRN, Dago Rubio MD    sodium chloride 0.9 % flush 10 mL, 10 mL, Intravenous, Q12H, Dago Rubio MD, 10 mL at 04/10/24 0912    sodium chloride 0.9 % flush 10 mL, 10 mL, Intravenous, PRN, Dago Rubio MD    sodium chloride 0.9 % infusion 40 mL, 40 mL, Intravenous, PRJoanne PEREZ Richard, MD    Allergies:    Patient has no known allergies.    Assessment:    Active Problems:    Acute CVA (cerebrovascular accident)    Right-sided extracranial carotid artery stenosis    Coffee ground emesis    Gastroesophageal reflux disease       Gross hematuria patient anticoagulated likely prostatic in origin and seems little unlikely he has recurrent bladder cancer after recent negative cystoscopy in January    Plan:    Right now with pretty risky to perform cystoscopy and so we will continue his irrigations and hopefully we can wean him off.  Best to keep his catheter in for now due to his high risk of retention      Robert Haque MD    4/10/2024  14:48  EDT

## 2024-04-10 NOTE — PLAN OF CARE
Goal Outcome Evaluation:  Plan of Care Reviewed With: patient, daughter        Progress: improving  Outcome Evaluation: Pt is alert and awake throughout session today, he is able to follow commands via daughter who requests to interpret for pt today, pt in agreement. Pt is able to sit EOB with intermitten sitting balance assistance and stand 2 reps today. LUE remains flaccid and OT assisted with faciliation techniques. Pt participated well. Family remains very supportive of pt and his recovery. Hopefully pt may be able to transfer to chair in upcoming sessions.      Anticipated Discharge Disposition (OT): inpatient rehabilitation facility                         Alar Island Pedicle Flap Text: The defect edges were debeveled with a #15 scalpel blade.  Given the location of the defect, shape of the defect and the proximity to the alar rim an island pedicle advancement flap was deemed most appropriate.  Using a sterile surgical marker, an appropriate advancement flap was drawn incorporating the defect, outlining the appropriate donor tissue and placing the expected incisions within the nasal ala running parallel to the alar rim. The area thus outlined was incised with a #15 scalpel blade.  The skin margins were undermined minimally to an appropriate distance in all directions around the primary defect and laterally outward around the island pedicle utilizing iris scissors.  There was minimal undermining beneath the pedicle flap.

## 2024-04-10 NOTE — THERAPY TREATMENT NOTE
Patient Name: Alex Negron  : 1945    MRN: [id number]                              Today's Date: 4/10/2024       Admit Date: 2024    Visit Dx:     ICD-10-CM ICD-9-CM   1. Cerebrovascular accident (CVA), unspecified mechanism  I63.9 434.91   2. Coffee ground emesis  K92.0 578.0   3. Gastroesophageal reflux disease, unspecified whether esophagitis present  K21.9 530.81   4. 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 No pertinent past medical history.  Past Surgical History:   Procedure Laterality Date    ENDOSCOPY N/A 4/3/2024    Procedure: ESOPHAGOGASTRODUODENOSCOPY AT BEDSIDE;  Surgeon: Redd Guidry MD;  Location: [facility] ENDOSCOPY;  Service: Gastroenterology;  Laterality: N/A;  PRE-  GI BLEED  POST- HIATAL HERNIA, DISTAL ESOPHAGITIS ULCERS, GASTRITIS, PYLOROPLASTY      General Information       Row Name 04/10/24 1220          OT Time and Intention    Document Type therapy note (daily note)  -SM     Mode of Treatment co-treatment;occupational therapy;physical therapy  -       Row Name 04/10/24 1220          General Information    Patient Profile Reviewed yes  -SM     Existing Precautions/Restrictions fall;NPO  -SM       Row Name 04/10/24 1220          Cognition    Orientation Status (Cognition) oriented x 3  -SM       Row Name 04/10/24 1220          Safety Issues, Functional Mobility    Impairments Affecting Function (Mobility) endurance/activity tolerance;strength;pain;balance;visual/perceptual;sensation/sensory awareness;postural/trunk control;motor control;muscle tone abnormal;grasp;cognition  -SM     Comment, Safety Issues/Impairments (Mobility) PT/OT cotreatment medically appropriate and necessary due to patient acuity  level, to maximize therapeutic benefit due to impaired act tolerance, and for safety of patient and staff. Treatment focused on progression of care and goals established in POC.  -               User Key  (r) = Recorded By, (t) = Taken By, (c) = Cosigned By      Initials Name Provider Type    Yasmine Kirkpatrick OT Occupational Therapist                     Mobility/ADL's       Row Name 04/10/24 1221          Bed Mobility    Supine-Sit [place] (Bed Mobility) maximum assist (25% patient effort);2 person assist;verbal cues  -     Sit-Supine [place] (Bed Mobility) maximum assist (25% patient effort);2 person assist;verbal cues  -       Row Name 04/10/24 1221          Sit-Stand Transfer    Sit-Stand [place] (Transfers) moderate assist (50% patient effort);maximum assist (25% patient effort);2 person assist  -     Comment, (Sit-Stand Transfer) Pt stood X2 reps, LLE blocked, LUE supported.  -       Row Name 04/10/24 1221          Lower Body Dressing Assessment/Training    [place] Level (Lower Body Dressing) don;socks;dependent (less than 25% patient effort)  -               User Key  (r) = Recorded By, (t) = Taken By, (c) = Cosigned By      Initials Name Provider Type    Yasmine Kirkpatrick OT Occupational Therapist                   Obj/Interventions       Row Name 04/10/24 1224          Vision Assessment/Intervention    Visual Processing Deficit tehresa-inattention/neglect, left  -     Vision Assessment Comment Pt is able to track to midline and L side today with multiple tactile and visual cues. Family placed on L side to faciliate attention during session.  -       Row Name 04/10/24 1224          Shoulder (Therapeutic Exercise)    Shoulder PROM (Therapeutic Exercise) left;flexion;extension;aBduction;aDduction;external rotation;internal rotation;10 repetitions;supine  -       Row Name 04/10/24 1224          Elbow/Forearm (Therapeutic Exercise)    Elbow/Forearm PROM (Therapeutic  Exercise) left;flexion;extension;10 repetitions;supine  -       Row Name 04/10/24 1224          Wrist (Therapeutic Exercise)    Wrist PROM (Therapeutic Exercise) left;flexion;extension;10 repetitions  -       Row Name 04/10/24 1224          Hand (Therapeutic Exercise)    Hand PROM (Therapeutic Exercise) left;finger flexion;finger extension;10 repetitions  -       Row Name 04/10/24 1224          Motor Skills    Motor Control/Coordination Interventions weight-bearing activities  weight bearing down to L elbow for faciliation, sensory feedback, proximation of glenohumeral joint  -       Row Name 04/10/24 1224          Balance    Static Sitting Balance --  varies from mod-CGA. Pt able to sit up longer CGA today than in previous sessions. He needs constant cues for RUE hand placement to maintain balance.  -     Static Standing Balance moderate assist;2-person assist  -     Position/Device Used, Standing Balance supported  -               User Key  (r) = Recorded By, (t) = Taken By, (c) = Cosigned By      Initials Name Provider Type     Yasmine Martin, OT Occupational Therapist                   Goals/Plan    No documentation.                  Clinical Impression       Row Name 04/10/24 1226          Pain Assessment    Pretreatment Pain Rating 0/10 - no pain  -     Posttreatment Pain Rating 0/10 - no pain  -[facility] Name 04/10/24 1226          Plan of Care Review    Plan of Care Reviewed With patient;daughter  -     Progress improving  -     Outcome Evaluation Pt is alert and awake throughout session today, he is able to follow commands via daughter who requests to interpret for pt today, pt in agreement. Pt is able to sit EOB with intermitten sitting balance assistance and stand 2 reps today. LUE remains flaccid and OT assisted with faciliation techniques. Pt participated well. Family remains very supportive of pt and his recovery. Hopefully pt may be able to transfer to chair in upcoming  sessions.  -       Row Name 04/10/24 1226          Therapy Plan Review/Discharge Plan (OT)    Anticipated Discharge Disposition (OT) inpatient rehabilitation facility  -       Row Name 04/10/24 1226          Vital Signs    O2 Delivery Pre Treatment room air  -     O2 Delivery Intra Treatment room air  -     O2 Delivery Post Treatment room air  -       Row Name 04/10/24 1226          Positioning and Restraints    Pre-Treatment Position in bed  -SM     Post Treatment Position bed  -SM     In Bed fowlers;call light within reach;encouraged to call for assist;exit alarm on;notified nsg;with family/caregiver  -     Restraints reapplied:;soft limb  -               User Key  (r) = Recorded By, (t) = Taken By, (c) = Cosigned By      Initials Name Provider Type    Yasmine Kirkpatrick, BUDDY Occupational Therapist                   Outcome Measures       Row Name 04/10/24 1229          How much help from another is currently needed...    Putting on and taking off regular lower body clothing? 1  -SM     Bathing (including washing, rinsing, and drying) 2  -SM     Toileting (which includes using toilet bed pan or urinal) 1  -SM     Putting on and taking off regular upper body clothing 2  -SM     Taking care of personal grooming (such as brushing teeth) 2  -SM     Eating meals 1  -SM     AM-PAC 6 Clicks Score (OT) 9  -       Row Name 04/10/24 1145 04/10/24 0400       How much help from another person do you currently need...    Turning from your back to your side while in flat bed without using bedrails? 1  -PC 2  -KM    Moving from lying on back to sitting on the side of a flat bed without bedrails? 2  -PC 1  -KM    Moving to and from a bed to a chair (including a wheelchair)? 1  -PC 1  -KM    Standing up from a chair using your arms (e.g., wheelchair, bedside chair)? 2  -PC 1  -KM    Climbing 3-5 steps with a railing? 1  -PC 1  -KM    To walk in hospital room? 1  -PC 1  -KM    AM-PAC 6 Clicks Score (PT) 8  -PC 7   -KM    Highest Level of Mobility Goal 3 --> Sit at edge of bed  -PC 2 --> Bed activities/dependent transfer  -KM      Row Name 04/10/24 1229          Functional Assessment    Outcome Measure Options AM-PAC 6 Clicks Daily Activity (OT)  -DRE               User Key  (r) = Recorded By, (t) = Taken By, (c) = Cosigned By      Initials Name Provider Type    PC Sidra Arce, PT Physical Therapist    Yasmine Kirkpatrick, OT Occupational Therapist    Amrita Yap, RN Registered Nurse                    Occupational Therapy Education       Title: PT OT SLP Therapies (In Progress)       Topic: Occupational Therapy (In Progress)       Point: ADL training (In Progress)       Description:   Instruct learner(s) on proper safety adaptation and remediation techniques during self care or transfers.   Instruct in proper use of assistive devices.                  Learning Progress Summary             Patient Nonacceptance, E, NL by KOFI at 4/4/2024 0014                         Point: Home exercise program (In Progress)       Description:   Instruct learner(s) on appropriate technique for monitoring, assisting and/or progressing therapeutic exercises/activities.                  Learning Progress Summary             Patient Nonacceptance, E, NL by EN at 4/4/2024 0014                         Point: Precautions (In Progress)       Description:   Instruct learner(s) on prescribed precautions during self-care and functional transfers.                  Learning Progress Summary             Patient Nonacceptance, E, NL by EN at 4/4/2024 0014                         Point: Body mechanics (In Progress)       Description:   Instruct learner(s) on proper positioning and spine alignment during self-care, functional mobility activities and/or exercises.                  Learning Progress Summary             Patient Nonacceptance, E, NL by KOFI at 4/4/2024 0014   Family Acceptance, E, VU by DRE at 4/3/2024 1239    Comment: OT goals, POC,  positioning of LUE for safety post CVA for subluxation                                         User Key       Initials Effective Dates Name Provider Type Discipline     08/01/22 -  Monique Mary, RN Registered Nurse Nurse     04/02/20 -  Yasmine Martin OT Occupational Therapist OT                  OT Recommendation and Plan  Planned Therapy Interventions (OT): activity tolerance training, adaptive equipment training, functional balance retraining, occupation/activity based interventions, cognitive/visual perception retraining, neuromuscular control/coordination retraining, patient/caregiver education/training, transfer/mobility retraining, strengthening exercise, ROM/therapeutic exercise, orthotic fabrication/fitting/training, IADL retraining, BADL retraining  Therapy Frequency (OT): 5 times/wk  Plan of Care Review  Plan of Care Reviewed With: patient, daughter  Progress: improving  Outcome Evaluation: Pt is alert and awake throughout session today, he is able to follow commands via daughter who requests to interpret for pt today, pt in agreement. Pt is able to sit EOB with intermitten sitting balance assistance and stand 2 reps today. LUE remains flaccid and OT assisted with faciliation techniques. Pt participated well. Family remains very supportive of pt and his recovery. Hopefully pt may be able to transfer to chair in upcoming sessions.     Time Calculation:   Evaluation Complexity (OT)  Review Occupational Profile/Medical/Therapy History Complexity: extensive/high complexity  Assessment, Occupational Performance/Identification of Deficit Complexity: 5 or more performance deficits  Clinical Decision Making Complexity (OT): comprehensive assessment/high complexity  Overall Complexity of Evaluation (OT): high complexity     Time Calculation- OT       Row Name 04/10/24 1229             Time Calculation- OT    OT Start Time 0941  -      OT Stop Time 1006  -      OT Time Calculation (min) 25 min  -       Total Timed Code Minutes- OT 25 minute(s)  -SM      OT Received On 04/10/24  -      OT - Next Appointment 04/11/24  -SM         Timed Charges    60225 -  OT Neuromuscular Reeducation Minutes 10  -SM      67617 - OT Therapeutic Activity Minutes 15  -SM         Total Minutes    Timed Charges Total Minutes 25  -SM       Total Minutes 25  -SM                User Key  (r) = Recorded By, (t) = Taken By, (c) = Cosigned By      Initials Name Provider Type     Yasmine Martin OT Occupational Therapist                  Therapy Charges for Today       Code Description Service Date Service Provider Modifiers Qty    56362585114 HC OT NEUROMUSC RE EDUCATION EA 15 MIN 4/9/2024 Yasmine Martin OT GO 1    38744663001 HC OT THERAPEUTIC ACT EA 15 MIN 4/9/2024 Yasmine Martin OT GO 1    38422202729 HC OT NEUROMUSC RE EDUCATION EA 15 MIN 4/10/2024 Yasmine Martin OT GO 1    30628554156 HC OT THERAPEUTIC ACT EA 15 MIN 4/10/2024 Yasmine Martin OT GO 1                 Yasmine Martin OT  4/10/2024

## 2024-04-10 NOTE — THERAPY EVALUATION
Acute Care - Speech Language Pathology   Swallow Treatment Note [facility]     Patient Name: Alex Negron  : 1945  MRN: [id number]  Today's Date: 4/10/2024               Admit Date: 2024    Visit Dx:     ICD-10-CM ICD-9-CM   1. Cerebrovascular accident (CVA), unspecified mechanism  I63.9 434.91   2. Coffee ground emesis  K92.0 578.0   3. Gastroesophageal reflux disease, unspecified whether esophagitis present  K21.9 530.81   4. 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 No pertinent past medical history.  Past Surgical History:   Procedure Laterality Date    ENDOSCOPY N/A 4/3/2024    Procedure: ESOPHAGOGASTRODUODENOSCOPY AT BEDSIDE;  Surgeon: Redd Guidry MD;  Location: [facility] ENDOSCOPY;  Service: Gastroenterology;  Laterality: N/A;  PRE-  GI BLEED  POST- HIATAL HERNIA, DISTAL ESOPHAGITIS ULCERS, GASTRITIS, PYLOROPLASTY       SLP Recommendation and Plan     SLP Diet Recommendation: NPO, other (see comments) (continue ice chips after oral care w/ rn) (04/10/24 1100)     SLP Rec. for Method of Medication Administration: meds via alternate route (04/10/24 1100)        Recommended Diagnostics: reassess via clinical swallow evaluation (04/10/24 1100)           Therapy Frequency (Swallow): PRN (04/10/24 1100)  Predicted Duration Therapy Intervention (Days): until discharge (04/10/24 1100)  Oral Care Recommendations: Oral Care BID/PRN, Before ice/water (04/10/24 1100)                                        Plan of Care Reviewed With: patient, daughter  Outcome Evaluation: Pt seen this am for re-evlauation of swallow.  Pt demonstrated baseline wet throat clear.  Presented with honey thick and nectar thick liquids by cup/spoon.  Pt  demonstrated audible multiple swallows.  Resulting throat clearing immediately following swallow.  Pt demonstrated increasing pain during eval reporting burning at insertion of catheter and cramping.  Pt pushing up with R arm against bed.  Deferred remainder of swallow eval.  Given s/s with thick liquids and possible difficulty with secretion management, recommend continue NPO w/ meds/nutrition via alternative feeding.  May continue with ice chips after oral care when alert and upright.  Will continue to re-eval swallow as appropriate.      SWALLOW EVALUATION (Last 72 Hours)       SLP Adult Swallow Evaluation       Row Name 04/10/24 1100 04/08/24 1100                Rehab Evaluation    Document Type re-evaluation  -SA re-evaluation  -CR       Subjective Information no complaints  -SA no complaints  -CR       Patient Observations alert;cooperative  -SA lethargic  -CR       Patient/Family/Caregiver Comments/Observations daughter present and interpreted for pt  -SA --       Patient Effort adequate  -SA adequate  -CR       Symptoms Noted During/After Treatment other (see comments)  pt with increasing pain d/t stomach cramping and burning sensation at penis  -SA none  -CR          General Information    Patient Profile Reviewed yes  -SA yes  -CR       Pertinent History Of Current Problem Multiple acute infarcts  -SA --       Current Method of Nutrition NPO;nasogastric feedings  -SA --       Precautions/Limitations, Vision difficult to assess  -SA --       Precautions/Limitations, Hearing WFL;for purposes of eval  -SA --       Prior Level of Function-Communication other (see comments);WFL  per daughter; speaks [place]  -SA --       Prior Level of Function-Swallowing no diet consistency restrictions  -SA --       Plans/Goals Discussed with patient and family;agreed upon  -SA --       Barriers to Rehab medically complex  -SA --       Patient's Goals for Discharge patient did not state  -SA --       Family Goals for  Discharge patient able to return to PO diet  -SA --          Pain Scale: Numbers Pre/Post-Treatment    Pretreatment Pain Rating -- 0/10 - no pain  -CR          General Eating/Swallowing Observations    Respiratory Support Currently in Use -- high-flow nasal cannula  -CR       O2 Liters -- 6L  -CR          Clinical Swallow Eval    Oral Prep Phase WFL  -SA --       Oral Transit WFL  -SA --       Oral Residue WFL  -SA --       Pharyngeal Phase suspected pharyngeal impairment  -SA --       Esophageal Phase unremarkable  -SA --       Clinical Swallow Evaluation Summary -- Swallow re-evaluation completed. Weened off Optiflow this date; currently on 6L highflow. Daughter at bedside pleasantly refused use of iPad  and served as  during assessment. Eyes remained closed during majority of assessment; opens eyes briefly when prompted. Difficulties following commands. Patient with delayed coughing following x1/2 ice chip trials. Immediate cough with honey by spoon. Continuing to recommend NPO as patient presents with consistent overt s/s of aspiration across all consistencies. Ice chips OK with RN only. Not a candidate for instrumental at this time secondary to lethargy and mental status. Speech to follow for re-eval.  -CR          Pharyngeal Phase Concerns    Pharyngeal Phase Concerns throat clear;multiple swallows  -SA --       Multiple Swallows nectar;honey  -SA --       Throat Clear honey;nectar  -SA --          SLP Evaluation Clinical Impression    SLP Swallowing Diagnosis -- suspected pharyngeal dysphagia;oral dysphagia  -CR       Functional Impact -- risk of aspiration/pneumonia  -CR       Rehab Potential/Prognosis, Swallowing -- good, to achieve stated therapy goals  -CR       Swallow Criteria for Skilled Therapeutic Interventions Met -- demonstrates skilled criteria  -CR          Recommendations    Therapy Frequency (Swallow) PRN  -SA PRN  -CR       Predicted Duration Therapy Intervention (Days)  until discharge  - until discharge  -CR       SLP Diet Recommendation NPO;other (see comments)  continue ice chips after oral care w/ rn  -SA NPO  ice chips with RN  -CR       Recommended Diagnostics reassess via clinical swallow evaluation  - reassess via clinical swallow evaluation  -CR       Oral Care Recommendations Oral Care BID/PRN;Before ice/water  -SA Oral Care BID/PRN  -CR       SLP Rec. for Method of Medication Administration meds via alternate route  - meds via alternate route  -CR       Monitor for Signs of Aspiration -- yes;notify SLP if any concerns  -CR       Anticipated Discharge Disposition (SLP) -- anticipate therapy at next level of care  -CR                 User Key  (r) = Recorded By, (t) = Taken By, (c) = Cosigned By      Initials Name Effective Dates     Eav Gong SLP 01/11/24 -     Hemalatha Guadarrama SLP 08/28/23 -                     EDUCATION  The patient has been educated in the following areas:   Dysphagia (Swallowing Impairment) Oral Care/Hydration NPO rationale.              Time Calculation:    Time Calculation- SLP       Row Name 04/10/24 1423             Time Calculation- SLP    SLP Start Time 1100  -      SLP Received On 04/10/24  -                User Key  (r) = Recorded By, (t) = Taken By, (c) = Cosigned By      Initials Name Provider Type     Eva Gong SLP Speech and Language Pathologist                    Therapy Charges for Today       Code Description Service Date Service Provider Modifiers Qty    [id number]  ST TREATMENT SWALLOW 3 4/10/2024 Eva Gong SLP GN 1                 LV Trammell  4/10/2024

## 2024-04-10 NOTE — PROGRESS NOTES
"Nutrition Services    Patient Name:  Alex Negron  YOB: 1945  MRN: [id number]  Admit Date:  4/1/2024    Assessment Date:  04/10/24    Comment: Neuro status improving per RN. TF's at goal of 70mL/hr with Diabetisource AC and tolerating.  Last BM 4/9.  Labs, meds, skin reviewed. Glucoses 168/171/152. Now getting SSI. Na+ 149 today, Free water deficit calculated at 3L. Will increase free water in TF's.  Looks like SLP plans to see him today, Await po.    Plan/Recommendations:  Continue TF's at 70mL/hr with Diabetisource AC  Increase free water to 60 mL/hr   Diet per SLP when appropriate    Will follow clinical course, nutrition needs.    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, L sided hemiparesis   Medical & Surgical Hx History reviewed. No pertinent past medical history.    Past Surgical History:   Procedure Laterality Date    ENDOSCOPY N/A 4/3/2024    Procedure: ESOPHAGOGASTRODUODENOSCOPY AT BEDSIDE;  Surgeon: Redd Guidry MD;  Location: [facility] ENDOSCOPY;  Service: Gastroenterology;  Laterality: N/A;  PRE-  GI BLEED  POST- HIATAL HERNIA, DISTAL ESOPHAGITIS ULCERS, GASTRITIS, PYLOROPLASTY        Current Problems Dysphagia, GI Bleed     Encounter Information        Nutrition History    Food Preferences    Supplements    Factors Affecting Intake altered mental status, altered respiratory status, swallow impairment   Tests/Procedures Clinical Swallow Evaluation     Anthropometrics        Current Height   Current Weight  BMI kg/m2 Height: 180.3 cm (71\")  Weight: 79.7 kg (175 lb 11.3 oz) (04/10/24 0400)  Body mass index is 24.51 kg/m².     Adj BMI (if applicable)    BMI Category Overweight (25 - 29.9)       Admission Weight 81.7kg   Ideal Body Weight (IBW) 166lb     Adj IBW (if applicable)    Usual Body Weight (UBW) unknown   Weight " Change/Trend Unknown       Weight history: Wt Readings from Last 30 Encounters:   04/10/24 0400 79.7 kg (175 lb 11.3 oz)   04/09/24 0400 80.3 kg (177 lb 0.5 oz)   04/08/24 0400 81.3 kg (179 lb 3.7 oz)   04/07/24 0418 86.3 kg (190 lb 4.1 oz)   04/05/24 0434 86.6 kg (190 lb 14.7 oz)   04/04/24 1344 87.5 kg (193 lb)   04/04/24 0500 87.7 kg (193 lb 5.5 oz)   04/03/24 0434 85.6 kg (188 lb 11.4 oz)   04/02/24 1344 84.5 kg (186 lb 4.6 oz)   04/02/24 0418 84.5 kg (186 lb 4.6 oz)   04/01/24 2159 81.7 kg (180 lb 3.2 oz)        Estimated/Assessed Needs        Energy Requirements    Weight for Calculation 81.7kg   Method for Estimation  25 kcal/kg   EST Needs (kcal/day) 2042       Protein Requirements    Weight for Calculation 81.7kg   EST Protein Needs (g/kg) 1.0 - 1.2 gm/kg   EST Daily Needs (g/day) 82-98       Fluid Requirements     Method for Estimation 1 mL/kcal    Estimated Needs (mL/day)        Fluid Deficit    Current Na Level (mEq/L) 149   Desired Na Level (mEq/L) 140   Estimated Fluid Deficit (L)  3L     Labs        Pertinent Labs    Results from last 7 days   Lab Units 04/10/24  0704 04/09/24  0415 04/08/24  1431 04/08/24  0246 04/04/24  0402 04/03/24  1612   SODIUM mmol/L 149* 146*  --  147*   < > 141   POTASSIUM mmol/L 3.8 4.1 4.3 3.5   < > 4.1   CHLORIDE mmol/L 112* 111*  --  108*   < > 109*   CO2 mmol/L 23.6 21.9*  --  22.2   < > 21.0*   BUN mg/dL 43* 37*  --  28*   < > 23   CREATININE mg/dL 0.95 1.12  --  1.06   < > 1.47*   CALCIUM mg/dL 8.8 8.9  --  8.6   < > 7.9*   BILIRUBIN mg/dL  --   --   --   --   --  0.4   ALK PHOS U/L  --   --   --   --   --  71   ALT (SGPT) U/L  --   --   --   --   --  15   AST (SGOT) U/L  --   --   --   --   --  30   GLUCOSE mg/dL 152* 175*  --  139*   < > 126*    < > = values in this interval not displayed.     Results from last 7 days   Lab Units 04/10/24  0704 04/09/24  0415 04/08/24  0246 04/04/24  0402 04/03/24  1612   MAGNESIUM mg/dL 2.9* 2.7* 2.2   < >  --    PHOSPHORUS mg/dL   "--   --  3.3   < >  --    HEMOGLOBIN g/dL 10.8* 11.5* 10.6*   < > 9.5*   HEMATOCRIT % 33.3* 35.0* 32.5*   < > 28.8*   WBC 10*3/mm3 15.86* 14.83* 14.93*   < > 16.92*   ALBUMIN g/dL  --   --   --   --  3.1*    < > = values in this interval not displayed.     Results from last 7 days   Lab Units 04/10/24  0704 04/09/24  0415 04/08/24  0246 04/07/24  0413 04/06/24  1122 04/06/24  0515   INR   --   --   --   --  1.0  --    PLATELETS 10*3/mm3 714* 646* 492* 407  --  343     No results found for: \"COVID19\"  Lab Results   Component Value Date    HGBA1C 6.20 (H) 04/02/2024          Medications            Scheduled Medications amLODIPine, 10 mg, Per PEG Tube, Q24H  aspirin, 81 mg, Nasogastric, Daily  atorvastatin, 80 mg, Nasogastric, Nightly  cefTRIAXone, 2,000 mg, Intravenous, Q24H  clopidogrel, 75 mg, Nasogastric, Daily  furosemide, 40 mg, Intravenous, Daily  insulin regular, 2-7 Units, Subcutaneous, Q6H  ipratropium-albuterol, 3 mL, Nebulization, Q4H - RT  pantoprazole, 40 mg, Intravenous, Daily  senna-docusate sodium, 2 tablet, Nasogastric, BID  sodium chloride, 500 mL, Intravenous, Once  sodium chloride, 10 mL, Intravenous, Q12H        Infusions clevidipine, 2-32 mg/hr, Last Rate: Stopped (04/07/24 0145)        PRN Medications   acetaminophen    senna-docusate sodium **AND** polyethylene glycol **AND** bisacodyl **AND** bisacodyl    calcium carbonate    Calcium Replacement - Follow Nurse / BPA Driven Protocol    Calcium Replacement - Follow Nurse / BPA Driven Protocol    Calcium Replacement - Follow Nurse / BPA Driven Protocol    dextrose    dextrose    fentaNYL citrate (PF)    glucagon (human recombinant)    hydrALAZINE    HYDROcodone-acetaminophen    Magnesium Low Dose Replacement - Follow Nurse / BPA Driven Protocol    Magnesium Standard Dose Replacement - Follow Nurse / BPA Driven Protocol    Magnesium Standard Dose Replacement - Follow Nurse / BPA Driven Protocol    nitroglycerin    ondansetron    Phosphorus " Replacement - Follow Nurse / BPA Driven Protocol    Phosphorus Replacement - Follow Nurse / BPA Driven Protocol    Phosphorus Replacement - Follow Nurse / BPA Driven Protocol    Potassium Replacement - Follow Nurse / BPA Driven Protocol    Potassium Replacement - Follow Nurse / BPA Driven Protocol    Potassium Replacement - Follow Nurse / BPA Driven Protocol    sodium chloride    sodium chloride    sodium chloride     Physical Findings          Physical Appearance alert, hemiparesis , oriented, room air   Oral/Mouth Cavity tooth or teeth missing   Edema  1+ (trace), 2+ (mild)   Gastrointestinal last bowel movement: 4/9   Skin  blisters, bruising   Tubes/Drains/Lines Cortrak, ND tube, bridle in place   NFPE No clinical signs of muscle wasting or fat loss   --  Current Nutrition Orders & Evaluation of Intake       Oral Nutrition     Food Allergies NKFA   Current PO Diet NPO Diet NPO Type: Strict NPO   Supplement    PO Evaluation     Trending % PO Intake       Enteral Nutrition     Enteral Route ND    TF Delivery Method Continuous    Propofol Rate/Kcal     Current TF Order/Rate  Diabetisource AC @ 70 mL/hr    TF Goal Rate 70 mL/hr    Current Water Flush 25 mL Q 1 hr    Modular None    TF Residual  no or minimal residual    TF Tolerance tolerating    TF Observation Verified correct TF and water flush infusing per orders     Nutrition Diagnosis        Nutrition Dx Problem 1 Problem: Inadequate Oral Intake  Etiology: Medical Diagnosis - CVA  Dysphagia  Signs/Symptoms: NPO    Comment:      INTERVENTION / PLAN OF CARE  Intervention Goal        Intervention Goal(s) Maintain nutrition status, Meet estimated needs, Disease management/therapy, Initiate feeding/diet, Initiate TF/PN, Transition TF to PO, and Maintain weight     Nutrition Intervention        RD Action Await initiation/advancement of PO diet, Continue to monitor, and Care plan reviewed     Prescription         Diet     Supplement/Snack    EN/PN     Prescription  Ordered       Enteral Prescription:     Enteral Route ND    TF Delivery Method Continuous    Enteral Product Diabetisource AC    Modular None    Propofol Rate/Kcal     TF Start Rate 20    TF Goal Rate 70    Free Water Flush 60mL q 1 hr    TF Provision at Goal: 2016kcal, 101 gm protein, 1377 mL free water + 1440 mL water flushes         Calories 100 % needs met         Protein  102 % needs met         Fluid (mL) 2817mL    Prescription Ordered Yes     Monitor/Evaluation        Monitor Per protocol   Discharge Plan Pending clinical course   Education Will instruct as appropriate     RD to follow per protocol.       Electronically signed by:  Lise Song RD  04/10/24 08:58 EDT

## 2024-04-10 NOTE — PROGRESS NOTES
"Enter Query Response Below      Query Response:         Klebsiella pneumoniae pneumonia        If applicable, please update the problem list.     Patient: Alex Negron        : 1945  Account: [id number]           Admit Date:         How to Respond to this query:       a. Click New Note     b. Answer query within the yellow box.                c. Update the Problem List, if applicable.      If you have any questions about this query contact me at: [email or web address]     ,    Patient presented with altered mental status, CVA, and required mechanical ventilation during and after EGD (4/3, extubated ). Progress note  documents \"Bilateral infiltrates/pneumonia: Klebsiella in culture.\"  Pulmonology note  states current antibiotics to cover aspiration pneumonia.  Per nursing note  - failed bedside swallow evaluation.    Treatment: Zosyn (4/3-), Rocephin (-), tube feedings and aspiration precautions.    Please clarify the type of pneumonia the patient was treated/monitored for as one or more of the following:    Klebsiella pneumoniae pneumonia  Bacterial pneumonia unspecified  Aspiration pneumonia  Other- specify______  Unable to determine    By submitting this query, we are merely seeking further clarification of documentation to accurately reflect all conditions that you are monitoring, evaluating, treating or that extend the hospitalization or utilize additional resources of care. Please utilize your independent clinical judgment when addressing the question(s) above.     This query and your response, once completed, will be entered into the legal medical record.    Sincerely,  Rosa CAMPA, RN  Clinical Documentation Integrity Program   [email or web address]    "

## 2024-04-10 NOTE — PLAN OF CARE
Goal Outcome Evaluation:  Plan of Care Reviewed With: patient, daughter        Progress: improving  Outcome Evaluation: Pt awake and alert today, daughter present and assisting with translating. Pt able to tolerate sitting edge of bed and worked on balance, trunk control, L side attention, also worked on sit to stand with L knee blocked and weight shifting in standing, pt with good participation today, will cont to follow      Anticipated Discharge Disposition (PT): inpatient rehabilitation facility

## 2024-04-10 NOTE — CONSULTS
Patient Name:  Alex Negron  YOB: 1945  MRN:  [id number]  Admit Date:  2024  Patient Care Team:  Jyothi Song PA-C as PCP - General (Family Medicine)      Subjective   History Present Illness     Chief Complaint   Patient presents with    Extremity Weakness       Mr. Negron is a 79 y.o. who was admitted with MCA syndrome with acute stroke and was s/p TPA and underwent endovascular intervention with Dr. Rubio who placed a right carotid stent. He was admitted to the ICU. His stay had been complicated by dysphagia, pneumonia, coffee-ground emesis with EGD with distal esophageal ulcers, gross hematuria. He is still in the ICU but Pulmonary considering transfer out of the ICU.       History of Present Illness  Review of Systems   Unable to perform ROS: Acuity of condition        Personal History     History reviewed. No pertinent past medical history.  Past Surgical History:   Procedure Laterality Date    ENDOSCOPY N/A 4/3/2024    Procedure: ESOPHAGOGASTRODUODENOSCOPY AT BEDSIDE;  Surgeon: Redd Guidry MD;  Location: [facility] ENDOSCOPY;  Service: Gastroenterology;  Laterality: N/A;  PRE-  GI BLEED  POST- HIATAL HERNIA, DISTAL ESOPHAGITIS ULCERS, GASTRITIS, PYLOROPLASTY     History reviewed. No pertinent family history.  Social History     Tobacco Use    Smoking status: Former     Current packs/day: 0.00     Types: Cigarettes     Quit date:      Years since quittin.2    Smokeless tobacco: Never   Substance Use Topics    Alcohol use: Not Currently    Drug use: Never     Medications Prior to Admission   Medication Sig Dispense Refill Last Dose    amLODIPine (NORVASC) 10 MG tablet Take 1 tablet by mouth Daily.       ferrous sulfate 325 (65 FE) MG tablet Take 1 tablet by mouth Daily With Breakfast.       gabapentin (NEURONTIN) 300 MG capsule Take 1 capsule by mouth 3 (Three) Times a Day.       indapamide (LOZOL) 1.25 MG tablet Take 1 tablet by mouth Every Morning.        lisinopril (PRINIVIL,ZESTRIL) 10 MG tablet Take 1 tablet by mouth Daily.       rosuvastatin (CRESTOR) 20 MG tablet Take 1 tablet by mouth Daily.       tamsulosin (FLOMAX) 0.4 MG capsule 24 hr capsule Take 1 capsule by mouth Every Night.        Allergies:  No Known Allergies    Objective    Objective     Vital Signs  Temp:  [97.3 °F (36.3 °C)-98.9 °F (37.2 °C)] 97.8 °F (36.6 °C)  Heart Rate:  [123-149] 125  Resp:  [19-26] 23  BP: (107-162)/() 117/81  SpO2:  [91 %-96 %] 95 %  on  Flow (L/min):  [0] 0;   Device (Oxygen Therapy): room air  Body mass index is 24.51 kg/m².    Physical Exam  HENT:      Head: Normocephalic and atraumatic.   Eyes:      General: No scleral icterus.  Cardiovascular:      Rate and Rhythm: Tachycardia present.   Pulmonary:      Effort: Pulmonary effort is normal. No respiratory distress (decreased bs at bases).      Breath sounds: Normal breath sounds.   Abdominal:      General: There is no distension.      Palpations: Abdomen is soft.      Tenderness: There is no abdominal tenderness.   Musculoskeletal:      Cervical back: Neck supple.     +catheter with irrigation  Sleeping during exam- seen in ICU-  ICU RN    Results Review:  I reviewed the patient's new clinical results.      Lab Results (last 24 hours)       Procedure Component Value Units Date/Time    POC Glucose Once [[id number]]  (Abnormal) Collected: 04/09/24 1737    Specimen: Blood Updated: 04/09/24 1738     Glucose 165 mg/dL     POC Glucose Once [[id number]]  (Abnormal) Collected: 04/09/24 2344    Specimen: Blood Updated: 04/09/24 2345     Glucose 168 mg/dL     POC Glucose Once [[id number]]  (Abnormal) Collected: 04/10/24 0534    Specimen: Blood Updated: 04/10/24 0535     Glucose 171 mg/dL     CBC (No Diff) [[id number]]  (Abnormal) Collected: 04/10/24 0704    Specimen: Blood Updated: 04/10/24 0724     WBC 15.86 10*3/mm3      RBC 3.73 10*6/mm3      Hemoglobin 10.8 g/dL      Hematocrit 33.3 %      MCV 89.3 fL      MCH 29.0 pg       MCHC 32.4 g/dL      RDW 13.1 %      RDW-SD 43.0 fl      MPV 9.4 fL      Platelets 714 10*3/mm3     Basic Metabolic Panel [[id number]]  (Abnormal) Collected: 04/10/24 0704    Specimen: Blood Updated: 04/10/24 0742     Glucose 152 mg/dL      BUN 43 mg/dL      Creatinine 0.95 mg/dL      Sodium 149 mmol/L      Potassium 3.8 mmol/L      Chloride 112 mmol/L      CO2 23.6 mmol/L      Calcium 8.8 mg/dL      BUN/Creatinine Ratio 45.3     Anion Gap 13.4 mmol/L      eGFR 82.4 mL/min/1.73     Narrative:      GFR Normal >60  Chronic Kidney Disease <60  Kidney Failure <15    The GFR formula is only valid for adults with stable renal function between ages 18 and 70.    Magnesium [[id number]]  (Abnormal) Collected: 04/10/24 0704    Specimen: Blood Updated: 04/10/24 0742     Magnesium 2.9 mg/dL     POC Glucose Once [[id number]]  (Abnormal) Collected: 04/10/24 1113    Specimen: Blood Updated: 04/10/24 1140     Glucose 191 mg/dL             Imaging Results (Last 24 Hours)       Procedure Component Value Units Date/Time    XR Chest 1 View [[id number]] Collected: 04/10/24 1304     Updated: 04/10/24 1308    Narrative:      XR CHEST 1 VW-4/10/2024     HISTORY: Follow-up edema. Possible effusion.     Heart size is within normal limits. There is increased density in the  right lung base consistent with atelectasis and/or pneumonia. The right  base has cleared somewhat since the 4/8/2024 study. Left lung appears  relatively clear. NG tube courses into the stomach. No pneumothorax is  seen.        This report was finalized on 4/10/2024 1:05 PM by Dr. Jerry Mckinnon M.D on Workstation: [id number]               Results for orders placed during the hospital encounter of 04/01/24    Adult Transthoracic Echo Complete W/ Cont if Necessary Per Protocol    Interpretation Summary    Left ventricular systolic function is normal. Left ventricular ejection fraction appears to be 56 - 60%.    Left ventricular diastolic function was normal.    Estimated  right ventricular systolic pressure from tricuspid regurgitation is normal (<35 mmHg).      ECG 12 Lead Stroke Evaluation   Final Result   HEART RATE= 61  bpm   RR Interval= 984  ms   KS Interval= 193  ms   P Horizontal Axis= 30  deg   P Front Axis= -30  deg   QRSD Interval= 83  ms   QT Interval= 430  ms   QTcB= 433  ms   QRS Axis= 24  deg   T Wave Axis= 38  deg   - ABNORMAL ECG -   Sinus rhythm   No Prior Tracing for Comparison   Electronically Signed By: Hugo LopezJOSEFINA) ([facility]) 02-Apr-2024 20:01:12   Date and Time of Study: 2024-04-01 22:40:05      Telemetry Scan   Final Result           Assessment/Plan     Active Hospital Problems    Diagnosis  POA    **Acute CVA (cerebrovascular accident) [I63.9]  Yes    Right-sided extracranial carotid artery stenosis [I65.21]  Yes    Coffee ground emesis [K92.0]  Unknown    Gastroesophageal reflux disease [K21.9]  Unknown     Mr. Negron is a 79 y.o. who was admitted with MCA syndrome with acute stroke and was s/p TPA and underwent endovascular intervention with Dr. Rubio who placed a right carotid stent. He was admitted to the ICU. His stay had been complicated by dysphagia, pneumonia, coffee-ground emesis with EGD with distal esophageal ulcers, gross hematuria. He is still in the ICU but Pulmonary considering transfer out of the ICU.     Continue stroke care as directed by Neurology. On ASA and statin. PT/OT/ST  On ABX for PNA  Hematuria with Urology following and has three way catheter with irrigation  PPI and carafate, monitor H/H  I discussed the patients findings and my recommendations with patient and nursing staff.    VTE Prophylaxis - SCDs.         Umer Taylor MD  [facility]  04/10/24  15:14 EDT

## 2024-04-11 ENCOUNTER — ANCILLARY PROCEDURE (OUTPATIENT)
Dept: SPEECH THERAPY | Facility: HOSPITAL | Age: 79
End: 2024-04-11
Payer: MEDICAID

## 2024-04-11 PROBLEM — R13.10 DYSPHAGIA: Status: ACTIVE | Noted: 2024-04-01

## 2024-04-11 PROBLEM — I63.9 CEREBROVASCULAR ACCIDENT (CVA): Status: ACTIVE | Noted: 2024-04-01

## 2024-04-11 LAB
ALBUMIN SERPL-MCNC: 3.4 G/DL (ref 3.5–5.2)
ANION GAP SERPL CALCULATED.3IONS-SCNC: 13.6 MMOL/L (ref 5–15)
BUN SERPL-MCNC: 51 MG/DL (ref 8–23)
BUN/CREAT SERPL: 38.9 (ref 7–25)
CALCIUM SPEC-SCNC: 9.1 MG/DL (ref 8.6–10.5)
CHLORIDE SERPL-SCNC: 110 MMOL/L (ref 98–107)
CO2 SERPL-SCNC: 25.4 MMOL/L (ref 22–29)
CREAT SERPL-MCNC: 1.31 MG/DL (ref 0.76–1.27)
DEPRECATED RDW RBC AUTO: 45.3 FL (ref 37–54)
EGFRCR SERPLBLD CKD-EPI 2021: 56.1 ML/MIN/1.73
ERYTHROCYTE [DISTWIDTH] IN BLOOD BY AUTOMATED COUNT: 13.7 % (ref 12.3–15.4)
GLUCOSE BLDC GLUCOMTR-MCNC: 128 MG/DL (ref 70–130)
GLUCOSE BLDC GLUCOMTR-MCNC: 154 MG/DL (ref 70–130)
GLUCOSE BLDC GLUCOMTR-MCNC: 159 MG/DL (ref 70–130)
GLUCOSE BLDC GLUCOMTR-MCNC: 161 MG/DL (ref 70–130)
GLUCOSE BLDC GLUCOMTR-MCNC: 162 MG/DL (ref 70–130)
GLUCOSE SERPL-MCNC: 174 MG/DL (ref 65–99)
HCT VFR BLD AUTO: 32.7 % (ref 37.5–51)
HGB BLD-MCNC: 10.1 G/DL (ref 13–17.7)
MCH RBC QN AUTO: 27.9 PG (ref 26.6–33)
MCHC RBC AUTO-ENTMCNC: 30.9 G/DL (ref 31.5–35.7)
MCV RBC AUTO: 90.3 FL (ref 79–97)
PHOSPHATE SERPL-MCNC: 3.2 MG/DL (ref 2.5–4.5)
PLATELET # BLD AUTO: 794 10*3/MM3 (ref 140–450)
PMV BLD AUTO: 9.8 FL (ref 6–12)
POTASSIUM SERPL-SCNC: 3.9 MMOL/L (ref 3.5–5.2)
RBC # BLD AUTO: 3.62 10*6/MM3 (ref 4.14–5.8)
SODIUM SERPL-SCNC: 149 MMOL/L (ref 136–145)
WBC NRBC COR # BLD AUTO: 17.25 10*3/MM3 (ref 3.4–10.8)

## 2024-04-11 PROCEDURE — 63710000001 INSULIN REGULAR HUMAN PER 5 UNITS: Performed by: INTERNAL MEDICINE

## 2024-04-11 PROCEDURE — 80069 RENAL FUNCTION PANEL: CPT | Performed by: INTERNAL MEDICINE

## 2024-04-11 PROCEDURE — 94760 N-INVAS EAR/PLS OXIMETRY 1: CPT

## 2024-04-11 PROCEDURE — 97530 THERAPEUTIC ACTIVITIES: CPT

## 2024-04-11 PROCEDURE — 82948 REAGENT STRIP/BLOOD GLUCOSE: CPT

## 2024-04-11 PROCEDURE — 92612 ENDOSCOPY SWALLOW (FEES) VID: CPT

## 2024-04-11 PROCEDURE — 94664 DEMO&/EVAL PT USE INHALER: CPT

## 2024-04-11 PROCEDURE — 99222 1ST HOSP IP/OBS MODERATE 55: CPT

## 2024-04-11 PROCEDURE — 25010000002 CEFTRIAXONE PER 250 MG: Performed by: INTERNAL MEDICINE

## 2024-04-11 PROCEDURE — 94799 UNLISTED PULMONARY SVC/PX: CPT

## 2024-04-11 PROCEDURE — 94761 N-INVAS EAR/PLS OXIMETRY MLT: CPT

## 2024-04-11 PROCEDURE — 85027 COMPLETE CBC AUTOMATED: CPT | Performed by: INTERNAL MEDICINE

## 2024-04-11 RX ORDER — AMLODIPINE BESYLATE 5 MG/1
5 TABLET ORAL
Status: DISCONTINUED | OUTPATIENT
Start: 2024-04-11 | End: 2024-04-17 | Stop reason: HOSPADM

## 2024-04-11 RX ORDER — ACETAMINOPHEN 325 MG/1
650 TABLET ORAL 3 TIMES DAILY
Status: DISCONTINUED | OUTPATIENT
Start: 2024-04-11 | End: 2024-04-17 | Stop reason: HOSPADM

## 2024-04-11 RX ORDER — METOPROLOL TARTRATE 50 MG/1
50 TABLET, FILM COATED ORAL EVERY 12 HOURS SCHEDULED
Status: DISCONTINUED | OUTPATIENT
Start: 2024-04-11 | End: 2024-04-17 | Stop reason: HOSPADM

## 2024-04-11 RX ORDER — GABAPENTIN 100 MG/1
100 CAPSULE ORAL 3 TIMES DAILY
Status: DISCONTINUED | OUTPATIENT
Start: 2024-04-11 | End: 2024-04-11

## 2024-04-11 RX ORDER — GABAPENTIN 100 MG/1
200 CAPSULE ORAL 3 TIMES DAILY
Status: DISCONTINUED | OUTPATIENT
Start: 2024-04-11 | End: 2024-04-17 | Stop reason: HOSPADM

## 2024-04-11 RX ORDER — IPRATROPIUM BROMIDE AND ALBUTEROL SULFATE 2.5; .5 MG/3ML; MG/3ML
3 SOLUTION RESPIRATORY (INHALATION)
Status: DISCONTINUED | OUTPATIENT
Start: 2024-04-11 | End: 2024-04-12

## 2024-04-11 RX ORDER — SODIUM CHLORIDE 450 MG/100ML
75 INJECTION, SOLUTION INTRAVENOUS CONTINUOUS
Status: ACTIVE | OUTPATIENT
Start: 2024-04-11 | End: 2024-04-11

## 2024-04-11 RX ADMIN — IPRATROPIUM BROMIDE AND ALBUTEROL SULFATE 3 ML: .5; 3 SOLUTION RESPIRATORY (INHALATION) at 07:03

## 2024-04-11 RX ADMIN — IPRATROPIUM BROMIDE 0.5 MG: 0.5 SOLUTION RESPIRATORY (INHALATION) at 15:29

## 2024-04-11 RX ADMIN — AMLODIPINE BESYLATE 5 MG: 5 TABLET ORAL at 11:20

## 2024-04-11 RX ADMIN — GABAPENTIN 200 MG: 100 CAPSULE ORAL at 16:50

## 2024-04-11 RX ADMIN — ACETAMINOPHEN 650 MG: 160 SOLUTION ORAL at 16:50

## 2024-04-11 RX ADMIN — CEFTRIAXONE 2000 MG: 2 INJECTION, POWDER, FOR SOLUTION INTRAMUSCULAR; INTRAVENOUS at 11:21

## 2024-04-11 RX ADMIN — IPRATROPIUM BROMIDE AND ALBUTEROL SULFATE 3 ML: .5; 3 SOLUTION RESPIRATORY (INHALATION) at 21:30

## 2024-04-11 RX ADMIN — METOPROLOL TARTRATE 50 MG: 50 TABLET, FILM COATED ORAL at 09:33

## 2024-04-11 RX ADMIN — INSULIN HUMAN 2 UNITS: 100 INJECTION, SOLUTION PARENTERAL at 06:32

## 2024-04-11 RX ADMIN — DOCUSATE SODIUM 50MG AND SENNOSIDES 8.6MG 2 TABLET: 8.6; 5 TABLET, FILM COATED ORAL at 09:33

## 2024-04-11 RX ADMIN — Medication 10 ML: at 09:34

## 2024-04-11 RX ADMIN — ATORVASTATIN CALCIUM 80 MG: 80 TABLET, FILM COATED ORAL at 21:05

## 2024-04-11 RX ADMIN — SODIUM CHLORIDE 75 ML/HR: 4.5 INJECTION, SOLUTION INTRAVENOUS at 11:55

## 2024-04-11 RX ADMIN — PANTOPRAZOLE SODIUM 40 MG: 40 INJECTION, POWDER, FOR SOLUTION INTRAVENOUS at 09:32

## 2024-04-11 RX ADMIN — DOCUSATE SODIUM 50MG AND SENNOSIDES 8.6MG 2 TABLET: 8.6; 5 TABLET, FILM COATED ORAL at 21:03

## 2024-04-11 RX ADMIN — ASPIRIN 81 MG: 81 TABLET, CHEWABLE ORAL at 09:33

## 2024-04-11 RX ADMIN — INSULIN HUMAN 2 UNITS: 100 INJECTION, SOLUTION PARENTERAL at 13:28

## 2024-04-11 RX ADMIN — ACETAMINOPHEN 325MG 650 MG: 325 TABLET ORAL at 11:20

## 2024-04-11 RX ADMIN — GABAPENTIN 200 MG: 100 CAPSULE ORAL at 21:05

## 2024-04-11 RX ADMIN — ACETAMINOPHEN 325MG 650 MG: 325 TABLET ORAL at 21:04

## 2024-04-11 RX ADMIN — IPRATROPIUM BROMIDE 0.5 MG: 0.5 SOLUTION RESPIRATORY (INHALATION) at 21:30

## 2024-04-11 RX ADMIN — METOPROLOL TARTRATE 50 MG: 50 TABLET, FILM COATED ORAL at 21:05

## 2024-04-11 RX ADMIN — INSULIN HUMAN 2 UNITS: 100 INJECTION, SOLUTION PARENTERAL at 18:44

## 2024-04-11 RX ADMIN — GABAPENTIN 200 MG: 100 CAPSULE ORAL at 11:20

## 2024-04-11 RX ADMIN — CLOPIDOGREL BISULFATE 75 MG: 75 TABLET, FILM COATED ORAL at 09:33

## 2024-04-11 RX ADMIN — IPRATROPIUM BROMIDE AND ALBUTEROL SULFATE 3 ML: .5; 3 SOLUTION RESPIRATORY (INHALATION) at 03:02

## 2024-04-11 RX ADMIN — INSULIN HUMAN 2 UNITS: 100 INJECTION, SOLUTION PARENTERAL at 01:52

## 2024-04-11 NOTE — PROGRESS NOTES
Name: Alex Negron ADMIT: 2024   : 1945  PCP: Jyothi Song PA-C    MRN: [id number] LOS: 10 days   AGE/SEX: 79 y.o. male  ROOM: [facility]   Subjective   Chief Complaint   Patient presents with    Extremity Weakness     Transferred out of ICU  Has been tachycardic- for his whole stay it seems  Getting TFs  Confused - needing restraints  On abx    ROS  Unreliable due to pt status     Objective   Vital Signs  Temp:  [97.7 °F (36.5 °C)-98.9 °F (37.2 °C)] 98.4 °F (36.9 °C)  Heart Rate:  [111-146] 120  Resp:  [16-22] 16  BP: (112-141)/(75-90) 141/90  SpO2:  [91 %-100 %] 93 %  on   ;   Device (Oxygen Therapy): room air  Body mass index is 24.51 kg/m².    Physical Exam  Constitutional:       Appearance: He is ill-appearing.   HENT:      Head: Normocephalic and atraumatic.   Eyes:      General: No scleral icterus.  Cardiovascular:      Rate and Rhythm: Normal rate and regular rhythm. Tachycardia present.      Heart sounds: Normal heart sounds.   Pulmonary:      Effort: Pulmonary effort is normal. No respiratory distress (decreased bs at bases).   Abdominal:      General: There is no distension.      Palpations: Abdomen is soft.      Tenderness: There is no abdominal tenderness.   Musculoskeletal:      Cervical back: Neck supple.     Drowsy, lethargic    Results Review:       I reviewed the patient's new clinical results.  Results from last 7 days   Lab Units 24  0453 04/10/24  0704 24  0415 24  0246   WBC 10*3/mm3 17.25* 15.86* 14.83* 14.93*   HEMOGLOBIN g/dL 10.1* 10.8* 11.5* 10.6*   PLATELETS 10*3/mm3 794* 714* 646* 492*     Results from last 7 days   Lab Units 24  0453 04/10/24  0704 24  0415 24  1431 24  0246   SODIUM mmol/L 149* 149* 146*  --  147*   POTASSIUM mmol/L 3.9 3.8 4.1 4.3 3.5   CHLORIDE mmol/L 110* 112* 111*  --  108*   CO2 mmol/L 25.4 23.6 21.9*  --  22.2   BUN mg/dL 51* 43* 37*  --  28*   CREATININE mg/dL 1.31* 0.95 1.12  --  1.06   GLUCOSE mg/dL  "174* 152* 175*  --  139*   Estimated Creatinine Clearance: 53.2 mL/min (A) (by C-G formula based on SCr of 1.31 mg/dL (H)).  Results from last 7 days   Lab Units 04/11/24  0453   ALBUMIN g/dL 3.4*     Results from last 7 days   Lab Units 04/11/24  0453 04/10/24  0704 04/09/24  0415 04/08/24  0246 04/07/24  0413   CALCIUM mg/dL 9.1 8.8 8.9 8.6 7.8*   ALBUMIN g/dL 3.4*  --   --   --   --    MAGNESIUM mg/dL  --  2.9* 2.7* 2.2 2.4   PHOSPHORUS mg/dL 3.2  --   --  3.3 1.1*         Coag   Results from last 7 days   Lab Units 04/06/24  1122   INR  1.0     HbA1C   Lab Results   Component Value Date    HGBA1C 6.20 (H) 04/02/2024     Infection   Results from last 7 days   Lab Units 04/04/24 2021   RESPCX  Scant growth (1+) Klebsiella pneumoniae ssp pneumoniae*  No Normal Respiratory Debra*     Radiology(recent) No radiology results for the last day  No results found for: \"TROPONINT\", \"TROPONINI\", \"BNP\"  No components found for: \"TSH;2\"    acetaminophen, 650 mg, Nasogastric, TID  amLODIPine, 5 mg, Per PEG Tube, Q24H  aspirin, 81 mg, Nasogastric, Daily  atorvastatin, 80 mg, Nasogastric, Nightly  cefTRIAXone, 2,000 mg, Intravenous, Q24H  clopidogrel, 75 mg, Nasogastric, Daily  gabapentin, 200 mg, Nasogastric, TID  insulin regular, 2-7 Units, Subcutaneous, Q6H  ipratropium, 0.5 mg, Nebulization, TID - RT  metoprolol tartrate, 50 mg, Oral, Q12H  pantoprazole, 40 mg, Intravenous, Daily  senna-docusate sodium, 2 tablet, Nasogastric, BID  sodium chloride, 500 mL, Intravenous, Once  sodium chloride, 10 mL, Intravenous, Q12H       NPO Diet NPO Type: Strict NPO      Assessment & Plan      Active Hospital Problems    Diagnosis  POA    **Acute CVA (cerebrovascular accident) [I63.9]  Yes    Right-sided extracranial carotid artery stenosis [I65.21]  Yes    Coffee ground emesis [K92.0]  Unknown    Gastroesophageal reflux disease [K21.9]  Unknown      Resolved Hospital Problems   No resolved problems to display.       Mr. Negron is a 79 " y.o. who was admitted with MCA syndrome with acute stroke and was s/p TPA and underwent endovascular intervention with Dr. Rubio who placed a right carotid stent. He was admitted to the ICU. His stay had been complicated by dysphagia, pneumonia, coffee-ground emesis with EGD with distal esophageal ulcers, gross hematuria. He is still in the ICU but Pulmonary considering transfer out of the ICU.      Continue stroke care as directed by Neurology. On ASA and statin. PT/OT/ST  On ABX for PNA- klebsiella pna ceftriaxone  Hematuria with Urology following and has three way catheter with irrigation  PPI and carafate, monitor H/H  Has been quite tachycardic for some time by review. May have some pain will have apap and restart his home gabapentin. May be related to PNA as well as stroke. Increase metoprolol  Hyperna- on free water with TFs. Will have some 1/2NS today as well.   He's probably going to need a PEG. Will go ahead and consult Surgery to see what they think and if can stay on plavix or not  I discussed the patients findings and my recommendations with patient and nursing staff.     VTE Prophylaxis - SCDs.        Dispo- acute vs subacute rehab, likely next week     DASH RN  DW daughter     Umer Taylor MD  [facility]  04/11/24  11:02 EDT    Addendum  Dash Cotto who evaluated him again and he failed swallow eval so does need PEG. Lots of thick secretions so will have nebs and RT suctioning (may be more effective when cortrack removed). Monitor as he does have risk for aspiration.     Electronically signed by Umer Tyalor MD, 04/11/24, 3:16 PM EDT.

## 2024-04-11 NOTE — PLAN OF CARE
Goal Outcome Evaluation:  Plan of Care Reviewed With: patient, family           Outcome Evaluation: FEES completed. Recommend NPO, moist swabs for comfort s/p oral care. Alternate means of nutrition and meds alternate route. Recommend frequent oral care with suction. Discussed concern for aspiration of thick secretion(s) with MD and RN. Reviewed results of study with patient's son in law and son, offered  and son declined. SLP to follow for further eval.      Anticipated Discharge Disposition (SLP): anticipate therapy at next level of care          SLP Swallowing Diagnosis: severe, oral dysphagia, pharyngeal dysphagia (04/11/24 8925)

## 2024-04-11 NOTE — PLAN OF CARE
Goal Outcome Evaluation:                 Pt. Tolerating tube feed. CBI running very slow and urine is light yellow. No clots, blood. Resp and heart rate remains elevated as has been.

## 2024-04-11 NOTE — MBS/VFSS/FEES
Acute Care - Speech Language Pathology   Swallow Initial Evaluation [facility]     Patient Name: Alex Negron  : 1945  MRN: [id number]  Today's Date: 2024               Admit Date: 2024    Visit Dx:     ICD-10-CM ICD-9-CM   1. Cerebrovascular accident (CVA), unspecified mechanism  I63.9 434.91   2. Coffee ground emesis  K92.0 578.0   3. Gastroesophageal reflux disease, unspecified whether esophagitis present  K21.9 530.81   4. 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 Dysphagia, unspecified type  R13.10 787.20     Patient Active Problem List   Diagnosis    Acute CVA (cerebrovascular accident)    Right-sided extracranial carotid artery stenosis    Coffee ground emesis    Gastroesophageal reflux disease    Cerebrovascular accident (CVA)    Dysphagia     History reviewed. No pertinent past medical history.  Past Surgical History:   Procedure Laterality Date    ENDOSCOPY N/A 4/3/2024    Procedure: ESOPHAGOGASTRODUODENOSCOPY AT BEDSIDE;  Surgeon: Redd Guidry MD;  Location: [facility] ENDOSCOPY;  Service: Gastroenterology;  Laterality: N/A;  PRE-  GI BLEED  POST- HIATAL HERNIA, DISTAL ESOPHAGITIS ULCERS, GASTRITIS, PYLOROPLASTY       SLP Recommendation and Plan  SLP Swallowing Diagnosis: severe, oral dysphagia, pharyngeal dysphagia (24)  SLP Diet Recommendation: NPO, other (see comments) (moist swabs for oral comfort) (24)     SLP Rec. for Method of Medication Administration: meds via alternate route (24)     Monitor for Signs of Aspiration: yes, notify SLP if any concerns (24)  Recommended Diagnostics: reassess via FEES (24)  Swallow Criteria for Skilled Therapeutic Interventions Met: demonstrates skilled criteria (24)  Anticipated Discharge Disposition (SLP): anticipate therapy at next level of care (24)  Rehab Potential/Prognosis, Swallowing: good, to  achieve stated therapy goals (04/11/24 1315)  Therapy Frequency (Swallow): PRN (04/11/24 1315)  Predicted Duration Therapy Intervention (Days): until discharge (04/11/24 1315)  Oral Care Recommendations: Oral Care BID/PRN (04/11/24 1315)                                        Plan of Care Reviewed With: patient, family  Outcome Evaluation: FEES completed. Recommend NPO, moist swabs for comfort s/p oral care. Alternate means of nutrition and meds alternate route. Recommend frequent oral care with suction. Discussed concern for aspiration of thick secretion(s) with MD and RN. Reviewed results of study with patient's son in law and son, offered  and son declined. SLP to follow for further eval.      SWALLOW EVALUATION (Last 72 Hours)       SLP Adult Swallow Evaluation       Row Name 04/11/24 1315 04/10/24 1100                Rehab Evaluation    Document Type evaluation  -OC re-evaluation  -SA       Subjective Information no complaints  -OC no complaints  -SA       Patient Observations cooperative;agree to therapy  -OC alert;cooperative  -SA       Patient/Family/Caregiver Comments/Observations requried verbal/tactile cues to maintain alertness  -OC daughter present and interpreted for pt  -SA       Patient Effort fair  -OC adequate  -SA       Symptoms Noted During/After Treatment -- other (see comments)  pt with increasing pain d/t stomach cramping and burning sensation at penis  -SA          General Information    Patient Profile Reviewed yes  -OC yes  -SA       Pertinent History Of Current Problem -- Multiple acute infarcts  -SA       Current Method of Nutrition NPO;nasogastric feedings  -OC NPO;nasogastric feedings  -SA       Precautions/Limitations, Vision difficult to assess  -OC difficult to assess  -SA       Precautions/Limitations, Hearing WFL;for purposes of eval  -OC WFL;for purposes of eval  -SA       Prior Level of Function-Communication WFL  -OC other (see comments);WFL  per daughter; speaks  [place]  -SA       Prior Level of Function-Swallowing no diet consistency restrictions  -OC no diet consistency restrictions  -SA       Plans/Goals Discussed with patient;agreed upon  -OC patient and family;agreed upon  -SA       Barriers to Rehab medically complex  -OC medically complex  -SA       Patient's Goals for Discharge patient did not state  -OC patient did not state  -SA       Family Goals for Discharge -- patient able to return to PO diet  -SA          Pain Scale: Numbers Pre/Post-Treatment    Pre/Posttreatment Pain Comment appears to be resting comfortably, no pain reported  -OC --          Clinical Swallow Eval    Oral Prep Phase -- WFL  -SA       Oral Transit -- WFL  -SA       Oral Residue -- WFL  -SA       Pharyngeal Phase -- suspected pharyngeal impairment  -SA       Esophageal Phase -- unremarkable  -SA          Pharyngeal Phase Concerns    Pharyngeal Phase Concerns -- throat clear;multiple swallows  -SA       Multiple Swallows -- nectar;honey  -SA       Throat Clear -- honey;nectar  -SA          Fiberoptic Endoscopic Evaluation of Swallowing (FEES)    Risks/Benefits Reviewed risks/benefits explained;patient;family;agreed to eval  -OC --       Nasal Entry right:  -OC --       Scope serial number/identification ambu  -OC --          Anatomy and Physiology    Velopharyngeal reduced movement  -OC --       Base of Tongue symmetrical;range reduced  -OC --       Laryngeal Function Breathing CNA  -OC --       Laryngeal Function Phonation CNA  -OC --       Laryngeal Function to Breath Hold CNA  -OC --       Secretion Rating Scale (Malcolm et al. 1996) 3- secretions inside the laryngeal vestibule/aspiration, not cleared  suspected, limited view  -OC --       Ice Chips elicited swallow;did not clear secretions;aspirated  -OC --       Spontaneous Swallow frequency reduced  -OC --          Initiation of Pharyngeal Swallow    FEES Summary Objective: Risks/benefits were reviewed with the patient and family, and  consent was obtained. The nasendoscope was introduced into the right nare without the use of topical anaesthetic. Textures given included ice. Assessment:  Velopharyngeal function was Impaired and characterized by reduced movement.  Epiglottis was  partially visualized, view obscured by large thick/dried mucous . Tongue base movement was symmetrical with reduced range of motion. Laryngeal function was not visualized secondary to large thick/dark red dried mucous on posterior pharyngeal wall superior to and moving in/out of vestibule.  Secretion rating scale score unobtained secondary to unable to visualized vestibule, suspect 3 with aspiration of secretions. Difficulties were noted with ice chips. Limited view of vestibule, ice chips aided in loosening of secretion however unable to clear. Minimal view of vocal cords demonstrated white coating of cords and vestibule s/p ice chips. Suction provided with limited/partial removal of thick secretions. Discussed concern for aspiration/choking on thick secretions with nursing and MD. MD ordered RT for breathing treatment and suction. SLP Findings:  Patient presents with severe oropharyngeal dysphagia.   Recommendations:  Diet Textures: NPO. Medications should be taken  by alternate means. May have  moist swabs for oral comfort.  -OC --          SLP Evaluation Clinical Impression    SLP Swallowing Diagnosis severe;oral dysphagia;pharyngeal dysphagia  -OC --       Functional Impact risk of aspiration/pneumonia  -OC --       Rehab Potential/Prognosis, Swallowing good, to achieve stated therapy goals  -OC --       Swallow Criteria for Skilled Therapeutic Interventions Met demonstrates skilled criteria  -OC --          Recommendations    Therapy Frequency (Swallow) PRN  -OC PRN  -SA       Predicted Duration Therapy Intervention (Days) until discharge  -OC until discharge  -SA       SLP Diet Recommendation NPO;other (see comments)  moist swabs for oral comfort  -OC NPO;other  (see comments)  continue ice chips after oral care w/ rn  -       Recommended Diagnostics reassess via FEES  -OC reassess via clinical swallow evaluation  -       Oral Care Recommendations Oral Care BID/PRN  -OC Oral Care BID/PRN;Before ice/water  -SA       SLP Rec. for Method of Medication Administration meds via alternate route  -OC meds via alternate route  -       Monitor for Signs of Aspiration yes;notify SLP if any concerns  -OC --       Anticipated Discharge Disposition (SLP) anticipate therapy at next level of care  -OC --                 User Key  (r) = Recorded By, (t) = Taken By, (c) = Cosigned By      Initials Name Effective Dates    SA Eva Gong SLP 01/11/24 -     Yara Solorzano SLP 08/28/23 -                     EDUCATION  The patient has been educated in the following areas:   NPO rationale.              Time Calculation:    Time Calculation- SLP       Row Name 04/11/24 1616             Time Calculation- SLP    SLP Start Time 1315  -OC      SLP Received On 04/11/24  -OC         Untimed Charges    SLP Eval/Re-eval  ST Fiberoptic Endo Eval Swallow - 62724  -OC      69195-MY Fiberoptic Endo Eval Swallow Minutes 120  -OC         Total Minutes    Untimed Charges Total Minutes 120  -OC       Total Minutes 120  -OC                User Key  (r) = Recorded By, (t) = Taken By, (c) = Cosigned By      Initials Name Provider Type    Yara Solorzano SLP Speech and Language Pathologist                    Therapy Charges for Today       Code Description Service Date Service Provider Modifiers Qty    [id number]  ST FIBEROPTIC ENDO EVAL SWALL 8 4/11/2024 Yara Claire SLP GN 1    [id number] [facility] SCP BRONCH ASCOPE FLX DISPOSABLE 4/11/2024 Yara Claire SLP  1                 LV Ward  4/11/2024

## 2024-04-11 NOTE — PLAN OF CARE
Problem: Adult Inpatient Plan of Care  Goal: Absence of Hospital-Acquired Illness or Injury  Intervention: Identify and Manage Fall Risk  Recent Flowsheet Documentation  Taken 4/11/2024 1608 by Leticia Trinidad RN  Safety Promotion/Fall Prevention:   assistive device/personal items within reach   fall prevention program maintained   nonskid shoes/slippers when out of bed   room organization consistent   safety round/check completed  Taken 4/11/2024 1435 by Leticia Trinidad RN  Safety Promotion/Fall Prevention:   safety round/check completed   room organization consistent   nonskid shoes/slippers when out of bed   fall prevention program maintained   clutter free environment maintained   assistive device/personal items within reach  Taken 4/11/2024 1238 by Leticia Trinidad RN  Safety Promotion/Fall Prevention:   safety round/check completed   room organization consistent   nonskid shoes/slippers when out of bed   lighting adjusted   fall prevention program maintained   clutter free environment maintained   assistive device/personal items within reach  Taken 4/11/2024 1020 by Leticia Trinidad RN  Safety Promotion/Fall Prevention:   safety round/check completed   room organization consistent   nonskid shoes/slippers when out of bed   fall prevention program maintained   clutter free environment maintained   assistive device/personal items within reach  Taken 4/11/2024 0830 by Leticia Trinidad RN  Safety Promotion/Fall Prevention:   assistive device/personal items within reach   clutter free environment maintained   fall prevention program maintained   lighting adjusted   nonskid shoes/slippers when out of bed   room organization consistent   safety round/check completed  Intervention: Prevent Skin Injury  Recent Flowsheet Documentation  Taken 4/11/2024 1435 by Leticia Trinidad RN  Body Position:   30 degrees   supine, legs elevated  Taken 4/11/2024 1238 by Leticia Trinidad RN  Body Position: 30 degrees  Taken 4/11/2024 1020 by Leticia Trinidad  RN  Body Position:   30 degrees   sitting up in bed  Taken 4/11/2024 0830 by Leticia Trinidad RN  Body Position:   30 degrees   supine, legs elevated  Skin Protection:   adhesive use limited   skin-to-device areas padded   skin-to-skin areas padded   transparent dressing maintained   tubing/devices free from skin contact  Intervention: Prevent and Manage VTE (Venous Thromboembolism) Risk  Recent Flowsheet Documentation  Taken 4/11/2024 1435 by Leticia Trinidad RN  Activity Management: bedrest  Taken 4/11/2024 1238 by Leticia Trinidad RN  Activity Management: bedrest  Taken 4/11/2024 1020 by Leticia Trinidad RN  Activity Management: bedrest  Taken 4/11/2024 0830 by Leticia Trinidad RN  Activity Management: bedrest  Range of Motion: active ROM (range of motion) encouraged  Intervention: Prevent Infection  Recent Flowsheet Documentation  Taken 4/11/2024 1608 by Leticia Trinidad RN  Infection Prevention:   hand hygiene promoted   single patient room provided  Taken 4/11/2024 1435 by Leticia Trinidad RN  Infection Prevention:   personal protective equipment utilized   equipment surfaces disinfected   single patient room provided  Taken 4/11/2024 1238 by Letciia Trinidad RN  Infection Prevention:   equipment surfaces disinfected   hand hygiene promoted   personal protective equipment utilized   single patient room provided  Taken 4/11/2024 1020 by Leticia Trinidad RN  Infection Prevention:   hand hygiene promoted   personal protective equipment utilized   equipment surfaces disinfected   single patient room provided     Problem: Adult Inpatient Plan of Care  Goal: Absence of Hospital-Acquired Illness or Injury  Intervention: Prevent and Manage VTE (Venous Thromboembolism) Risk  Recent Flowsheet Documentation  Taken 4/11/2024 1435 by Leticia Trinidad RN  Activity Management: bedrest  Taken 4/11/2024 1238 by Leticia Trinidad RN  Activity Management: bedrest  Taken 4/11/2024 1020 by Leticia Trinidad RN  Activity Management: bedrest  Taken 4/11/2024 0830 by Leticia Trinidad  RN  Activity Management: bedrest  Range of Motion: active ROM (range of motion) encouraged     Problem: Adult Inpatient Plan of Care  Goal: Optimal Comfort and Wellbeing  Intervention: Monitor Pain and Promote Comfort  Recent Flowsheet Documentation  Taken 4/11/2024 0830 by Leticia Trinidad RN  Pain Management Interventions:   care clustered   pillow support provided   position adjusted   see MAR   quiet environment facilitated     Problem: Skin Injury Risk Increased  Goal: Skin Health and Integrity  Intervention: Optimize Skin Protection  Recent Flowsheet Documentation  Taken 4/11/2024 1435 by Leticia Trinidad RN  Head of Bed ([facility]) Positioning: HOB at 30 degrees  Taken 4/11/2024 1238 by Leticia Trinidad RN  Head of Bed ([facility]) Positioning: HOB at 30 degrees  Taken 4/11/2024 1020 by Leticia Trinidad RN  Head of Bed ([facility]) Positioning: HOB at 20-30 degrees  Taken 4/11/2024 0830 by Leticia Trinidad RN  Pressure Reduction Techniques:   frequent weight shift encouraged   pressure points protected  Head of Bed (HOB) Positioning:   HOB elevated   HOB at 30-45 degrees  Pressure Reduction Devices: alternating pressure pump (ADD)  Skin Protection:   adhesive use limited   skin-to-device areas padded   skin-to-skin areas padded   transparent dressing maintained   tubing/devices free from skin contact     Problem: Aspiration (Enteral Nutrition)  Goal: Absence of Aspiration Signs and Symptoms  Intervention: Minimize Aspiration Risk  Recent Flowsheet Documentation  Taken 4/11/2024 1435 by Leticia Trinidad RN  Head of Bed ([facility]) Positioning: HOB at 30 degrees  Taken 4/11/2024 1238 by Leticia Trinidad RN  Head of Bed ([facility]) Positioning: HOB at 30 degrees  Taken 4/11/2024 1108 by Leticia Trinidad RN  Oral Care: swabbed with antiseptic solution  Taken 4/11/2024 1020 by Leticia Trinidad RN  Head of Bed ([facility]) Positioning: HOB at 20-30 degrees  Taken 4/11/2024 0830 by Leticia Trinidad RN  Head of Bed ([facility]) Positioning:   HOB elevated   HOB at 30-45 degrees  Oral Care: swabbed  with antiseptic solution  Enteral Feeding Safety:   placement checked   tube marked at exit site     Problem: Device-Related Complication Risk (Enteral Nutrition)  Goal: Safe, Effective Therapy Delivery  Intervention: Prevent Feeding-Related Adverse Events  Recent Flowsheet Documentation  Taken 4/11/2024 0830 by Leticia Trinidad, RN  Enteral Feeding Safety:   placement checked   tube marked at exit site   Goal Outcome Evaluation:   Pt is A&OX4 c/ help from daughter and . Pt only speaks [place] citizen daughter/interpretor needed. VSS c/ elevated HR, MD aware, meds admin as directed. Pt is NPO, NG tube in place c/ tube feedings (Diabetisource) running at 75 ml/hr. Pt tolerating well. Oral mucosa dry and intact, oral hygiene admin as directed throughout shift. LS Dim to ascu, O2 92-94% on RA. Continuous irrigation via f/c in place, I&Os cont. IV site to RUE intact, patent and c/ good blood return, no s/s of redness, drainage, or swelling noted. Cont fluids running as directed. HOB elevated, call light and personal items in reach.

## 2024-04-11 NOTE — PROGRESS NOTES
Continued Stay Note  [facility]     Patient Name: Alex Negron  MRN: [id number]  Today's Date: 4/11/2024    Admit Date: 4/1/2024    Plan: [facility] acute rehab eval pending   Discharge Plan       Row Name 04/11/24 0955       Plan    Plan [facility] acute rehab eval pending    Patient/Family in Agreement with Plan yes    Plan Comments [facility] acute rehab continues to eval pending clinical and behavioral stability. Pt remains in restraints and npo with ng tube. CCP to follow. Cherise Sheehan LCSW                    Discharge Codes    No documentation.                 Expected Discharge Date and Time       Expected Discharge Date Expected Discharge Time    Apr 12, 2024               Michelle Sheehan LCSW

## 2024-04-11 NOTE — H&P (VIEW-ONLY)
General Surgery     Summary:     Alex Negron is a 79 y.o. 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 General surgery consulted for PEG tube placement. Patient has been receiving enteral feeds via DHT and tolerating well. WBC elevated, currently receiving Rocephin for PNA. Patients abdomen is soft, non-tender, no scars noted. Patient has failed swallow studies with speech and PEG tube placement has been recommended. Patient is currently on Plavix. Discussed with patient's daughter the risks (bleeding, infection, damage to surrounding tissues), benefits, and alternatives to this procedure and she agreed to proceed. Recent EGD on 4/3 showed gastritis and esophageal ulcers, depending on how this looks tomorrow, may not be able to proceed with tube placement.     Will Plan for EGD with PEG tube placement tomorrow with Dr. Brand.   NPO at [facility]    Chief Complaint:    Dysphagia    History of Present Illness:     Alex Negron is a 79 y.o. year old man with HTN, HLD who presented to hospital on 4/1/2024 with left sided weakness and facial droop. 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 Dr. Rubio placed a right ICA stent and successful thrombectomy. Patient remains with Left sided hemiplegia and oral dysphagia. On 4/3 he was having coffee ground emesis and went for EGD with Dr. Guidry which showed a hiatal hernia, esophagitis ulcers, and gastritis.  A pyloroplasty was also performed. Patient has been doing much better since then, both neurology and GI have signed off. No abdominal surgery history noted, no scars noted on abdomen on physical exam.       Past Medical History:   HLD  HTN  CVA  Esophageal ulcers  gastritis    Past Surgical History:    Past Surgical History:   Procedure Laterality Date    ENDOSCOPY N/A 4/3/2024    Procedure: ESOPHAGOGASTRODUODENOSCOPY AT BEDSIDE;  Surgeon: Redd Guidry MD;  Location: [facility] ENDOSCOPY;  Service:  Gastroenterology;  Laterality: N/A;  PRE-  GI BLEED  POST- HIATAL HERNIA, DISTAL ESOPHAGITIS ULCERS, GASTRITIS, PYLOROPLASTY       Family History:    History reviewed. No pertinent family history.      Social History:    Social History     Socioeconomic History    Marital status:    Tobacco Use    Smoking status: Former     Current packs/day: 0.00     Types: Cigarettes     Quit date:      Years since quittin.2    Smokeless tobacco: Never   Substance and Sexual Activity    Alcohol use: Not Currently    Drug use: Never    Sexual activity: Defer       Allergies:   No Known Allergies    Medications:     Current Facility-Administered Medications:     acetaminophen (TYLENOL) 160 MG/5ML oral solution 650 mg, 650 mg, Oral, Q6H PRN, Martin Garnett MD, 650 mg at 24 2329    acetaminophen (TYLENOL) tablet 650 mg, 650 mg, Nasogastric, TID, Umer Taylor MD, 650 mg at 24 1120    amLODIPine (NORVASC) tablet 5 mg, 5 mg, Per PEG Tube, Q24H, Umer Taylor MD, 5 mg at 24 1120    aspirin chewable tablet 81 mg, 81 mg, Nasogastric, Daily, Martin Garnett MD, 81 mg at 24 0933    atorvastatin (LIPITOR) tablet 80 mg, 80 mg, Nasogastric, Nightly, Martin Garnett MD, 80 mg at 04/10/24 2103    sennosides-docusate (PERICOLACE) 8.6-50 MG per tablet 2 tablet, 2 tablet, Nasogastric, BID, 2 tablet at 24 0933 **AND** polyethylene glycol (MIRALAX) packet 17 g, 17 g, Oral, Daily PRN **AND** bisacodyl (DULCOLAX) EC tablet 5 mg, 5 mg, Oral, Daily PRN **AND** bisacodyl (DULCOLAX) suppository 10 mg, 10 mg, Rectal, Daily PRN, Martin Garnett MD    calcium carbonate (TUMS) chewable tablet 500 mg (200 mg elemental), 2 tablet, Oral, TID PRN, Martin Garnett MD, 2 tablet at 24    Calcium Replacement - Follow Nurse / BPA Driven Protocol, , Does not apply, PRN, Martin Garnett MD    Calcium Replacement - Follow Nurse / BPA Driven Protocol, , Does not apply, PRN,  Martin Garnett MD    Calcium Replacement - Follow Nurse / BPA Driven Protocol, , Does not apply, PRN, Martin Garnett MD    cefTRIAXone (ROCEPHIN) 2,000 mg in sodium chloride 0.9 % 100 mL MBP, 2,000 mg, Intravenous, Q24H, Umer Taylor MD, Last Rate: 200 mL/hr at 04/11/24 1121, 2,000 mg at 04/11/24 1121    clopidogrel (PLAVIX) tablet 75 mg, 75 mg, Nasogastric, Daily, Martin Garnett MD, 75 mg at 04/11/24 0933    dextrose (D50W) (25 g/50 mL) IV injection 25 g, 25 g, Intravenous, Q15 Min PRN, Martin Garnett MD    dextrose (GLUTOSE) oral gel 15 g, 15 g, Oral, Q15 Min PRN, Martin Garnett MD    gabapentin (NEURONTIN) capsule 200 mg, 200 mg, Nasogastric, TID, Umer Taylor MD, 200 mg at 04/11/24 1120    glucagon (GLUCAGEN) injection 1 mg, 1 mg, Intramuscular, Q15 Min PRN, Martin Garnett MD    hydrALAZINE (APRESOLINE) injection 10 mg, 10 mg, Intravenous, Q4H PRN, Martin Garnett MD, 10 mg at 04/10/24 1109    insulin regular (humuLIN R,novoLIN R) injection 2-7 Units, 2-7 Units, Subcutaneous, Q6H, Martin Garnett MD, 2 Units at 04/11/24 1328    ipratropium (ATROVENT) nebulizer solution 0.5 mg, 0.5 mg, Nebulization, TID - RT, Umer Taylor MD    Magnesium Low Dose Replacement - Follow Nurse / BPA Driven Protocol, , Does not apply, Tamir LINDSEY Mark Edwin, MD    Magnesium Standard Dose Replacement - Follow Nurse / BPA Driven Protocol, , Does not apply, Tamir LINDSEY Mark Edwin, MD    Magnesium Standard Dose Replacement - Follow Nurse / BPA Driven Protocol, , Does not apply, Tamir LINDSEY Mark Edwin, MD    metoprolol tartrate (LOPRESSOR) tablet 50 mg, 50 mg, Oral, Q12H, Umer Taylor MD, 50 mg at 04/11/24 0933    nitroglycerin (NITROSTAT) SL tablet 0.4 mg, 0.4 mg, Sublingual, Q5 Min PRN, Martin Garnett MD    ondansetron (ZOFRAN) injection 4 mg, 4 mg, Intravenous, Q4H PRN, Martin Garnett MD, 4 mg at 04/02/24 6325    pantoprazole (PROTONIX)  injection 40 mg, 40 mg, Intravenous, Daily, Martin Garnett MD, 40 mg at 04/11/24 0932    Phosphorus Replacement - Follow Nurse / BPA Driven Protocol, , Does not apply, Tamir LINDSEY Mark Edwin, MD    Phosphorus Replacement - Follow Nurse / BPA Driven Protocol, , Does not apply, Tamir LINDSEY Mark Edwin, MD    Phosphorus Replacement - Follow Nurse / BPA Driven Protocol, , Does not apply, Tamir LINDSEY Mark Edwin, MD    Potassium Replacement - Follow Nurse / BPA Driven Protocol, , Does not apply, Tamir LINDSEY Mark Edwin, MD    Potassium Replacement - Follow Nurse / BPA Driven Protocol, , Does not apply, Tamir LINDSEY Mark Edwin, MD    Potassium Replacement - Follow Nurse / BPA Driven Protocol, , Does not apply, Tamir LINDSEY Mark Edwin, MD    sodium chloride 0.45 % infusion, 75 mL/hr, Intravenous, Continuous, BrandonUmer MD, Last Rate: 75 mL/hr at 04/11/24 1155, 75 mL/hr at 04/11/24 1155    sodium chloride 0.9 % bolus 500 mL, 500 mL, Intravenous, Once, Martin Garnett MD    sodium chloride 0.9 % flush 10 mL, 10 mL, Intravenous, PRN, Martin Garnett MD    sodium chloride 0.9 % flush 10 mL, 10 mL, Intravenous, Q12H, Martin Garnett MD, 10 mL at 04/11/24 0934    sodium chloride 0.9 % flush 10 mL, 10 mL, Intravenous, PRNTamir Mark Edwin, MD    sodium chloride 0.9 % infusion 40 mL, 40 mL, Intravenous, PRTamir PEREZ Mark Edwin, MD          Labs:    Results from last 7 days   Lab Units 04/11/24  0453 04/10/24  0704 04/09/24  0415 04/08/24  0246 04/07/24  0413 04/06/24  0515 04/05/24  0317   WBC 10*3/mm3 17.25* 15.86* 14.83* 14.93* 14.37* 14.00* 11.54*   HEMOGLOBIN g/dL 10.1* 10.8* 11.5* 10.6* 8.3* 8.7* 8.9*   HEMATOCRIT % 32.7* 33.3* 35.0* 32.5* 25.3* 25.9* 27.3*   PLATELETS 10*3/mm3 794* 714* 646* 492* 407 343 270     Results from last 7 days   Lab Units 04/11/24  0453 04/10/24  0704 04/09/24  0415   SODIUM mmol/L 149* 149* 146*   POTASSIUM mmol/L 3.9 3.8 4.1   CHLORIDE mmol/L 110* 112*  111*   CO2 mmol/L 25.4 23.6 21.9*   BUN mg/dL 51* 43* 37*   CREATININE mg/dL 1.31* 0.95 1.12   CALCIUM mg/dL 9.1 8.8 8.9   GLUCOSE mg/dL 174* 152* 175*     BMI: 24.5  Wt 79.7kg   Vitals  Temp 97.3    RR 16  /98  SpO2 92        Review of Systems   Constitutional:  Negative for chills and fever.   HENT:  Positive for trouble swallowing.         Physical Exam  Constitutional:       General: He is not in acute distress.     Comments: Lethargic    HENT:      Head: Normocephalic and atraumatic.   Cardiovascular:      Rate and Rhythm: Tachycardia present.      Pulses: Normal pulses.   Pulmonary:      Effort: Pulmonary effort is normal.      Breath sounds: Wheezing present.   Abdominal:      General: Bowel sounds are normal. There is distension.      Palpations: Abdomen is soft. There is no mass.      Tenderness: There is no abdominal tenderness. There is no guarding or rebound.      Hernia: No hernia is present.   Skin:     General: Skin is warm and dry.   Neurological:      Motor: Weakness present.                ESCOBAR Carballo   General and Endoscopic Surgery  [facility]    [address], [address]  [place], [place], [address]  P: [phone]  F: [phone]

## 2024-04-11 NOTE — PROGRESS NOTES
[facility]    Now out of the ICU. Plan for PEG tomorrow. Will discuss discharge plans with patient family and review with Dr. Connelly. Patient would likely still require a good deal of physical/hands on assist even after an acute rehab stay.     Angelica Corbin RN  Rehab Admissions Coordinator  [phone]

## 2024-04-11 NOTE — CONSULTS
General Surgery     Summary:     Alex Negron is a 79 y.o. 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 General surgery consulted for PEG tube placement. Patient has been receiving enteral feeds via DHT and tolerating well. WBC elevated, currently receiving Rocephin for PNA. Patients abdomen is soft, non-tender, no scars noted. Patient has failed swallow studies with speech and PEG tube placement has been recommended. Patient is currently on Plavix. Discussed with patient's daughter the risks (bleeding, infection, damage to surrounding tissues), benefits, and alternatives to this procedure and she agreed to proceed. Recent EGD on 4/3 showed gastritis and esophageal ulcers, depending on how this looks tomorrow, may not be able to proceed with tube placement.     Will Plan for EGD with PEG tube placement tomorrow with Dr. Brand.   NPO at [facility]    Chief Complaint:    Dysphagia    History of Present Illness:     Alex Negron is a 79 y.o. year old man with HTN, HLD who presented to hospital on 4/1/2024 with left sided weakness and facial droop. 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 Dr. Rubio placed a right ICA stent and successful thrombectomy. Patient remains with Left sided hemiplegia and oral dysphagia. On 4/3 he was having coffee ground emesis and went for EGD with Dr. Guidry which showed a hiatal hernia, esophagitis ulcers, and gastritis.  A pyloroplasty was also performed. Patient has been doing much better since then, both neurology and GI have signed off. No abdominal surgery history noted, no scars noted on abdomen on physical exam.       Past Medical History:   HLD  HTN  CVA  Esophageal ulcers  gastritis    Past Surgical History:    Past Surgical History:   Procedure Laterality Date    ENDOSCOPY N/A 4/3/2024    Procedure: ESOPHAGOGASTRODUODENOSCOPY AT BEDSIDE;  Surgeon: Redd Guidry MD;  Location: [facility] ENDOSCOPY;  Service:  Gastroenterology;  Laterality: N/A;  PRE-  GI BLEED  POST- HIATAL HERNIA, DISTAL ESOPHAGITIS ULCERS, GASTRITIS, PYLOROPLASTY       Family History:    History reviewed. No pertinent family history.      Social History:    Social History     Socioeconomic History    Marital status:    Tobacco Use    Smoking status: Former     Current packs/day: 0.00     Types: Cigarettes     Quit date:      Years since quittin.2    Smokeless tobacco: Never   Substance and Sexual Activity    Alcohol use: Not Currently    Drug use: Never    Sexual activity: Defer       Allergies:   No Known Allergies    Medications:     Current Facility-Administered Medications:     acetaminophen (TYLENOL) 160 MG/5ML oral solution 650 mg, 650 mg, Oral, Q6H PRN, Martin Garnett MD, 650 mg at 24 2329    acetaminophen (TYLENOL) tablet 650 mg, 650 mg, Nasogastric, TID, Umer Taylor MD, 650 mg at 24 1120    amLODIPine (NORVASC) tablet 5 mg, 5 mg, Per PEG Tube, Q24H, Umer Taylor MD, 5 mg at 24 1120    aspirin chewable tablet 81 mg, 81 mg, Nasogastric, Daily, Martin Garnett MD, 81 mg at 24 0933    atorvastatin (LIPITOR) tablet 80 mg, 80 mg, Nasogastric, Nightly, Martin Garnett MD, 80 mg at 04/10/24 2103    sennosides-docusate (PERICOLACE) 8.6-50 MG per tablet 2 tablet, 2 tablet, Nasogastric, BID, 2 tablet at 24 0933 **AND** polyethylene glycol (MIRALAX) packet 17 g, 17 g, Oral, Daily PRN **AND** bisacodyl (DULCOLAX) EC tablet 5 mg, 5 mg, Oral, Daily PRN **AND** bisacodyl (DULCOLAX) suppository 10 mg, 10 mg, Rectal, Daily PRN, Martin Garnett MD    calcium carbonate (TUMS) chewable tablet 500 mg (200 mg elemental), 2 tablet, Oral, TID PRN, Martin Garnett MD, 2 tablet at 24    Calcium Replacement - Follow Nurse / BPA Driven Protocol, , Does not apply, PRN, Martin Garnett MD    Calcium Replacement - Follow Nurse / BPA Driven Protocol, , Does not apply, PRN,  Martin Garnett MD    Calcium Replacement - Follow Nurse / BPA Driven Protocol, , Does not apply, PRN, Martin Garnett MD    cefTRIAXone (ROCEPHIN) 2,000 mg in sodium chloride 0.9 % 100 mL MBP, 2,000 mg, Intravenous, Q24H, Umer Taylor MD, Last Rate: 200 mL/hr at 04/11/24 1121, 2,000 mg at 04/11/24 1121    clopidogrel (PLAVIX) tablet 75 mg, 75 mg, Nasogastric, Daily, Martin Garnett MD, 75 mg at 04/11/24 0933    dextrose (D50W) (25 g/50 mL) IV injection 25 g, 25 g, Intravenous, Q15 Min PRN, Martin Garnett MD    dextrose (GLUTOSE) oral gel 15 g, 15 g, Oral, Q15 Min PRN, Martin Garnett MD    gabapentin (NEURONTIN) capsule 200 mg, 200 mg, Nasogastric, TID, Umer Taylor MD, 200 mg at 04/11/24 1120    glucagon (GLUCAGEN) injection 1 mg, 1 mg, Intramuscular, Q15 Min PRN, Martin Garnett MD    hydrALAZINE (APRESOLINE) injection 10 mg, 10 mg, Intravenous, Q4H PRN, Martin Garnett MD, 10 mg at 04/10/24 1109    insulin regular (humuLIN R,novoLIN R) injection 2-7 Units, 2-7 Units, Subcutaneous, Q6H, Martin Garnett MD, 2 Units at 04/11/24 1328    ipratropium (ATROVENT) nebulizer solution 0.5 mg, 0.5 mg, Nebulization, TID - RT, Umer Taylor MD    Magnesium Low Dose Replacement - Follow Nurse / BPA Driven Protocol, , Does not apply, Tamir LINDSEY Mark Edwin, MD    Magnesium Standard Dose Replacement - Follow Nurse / BPA Driven Protocol, , Does not apply, Tamir LINDSEY Mark Edwin, MD    Magnesium Standard Dose Replacement - Follow Nurse / BPA Driven Protocol, , Does not apply, Tamir LINDSEY Mark Edwin, MD    metoprolol tartrate (LOPRESSOR) tablet 50 mg, 50 mg, Oral, Q12H, Umer Talyor MD, 50 mg at 04/11/24 0933    nitroglycerin (NITROSTAT) SL tablet 0.4 mg, 0.4 mg, Sublingual, Q5 Min PRN, Martin Garnett MD    ondansetron (ZOFRAN) injection 4 mg, 4 mg, Intravenous, Q4H PRN, Martin Garnett MD, 4 mg at 04/02/24 7771    pantoprazole (PROTONIX)  injection 40 mg, 40 mg, Intravenous, Daily, Martin Garnett MD, 40 mg at 04/11/24 0932    Phosphorus Replacement - Follow Nurse / BPA Driven Protocol, , Does not apply, Tamir LINDSEY Mark Edwin, MD    Phosphorus Replacement - Follow Nurse / BPA Driven Protocol, , Does not apply, Tamir LINDSEY Mark Edwin, MD    Phosphorus Replacement - Follow Nurse / BPA Driven Protocol, , Does not apply, Tamir LINDSEY Mark Edwin, MD    Potassium Replacement - Follow Nurse / BPA Driven Protocol, , Does not apply, Tamir LINDSEY Mark Edwin, MD    Potassium Replacement - Follow Nurse / BPA Driven Protocol, , Does not apply, Tamir LINDSEY Mark Edwin, MD    Potassium Replacement - Follow Nurse / BPA Driven Protocol, , Does not apply, Tamir LINDSEY Mark Edwin, MD    sodium chloride 0.45 % infusion, 75 mL/hr, Intravenous, Continuous, BrandonUmer MD, Last Rate: 75 mL/hr at 04/11/24 1155, 75 mL/hr at 04/11/24 1155    sodium chloride 0.9 % bolus 500 mL, 500 mL, Intravenous, Once, Martin Garnett MD    sodium chloride 0.9 % flush 10 mL, 10 mL, Intravenous, PRN, Martin Garnett MD    sodium chloride 0.9 % flush 10 mL, 10 mL, Intravenous, Q12H, Martin Garnett MD, 10 mL at 04/11/24 0934    sodium chloride 0.9 % flush 10 mL, 10 mL, Intravenous, PRNTamir Mark Edwin, MD    sodium chloride 0.9 % infusion 40 mL, 40 mL, Intravenous, PRTamir PEREZ Mark Edwin, MD          Labs:    Results from last 7 days   Lab Units 04/11/24  0453 04/10/24  0704 04/09/24  0415 04/08/24  0246 04/07/24  0413 04/06/24  0515 04/05/24  0317   WBC 10*3/mm3 17.25* 15.86* 14.83* 14.93* 14.37* 14.00* 11.54*   HEMOGLOBIN g/dL 10.1* 10.8* 11.5* 10.6* 8.3* 8.7* 8.9*   HEMATOCRIT % 32.7* 33.3* 35.0* 32.5* 25.3* 25.9* 27.3*   PLATELETS 10*3/mm3 794* 714* 646* 492* 407 343 270     Results from last 7 days   Lab Units 04/11/24  0453 04/10/24  0704 04/09/24  0415   SODIUM mmol/L 149* 149* 146*   POTASSIUM mmol/L 3.9 3.8 4.1   CHLORIDE mmol/L 110* 112*  111*   CO2 mmol/L 25.4 23.6 21.9*   BUN mg/dL 51* 43* 37*   CREATININE mg/dL 1.31* 0.95 1.12   CALCIUM mg/dL 9.1 8.8 8.9   GLUCOSE mg/dL 174* 152* 175*     BMI: 24.5  Wt 79.7kg   Vitals  Temp 97.3    RR 16  /98  SpO2 92        Review of Systems   Constitutional:  Negative for chills and fever.   HENT:  Positive for trouble swallowing.         Physical Exam  Constitutional:       General: He is not in acute distress.     Comments: Lethargic    HENT:      Head: Normocephalic and atraumatic.   Cardiovascular:      Rate and Rhythm: Tachycardia present.      Pulses: Normal pulses.   Pulmonary:      Effort: Pulmonary effort is normal.      Breath sounds: Wheezing present.   Abdominal:      General: Bowel sounds are normal. There is distension.      Palpations: Abdomen is soft. There is no mass.      Tenderness: There is no abdominal tenderness. There is no guarding or rebound.      Hernia: No hernia is present.   Skin:     General: Skin is warm and dry.   Neurological:      Motor: Weakness present.                ESCOBAR Carballo   General and Endoscopic Surgery  [facility]    [address], [address]  [place], [place], [address]  P: [phone]  F: [phone]

## 2024-04-11 NOTE — PLAN OF CARE
Goal Outcome Evaluation:  Plan of Care Reviewed With: patient, daughter           Outcome Evaluation: Pt seen for OT tx focsing on static sitting balance as well as visual scanning and attn to L. Pt required maxA x 1 for bed mobility to L side and able to maintain static sitting balance EOB with mod-maxA overall with cues to correct L lean. Con't to require max cues and physical assist to turn head to locate items in midline and L visual field. OT hipolito con't to follow for stated goals and recommend d/c to inpt rehab. Dtr requesting to speak with [facility] re: dispo and epic message sent this date.      Anticipated Discharge Disposition (OT): inpatient rehabilitation facility

## 2024-04-11 NOTE — THERAPY TREATMENT NOTE
Patient Name: Alex Negron  : 1945    MRN: [id number]                              Today's Date: 2024       Admit Date: 2024    Visit Dx:     ICD-10-CM ICD-9-CM   1. Cerebrovascular accident (CVA), unspecified mechanism  I63.9 434.91   2. Coffee ground emesis  K92.0 578.0   3. Gastroesophageal reflux disease, unspecified whether esophagitis present  K21.9 530.81   4. 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 No pertinent past medical history.  Past Surgical History:   Procedure Laterality Date    ENDOSCOPY N/A 4/3/2024    Procedure: ESOPHAGOGASTRODUODENOSCOPY AT BEDSIDE;  Surgeon: Redd Guidry MD;  Location: [facility] ENDOSCOPY;  Service: Gastroenterology;  Laterality: N/A;  PRE-  GI BLEED  POST- HIATAL HERNIA, DISTAL ESOPHAGITIS ULCERS, GASTRITIS, PYLOROPLASTY      General Information       Row Name 24 0923          OT Time and Intention    Document Type therapy note (daily note)  -CE     Mode of Treatment occupational therapy  -CE       Row Name 24 0923          General Information    Patient Profile Reviewed yes  -CE     Existing Precautions/Restrictions fall;NPO  -CE     Barriers to Rehab --  dtr present and wanting to/able to interpret  -CE       Row Name 2423          Cognition    Orientation Status (Cognition) oriented to;person  somewhat somnolent this morning  -CE       Row Name 24 0923          Safety Issues, Functional Mobility    Impairments Affecting Function (Mobility) endurance/activity tolerance;strength;pain;balance;visual/perceptual;sensation/sensory awareness;postural/trunk control;motor control;muscle tone abnormal;grasp;cognition  -CE     Cognitive Impairments, Mobility  Safety/Performance attention;awareness, need for assistance;insight into deficits/self-awareness;impulsivity;judgment;problem-solving/reasoning;safety precaution awareness;safety precaution follow-through;sequencing abilities  -               User Key  (r) = Recorded By, (t) = Taken By, (c) = Cosigned By      Initials Name Provider Type    Estelita Abdalla OT Occupational Therapist                     Mobility/ADL's       Row Name 04/11/24 0924          Bed Mobility    Bed Mobility supine-sit;sit-supine  -CE     Supine-Sit [place] (Bed Mobility) maximum assist (25% patient effort);1 person assist;verbal cues;nonverbal cues (demo/gesture)  -     Sit-Supine [place] (Bed Mobility) 1 person assist;nonverbal cues (demo/gesture);verbal cues;maximum assist (25% patient effort)  -     Assistive Device (Bed Mobility) draw sheet;head of bed elevated;bed rails  -     Comment, (Bed Mobility) strong left lateral lean  -       Row Name 04/11/24 0924          Transfers    Comment, (Transfers) deferred 2/2 decreased static sitting balance  -               User Key  (r) = Recorded By, (t) = Taken By, (c) = Cosigned By      Initials Name Provider Type     Estelita Marcial OT Occupational Therapist                   Obj/Interventions       Row Name 04/11/24 0926          Vision Assessment/Intervention    Visual Processing Deficit theresa-inattention/neglect, left  -     Vision Assessment Comment Pt requiring physical assist for turning head to L beyond midline during visual scanning tasks. Pt able to locate 2/3 items correctly with max verbal and tactile cues to locate items in L midline and L visual field while seated EOB.  -       Row Name 04/11/24 0926          Strength Comprehensive (MMT)    Comment, General Manual Muscle Testing (MMT) Assessment 0/5 LUE; PROM WFL and no s/s pain noted during ROM; educated pts dtr on placement of pillows to support shoulder joint when in bed.  -       Row Name  04/11/24 0926          Balance    Balance Assessment sitting static balance  -CE     Static Sitting Balance 1-person assist;maximum assist  -CE     Comment, Balance Sat EOB ~10 min with mod-maxA 2/2 L lateral lean and pushing when RUE at side. Able to maintain balance for reaching/grasping items in midline and L visual field with maxA and max verbal/tactile cues.  -CE               User Key  (r) = Recorded By, (t) = Taken By, (c) = Cosigned By      Initials Name Provider Type    CE Estelita Marcial, BUDDY Occupational Therapist                   Goals/Plan    No documentation.                  Clinical Impression       Row Name 04/11/24 0928          Pain Assessment    Pre/Posttreatment Pain Comment pt reporting L leg pain when asked by dtr but unable to specify or rate  -CE     Pain Intervention(s) Rest;Repositioned  -CE       Row Name 04/11/24 0928          Plan of Care Review    Plan of Care Reviewed With patient;daughter  -CE     Outcome Evaluation Pt seen for OT tx focsing on static sitting balance as well as visual scanning and attn to L. Pt required maxA x 1 for bed mobility to L side and able to maintain static sitting balance EOB with mod-maxA overall with cues to correct L lean. Con't to require max cues and physical assist to turn head to locate items in midline and L visual field. OT hipolito con't to follow for stated goals and recommend d/c to inpt rehab. Dtr requesting to speak with SAMMI re: dispo and epic message sent this date.  -CE       Row Name 04/11/24 0928          Therapy Assessment/Plan (OT)    Rehab Potential (OT) good, to achieve stated therapy goals  -CE       Row Name 04/11/24 0928          Therapy Plan Review/Discharge Plan (OT)    Anticipated Discharge Disposition (OT) inpatient rehabilitation facility  -CE       Row Name 04/11/24 0928          Vital Signs    O2 Delivery Pre Treatment room air  -CE     O2 Delivery Intra Treatment room air  -CE     O2 Delivery Post Treatment room air  -CE     Pre  Patient Position Supine  -CE     Intra Patient Position Sitting  -CE     Post Patient Position Supine  -CE       Row Name 04/11/24 0928          Positioning and Restraints    Pre-Treatment Position in bed  -CE     Post Treatment Position bed  -CE     In Bed notified nsg;supine;fowlers;encouraged to call for assist;exit alarm on;with family/caregiver  -CE     Restraints soft limb;reapplied:  RUE  -CE               User Key  (r) = Recorded By, (t) = Taken By, (c) = Cosigned By      Initials Name Provider Type    CE Estelita Marcial OT Occupational Therapist                   Outcome Measures       Row Name 04/11/24 0931          How much help from another is currently needed...    Putting on and taking off regular lower body clothing? 1  -CE     Bathing (including washing, rinsing, and drying) 1  -CE     Toileting (which includes using toilet bed pan or urinal) 1  -CE     Putting on and taking off regular upper body clothing 1  -CE     Taking care of personal grooming (such as brushing teeth) 2  -CE     Eating meals 1  -CE     AM-PAC 6 Clicks Score (OT) 7  -CE       Row Name 04/11/24 0500          How much help from another person do you currently need...    Turning from your back to your side while in flat bed without using bedrails? 2  -SC     Moving from lying on back to sitting on the side of a flat bed without bedrails? 1  -SC     Moving to and from a bed to a chair (including a wheelchair)? 1  -SC     Standing up from a chair using your arms (e.g., wheelchair, bedside chair)? 1  -SC     Climbing 3-5 steps with a railing? 1  -SC     To walk in hospital room? 1  -SC     AM-PAC 6 Clicks Score (PT) 7  -SC     Highest Level of Mobility Goal 2 --> Bed activities/dependent transfer  -SC       Row Name 04/11/24 0931          Functional Assessment    Outcome Measure Options AM-PAC 6 Clicks Daily Activity (OT)  -CE               User Key  (r) = Recorded By, (t) = Taken By, (c) = Cosigned By      Initials Name Provider  Type    SC Cleo Dennis, RN Registered Nurse    Estelita Abdalla OT Occupational Therapist                    Occupational Therapy Education       Title: PT OT SLP Therapies (In Progress)       Topic: Occupational Therapy (In Progress)       Point: ADL training (In Progress)       Description:   Instruct learner(s) on proper safety adaptation and remediation techniques during self care or transfers.   Instruct in proper use of assistive devices.                  Learning Progress Summary             Patient Nonacceptance, E, NL by EN at 4/4/2024 0014                         Point: Home exercise program (In Progress)       Description:   Instruct learner(s) on appropriate technique for monitoring, assisting and/or progressing therapeutic exercises/activities.                  Learning Progress Summary             Patient Nonacceptance, E, NL by EN at 4/4/2024 0014                         Point: Precautions (In Progress)       Description:   Instruct learner(s) on prescribed precautions during self-care and functional transfers.                  Learning Progress Summary             Patient Nonacceptance, E, NL by EN at 4/4/2024 0014                         Point: Body mechanics (In Progress)       Description:   Instruct learner(s) on proper positioning and spine alignment during self-care, functional mobility activities and/or exercises.                  Learning Progress Summary             Patient Nonacceptance, E, NL by EN at 4/4/2024 0014   Family Acceptance, E, VU by  at 4/3/2024 1239    Comment: OT goals, POC, positioning of LUE for safety post CVA for subluxation                                         User Key       Initials Effective Dates Name Provider Type Discipline    EN 08/01/22 -  Monique Mary, DYLON Registered Nurse Nurse     04/02/20 -  Yasmine Martin OT Occupational Therapist OT                  OT Recommendation and Plan     Plan of Care Review  Plan of Care Reviewed With: patient,  daughter  Outcome Evaluation: Pt seen for OT tx focsing on static sitting balance as well as visual scanning and attn to L. Pt required maxA x 1 for bed mobility to L side and able to maintain static sitting balance EOB with mod-maxA overall with cues to correct L lean. Con't to require max cues and physical assist to turn head to locate items in midline and L visual field. OT hipolito con't to follow for stated goals and recommend d/c to inpt rehab. Dtr requesting to speak with CM re: dispo and epic message sent this date.     Time Calculation:         Time Calculation- OT       Row Name 04/11/24 0932             Time Calculation- OT    OT Start Time 0848  -CE      OT Stop Time 0919  -CE      OT Time Calculation (min) 31 min  -CE      Total Timed Code Minutes- OT 31 minute(s)  -CE      OT Received On 04/11/24  -CE      OT - Next Appointment 04/12/24  -CE         Timed Charges    59306 - OT Therapeutic Activity Minutes 31  -CE         Total Minutes    Timed Charges Total Minutes 31  -CE       Total Minutes 31  -CE                User Key  (r) = Recorded By, (t) = Taken By, (c) = Cosigned By      Initials Name Provider Type    CE Estelita Marcial OT Occupational Therapist                  Therapy Charges for Today       Code Description Service Date Service Provider Modifiers Qty    72765886405 HC OT THERAPEUTIC ACT EA 15 MIN 4/11/2024 Estelita Marcial OT GO 2                 Estelita Marcial OT  4/11/2024

## 2024-04-11 NOTE — PROGRESS NOTES
"      [facility]         Dr Chaney Sayied   LOS: 10 days   Patient Care Team:  Jyothi Song PA-C as PCP - General (Family Medicine)    Chief Complaint: Acute CVA with dysphagia multiple issues as listed below    Interval History: Currently confused on room air.  Dobbhoff tube in place.    REVIEW OF SYSTEMS:   Unable to get with patient's current condition.    Ventilator/Non-Invasive Ventilation Settings (From admission, onward)       Start     Ordered    04/04/24 0430  Ventilator - Vent Mode: AC/VC+; Rate: Other; Rate: 14; FiO2: 50%; PEEP: 7.5; Tidal Volume: mL; TV: 500  Continuous,   Status:  Canceled        Question Answer Comment   Vent Mode AC/VC+    Rate Other    Rate 14    FiO2 50%    PEEP 7.5    Tidal Volume mL            04/04/24 0429 04/03/24 1745  Ventilator - Vent Mode: AC/VC; Rate: Other; Rate: 14; FiO2: 50%; PEEP: 7.5; Tidal Volume: mL; TV: 500  Continuous,   Status:  Canceled        Question Answer Comment   Vent Mode AC/VC    Rate Other    Rate 14    FiO2 50%    PEEP 7.5    Tidal Volume mL            04/03/24 1746                      Vital Signs  Temp:  [97.7 °F (36.5 °C)-98.9 °F (37.2 °C)] 98.4 °F (36.9 °C)  Heart Rate:  [111-138] 120  Resp:  [16-22] 16  BP: (112-141)/(75-90) 141/90    Intake/Output Summary (Last 24 hours) at 4/11/2024 1139  Last data filed at 4/11/2024 0830  Gross per 24 hour   Intake 1949 ml   Output 6200 ml   Net -4251 ml     Flowsheet Rows      Flowsheet Row First Filed Value   Admission Height 180.3 cm (71\") Documented at 04/02/2024 0430   Admission Weight 81.7 kg (180 lb 3.2 oz) Documented at 04/01/2024 2159            Physical Exam:  Patient is examined using the personal protective equipment as per guidelines from infection control for this particular patient as enacted.  Hand hygiene was performed before and after patient interaction.   General Appearance:  Confused  ENT Dobbhoff tube in place  Neck midline trachea, no thyromegaly   Lungs:   " Diminished breath sounds bilaterally occasional crackles right base    Heart:    Regular rhythm and normal rate, normal S1 and S2, no            murmur, no gallop, no rub, no click   Chest Wall:    No abnormalities observed   Abdomen:     Normal bowel sounds, no masses, no organomegaly, soft        nontender, nondistended, no guarding, no rebound                tenderness   Extremities:   Moves all extremities well, no edema, no cyanosis, no             redness  CNS confused not following,  Skin no rashes no nodules  Musculoskeletal no cyanosis no clubbing      Results Review:        Results from last 7 days   Lab Units 04/11/24 0453 04/10/24  0704 04/09/24  0415   SODIUM mmol/L 149* 149* 146*   POTASSIUM mmol/L 3.9 3.8 4.1   CHLORIDE mmol/L 110* 112* 111*   CO2 mmol/L 25.4 23.6 21.9*   BUN mg/dL 51* 43* 37*   CREATININE mg/dL 1.31* 0.95 1.12   GLUCOSE mg/dL 174* 152* 175*   CALCIUM mg/dL 9.1 8.8 8.9         Results from last 7 days   Lab Units 04/11/24  0453 04/10/24  0704 04/09/24  0415   WBC 10*3/mm3 17.25* 15.86* 14.83*   HEMOGLOBIN g/dL 10.1* 10.8* 11.5*   HEMATOCRIT % 32.7* 33.3* 35.0*   PLATELETS 10*3/mm3 794* 714* 646*     Results from last 7 days   Lab Units 04/06/24  1122   INR  1.0         Results from last 7 days   Lab Units 04/10/24  0704   MAGNESIUM mg/dL 2.9*         Results from last 7 days   Lab Units 04/05/24  2343   PH, ARTERIAL pH units 7.482*   PO2 ART mm Hg 50.6*   PCO2, ARTERIAL mm Hg 32.4*   HCO3 ART mmol/L 24.3       I reviewed the patient's new clinical results.  I personally viewed and interpreted the patient's chest x-ray.        Medication Review:   acetaminophen, 650 mg, Nasogastric, TID  amLODIPine, 5 mg, Per PEG Tube, Q24H  aspirin, 81 mg, Nasogastric, Daily  atorvastatin, 80 mg, Nasogastric, Nightly  cefTRIAXone, 2,000 mg, Intravenous, Q24H  clopidogrel, 75 mg, Nasogastric, Daily  gabapentin, 200 mg, Nasogastric, TID  insulin regular, 2-7 Units, Subcutaneous, Q6H  ipratropium, 0.5  mg, Nebulization, TID - RT  metoprolol tartrate, 50 mg, Oral, Q12H  pantoprazole, 40 mg, Intravenous, Daily  senna-docusate sodium, 2 tablet, Nasogastric, BID  sodium chloride, 500 mL, Intravenous, Once  sodium chloride, 10 mL, Intravenous, Q12H        sodium chloride, 75 mL/hr        ASSESSMENT:   Acute CVA with right carotid occlusion: Status post tPA and right carotid stent  Left-sided hemiparesis  Bilateral infiltrates/pneumonia: Klebsiella in culture  Sepsis with shock  Acute renal insufficiency  Elevated blood pressure   Acute blood loss anemia  coffee-ground emesis  Left lower lobe pneumonia versus atelectasis  Pulmonary edema with volume overload  Gross hematuria    PLAN:  Pulmonary status remained stable currently on room air  Stroke workup per neurology on aspirin and statin  Antibiotics for pneumonia-Klebsiella on Rocephin.  Probably will need PEG tube for nutrition  Discussed with daughter at bedside  Patient remained stable from pulmonary point of view and no acute critical care issues  Hospitalist managing other medical issues  We will sign off call if needed        Shiv Felipe MD  04/11/24  11:39 EDT

## 2024-04-11 NOTE — PLAN OF CARE
Goal Outcome Evaluation:  Plan of Care Reviewed With: patient, daughter        Progress: no change  Outcome Evaluation: Pt was seen by PT this AM for tx. Pt was in bed w/ dtr in room who translated. Pt req max A x 2 for bed mobility after max cues for initation and sequencing to L side of bed. Pt was noted to be pushing w/ R UE toward L side w/ time spent on correcting for upright  posture. Sit bal req from min A to max A for L lean correction. Pt stood 3x req max A x 2 w/ R HHA and L UE supported. Pt performed L LE PROM w/ pt able to perforn L knee ext 2x today. PT will prog as pt max.      Anticipated Discharge Disposition (PT): inpatient rehabilitation facility

## 2024-04-11 NOTE — THERAPY TREATMENT NOTE
Patient Name: Alex Negron  : 1945    MRN: [id number]                              Today's Date: 2024       Admit Date: 2024    Visit Dx:     ICD-10-CM ICD-9-CM   1. Cerebrovascular accident (CVA), unspecified mechanism  I63.9 434.91   2. Coffee ground emesis  K92.0 578.0   3. Gastroesophageal reflux disease, unspecified whether esophagitis present  K21.9 530.81   4. 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 No pertinent past medical history.  Past Surgical History:   Procedure Laterality Date    ENDOSCOPY N/A 4/3/2024    Procedure: ESOPHAGOGASTRODUODENOSCOPY AT BEDSIDE;  Surgeon: Redd Guidry MD;  Location: [facility] ENDOSCOPY;  Service: Gastroenterology;  Laterality: N/A;  PRE-  GI BLEED  POST- HIATAL HERNIA, DISTAL ESOPHAGITIS ULCERS, GASTRITIS, PYLOROPLASTY      General Information       Row Name 24 1004          Physical Therapy Time and Intention    Document Type therapy note (daily note)  -PH     Mode of Treatment physical therapy  -       Row Name 24 1004          General Information    Existing Precautions/Restrictions fall;NPO  -PH       Row Name 24 1004          Cognition    Orientation Status (Cognition) unable/difficult to assess;oriented to;person  -PH       Row Name 24 1004          Safety Issues, Functional Mobility    Impairments Affecting Function (Mobility) cognition;balance;endurance/activity tolerance;strength;motor control;grasp;postural/trunk control;visual/perceptual  -PH     Cognitive Impairments, Mobility Safety/Performance problem-solving/reasoning;safety precaution follow-through;safety precaution awareness;judgment;insight into deficits/self-awareness;sequencing  abilities  -PH     Comment, Safety Issues/Impairments (Mobility) gt belt and non skid socks donned  -PH               User Key  (r) = Recorded By, (t) = Taken By, (c) = Cosigned By      Initials Name Provider Type    PH Chante Frost PTA Physical Therapist Assistant                   Mobility       Row Name 04/11/24 1006          Bed Mobility    Bed Mobility supine-sit  -PH     Supine-Sit [place] (Bed Mobility) maximum assist (25% patient effort);1 person assist;2 person assist;verbal cues;nonverbal cues (demo/gesture)  -PH     Sit-Supine [place] (Bed Mobility) maximum assist (25% patient effort);2 person assist;verbal cues;nonverbal cues (demo/gesture);1 person assist  -PH     Assistive Device (Bed Mobility) bed rails;draw sheet;head of bed elevated  -PH     Comment, (Bed Mobility) max cues for initiation and sequencing to EOB w/ L lean noted when seated  -PH       Row Name 04/11/24 1006          Sit-Stand Transfer    Sit-Stand [place] (Transfers) moderate assist (50% patient effort);maximum assist (25% patient effort);2 person assist;verbal cues;nonverbal cues (demo/gesture)  -PH     Assistive Device (Sit-Stand Transfers) other (see comments)  R HHA / L UE supported  -PH     Comment, (Sit-Stand Transfer) B knees and feet blocked; pt stood 3x and was unable to achieve completed upright w/ hips in flexion; cues for postural correction; L lean noted  -PH       Row Name 04/11/24 1006          Gait/Stairs (Locomotion)    [place] Level (Gait) unable to assess  -PH     Patient was able to Ambulate no, other medical factors prevent ambulation  -PH     Reason Patient was unable to Ambulate Excessive Weakness;Other (Comment)  CVA w/ L wkness  -PH     [place] Level (Stairs) unable to assess  -PH               User Key  (r) = Recorded By, (t) = Taken By, (c) = Cosigned By      Initials Name Provider Type    PH Chante Frost PTA Physical Therapist Assistant                    Obj/Interventions       Row Name 04/11/24 1008          Motor Skills    Therapeutic Exercise other (see comments)  PROM w/ L LE; pt able to extend L knee 2x w/ max cues  -PH       Row Name 04/11/24 1008          Balance    Balance Assessment sitting static balance;standing static balance  -PH     Static Sitting Balance minimal assist;maximum assist;verbal cues;non-verbal cues (demo/gesture)  -PH     Static Standing Balance maximum assist;2-person assist;verbal cues;non-verbal cues (demo/gesture)  -PH     Position/Device Used, Standing Balance other (see comments)  R hha/ L UE supported  -PH     Comment, Balance min to max A for sit bal w/ pt pushing to L and time spent working on sit bal approx 5 min; cues for postural correction when standing w/ hips in flexion and B knees blocked  -               User Key  (r) = Recorded By, (t) = Taken By, (c) = Cosigned By      Initials Name Provider Type    PH Chante Frost PTA Physical Therapist Assistant                   Goals/Plan    No documentation.                  Clinical Impression       Row Name 04/11/24 1011          Pain    Pretreatment Pain Rating 0/10 - no pain  -PH     Pre/Posttreatment Pain Comment pt gestured to L shldr when asked about pain;  -PH     Pain Intervention(s) Repositioned;Rest  -PH     Additional Documentation Pain Scale: Numbers Pre/Post-Treatment (Group)  -       Row Name 04/11/24 1011          Plan of Care Review    Plan of Care Reviewed With patient;daughter  -     Progress no change  -PH     Outcome Evaluation Pt was seen by PT this AM for tx. Pt was in bed w/ dtr in room who translated. Pt req max A x 2 for bed mobility after max cues for initation and sequencing to L side of bed. Pt was noted to be pushing w/ R UE toward L side w/ time spent on correcting for upright  posture. Sit bal req from min A to max A for L lean correction. Pt stood 3x req max A x 2 w/ R HHA and L UE supported. Pt performed L LE PROM w/ pt able to  perforn L knee ext 2x today. PT will prog as pt max.  -PH       Row Name 04/11/24 1011          Vital Signs    O2 Delivery Pre Treatment room air  -PH     O2 Delivery Intra Treatment room air  -PH     O2 Delivery Post Treatment room air  -PH       Row Name 04/11/24 1011          Positioning and Restraints    Pre-Treatment Position in bed  -PH     Post Treatment Position bed  -PH     In Bed fowlers;call light within reach;encouraged to call for assist;exit alarm on;notified nsg;LUE elevated  -PH     Restraints soft limb;reapplied:;other (comment)  soft limb restraint on R wrist  -PH               User Key  (r) = Recorded By, (t) = Taken By, (c) = Cosigned By      Initials Name Provider Type     Chante Frost PTA Physical Therapist Assistant                   Outcome Measures       Row Name 04/11/24 1017 04/11/24 0500       How much help from another person do you currently need...    Turning from your back to your side while in flat bed without using bedrails? 2  -PH 2  -SC    Moving from lying on back to sitting on the side of a flat bed without bedrails? 1  -PH 1  -SC    Moving to and from a bed to a chair (including a wheelchair)? 1  -PH 1  -SC    Standing up from a chair using your arms (e.g., wheelchair, bedside chair)? 2  -PH 1  -SC    Climbing 3-5 steps with a railing? 1  -PH 1  -SC    To walk in hospital room? 1  -PH 1  -SC    AM-PAC 6 Clicks Score (PT) 8  -PH 7  -SC    Highest Level of Mobility Goal 3 --> Sit at edge of bed  -PH 2 --> Bed activities/dependent transfer  -SC      Row Name 04/11/24 1017 04/11/24 0931       Functional Assessment    Outcome Measure Options AM-PAC 6 Clicks Basic Mobility (PT)  -PH AM-PAC 6 Clicks Daily Activity (OT)  -CE              User Key  (r) = Recorded By, (t) = Taken By, (c) = Cosigned By      Initials Name Provider Type     Chante Frost PTA Physical Therapist Assistant    SC Cleo Dennis, RN Registered Nurse    CE Estelita Marcial, OT  Occupational Therapist                                 Physical Therapy Education       Title: PT OT SLP Therapies (In Progress)       Topic: Physical Therapy (Done)       Point: Mobility training (Done)       Learning Progress Summary             Patient Acceptance, E,TB,D, NR,DU by PH at 4/11/2024 1017    Acceptance, E,D, DU,NR by PC at 4/10/2024 1146    Acceptance, E,D, NR by PC at 4/9/2024 1045    Acceptance, E,D, NL by PC at 4/8/2024 1149    Acceptance, E, NR by EM at 4/5/2024 1622    Nonacceptance, E, NL by EN at 4/4/2024 0014    Acceptance, E,D, NR by PC at 4/3/2024 1030   Family Acceptance, E,D, DU,NR by PC at 4/10/2024 1146    Acceptance, E,D, NR by PC at 4/3/2024 1030                         Point: Home exercise program (Done)       Learning Progress Summary             Patient Acceptance, E,TB,D, NR,DU by PH at 4/11/2024 1017    Acceptance, E,D, DU,NR by PC at 4/10/2024 1146    Acceptance, E,D, NR by PC at 4/9/2024 1045    Acceptance, E,D, NL by PC at 4/8/2024 1149    Acceptance, E,D, NR,NL by LW at 4/6/2024 1138    Nonacceptance, E, NL by EN at 4/4/2024 0014    Acceptance, E,D, NR by PC at 4/3/2024 1030   Family Acceptance, E,D, DU,NR by PC at 4/10/2024 1146    Acceptance, E,D, NR by PC at 4/3/2024 1030                         Point: Body mechanics (Done)       Learning Progress Summary             Patient Acceptance, E,TB,D, NR,DU by PH at 4/11/2024 1017    Acceptance, E,D, DU,NR by PC at 4/10/2024 1146    Acceptance, E,D, NR by PC at 4/9/2024 1045    Acceptance, E,D, NL by PC at 4/8/2024 1149    Acceptance, E,D, NR,NL by LW at 4/6/2024 1138    Nonacceptance, E, NL by EN at 4/4/2024 0014    Acceptance, E,D, NR by PC at 4/3/2024 1030   Family Acceptance, E,D, DU,NR by PC at 4/10/2024 1146    Acceptance, E,D, NR by PC at 4/3/2024 1030                         Point: Precautions (Done)       Learning Progress Summary             Patient Acceptance, E,TB,D, NR,DU by PH at 4/11/2024 1017    Acceptance, E,D,  DU,NR by PC at 4/10/2024 1146    Acceptance, E,D, NR by PC at 4/9/2024 1045    Acceptance, E,D, NL by PC at 4/8/2024 1149    Acceptance, E,D, NR,NL by LW at 4/6/2024 1138    Nonacceptance, E, NL by EN at 4/4/2024 0014    Acceptance, E,D, NR by PC at 4/3/2024 1030   Family Acceptance, E,D, DU,NR by PC at 4/10/2024 1146    Acceptance, E,D, NR by PC at 4/3/2024 1030                                         User Key       Initials Effective Dates Name Provider Type Discipline    PC 06/16/21 -  Sidra Arce, PT Physical Therapist PT    EM 06/16/21 -  Lesia Judd, PT Physical Therapist PT    PH 06/16/21 -  Chante Frost PTA Physical Therapist Assistant PT    EN 08/01/22 -  Monique Mary RN Registered Nurse Nurse    LW 05/08/23 -  Ias Pierce, PT Physical Therapist PT                  PT Recommendation and Plan     Plan of Care Reviewed With: patient, daughter  Progress: no change  Outcome Evaluation: Pt was seen by PT this AM for tx. Pt was in bed w/ dtr in room who translated. Pt req max A x 2 for bed mobility after max cues for initation and sequencing to L side of bed. Pt was noted to be pushing w/ R UE toward L side w/ time spent on correcting for upright  posture. Sit bal req from min A to max A for L lean correction. Pt stood 3x req max A x 2 w/ R HHA and L UE supported. Pt performed L LE PROM w/ pt able to perforn L knee ext 2x today. PT will prog as pt max.     Time Calculation:         PT Charges       Row Name 04/11/24 1018             Time Calculation    Start Time 0926  -PH      Stop Time 1000  -PH      Time Calculation (min) 34 min  -PH      PT Received On 04/11/24  -PH      PT - Next Appointment 04/12/24  -PH         Timed Charges    17832 - PT Therapeutic Exercise Minutes 6  -PH      49589 - PT Therapeutic Activity Minutes 28  -PH         Total Minutes    Timed Charges Total Minutes 34  -PH       Total Minutes 34  -PH                User Key  (r) = Recorded By, (t) = Taken By, (c) =  Cosigned By      Initials Name Provider Type    PH Chante Frost PTA Physical Therapist Assistant                  Therapy Charges for Today       Code Description Service Date Service Provider Modifiers Qty    [id number]  PT THERAPEUTIC ACT EA 15 MIN 4/11/2024 Chante Frost PTA GP 2    [id number]  PT THER SUPP EA 15 MIN 4/11/2024 Chante Frost PTA GP 2            PT G-Codes  Outcome Measure Options: AM-PAC 6 Clicks Basic Mobility (PT)  AM-PAC 6 Clicks Score (PT): 8  AM-PAC 6 Clicks Score (OT): 7  Modified [place] Scale: 4 - Moderately severe disability.  Unable to walk without assistance, and unable to attend to own bodily needs without assistance.  PT Discharge Summary  Anticipated Discharge Disposition (PT): inpatient rehabilitation facility    Chante Frost PTA  4/11/2024

## 2024-04-11 NOTE — PLAN OF CARE
Goal Outcome Evaluation:                 Soft wrist restraint needed at this time due to 3way dunham cath with CBI running and NG tube with tube feeding and patient tries to immediately remove when restraint is undone for repositioning.                              97

## 2024-04-12 ENCOUNTER — ANESTHESIA EVENT (OUTPATIENT)
Dept: GASTROENTEROLOGY | Facility: HOSPITAL | Age: 79
End: 2024-04-12
Payer: MEDICAID

## 2024-04-12 ENCOUNTER — ANESTHESIA (OUTPATIENT)
Dept: GASTROENTEROLOGY | Facility: HOSPITAL | Age: 79
End: 2024-04-12
Payer: MEDICAID

## 2024-04-12 LAB
GLUCOSE BLDC GLUCOMTR-MCNC: 116 MG/DL (ref 70–130)
GLUCOSE BLDC GLUCOMTR-MCNC: 134 MG/DL (ref 70–130)

## 2024-04-12 PROCEDURE — 43246 EGD PLACE GASTROSTOMY TUBE: CPT | Performed by: SURGERY

## 2024-04-12 PROCEDURE — 94799 UNLISTED PULMONARY SVC/PX: CPT

## 2024-04-12 PROCEDURE — 82948 REAGENT STRIP/BLOOD GLUCOSE: CPT

## 2024-04-12 PROCEDURE — 25810000003 LACTATED RINGERS PER 1000 ML: Performed by: SURGERY

## 2024-04-12 PROCEDURE — 25010000002 CEFTRIAXONE PER 250 MG: Performed by: INTERNAL MEDICINE

## 2024-04-12 PROCEDURE — 94664 DEMO&/EVAL PT USE INHALER: CPT

## 2024-04-12 PROCEDURE — 94761 N-INVAS EAR/PLS OXIMETRY MLT: CPT

## 2024-04-12 PROCEDURE — 97530 THERAPEUTIC ACTIVITIES: CPT

## 2024-04-12 PROCEDURE — 0DH63UZ INSERTION OF FEEDING DEVICE INTO STOMACH, PERCUTANEOUS APPROACH: ICD-10-PCS | Performed by: SURGERY

## 2024-04-12 PROCEDURE — 25010000002 PROPOFOL 10 MG/ML EMULSION: Performed by: NURSE ANESTHETIST, CERTIFIED REGISTERED

## 2024-04-12 PROCEDURE — 94760 N-INVAS EAR/PLS OXIMETRY 1: CPT

## 2024-04-12 RX ORDER — LIDOCAINE HYDROCHLORIDE 20 MG/ML
INJECTION, SOLUTION INFILTRATION; PERINEURAL AS NEEDED
Status: DISCONTINUED | OUTPATIENT
Start: 2024-04-12 | End: 2024-04-12 | Stop reason: SURG

## 2024-04-12 RX ORDER — PROPOFOL 10 MG/ML
VIAL (ML) INTRAVENOUS AS NEEDED
Status: DISCONTINUED | OUTPATIENT
Start: 2024-04-12 | End: 2024-04-12 | Stop reason: SURG

## 2024-04-12 RX ORDER — SODIUM CHLORIDE 450 MG/100ML
75 INJECTION, SOLUTION INTRAVENOUS CONTINUOUS
Status: ACTIVE | OUTPATIENT
Start: 2024-04-12 | End: 2024-04-13

## 2024-04-12 RX ORDER — SODIUM CHLORIDE, SODIUM LACTATE, POTASSIUM CHLORIDE, CALCIUM CHLORIDE 600; 310; 30; 20 MG/100ML; MG/100ML; MG/100ML; MG/100ML
30 INJECTION, SOLUTION INTRAVENOUS CONTINUOUS
Status: DISCONTINUED | OUTPATIENT
Start: 2024-04-12 | End: 2024-04-13

## 2024-04-12 RX ORDER — CEFOXITIN 2 G/1
2 INJECTION, POWDER, FOR SOLUTION INTRAVENOUS ONCE
Status: CANCELLED | OUTPATIENT
Start: 2024-04-12

## 2024-04-12 RX ADMIN — CEFTRIAXONE 2000 MG: 2 INJECTION, POWDER, FOR SOLUTION INTRAMUSCULAR; INTRAVENOUS at 11:14

## 2024-04-12 RX ADMIN — PROPOFOL 50 MG: 10 INJECTION, EMULSION INTRAVENOUS at 14:16

## 2024-04-12 RX ADMIN — Medication 10 ML: at 21:38

## 2024-04-12 RX ADMIN — GABAPENTIN 200 MG: 100 CAPSULE ORAL at 16:51

## 2024-04-12 RX ADMIN — SODIUM CHLORIDE, POTASSIUM CHLORIDE, SODIUM LACTATE AND CALCIUM CHLORIDE 30 ML/HR: 600; 310; 30; 20 INJECTION, SOLUTION INTRAVENOUS at 12:49

## 2024-04-12 RX ADMIN — ACETAMINOPHEN 325MG 650 MG: 325 TABLET ORAL at 21:37

## 2024-04-12 RX ADMIN — METOPROLOL TARTRATE 50 MG: 50 TABLET, FILM COATED ORAL at 09:06

## 2024-04-12 RX ADMIN — LIDOCAINE HYDROCHLORIDE 50 MG: 20 INJECTION, SOLUTION INFILTRATION; PERINEURAL at 14:16

## 2024-04-12 RX ADMIN — IPRATROPIUM BROMIDE 0.5 MG: 0.5 SOLUTION RESPIRATORY (INHALATION) at 20:05

## 2024-04-12 RX ADMIN — Medication 10 ML: at 09:35

## 2024-04-12 RX ADMIN — PROPOFOL 160 MCG/KG/MIN: 10 INJECTION, EMULSION INTRAVENOUS at 14:16

## 2024-04-12 RX ADMIN — IPRATROPIUM BROMIDE AND ALBUTEROL SULFATE 3 ML: .5; 3 SOLUTION RESPIRATORY (INHALATION) at 08:35

## 2024-04-12 RX ADMIN — IPRATROPIUM BROMIDE 0.5 MG: 0.5 SOLUTION RESPIRATORY (INHALATION) at 08:35

## 2024-04-12 RX ADMIN — METOPROLOL TARTRATE 50 MG: 50 TABLET, FILM COATED ORAL at 21:37

## 2024-04-12 RX ADMIN — ATORVASTATIN CALCIUM 80 MG: 80 TABLET, FILM COATED ORAL at 21:37

## 2024-04-12 RX ADMIN — SODIUM CHLORIDE 75 ML/HR: 4.5 INJECTION, SOLUTION INTRAVENOUS at 11:14

## 2024-04-12 RX ADMIN — GABAPENTIN 200 MG: 100 CAPSULE ORAL at 21:37

## 2024-04-12 RX ADMIN — DOCUSATE SODIUM 50MG AND SENNOSIDES 8.6MG 2 TABLET: 8.6; 5 TABLET, FILM COATED ORAL at 21:37

## 2024-04-12 NOTE — PLAN OF CARE
Goal Outcome Evaluation:         Pt. Continues to need soft wrist restraint to right wrist due Murillo cath with Continuous Bladder Irrigation and NG tube, when restraint is removed for turning and ROM patient tries to reach for NG tube or Murillo. To prevent injury the restraint remains at this time. Patient is also scheduled for Gastric Tube placement this afternoon. Daughter is here for interpretation when Doctor explains, so that patient/daughter can sign informed consent. Patient has been NPO and tube feeding stopped before midnight. Daughter, Patient and Spouse are aware of plan for today.

## 2024-04-12 NOTE — PLAN OF CARE
Goal Outcome Evaluation:  Plan of Care Reviewed With: patient        Progress: improving  Outcome Evaluation: Pt was seen by PT this AM for tx. Pt was in bed w/ dtr in room who was helpful and translating. Pt req max/dep A x 2 for all bed mobility after max cues for initiation and sequencing. Pt exhibiting strong push to L w/ R UE. Time spent attempting to correction. Pt stood 4x from EOB improving to mod A x 2 w/ R hand on fww  and L UE support / L knee blocked. Pt was again noted to be pushing w/ RUE toward L side when standing req max A to correct. Pt was in bed at end of session. PT will prog as pt max.      Anticipated Discharge Disposition (PT): inpatient rehabilitation facility

## 2024-04-12 NOTE — THERAPY TREATMENT NOTE
Patient Name: Alex Negron  : 1945    MRN: [id number]                              Today's Date: 2024       Admit Date: 2024    Visit Dx:     ICD-10-CM ICD-9-CM   1. Cerebrovascular accident (CVA), unspecified mechanism  I63.9 434.91   2. Coffee ground emesis  K92.0 578.0   3. Gastroesophageal reflux disease, unspecified whether esophagitis present  K21.9 530.81   4. 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 Dysphagia, unspecified type  R13.10 787.20     Patient Active Problem List   Diagnosis    Acute CVA (cerebrovascular accident)    Right-sided extracranial carotid artery stenosis    Coffee ground emesis    Gastroesophageal reflux disease    Cerebrovascular accident (CVA)    Dysphagia     Past Medical History:   Diagnosis Date    Chronic back pain     neuropathy rolando legs    Hypertension      Past Surgical History:   Procedure Laterality Date    ENDOSCOPY N/A 4/3/2024    Procedure: ESOPHAGOGASTRODUODENOSCOPY AT BEDSIDE;  Surgeon: Redd Guidry MD;  Location: [facility] ENDOSCOPY;  Service: Gastroenterology;  Laterality: N/A;  PRE-  GI BLEED  POST- HIATAL HERNIA, DISTAL ESOPHAGITIS ULCERS, GASTRITIS, PYLOROPLASTY      General Information       Row Name 24 1319          Physical Therapy Time and Intention    Document Type therapy note (daily note)  -     Mode of Treatment physical therapy  -       Row Name 24 1319          General Information    Existing Precautions/Restrictions fall;non-weight bearing  -       Row Name 24 1319          Cognition    Orientation Status (Cognition) oriented to;person;unable/difficult to assess  -       Row Name 24 1319          Safety Issues, Functional Mobility    Impairments Affecting Function (Mobility) balance;cognition;coordination;endurance/activity tolerance;strength;shortness of breath;postural/trunk control;range of motion (ROM);motor control;grasp  -PH      Cognitive Impairments, Mobility Safety/Performance insight into deficits/self-awareness;judgment;problem-solving/reasoning;safety precaution awareness;safety precaution follow-through;sequencing abilities;awareness, need for assistance  -PH     Comment, Safety Issues/Impairments (Mobility) gt belt and non skid socks donned  -PH               User Key  (r) = Recorded By, (t) = Taken By, (c) = Cosigned By      Initials Name Provider Type     Chante Frost PTA Physical Therapist Assistant                   Mobility       Row Name 04/12/24 1320          Bed Mobility    Bed Mobility bed mobility (all) activities  -PH     All Activities, Burnett (Bed Mobility) maximum assist (25% patient effort);dependent (less than 25% patient effort);2 person assist;verbal cues;nonverbal cues (demo/gesture)  -PH     Comment, (Bed Mobility) max cues for initiation and sequencing to R side of bed.  -       Row Name 04/12/24 1320          Sit-Stand Transfer    Sit-Stand Burnett (Transfers) moderate assist (50% patient effort);2 person assist;verbal cues;nonverbal cues (demo/gesture)  -     Assistive Device (Sit-Stand Transfers) walker, front-wheeled;other (see comments)  fww closed on L side w/ L UE supported and L knee blocked  -     Comment, (Sit-Stand Transfer) 4x from EOB; pushing to L heavily w/ R hand on fww ; noted after standing; cues for postural correction  -       Row Name 04/12/24 1320          Gait/Stairs (Locomotion)    Burnett Level (Gait) unable to assess  -     Burnett Level (Stairs) unable to assess  -               User Key  (r) = Recorded By, (t) = Taken By, (c) = Cosigned By      Initials Name Provider Type     Chante Frost PTA Physical Therapist Assistant                   Obj/Interventions       Row Name 04/12/24 1324          Motor Skills    Therapeutic Exercise other (see comments)  L UE: elbow flexion, wrist flexion/ext, shldr flexion; x 10 reps w/ PROM   -PH       Row Name 04/12/24 1324          Balance    Balance Assessment sitting static balance  -PH     Static Sitting Balance moderate assist;maximum assist;verbal cues;non-verbal cues (demo/gesture);2-person assist  -PH     Position/Device Used, Standing Balance walker, front-wheeled  -     Comment, Balance worked on sit bal for approx 8 min w/ pt heavily pushing to L using R UE; Pt again pushing to L when standing req max A for correction after cues provided  -               User Key  (r) = Recorded By, (t) = Taken By, (c) = Cosigned By      Initials Name Provider Type    PH Chante Frost, PTA Physical Therapist Assistant                   Goals/Plan    No documentation.                  Clinical Impression       Row Name 04/12/24 1326          Pain    Pretreatment Pain Rating 0/10 - no pain  -PH     Posttreatment Pain Rating 0/10 - no pain  -PH     Additional Documentation Pain Scale: Numbers Pre/Post-Treatment (Group)  -       Row Name 04/12/24 1326          Plan of Care Review    Plan of Care Reviewed With patient  -PH     Progress improving  -     Outcome Evaluation Pt was seen by PT this AM for tx. Pt was in bed w/ dtr in room who was helpful and translating. Pt req max/dep A x 2 for all bed mobility after max cues for initiation and sequencing. Pt exhibiting strong push to L w/ R UE. Time spent attempting to correction. Pt stood 4x from EOB improving to mod A x 2 w/ R hand on fww  and L UE support / L knee blocked. Pt was again noted to be pushing w/ RUE toward L side when standing req max A to correct. Pt was in bed at end of session. PT will prog as pt max.  -       Row Name 04/12/24 1326          Vital Signs    O2 Delivery Pre Treatment room air  -PH     O2 Delivery Intra Treatment room air  -PH     O2 Delivery Post Treatment room air  -PH       Row Name 04/12/24 1326          Positioning and Restraints    Pre-Treatment Position in bed  -PH     Post Treatment Position bed  -      In Bed fowlers;call light within reach;encouraged to call for assist;exit alarm on;notified nsg;OLINDAE elevated  -PH               User Key  (r) = Recorded By, (t) = Taken By, (c) = Cosigned By      Initials Name Provider Type    PH Chante Frost PTA Physical Therapist Assistant                   Outcome Measures       Row Name 04/12/24 1330 04/12/24 0800       How much help from another person do you currently need...    Turning from your back to your side while in flat bed without using bedrails? 1  -PH 1  -IB    Moving from lying on back to sitting on the side of a flat bed without bedrails? 1  -PH 1  -IB    Moving to and from a bed to a chair (including a wheelchair)? 1  -PH 1  -IB    Standing up from a chair using your arms (e.g., wheelchair, bedside chair)? 2  -PH 1  -IB    Climbing 3-5 steps with a railing? 1  -PH 1  -IB    To walk in hospital room? 1  -PH 1  -IB    AM-PAC 6 Clicks Score (PT) 7  -PH 6  -IB    Highest Level of Mobility Goal 2 --> Bed activities/dependent transfer  -PH 2 --> Bed activities/dependent transfer  -IB      Row Name 04/12/24 1330          Functional Assessment    Outcome Measure Options AM-PAC 6 Clicks Basic Mobility (PT)  -PH               User Key  (r) = Recorded By, (t) = Taken By, (c) = Cosigned By      Initials Name Provider Type    IB Octavio Leggett RN Registered Nurse     Chante Frost PTA Physical Therapist Assistant                                 Physical Therapy Education       Title: PT OT SLP Therapies (In Progress)       Topic: Physical Therapy (Done)       Point: Mobility training (Done)       Learning Progress Summary             Patient Acceptance, E,TB,D, NR,DU by  at 4/12/2024 1330    Acceptance, E,TB,D, NR,DU by  at 4/11/2024 1017    Acceptance, E,D, DU,NR by PC at 4/10/2024 1146    Acceptance, E,D, NR by PC at 4/9/2024 1045    Acceptance, E,D, NL by PC at 4/8/2024 1149    Acceptance, E, NR by  at 4/5/2024 1622    Nonacceptance, E, NL by  EN at 4/4/2024 0014    Acceptance, E,D, NR by PC at 4/3/2024 1030   Family Acceptance, E,D, DU,NR by PC at 4/10/2024 1146    Acceptance, E,D, NR by PC at 4/3/2024 1030                         Point: Home exercise program (Done)       Learning Progress Summary             Patient Acceptance, E,TB,D, NR,DU by PH at 4/12/2024 1330    Acceptance, E,TB,D, NR,DU by PH at 4/11/2024 1017    Acceptance, E,D, DU,NR by PC at 4/10/2024 1146    Acceptance, E,D, NR by PC at 4/9/2024 1045    Acceptance, E,D, NL by PC at 4/8/2024 1149    Acceptance, E,D, NR,NL by LW at 4/6/2024 1138    Nonacceptance, E, NL by EN at 4/4/2024 0014    Acceptance, E,D, NR by PC at 4/3/2024 1030   Family Acceptance, E,D, DU,NR by PC at 4/10/2024 1146    Acceptance, E,D, NR by PC at 4/3/2024 1030                         Point: Body mechanics (Done)       Learning Progress Summary             Patient Acceptance, E,TB,D, NR,DU by PH at 4/12/2024 1330    Acceptance, E,TB,D, NR,DU by PH at 4/11/2024 1017    Acceptance, E,D, DU,NR by PC at 4/10/2024 1146    Acceptance, E,D, NR by PC at 4/9/2024 1045    Acceptance, E,D, NL by PC at 4/8/2024 1149    Acceptance, E,D, NR,NL by LW at 4/6/2024 1138    Nonacceptance, E, NL by EN at 4/4/2024 0014    Acceptance, E,D, NR by PC at 4/3/2024 1030   Family Acceptance, E,D, DU,NR by PC at 4/10/2024 1146    Acceptance, E,D, NR by PC at 4/3/2024 1030                         Point: Precautions (Done)       Learning Progress Summary             Patient Acceptance, E,TB,D, NR,DU by PH at 4/12/2024 1330    Acceptance, E,TB,D, NR,DU by PH at 4/11/2024 1017    Acceptance, E,D, DU,NR by PC at 4/10/2024 1146    Acceptance, E,D, NR by PC at 4/9/2024 1045    Acceptance, E,D, NL by PC at 4/8/2024 1149    Acceptance, E,D, NR,NL by LW at 4/6/2024 1138    Nonacceptance, E, NL by EN at 4/4/2024 0014    Acceptance, E,D, NR by PC at 4/3/2024 1030   Family Acceptance, E,D, DU,NR by PC at 4/10/2024 1146    Acceptance, E,D, NR by PC at 4/3/2024  1030                                         User Key       Initials Effective Dates Name Provider Type Discipline    PC 06/16/21 -  Sidra Arce, PT Physical Therapist PT    EM 06/16/21 -  Lesia Judd PT Physical Therapist PT    PH 06/16/21 -  Chante Frost PTA Physical Therapist Assistant PT    EN 08/01/22 -  Monique Mary RN Registered Nurse Nurse    LW 05/08/23 -  Isa Pierce PT Physical Therapist PT                  PT Recommendation and Plan     Plan of Care Reviewed With: patient  Progress: improving  Outcome Evaluation: Pt was seen by PT this AM for tx. Pt was in bed w/ dtr in room who was helpful and translating. Pt req max/dep A x 2 for all bed mobility after max cues for initiation and sequencing. Pt exhibiting strong push to L w/ R UE. Time spent attempting to correction. Pt stood 4x from EOB improving to mod A x 2 w/ R hand on fww  and L UE support / L knee blocked. Pt was again noted to be pushing w/ RUE toward L side when standing req max A to correct. Pt was in bed at end of session. PT will prog as pt max.     Time Calculation:         PT Charges       Row Name 04/12/24 1331             Time Calculation    Start Time 1051  -PH      Stop Time 1115  -PH      Time Calculation (min) 24 min  -PH      PT Received On 04/12/24  -PH      PT - Next Appointment 04/15/24  -PH         Timed Charges    64931 - PT Therapeutic Activity Minutes 24  -PH         Total Minutes    Timed Charges Total Minutes 24  -PH       Total Minutes 24  -PH                User Key  (r) = Recorded By, (t) = Taken By, (c) = Cosigned By      Initials Name Provider Type    PH Chante Frost PTA Physical Therapist Assistant                  Therapy Charges for Today       Code Description Service Date Service Provider Modifiers Qty    06954973118 HC PT THERAPEUTIC ACT EA 15 MIN 4/11/2024 Chante Frost PTA GP 2    59349428750 HC PT THER SUPP EA 15 MIN 4/11/2024 Chante Frost PTA GP  2    [id number]  PT THERAPEUTIC ACT EA 15 MIN 4/12/2024 Chante Frost PTA GP 2    [id number]  PT THER SUPP EA 15 MIN 4/12/2024 Chante Frost PTA GP 2            PT G-Codes  Outcome Measure Options: AM-PAC 6 Clicks Basic Mobility (PT)  AM-PAC 6 Clicks Score (PT): 7  AM-PAC 6 Clicks Score (OT): 7  Modified Lilian Scale: 4 - Moderately severe disability.  Unable to walk without assistance, and unable to attend to own bodily needs without assistance.  PT Discharge Summary  Anticipated Discharge Disposition (PT): inpatient rehabilitation facility    Chante Frost PTA  4/12/2024

## 2024-04-12 NOTE — PROGRESS NOTES
Mod/Max/Dep x2 with therapies today for bed mobility et STS transfers.  Lengthy discussion by phone with dgt (Radha) who says there will be no one able to stay with patient 24/7 at home after Rehab.   [facility] book for her in patient's room and she plans to request phone contact from [facility] tomorrow to discuss subacute rehab settings.  Will plan to follow up on Monday 4/15/24 and possibly sign off.  Thank you--Flor Bermudez Admission Coordinator

## 2024-04-12 NOTE — PROGRESS NOTES
[facility] Progress Note    04/12/24 18:03 EDT        Seen briefly in recovery room earlier today.  Talked to his nurse as well.    Plan to wean down his CBI and give him a voiding trial prior to discharge.

## 2024-04-12 NOTE — INTERVAL H&P NOTE
H&P reviewed. The patient was examined and there are no changes to the H&P.      Discussed with daughter.  Risks and rationale reviewed for the procedure.  She was in agreement and consent has been obtained.

## 2024-04-12 NOTE — PROGRESS NOTES
Name: Alex Negron ADMIT: 2024   : 1945  PCP: Jyothi Song PA-C    MRN: [id number] LOS: 11 days   AGE/SEX: 79 y.o. male  ROOM: [facility]   Subjective   Chief Complaint   Patient presents with    Extremity Weakness       Has been tachycardic- for his whole stay it seems, better   Getting TFs though NPO for OR  Confused - needing restraints  On abx  Still difficulty swallowing     ROS  Unreliable due to pt status     Objective   Vital Signs  Temp:  [97.3 °F (36.3 °C)-98.1 °F (36.7 °C)] 97.3 °F (36.3 °C)  Heart Rate:  [] 89  Resp:  [16-21] 21  BP: (132-152)/(86-98) 147/95  SpO2:  [91 %-95 %] 94 %  on  Flow (L/min):  [1.5-2] 1.5;   Device (Oxygen Therapy): nasal cannula  Body mass index is 24.51 kg/m².    Physical Exam  Constitutional:       Appearance: He is ill-appearing.   HENT:      Head: Normocephalic and atraumatic.   Eyes:      General: No scleral icterus.  Cardiovascular:      Rate and Rhythm: Normal rate and regular rhythm. Tachycardia present.      Heart sounds: Normal heart sounds.   Pulmonary:      Effort: Pulmonary effort is normal. No respiratory distress (decreased bs at bases).   Abdominal:      General: There is no distension.      Palpations: Abdomen is soft.      Tenderness: There is no abdominal tenderness.   Musculoskeletal:      Cervical back: Neck supple.     Drowsy, lethargic- sleeping initially but then wakes up with help of daughter     Results Review:       I reviewed the patient's new clinical results.  Results from last 7 days   Lab Units 04/11/24  0453 04/10/24  0704 24  0415 24  0246   WBC 10*3/mm3 17.25* 15.86* 14.83* 14.93*   HEMOGLOBIN g/dL 10.1* 10.8* 11.5* 10.6*   PLATELETS 10*3/mm3 794* 714* 646* 492*     Results from last 7 days   Lab Units 24  0453 04/10/24  0704 24  0415 24  1431 24  0246   SODIUM mmol/L 149* 149* 146*  --  147*   POTASSIUM mmol/L 3.9 3.8 4.1 4.3 3.5   CHLORIDE mmol/L 110* 112* 111*  --  108*   CO2  "mmol/L 25.4 23.6 21.9*  --  22.2   BUN mg/dL 51* 43* 37*  --  28*   CREATININE mg/dL 1.31* 0.95 1.12  --  1.06   GLUCOSE mg/dL 174* 152* 175*  --  139*   Estimated Creatinine Clearance: 53.2 mL/min (A) (by C-G formula based on SCr of 1.31 mg/dL (H)).  Results from last 7 days   Lab Units 04/11/24  0453   ALBUMIN g/dL 3.4*     Results from last 7 days   Lab Units 04/11/24  0453 04/10/24  0704 04/09/24  0415 04/08/24  0246 04/07/24  0413   CALCIUM mg/dL 9.1 8.8 8.9 8.6 7.8*   ALBUMIN g/dL 3.4*  --   --   --   --    MAGNESIUM mg/dL  --  2.9* 2.7* 2.2 2.4   PHOSPHORUS mg/dL 3.2  --   --  3.3 1.1*         Coag   Results from last 7 days   Lab Units 04/06/24  1122   INR  1.0     HbA1C   Lab Results   Component Value Date    HGBA1C 6.20 (H) 04/02/2024     Infection         Radiology(recent) No radiology results for the last day  No results found for: \"TROPONINT\", \"TROPONINI\", \"BNP\"  No components found for: \"TSH;2\"    acetaminophen, 650 mg, Nasogastric, TID  amLODIPine, 5 mg, Per PEG Tube, Q24H  aspirin, 81 mg, Nasogastric, Daily  atorvastatin, 80 mg, Nasogastric, Nightly  cefTRIAXone, 2,000 mg, Intravenous, Q24H  clopidogrel, 75 mg, Nasogastric, Daily  gabapentin, 200 mg, Nasogastric, TID  insulin regular, 2-7 Units, Subcutaneous, Q6H  ipratropium, 0.5 mg, Nebulization, TID - RT  metoprolol tartrate, 50 mg, Oral, Q12H  pantoprazole, 40 mg, Intravenous, Daily  senna-docusate sodium, 2 tablet, Nasogastric, BID  sodium chloride, 500 mL, Intravenous, Once  sodium chloride, 10 mL, Intravenous, Q12H       NPO Diet NPO Type: Strict NPO      Assessment & Plan      Active Hospital Problems    Diagnosis  POA    **Acute CVA (cerebrovascular accident) [I63.9]  Yes    Right-sided extracranial carotid artery stenosis [I65.21]  Yes    Coffee ground emesis [K92.0]  Unknown    Gastroesophageal reflux disease [K21.9]  Unknown    Cerebrovascular accident (CVA) [I63.9]  Unknown    Dysphagia [R13.10]  Unknown      Resolved Hospital Problems "   No resolved problems to display.       Mr. Negron is a 79 y.o. who was admitted with MCA syndrome with acute stroke and was s/p TPA and underwent endovascular intervention with Dr. Rubio who placed a right carotid stent. He was admitted to the ICU. His stay had been complicated by dysphagia, pneumonia, coffee-ground emesis with EGD with distal esophageal ulcers, gross hematuria. He was in the ICU but now transferred out     Continue stroke care as directed by Neurology. On ASA, plavix  and statin. PT/OT/ST  On ABX for PNA- klebsiella pna ceftriaxone. High risk for aspiration.   Hematuria with Urology following and has three way catheter with irrigation  PPI and carafate, monitor H/H  Has been quite tachycardic for some time by review. May have some pain will have apap and restarted his home gabapentin. May be related to PNA as well as stroke. Increased metoprolol  Hyperna- on free water with TFs. Will have some 1/2NS today as well.   Getting a PEG 4/12. Dash Cotto who evaluated him again and he failed swallow eval so does need PEG. Lots of thick secretions so will have nebs and RT suctioning (may be more effective when cortrack removed). Monitor as he does have risk for aspiration.   I discussed the patients findings and my recommendations with patient and nursing staff.     VTE Prophylaxis - SCDs.        Dispo- acute vs subacute rehab, likely next week     DASH BOWLING daughter     Umer Taylor MD  [facility]  04/12/24  11:02 EDT

## 2024-04-12 NOTE — OP NOTE
DATE OF OPERATION: 4/12/2024      SURGEON:   Dimas Brand MD       PREOPERATIVE DIAGNOSIS: Dysphagia, CVA     POSTOPERATIVE DIAGNOSIS: Same     Findings:  PEG tube secured at 3 cm at skin     PROCEDURE PERFORMED: EGD with percutaneous endoscopic gastrostomy tube placement     ANESTHESIA: MAC     SPECIMEN: None     DRAINS: None     BLOOD LOSS: Minimal     INDICATIONS FOR OPERATION: Mr. Alex Negron is a 79 y.o.  with dysphagia after a CVA.  He required long-term feeding access and family desired to proceed with this.   All risks, benefits, and alternatives were explained to the patient's next of kin and they agreed and wished to proceed.  Informed consent was obtained.     OPERATIVE REPORT: Patient was brought to the endoscopy suite.  Timeout was performed.  He was on scheduled antibiotics.     A gastroscope was placed into the oropharynx under direct visualization was passed into the stomach.  A light reflex was noted in the left upper quadrant and a one-to-one impulse was seen on manual palpation.  The site for the PEG tube was marked and then prepped.  A needle was introduced into the stomach under direct visualization while aspirating for air.  Air was aspirated into the syringe as the needle was seen entering the stomach.  A wire was placed through the cannula and a snare was used to grab the wire.  This was brought out through the mouth and the push PEG was advanced over top of the wire and passed through the abdominal wall. The PEG tube was pulled into place and secured at 3 cm at the skin.  The gastroscope was reintroduced into the oropharynx and the site of the PEG tube placement was inspected and was noted to be hemostatic.  The tolerated the procedure well.       Dimas Brand M.D.  General and Endoscopic Surgery  [facility]     [address] [place] [address], [address]  [place], [place], [address]  P: [phone]  F: [phone]

## 2024-04-12 NOTE — PLAN OF CARE
Goal Outcome Evaluation:  Plan of Care Reviewed With: patient        Progress: improving  Outcome Evaluation: vss. 3L O2. a&ox4 - Singaporean speaking only. daughter able to help translate at times. accucheck q6hrs. NGT removed in surgery this day - PEG placed and OK to use per surgery. abd binder in place. restarted tube feeds - see order. R wrist restraint d/t pulling tubes/lines. educated on glucose monitoring. plan to dc to snf when appropriate.

## 2024-04-12 NOTE — ANESTHESIA POSTPROCEDURE EVALUATION
"Patient: Alex Negron    Procedure Summary       Date: 04/12/24 Room / Location:  [facility]    Anesthesia Start: 1414 Anesthesia Stop: 1452    Procedure: ESOPHAGOGASTRODUODENOSCOPY WITH PERCUTANEOUS ENDOSCOPIC GASTROSTOMY TUBE INSERTION (Esophagus) Diagnosis:       Cerebrovascular accident (CVA), unspecified mechanism      Dysphagia, unspecified type      (Cerebrovascular accident (CVA), unspecified mechanism [I63.9])      (Dysphagia, unspecified type [R13.10])    Surgeons: Dimas Brand MD Provider: Hugo Mcwilliams MD    Anesthesia Type: general ASA Status: 4            Anesthesia Type: general    Vitals  Vitals Value Taken Time   /91 04/12/24 1513   Temp     Pulse 101 04/12/24 1519   Resp 16 04/12/24 1513   SpO2 94 % 04/12/24 1519   Vitals shown include unfiled device data.        Post Anesthesia Care and Evaluation    Patient location during evaluation: bedside  Patient participation: complete - patient participated  Level of consciousness: awake and alert  Pain management: adequate    Airway patency: patent  Anesthetic complications: No anesthetic complications    Cardiovascular status: acceptable  Respiratory status: acceptable  Hydration status: acceptable    Comments: /91 (BP Location: Left arm, Patient Position: Lying)   Pulse 98   Temp 36.3 °C (97.3 °F) (Oral)   Resp 16   Ht 180.3 cm (71\")   Wt 79.7 kg (175 lb 11.3 oz)   SpO2 93%   BMI 24.51 kg/m²     "

## 2024-04-12 NOTE — ANESTHESIA PREPROCEDURE EVALUATION
Anesthesia Evaluation     no history of anesthetic complications:   NPO Solid Status: > 8 hours  NPO Liquid Status: > 2 hours           Airway   Mallampati: IV  Small opening and Possible difficult intubation  Dental - normal exam     Pulmonary    (+) a smoker Former,  (-) COPD, asthma, sleep apnea  Cardiovascular - negative cardio ROS    Rhythm: regular  Rate: abnormal    (-) hypertension, valvular problems/murmurs, past MI, CAD, dysrhythmias, angina    PE comment: tachycardic    Neuro/Psych  (+) CVA  (-) seizures, TIA  GI/Hepatic/Renal/Endo    (+) GERD, GI bleeding   (-) liver disease, no renal disease, diabetes, no thyroid disorder    Musculoskeletal (-) negative ROS    Abdominal    Substance History      OB/GYN          Other        ROS/Med Hx Other: Pt speaks Fijian and history obtained with assistance of      Mr. Negron is a 79 y.o. who was admitted with MCA syndrome with acute stroke and was s/p TPA and underwent endovascular intervention with Dr. Rubio who placed a right carotid stent. He was admitted to the ICU. His stay had been complicated by dysphagia, pneumonia, coffee-ground emesis with EGD with distal esophageal ulcers, gross hematuria. He was in the ICU but now transferred out     · Continue stroke care as directed by Neurology. On ASA, plavix  and statin. PT/OT/ST  · On ABX for PNA- klebsiella pna ceftriaxone. High risk for aspiration.   · Hematuria with Urology following and has three way catheter with irrigation  · PPI and carafate, monitor H/H  · Has been quite tachycardic for some time by review. May have some pain will have apap and restarted his home gabapentin. May be related to PNA as well as stroke. Increased metoprolol  · Hyperna- on free water with TFs. Will have some 1/2NS today as well.   · Getting a PEG 4/12. Dw [facility] who evaluated him again and he failed swallow eval so does need PEG. Lots of thick secretions so will have nebs and RT suctioning (may be more  effective when cortrack removed). Monitor as he does have risk for aspiration.   · I discussed the patients findings and my recommendations with patient and nursing staff.                      Anesthesia Plan    ASA 4     MAC     (Pre op O2 92% on 2L  Sleepy pre op, but arousable  Used daughter to translate)  intravenous induction     Anesthetic plan, risks, benefits, and alternatives have been provided, discussed and informed consent has been obtained with: patient and child.      CODE STATUS:    Code Status (Patient has no pulse and is not breathing): CPR (Attempt to Resuscitate)  Medical Interventions (Patient has pulse or is breathing): Full Support

## 2024-04-13 LAB
ANION GAP SERPL CALCULATED.3IONS-SCNC: 12.6 MMOL/L (ref 5–15)
ANION GAP SERPL CALCULATED.3IONS-SCNC: 8 MMOL/L (ref 5–15)
BUN SERPL-MCNC: 42 MG/DL (ref 8–23)
BUN SERPL-MCNC: 45 MG/DL (ref 8–23)
BUN/CREAT SERPL: 41.6 (ref 7–25)
BUN/CREAT SERPL: 44.1 (ref 7–25)
CALCIUM SPEC-SCNC: 8.7 MG/DL (ref 8.6–10.5)
CALCIUM SPEC-SCNC: 9.1 MG/DL (ref 8.6–10.5)
CHLORIDE SERPL-SCNC: 111 MMOL/L (ref 98–107)
CHLORIDE SERPL-SCNC: 111 MMOL/L (ref 98–107)
CO2 SERPL-SCNC: 23.4 MMOL/L (ref 22–29)
CO2 SERPL-SCNC: 26 MMOL/L (ref 22–29)
CREAT SERPL-MCNC: 1.01 MG/DL (ref 0.76–1.27)
CREAT SERPL-MCNC: 1.02 MG/DL (ref 0.76–1.27)
DEPRECATED RDW RBC AUTO: 43.1 FL (ref 37–54)
EGFRCR SERPLBLD CKD-EPI 2021: 75.7 ML/MIN/1.73
EGFRCR SERPLBLD CKD-EPI 2021: 76.6 ML/MIN/1.73
ERYTHROCYTE [DISTWIDTH] IN BLOOD BY AUTOMATED COUNT: 13.1 % (ref 12.3–15.4)
GLUCOSE BLDC GLUCOMTR-MCNC: 124 MG/DL (ref 70–130)
GLUCOSE BLDC GLUCOMTR-MCNC: 128 MG/DL (ref 70–130)
GLUCOSE SERPL-MCNC: 125 MG/DL (ref 65–99)
GLUCOSE SERPL-MCNC: 142 MG/DL (ref 65–99)
HCT VFR BLD AUTO: 30.6 % (ref 37.5–51)
HGB BLD-MCNC: 10 G/DL (ref 13–17.7)
MAGNESIUM SERPL-MCNC: 2.6 MG/DL (ref 1.6–2.4)
MCH RBC QN AUTO: 29.8 PG (ref 26.6–33)
MCHC RBC AUTO-ENTMCNC: 32.7 G/DL (ref 31.5–35.7)
MCV RBC AUTO: 91.1 FL (ref 79–97)
PLATELET # BLD AUTO: 759 10*3/MM3 (ref 140–450)
PMV BLD AUTO: 9.6 FL (ref 6–12)
POTASSIUM SERPL-SCNC: 4 MMOL/L (ref 3.5–5.2)
POTASSIUM SERPL-SCNC: 4.1 MMOL/L (ref 3.5–5.2)
QT INTERVAL: 392 MS
QTC INTERVAL: 477 MS
RBC # BLD AUTO: 3.36 10*6/MM3 (ref 4.14–5.8)
SODIUM SERPL-SCNC: 145 MMOL/L (ref 136–145)
SODIUM SERPL-SCNC: 147 MMOL/L (ref 136–145)
WBC NRBC COR # BLD AUTO: 18.27 10*3/MM3 (ref 3.4–10.8)

## 2024-04-13 PROCEDURE — 83735 ASSAY OF MAGNESIUM: CPT | Performed by: NURSE PRACTITIONER

## 2024-04-13 PROCEDURE — 94664 DEMO&/EVAL PT USE INHALER: CPT

## 2024-04-13 PROCEDURE — 93010 ELECTROCARDIOGRAM REPORT: CPT | Performed by: INTERNAL MEDICINE

## 2024-04-13 PROCEDURE — 94799 UNLISTED PULMONARY SVC/PX: CPT

## 2024-04-13 PROCEDURE — 94761 N-INVAS EAR/PLS OXIMETRY MLT: CPT

## 2024-04-13 PROCEDURE — 80048 BASIC METABOLIC PNL TOTAL CA: CPT | Performed by: SURGERY

## 2024-04-13 PROCEDURE — 93005 ELECTROCARDIOGRAM TRACING: CPT | Performed by: NURSE PRACTITIONER

## 2024-04-13 PROCEDURE — 25010000002 CEFTRIAXONE PER 250 MG: Performed by: SURGERY

## 2024-04-13 PROCEDURE — 80048 BASIC METABOLIC PNL TOTAL CA: CPT | Performed by: NURSE PRACTITIONER

## 2024-04-13 PROCEDURE — 99231 SBSQ HOSP IP/OBS SF/LOW 25: CPT | Performed by: SURGERY

## 2024-04-13 PROCEDURE — 82948 REAGENT STRIP/BLOOD GLUCOSE: CPT

## 2024-04-13 PROCEDURE — 94760 N-INVAS EAR/PLS OXIMETRY 1: CPT

## 2024-04-13 PROCEDURE — 85027 COMPLETE CBC AUTOMATED: CPT | Performed by: SURGERY

## 2024-04-13 RX ADMIN — Medication 10 ML: at 21:29

## 2024-04-13 RX ADMIN — Medication 10 ML: at 09:42

## 2024-04-13 RX ADMIN — ACETAMINOPHEN 325MG 650 MG: 325 TABLET ORAL at 21:26

## 2024-04-13 RX ADMIN — ATORVASTATIN CALCIUM 80 MG: 80 TABLET, FILM COATED ORAL at 21:26

## 2024-04-13 RX ADMIN — ACETAMINOPHEN 325MG 650 MG: 325 TABLET ORAL at 09:41

## 2024-04-13 RX ADMIN — GABAPENTIN 200 MG: 100 CAPSULE ORAL at 16:02

## 2024-04-13 RX ADMIN — METOPROLOL TARTRATE 50 MG: 50 TABLET, FILM COATED ORAL at 09:41

## 2024-04-13 RX ADMIN — PANTOPRAZOLE SODIUM 40 MG: 40 INJECTION, POWDER, FOR SOLUTION INTRAVENOUS at 09:41

## 2024-04-13 RX ADMIN — IPRATROPIUM BROMIDE 0.5 MG: 0.5 SOLUTION RESPIRATORY (INHALATION) at 07:01

## 2024-04-13 RX ADMIN — ACETAMINOPHEN 325MG 650 MG: 325 TABLET ORAL at 16:01

## 2024-04-13 RX ADMIN — CEFTRIAXONE 2000 MG: 2 INJECTION, POWDER, FOR SOLUTION INTRAMUSCULAR; INTRAVENOUS at 09:42

## 2024-04-13 RX ADMIN — ASPIRIN 81 MG: 81 TABLET, CHEWABLE ORAL at 09:41

## 2024-04-13 RX ADMIN — Medication 1 DROP: at 16:02

## 2024-04-13 RX ADMIN — GABAPENTIN 200 MG: 100 CAPSULE ORAL at 21:26

## 2024-04-13 RX ADMIN — DOCUSATE SODIUM 50MG AND SENNOSIDES 8.6MG 2 TABLET: 8.6; 5 TABLET, FILM COATED ORAL at 09:41

## 2024-04-13 RX ADMIN — AMLODIPINE BESYLATE 5 MG: 5 TABLET ORAL at 09:41

## 2024-04-13 RX ADMIN — METOPROLOL TARTRATE 50 MG: 50 TABLET, FILM COATED ORAL at 21:26

## 2024-04-13 RX ADMIN — DOCUSATE SODIUM 50MG AND SENNOSIDES 8.6MG 2 TABLET: 8.6; 5 TABLET, FILM COATED ORAL at 21:26

## 2024-04-13 RX ADMIN — Medication 1 DROP: at 21:30

## 2024-04-13 RX ADMIN — GABAPENTIN 200 MG: 100 CAPSULE ORAL at 09:41

## 2024-04-13 RX ADMIN — CLOPIDOGREL BISULFATE 75 MG: 75 TABLET, FILM COATED ORAL at 09:41

## 2024-04-13 NOTE — PROGRESS NOTES
FIRST UROLOGY DAILY PROGRESS NOTE      Name: Alex Negron  Age: 79 y.o.  Sex: male  :  1945  MRN: [id number]    Date: 2024             Chief complaint: No new complaints    Patient appears to be doing well this morning. Required R soft wrist restrain overnight due to pulling at tubes but he appears to be resting well now. CBI at a slow drip.     - AVSS, good UOP  - WBC - 18.27  - Cr - 1.02    Physical Exam:    General Appearance:    Asleep, arousable, NAD   :      3-way dunham catheter in place with CBI draining clear, light yellow urine   Neuro/Psych:   Asleep, arousable       Assessment:    Urinary retention  Gross hematuria  Hx CaP  BPH    Plan:    - Continue to wean CBI  - Voiding trial closer to discharge    ESCOBAR Glasgow  2024  07:58 EDT

## 2024-04-13 NOTE — PROGRESS NOTES
[facility] Acute Rehab    Dr. Gray has declined as there is no one that can stay with him anticipated 24/7 need after rehab. SNU recommended.     Eula Silverman RN  Acute Rehab Admission Nurse

## 2024-04-13 NOTE — PLAN OF CARE
Problem: Adult Inpatient Plan of Care  Goal: Plan of Care Review  Outcome: Ongoing, Not Progressing  Goal: Patient-Specific Goal (Individualized)  Outcome: Ongoing, Not Progressing  Goal: Absence of Hospital-Acquired Illness or Injury  Outcome: Ongoing, Not Progressing  Intervention: Identify and Manage Fall Risk  Recent Flowsheet Documentation  Taken 4/13/2024 0600 by Rachana Khoury RN  Safety Promotion/Fall Prevention:   assistive device/personal items within reach   clutter free environment maintained   fall prevention program maintained   lighting adjusted   nonskid shoes/slippers when out of bed   safety round/check completed   room organization consistent  Taken 4/13/2024 0400 by Rachana Khoury RN  Safety Promotion/Fall Prevention:   assistive device/personal items within reach   clutter free environment maintained   fall prevention program maintained   lighting adjusted   nonskid shoes/slippers when out of bed   safety round/check completed   room organization consistent  Taken 4/13/2024 0200 by Rachana Khoury RN  Safety Promotion/Fall Prevention:   assistive device/personal items within reach   clutter free environment maintained   fall prevention program maintained   lighting adjusted   nonskid shoes/slippers when out of bed   safety round/check completed   room organization consistent  Taken 4/13/2024 0000 by Rachana Khoury RN  Safety Promotion/Fall Prevention:   assistive device/personal items within reach   clutter free environment maintained   fall prevention program maintained   lighting adjusted   nonskid shoes/slippers when out of bed   safety round/check completed   room organization consistent  Taken 4/12/2024 2200 by Rachana Khoury RN  Safety Promotion/Fall Prevention:   assistive device/personal items within reach   clutter free environment maintained   fall prevention program maintained   lighting adjusted   nonskid shoes/slippers when out of bed   safety round/check completed   room  organization consistent  Taken 4/12/2024 2020 by Rachana Khoury RN  Safety Promotion/Fall Prevention:   assistive device/personal items within reach   clutter free environment maintained   fall prevention program maintained   lighting adjusted   nonskid shoes/slippers when out of bed   safety round/check completed   room organization consistent  Taken 4/12/2024 2000 by Rachana Khoury RN  Safety Promotion/Fall Prevention:   assistive device/personal items within reach   clutter free environment maintained   fall prevention program maintained   lighting adjusted   nonskid shoes/slippers when out of bed   safety round/check completed   room organization consistent  Intervention: Prevent Skin Injury  Recent Flowsheet Documentation  Taken 4/13/2024 0600 by Rachana Khoury RN  Body Position: weight shifting  Taken 4/13/2024 0400 by Rachana Khoury RN  Body Position: weight shifting  Taken 4/13/2024 0200 by Rachana Khoury RN  Body Position: weight shifting  Taken 4/13/2024 0000 by Rachana Khoury RN  Body Position: weight shifting  Taken 4/12/2024 2200 by Rachana Khoury RN  Body Position: weight shifting  Taken 4/12/2024 2020 by Rachana Khoury RN  Body Position: turned  Skin Protection:   adhesive use limited   drying agents applied   incontinence pads utilized   protective footwear used   skin sealant/moisture barrier applied   skin-to-device areas padded   skin-to-skin areas padded   transparent dressing maintained   tubing/devices free from skin contact  Taken 4/12/2024 2000 by Rachana Khoury RN  Body Position: position changed independently  Intervention: Prevent and Manage VTE (Venous Thromboembolism) Risk  Recent Flowsheet Documentation  Taken 4/13/2024 0600 by Rachana Khoury RN  Activity Management: activity encouraged  Taken 4/13/2024 0400 by Rachana Khoury RN  Activity Management: activity encouraged  Taken 4/13/2024 0200 by Rachana Khoury RN  Activity Management: activity encouraged  Taken 4/13/2024 0000  by Khoury, Rachana L, RN  Activity Management: activity encouraged  Taken 4/12/2024 2200 by Rachana Khoury RN  Activity Management: activity encouraged  Taken 4/12/2024 2020 by Rachana Khoury RN  Activity Management: activity encouraged  VTE Prevention/Management:   bilateral   sequential compression devices on  Range of Motion:   active ROM (range of motion) encouraged   ROM (range of motion) performed  Taken 4/12/2024 2000 by Rachana Khoury RN  Activity Management: activity encouraged  Goal: Optimal Comfort and Wellbeing  Outcome: Ongoing, Not Progressing  Intervention: Provide Person-Centered Care  Recent Flowsheet Documentation  Taken 4/12/2024 2020 by Rachana Khoury RN  Trust Relationship/Rapport:   care explained   choices provided   emotional support provided   empathic listening provided   questions answered   questions encouraged   reassurance provided   thoughts/feelings acknowledged  Goal: Readiness for Transition of Care  Outcome: Ongoing, Not Progressing   Goal Outcome Evaluation:      Pt remains in R soft wrist restraints due to pulling at tubes and wires, ROM performed. Family at bedside. No complaints of pain, appears restless at times. Repositioned frequently. Tube feed increased to 40 ml/hour per order. 1L NC.

## 2024-04-13 NOTE — PROGRESS NOTES
Name: Alex Negron ADMIT: 2024   : 1945  PCP: Jyothi Song PA-C    MRN: [id number] LOS: 12 days   AGE/SEX: 79 y.o. male  ROOM: [facility]   Subjective   Chief Complaint   Patient presents with    Extremity Weakness   Tachycardia better.  Daughter and daughter-n- law at bedside.    At times Confused requiring restraints.  Daughter translated at bedside.  Pt reported PEG tube insertion site pain 8/10 and dry itchy eyes but otherwise feeling ok.  Throat and nasal irritation better with DHT removed.  He is oriented.  Daughter reports he had issues prior to admit with some depression because he had to leave [facility] abruptly d/t conflict there.  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    Denies shoa, chest pain, n/v.  Some non productive cough.     Objective   Vital Signs  Temp:  [97.3 °F (36.3 °C)-98.2 °F (36.8 °C)] 98.2 °F (36.8 °C)  Heart Rate:  [] 77  Resp:  [12-20] 18  BP: (113-152)/(66-97) 152/79  SpO2:  [92 %-99 %] 96 %  on  Flow (L/min):  [1-8] 1;   Device (Oxygen Therapy): room air  Body mass index is 24.51 kg/m².    Physical Exam  Vitals and nursing note reviewed.   Constitutional:       Appearance: He is ill-appearing.   HENT:      Head: Normocephalic and atraumatic.      Mouth/Throat:      Mouth: Mucous membranes are dry.      Pharynx: Oropharynx is clear. No oropharyngeal exudate or posterior oropharyngeal erythema.   Eyes:      General: No scleral icterus.     Conjunctiva/sclera: Conjunctivae normal.   Cardiovascular:      Rate and Rhythm: Normal rate and regular rhythm.      Pulses: Normal pulses.      Heart sounds: Normal heart sounds. No murmur heard.  Pulmonary:      Effort: Pulmonary effort is normal. No respiratory distress (decreased bs at bases).      Comments: Dim to right mid to lower lobes.  Poor inspiratory effort.   Abdominal:      General: Bowel sounds are normal. There is no distension.      Palpations: Abdomen is soft.      Tenderness: There is  abdominal tenderness (mild TTP at PEG insertion site.). There is no guarding.      Comments: Abd. Binder in place.  No erythema at PEG insertion site.    Musculoskeletal:      Cervical back: Neck supple.      Right lower leg: No edema.      Left lower leg: No edema.   Skin:     General: Skin is warm and dry.   Neurological:      Mental Status: He is alert and oriented to person, place, and time.      Motor: Weakness present.      Comments: Left lower extremity dorsi/plantar flexion weak but present.  Unable to raise left leg.  Left arm flaccid. Right side with good ROM and strength.  Unable to perform EOM, visual neglect noted.    Psychiatric:      Comments: Flat affect, cooperative.      Drowsy, lethargic- sleeping initially but then wakes up with help of daughter     Results Review:       I reviewed the patient's new clinical results.  Results from last 7 days   Lab Units 04/13/24  0445 04/11/24  0453 04/10/24  0704 04/09/24  0415   WBC 10*3/mm3 18.27* 17.25* 15.86* 14.83*   HEMOGLOBIN g/dL 10.0* 10.1* 10.8* 11.5*   PLATELETS 10*3/mm3 759* 794* 714* 646*     Results from last 7 days   Lab Units 04/13/24  0445 04/11/24  0453 04/10/24  0704 04/09/24  0415   SODIUM mmol/L 147* 149* 149* 146*   POTASSIUM mmol/L 4.1 3.9 3.8 4.1   CHLORIDE mmol/L 111* 110* 112* 111*   CO2 mmol/L 23.4 25.4 23.6 21.9*   BUN mg/dL 45* 51* 43* 37*   CREATININE mg/dL 1.02 1.31* 0.95 1.12   GLUCOSE mg/dL 142* 174* 152* 175*   Estimated Creatinine Clearance: 68.4 mL/min (by C-G formula based on SCr of 1.02 mg/dL).  Results from last 7 days   Lab Units 04/11/24 0453   ALBUMIN g/dL 3.4*     Results from last 7 days   Lab Units 04/13/24  0445 04/11/24  0453 04/10/24  0704 04/09/24 0415 04/08/24  0246 04/07/24  0413   CALCIUM mg/dL 9.1 9.1 8.8 8.9 8.6 7.8*   ALBUMIN g/dL  --  3.4*  --   --   --   --    MAGNESIUM mg/dL  --   --  2.9* 2.7* 2.2 2.4   PHOSPHORUS mg/dL  --  3.2  --   --  3.3 1.1*         Coag         HbA1C   Lab Results   Component  "Value Date    HGBA1C 6.20 (H) 04/02/2024     Infection         Radiology(recent) No radiology results for the last day  No results found for: \"TROPONINT\", \"TROPONINI\", \"BNP\"  No components found for: \"TSH;2\"    acetaminophen, 650 mg, Nasogastric, TID  amLODIPine, 5 mg, Per PEG Tube, Q24H  aspirin, 81 mg, Nasogastric, Daily  atorvastatin, 80 mg, Nasogastric, Nightly  clopidogrel, 75 mg, Nasogastric, Daily  gabapentin, 200 mg, Nasogastric, TID  insulin regular, 2-7 Units, Subcutaneous, Q6H  metoprolol tartrate, 50 mg, Oral, Q12H  pantoprazole, 40 mg, Intravenous, Daily  polyethylene Glycol 400, 1 drop, Both Eyes, Q8H  senna-docusate sodium, 2 tablet, Nasogastric, BID  sodium chloride, 500 mL, Intravenous, Once  sodium chloride, 10 mL, Intravenous, Q12H       NPO Diet NPO Type: Strict NPO    Tele SR    Assessment & Plan      Active Hospital Problems    Diagnosis  POA    **Acute CVA (cerebrovascular accident) [I63.9]  Yes    Right-sided extracranial carotid artery stenosis [I65.21]  Yes    Coffee ground emesis [K92.0]  Unknown    Gastroesophageal reflux disease [K21.9]  Unknown    Cerebrovascular accident (CVA) [I63.9]  Unknown    Dysphagia [R13.10]  Unknown      Resolved Hospital Problems   No resolved problems to display.       Mr. Negron is a 79 y.o. who was admitted with MCA syndrome with acute stroke and was s/p TPA and underwent endovascular intervention with Dr. Rubio who placed a right carotid stent. He was admitted to the ICU. His stay had been complicated by dysphagia, pneumonia, coffee-ground emesis with EGD with distal esophageal ulcers, gross hematuria. He was in the ICU but now transferred out     Continue stroke care as directed by Neurology. On ASA, plavix  and statin. PT/OT/ST  On ABX for PNA- klebsiella pna ceftriaxone. Stop date 4/8.  High risk for aspiration. Monitor. He remains NPO.   Hematuria with Urology following and has three way catheter with irrigation. Plans for voiding try per urology " in the futer.    Hematemesis in ICU.  EGD 4/3/24.   Esophageal ulcers with no bleeding and no active bleeding.  No biopsy obtained d/t  anticoagulation medication.  GI recommending PPI and carafate, monitor H/H.  H/H stable. 10 (10.1).   Tachycardia improved with adding scheduled apap and restarted of his home gabapentin on 4/12 and up titration of BB.   May be related to PNA, dehydration, pain,  as well as stroke. He is tolerating increased metoprolol.  Hypernatremia- on free water with TFs. 1/2 NS infusing.  Started 4/12.  Improvement in NA with resumption in TF, free water via TF , and IVF.  Continue.   S/p  PEG 4/12. For failed swallow eval .  Secretions better with removal of DHFT from nare.  Remain strict NPO.  Family understands the importance of this.  Continue good oral care.  Pt denies any oral or throat pain.  No evidence of thrush on oral exam.   Leukocytosis noted.  Increase today, likely reactive from PEG placement 4/12.  Monitor. He is afebrile. Push pulmonary toilet with IS.   Currently on 1 liters NC.  Wean 02  as tolerated.  Keep sats > equal 94%.   Artifical tears eye drops q8.  No sign of infection.   Incentive spirometer q 2 hours while awake. Daughter thinks she will be able to help work with him on this.   I discussed the patients findings and my recommendations with patient, daughter, and nursing staff.     VTE Prophylaxis - SCDs.        Dispo- acute vs subacute rehab, likely next week .  CCP working with Daughter.  KAYLIE - RN has been by to evaluate plans to talk with CCP Monday.      DASH BOWLING daughter     ESCOBAR Rocha  [facility]  04/13/24  11:02 EDT

## 2024-04-13 NOTE — PLAN OF CARE
Goal Outcome Evaluation:      VSS throughout shift. Pt tube feeds increased to 55 mL/hr. Pt tolerating tube feeds at this time. Pt restless throughout shift picking at pillow and clothing. IV fluids stopped per order. Pt had a 10 beat run of PVC during shift. APRN notified and orders received. Pt no complaints.

## 2024-04-13 NOTE — PROGRESS NOTES
IMPRESSION & PLAN:  79-year-old gentleman status post EGD with PEG placement on 4/12/2024.  PEG is in good position and I loosened the bumper to 3.5 cm at the skin.  Recommend continuing abdominal binder for for 72 hours following procedure and as needed thereafter to prevent patient from pulling at gastrostomy tube.  Hemoglobin is stable.  I will sign off.  Please let me know if I can be of further assistance in his care.    CC:    Chief Complaint   Patient presents with    Extremity Weakness         HPI: Tolerating feeds.  No bleeding    PE:    VS:   Vitals:    04/13/24 0745   BP: 152/79   Pulse: 77   Resp: 18   Temp: 98.2 °F (36.8 °C)   SpO2: 96%          Intake/Output Summary (Last 24 hours) at 4/13/2024 1122  Last data filed at 4/13/2024 0941  Gross per 24 hour   Intake 1939 ml   Output 1500 ml   Net 439 ml        Abdomen: PEG tube site in good position      LABS:  Results from last 7 days   Lab Units 04/13/24  0445 04/11/24  0453 04/10/24  0704 04/09/24  0415 04/08/24  0246 04/07/24  0413   WBC 10*3/mm3 18.27* 17.25* 15.86* 14.83* 14.93* 14.37*   HEMOGLOBIN g/dL 10.0* 10.1* 10.8* 11.5* 10.6* 8.3*   HEMATOCRIT % 30.6* 32.7* 33.3* 35.0* 32.5* 25.3*   PLATELETS 10*3/mm3 759* 794* 714* 646* 492* 407     Results from last 7 days   Lab Units 04/13/24 0445 04/11/24  0453 04/10/24  0704   SODIUM mmol/L 147* 149* 149*   POTASSIUM mmol/L 4.1 3.9 3.8   CHLORIDE mmol/L 111* 110* 112*   CO2 mmol/L 23.4 25.4 23.6   BUN mg/dL 45* 51* 43*   CREATININE mg/dL 1.02 1.31* 0.95   CALCIUM mg/dL 9.1 9.1 8.8   GLUCOSE mg/dL 142* 174* 152*

## 2024-04-14 ENCOUNTER — APPOINTMENT (OUTPATIENT)
Dept: ULTRASOUND IMAGING | Facility: HOSPITAL | Age: 79
End: 2024-04-14
Payer: MEDICAID

## 2024-04-14 LAB
ALBUMIN SERPL-MCNC: 3.1 G/DL (ref 3.5–5.2)
ALBUMIN/GLOB SERPL: 0.9 G/DL
ALP SERPL-CCNC: 94 U/L (ref 39–117)
ALT SERPL W P-5'-P-CCNC: 71 U/L (ref 1–41)
ANION GAP SERPL CALCULATED.3IONS-SCNC: 9.7 MMOL/L (ref 5–15)
AST SERPL-CCNC: 131 U/L (ref 1–40)
BILIRUB SERPL-MCNC: 0.3 MG/DL (ref 0–1.2)
BUN SERPL-MCNC: 36 MG/DL (ref 8–23)
BUN/CREAT SERPL: 39.1 (ref 7–25)
CALCIUM SPEC-SCNC: 8.7 MG/DL (ref 8.6–10.5)
CHLORIDE SERPL-SCNC: 109 MMOL/L (ref 98–107)
CO2 SERPL-SCNC: 25.3 MMOL/L (ref 22–29)
CREAT SERPL-MCNC: 0.92 MG/DL (ref 0.76–1.27)
DEPRECATED RDW RBC AUTO: 41.6 FL (ref 37–54)
EGFRCR SERPLBLD CKD-EPI 2021: 85.7 ML/MIN/1.73
ERYTHROCYTE [DISTWIDTH] IN BLOOD BY AUTOMATED COUNT: 12.9 % (ref 12.3–15.4)
GLOBULIN UR ELPH-MCNC: 3.4 GM/DL
GLUCOSE BLDC GLUCOMTR-MCNC: 107 MG/DL (ref 70–130)
GLUCOSE BLDC GLUCOMTR-MCNC: 116 MG/DL (ref 70–130)
GLUCOSE BLDC GLUCOMTR-MCNC: 130 MG/DL (ref 70–130)
GLUCOSE BLDC GLUCOMTR-MCNC: 133 MG/DL (ref 70–130)
GLUCOSE BLDC GLUCOMTR-MCNC: 137 MG/DL (ref 70–130)
GLUCOSE SERPL-MCNC: 130 MG/DL (ref 65–99)
HCT VFR BLD AUTO: 29 % (ref 37.5–51)
HGB BLD-MCNC: 9.5 G/DL (ref 13–17.7)
MAGNESIUM SERPL-MCNC: 2.5 MG/DL (ref 1.6–2.4)
MCH RBC QN AUTO: 29.6 PG (ref 26.6–33)
MCHC RBC AUTO-ENTMCNC: 32.8 G/DL (ref 31.5–35.7)
MCV RBC AUTO: 90.3 FL (ref 79–97)
PHOSPHATE SERPL-MCNC: 2.6 MG/DL (ref 2.5–4.5)
PLATELET # BLD AUTO: 723 10*3/MM3 (ref 140–450)
PMV BLD AUTO: 9.4 FL (ref 6–12)
POTASSIUM SERPL-SCNC: 4 MMOL/L (ref 3.5–5.2)
PROT SERPL-MCNC: 6.5 G/DL (ref 6–8.5)
RBC # BLD AUTO: 3.21 10*6/MM3 (ref 4.14–5.8)
SODIUM SERPL-SCNC: 144 MMOL/L (ref 136–145)
WBC NRBC COR # BLD AUTO: 15.23 10*3/MM3 (ref 3.4–10.8)

## 2024-04-14 PROCEDURE — 84100 ASSAY OF PHOSPHORUS: CPT | Performed by: NURSE PRACTITIONER

## 2024-04-14 PROCEDURE — 99222 1ST HOSP IP/OBS MODERATE 55: CPT | Performed by: INTERNAL MEDICINE

## 2024-04-14 PROCEDURE — 80053 COMPREHEN METABOLIC PANEL: CPT | Performed by: NURSE PRACTITIONER

## 2024-04-14 PROCEDURE — 85027 COMPLETE CBC AUTOMATED: CPT | Performed by: NURSE PRACTITIONER

## 2024-04-14 PROCEDURE — 82948 REAGENT STRIP/BLOOD GLUCOSE: CPT

## 2024-04-14 PROCEDURE — 83735 ASSAY OF MAGNESIUM: CPT | Performed by: NURSE PRACTITIONER

## 2024-04-14 PROCEDURE — 76705 ECHO EXAM OF ABDOMEN: CPT

## 2024-04-14 RX ORDER — AMOXICILLIN 250 MG
2 CAPSULE ORAL NIGHTLY PRN
Status: DISCONTINUED | OUTPATIENT
Start: 2024-04-14 | End: 2024-04-17 | Stop reason: HOSPADM

## 2024-04-14 RX ORDER — ATORVASTATIN CALCIUM 20 MG/1
40 TABLET, FILM COATED ORAL NIGHTLY
Status: DISCONTINUED | OUTPATIENT
Start: 2024-04-14 | End: 2024-04-14

## 2024-04-14 RX ORDER — ATORVASTATIN CALCIUM 20 MG/1
40 TABLET, FILM COATED ORAL NIGHTLY
Status: DISCONTINUED | OUTPATIENT
Start: 2024-04-14 | End: 2024-04-17 | Stop reason: HOSPADM

## 2024-04-14 RX ORDER — POLYETHYLENE GLYCOL 3350 17 G/17G
17 POWDER, FOR SOLUTION ORAL DAILY PRN
Status: DISCONTINUED | OUTPATIENT
Start: 2024-04-14 | End: 2024-04-17 | Stop reason: HOSPADM

## 2024-04-14 RX ORDER — BISACODYL 5 MG/1
5 TABLET, DELAYED RELEASE ORAL DAILY PRN
Status: DISCONTINUED | OUTPATIENT
Start: 2024-04-14 | End: 2024-04-17 | Stop reason: HOSPADM

## 2024-04-14 RX ORDER — BISACODYL 10 MG
10 SUPPOSITORY, RECTAL RECTAL DAILY PRN
Status: DISCONTINUED | OUTPATIENT
Start: 2024-04-14 | End: 2024-04-17 | Stop reason: HOSPADM

## 2024-04-14 RX ADMIN — Medication 1 DROP: at 05:36

## 2024-04-14 RX ADMIN — Medication 10 ML: at 08:40

## 2024-04-14 RX ADMIN — AMLODIPINE BESYLATE 5 MG: 5 TABLET ORAL at 08:45

## 2024-04-14 RX ADMIN — POLYETHYLENE GLYCOL 3350 17 G: 17 POWDER, FOR SOLUTION ORAL at 05:36

## 2024-04-14 RX ADMIN — PANTOPRAZOLE SODIUM 40 MG: 40 INJECTION, POWDER, FOR SOLUTION INTRAVENOUS at 08:38

## 2024-04-14 RX ADMIN — ASPIRIN 81 MG: 81 TABLET, CHEWABLE ORAL at 08:38

## 2024-04-14 RX ADMIN — METOPROLOL TARTRATE 50 MG: 50 TABLET, FILM COATED ORAL at 20:37

## 2024-04-14 RX ADMIN — Medication 1 DROP: at 16:40

## 2024-04-14 RX ADMIN — GABAPENTIN 200 MG: 100 CAPSULE ORAL at 20:37

## 2024-04-14 RX ADMIN — CLOPIDOGREL BISULFATE 75 MG: 75 TABLET, FILM COATED ORAL at 08:38

## 2024-04-14 RX ADMIN — METOPROLOL TARTRATE 50 MG: 50 TABLET, FILM COATED ORAL at 08:38

## 2024-04-14 RX ADMIN — Medication 10 ML: at 20:38

## 2024-04-14 RX ADMIN — GABAPENTIN 200 MG: 100 CAPSULE ORAL at 16:39

## 2024-04-14 RX ADMIN — DOCUSATE SODIUM 50MG AND SENNOSIDES 8.6MG 2 TABLET: 8.6; 5 TABLET, FILM COATED ORAL at 08:38

## 2024-04-14 RX ADMIN — ATORVASTATIN CALCIUM 40 MG: 20 TABLET, FILM COATED ORAL at 20:37

## 2024-04-14 RX ADMIN — Medication 1 DROP: at 22:00

## 2024-04-14 RX ADMIN — GABAPENTIN 200 MG: 100 CAPSULE ORAL at 08:45

## 2024-04-14 NOTE — PLAN OF CARE
Problem: Adult Inpatient Plan of Care  Goal: Absence of Hospital-Acquired Illness or Injury  Intervention: Identify and Manage Fall Risk  Recent Flowsheet Documentation  Taken 4/14/2024 1800 by Leticia Trinidad RN  Safety Promotion/Fall Prevention: (pt down for ultrasound of liver)   patient off unit   other (see comments)  Taken 4/14/2024 1619 by Leticia Trinidad RN  Safety Promotion/Fall Prevention:   safety round/check completed   fall prevention program maintained   clutter free environment maintained   assistive device/personal items within reach  Taken 4/14/2024 1400 by Leticia Trinidad RN  Safety Promotion/Fall Prevention:   safety round/check completed   assistive device/personal items within reach   fall prevention program maintained  Taken 4/14/2024 1205 by Leticia Trinidad RN  Safety Promotion/Fall Prevention:   safety round/check completed   fall prevention program maintained   clutter free environment maintained   assistive device/personal items within reach  Taken 4/14/2024 1020 by Leticia Trinidad RN  Safety Promotion/Fall Prevention:   safety round/check completed   nonskid shoes/slippers when out of bed   fall prevention program maintained   clutter free environment maintained   assistive device/personal items within reach  Taken 4/14/2024 0838 by Leticia Trinidad RN  Safety Promotion/Fall Prevention:   fall prevention program maintained   assistive device/personal items within reach   room organization consistent   safety round/check completed   nonskid shoes/slippers when out of bed  Intervention: Prevent Skin Injury  Recent Flowsheet Documentation  Taken 4/14/2024 1800 by Leticia Trinidad RN  Body Position: (pt off unit) other (see comments)  Taken 4/14/2024 1619 by Leticia Trinidad RN  Body Position: side-lying  Taken 4/14/2024 1400 by Leticia Trinidad RN  Body Position: supine, legs elevated  Taken 4/14/2024 1205 by Leticia Trinidad RN  Body Position: side-lying  Taken 4/14/2024 1020 by Leticia Trinidad RN  Body Position:   sitting up  in bed   30 degrees  Taken 4/14/2024 0838 by Leticia Trinidad RN  Body Position:   30 degrees   sitting up in bed  Skin Protection:   adhesive use limited   incontinence pads utilized   transparent dressing maintained   skin-to-device areas padded  Intervention: Prevent and Manage VTE (Venous Thromboembolism) Risk  Recent Flowsheet Documentation  Taken 4/14/2024 1800 by Leticia Trinidad RN  Activity Management: (pt off unit) other (see comments)  Taken 4/14/2024 1619 by Leticia Trinidad RN  Activity Management: bedrest  Taken 4/14/2024 1400 by Leticia Trinidad RN  Activity Management: bedrest  Taken 4/14/2024 1205 by Leticia Trinidad RN  Activity Management: activity encouraged  Taken 4/14/2024 1020 by Leticia Trinidad RN  Activity Management: activity encouraged  Taken 4/14/2024 0838 by Leticia Trinidad RN  Activity Management: activity encouraged  VTE Prevention/Management:   bilateral   sequential compression devices on  Intervention: Prevent Infection  Recent Flowsheet Documentation  Taken 4/14/2024 1619 by Leticia Trinidad RN  Infection Prevention: single patient room provided  Taken 4/14/2024 1400 by Leticia Trinidad RN  Infection Prevention: single patient room provided  Taken 4/14/2024 1205 by Leticia Trinidad RN  Infection Prevention: single patient room provided  Taken 4/14/2024 1020 by Leticia Trinidad RN  Infection Prevention: single patient room provided  Taken 4/14/2024 0838 by Leticia Trinidad RN  Infection Prevention: single patient room provided     Problem: Adult Inpatient Plan of Care  Goal: Absence of Hospital-Acquired Illness or Injury  Intervention: Prevent Skin Injury  Recent Flowsheet Documentation  Taken 4/14/2024 1800 by Leticia Trinidad RN  Body Position: (pt off unit) other (see comments)  Taken 4/14/2024 1619 by Leticia Trinidad RN  Body Position: side-lying  Taken 4/14/2024 1400 by Leticia Trinidad RN  Body Position: supine, legs elevated  Taken 4/14/2024 1205 by Leticia Trinidad RN  Body Position: side-lying  Taken 4/14/2024 1020 by Leticia Triindad  RN  Body Position:   sitting up in bed   30 degrees  Taken 4/14/2024 0838 by Leticia Trinidad RN  Body Position:   30 degrees   sitting up in bed  Skin Protection:   adhesive use limited   incontinence pads utilized   transparent dressing maintained   skin-to-device areas padded     Problem: Adult Inpatient Plan of Care  Goal: Absence of Hospital-Acquired Illness or Injury  Intervention: Prevent and Manage VTE (Venous Thromboembolism) Risk  Recent Flowsheet Documentation  Taken 4/14/2024 1800 by Leticia Trinidad RN  Activity Management: (pt off unit) other (see comments)  Taken 4/14/2024 1619 by Leticia Trinidad RN  Activity Management: bedrest  Taken 4/14/2024 1400 by Leticia Trinidad RN  Activity Management: bedrest  Taken 4/14/2024 1205 by Leticia Trinidad RN  Activity Management: activity encouraged  Taken 4/14/2024 1020 by Leticia Trinidad RN  Activity Management: activity encouraged  Taken 4/14/2024 0838 by Leticia Trinidad RN  Activity Management: activity encouraged  VTE Prevention/Management:   bilateral   sequential compression devices on     Problem: Adult Inpatient Plan of Care  Goal: Optimal Comfort and Wellbeing  Intervention: Provide Person-Centered Care  Recent Flowsheet Documentation  Taken 4/14/2024 0838 by Leticia Trinidad RN  Trust Relationship/Rapport: (interpeter used)   care explained   other (see comments)     Problem: Aspiration (Enteral Nutrition)  Goal: Absence of Aspiration Signs and Symptoms  Intervention: Minimize Aspiration Risk  Recent Flowsheet Documentation  Taken 4/14/2024 1619 by Leticia Trinidad RN  Head of Bed (HOB) Positioning: HOB at 20-30 degrees  Taken 4/14/2024 1400 by Leticia Trinidad RN  Head of Bed (HOB) Positioning:   HOB elevated   HOB at 20-30 degrees  Taken 4/14/2024 1205 by Leticia Trinidad RN  Head of Bed (HOB) Positioning: HOB at 20-30 degrees  Taken 4/14/2024 1020 by Leticia Trinidad RN  Head of Bed (HOB) Positioning: HOB at 30 degrees  Taken 4/14/2024 0838 by Leticia Trinidad RN  Head of Bed (HOB) Positioning:  HOB at 30 degrees  Enteral Feeding Safety: placement checked     Problem: Device-Related Complication Risk (Enteral Nutrition)  Goal: Safe, Effective Therapy Delivery  Intervention: Prevent Feeding-Related Adverse Events  Recent Flowsheet Documentation  Taken 4/14/2024 6964 by Leticia Trinidad RN  Enteral Feeding Safety: placement checked   Goal Outcome Evaluation:      Pt A&OX4 via interpretor, TOBI DESIR. No acute changes noted this shift. NPO, G-tube clean dry and intact; pt tolerated feeding well throughout shift. IV to LUE intact, c/ no drainage or redness noted. Cont bladder irrigation; pt tolerated well. Call light and personal items in reach.

## 2024-04-14 NOTE — PROGRESS NOTES
[facility] Acute Inpt Rehab    Patient low level with therapies.  Anticipate 24/7 assist when DC home.  Per family, no one able to stay 24/7.  Follow up tomorrow with CCP.    Thank you,  Radha Hook RN  Rehab Admission Nurse

## 2024-04-14 NOTE — CONSULTS
Date of Hospital Visit: 2024  Encounter Provider: Gaurang Hope MD  Place of Service: [facility] CARDIOLOGY  Patient Name: Alex Negron  :1945  Referral Provider: No ref. provider found    Chief complaint: 10 beat run of VTach    History of Present Illness:  LUÍS Negron is a 79 year old male with medical history of R carotid artery stenosis, GERD, Gi bleed and dysphagia. He presented to the ER on  for L sided weakness with slurred speech and facial droop.  He was taken to the interventional radiology suite for mechanical thrombectomy and transcarotid stent.  He is [place] and history was obtained by review of digital records and limited interview with  as the patient was intermittently responsive to questions.  On bedside interview he has left-sided hemineglect and dense left hemiparesis.  He denies cardiac history.  With specific questioning he denied chest pain or shortness of air.  No orthopnea, PND or edema.  No palpitations, dizziness or syncope.  On telemetry overnight he was noted to have a 9-10 beat run of nonsustained VT.  EKG shows probable LVH with secondary repolarization abnormality.  Echocardiogram shows normal EF with no significant valvular abnormality.    HPI was reviewed, updated and amended when necessary.                  ECHO 24    Left ventricular systolic function is normal. Left ventricular ejection fraction appears to be 56 - 60%.    Left ventricular diastolic function was normal.    Estimated right ventricular systolic pressure from tricuspid regurgitation is normal (<35 mmHg).    Past Medical History:   Diagnosis Date    Chronic back pain     neuropathy rolando legs    Hypertension        Past Surgical History:   Procedure Laterality Date    ENDOSCOPY N/A 4/3/2024    Procedure: ESOPHAGOGASTRODUODENOSCOPY AT BEDSIDE;  Surgeon: Redd Guidry MD;  Location: [facility] ENDOSCOPY;  Service: Gastroenterology;  Laterality: N/A;   PRE-  GI BLEED  POST- HIATAL HERNIA, DISTAL ESOPHAGITIS ULCERS, GASTRITIS, PYLOROPLASTY       Medications Prior to Admission   Medication Sig Dispense Refill Last Dose    amLODIPine (NORVASC) 10 MG tablet Take 1 tablet by mouth Daily.       ferrous sulfate 325 (65 FE) MG tablet Take 1 tablet by mouth Daily With Breakfast.       gabapentin (NEURONTIN) 300 MG capsule Take 1 capsule by mouth 3 (Three) Times a Day.       indapamide (LOZOL) 1.25 MG tablet Take 1 tablet by mouth Every Morning.       lisinopril (PRINIVIL,ZESTRIL) 10 MG tablet Take 1 tablet by mouth Daily.       rosuvastatin (CRESTOR) 20 MG tablet Take 1 tablet by mouth Daily.       tamsulosin (FLOMAX) 0.4 MG capsule 24 hr capsule Take 1 capsule by mouth Every Night.          Current Meds  Scheduled Meds:[Held by provider] acetaminophen, 650 mg, Nasogastric, TID  amLODIPine, 5 mg, Per PEG Tube, Q24H  aspirin, 81 mg, Nasogastric, Daily  [Held by provider] atorvastatin, 80 mg, Nasogastric, Nightly  clopidogrel, 75 mg, Nasogastric, Daily  gabapentin, 200 mg, Nasogastric, TID  insulin regular, 2-7 Units, Subcutaneous, Q6H  metoprolol tartrate, 50 mg, Oral, Q12H  pantoprazole, 40 mg, Intravenous, Daily  polyethylene Glycol 400, 1 drop, Both Eyes, Q8H  senna-docusate sodium, 2 tablet, Nasogastric, BID  sodium chloride, 500 mL, Intravenous, Once  sodium chloride, 10 mL, Intravenous, Q12H      Continuous Infusions:   PRN Meds:.  acetaminophen    senna-docusate sodium **AND** polyethylene glycol **AND** bisacodyl **AND** bisacodyl    calcium carbonate    Calcium Replacement - Follow Nurse / BPA Driven Protocol    Calcium Replacement - Follow Nurse / BPA Driven Protocol    Calcium Replacement - Follow Nurse / BPA Driven Protocol    dextrose    dextrose    glucagon (human recombinant)    hydrALAZINE    Magnesium Low Dose Replacement - Follow Nurse / BPA Driven Protocol    Magnesium Standard Dose Replacement - Follow Nurse / BPA Driven Protocol    Magnesium Standard  "Dose Replacement - Follow Nurse / BPA Driven Protocol    nitroglycerin    ondansetron    Phosphorus Replacement - Follow Nurse / BPA Driven Protocol    Phosphorus Replacement - Follow Nurse / BPA Driven Protocol    Phosphorus Replacement - Follow Nurse / BPA Driven Protocol    Potassium Replacement - Follow Nurse / BPA Driven Protocol    Potassium Replacement - Follow Nurse / BPA Driven Protocol    Potassium Replacement - Follow Nurse / BPA Driven Protocol    sodium chloride    sodium chloride    sodium chloride    Allergies as of 2024    (No Known Allergies)       Social History     Socioeconomic History    Marital status:    Tobacco Use    Smoking status: Former     Current packs/day: 0.00     Types: Cigarettes     Quit date:      Years since quittin.3    Smokeless tobacco: Never   Vaping Use    Vaping status: Never Used   Substance and Sexual Activity    Alcohol use: Not Currently    Drug use: Never    Sexual activity: Defer     Past surgical, medical, social and family history was reviewed, updated and amended when necessary.    Review of Systems   Unable to perform ROS: Other            Objective:   Temp:  [97.3 °F (36.3 °C)-98.2 °F (36.8 °C)] 97.7 °F (36.5 °C)  Heart Rate:  [61-90] 78  Resp:  [16-18] 18  BP: (119-164)/() 132/84  Body mass index is 26.41 kg/m².  Flowsheet Rows      Flowsheet Row First Filed Value   Admission Height 180.3 cm (71\") Documented at 2024 0430   Admission Weight 81.7 kg (180 lb 3.2 oz) Documented at 2024 2159          Vitals:    24 0436   BP: 132/84   Pulse: 78   Resp: 18   Temp: 97.7 °F (36.5 °C)   SpO2: 96%       Vitals reviewed.   Constitutional:       Appearance: Not in distress. Frail. Chronically ill-appearing.   Neck:      Vascular: No JVR. JVD normal.   Pulmonary:      Effort: Pulmonary effort is normal.      Breath sounds: No wheezing. No rhonchi. No rales.   Chest:      Chest wall: Not tender to palpatation.   Cardiovascular:     "  PMI at left midclavicular line. Normal rate. Regular rhythm. Normal S1. Normal S2.       Murmurs: There is no murmur.      No gallop.  No click. No rub.   Pulses:     Intact distal pulses.   Edema:     Peripheral edema absent.   Abdominal:      General: Bowel sounds are normal.      Palpations: Abdomen is soft.      Tenderness: There is no abdominal tenderness.   Musculoskeletal: Normal range of motion.         General: No tenderness. Skin:     General: Skin is warm and dry.   Neurological:      Comments: Left-sided weakness with left-sided hemineglect and intermittent verbal response potentially secondary to expressive aphasia                 Lab Review:      Results from last 7 days   Lab Units 04/14/24  0639   SODIUM mmol/L 144   POTASSIUM mmol/L 4.0   CHLORIDE mmol/L 109*   CO2 mmol/L 25.3   BUN mg/dL 36*   CREATININE mg/dL 0.92   CALCIUM mg/dL 8.7   BILIRUBIN mg/dL 0.3   ALK PHOS U/L 94   ALT (SGPT) U/L 71*   AST (SGOT) U/L 131*   GLUCOSE mg/dL 130*         @LABRCNTbnp@  Results from last 7 days   Lab Units 04/14/24  0639 04/13/24  0445 04/11/24  0453   WBC 10*3/mm3 15.23* 18.27* 17.25*   HEMOGLOBIN g/dL 9.5* 10.0* 10.1*   HEMATOCRIT % 29.0* 30.6* 32.7*   PLATELETS 10*3/mm3 723* 759* 794*         Results from last 7 days   Lab Units 04/14/24  0639   MAGNESIUM mg/dL 2.5*     @LABRCNTIP(chol,trig,hdl,ldl)    I personally viewed and interpreted the patient's EKG/Telemetry data    Acute CVA (cerebrovascular accident)    Right-sided extracranial carotid artery stenosis    Coffee ground emesis    Gastroesophageal reflux disease    Cerebrovascular accident (CVA)    Dysphagia    Assessment and Plan:    NSVT -electrolytes within normal range.  Normal EF on echocardiogram.  Monitor closely.  Acute CVA -status post mechanical thrombectomy with right ICC a stent on dual antiplatelet therapy.  Workup and management per neurology.  Ambulatory telemetry monitor ordered for discharge  Hypertension -initially difficult to  control.  Fair control on current regimen.  Monitor closely.  Hematemesis -followed by GI with stable hemoglobin on dual antiplatelet therapy.    Gaurang Hope MD  04/14/24  08:14 EDT.  Time spent in reviewing chart, discussion and examination:

## 2024-04-14 NOTE — PROGRESS NOTES
Name: Alex Negron ADMIT: 2024   : 1945  PCP: Jyothi Song PA-C    MRN: [id number] LOS: 13 days   AGE/SEX: 79 y.o. male  ROOM: [facility]   Subjective   Chief Complaint   Patient presents with    Extremity Weakness   Tachycardia better.      Tolerating Tube feeds.  Son at bedside.  Google translate used to assist with communication.  Pt denies any abd pain, n/ v, chest pain, or shoa.  He feels better today.  Did have 2 BM's today.        Objective   Vital Signs  Temp:  [97.3 °F (36.3 °C)-99.1 °F (37.3 °C)] 98.2 °F (36.8 °C)  Heart Rate:  [61-78] 75  Resp:  [16-18] 18  BP: (119-164)/() 124/80  SpO2:  [95 %-98 %] 96 %  on  Flow (L/min):  [1] 1;   Device (Oxygen Therapy): room air  Body mass index is 26.41 kg/m².    Physical Exam  Vitals and nursing note reviewed.   Constitutional:       Appearance: He is ill-appearing.   HENT:      Head: Normocephalic and atraumatic.      Mouth/Throat:      Mouth: Mucous membranes are dry.      Pharynx: Oropharynx is clear. No oropharyngeal exudate or posterior oropharyngeal erythema.   Eyes:      General: No scleral icterus.     Conjunctiva/sclera: Conjunctivae normal.   Cardiovascular:      Rate and Rhythm: Normal rate and regular rhythm.      Pulses: Normal pulses.      Heart sounds: Normal heart sounds. No murmur heard.  Pulmonary:      Effort: Pulmonary effort is normal. No respiratory distress (decreased bs at bases).      Comments: Dim to right mid to lower lobes.  Poor inspiratory effort.   Abdominal:      General: Bowel sounds are normal. There is no distension.      Palpations: Abdomen is soft.      Tenderness: There is no abdominal tenderness (mild TTP at PEG insertion site.). There is no guarding.      Comments: Abd. Binder in place.  No erythema at PEG insertion site.    Musculoskeletal:      Cervical back: Neck supple.      Right lower leg: No edema.      Left lower leg: No edema.   Skin:     General: Skin is warm and dry.   Neurological:       "Mental Status: He is alert and oriented to person, place, and time.      Motor: Weakness present.      Comments: Left lower extremity dorsi/plantar flexion weak but present.  Unable to raise left leg.  Left arm flaccid. Right side with good ROM and strength.  Unable to perform EOM, visual neglect noted.    Psychiatric:      Comments: Flat affect, cooperative. Less restlessness and agitation today.            Results Review:       I reviewed the patient's new clinical results.  Results from last 7 days   Lab Units 04/14/24  0639 04/13/24  0445 04/11/24  0453 04/10/24  0704   WBC 10*3/mm3 15.23* 18.27* 17.25* 15.86*   HEMOGLOBIN g/dL 9.5* 10.0* 10.1* 10.8*   PLATELETS 10*3/mm3 723* 759* 794* 714*     Results from last 7 days   Lab Units 04/14/24 0639 04/13/24 1534 04/13/24 0445 04/11/24 0453   SODIUM mmol/L 144 145 147* 149*   POTASSIUM mmol/L 4.0 4.0 4.1 3.9   CHLORIDE mmol/L 109* 111* 111* 110*   CO2 mmol/L 25.3 26.0 23.4 25.4   BUN mg/dL 36* 42* 45* 51*   CREATININE mg/dL 0.92 1.01 1.02 1.31*   GLUCOSE mg/dL 130* 125* 142* 174*   Estimated Creatinine Clearance: 81.7 mL/min (by C-G formula based on SCr of 0.92 mg/dL).  Results from last 7 days   Lab Units 04/14/24 0639 04/11/24 0453   ALBUMIN g/dL 3.1* 3.4*   BILIRUBIN mg/dL 0.3  --    ALK PHOS U/L 94  --    AST (SGOT) U/L 131*  --    ALT (SGPT) U/L 71*  --      Results from last 7 days   Lab Units 04/14/24 0639 04/13/24 1534 04/13/24 0445 04/11/24 0453 04/10/24  0704 04/09/24  0415 04/08/24  0246   CALCIUM mg/dL 8.7 8.7 9.1 9.1 8.8 8.9 8.6   ALBUMIN g/dL 3.1*  --   --  3.4*  --   --   --    MAGNESIUM mg/dL 2.5* 2.6*  --   --  2.9* 2.7* 2.2   PHOSPHORUS mg/dL 2.6  --   --  3.2  --   --  3.3         Coag         HbA1C   Lab Results   Component Value Date    HGBA1C 6.20 (H) 04/02/2024     Infection         Radiology(recent) No radiology results for the last day  No results found for: \"TROPONINT\", \"TROPONINI\", \"BNP\"  No components found for: \"TSH;2\"    [Held " by provider] acetaminophen, 650 mg, Nasogastric, TID  amLODIPine, 5 mg, Per PEG Tube, Q24H  aspirin, 81 mg, Nasogastric, Daily  atorvastatin, 40 mg, Oral, Nightly  clopidogrel, 75 mg, Nasogastric, Daily  gabapentin, 200 mg, Nasogastric, TID  insulin regular, 2-7 Units, Subcutaneous, Q6H  metoprolol tartrate, 50 mg, Oral, Q12H  pantoprazole, 40 mg, Intravenous, Daily  polyethylene Glycol 400, 1 drop, Both Eyes, Q8H  senna-docusate sodium, 2 tablet, Nasogastric, BID  sodium chloride, 500 mL, Intravenous, Once  sodium chloride, 10 mL, Intravenous, Q12H       NPO Diet NPO Type: Strict NPO    Tele SR    Assessment & Plan      Active Hospital Problems    Diagnosis  POA    **Acute CVA (cerebrovascular accident) [I63.9]  Yes    Right-sided extracranial carotid artery stenosis [I65.21]  Yes    Coffee ground emesis [K92.0]  Unknown    Gastroesophageal reflux disease [K21.9]  Unknown    Cerebrovascular accident (CVA) [I63.9]  Unknown    Dysphagia [R13.10]  Unknown      Resolved Hospital Problems   No resolved problems to display.       Mr. Negron is a 79 y.o. who was admitted with MCA syndrome with acute stroke and was s/p TPA and underwent endovascular intervention with Dr. Rubio who placed a right carotid stent. He was admitted to the ICU. His stay had been complicated by dysphagia, pneumonia, coffee-ground emesis with EGD with distal esophageal ulcers, gross hematuria. He was in the ICU but now transferred out     Continue stroke care as directed by Neurology. On ASA, plavix  and statin.   Statin therapy reduced to 40 mg from 80 mg with noted ALT and AST elevation with normal Bili.   Lipid panel reviewed.-otal 117, HDL 38, LDL 69, Trigs 36  Will stop scheduled tylenol used for pain post peg.  CT of abd non contrast on admit showed normal liver, gallbladder, bile duct.  Discussed with Dr. Cook who recommends getting Liver US. This was obtained and shows very mild fatty liver disease but otherwise unremarkable.    He  denies ETOH use.   PT/OT/ST  On ABX for PNA- klebsiella pna ceftriaxone. Stop date 4/13. High risk for aspiration. Monitor. He remains NPO.   Hematuria with Urology following and has three way catheter with irrigation. Plans for voiding try per urology in the futer.    Hematemesis in ICU.  EGD 4/3/24.   Esophageal ulcers with no bleeding and no active bleeding.  No biopsy obtained d/t  anticoagulation medication.  GI recommending PPI and carafate, monitor H/H.  H/H stable. 10 (10.1).   Tachycardia improved with adding scheduled apap and restarted of his home gabapentin on 4/12 and up titration of BB.   May be related to PNA, dehydration, pain,  as well as stroke. He is tolerating increased metoprolol.  Hypernatremia- on free water with TFs. 1/2 NS infusing.  Started 4/12.  Improvement in NA with resumption in TF, free water via TF , and IVF.  Continue.   S/p  PEG 4/12. For failed swallow eval .  Secretions better with removal of DHFT from nare.  Remain strict NPO.  Family understands the importance of this.  Continue good oral care.  Pt denies any oral or throat pain.  No evidence of thrush on oral exam.   Leukocytosis noted. Improved today 15 (18)  I likely reactive from PEG placement 4/12.  Monitor. He is afebrile. Push pulmonary toilet with IS.   Currently on RA   Keep sats > equal 94%.   Artifical tears eye drops q8.  No sign of infection.   Incentive spirometer q 2 hours while awake. Daughter thinks she will be able to help work with him on this.   I discussed the patients findings and my recommendations with patient, daughter, son, Dr. Cook and nursing staff.     VTE Prophylaxis - SCDs.        Dispo- acute vs subacute rehab, likely next week .  CCP working with Daughter.  KAYLIE - RN has been by to evaluate plans to talk with CCP Monday.      DASH BOWLING daughter     ESCOBAR Rocha  [facility]  04/14/24

## 2024-04-14 NOTE — PLAN OF CARE
Problem: Adult Inpatient Plan of Care  Goal: Plan of Care Review  Outcome: Ongoing, Progressing  Goal: Patient-Specific Goal (Individualized)  Outcome: Ongoing, Progressing  Goal: Absence of Hospital-Acquired Illness or Injury  Outcome: Ongoing, Progressing  Intervention: Identify and Manage Fall Risk  Recent Flowsheet Documentation  Taken 4/14/2024 0200 by Rachana Khoury RN  Safety Promotion/Fall Prevention:   assistive device/personal items within reach   clutter free environment maintained   fall prevention program maintained   lighting adjusted   nonskid shoes/slippers when out of bed   safety round/check completed   room organization consistent  Taken 4/14/2024 0000 by Rachana Khoury RN  Safety Promotion/Fall Prevention:   assistive device/personal items within reach   clutter free environment maintained   fall prevention program maintained   lighting adjusted   nonskid shoes/slippers when out of bed   safety round/check completed   room organization consistent  Taken 4/13/2024 2200 by Rachana Khoury RN  Safety Promotion/Fall Prevention:   assistive device/personal items within reach   clutter free environment maintained   fall prevention program maintained   lighting adjusted   nonskid shoes/slippers when out of bed   safety round/check completed   room organization consistent  Taken 4/13/2024 2000 by Rachana Khoury RN  Safety Promotion/Fall Prevention:   assistive device/personal items within reach   clutter free environment maintained   fall prevention program maintained   lighting adjusted   nonskid shoes/slippers when out of bed   safety round/check completed   room organization consistent  Intervention: Prevent Skin Injury  Recent Flowsheet Documentation  Taken 4/14/2024 0200 by Rachana Khoury RN  Body Position: turned  Taken 4/14/2024 0000 by Rachana Khoury RN  Body Position: turned  Taken 4/13/2024 2200 by Rachana Khoury RN  Body Position: turned  Taken 4/13/2024 2000 by Rachana Khoury  RN  Body Position: turned  Skin Protection:   adhesive use limited   incontinence pads utilized   protective footwear used   silicone foam dressing in place   skin sealant/moisture barrier applied   skin-to-device areas padded   skin-to-skin areas padded   transparent dressing maintained   tubing/devices free from skin contact  Intervention: Prevent and Manage VTE (Venous Thromboembolism) Risk  Recent Flowsheet Documentation  Taken 4/14/2024 0200 by Rachana Khoury RN  Activity Management: activity encouraged  Taken 4/14/2024 0000 by Rachana Khoury RN  Activity Management: activity encouraged  Taken 4/13/2024 2200 by Rachana Khoury RN  Activity Management: activity encouraged  Taken 4/13/2024 2000 by Rachana Khoury RN  Activity Management: activity encouraged  VTE Prevention/Management:   bilateral   sequential compression devices on  Goal: Optimal Comfort and Wellbeing  Outcome: Ongoing, Progressing  Intervention: Provide Person-Centered Care  Recent Flowsheet Documentation  Taken 4/13/2024 2000 by Rachana Khoury RN  Trust Relationship/Rapport:   care explained   choices provided   emotional support provided   empathic listening provided   questions answered   questions encouraged   reassurance provided   thoughts/feelings acknowledged  Goal: Readiness for Transition of Care  Outcome: Ongoing, Progressing   Goal Outcome Evaluation:      Pt agitated at times. Per daughter, pt states his coccyx is hurting him, position changed Q2. Position changes seems to help agitation. No complaints of pain noted. Tube feeding continued per order. CBI continues at slow rate.

## 2024-04-15 PROBLEM — K76.0 FATTY LIVER DISEASE, NONALCOHOLIC: Status: ACTIVE | Noted: 2024-04-15

## 2024-04-15 PROBLEM — R79.89 ELEVATED LFTS: Status: ACTIVE | Noted: 2024-04-15

## 2024-04-15 LAB
ALBUMIN SERPL-MCNC: 3.3 G/DL (ref 3.5–5.2)
ALBUMIN/GLOB SERPL: 0.9 G/DL
ALP SERPL-CCNC: 103 U/L (ref 39–117)
ALT SERPL W P-5'-P-CCNC: 62 U/L (ref 1–41)
ANION GAP SERPL CALCULATED.3IONS-SCNC: 9.5 MMOL/L (ref 5–15)
AST SERPL-CCNC: 104 U/L (ref 1–40)
BILIRUB SERPL-MCNC: 0.3 MG/DL (ref 0–1.2)
BUN SERPL-MCNC: 30 MG/DL (ref 8–23)
BUN/CREAT SERPL: 33 (ref 7–25)
CALCIUM SPEC-SCNC: 8.9 MG/DL (ref 8.6–10.5)
CHLORIDE SERPL-SCNC: 105 MMOL/L (ref 98–107)
CO2 SERPL-SCNC: 24.5 MMOL/L (ref 22–29)
CREAT SERPL-MCNC: 0.91 MG/DL (ref 0.76–1.27)
DEPRECATED RDW RBC AUTO: 44.9 FL (ref 37–54)
EGFRCR SERPLBLD CKD-EPI 2021: 86.8 ML/MIN/1.73
ERYTHROCYTE [DISTWIDTH] IN BLOOD BY AUTOMATED COUNT: 13.3 % (ref 12.3–15.4)
GLOBULIN UR ELPH-MCNC: 3.5 GM/DL
GLUCOSE BLDC GLUCOMTR-MCNC: 117 MG/DL (ref 70–130)
GLUCOSE BLDC GLUCOMTR-MCNC: 120 MG/DL (ref 70–130)
GLUCOSE BLDC GLUCOMTR-MCNC: 138 MG/DL (ref 70–130)
GLUCOSE BLDC GLUCOMTR-MCNC: 138 MG/DL (ref 70–130)
GLUCOSE SERPL-MCNC: 146 MG/DL (ref 65–99)
HCT VFR BLD AUTO: 29.3 % (ref 37.5–51)
HGB BLD-MCNC: 9.5 G/DL (ref 13–17.7)
MCH RBC QN AUTO: 29.8 PG (ref 26.6–33)
MCHC RBC AUTO-ENTMCNC: 32.4 G/DL (ref 31.5–35.7)
MCV RBC AUTO: 91.8 FL (ref 79–97)
PLATELET # BLD AUTO: 773 10*3/MM3 (ref 140–450)
PMV BLD AUTO: 9.4 FL (ref 6–12)
POTASSIUM SERPL-SCNC: 4.2 MMOL/L (ref 3.5–5.2)
PROT SERPL-MCNC: 6.8 G/DL (ref 6–8.5)
RBC # BLD AUTO: 3.19 10*6/MM3 (ref 4.14–5.8)
SODIUM SERPL-SCNC: 139 MMOL/L (ref 136–145)
WBC NRBC COR # BLD AUTO: 15.07 10*3/MM3 (ref 3.4–10.8)

## 2024-04-15 PROCEDURE — 99232 SBSQ HOSP IP/OBS MODERATE 35: CPT | Performed by: NURSE PRACTITIONER

## 2024-04-15 PROCEDURE — 97530 THERAPEUTIC ACTIVITIES: CPT

## 2024-04-15 PROCEDURE — 82948 REAGENT STRIP/BLOOD GLUCOSE: CPT

## 2024-04-15 PROCEDURE — 85027 COMPLETE CBC AUTOMATED: CPT | Performed by: NURSE PRACTITIONER

## 2024-04-15 PROCEDURE — 97110 THERAPEUTIC EXERCISES: CPT

## 2024-04-15 PROCEDURE — 80053 COMPREHEN METABOLIC PANEL: CPT | Performed by: NURSE PRACTITIONER

## 2024-04-15 RX ADMIN — AMLODIPINE BESYLATE 5 MG: 5 TABLET ORAL at 09:23

## 2024-04-15 RX ADMIN — METOPROLOL TARTRATE 50 MG: 50 TABLET, FILM COATED ORAL at 09:28

## 2024-04-15 RX ADMIN — Medication 1 DROP: at 06:26

## 2024-04-15 RX ADMIN — Medication 1 DROP: at 21:01

## 2024-04-15 RX ADMIN — ATORVASTATIN CALCIUM 40 MG: 20 TABLET, FILM COATED ORAL at 21:01

## 2024-04-15 RX ADMIN — ASPIRIN 81 MG: 81 TABLET, CHEWABLE ORAL at 09:24

## 2024-04-15 RX ADMIN — GABAPENTIN 200 MG: 100 CAPSULE ORAL at 21:01

## 2024-04-15 RX ADMIN — Medication 1 DROP: at 13:38

## 2024-04-15 RX ADMIN — PANTOPRAZOLE SODIUM 40 MG: 40 INJECTION, POWDER, FOR SOLUTION INTRAVENOUS at 09:25

## 2024-04-15 RX ADMIN — GABAPENTIN 200 MG: 100 CAPSULE ORAL at 16:13

## 2024-04-15 RX ADMIN — Medication 10 ML: at 09:25

## 2024-04-15 RX ADMIN — Medication 10 ML: at 21:01

## 2024-04-15 RX ADMIN — METOPROLOL TARTRATE 50 MG: 50 TABLET, FILM COATED ORAL at 21:01

## 2024-04-15 RX ADMIN — CLOPIDOGREL BISULFATE 75 MG: 75 TABLET, FILM COATED ORAL at 09:23

## 2024-04-15 RX ADMIN — GABAPENTIN 200 MG: 100 CAPSULE ORAL at 09:23

## 2024-04-15 NOTE — THERAPY TREATMENT NOTE
Patient Name: Alex Negron  : 1945    MRN: [id number]                              Today's Date: 4/15/2024       Admit Date: 2024    Visit Dx:     ICD-10-CM ICD-9-CM   1. Cerebrovascular accident (CVA), unspecified mechanism  I63.9 434.91   2. Coffee ground emesis  K92.0 578.0   3. Gastroesophageal reflux disease, unspecified whether esophagitis present  K21.9 530.81   4. 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 Dysphagia, unspecified type  R13.10 787.20     Patient Active Problem List   Diagnosis    Acute CVA (cerebrovascular accident)    Right-sided extracranial carotid artery stenosis    Coffee ground emesis    Gastroesophageal reflux disease    Cerebrovascular accident (CVA)    Dysphagia    Elevated LFTs    Fatty liver disease, nonalcoholic     Past Medical History:   Diagnosis Date    Chronic back pain     neuropathy rolando legs    Hypertension      Past Surgical History:   Procedure Laterality Date    ENDOSCOPY N/A 4/3/2024    Procedure: ESOPHAGOGASTRODUODENOSCOPY AT BEDSIDE;  Surgeon: Redd Guidry MD;  Location: [facility] ENDOSCOPY;  Service: Gastroenterology;  Laterality: N/A;  PRE-  GI BLEED  POST- HIATAL HERNIA, DISTAL ESOPHAGITIS ULCERS, GASTRITIS, PYLOROPLASTY    ENDOSCOPY W/ PEG TUBE PLACEMENT N/A 2024    Procedure: ESOPHAGOGASTRODUODENOSCOPY WITH PERCUTANEOUS ENDOSCOPIC GASTROSTOMY TUBE INSERTION;  Surgeon: Dimas Brand MD;  Location: [facility] ENDOSCOPY;  Service: General;  Laterality: N/A;  Dysphagia      General Information       Row Name 04/15/24 1227          Physical Therapy Time and Intention    Document Type therapy note (daily note)  -AR     Mode of Treatment co-treatment;occupational therapy;physical therapy;other (see comments)  -AR       Row Name 04/15/24 1227          General Information    Existing Precautions/Restrictions fall  -AR     Barriers to Rehab none identified  -AR       Row Name 04/15/24 1228           Cognition    Orientation Status (Cognition) oriented to;person  -AR       Row Name 04/15/24 1227          Safety Issues, Functional Mobility    Impairments Affecting Function (Mobility) balance;cognition;coordination;endurance/activity tolerance;strength;shortness of breath;postural/trunk control;range of motion (ROM);motor control;grasp  -AR     Comment, Safety Issues/Impairments (Mobility) Co treatment medically appropriate and necessary due to patient acuity level, activity tolerance and safety of patient and staff. Treatment is focusing on progression of care and goals established in the POC.  -AR               User Key  (r) = Recorded By, (t) = Taken By, (c) = Cosigned By      Initials Name Provider Type    AR Gayla Ramirez, PT Physical Therapist                   Mobility       Row Name 04/15/24 1227          Bed Mobility    Supine-Sit [place] (Bed Mobility) maximum assist (25% patient effort);2 person assist;verbal cues  -AR     Sit-Supine [place] (Bed Mobility) maximum assist (25% patient effort);2 person assist;verbal cues  -AR     Assistive Device (Bed Mobility) bed rails;draw sheet;head of bed elevated  -AR       Row Name 04/15/24 1227          Transfers    Comment, (Transfers) sit<>stand twice  -AR       Row Name 04/15/24 1227          Sit-Stand Transfer    Sit-Stand [place] (Transfers) moderate assist (50% patient effort);2 person assist  -AR     Comment, (Sit-Stand Transfer) static standing w/ mod-max A x2, leaning L  -AR               User Key  (r) = Recorded By, (t) = Taken By, (c) = Cosigned By      Initials Name Provider Type    AR Gayla Ramirez, PT Physical Therapist                   Obj/Interventions       Row Name 04/15/24 1228          Motor Skills    Neuromuscular Function left;lower extremity;severe impairment  -AR     Therapeutic Exercise --  R LAQ 5x  -AR       Row Name 04/15/24 1228          Balance    Static Sitting Balance moderate assist  -AR     Static  Standing Balance moderate assist;maximum assist;2-person assist  -AR               User Key  (r) = Recorded By, (t) = Taken By, (c) = Cosigned By      Initials Name Provider Type    Gayla Tompkins, PT Physical Therapist                   Goals/Plan    No documentation.                  Clinical Impression       Row Name 04/15/24 1229          Pain    Pretreatment Pain Rating 0/10 - no pain  -AR     Posttreatment Pain Rating 2/10  -AR     Pain Location - Side/Orientation Left  -AR     Pain Location - shoulder  -AR     Pre/Posttreatment Pain Comment some L shoulder pain during mobility, number not obtained, indicates mild- moderate pain  -AR     Pain Intervention(s) Repositioned  -AR       Row Name 04/15/24 1229          Plan of Care Review    Outcome Evaluation Improved activitytolerance and independence w/ standing during therapy today.  Able to sit up w/ maximal assist x2.  Stood w/ mod Ax2, L knee blocked.  Required mod-max A x2 for static standing balance.  Recommend DC to acute rehab. Dtr assisting with translating.  -AR       Row Name 04/15/24 1229          Therapy Assessment/Plan (PT)    Rehab Potential (PT) good, to achieve stated therapy goals  -AR     Criteria for Skilled Interventions Met (PT) yes;meets criteria  -AR     Therapy Frequency (PT) 6 times/wk  -AR       Row Name 04/15/24 1229          Vital Signs    O2 Delivery Pre Treatment room air  -AR       Row Name 04/15/24 1229          Positioning and Restraints    Pre-Treatment Position in bed  -AR     Post Treatment Position bed  -AR     In Bed notified nsg;supine;call light within reach;encouraged to call for assist;exit alarm on;with family/caregiver  -AR               User Key  (r) = Recorded By, (t) = Taken By, (c) = Cosigned By      Initials Name Provider Type    Gayla Tompkins, PT Physical Therapist                   Outcome Measures       Row Name 04/15/24 1232 04/15/24 0805       How much help from another person do you currently  need...    Turning from your back to your side while in flat bed without using bedrails? 2  -AR 1  -RD    Moving from lying on back to sitting on the side of a flat bed without bedrails? 2  -AR 1  -RD    Moving to and from a bed to a chair (including a wheelchair)? 1  -AR 1  -RD    Standing up from a chair using your arms (e.g., wheelchair, bedside chair)? 2  -AR 1  -RD    Climbing 3-5 steps with a railing? 1  -AR 1  -RD    To walk in hospital room? 1  -AR 1  -RD    AM-PAC 6 Clicks Score (PT) 9  -AR 6  -RD    Highest Level of Mobility Goal 3 --> Sit at edge of bed  -AR 2 --> Bed activities/dependent transfer  -RD      Row Name 04/15/24 1232          Functional Assessment    Outcome Measure Options AM-PAC 6 Clicks Basic Mobility (PT)  -AR               User Key  (r) = Recorded By, (t) = Taken By, (c) = Cosigned By      Initials Name Provider Type    Gayla Tompkins, PT Physical Therapist    Leticia Heredia, RN Registered Nurse                                 Physical Therapy Education       Title: PT OT SLP Therapies (In Progress)       Topic: Physical Therapy (In Progress)       Point: Mobility training (In Progress)       Learning Progress Summary             Patient Acceptance, E, NR by AR at 4/15/2024 1233    Acceptance, E,TB,D, NR,DU by PH at 4/12/2024 1330    Acceptance, E,TB,D, NR,DU by PH at 4/11/2024 1017    Acceptance, E,D, DU,NR by PC at 4/10/2024 1146    Acceptance, E,D, NR by PC at 4/9/2024 1045    Acceptance, E,D, NL by PC at 4/8/2024 1149    Acceptance, E, NR by EM at 4/5/2024 1622    Nonacceptance, E, NL by EN at 4/4/2024 0014    Acceptance, E,D, NR by PC at 4/3/2024 1030   Family Acceptance, E, NR by AR at 4/15/2024 1233    Acceptance, E,D, DU,NR by PC at 4/10/2024 1146    Acceptance, E,D, NR by PC at 4/3/2024 1030                         Point: Home exercise program (In Progress)       Learning Progress Summary             Patient Acceptance, E, NR by AR at 4/15/2024 1233    Acceptance, E,TB,D,  NR,DU by PH at 4/12/2024 1330    Acceptance, E,TB,D, NR,DU by PH at 4/11/2024 1017    Acceptance, E,D, DU,NR by PC at 4/10/2024 1146    Acceptance, E,D, NR by PC at 4/9/2024 1045    Acceptance, E,D, NL by PC at 4/8/2024 1149    Acceptance, E,D, NR,NL by LW at 4/6/2024 1138    Nonacceptance, E, NL by EN at 4/4/2024 0014    Acceptance, E,D, NR by PC at 4/3/2024 1030   Family Acceptance, E, NR by AR at 4/15/2024 1233    Acceptance, E,D, DU,NR by PC at 4/10/2024 1146    Acceptance, E,D, NR by PC at 4/3/2024 1030                         Point: Body mechanics (In Progress)       Learning Progress Summary             Patient Acceptance, E, NR by AR at 4/15/2024 1233    Acceptance, E,TB,D, NR,DU by PH at 4/12/2024 1330    Acceptance, E,TB,D, NR,DU by PH at 4/11/2024 1017    Acceptance, E,D, DU,NR by PC at 4/10/2024 1146    Acceptance, E,D, NR by PC at 4/9/2024 1045    Acceptance, E,D, NL by PC at 4/8/2024 1149    Acceptance, E,D, NR,NL by LW at 4/6/2024 1138    Nonacceptance, E, NL by EN at 4/4/2024 0014    Acceptance, E,D, NR by PC at 4/3/2024 1030   Family Acceptance, E, NR by AR at 4/15/2024 1233    Acceptance, E,D, DU,NR by PC at 4/10/2024 1146    Acceptance, E,D, NR by PC at 4/3/2024 1030                         Point: Precautions (In Progress)       Learning Progress Summary             Patient Acceptance, E, NR by AR at 4/15/2024 1233    Acceptance, E,TB,D, NR,DU by PH at 4/12/2024 1330    Acceptance, E,TB,D, NR,DU by PH at 4/11/2024 1017    Acceptance, E,D, DU,NR by PC at 4/10/2024 1146    Acceptance, E,D, NR by PC at 4/9/2024 1045    Acceptance, E,D, NL by PC at 4/8/2024 1149    Acceptance, E,D, NR,NL by LW at 4/6/2024 1138    Nonacceptance, E, NL by EN at 4/4/2024 0014    Acceptance, E,D, NR by PC at 4/3/2024 1030   Family Acceptance, E, NR by AR at 4/15/2024 1233    Acceptance, E,D, DU,NR by PC at 4/10/2024 1146    Acceptance, E,D, NR by PC at 4/3/2024 1030                                         User Key        Initials Effective Dates Name Provider Type Discipline    PC 06/16/21 -  Sidra Arce, PT Physical Therapist PT    EM 06/16/21 -  Lesia Judd PT Physical Therapist PT    AR 06/16/21 -  Gayla Ramirez, PT Physical Therapist PT    PH 06/16/21 -  Chante Frost PTA Physical Therapist Assistant PT    EN 08/01/22 -  Monique Mary, RN Registered Nurse Nurse    LW 05/08/23 -  Isa Pierce PT Physical Therapist PT                  PT Recommendation and Plan     Outcome Evaluation: Improved activitytolerance and independence w/ standing during therapy today.  Able to sit up w/ maximal assist x2.  Stood w/ mod Ax2, L knee blocked.  Required mod-max A x2 for static standing balance.  Recommend DC to acute rehab. Dtr assisting with translating.     Time Calculation:         PT Charges       Row Name 04/15/24 1226             Time Calculation    Start Time 1019  -AR      Stop Time 1048  -AR      Time Calculation (min) 29 min  -AR      PT Received On 04/15/24  -AR      PT - Next Appointment 04/16/24  -AR                User Key  (r) = Recorded By, (t) = Taken By, (c) = Cosigned By      Initials Name Provider Type    AR Gayla Ramirez, PT Physical Therapist                  Therapy Charges for Today       Code Description Service Date Service Provider Modifiers Qty    25486418540 HC PT THER PROC EA 15 MIN 4/15/2024 Gayla Ramirez, PT GP 1    13039230678 HC PT THERAPEUTIC ACT EA 15 MIN 4/15/2024 Gayla Ramirez, PT GP 1            PT G-Codes  Outcome Measure Options: AM-PAC 6 Clicks Basic Mobility (PT)  AM-PAC 6 Clicks Score (PT): 9  AM-PAC 6 Clicks Score (OT): 7  Modified Lilian Scale: 4 - Moderately severe disability.  Unable to walk without assistance, and unable to attend to own bodily needs without assistance.  PT Discharge Summary  Anticipated Discharge Disposition (PT): inpatient rehabilitation facility    Gayla Ramirez PT  4/15/2024

## 2024-04-15 NOTE — PLAN OF CARE
Goal Outcome Evaluation:              Outcome Evaluation: Improved activitytolerance and independence w/ standing during therapy today.  Able to sit up w/ maximal assist x2.  Stood w/ mod Ax2, L knee blocked.  Required mod-max A x2 for static standing balance.  Recommend DC to acute rehab. Dtr assisting with translating.      Anticipated Discharge Disposition (PT): inpatient rehabilitation facility

## 2024-04-15 NOTE — PROGRESS NOTES
Name: Alex Negron ADMIT: 2024   : 1945  PCP: Jyothi Song PA-C    MRN: [id number] LOS: 14 days   AGE/SEX: 79 y.o. male  ROOM: [facility]     Subjective   Subjective   No new complaints. Hematuria has resolved. Still has a catheter in place. BM today. No other new issues. No CP SOB. No infectious symptoms.        Objective   Objective   Vital Signs  Temp:  [97.5 °F (36.4 °C)-99.1 °F (37.3 °C)] 98.1 °F (36.7 °C)  Heart Rate:  [59-76] 70  Resp:  [18] 18  BP: (124-153)/(72-83) 133/83  SpO2:  [93 %-97 %] 93 %  on   ;   Device (Oxygen Therapy): room air  Body mass index is 26.41 kg/m².  Physical Exam  Vitals reviewed.   Constitutional:       General: He is not in acute distress.     Appearance: He is well-developed. He is not toxic-appearing.      Comments: Chronically ill appearing.    HENT:      Head: Normocephalic and atraumatic.      Mouth/Throat:      Mouth: Mucous membranes are moist.   Cardiovascular:      Rate and Rhythm: Normal rate and regular rhythm.   Pulmonary:      Effort: Pulmonary effort is normal. No respiratory distress.      Breath sounds: Normal breath sounds.   Abdominal:      General: Bowel sounds are normal. There is no distension.      Palpations: Abdomen is soft.      Tenderness: There is no abdominal tenderness.   Skin:     General: Skin is warm and dry.   Neurological:      General: No focal deficit present.      Mental Status: He is alert and oriented to person, place, and time.   Psychiatric:         Mood and Affect: Mood normal.         Behavior: Behavior normal.         Thought Content: Thought content normal.       Results Review     I reviewed the patient's new clinical results.  Results from last 7 days   Lab Units 04/15/24  0703 24  0639 24  0445 24  0453   WBC 10*3/mm3 15.07* 15.23* 18.27* 17.25*   HEMOGLOBIN g/dL 9.5* 9.5* 10.0* 10.1*   PLATELETS 10*3/mm3 773* 723* 759* 794*     Results from last 7 days   Lab Units 04/15/24  0703 24  0639  04/13/24 1534 04/13/24 0445   SODIUM mmol/L 139 144 145 147*   POTASSIUM mmol/L 4.2 4.0 4.0 4.1   CHLORIDE mmol/L 105 109* 111* 111*   CO2 mmol/L 24.5 25.3 26.0 23.4   BUN mg/dL 30* 36* 42* 45*   CREATININE mg/dL 0.91 0.92 1.01 1.02   GLUCOSE mg/dL 146* 130* 125* 142*   EGFR mL/min/1.73 86.8 85.7 76.6 75.7     Results from last 7 days   Lab Units 04/15/24  0703 04/14/24 0639 04/11/24 0453   ALBUMIN g/dL 3.3* 3.1* 3.4*   BILIRUBIN mg/dL 0.3 0.3  --    ALK PHOS U/L 103 94  --    AST (SGOT) U/L 104* 131*  --    ALT (SGPT) U/L 62* 71*  --      Results from last 7 days   Lab Units 04/15/24  0703 04/14/24  0639 04/13/24 1534 04/13/24 0445 04/11/24 0453 04/10/24  0704 04/09/24  0415   CALCIUM mg/dL 8.9 8.7 8.7 9.1 9.1 8.8 8.9   ALBUMIN g/dL 3.3* 3.1*  --   --  3.4*  --   --    MAGNESIUM mg/dL  --  2.5* 2.6*  --   --  2.9* 2.7*   PHOSPHORUS mg/dL  --  2.6  --   --  3.2  --   --        Glucose   Date/Time Value Ref Range Status   04/15/2024 0616 120 70 - 130 mg/dL Final   04/15/2024 0004 138 (H) 70 - 130 mg/dL Final   04/14/2024 1950 107 70 - 130 mg/dL Final   04/14/2024 1214 130 70 - 130 mg/dL Final   04/14/2024 0908 133 (H) 70 - 130 mg/dL Final   04/14/2024 0608 116 70 - 130 mg/dL Final   04/14/2024 0007 137 (H) 70 - 130 mg/dL Final       No radiology results for the last day    I have personally reviewed all medications:  Scheduled Medications  [Held by provider] acetaminophen, 650 mg, Nasogastric, TID  amLODIPine, 5 mg, Per PEG Tube, Q24H  aspirin, 81 mg, Nasogastric, Daily  atorvastatin, 40 mg, Oral, Nightly  clopidogrel, 75 mg, Nasogastric, Daily  gabapentin, 200 mg, Nasogastric, TID  insulin regular, 2-7 Units, Subcutaneous, Q6H  metoprolol tartrate, 50 mg, Oral, Q12H  pantoprazole, 40 mg, Intravenous, Daily  polyethylene Glycol 400, 1 drop, Both Eyes, Q8H  sodium chloride, 500 mL, Intravenous, Once  sodium chloride, 10 mL, Intravenous, Q12H    Infusions   Diet  NPO Diet NPO Type: Strict NPO    I have  personally reviewed:  [x]  Laboratory   [x]  Microbiology   [x]  Radiology   [x]  EKG/Telemetry  [x]  Cardiology/Vascular   []  Pathology    []  Records       Assessment/Plan     Active Hospital Problems    Diagnosis  POA    **Acute CVA (cerebrovascular accident) [I63.9]  Yes    Elevated LFTs [R79.89]  Unknown    Fatty liver disease, nonalcoholic [K76.0]  Unknown    Right-sided extracranial carotid artery stenosis [I65.21]  Yes    Coffee ground emesis [K92.0]  Unknown    Gastroesophageal reflux disease [K21.9]  Unknown    Cerebrovascular accident (CVA) [I63.9]  Unknown    Dysphagia [R13.10]  Unknown      Resolved Hospital Problems   No resolved problems to display.        Mr. Negron is a 79 y.o. who was admitted with MCA syndrome with acute stroke and was s/p TPA and underwent endovascular intervention with Dr. Rubio who placed a right carotid stent. He was admitted to the ICU. His stay had been complicated by dysphagia, pneumonia, coffee-ground emesis with EGD with distal esophageal ulcers, gross hematuria. He was in the ICU but now transferred out     Continue stroke care as directed by Neurology. On ASA, plavix  and statin. \    Statin therapy reduced to 40 mg from 80 mg with noted ALT and AST elevation with normal Bili.   Lipid panel reviewed.-otal 117, HDL 38, LDL 69, Trigs 36     CT of abd non contrast on admit showed normal liver, gallbladder, bile duct.    Liver US  shows very mild fatty liver disease but otherwise unremarkable.    He denies ETOH use.      PNA- klebsiella pna treated with ceftriaxone which was completed on 4/13. High risk for aspiration. Monitor. He remains NPO.     Hematuria (resolved) with Urology following and had  three way catheter with irrigation. Plans for voiding try per urology prior to DC. Will attempt tmrw prior to dc to rehab      Hematemesis in ICU.  EGD 4/3/24.   Esophageal ulcers with no bleeding and no active bleeding.  No biopsy obtained d/t  anticoagulation medication.   GI recommending PPI and carafate,  H/H stable.     NSVT-    improved. Cardiology signed off. DC with tele monitor continue   May be related to PNA, dehydration, pain,  as well as stroke. He is tolerating increased metoprolol.    Hypernatremia- improved.  free water with TFs.      S/p  PEG 4/12. For failed swallow eval .  Secretions better with removal of DHFT from nare.  Remain strict NPO.  Family understands the importance of this.  Continue good oral care.      Leukocytosis noted. Improved and remains stable today 15.   I likely reactive from PEG placement 4/12.  Monitor. He is afebrile. Push pulmonary toilet with IS.     Currently on RA   Keep sats > equal 94%.   Incentive spirometer q 2 hours while awake. Daughter thinks she will be able to help work with him on this.     I discussed the patients findings and my recommendations with patient, daughter, son, Dr. Cook and nursing staff.     VTE Prophylaxis - SCDs.        Dispo- acute vs subacute rehab when arranged. CCP working with family.  Being evaluated by ESCOBAR Alcazar  [facility]  04/15/24  08:38 EDT

## 2024-04-15 NOTE — DISCHARGE PLACEMENT REQUEST
"Analisa Negron (79 y.o. Male)       Date of Birth   1945    Social Security Number       Address   [address] [address]    Home Phone       MRN   [id number]       Restoration   Protestant    Marital Status                               Admission Date   4/1/24    Admission Type   Emergency    Admitting Provider   Dago Rubio MD    Attending Provider   Jaime Cook MD    Department, Room/Bed   [address], P599/1       Discharge Date       Discharge Disposition       Discharge Destination                                 Attending Provider: Jaime Cook MD    Allergies: No Known Allergies    Isolation: None   Infection: None   Code Status: CPR    Ht: 180.3 cm (71\")   Wt: 85.9 kg (189 lb 6 oz)    Admission Cmt: None   Principal Problem: Acute CVA (cerebrovascular accident) [I63.9]                   Active Insurance as of 4/1/2024       Primary Coverage       Payor Plan Insurance Group Employer/Plan Group    [place]        Payor Plan Address Payor Plan Phone Number Payor Plan Fax Number Effective Dates    [address] [phone]  4/1/2024 - None Entered    [facility] [id number]         Subscriber Name Subscriber Birth Date Member ID       ANALISA NEGRON 1945 [id number]                     Emergency Contacts        (Rel.) Home Phone Work Phone Mobile Phone    JEREMÍAS GO (Daughter) -- -- [phone]    ARJUN NEGRON II (Son) -- -- [phone]                "

## 2024-04-15 NOTE — PROGRESS NOTES
"Nutrition Services    Patient Name:  Alex Negron  YOB: 1945  MRN: [id number]  Admit Date:  4/1/2024    Assessment Date:  04/15/24    Comment: RD spoke with RN who reported pt is tolerating feeds very well. RN stated pt is not having any n/v/d or c/o stomach pain. Pt had PEG placement on 4/12. Pt Na currently 139. RD will decrease free water and continue following.     Plan/Recommendations:  Continue TF's at 70mL/hr with Diabetisource AC  Decraese free water to 30 mL q 4 hrs  Diet per SLP when appropriate    Will follow clinical course, nutrition needs.    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, L sided hemiparesis   Medical & Surgical Hx Past Medical History:   Diagnosis Date    Chronic back pain     neuropathy rolando legs    Hypertension        Past Surgical History:   Procedure Laterality Date    ENDOSCOPY N/A 4/3/2024    Procedure: ESOPHAGOGASTRODUODENOSCOPY AT BEDSIDE;  Surgeon: Redd Guidry MD;  Location: [facility] ENDOSCOPY;  Service: Gastroenterology;  Laterality: N/A;  PRE-  GI BLEED  POST- HIATAL HERNIA, DISTAL ESOPHAGITIS ULCERS, GASTRITIS, PYLOROPLASTY    ENDOSCOPY W/ PEG TUBE PLACEMENT N/A 4/12/2024    Procedure: ESOPHAGOGASTRODUODENOSCOPY WITH PERCUTANEOUS ENDOSCOPIC GASTROSTOMY TUBE INSERTION;  Surgeon: Dimas Brand MD;  Location: [facility] ENDOSCOPY;  Service: General;  Laterality: N/A;  Dysphagia        Current Problems Dysphagia, GI Bleed     Encounter Information        Nutrition History    Food Preferences    Supplements    Factors Affecting Intake altered mental status, altered respiratory status, swallow impairment   Tests/Procedures Clinical Swallow Evaluation     Anthropometrics        Current Height   Current Weight  BMI kg/m2 Height: 180.3 cm (71\")  Weight: 85.9 kg (189 lb 6 oz) (04/14/24 1658)  Body mass index " is 26.41 kg/m².     Adj BMI (if applicable)    BMI Category Overweight (25 - 29.9)       Admission Weight 81.7kg   Ideal Body Weight (IBW) 166lb     Adj IBW (if applicable)    Usual Body Weight (UBW) unknown   Weight Change/Trend Unknown       Weight history: Wt Readings from Last 30 Encounters:   04/14/24 0436 85.9 kg (189 lb 6 oz)   04/10/24 0400 79.7 kg (175 lb 11.3 oz)   04/09/24 0400 80.3 kg (177 lb 0.5 oz)   04/08/24 0400 81.3 kg (179 lb 3.7 oz)   04/07/24 0418 86.3 kg (190 lb 4.1 oz)   04/05/24 0434 86.6 kg (190 lb 14.7 oz)   04/04/24 1344 87.5 kg (193 lb)   04/04/24 0500 87.7 kg (193 lb 5.5 oz)   04/03/24 0434 85.6 kg (188 lb 11.4 oz)   04/02/24 1344 84.5 kg (186 lb 4.6 oz)   04/02/24 0418 84.5 kg (186 lb 4.6 oz)   04/01/24 2159 81.7 kg (180 lb 3.2 oz)        Estimated/Assessed Needs        Energy Requirements    Weight for Calculation 81.7kg   Method for Estimation  25 kcal/kg   EST Needs (kcal/day) 2042       Protein Requirements    Weight for Calculation 81.7kg   EST Protein Needs (g/kg) 1.0 - 1.2 gm/kg   EST Daily Needs (g/day) 82-98       Fluid Requirements     Method for Estimation 1 mL/kcal    Estimated Needs (mL/day)        Fluid Deficit    Current Na Level (mEq/L) 149   Desired Na Level (mEq/L) 140   Estimated Fluid Deficit (L)  3L     Labs        Pertinent Labs    Results from last 7 days   Lab Units 04/15/24  0703 04/14/24  0639 04/13/24  1534   SODIUM mmol/L 139 144 145   POTASSIUM mmol/L 4.2 4.0 4.0   CHLORIDE mmol/L 105 109* 111*   CO2 mmol/L 24.5 25.3 26.0   BUN mg/dL 30* 36* 42*   CREATININE mg/dL 0.91 0.92 1.01   CALCIUM mg/dL 8.9 8.7 8.7   BILIRUBIN mg/dL 0.3 0.3  --    ALK PHOS U/L 103 94  --    ALT (SGPT) U/L 62* 71*  --    AST (SGOT) U/L 104* 131*  --    GLUCOSE mg/dL 146* 130* 125*     Results from last 7 days   Lab Units 04/15/24  0703 04/14/24  0639 04/13/24  1534 04/11/24  0453 04/10/24  0704   MAGNESIUM mg/dL  --  2.5* 2.6*  --  2.9*   PHOSPHORUS mg/dL  --  2.6  --    < >  --   "  HEMOGLOBIN g/dL 9.5* 9.5*  --    < > 10.8*   HEMATOCRIT % 29.3* 29.0*  --    < > 33.3*   WBC 10*3/mm3 15.07* 15.23*  --    < > 15.86*   ALBUMIN g/dL 3.3* 3.1*  --    < >  --     < > = values in this interval not displayed.     Results from last 7 days   Lab Units 04/15/24  0703 04/14/24  0639 04/13/24  0445 04/11/24  0453 04/10/24  0704   PLATELETS 10*3/mm3 773* 723* 759* 794* 714*     No results found for: \"COVID19\"  Lab Results   Component Value Date    HGBA1C 6.20 (H) 04/02/2024          Medications            Scheduled Medications [Held by provider] acetaminophen, 650 mg, Nasogastric, TID  amLODIPine, 5 mg, Per PEG Tube, Q24H  aspirin, 81 mg, Nasogastric, Daily  atorvastatin, 40 mg, Oral, Nightly  clopidogrel, 75 mg, Nasogastric, Daily  gabapentin, 200 mg, Nasogastric, TID  insulin regular, 2-7 Units, Subcutaneous, Q6H  metoprolol tartrate, 50 mg, Oral, Q12H  pantoprazole, 40 mg, Intravenous, Daily  polyethylene Glycol 400, 1 drop, Both Eyes, Q8H  sodium chloride, 500 mL, Intravenous, Once  sodium chloride, 10 mL, Intravenous, Q12H        Infusions        PRN Medications   acetaminophen    senna-docusate sodium **AND** polyethylene glycol **AND** bisacodyl **AND** bisacodyl    calcium carbonate    Calcium Replacement - Follow Nurse / BPA Driven Protocol    Calcium Replacement - Follow Nurse / BPA Driven Protocol    Calcium Replacement - Follow Nurse / BPA Driven Protocol    dextrose    dextrose    glucagon (human recombinant)    hydrALAZINE    Magnesium Low Dose Replacement - Follow Nurse / BPA Driven Protocol    Magnesium Standard Dose Replacement - Follow Nurse / BPA Driven Protocol    Magnesium Standard Dose Replacement - Follow Nurse / BPA Driven Protocol    nitroglycerin    ondansetron    Phosphorus Replacement - Follow Nurse / BPA Driven Protocol    Phosphorus Replacement - Follow Nurse / BPA Driven Protocol    Phosphorus Replacement - Follow Nurse / BPA Driven Protocol    Potassium Replacement - Follow " Nurse / BPA Driven Protocol    Potassium Replacement - Follow Nurse / BPA Driven Protocol    Potassium Replacement - Follow Nurse / BPA Driven Protocol    sodium chloride    sodium chloride    sodium chloride     Physical Findings          Physical Appearance alert, hemiparesis , oriented, room air   Oral/Mouth Cavity tooth or teeth missing   Edema  generalized, lower extremity , upper extremity, 1+ (trace), 3+ (moderate)   Gastrointestinal last bowel movement: 4/14   Skin  blisters, bruising   Tubes/Drains/Lines PEG   NFPE No clinical signs of muscle wasting or fat loss   --  Current Nutrition Orders & Evaluation of Intake       Oral Nutrition     Food Allergies NKFA   Current PO Diet NPO Diet NPO Type: Strict NPO   Supplement    PO Evaluation     Trending % PO Intake       Enteral Nutrition     Enteral Route PEG    TF Delivery Method Continuous    Propofol Rate/Kcal     Current TF Order/Rate  Diabetisource AC @ 70 mL/hr    TF Goal Rate 70 mL/hr    Current Water Flush 60 ml/hr    Modular None    TF Residual  no or minimal residual    TF Tolerance tolerating    TF Observation Verified correct TF and water flush infusing per orders     Nutrition Diagnosis        Nutrition Dx Problem 1 Problem: Inadequate Oral Intake  Etiology: Medical Diagnosis - CVA  Dysphagia  Signs/Symptoms: NPO    Comment:      INTERVENTION / PLAN OF CARE  Intervention Goal        Intervention Goal(s) Maintain nutrition status, Meet estimated needs, Disease management/therapy, Initiate feeding/diet, Initiate TF/PN, Transition TF to PO, and Maintain weight     Nutrition Intervention        RD Action Await initiation/advancement of PO diet, Continue to monitor, and Care plan reviewed     Prescription         Diet     Supplement/Snack    EN/PN     Prescription Ordered       Enteral Prescription:     Enteral Route ND    TF Delivery Method Continuous    Enteral Product Diabetisource AC    Modular None    Propofol Rate/Kcal     TF Start Rate 20    TF  Goal Rate 70    Free Water Flush 30 mL q 4 hr    TF Provision at Goal: 2016kcal, 101 gm protein, 1377 mL free water + 180 mL water flushes         Calories 100 % needs met         Protein  102 % needs met         Fluid (mL) 1557mL    Prescription Ordered Yes     Monitor/Evaluation        Monitor Per protocol   Discharge Plan Pending clinical course   Education Will instruct as appropriate     RD to follow per protocol.       Electronically signed by:  Lucas Carlin  04/15/24 12:05 EDT

## 2024-04-15 NOTE — PLAN OF CARE
Problem: Adult Inpatient Plan of Care  Goal: Absence of Hospital-Acquired Illness or Injury  Intervention: Identify and Manage Fall Risk  Recent Flowsheet Documentation  Taken 4/15/2024 1614 by Leticia Trinidad RN  Safety Promotion/Fall Prevention: safety round/check completed  Taken 4/15/2024 1405 by Leticia Trinidad RN  Safety Promotion/Fall Prevention:   assistive device/personal items within reach   activity supervised   nonskid shoes/slippers when out of bed   safety round/check completed   clutter free environment maintained  Taken 4/15/2024 1202 by Leticia Trinidad RN  Safety Promotion/Fall Prevention:   assistive device/personal items within reach   clutter free environment maintained   fall prevention program maintained   nonskid shoes/slippers when out of bed   room organization consistent   safety round/check completed  Taken 4/15/2024 1010 by Leticia Trinidad RN  Safety Promotion/Fall Prevention:   safety round/check completed   assistive device/personal items within reach   fall prevention program maintained  Taken 4/15/2024 0805 by Leticia Trinidad RN  Safety Promotion/Fall Prevention:   safety round/check completed   fall prevention program maintained   clutter free environment maintained   assistive device/personal items within reach  Intervention: Prevent Skin Injury  Recent Flowsheet Documentation  Taken 4/15/2024 1614 by Leticia Trinidad RN  Body Position:   turned   right   side-lying  Taken 4/15/2024 1405 by Leticia Trinidad RN  Body Position:   side-lying   left  Taken 4/15/2024 1202 by Leticia Trinidad RN  Body Position:   turned   left   side-lying  Taken 4/15/2024 1010 by Leticia Trinidad RN  Body Position:   side-lying   right  Taken 4/15/2024 0805 by Leticia Trinidad RN  Body Position: supine, legs elevated  Intervention: Prevent and Manage VTE (Venous Thromboembolism) Risk  Recent Flowsheet Documentation  Taken 4/15/2024 1614 by Leticia Trinidad RN  Activity Management: bedrest  Taken 4/15/2024 1405 by Leticia Trinidad RN  Activity  Management: bedrest  Taken 4/15/2024 1202 by Leticia Trinidad RN  Activity Management: bedrest  Taken 4/15/2024 1010 by Leticia Trinidad RN  Activity Management: bedrest  Taken 4/15/2024 0805 by Leticia Trinidad RN  Activity Management: bedrest  VTE Prevention/Management:   bilateral   sequential compression devices on  Range of Motion: active ROM (range of motion) encouraged  Intervention: Prevent Infection  Recent Flowsheet Documentation  Taken 4/15/2024 1202 by Leticia Trinidad RN  Infection Prevention: single patient room provided  Taken 4/15/2024 1010 by Leticia Trinidad RN  Infection Prevention: single patient room provided  Taken 4/15/2024 0805 by Leticia Trinidad RN  Infection Prevention:   hand hygiene promoted   single patient room provided     Problem: Adult Inpatient Plan of Care  Goal: Absence of Hospital-Acquired Illness or Injury  Intervention: Prevent Skin Injury  Recent Flowsheet Documentation  Taken 4/15/2024 1614 by Leticia Trinidad RN  Body Position:   turned   right   side-lying  Taken 4/15/2024 1405 by Leticia Trinidad RN  Body Position:   side-lying   left  Taken 4/15/2024 1202 by Leticia Trinidad RN  Body Position:   turned   left   side-lying  Taken 4/15/2024 1010 by Leticia Trinidad RN  Body Position:   side-lying   right  Taken 4/15/2024 0805 by Leticia Trinidad RN  Body Position: supine, legs elevated     Problem: Adult Inpatient Plan of Care  Goal: Absence of Hospital-Acquired Illness or Injury  Intervention: Prevent and Manage VTE (Venous Thromboembolism) Risk  Recent Flowsheet Documentation  Taken 4/15/2024 1614 by Leticia Trinidad RN  Activity Management: bedrest  Taken 4/15/2024 1405 by Leticia Trinidad RN  Activity Management: bedrest  Taken 4/15/2024 1202 by Leticia Trinidad RN  Activity Management: bedrest  Taken 4/15/2024 1010 by Leticia Trinidad RN  Activity Management: bedrest  Taken 4/15/2024 0805 by Leticia Trinidad RN  Activity Management: bedrest  VTE Prevention/Management:   bilateral   sequential compression devices on  Range of  Motion: active ROM (range of motion) encouraged     Problem: Adult Inpatient Plan of Care  Goal: Optimal Comfort and Wellbeing  Intervention: Monitor Pain and Promote Comfort  Recent Flowsheet Documentation  Taken 4/15/2024 0805 by Leticia Trinidad RN  Pain Management Interventions:   see MAR   pillow support provided   position adjusted  Intervention: Provide Person-Centered Care  Recent Flowsheet Documentation  Taken 4/15/2024 0805 by Leticia Trinidad RN  Trust Relationship/Rapport:   care explained   questions answered     Problem: Aspiration (Enteral Nutrition)  Goal: Absence of Aspiration Signs and Symptoms  Intervention: Minimize Aspiration Risk  Recent Flowsheet Documentation  Taken 4/15/2024 1614 by Leticia Trinidad RN  Head of Bed (HOB) Positioning:   HOB elevated   HOB at 30 degrees  Taken 4/15/2024 1405 by Leticia Trinidad RN  Head of Bed (HOB) Positioning:   HOB lowered   HOB at 20-30 degrees  Taken 4/15/2024 1202 by Leticia Trinidad RN  Head of Bed (HOB) Positioning: HOB at 20-30 degrees  Taken 4/15/2024 1010 by Leticia Trinidad RN  Head of Bed (HOB) Positioning:   HOB elevated   HOB at 30 degrees  Taken 4/15/2024 0805 by Leticia Trinidad RN  Head of Bed (HOB) Positioning: HOB at 30-45 degrees  Enteral Feeding Safety: tubing labeled as enteral feeding     Problem: Device-Related Complication Risk (Enteral Nutrition)  Goal: Safe, Effective Therapy Delivery  Intervention: Prevent Feeding-Related Adverse Events  Recent Flowsheet Documentation  Taken 4/15/2024 0805 by Leticia Trinidad RN  Enteral Feeding Safety: tubing labeled as enteral feeding     Problem: Restraint, Nonviolent  Goal: Absence of Harm or Injury  Intervention: Implement Least Restrictive Safety Strategies  Recent Flowsheet Documentation  Taken 4/15/2024 1202 by Leticia Trinidad RN  Medical Device Protection:   IV pole/bag removed from visual field   torso covered   tubing secured  Taken 4/15/2024 1010 by Leticia Trinidad RN  Medical Device Protection:   IV pole/bag removed from  visual field   torso covered   tubing secured  Taken 4/15/2024 0805 by Leticia Trinidad RN  Medical Device Protection:   IV pole/bag removed from visual field   torso covered   tubing secured  Diversional Activities: television  Intervention: Protect Dignity, Rights, and Personal Wellbeing  Recent Flowsheet Documentation  Taken 4/15/2024 0805 by Leticia Trinidad RN  Trust Relationship/Rapport:   care explained   questions answered  Intervention: Protect Skin and Joint Integrity  Recent Flowsheet Documentation  Taken 4/15/2024 1614 by Leticia Trinidad RN  Body Position:   turned   right   side-lying  Taken 4/15/2024 1405 by Leticia Trinidad RN  Body Position:   side-lying   left  Taken 4/15/2024 1202 by Leticia Trinidad RN  Body Position:   turned   left   side-lying  Taken 4/15/2024 1010 by Leticia Trinidad RN  Body Position:   side-lying   right  Taken 4/15/2024 0805 by Leticia Trinidad RN  Body Position: supine, legs elevated  Range of Motion: active ROM (range of motion) encouraged     Problem: Glycemic Control Impaired (Sepsis/Septic Shock)  Goal: Blood Glucose Level Within Desired Range  Intervention: Optimize Glycemic Control  Recent Flowsheet Documentation  Taken 4/15/2024 0805 by Leticia Trinidad RN  Glycemic Management: blood glucose monitored     Problem: Infection Progression (Sepsis/Septic Shock)  Goal: Absence of Infection Signs and Symptoms  Intervention: Initiate Sepsis Management  Recent Flowsheet Documentation  Taken 4/15/2024 1202 by Leticia Trinidad RN  Infection Prevention: single patient room provided  Taken 4/15/2024 1010 by Leticia Trinidad RN  Infection Prevention: single patient room provided  Taken 4/15/2024 0805 by Leticia Trinidad RN  Infection Prevention:   hand hygiene promoted   single patient room provided  Intervention: Promote Recovery  Recent Flowsheet Documentation  Taken 4/15/2024 1614 by Leticia Trinidad RN  Activity Management: bedrest  Taken 4/15/2024 1405 by Leticia Trinidad RN  Activity Management: bedrest  Taken 4/15/2024  1202 by Leticia Trinidad, RN  Activity Management: bedrest  Taken 4/15/2024 1010 by Leticia Trinidad, RN  Activity Management: bedrest  Taken 4/15/2024 0805 by Leticia Trinidad, RN  Activity Management: bedrest   Goal Outcome Evaluation:   Pt A&OX4, VSS, TOBI. Pt more awake today throughout shift, interacting with staff and family. Interpretors  used as directed throughout shift. G-tube intact, no redness or drainage noted; dry drsg intact. Abd binder in place to help prevent pt from pulling out tube, no skin issues noted this shift. Pt tolerated cont tube feeding well throughout shift. IV site to RUE intact c/ good blood return, patent and free from redness or drainage; drsg intact. CBI tolerated this shift, slow drip noted, clear yellow urine noted this shift; urology to possible d/c 3-way [facility]. All questions and concerns answered at bedside. Alarms intact, call light and personal items in reach.

## 2024-04-15 NOTE — PLAN OF CARE
Goal Outcome Evaluation:  Plan of Care Reviewed With: patient, daughter        Progress: improving  Outcome Evaluation: Pt seen for OT tx focusing on bed mobility, STS and LUE WB and dynamic reacing tasks. Con't to require max cues and assist for attn to L side of body/environment. STS x 2 with Ming x 2 but mod-maxA to maintain. OT will con't to follow for stated goals and recommend d/c to [facility].      Anticipated Discharge Disposition (OT): inpatient rehabilitation facility

## 2024-04-15 NOTE — THERAPY TREATMENT NOTE
Patient Name: Alex Negron  : 1945    MRN: [id number]                              Today's Date: 4/15/2024       Admit Date: 2024    Visit Dx:     ICD-10-CM ICD-9-CM   1. Cerebrovascular accident (CVA), unspecified mechanism  I63.9 434.91   2. Coffee ground emesis  K92.0 578.0   3. Gastroesophageal reflux disease, unspecified whether esophagitis present  K21.9 530.81   4. 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 Dysphagia, unspecified type  R13.10 787.20     Patient Active Problem List   Diagnosis    Acute CVA (cerebrovascular accident)    Right-sided extracranial carotid artery stenosis    Coffee ground emesis    Gastroesophageal reflux disease    Cerebrovascular accident (CVA)    Dysphagia    Elevated LFTs    Fatty liver disease, nonalcoholic     Past Medical History:   Diagnosis Date    Chronic back pain     neuropathy rolando legs    Hypertension      Past Surgical History:   Procedure Laterality Date    ENDOSCOPY N/A 4/3/2024    Procedure: ESOPHAGOGASTRODUODENOSCOPY AT BEDSIDE;  Surgeon: Redd Guidry MD;  Location: [facility] ENDOSCOPY;  Service: Gastroenterology;  Laterality: N/A;  PRE-  GI BLEED  POST- HIATAL HERNIA, DISTAL ESOPHAGITIS ULCERS, GASTRITIS, PYLOROPLASTY    ENDOSCOPY W/ PEG TUBE PLACEMENT N/A 2024    Procedure: ESOPHAGOGASTRODUODENOSCOPY WITH PERCUTANEOUS ENDOSCOPIC GASTROSTOMY TUBE INSERTION;  Surgeon: Dimas Brand MD;  Location: [facility] ENDOSCOPY;  Service: General;  Laterality: N/A;  Dysphagia      General Information       Row Name 04/15/24 1538          OT Time and Intention    Document Type therapy note (daily note)  -CE     Mode of Treatment co-treatment;occupational therapy;physical therapy;other (see comments)  co-tx appropriate 2/2 pt acuity and level of assist required to maintain safety of pt and staff for attempting mobility  -CE       Row Name 04/15/24 1538          General Information    Existing  Precautions/Restrictions fall  -CE       Row Name 04/15/24 1538          Cognition    Orientation Status (Cognition) oriented to;person  somnolent this morning and dtr reporting pt was up all night talking  -CE       Row Name 04/15/24 1538          Safety Issues, Functional Mobility    Impairments Affecting Function (Mobility) balance;cognition;coordination;endurance/activity tolerance;strength;shortness of breath;postural/trunk control;range of motion (ROM);motor control;grasp  -CE     Cognitive Impairments, Mobility Safety/Performance attention;awareness, need for assistance;impulsivity;insight into deficits/self-awareness;judgment;problem-solving/reasoning;safety precaution awareness;safety precaution follow-through;sequencing abilities  -CE               User Key  (r) = Recorded By, (t) = Taken By, (c) = Cosigned By      Initials Name Provider Type    Estelita Abdalla OT Occupational Therapist                     Mobility/ADL's       Row Name 04/15/24 1540          Bed Mobility    Supine-Sit [place] (Bed Mobility) maximum assist (25% patient effort);2 person assist;verbal cues  -CE     Sit-Supine [place] (Bed Mobility) maximum assist (25% patient effort);2 person assist;verbal cues  -CE     Assistive Device (Bed Mobility) bed rails;draw sheet;head of bed elevated  -CE       Row Name 04/15/24 1540          Transfers    Transfers sit-stand transfer  -CE       Row Name 04/15/24 1540          Sit-Stand Transfer    Sit-Stand [place] (Transfers) moderate assist (50% patient effort);2 person assist  -CE     Comment, (Sit-Stand Transfer) static standing x 2 trials with mod-maxA x 2 2/2 lateral lean and L side weakness  -CE               User Key  (r) = Recorded By, (t) = Taken By, (c) = Cosigned By      Initials Name Provider Type    Estelita Abdalla OT Occupational Therapist                   Obj/Interventions       Row Name 04/15/24 1541          Vision Assessment/Intervention    Visual  Processing Deficit theresa-inattention/neglect, left  -CE     Vision Assessment Comment Pt able to locate 1 of 3 items in L visual field with max cues and physical assist for L lateral head turn 2/2 R gaze preference. Pt able to maintain vision on L hand briefly (<1 min) once locating.  -CE       Row Name 04/15/24 1541          Strength Comprehensive (MMT)    Comment, General Manual Muscle Testing (MMT) Assessment cont to note 0/5 strength in LUE  -CE       Row Name 04/15/24 1541          Motor Skills    Motor Skills motor control/coordination interventions  -CE     Motor Control/Coordination Interventions neuro-muscular re-education  -CE       Row Name 04/15/24 1541          Balance    Balance Interventions sit to stand;highly challenging  -CE     Comment, Balance L lean better in sitting this date but strong in attempts at standing x 2  -CE       Row Name 04/15/24 1541          Glenohumeral Joint Subluxation    Glenohumeral Joint Subluxation left;minimal subluxation  -CE       Row Name 04/15/24 1541          Neuromuscular Re-education    Interventions (Neuromuscular Re-education) weight shifting;joint approximation  -CE               User Key  (r) = Recorded By, (t) = Taken By, (c) = Cosigned By      Initials Name Provider Type    CE Estelita Marcial, OT Occupational Therapist                   Goals/Plan    No documentation.                  Clinical Impression       Row Name 04/15/24 1546          Pain Assessment    Pretreatment Pain Rating 0/10 - no pain  -CE     Posttreatment Pain Rating 2/10  -CE     Pain Location - Side/Orientation Left  -CE     Pain Location - shoulder  -CE     Pain Intervention(s) Rest;Repositioned  -CE       Row Name 04/15/24 1546          Plan of Care Review    Plan of Care Reviewed With patient;daughter  -CE     Progress improving  -CE     Outcome Evaluation Pt seen for OT tx focusing on bed mobility, STS and LUE WB and dynamic reacing tasks. Con't to require max cues and assist for attn  to L side of body/environment. STS x 2 with Ming x 2 but mod-maxA to maintain. OT will con't to follow for stated goals and recommend d/c to IPR.  -CE       Row Name 04/15/24 1545          Therapy Plan Review/Discharge Plan (OT)    Anticipated Discharge Disposition (OT) inpatient rehabilitation facility  -CE       Row Name 04/15/24 1545          Vital Signs    O2 Delivery Pre Treatment room air  -CE     O2 Delivery Intra Treatment room air  -CE     O2 Delivery Post Treatment room air  -CE     Pre Patient Position Supine  -CE     Intra Patient Position Standing  -CE     Post Patient Position Supine  -CE       Row Name 04/15/24 1545          Positioning and Restraints    Pre-Treatment Position in bed  -CE     Post Treatment Position bed  -CE     In Bed notified nsg;supine;side lying right;call light within reach;encouraged to call for assist;exit alarm on;with family/caregiver  -CE               User Key  (r) = Recorded By, (t) = Taken By, (c) = Cosigned By      Initials Name Provider Type    CE Estelita Marcial, OT Occupational Therapist                   Outcome Measures       Row Name 04/15/24 1548          How much help from another is currently needed...    Putting on and taking off regular lower body clothing? 1  -CE     Bathing (including washing, rinsing, and drying) 1  -CE     Toileting (which includes using toilet bed pan or urinal) 1  -CE     Putting on and taking off regular upper body clothing 1  -CE     Taking care of personal grooming (such as brushing teeth) 2  -CE     Eating meals 1  -CE     AM-PAC 6 Clicks Score (OT) 7  -CE       Row Name 04/15/24 1232 04/15/24 0805       How much help from another person do you currently need...    Turning from your back to your side while in flat bed without using bedrails? 2  -AR 1  -RD    Moving from lying on back to sitting on the side of a flat bed without bedrails? 2  -AR 1  -RD    Moving to and from a bed to a chair (including a wheelchair)? 1  -AR 1  -RD     Standing up from a chair using your arms (e.g., wheelchair, bedside chair)? 2  -AR 1  -RD    Climbing 3-5 steps with a railing? 1  -AR 1  -RD    To walk in hospital room? 1  -AR 1  -RD    AM-PAC 6 Clicks Score (PT) 9  -AR 6  -RD    Highest Level of Mobility Goal 3 --> Sit at edge of bed  -AR 2 --> Bed activities/dependent transfer  -RD      Row Name 04/15/24 1548 04/15/24 1232       Functional Assessment    Outcome Measure Options AM-PAC 6 Clicks Daily Activity (OT)  -CE AM-PAC 6 Clicks Basic Mobility (PT)  -AR              User Key  (r) = Recorded By, (t) = Taken By, (c) = Cosigned By      Initials Name Provider Type    AR Gayla Ramirez, PT Physical Therapist    CE Estelita Marcial, OT Occupational Therapist    Leticia Heredia, RN Registered Nurse                    Occupational Therapy Education       Title: PT OT SLP Therapies (In Progress)       Topic: Occupational Therapy (In Progress)       Point: ADL training (In Progress)       Description:   Instruct learner(s) on proper safety adaptation and remediation techniques during self care or transfers.   Instruct in proper use of assistive devices.                  Learning Progress Summary             Patient Nonacceptance, E, NL by EN at 4/4/2024 0014                         Point: Home exercise program (In Progress)       Description:   Instruct learner(s) on appropriate technique for monitoring, assisting and/or progressing therapeutic exercises/activities.                  Learning Progress Summary             Patient Nonacceptance, E, NL by EN at 4/4/2024 0014                         Point: Precautions (In Progress)       Description:   Instruct learner(s) on prescribed precautions during self-care and functional transfers.                  Learning Progress Summary             Patient Nonacceptance, E, NL by EN at 4/4/2024 0014                         Point: Body mechanics (In Progress)       Description:   Instruct learner(s) on proper positioning and  spine alignment during self-care, functional mobility activities and/or exercises.                  Learning Progress Summary             Patient Nonacceptance, E, NL by KOFI at 4/4/2024 0014   Family Acceptance, E, VU by  at 4/3/2024 1239    Comment: OT goals, POC, positioning of LUE for safety post CVA for subluxation                                         User Key       Initials Effective Dates Name Provider Type Discipline    EN 08/01/22 -  Monique Mary, RN Registered Nurse Nurse     04/02/20 -  Yasmine Martin OT Occupational Therapist OT                  OT Recommendation and Plan     Plan of Care Review  Plan of Care Reviewed With: patient, daughter  Progress: improving  Outcome Evaluation: Pt seen for OT tx focusing on bed mobility, STS and LUE WB and dynamic reacing tasks. Con't to require max cues and assist for attn to L side of body/environment. STS x 2 with Ming x 2 but mod-maxA to maintain. OT will con't to follow for stated goals and recommend d/c to IPR.     Time Calculation:         Time Calculation- OT       Row Name 04/15/24 1548             Time Calculation- OT    OT Start Time 1018  -CE      OT Stop Time 1046  -CE      OT Time Calculation (min) 28 min  -CE      Total Timed Code Minutes- OT 28 minute(s)  -CE      OT Received On 04/15/24  -CE      OT - Next Appointment 04/16/24  -CE         Timed Charges    31058 - OT Therapeutic Activity Minutes 28  -CE         Total Minutes    Timed Charges Total Minutes 28  -CE       Total Minutes 28  -CE                User Key  (r) = Recorded By, (t) = Taken By, (c) = Cosigned By      Initials Name Provider Type    CE Estelita Marcial OT Occupational Therapist                  Therapy Charges for Today       Code Description Service Date Service Provider Modifiers Qty    [id number]  OT THERAPEUTIC ACT EA 15 MIN 4/15/2024 Estelita Marcial OT GO 2                 Estelita Marcial OT  4/15/2024

## 2024-04-15 NOTE — PROGRESS NOTES
"    Patient Name: Alex Negron  :1945  79 y.o.      Patient Care Team:  Jyothi Song PA-C as PCP - General (Family Medicine)    Chief Complaint: follow up NSVT, stroke    Interval History: resting in bed. Family at bedside assisted with history and noticed wet vocal quality, clearing throat.        Objective   Vital Signs  Temp:  [97.3 °F (36.3 °C)-98.2 °F (36.8 °C)] 97.3 °F (36.3 °C)  Heart Rate:  [59-76] 72  Resp:  [18] 18  BP: (124-153)/(72-83) 133/81    Intake/Output Summary (Last 24 hours) at 4/15/2024 1159  Last data filed at 4/15/2024 0600  Gross per 24 hour   Intake 3400 ml   Output 1050 ml   Net 2350 ml     Flowsheet Rows      Flowsheet Row First Filed Value   Admission Height 180.3 cm (71\") Documented at 2024 0430   Admission Weight 81.7 kg (180 lb 3.2 oz) Documented at 2024 2159            Physical Exam:   General Appearance:    Alert, cooperative, in no acute distress   Lungs:     Clear to auscultation.  Normal respiratory effort and rate.      Heart:    Regular rhythm and normal rate, normal S1 and S2, no murmurs, gallops or rubs.     Chest Wall:    No abnormalities observed   Abdomen:     Soft, nontender, positive bowel sounds.     Extremities:   no cyanosis, clubbing or edema.  No marked joint deformities.  Adequate musculoskeletal strength.       Results Review:    Results from last 7 days   Lab Units 04/15/24  0703   SODIUM mmol/L 139   POTASSIUM mmol/L 4.2   CHLORIDE mmol/L 105   CO2 mmol/L 24.5   BUN mg/dL 30*   CREATININE mg/dL 0.91   GLUCOSE mg/dL 146*   CALCIUM mg/dL 8.9         Results from last 7 days   Lab Units 04/15/24  0703   WBC 10*3/mm3 15.07*   HEMOGLOBIN g/dL 9.5*   HEMATOCRIT % 29.3*   PLATELETS 10*3/mm3 773*         Results from last 7 days   Lab Units 24  0639   MAGNESIUM mg/dL 2.5*                   Medication Review:   [Held by provider] acetaminophen, 650 mg, Nasogastric, TID  amLODIPine, 5 mg, Per PEG Tube, Q24H  aspirin, 81 mg, Nasogastric, " Daily  atorvastatin, 40 mg, Oral, Nightly  clopidogrel, 75 mg, Nasogastric, Daily  gabapentin, 200 mg, Nasogastric, TID  insulin regular, 2-7 Units, Subcutaneous, Q6H  metoprolol tartrate, 50 mg, Oral, Q12H  pantoprazole, 40 mg, Intravenous, Daily  polyethylene Glycol 400, 1 drop, Both Eyes, Q8H  sodium chloride, 500 mL, Intravenous, Once  sodium chloride, 10 mL, Intravenous, Q12H              Assessment & Plan   Acute stroke status post mechanical thrombectomy with right ICC stent on DAPT.   NSVT, electrolytes within normal range. No structural heart disease.   Hypertension reasonably controlled currently. Target < 130/80  Dysphagia due to #1. Status post EGD and PEG placement. 4/12/2024  Urinary retention, gross hematuria. CBI . Urology following.     Tele reviewed from last 24 hrs and no runs of NSVT alarmed or saved. He will be going home with a 2 week tele monitor from a stroke perspective. He is on beta blocker and has normal LVEF.     Nothing further to add from cardiac standpoint. Will see as needed.     ESCOBAR Murillo  [facility]  04/15/24  11:59 EDT

## 2024-04-15 NOTE — PROGRESS NOTES
Continued Stay Note  [facility]     Patient Name: Alex Negron  MRN: [id number]  Today's Date: 4/15/2024    Admit Date: 4/1/2024    Plan: Rehab referrals pending   Discharge Plan       Row Name 04/15/24 1045       Plan    Plan Rehab referrals pending    Patient/Family in Agreement with Plan yes    Plan Comments Rehab referrals pending to [facility] acute and broad SNFs nearest pt's family home per daughter's request. CCP to follow for evals. Cherise Sheehan LCSW                   Discharge Codes    No documentation.                 Expected Discharge Date and Time       Expected Discharge Date Expected Discharge Time    Apr 17, 2024               Michelle Sheehan LCSW

## 2024-04-15 NOTE — PLAN OF CARE
Problem: Adult Inpatient Plan of Care  Goal: Plan of Care Review  Outcome: Ongoing, Progressing  Goal: Patient-Specific Goal (Individualized)  Outcome: Ongoing, Progressing  Goal: Absence of Hospital-Acquired Illness or Injury  Outcome: Ongoing, Progressing  Intervention: Identify and Manage Fall Risk  Recent Flowsheet Documentation  Taken 4/15/2024 0400 by Rachana Khoury RN  Safety Promotion/Fall Prevention:   assistive device/personal items within reach   clutter free environment maintained   fall prevention program maintained   lighting adjusted   nonskid shoes/slippers when out of bed   safety round/check completed   room organization consistent  Taken 4/15/2024 0210 by Rachana Khoury RN  Safety Promotion/Fall Prevention:   assistive device/personal items within reach   clutter free environment maintained   fall prevention program maintained   lighting adjusted   nonskid shoes/slippers when out of bed   safety round/check completed   room organization consistent  Taken 4/15/2024 0005 by Rachana Khoury RN  Safety Promotion/Fall Prevention:   assistive device/personal items within reach   clutter free environment maintained   fall prevention program maintained   lighting adjusted   nonskid shoes/slippers when out of bed   safety round/check completed   room organization consistent  Taken 4/14/2024 2200 by Rachana Khoury RN  Safety Promotion/Fall Prevention:   assistive device/personal items within reach   clutter free environment maintained   fall prevention program maintained   lighting adjusted   nonskid shoes/slippers when out of bed   safety round/check completed   room organization consistent  Taken 4/14/2024 2043 by Rachana Khoury RN  Safety Promotion/Fall Prevention:   assistive device/personal items within reach   clutter free environment maintained   fall prevention program maintained   lighting adjusted   nonskid shoes/slippers when out of bed   safety round/check completed   room organization  consistent  Intervention: Prevent Skin Injury  Recent Flowsheet Documentation  Taken 4/15/2024 0400 by Rachana Khoury RN  Body Position: position changed independently  Taken 4/15/2024 0210 by Rachana Khoury RN  Body Position: position changed independently  Taken 4/15/2024 0005 by Rachana Khoury RN  Body Position: position changed independently  Taken 4/14/2024 2200 by Rachana Khoury RN  Body Position: position changed independently  Taken 4/14/2024 2043 by Rachana Khoury RN  Body Position: turned  Skin Protection:   adhesive use limited   drying agents applied   incontinence pads utilized   protective footwear used   pulse oximeter probe site changed   skin sealant/moisture barrier applied   skin-to-device areas padded   skin-to-skin areas padded   transparent dressing maintained   tubing/devices free from skin contact  Intervention: Prevent and Manage VTE (Venous Thromboembolism) Risk  Recent Flowsheet Documentation  Taken 4/15/2024 0400 by Rachana Khoury RN  Activity Management: activity encouraged  Taken 4/15/2024 0210 by Rachana Khoury RN  Activity Management: activity encouraged  Taken 4/15/2024 0005 by Rachana Khoury RN  Activity Management: activity encouraged  Taken 4/14/2024 2200 by Rachana Khoury RN  Activity Management: activity encouraged  Taken 4/14/2024 2043 by Rachana Khoury RN  Activity Management: activity encouraged  Range of Motion:   active ROM (range of motion) encouraged   ROM (range of motion) performed  Goal: Optimal Comfort and Wellbeing  Outcome: Ongoing, Progressing  Intervention: Provide Person-Centered Care  Recent Flowsheet Documentation  Taken 4/14/2024 2043 by Rachana Khoury RN  Trust Relationship/Rapport:   care explained   choices provided   questions encouraged   questions answered   reassurance provided   thoughts/feelings acknowledged  Goal: Readiness for Transition of Care  Outcome: Ongoing, Progressing   Goal Outcome Evaluation:         Soft limb restraint removed  during shift. Pt tolerated being out of it. He is able to shift his weight allowing him to rest better. He continues to try to take heart monitor leads off infrequently but able to redirect him. He is not attempting to pull at abdominal binder or other cords. Educated pt and daughter at bedside the importance of not pulling at tele cords, dunham, abdominal binder, or PEG tube. Pt was able to tell his daughter that his left shoulder was hurting him, repositioned to comfort. Large loose BP at beginning of shift. More alert and interactive this shift.

## 2024-04-16 LAB
ANION GAP SERPL CALCULATED.3IONS-SCNC: 10.3 MMOL/L (ref 5–15)
BUN SERPL-MCNC: 30 MG/DL (ref 8–23)
BUN/CREAT SERPL: 35.7 (ref 7–25)
CALCIUM SPEC-SCNC: 8.9 MG/DL (ref 8.6–10.5)
CHLORIDE SERPL-SCNC: 105 MMOL/L (ref 98–107)
CO2 SERPL-SCNC: 20.7 MMOL/L (ref 22–29)
CREAT SERPL-MCNC: 0.84 MG/DL (ref 0.76–1.27)
EGFRCR SERPLBLD CKD-EPI 2021: 89.8 ML/MIN/1.73
GLUCOSE BLDC GLUCOMTR-MCNC: 110 MG/DL (ref 70–130)
GLUCOSE BLDC GLUCOMTR-MCNC: 120 MG/DL (ref 70–130)
GLUCOSE BLDC GLUCOMTR-MCNC: 128 MG/DL (ref 70–130)
GLUCOSE BLDC GLUCOMTR-MCNC: 139 MG/DL (ref 70–130)
GLUCOSE BLDC GLUCOMTR-MCNC: 147 MG/DL (ref 70–130)
GLUCOSE SERPL-MCNC: 146 MG/DL (ref 65–99)
POTASSIUM SERPL-SCNC: 5.1 MMOL/L (ref 3.5–5.2)
SODIUM SERPL-SCNC: 136 MMOL/L (ref 136–145)

## 2024-04-16 PROCEDURE — 82948 REAGENT STRIP/BLOOD GLUCOSE: CPT

## 2024-04-16 PROCEDURE — 80048 BASIC METABOLIC PNL TOTAL CA: CPT | Performed by: NURSE PRACTITIONER

## 2024-04-16 PROCEDURE — 97530 THERAPEUTIC ACTIVITIES: CPT

## 2024-04-16 RX ADMIN — METOPROLOL TARTRATE 50 MG: 50 TABLET, FILM COATED ORAL at 08:19

## 2024-04-16 RX ADMIN — GABAPENTIN 200 MG: 100 CAPSULE ORAL at 16:30

## 2024-04-16 RX ADMIN — Medication 1 DROP: at 20:36

## 2024-04-16 RX ADMIN — METOPROLOL TARTRATE 50 MG: 50 TABLET, FILM COATED ORAL at 20:36

## 2024-04-16 RX ADMIN — Medication 1 DROP: at 13:34

## 2024-04-16 RX ADMIN — GABAPENTIN 200 MG: 100 CAPSULE ORAL at 20:36

## 2024-04-16 RX ADMIN — ATORVASTATIN CALCIUM 40 MG: 20 TABLET, FILM COATED ORAL at 20:36

## 2024-04-16 RX ADMIN — PANTOPRAZOLE SODIUM 40 MG: 40 INJECTION, POWDER, FOR SOLUTION INTRAVENOUS at 08:18

## 2024-04-16 RX ADMIN — GABAPENTIN 200 MG: 100 CAPSULE ORAL at 08:17

## 2024-04-16 RX ADMIN — AMLODIPINE BESYLATE 5 MG: 5 TABLET ORAL at 08:18

## 2024-04-16 RX ADMIN — Medication 1 DROP: at 05:42

## 2024-04-16 RX ADMIN — Medication 10 ML: at 20:36

## 2024-04-16 RX ADMIN — ASPIRIN 81 MG: 81 TABLET, CHEWABLE ORAL at 08:17

## 2024-04-16 RX ADMIN — CLOPIDOGREL BISULFATE 75 MG: 75 TABLET, FILM COATED ORAL at 08:18

## 2024-04-16 RX ADMIN — Medication 10 ML: at 08:18

## 2024-04-16 NOTE — THERAPY TREATMENT NOTE
Patient Name: Alex Negron  : 1945    MRN: [id number]                              Today's Date: 2024       Admit Date: 2024    Visit Dx:     ICD-10-CM ICD-9-CM   1. Cerebrovascular accident (CVA), unspecified mechanism  I63.9 434.91   2. Coffee ground emesis  K92.0 578.0   3. Gastroesophageal reflux disease, unspecified whether esophagitis present  K21.9 530.81   4. 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 Dysphagia, unspecified type  R13.10 787.20     Patient Active Problem List   Diagnosis    Acute CVA (cerebrovascular accident)    Right-sided extracranial carotid artery stenosis    Coffee ground emesis    Gastroesophageal reflux disease    Cerebrovascular accident (CVA)    Dysphagia    Elevated LFTs    Fatty liver disease, nonalcoholic     Past Medical History:   Diagnosis Date    Chronic back pain     neuropathy rolando legs    Hypertension      Past Surgical History:   Procedure Laterality Date    ENDOSCOPY N/A 4/3/2024    Procedure: ESOPHAGOGASTRODUODENOSCOPY AT BEDSIDE;  Surgeon: Redd Guidry MD;  Location: [facility] ENDOSCOPY;  Service: Gastroenterology;  Laterality: N/A;  PRE-  GI BLEED  POST- HIATAL HERNIA, DISTAL ESOPHAGITIS ULCERS, GASTRITIS, PYLOROPLASTY    ENDOSCOPY W/ PEG TUBE PLACEMENT N/A 2024    Procedure: ESOPHAGOGASTRODUODENOSCOPY WITH PERCUTANEOUS ENDOSCOPIC GASTROSTOMY TUBE INSERTION;  Surgeon: Dimas Brand MD;  Location: [facility] ENDOSCOPY;  Service: General;  Laterality: N/A;  Dysphagia      General Information       Row Name 24 1134          Physical Therapy Time and Intention    Document Type therapy note (daily note)  -PH     Mode of Treatment co-treatment;occupational therapy;physical therapy  -PH       Row Name 24 1134          Cognition    Orientation Status (Cognition) unable/difficult to assess  -PH       Row Name 24 1134          Safety Issues, Functional Mobility     Impairments Affecting Function (Mobility) balance;endurance/activity tolerance;strength;postural/trunk control;grasp;motor control;visual/perceptual  -PH     Cognitive Impairments, Mobility Safety/Performance insight into deficits/self-awareness;safety precaution awareness;safety precaution follow-through;judgment;problem-solving/reasoning  -PH     Comment, Safety Issues/Impairments (Mobility) Co treatment medically appropriate and necessary due to patient acuity level, activity tolerance and safety of patient and staff. Treatment is focusing on progression of care and goals established in the POC.  -PH               User Key  (r) = Recorded By, (t) = Taken By, (c) = Cosigned By      Initials Name Provider Type    PH Chante Frost PTA Physical Therapist Assistant                   Mobility       Row Name 04/16/24 1135          Bed Mobility    Bed Mobility supine-sit;sit-supine  -PH     All Activities, [place] (Bed Mobility) dependent (less than 25% patient effort);2 person assist;verbal cues;nonverbal cues (demo/gesture)  -PH     Supine-Sit [place] (Bed Mobility) maximum assist (25% patient effort);2 person assist;verbal cues;nonverbal cues (demo/gesture)  -PH     Sit-Supine [place] (Bed Mobility) maximum assist (25% patient effort);2 person assist;verbal cues;nonverbal cues (demo/gesture)  -PH     Assistive Device (Bed Mobility) bed rails;head of bed elevated;draw sheet  -PH     Comment, (Bed Mobility) cues for initiation and sequencing to R side of bed.  -       Row Name 04/16/24 1135          Sit-Stand Transfer    Sit-Stand [place] (Transfers) moderate assist (50% patient effort);minimum assist (75% patient effort);2 person assist;verbal cues;nonverbal cues (demo/gesture)  -     Assistive Device (Sit-Stand Transfers) walker, front-wheeled  -PH     Comment, (Sit-Stand Transfer) 3x sts from EOB; pt became fatigued and req more assist w/ each attempt; incr in L lean noted w/ each  trial; L UE supported w/ L knee blocked; R side of fww open w/ pt's R hand on   -PH       Row Name 04/16/24 1135          Gait/Stairs (Locomotion)    [place] Level (Gait) unable to assess  -PH     Patient was able to Ambulate no, other medical factors prevent ambulation  -PH     Reason Patient was unable to Ambulate Excessive Weakness  -PH     [place] Level (Stairs) unable to assess  -PH     Comment, (Gait/Stairs) pt was unable to take 1 step to right when directed  -PH               User Key  (r) = Recorded By, (t) = Taken By, (c) = Cosigned By      Initials Name Provider Type     Chante Frost PTA Physical Therapist Assistant                   Obj/Interventions       Row Name 04/16/24 1138          Balance    Balance Assessment sitting static balance  -PH     Static Sitting Balance contact guard;moderate assist;maximum assist;verbal cues;non-verbal cues (demo/gesture)  -PH     Static Standing Balance minimal assist;moderate assist;maximum assist;2-person assist;verbal cues;non-verbal cues (demo/gesture)  -PH     Position/Device Used, Standing Balance walker, front-wheeled  -PH     Comment, Balance pt was able to stand approx 1 min w/ cues for upright posture; incr L pushing/leaning w/ progressive stand attempts.  -PH               User Key  (r) = Recorded By, (t) = Taken By, (c) = Cosigned By      Initials Name Provider Type     Chante Frost PTA Physical Therapist Assistant                   Goals/Plan    No documentation.                  Clinical Impression       Row Name 04/16/24 4174          Pain    Pain Location - Side/Orientation Left  -PH     Pain Location - shoulder  -PH     Pre/Posttreatment Pain Comment Pt reports pain in L shldr w/ mobility although no pain when at rest  -PH     Pain Intervention(s) Repositioned;Rest;Elevated  -PH       Row Name 04/16/24 4838          Plan of Care Review    Plan of Care Reviewed With patient;family;friend  -PH     Progress  improving  -PH     Outcome Evaluation Pt seen by PT/OT for co-tx this AM. Pt continues working on sit bal at EOB. Pt exhibited improvement initially req CGA to min A for sit bal although became fatigued w/ strong L lean over time req mod/max A. Pt stood 3x req min A x 2 for first sit to stand. Pt req additional assist w/ L lean noted in subsequent stands req mod A x 2 w/ pt's R hand on fww  and L UE supported. Pt stood for approx 1 min req min/mod A x 2 w/ use of fww. Pt was back in bed at end of session w/ bed mobility req max A x 2. Extra time spent answering questions for dtr regarding rehab after dc. Rec IPR to address deficits. PT will prog as pt max.  -PH       Row Name 04/16/24 1139          Vital Signs    O2 Delivery Pre Treatment room air  -PH     O2 Delivery Intra Treatment room air  -PH     O2 Delivery Post Treatment room air  -PH       Row Name 04/16/24 1139          Positioning and Restraints    Pre-Treatment Position in bed  -PH     Post Treatment Position bed  -PH     In Bed fowlers;call light within reach;encouraged to call for assist;exit alarm on;notified nsg  -PH               User Key  (r) = Recorded By, (t) = Taken By, (c) = Cosigned By      Initials Name Provider Type    PH Chante Frost PTA Physical Therapist Assistant                   Outcome Measures       Row Name 04/16/24 1148 04/16/24 0817       How much help from another person do you currently need...    Turning from your back to your side while in flat bed without using bedrails? 2  -PH 2  -RD    Moving from lying on back to sitting on the side of a flat bed without bedrails? 2  -PH 1  -RD    Moving to and from a bed to a chair (including a wheelchair)? 1  -PH 1  -RD    Standing up from a chair using your arms (e.g., wheelchair, bedside chair)? 2  -PH 1  -RD    Climbing 3-5 steps with a railing? 1  -PH 1  -RD    To walk in hospital room? 1  -PH 1  -RD    AM-PAC 6 Clicks Score (PT) 9  -PH 7  -RD    Highest Level of  Mobility Goal 3 --> Sit at edge of bed  -PH 2 --> Bed activities/dependent transfer  -RD      Row Name 04/16/24 1148          Functional Assessment    Outcome Measure Options AM-PAC 6 Clicks Basic Mobility (PT)  -PH               User Key  (r) = Recorded By, (t) = Taken By, (c) = Cosigned By      Initials Name Provider Type    PH Chante Frost PTA Physical Therapist Assistant    Leticia Heredia, RN Registered Nurse                                 Physical Therapy Education       Title: PT OT SLP Therapies (In Progress)       Topic: Physical Therapy (In Progress)       Point: Mobility training (In Progress)       Learning Progress Summary             Patient Acceptance, E,TB, DU,NR by PH at 4/16/2024 1148    Acceptance, E, NR by AR at 4/15/2024 1233    Acceptance, E,TB,D, NR,DU by PH at 4/12/2024 1330    Acceptance, E,TB,D, NR,DU by PH at 4/11/2024 1017    Acceptance, E,D, DU,NR by PC at 4/10/2024 1146    Acceptance, E,D, NR by PC at 4/9/2024 1045    Acceptance, E,D, NL by PC at 4/8/2024 1149    Acceptance, E, NR by EM at 4/5/2024 1622    Nonacceptance, E, NL by EN at 4/4/2024 0014    Acceptance, E,D, NR by PC at 4/3/2024 1030   Family Acceptance, E, NR by AR at 4/15/2024 1233    Acceptance, E,D, DU,NR by PC at 4/10/2024 1146    Acceptance, E,D, NR by PC at 4/3/2024 1030                         Point: Home exercise program (In Progress)       Learning Progress Summary             Patient Acceptance, E,TB, DU,NR by PH at 4/16/2024 1148    Acceptance, E, NR by AR at 4/15/2024 1233    Acceptance, E,TB,D, NR,DU by PH at 4/12/2024 1330    Acceptance, E,TB,D, NR,DU by PH at 4/11/2024 1017    Acceptance, E,D, DU,NR by PC at 4/10/2024 1146    Acceptance, E,D, NR by PC at 4/9/2024 1045    Acceptance, E,D, NL by PC at 4/8/2024 1149    Acceptance, E,D, NR,NL by LW at 4/6/2024 1138    Nonacceptance, E, NL by EN at 4/4/2024 0014    Acceptance, E,D, NR by PC at 4/3/2024 1030   Family Acceptance, E, NR by AR at 4/15/2024  1233    Acceptance, E,D, DU,NR by PC at 4/10/2024 1146    Acceptance, E,D, NR by PC at 4/3/2024 1030                         Point: Body mechanics (In Progress)       Learning Progress Summary             Patient Acceptance, E,TB, DU,NR by PH at 4/16/2024 1148    Acceptance, E, NR by AR at 4/15/2024 1233    Acceptance, E,TB,D, NR,DU by PH at 4/12/2024 1330    Acceptance, E,TB,D, NR,DU by PH at 4/11/2024 1017    Acceptance, E,D, DU,NR by PC at 4/10/2024 1146    Acceptance, E,D, NR by PC at 4/9/2024 1045    Acceptance, E,D, NL by PC at 4/8/2024 1149    Acceptance, E,D, NR,NL by LW at 4/6/2024 1138    Nonacceptance, E, NL by EN at 4/4/2024 0014    Acceptance, E,D, NR by PC at 4/3/2024 1030   Family Acceptance, E, NR by AR at 4/15/2024 1233    Acceptance, E,D, DU,NR by PC at 4/10/2024 1146    Acceptance, E,D, NR by PC at 4/3/2024 1030                         Point: Precautions (In Progress)       Learning Progress Summary             Patient Acceptance, E,TB, DU,NR by PH at 4/16/2024 1148    Acceptance, E, NR by AR at 4/15/2024 1233    Acceptance, E,TB,D, NR,DU by PH at 4/12/2024 1330    Acceptance, E,TB,D, NR,DU by PH at 4/11/2024 1017    Acceptance, E,D, DU,NR by PC at 4/10/2024 1146    Acceptance, E,D, NR by PC at 4/9/2024 1045    Acceptance, E,D, NL by PC at 4/8/2024 1149    Acceptance, E,D, NR,NL by LW at 4/6/2024 1138    Nonacceptance, E, NL by EN at 4/4/2024 0014    Acceptance, E,D, NR by PC at 4/3/2024 1030   Family Acceptance, E, NR by AR at 4/15/2024 1233    Acceptance, E,D, DU,NR by PC at 4/10/2024 1146    Acceptance, E,D, NR by PC at 4/3/2024 1030                                         User Key       Initials Effective Dates Name Provider Type Discipline    PC 06/16/21 -  Sidra Arce, PT Physical Therapist PT    EM 06/16/21 -  Lesia Judd, PT Physical Therapist PT    AR 06/16/21 -  Gayla Ramirez, PT Physical Therapist PT    PH 06/16/21 -  Chante Frost, PTA Physical Therapist  Assistant PT    EN 08/01/22 -  Monique Mary, RN Registered Nurse Nurse    LW 05/08/23 -  Isa Pierce PT Physical Therapist PT                  PT Recommendation and Plan     Plan of Care Reviewed With: patient, family, friend  Progress: improving  Outcome Evaluation: Pt seen by PT/OT for co-tx this AM. Pt continues working on sit bal at EOB. Pt exhibited improvement initially req CGA to min A for sit bal although became fatigued w/ strong L lean over time req mod/max A. Pt stood 3x req min A x 2 for first sit to stand. Pt req additional assist w/ L lean noted in subsequent stands req mod A x 2 w/ pt's R hand on fww  and L UE supported. Pt stood for approx 1 min req min/mod A x 2 w/ use of fww. Pt was back in bed at end of session w/ bed mobility req max A x 2. Extra time spent answering questions for dtr regarding rehab after dc. Rec IPR to address deficits. PT will prog as pt max.     Time Calculation:         PT Charges       Row Name 04/16/24 1150             Time Calculation    Start Time 1040  -PH      Stop Time 1110  -PH      Time Calculation (min) 30 min  -PH      PT Received On 04/16/24  -PH      PT - Next Appointment 04/17/24  -PH         Timed Charges    62779 - PT Therapeutic Activity Minutes 30  -PH         Total Minutes    Timed Charges Total Minutes 30  -PH       Total Minutes 30  -PH                User Key  (r) = Recorded By, (t) = Taken By, (c) = Cosigned By      Initials Name Provider Type    PH Chante Frost PTA Physical Therapist Assistant                  Therapy Charges for Today       Code Description Service Date Service Provider Modifiers Qty    [id number]  PT THERAPEUTIC ACT EA 15 MIN 4/16/2024 Chante Frost PTA GP 2            PT G-Codes  Outcome Measure Options: AM-PAC 6 Clicks Basic Mobility (PT)  AM-PAC 6 Clicks Score (PT): 9  AM-PAC 6 Clicks Score (OT): 7  Modified [place] Scale: 4 - Moderately severe disability.  Unable to walk without assistance, and  unable to attend to own bodily needs without assistance.  PT Discharge Summary  Anticipated Discharge Disposition (PT): inpatient rehabilitation facility    Chante Frost, PTA  4/16/2024

## 2024-04-16 NOTE — PROGRESS NOTES
[facility]    Pt does not have the 24/7 asst that is anticipated to be needed at [facility]. Family is looking for a rehab with a longer stay. Msg left with [facility]CP.     Will sign off    Eula Silverman RN  Acute Rehab Admission Nurse

## 2024-04-16 NOTE — THERAPY TREATMENT NOTE
Patient Name: Alex Negron  : 1945    MRN: [id number]                              Today's Date: 2024       Admit Date: 2024    Visit Dx:     ICD-10-CM ICD-9-CM   1. Cerebrovascular accident (CVA), unspecified mechanism  I63.9 434.91   2. Coffee ground emesis  K92.0 578.0   3. Gastroesophageal reflux disease, unspecified whether esophagitis present  K21.9 530.81   4. 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 Dysphagia, unspecified type  R13.10 787.20     Patient Active Problem List   Diagnosis    Acute CVA (cerebrovascular accident)    Right-sided extracranial carotid artery stenosis    Coffee ground emesis    Gastroesophageal reflux disease    Cerebrovascular accident (CVA)    Dysphagia    Elevated LFTs    Fatty liver disease, nonalcoholic     Past Medical History:   Diagnosis Date    Chronic back pain     neuropathy rolando legs    Hypertension      Past Surgical History:   Procedure Laterality Date    ENDOSCOPY N/A 4/3/2024    Procedure: ESOPHAGOGASTRODUODENOSCOPY AT BEDSIDE;  Surgeon: Redd Guidry MD;  Location: [facility] ENDOSCOPY;  Service: Gastroenterology;  Laterality: N/A;  PRE-  GI BLEED  POST- HIATAL HERNIA, DISTAL ESOPHAGITIS ULCERS, GASTRITIS, PYLOROPLASTY    ENDOSCOPY W/ PEG TUBE PLACEMENT N/A 2024    Procedure: ESOPHAGOGASTRODUODENOSCOPY WITH PERCUTANEOUS ENDOSCOPIC GASTROSTOMY TUBE INSERTION;  Surgeon: Dimas Brand MD;  Location: [facility] ENDOSCOPY;  Service: General;  Laterality: N/A;  Dysphagia      General Information       Row Name 24 1325          OT Time and Intention    Document Type therapy note (daily note)  -KA     Mode of Treatment co-treatment;physical therapy;occupational therapy  -       Row Name 24 1325          General Information    Patient Profile Reviewed yes  -KA     Existing Precautions/Restrictions fall  -       Row Name 24 1325          Cognition    Orientation  Status (Cognition) unable/difficult to assess  -LISBETH       Row Name 04/16/24 1325          Safety Issues, Functional Mobility    Impairments Affecting Function (Mobility) balance;endurance/activity tolerance;strength;postural/trunk control;grasp;motor control;visual/perceptual  -LISBETH     Comment, Safety Issues/Impairments (Mobility) [place] medically appropriate and necessary due to pt acuity, activity tolerance, to maximize mobility efforts and safety of pt and staff. Focus on progression of care and goals established in plan of care.  -LISBETH               User Key  (r) = Recorded By, (t) = Taken By, (c) = Cosigned By      Initials Name Provider Type    Carine Marti OT Occupational Therapist                     Mobility/ADL's       Row Name 04/16/24 1325          Bed Mobility    Supine-Sit [place] (Bed Mobility) maximum assist (25% patient effort);2 person assist;verbal cues;nonverbal cues (demo/gesture)  -LISBETH     Sit-Supine [place] (Bed Mobility) maximum assist (25% patient effort);2 person assist;verbal cues;nonverbal cues (demo/gesture)  -     Assistive Device (Bed Mobility) bed rails;head of bed elevated;draw sheet  -LISBETH     Comment, (Bed Mobility) cues for task sequencing and initiation to R side of bed. Family present and helpful  -       Row Name 04/16/24 1323          Sit-Stand Transfer    Sit-Stand [place] (Transfers) moderate assist (50% patient effort);minimum assist (75% patient effort);2 person assist;verbal cues;nonverbal cues (demo/gesture)  -     Assistive Device (Sit-Stand Transfers) walker, front-wheeled  -LISBETH     Comment, (Sit-Stand Transfer) x2 from EOB. Became more fatigued with each attempt. LUE supported with L knee blocked, R side of rwx opened with pt grasping walker with RUE  -LISBETH       Row Name 04/16/24 1329          Lower Body Dressing Assessment/Training    [place] Level (Lower Body Dressing) don;socks;dependent (less than 25% patient effort)  -LISBETH     Position  (Lower Body Dressing) supine  -KA       Row Name 04/16/24 1325          Toileting Assessment/Training    [place] Level (Toileting) dependent (less than 25% patient effort)  -     Position (Toileting) supine  -     Comment, (Toileting) unable to safely attempt transfer this AM  -               User Key  (r) = Recorded By, (t) = Taken By, (c) = Cosigned By      Initials Name Provider Type    Carine Marti OT Occupational Therapist                   Obj/Interventions       Row Name 04/16/24 1328          Vision Assessment/Intervention    Visual Processing Deficit theresa-inattention/neglect, left  -     Vision Assessment Comment Worked on pt visually scanning to the L with family members while sitting EOB.  -KA       Row Name 04/16/24 1328          Shoulder (Therapeutic Exercise)    Shoulder (Therapeutic Exercise) --  PROM performed with LUE while seated EOB  -KA       Row Name 04/16/24 1328          Motor Skills    Muscle Tone left;upper extremity(s);flaccidity;severe impairment  -     Motor Control/Coordination Interventions neuro-muscular re-education  -KA       Row Name 04/16/24 1328          Balance    Static Sitting Balance contact guard;moderate assist;maximum assist  sitting balance ranged while sitting EOB  -     Comment, Balance Worked on dynamic sitting balance and visually scanning to the L while seated EOB reaching for family members hand in various directions while therapists assisted with LUE WBing and balance  -               User Key  (r) = Recorded By, (t) = Taken By, (c) = Cosigned By      Initials Name Provider Type    Carine Marti OT Occupational Therapist                   Goals/Plan    No documentation.                  Clinical Impression       Row Name 04/16/24 1332          Pain Assessment    Pre/Posttreatment Pain Comment pain in L shoulder with mobility  -KA       Row Name 04/16/24 1336          Plan of Care Review    Plan of Care Reviewed With patient;family   -     Outcome Evaluation Pt seen for OT session this AM. Worked on sitting balance and visually scanning to the L while seated EOB. His sitting balance initially was CGA-min A and then pt presented with strong L lateral lean as he became fatigued. He stood with Ax2 for 3 standing reps.Required more assist with each stand as he fatigued. Pt reported pain in LUE shoulder with STS attempts. OT to discuss with nsg to order sling only for mobility when practicing STS to provide support and decrease pain in shoulder. Increased time spent discussing rehab with daughter. Pt currently is max/dep A with LBD and toileting tasks. OT rec IPR at NH.  -       Row Name 04/16/24 1332          Therapy Plan Review/Discharge Plan (OT)    Anticipated Discharge Disposition (OT) inpatient rehabilitation facility  -       Row Name 04/16/24 1332          Vital Signs    Pre Patient Position Supine  -KA     Intra Patient Position Standing  -KA     Post Patient Position Supine  -KA       Row Name 04/16/24 1332          Positioning and Restraints    Pre-Treatment Position in bed  -KA     Post Treatment Position bed  -KA     In Bed notified nsg;supine;call light within reach;encouraged to call for assist;exit alarm on  -KA               User Key  (r) = Recorded By, (t) = Taken By, (c) = Cosigned By      Initials Name Provider Type    Carine Marti, OT Occupational Therapist                   Outcome Measures       Row Name 04/16/24 5522          How much help from another is currently needed...    Putting on and taking off regular lower body clothing? 1  -KA     Bathing (including washing, rinsing, and drying) 1  -KA     Toileting (which includes using toilet bed pan or urinal) 1  -KA     Putting on and taking off regular upper body clothing 1  -KA     Taking care of personal grooming (such as brushing teeth) 2  -KA     Eating meals 1  -KA     AM-PAC 6 Clicks Score (OT) 7  -KA       Row Name 04/16/24 1148 04/16/24 0817       How much  help from another person do you currently need...    Turning from your back to your side while in flat bed without using bedrails? 2  -PH 2  -RD    Moving from lying on back to sitting on the side of a flat bed without bedrails? 2  -PH 1  -RD    Moving to and from a bed to a chair (including a wheelchair)? 1  -PH 1  -RD    Standing up from a chair using your arms (e.g., wheelchair, bedside chair)? 2  -PH 1  -RD    Climbing 3-5 steps with a railing? 1  -PH 1  -RD    To walk in hospital room? 1  -PH 1  -RD    AM-PAC 6 Clicks Score (PT) 9  -PH 7  -RD    Highest Level of Mobility Goal 3 --> Sit at edge of bed  -PH 2 --> Bed activities/dependent transfer  -RD      Row Name 04/16/24 1337 04/16/24 1148       Functional Assessment    Outcome Measure Options AM-PAC 6 Clicks Daily Activity (OT)  -LISBETH AM-PAC 6 Clicks Basic Mobility (PT)  -PH              User Key  (r) = Recorded By, (t) = Taken By, (c) = Cosigned By      Initials Name Provider Type    PH Chante Frost, PTA Physical Therapist Assistant    Carine Marti OT Occupational Therapist    Leticia Heredia, RN Registered Nurse                    Occupational Therapy Education       Title: PT OT SLP Therapies (In Progress)       Topic: Occupational Therapy (In Progress)       Point: ADL training (In Progress)       Description:   Instruct learner(s) on proper safety adaptation and remediation techniques during self care or transfers.   Instruct in proper use of assistive devices.                  Learning Progress Summary             Patient Nonacceptance, E, NL by EN at 4/4/2024 0014                         Point: Home exercise program (In Progress)       Description:   Instruct learner(s) on appropriate technique for monitoring, assisting and/or progressing therapeutic exercises/activities.                  Learning Progress Summary             Patient Nonacceptance, E, NL by EN at 4/4/2024 0014                         Point: Precautions (In Progress)        Description:   Instruct learner(s) on prescribed precautions during self-care and functional transfers.                  Learning Progress Summary             Patient Nonacceptance, E, NL by KOFI at 4/4/2024 0014                         Point: Body mechanics (In Progress)       Description:   Instruct learner(s) on proper positioning and spine alignment during self-care, functional mobility activities and/or exercises.                  Learning Progress Summary             Patient Nonacceptance, E, NL by EN at 4/4/2024 0014   Family Acceptance, E, VU by DRE at 4/3/2024 1239    Comment: OT goals, POC, positioning of LUE for safety post CVA for subluxation                                         User Key       Initials Effective Dates Name Provider Type Discipline    KOFI 08/01/22 -  Monique Mary, RN Registered Nurse Nurse     04/02/20 -  Yasmine Martin OT Occupational Therapist OT                  OT Recommendation and Plan     Plan of Care Review  Plan of Care Reviewed With: patient, family  Outcome Evaluation: Pt seen for OT session this AM. Worked on sitting balance and visually scanning to the L while seated EOB. His sitting balance initially was CGA-min A and then pt presented with strong L lateral lean as he became fatigued. He stood with Ax2 for 3 standing reps.Required more assist with each stand as he fatigued. Pt reported pain in LUE shoulder with STS attempts. OT to discuss with nsg to order sling only for mobility when practicing STS to provide support and decrease pain in shoulder. Increased time spent discussing rehab with daughter. Pt currently is max/dep A with LBD and toileting tasks. OT rec IPR at dc.     Time Calculation:         Time Calculation- OT       Row Name 04/16/24 1338             Time Calculation- OT    OT Start Time 1039  -KA      OT Stop Time 1110  -KA      OT Time Calculation (min) 31 min  -KA      Total Timed Code Minutes- OT 31 minute(s)  -KA      OT Received On 04/16/24  -KA       OT - Next Appointment 04/17/24  -         Timed Charges    43360 - OT Therapeutic Activity Minutes 31  -KA         Total Minutes    Timed Charges Total Minutes 31  -KA       Total Minutes 31  -KA                User Key  (r) = Recorded By, (t) = Taken By, (c) = Cosigned By      Initials Name Provider Type    Carine Marti OT Occupational Therapist                  Therapy Charges for Today       Code Description Service Date Service Provider Modifiers Qty    62909646449 HC OT THERAPEUTIC ACT EA 15 MIN 4/16/2024 Carine Floyd OT GO 2                 Carine Floyd OT  4/16/2024

## 2024-04-16 NOTE — PLAN OF CARE
Goal Outcome Evaluation:  Plan of Care Reviewed With: patient, family, friend        Progress: improving  Outcome Evaluation: Pt seen by PT/OT for co-tx this AM. Pt continues working on sit bal at EOB. Pt exhibited improvement initially req CGA to min A for sit bal although became fatigued w/ strong L lean over time req mod/max A. Pt stood 3x req min A x 2 for first sit to stand. Pt req additional assist w/ L lean noted in subsequent stands req mod A x 2 w/ pt's R hand on fww  and L UE supported. Pt stood for approx 1 min req min/mod A x 2 w/ use of fww. Pt was back in bed at end of session w/ bed mobility req max A x 2. Extra time spent answering questions for dtr regarding rehab after dc. Rec IPR to address deficits. PT will prog as pt max.      Anticipated Discharge Disposition (PT): inpatient rehabilitation facility

## 2024-04-16 NOTE — PROGRESS NOTES
Continued Stay Note  [facility]     Patient Name: Alex Negron  MRN: [id number]  Today's Date: 4/16/2024    Admit Date: 4/1/2024    Plan: [facility] skilled, bed available tomorrow, 4/17   Discharge Plan       Row Name 04/16/24 1416       Plan    Plan [facility] skilled, bed available tomorrow, 4/17    Patient/Family in Agreement with Plan yes    Plan Comments CCP followed up with pt's daughter re: accepting facility option at [facility]. Daughter has toured and is agreeable. Per Kavin, [facility] can accept pt skilled, bed available tomorrow, 4/17. Pharmacy updated and packet in CCP office. Cherise Sheehan LCSW                   Discharge Codes    No documentation.                 Expected Discharge Date and Time       Expected Discharge Date Expected Discharge Time    Apr 17, 2024               Michelle Sheehan LCSW

## 2024-04-16 NOTE — PLAN OF CARE
Goal Outcome Evaluation:  Plan of Care Reviewed With: patient, family        Progress: improving  Outcome Evaluation: VSS overnight.  Tube feeds maintained. Patient continues to pull at wires and tubes but is redirectable. 3 way F/C with irrigation  maintained. Awaiting placement. Will continue to monitor and await further orders.

## 2024-04-16 NOTE — PROGRESS NOTES
Name: Alex Negron ADMIT: 2024   : 1945  PCP: Jyothi Song PA-C    MRN: [id number] LOS: 15 days   AGE/SEX: 79 y.o. male  ROOM: [facility]     Subjective   Subjective   He complains of left should pain, per PT this is likely due to positional dependence of limb following stroke. Add Sling for support.   Hematuria has resolved. Still has a catheter in place. +BMs. No other new issues. No CP SOB. No infectious symptoms.        Objective   Objective   Vital Signs  Temp:  [97.3 °F (36.3 °C)-98.5 °F (36.9 °C)] 97.5 °F (36.4 °C)  Heart Rate:  [68-80] 72  Resp:  [18] 18  BP: (104-164)/(70-86) 136/70  SpO2:  [93 %-99 %] 99 %  on   ;   Device (Oxygen Therapy): room air  Body mass index is 24.26 kg/m².  Physical Exam  Vitals reviewed.   Constitutional:       General: He is not in acute distress.     Appearance: He is well-developed.      Comments: Chronically ill appearing.    Cardiovascular:      Rate and Rhythm: Normal rate and regular rhythm.   Pulmonary:      Effort: Pulmonary effort is normal. No respiratory distress.      Breath sounds: Normal breath sounds.   Abdominal:      General: Bowel sounds are normal. There is no distension.      Palpations: Abdomen is soft.      Tenderness: There is no abdominal tenderness.      Comments: PEG In place   Genitourinary:     Comments: [place] with clear yellow urine  Skin:     General: Skin is warm and dry.   Neurological:      Mental Status: He is alert and oriented to person, place, and time.      Comments: LUE LLE weakness. Left facial droop.     Psychiatric:         Mood and Affect: Mood normal.         Behavior: Behavior normal.       Results Review     I reviewed the patient's new clinical results.  Results from last 7 days   Lab Units 04/15/24  0703 24  0639 24  0445 24  0453   WBC 10*3/mm3 15.07* 15.23* 18.27* 17.25*   HEMOGLOBIN g/dL 9.5* 9.5* 10.0* 10.1*   PLATELETS 10*3/mm3 773* 723* 759* 794*     Results from last 7 days   Lab Units  04/15/24  0703 04/14/24  0639 04/13/24  1534 04/13/24 0445   SODIUM mmol/L 139 144 145 147*   POTASSIUM mmol/L 4.2 4.0 4.0 4.1   CHLORIDE mmol/L 105 109* 111* 111*   CO2 mmol/L 24.5 25.3 26.0 23.4   BUN mg/dL 30* 36* 42* 45*   CREATININE mg/dL 0.91 0.92 1.01 1.02   GLUCOSE mg/dL 146* 130* 125* 142*   EGFR mL/min/1.73 86.8 85.7 76.6 75.7     Results from last 7 days   Lab Units 04/15/24  0703 04/14/24 0639 04/11/24 0453   ALBUMIN g/dL 3.3* 3.1* 3.4*   BILIRUBIN mg/dL 0.3 0.3  --    ALK PHOS U/L 103 94  --    AST (SGOT) U/L 104* 131*  --    ALT (SGPT) U/L 62* 71*  --      Results from last 7 days   Lab Units 04/15/24  0703 04/14/24  0639 04/13/24  1534 04/13/24 0445 04/11/24  0453 04/10/24  0704   CALCIUM mg/dL 8.9 8.7 8.7 9.1 9.1 8.8   ALBUMIN g/dL 3.3* 3.1*  --   --  3.4*  --    MAGNESIUM mg/dL  --  2.5* 2.6*  --   --  2.9*   PHOSPHORUS mg/dL  --  2.6  --   --  3.2  --        Glucose   Date/Time Value Ref Range Status   04/16/2024 1116 139 (H) 70 - 130 mg/dL Final   04/16/2024 0704 120 70 - 130 mg/dL Final   04/16/2024 0636 110 70 - 130 mg/dL Final   04/16/2024 0031 147 (H) 70 - 130 mg/dL Final   04/15/2024 1847 117 70 - 130 mg/dL Final   04/15/2024 1145 138 (H) 70 - 130 mg/dL Final   04/15/2024 0616 120 70 - 130 mg/dL Final       No radiology results for the last day    I have personally reviewed all medications:  Scheduled Medications  [Held by provider] acetaminophen, 650 mg, Nasogastric, TID  amLODIPine, 5 mg, Per PEG Tube, Q24H  aspirin, 81 mg, Nasogastric, Daily  atorvastatin, 40 mg, Oral, Nightly  clopidogrel, 75 mg, Nasogastric, Daily  gabapentin, 200 mg, Nasogastric, TID  insulin regular, 2-7 Units, Subcutaneous, Q6H  metoprolol tartrate, 50 mg, Oral, Q12H  pantoprazole, 40 mg, Intravenous, Daily  polyethylene Glycol 400, 1 drop, Both Eyes, Q8H  sodium chloride, 500 mL, Intravenous, Once  sodium chloride, 10 mL, Intravenous, Q12H    Infusions   Diet  NPO Diet NPO Type: Strict NPO    I have personally  reviewed:  [x]  Laboratory   [x]  Microbiology   [x]  Radiology   [x]  EKG/Telemetry  [x]  Cardiology/Vascular   []  Pathology    []  Records       Assessment/Plan     Active Hospital Problems    Diagnosis  POA    **Acute CVA (cerebrovascular accident) [I63.9]  Yes    Elevated LFTs [R79.89]  Unknown    Fatty liver disease, nonalcoholic [K76.0]  Unknown    Right-sided extracranial carotid artery stenosis [I65.21]  Yes    Coffee ground emesis [K92.0]  Unknown    Gastroesophageal reflux disease [K21.9]  Unknown    Cerebrovascular accident (CVA) [I63.9]  Unknown    Dysphagia [R13.10]  Unknown      Resolved Hospital Problems   No resolved problems to display.        Mr. Negron is a 79 y.o. who was admitted with MCA syndrome with acute stroke and was s/p TPA and underwent endovascular intervention with Dr. Rubio who placed a right carotid stent. He was admitted to the ICU. His stay had been complicated by dysphagia, pneumonia, coffee-ground emesis with EGD with distal esophageal ulcers, gross hematuria. He was in the ICU but now transferred out     Acute multi vascular territory infarcts s/p IV TNK and MT with right ICA stent  Left-sided hemiplegia  dysphagia  Continue stroke care as directed by Neurology. On ASA, plavix  and statin.   Statin therapy reduced to 40 mg from 80 mg with elevated LFTs.    S/p  PEG 4/12.  Remain strict NPO.     Complains of Left shoulder pain likely positional with hemiplegia, add sling  Awaiting placement to SNF. BAR declined.       Hematemesis in ICU.  (Resolved) EGD 4/3/24.   Esophageal ulcers with no bleeding and no active bleeding.  No biopsy obtained d/t  anticoagulation medication.  GI recommending PPI and carafate,  H/H stable.     Transaminitis    CT of abd non contrast on admit showed normal liver, gallbladder, bile duct.    Liver US  shows very mild fatty liver disease but otherwise unremarkable.    He denies ETOH use.      PNA (resolved)- klebsiella pna treated with ceftriaxone  which was completed on 4/13. High risk for aspiration. Monitor. He remains NPO.   Currently on RA   Keep sats > equal 94%.   Incentive spirometer q 2 hours while awake     Hematuria (resolved) with Urology following and had  three way catheter with irrigation. Plans for voiding try per urology prior to DC. Will attempt tmrw prior to dc to rehab      NSVT-    improved. Cardiology signed off. DC with tele monitor continue   May be related to PNA, dehydration, pain,  as well as stroke. He is tolerating   metoprolol.     Leukocytosis noted. Trending down. Labs today pending, monitor. likely reactive from PEG placement 4/12.  Monitor. He is afebrile. Push pulmonary toilet with IS.         I discussed the patients findings and my recommendations with patient, daughter, son, Dr. Taylor and nursing staff.     VTE Prophylaxis - SCDs.        Dispo-  subacute rehab when arranged. CCP working with family.           ESCOBAR Jean  [facility]  04/16/24  12:35 EDT

## 2024-04-16 NOTE — PLAN OF CARE
Goal Outcome Evaluation:   VSS throughout shift, Pt is A&Ox3-4 c/ confusion at baseline, interpretor used throughout shift as directed. Pt awake most of the day, and talking randomly and with family at bedside. No acute changes noted this shift. G-tube intact, free from redness and drainage, split gauze intact, Abd  binder in place. CBI cont as directed, pt tolerating well, msg sent to urology r/t cath d/c orders prior to d/c to another facility; d/c pending at this time. IV to RUE intact, patent and free from drainage and redness. Call light and personal items in reach.

## 2024-04-16 NOTE — PLAN OF CARE
Goal Outcome Evaluation:  Plan of Care Reviewed With: patient, family           Outcome Evaluation: Pt seen for OT session this AM. Worked on sitting balance and visually scanning to the L while seated EOB. His sitting balance initially was CGA-min A and then pt presented with strong L lateral lean as he became fatigued. He stood with Ax2 for 3 standing reps.Required more assist with each stand as he fatigued. Pt reported pain in LUE shoulder with STS attempts. OT to discuss with nsg to order sling only for mobility when practicing STS to provide support and decrease pain in shoulder. Increased time spent discussing rehab with daughter. Pt currently is max/dep A with LBD and toileting tasks. OT rec IPR at CT.      Anticipated Discharge Disposition (OT): inpatient rehabilitation facility

## 2024-04-17 ENCOUNTER — APPOINTMENT (OUTPATIENT)
Dept: CARDIOLOGY | Facility: HOSPITAL | Age: 79
End: 2024-04-17
Payer: MEDICAID

## 2024-04-17 VITALS
OXYGEN SATURATION: 96 % | SYSTOLIC BLOOD PRESSURE: 167 MMHG | BODY MASS INDEX: 24.26 KG/M2 | DIASTOLIC BLOOD PRESSURE: 77 MMHG | WEIGHT: 173.28 LBS | HEART RATE: 73 BPM | RESPIRATION RATE: 18 BRPM | HEIGHT: 71 IN | TEMPERATURE: 97.2 F

## 2024-04-17 PROBLEM — R33.9 URINARY RETENTION: Status: ACTIVE | Noted: 2024-04-17

## 2024-04-17 PROBLEM — Z93.1 PEG (PERCUTANEOUS ENDOSCOPIC GASTROSTOMY) STATUS: Status: ACTIVE | Noted: 2024-04-17

## 2024-04-17 PROBLEM — K92.0 COFFEE GROUND EMESIS: Status: RESOLVED | Noted: 2024-04-01 | Resolved: 2024-04-17

## 2024-04-17 LAB
ANION GAP SERPL CALCULATED.3IONS-SCNC: 10.9 MMOL/L (ref 5–15)
BUN SERPL-MCNC: 33 MG/DL (ref 8–23)
BUN/CREAT SERPL: 41.3 (ref 7–25)
CALCIUM SPEC-SCNC: 9.2 MG/DL (ref 8.6–10.5)
CHLORIDE SERPL-SCNC: 103 MMOL/L (ref 98–107)
CO2 SERPL-SCNC: 23.1 MMOL/L (ref 22–29)
CREAT SERPL-MCNC: 0.8 MG/DL (ref 0.76–1.27)
DEPRECATED RDW RBC AUTO: 43.2 FL (ref 37–54)
EGFRCR SERPLBLD CKD-EPI 2021: 91.2 ML/MIN/1.73
ERYTHROCYTE [DISTWIDTH] IN BLOOD BY AUTOMATED COUNT: 13.3 % (ref 12.3–15.4)
GLUCOSE BLDC GLUCOMTR-MCNC: 129 MG/DL (ref 70–130)
GLUCOSE BLDC GLUCOMTR-MCNC: 130 MG/DL (ref 70–130)
GLUCOSE BLDC GLUCOMTR-MCNC: 135 MG/DL (ref 70–130)
GLUCOSE SERPL-MCNC: 133 MG/DL (ref 65–99)
HCT VFR BLD AUTO: 30.9 % (ref 37.5–51)
HGB BLD-MCNC: 10.1 G/DL (ref 13–17.7)
MCH RBC QN AUTO: 29.7 PG (ref 26.6–33)
MCHC RBC AUTO-ENTMCNC: 32.7 G/DL (ref 31.5–35.7)
MCV RBC AUTO: 90.9 FL (ref 79–97)
PLATELET # BLD AUTO: 731 10*3/MM3 (ref 140–450)
PMV BLD AUTO: 9.2 FL (ref 6–12)
POTASSIUM SERPL-SCNC: 4.5 MMOL/L (ref 3.5–5.2)
RBC # BLD AUTO: 3.4 10*6/MM3 (ref 4.14–5.8)
SODIUM SERPL-SCNC: 137 MMOL/L (ref 136–145)
WBC NRBC COR # BLD AUTO: 12.07 10*3/MM3 (ref 3.4–10.8)

## 2024-04-17 PROCEDURE — 93246 EXT ECG>7D<15D RECORDING: CPT

## 2024-04-17 PROCEDURE — 80048 BASIC METABOLIC PNL TOTAL CA: CPT | Performed by: NURSE PRACTITIONER

## 2024-04-17 PROCEDURE — 97164 PT RE-EVAL EST PLAN CARE: CPT

## 2024-04-17 PROCEDURE — 85027 COMPLETE CBC AUTOMATED: CPT | Performed by: NURSE PRACTITIONER

## 2024-04-17 PROCEDURE — 97530 THERAPEUTIC ACTIVITIES: CPT

## 2024-04-17 PROCEDURE — 82948 REAGENT STRIP/BLOOD GLUCOSE: CPT

## 2024-04-17 RX ORDER — AMLODIPINE BESYLATE 10 MG/1
10 TABLET ORAL DAILY
Start: 2024-04-17

## 2024-04-17 RX ORDER — GABAPENTIN 100 MG/1
300 CAPSULE ORAL 3 TIMES DAILY
Qty: 27 CAPSULE | Refills: 0 | Status: SHIPPED | OUTPATIENT
Start: 2024-04-17 | End: 2024-04-20

## 2024-04-17 RX ORDER — METOPROLOL TARTRATE 50 MG/1
50 TABLET, FILM COATED ORAL EVERY 12 HOURS SCHEDULED
Start: 2024-04-17

## 2024-04-17 RX ORDER — CLOPIDOGREL BISULFATE 75 MG/1
75 TABLET ORAL DAILY
Start: 2024-04-18

## 2024-04-17 RX ORDER — AMOXICILLIN 250 MG
2 CAPSULE ORAL NIGHTLY PRN
Start: 2024-04-17

## 2024-04-17 RX ORDER — FERROUS SULFATE 300 MG/5ML
300 LIQUID (ML) ORAL DAILY
Start: 2024-04-17

## 2024-04-17 RX ORDER — ATORVASTATIN CALCIUM 40 MG/1
40 TABLET, FILM COATED ORAL NIGHTLY
Start: 2024-04-17

## 2024-04-17 RX ORDER — GABAPENTIN 100 MG/1
300 CAPSULE ORAL 3 TIMES DAILY
Start: 2024-04-17 | End: 2024-04-17

## 2024-04-17 RX ORDER — AMLODIPINE BESYLATE 5 MG/1
5 TABLET ORAL ONCE
Status: DISCONTINUED | OUTPATIENT
Start: 2024-04-17 | End: 2024-04-17 | Stop reason: HOSPADM

## 2024-04-17 RX ORDER — ACETAMINOPHEN 160 MG/5ML
650 SOLUTION ORAL EVERY 6 HOURS PRN
Start: 2024-04-17

## 2024-04-17 RX ORDER — POLYETHYLENE GLYCOL 3350 17 G/17G
17 POWDER, FOR SOLUTION ORAL DAILY PRN
Start: 2024-04-17

## 2024-04-17 RX ADMIN — CLOPIDOGREL BISULFATE 75 MG: 75 TABLET, FILM COATED ORAL at 08:32

## 2024-04-17 RX ADMIN — GABAPENTIN 200 MG: 100 CAPSULE ORAL at 08:32

## 2024-04-17 RX ADMIN — Medication 1 DROP: at 06:09

## 2024-04-17 RX ADMIN — ASPIRIN 81 MG: 81 TABLET, CHEWABLE ORAL at 08:32

## 2024-04-17 RX ADMIN — AMLODIPINE BESYLATE 5 MG: 5 TABLET ORAL at 08:32

## 2024-04-17 RX ADMIN — Medication 10 ML: at 08:33

## 2024-04-17 RX ADMIN — METOPROLOL TARTRATE 50 MG: 50 TABLET, FILM COATED ORAL at 08:32

## 2024-04-17 RX ADMIN — PANTOPRAZOLE SODIUM 40 MG: 40 INJECTION, POWDER, FOR SOLUTION INTRAVENOUS at 08:33

## 2024-04-17 NOTE — PLAN OF CARE
Goal Outcome Evaluation:  Plan of Care Reviewed With: patient, son        Progress: no change  Outcome Evaluation: Pt resting in bed, neck in rotation and sidebending to R, son present and helpful. Pt req mod A Of 2 to sit EOB using R UE to pull himself over. Pt dem poor sitting balance with jennifer L lean and req max A of 2 to maintain uppright posture. Pt abl eto sit briefly for 20 sssssec with CGA once weight shifted to R and WB thru R UE. Pt stood from raised EOB x 2 attempts and req max A Of 2 with L knee blocked. L LE unable to support weight and gives out with WBing. Pt unable to take any lateral steps. He was dependent to return supine and dependent to reposition in bed. Pt dem poor progress with mobility today. Cont PT to address functional deficits and prepare for d/c to facility as appropriate. All goals remain

## 2024-04-17 NOTE — DISCHARGE SUMMARY
Patient Name: Alex Negron  : 1945  MRN: [id number]    Date of Admission: 2024  Date of Discharge:  2024  Primary Care Physician: Jyothi Song PA-C      Chief Complaint:   Extremity Weakness      Discharge Diagnoses     Active Hospital Problems    Diagnosis  POA   • **Acute CVA (cerebrovascular accident) [I63.9]  Yes   • PEG (percutaneous endoscopic gastrostomy) status [Z93.1]  Not Applicable   • Urinary retention [R33.9]  Yes   • Elevated LFTs [R79.89]  Yes   • Fatty liver disease, nonalcoholic [K76.0]  Yes   • Right-sided extracranial carotid artery stenosis [I65.21]  Yes   • Gastroesophageal reflux disease [K21.9]  Yes   • Cerebrovascular accident (CVA) [I63.9]  Yes   • Dysphagia [R13.10]  Yes      Resolved Hospital Problems    Diagnosis Date Resolved POA   • Coffee ground emesis [K92.0] 2024 Yes        Hospital Course     Mr. Negron is a 79 y.o. male  admitted with MCA syndrome with acute stroke and was s/p TPA and underwent endovascular intervention with Dr. Rubio who placed a right carotid stent. He was admitted to the ICU then transferred to telemetry. He continues to have dysphagia and left hemiparesis.  Patient will continue on statin therapy at reduced dose due to elevated LFTs, continue aspirin and Plavix. He developed coffee-ground emesis and underwent an EGD on 4/3/2024 demonstrating esophageal ulcers but no biopsies were obtained due to anticoagulation.  He has not had no further bleeding and he will continue PPI and Carafate. His stay had been complicated by dysphagia and Klebsiella pneumonia that has been treated with Rocephin IV completed on .  Patient is to remain strict n.p.o. and has a PEG placed 2024.  Patient is also treated by urology for hematuria with urinary retention that resolved with three-way catheter and irrigation.  Voiding trial completed today but will need continued scan monitoring for possible intermittent clean catheterization.   Patient can follow-up in the outpatient setting [facility]  Cardiology has also been following for NSVT that is improved with metoprolol.  Blood pressure parameters recommended to be 130/80 or less which has been stable on amlodipine  5 mg daily.  BP is slightly elevated today, therefore, he has been resumed on his home dose of Amlodipine 10 mg but additional agents may need to be added as needed. He was also previously on lisinopril 10mg.  He has had leukocytosis since PEG placement that has been trending down slowly and suspected to be inflammatory in the absence of new infectious symptoms.  Patient has been experiencing discomfort in his left shoulder that is likely due to dependent position with left hemiparesis.  A LUE sling has been applied for comfort and support.  Encourage pulmonary toilet with incentive spirometer at rehab facility.       Day of Discharge     Subjective:   Patient is stable for discharge to rehab with continued monitoring of UOP and BP.     Physical Exam:  Temp:  [97.2 °F (36.2 °C)-98.4 °F (36.9 °C)] 97.2 °F (36.2 °C)  Heart Rate:  [65-80] 73  Resp:  [16-18] 18  BP: (132-174)/(61-87) 167/77  Body mass index is 24.17 kg/m².  Physical Exam  Vitals and nursing note reviewed.   Constitutional:       General: He is not in acute distress.     Appearance: He is well-developed. He is ill-appearing (chronic NAD). He is not toxic-appearing.   HENT:      Head: Normocephalic and atraumatic.   Eyes:      Conjunctiva/sclera: Conjunctivae normal.   Neck:      Trachea: No tracheal deviation.   Cardiovascular:      Rate and Rhythm: Normal rate and regular rhythm.   Pulmonary:      Effort: Pulmonary effort is normal. No respiratory distress.      Breath sounds: Normal breath sounds.   Abdominal:      General: Bowel sounds are normal. There is no distension.      Palpations: Abdomen is soft.      Tenderness: There is no abdominal tenderness. There is no guarding.      Comments: PEG in place    Musculoskeletal:         General: Normal range of motion.      Cervical back: Normal range of motion.   Skin:     General: Skin is warm and dry.   Neurological:      Mental Status: He is alert. Mental status is at baseline.   Psychiatric:         Judgment: Judgment normal.         Consultants     Consult Orders (all) (From admission, onward)       Start     Ordered    04/14/24 2126  Inpatient Case Management  Consult  Once        Provider:  (Not yet assigned)    04/14/24 2127 04/13/24 1457  Inpatient Cardiology Consult  Once        Specialty:  Cardiology  Provider:  Johnny Rubi MD    04/13/24 1456    04/11/24 1109  Inpatient General Surgery Consult  Once        Specialty:  General Surgery  Provider:  Dimas Brand MD    04/11/24 1108    04/10/24 0908  Inpatient Hospitalist Consult  Once        Specialty:  Hospitalist  Provider:  Lonnie Lugo MD    04/10/24 0907    04/04/24 1152  Inpatient Urology Consult  Once        Specialty:  Urology  Provider:  Robert Haque MD    04/04/24 1153    04/03/24 0644  Inpatient Gastroenterology Consult  Once        Specialty:  Gastroenterology  Provider:  Darnell Desai MD    04/03/24 0643    04/03/24 0643  Inpatient Gastroenterology Consult  Once        Specialty:  Gastroenterology  Provider:  Darnell Desai MD    04/03/24 0643    04/02/24 1343  Inpatient Consult to Nutrition Services  Once        Provider:  (Not yet assigned)    04/02/24 1343    04/02/24 0242  Notify Stroke Coordinator  Once        Provider:  (Not yet assigned)    04/02/24 0244    04/02/24 0242  Inpatient Rehab Admission Consult  Once        Provider:  (Not yet assigned)    04/02/24 0244    04/02/24 0242  Inpatient Case Management  Consult  Once        Provider:  (Not yet assigned)    04/02/24 0244    04/02/24 0242  Inpatient Diabetes Educator Consult  Once,   Status:  Canceled        Provider:  (Not yet assigned)    04/02/24 0244    04/02/24  0242  Inpatient Neurology Consult Stroke  Once        Specialty:  Neurology  Provider:  Emil Faith MD    04/02/24 0244 04/01/24 2234  Pulmonology (on-call MD unless specified)  Once        Specialty:  Pulmonary Disease  Provider:  Shiv Felipe MD    04/01/24 2233 04/01/24 2204  Inpatient Neurology Consult Stroke  Once        Specialty:  Neurology  Provider:  Emil Faith MD    04/01/24 2203 04/01/24 2204  Inpatient Neurology Consult Stroke  Once        Specialty:  Neurology  Provider:  Emil Faith MD    04/01/24 2203                  Procedures     ESOPHAGOGASTRODUODENOSCOPY WITH PERCUTANEOUS ENDOSCOPIC GASTROSTOMY TUBE INSERTION    Imaging Results (All)       Procedure Component Value Units Date/Time    US Liver [[id number]] Collected: 04/14/24 1840     Updated: 04/15/24 1436    Narrative:      RIGHT UPPER QUADRANT ULTRASOUND     HISTORY: 79-year-old male with abnormal LFTs.     TECHNIQUE: Real-time ultrasound of the right upper quadrant was  performed. Confirmatory images were obtained.     FINDINGS:  1. Gallbladder appears unremarkable unremarkable without stones or  sludge. There is no gallbladder wall thickening. There is no intra or  extrahepatic biliary dilatation.     2. The liver appears unremarkable other than possibly very mild fatty  infiltration. The right kidney as visualized appears unremarkable.  Pancreatic body appears unremarkable. Pancreatic head and tail are not  seen.           This report was finalized on 4/15/2024 2:33 PM by Dr. Lindy Liriano M.D on  Workstation: [id number]       SLP FEES - Fiberoptic Endo Eval Swallow [[id number]] Resulted: 04/11/24 1541     Updated: 04/11/24 1541    Narrative:      This procedure was auto-finalized with no dictation required.    XR Chest 1 View [[id number]] Collected: 04/10/24 1304     Updated: 04/10/24 1308    Narrative:      XR CHEST 1 VW-4/10/2024     HISTORY: Follow-up edema. Possible effusion.      Heart size is within normal limits. There is increased density in the  right lung base consistent with atelectasis and/or pneumonia. The right  base has cleared somewhat since the 4/8/2024 study. Left lung appears  relatively clear. NG tube courses into the stomach. No pneumothorax is  seen.        This report was finalized on 4/10/2024 1:05 PM by Dr. Jerry Mckinnon M.D on Workstation: [id number]       XR Chest 1 View [[id number]] Collected: 04/08/24 1017     Updated: 04/08/24 1424    Narrative:      XR CHEST 1 VW-4/8/2024     HISTORY: Follow-up edema. Effusion.     Heart size is mildly enlarged. There is haziness of the right base  likely related to pleural fluid atelectasis, pneumonia and/or atypical  pulmonary edema. There are also some increased density in the left base  obscuring the left hemidiaphragm which also may be related to pleural  effusion and atelectasis and/or pneumonia.     NG tube courses into the stomach. It appears to curl in the stomach with  its tip overlying the left upper quadrant probably in the body of the  stomach similar to the 4/6/2024 study.       Impression:      1. Mild cardiomegaly.  2. Increased density in the bilateral lower lungs/lung bases as  described. Overall the chest appears similar to the 4/6/2024 study.        This report was finalized on 4/8/2024 2:19 PM by Dr. Jerry Mckinnon M.D on Workstation: [id number]       XR Chest 1 View [[id number]] Collected: 04/07/24 0055     Updated: 04/07/24 0101    Narrative:      Portable chest radiograph     HISTORY: Worsening oxygenation     TECHNIQUE: Single AP portable radiograph of the chest     COMPARISON: Chest radiograph 4/6/2024 at 3:47 a.m.       Impression:      FINDINGS AND IMPRESSION:  An enteric tube courses below the mid diaphragm with tip terminating  over the left upper quadrant.     Lungs are hypoinflated. Bibasilar pulmonary pacification is present,  left greater than right, which has worsened since chest  radiograph  performed earlier today. Suggestion of at least a small left pleural  effusion. There is also pulmonary vascular congestion. Constellation of  findings are concerning for worsening asymmetric pulmonary edema versus  multifocal pneumonia in the appropriate clinical context and correlation  with patient history is recommended with follow-up chest CT if  clinically indicated. No pneumothorax is seen. Cardiac silhouette is  accentuated by low lung volumes and patient rotation.     This report was finalized on 4/7/2024 12:58 AM by Dr. John Coley M.D  on Workstation: [id number]       XR Chest 1 View [[id number]] Collected: 04/06/24 0638     Updated: 04/06/24 0656    Narrative:      XR CHEST 1 VW-     HISTORY: Male who is 79 years-old, ventilator management     TECHNIQUE: Frontal views of the chest     COMPARISON: 4/5/2024     FINDINGS: NG tube appear stable. The heart appears mildly enlarged.  Pulmonary vasculature appears congested. Bilateral pulmonary opacities  do not appear significantly changed. No large pleural effusion, or  pneumothorax. No acute osseous process.       Impression:      No significant change.     This report was finalized on 4/6/2024 6:53 AM by Dr. Prince So M.D on Workstation: [id number]       XR Chest 1 View [[id number]] Collected: 04/06/24 0357     Updated: 04/06/24 0403    Narrative:      XR CHEST 1 VW-     HISTORY: Increased oxygen demands.     COMPARISON:  Chest radiograph 4/5/2024 at 3:53 a.m.     FINDINGS:    2 views of the chest were obtained.     Support Devices: There is an enteric tube with the tip projecting over  the left upper quadrant, similar to prior. The previously noted  endotracheal tube has been removed.  Cardiac Silhouette/Mediastinum/Jacqui: The cardiac silhouette is upper  normal to mildly enlarged. The aorta is tortuous. There is calcific  aortic atherosclerosis.  Lungs/Pleural Spaces: There are mild basilar airspace opacities, right  greater than  left, overall slightly progressed from earlier same date  chest radiograph. There are small bilateral pleural effusions, right  greater than left, overall similar.   Chest Wall/Diaphragm/Upper Abdomen:  The visualized osseous structures  are unchanged.        This report was finalized on 4/6/2024 4:00 AM by Dr. Taya Aranda M.D  on Workstation: [id number]       CT Abdomen Pelvis Without Contrast [[id number]] Collected: 04/05/24 1319     Updated: 04/05/24 1334    Narrative:      CT ABDOMEN PELVIS WO CONTRAST-     Date of Exam: 4/5/2024 12:54 PM     Indication: Hematuria, gross/macroscopic. Hematemesis. Gastro-esophageal  reflux disease without esophagitis.     Comparison Exams: Chest radiographs 4/5/2024, 4/4/2024, and 4/3/2024.     Technique: Multiple contiguous axial images were acquired through the  abdomen and pelvis without the intravenous administration of contrast.  Reformatted coronal and sagittal sequences were also reviewed. Radiation  dose reduction techniques were utilized, including automated exposure  control and exposure modulation based on body size.     FINDINGS:  Motion artifact mildly limits evaluation.     Patchy groundglass opacities, volume loss, interstitial thickening, and  consolidation in the base of each lower lobe, right greater than left,  with a trace right pleural effusion. Suspected multifocal bronchial  plugging in the base of each lower lobe. Mild to moderate calcified  coronary artery disease. Relative hyperdensity of the interventricular  septum suggests anemia.     The liver, gallbladder, spleen, pancreas, adrenal glands, and right  kidney are unremarkable in limited noncontrast CT appearance.  Incompletely evaluated likely benign 1 cm left renal cyst with scarring  in the posterior upper pole of the left kidney. Murillo catheter position  within the urinary bladder. Air in the urinary bladder may be  iatrogenic. Dependent high density contents in the base of the urinary  bladder  measuring up to 8.5 cm x 5 cm in axial dimensions, probable  hemorrhage. Small bilateral hydroceles.     Enteric tube terminating at the ligament of Treitz. Small hiatal hernia.  Mild colonic stool. No bowel obstruction or significant bowel wall  thickening. The appendix is normal.     No free fluid in the abdomen or pelvis. No free intraperitoneal air. No  pathologically enlarged lymph nodes. Moderate calcified atherosclerotic  disease in the abdominal aorta and its branches without aneurysm.     Mild diffuse anasarca. Mild lumbar dextroscoliosis with mild to moderate  multilevel lumbar spondylosis and likely chronic/degenerative grade 1  retrolisthesis of L1 on L2 and L2 on L3 and anterolisthesis of L5 on S1.  The right upper extremity is partially included in the field-of-view but  not adequately evaluated. No acute osseous abnormality or concerning  osseous lesion.       Impression:         1. Heterogeneous high attenuation contents in the dependent base of the  urinary bladder, likely hemorrhage. Air in the urinary bladder may be  iatrogenic. Correlate with urinalysis and possible cystoscopy.  2. Incomplete evaluated but simple appearing 1 cm left renal cyst with  scarring in the posterior left kidney. No renal or ureteral stone is  identified. No hydronephrosis or hydroureter.  3. Small hiatal hernia.  4. Mild diffuse anasarca.  5. Multifocal bibasilar opacities, right greater than left, likely  atelectasis and superimposed pneumonia with multifocal bronchial  plugging in the base of each lower lobe.     This report was finalized on 4/5/2024 1:31 PM by Parth Wasserman MD on  Workstation: [id number]       XR Chest 1 View [[id number]] Collected: 04/05/24 0418     Updated: 04/05/24 0422    Narrative:      SINGLE VIEW OF THE CHEST     HISTORY: Stroke     COMPARISON: April 4, 2024     FINDINGS:  Tubes and lines are stable position. Cardiomegaly is noted. There is  vascular congestion. There is bibasilar atelectasis.  There are bilateral  effusions. No pneumothorax is seen.       Impression:      No significant interval change.     This report was finalized on 4/5/2024 4:18 AM by Dr. Kristal Sanford M.D on Workstation: [id number]       XR Chest 1 View [[id number]] Collected: 04/04/24 0527     Updated: 04/04/24 0531    Narrative:      SINGLE VIEW OF THE CHEST     HISTORY: Stroke     COMPARISON: April 30, 2024     FINDINGS:  Tubes and lines are stable in position. There is cardiomegaly. There is  vascular congestion. Bibasilar consolidation is unchanged. There is a  left pleural effusion. No pneumothorax is seen.    Impression:      No significant interval change.     This report was finalized on 4/4/2024 5:28 AM by Dr. Kristal Sanford M.D on Workstation: [id number]       XR Chest 1 View [[id number]] Collected: 04/03/24 1422     Updated: 04/03/24 1427    Narrative:      XR CHEST 1 VW-     HISTORY: Male who is 79 years-old, endotracheal intubation     TECHNIQUE: Frontal view of the chest     COMPARISON:  4 /1/2024     FINDINGS: Endotracheal tube tip is 3.7 cm above the pollo. NG tube  extends to the level of mid stomach. The heart size is normal. Pulmonary  vasculature is congested. Bibasilar atelectasis is evident with hazy  opacity at the right midlung that may be edema or pneumonia, follow-up  advised. There may be small pleural effusions. No pneumothorax. No acute  osseous process.       Impression:      As described.     This report was finalized on 4/3/2024 2:24 PM by Dr. Prince So M.D on Workstation: [id number]       [facility] Thromb Prim Nonc Art Ini [[id number]] Collected: 04/02/24 1200     Updated: 04/03/24 1155    Narrative:      CEREBRAL ARTERIOGRAM WITH MECHANICAL THROMBECTOMY AND RIGHT ICA ORIGIN  STENT PLACEMENT UNDER DISTAL PROTECTION performed on 4/2/2024.     CLINICAL HISTORY: 79-year-old male with history of hypertension who  presented to the emergency room with acute left-sided weakness  and  slurred speech after a witnessed onset. The patient received an acute  stroke work-up with additional tenecteplase administration and now sent  for endovascular treatment with an LVO plus right carotid occlusion on  CTA imaging.      TECHNIQUE: After obtaining emergency consent, the patient was placed in  a supine position on the angiographic table and the right groin was then  prepped and draped in usual sterile fashion with ChloraPrep soap. Under  ultrasound guidance with permanent images recorded, a 4 Hungarian  micro-puncture set was used to access the right common femoral artery  through a dermatotomy. Through this access an 8 Hungarian sheath was  positioned within the right groin. Over an 035 Glidewire, a 6 Hungarian  vertebral artery catheter was placed coaxially into a BMX 96 guiding  sheath and together these were placed into the circulation and the  following vessels were then selectively catheterized:     1. Right common carotid artery with digital subtraction imaging of the  cervical and intracranial runoff after contrast injection.     Upon completion of the diagnostic portion of the exam, the endovascular  procedure was performed as described below. At the conclusion of the  entire procedure, the catheters and sheath were removed with placement  of an 8 Hungarian Angio-Seal. Once hemostasis was achieved the appropriate  dressing was applied. No immediate postprocedural complications were  encountered. General endotracheal anesthesia was provided by the  anesthesia department. Approximately 87.6 minutes of fluoroscopy time  were employed delivering an AK of 3323.85 mGy. 164 cc of Visipaque 320  were injected. Maximal sterile barrier technique was employed throughout  the entire procedure according to current guidelines.     Surgeon: Dr. Dago Rubio.     FINDINGS: The right common carotid artery injection demonstrates a  patent bifurcation occurring at the C2-3 level, but subsequent occlusion  of the  internal carotid artery just beyond. The external carotid artery  remains patent.     ENDOVASCULAR PROCEDURES:     Right ICA, M1 and A2 Mechanical Thrombectomy - Upon completion of the  diagnostic portion of the exam and with the BMX 96 guiding sheath in the  cervical right CCA, a combination of a 6 Mongolian Zoom 71 intermediate  reperfusion catheter and coaxially placed Zoom 35 reperfusion  microcatheter were introduced into the circulation over an Waqas 24  microguidewire through the guiding sheath. This was positioned through  the occlusion under roadmap conditions with the distal tip positioned in  the distal cervical and petrous carotid junction. Digital subtraction  imaging showed patency of the intracranial ICA. The M1 and A1 segment  origins are patent but subsequent occlusion of each is seen. The distal  tip of the Zoom 71 intermediate reperfusion catheter was positioned in  the M1 occlusion where a suction thrombectomy was performed by applying  suction using the Penumbra pump connected to the guiding sheath as well  as a 60 cc syringe attached to the Zoom 71 intermediate reperfusion  catheter. A clot aspiration was performed with a large amount of  thrombus removed. Base catheter angiography was then subsequently  performed showing restored flow in the MCA territory, but continued  occlusion of the A1 segment. The internal carotid origin was now patent  but showed multiple areas of proximal stenosis measuring 85% by NASCET  criteria at its narrowest segment. Carotid stent placement was then  performed as described below. After endovascular repair of the right ICA  origin, the distal right PCA and distal right A1 segment demonstrated  persistent occlusion. Over the Waqas 24 microguidewire, the Zoom 35  reperfusion microcatheter was positioned in the distal right posterior  cerebral artery through the posterior communicating artery and a suction  thrombectomy was performed with aspiration again  applied removing the  occlusion in the posterior cerebral artery. The Zoom 71 intermediate  reperfusion catheter was then exchanged for a Zoom 55 intermediate  reperfusion catheter which together with the Zoom 35 reperfusion  microcatheter were positioned in the right A1 segment under roadmap  conditions. With the Zoom 35 reperfusion microcatheter subsequently  positioned in the A2 runoff, a suction thrombectomy was again performed  with some reperfusion of the A2 runoff obtained. This was repeated with  the Zoom 35 reperfusion microcatheter and final base catheter  angiography showed TICI 2c flow and no evidence of any contrast  extravasation or hemorrhage.      Right ICA Origin Carotid Stent Placement under Distal Protection - Upon  completion of the initial MCA revascularization, an underlying  high-grade stenosis was identified measuring 85% by NASCET criteria. A 5  mm SpiderFX distal protection device was then placed after traversing  the stenosis with a 014 BMW exchange length microguidewire. This was  positioned in the distal ICA and subsequently deployed as the distal  protection device. Predilatation was then achieved with a 4 x 20 mm  Viatrack 14+ angioplasty balloon. An 8-6 x 40 mm Xact stent was then  deployed. Post dilatation was required secondary to a residual waist  within the midportion of the stent and this was achieved with a 6 x 20  mm Viatrack 14+ angioplasty balloon. No residual waist or stenosis was  then seen. Marked improved flow intracranially is now present. IV  Integrilin was administered prior to stent placement with a drip  continued postoperatively. The patient is to receive aspirin and Plavix  when able with discontinuing the Integrilin drip. Dual antiplatelet  therapy will be required for 3 to 6 months with follow-up carotid  Doppler ultrasound at 6 months.       Impression:      Acute occlusion of the right internal carotid artery, right  A2 segment, right distal PCA and distal  right M1 segment with subsequent  successful mechanical thrombectomy in all 3 territories and TICI 2c flow  achieved. Underlying 85% stenosis of the right ICA origin treated with  successful carotid artery stent placement under distal protection with  no residual narrowing seen. Initiation of dual antiplatelet therapy  required with follow-up carotid Doppler sonography as described above.     All carotid stenosis measurements were made using NASCET criteria.        This report was finalized on 4/3/2024 11:52 AM by Dr. Dago Rubio M.D on Workstation: [id number]       MRI Brain With & Without Contrast [[id number]] Collected: 04/02/24 1147     Updated: 04/02/24 1252    Narrative:      MRI EXAMINATION OF THE BRAIN WITH AND WITHOUT CONTRAST     HISTORY: Stroke, follow up, bladder cancer.     COMPARISON: CT head 04/01/2024.     TECHNIQUE: A MRI examination of the brain was performed utilizing  sagittal T1, axial diffusion, T1, T2, T2 FLAIR, gradient echo T2 as well  as sagittal, axial and coronal T1 postcontrast weighted sequences.     FINDINGS: The diffusion sequence demonstrates multiple acute infarcts in  multiple vascular distributions. The largest infarct involves the  parietal occipital region on the left posterolaterally and posteriorly  measuring 7 cm in size extending to the posterior aspect of the temporal  lobe. An acute infarct involving the head of the caudate nucleus on the  right is appreciated measuring 12 mm in size. An acute infarct is  appreciated involving the medial aspect of the thalamus measuring 1.5 cm  in size. Multiple small acute infarcts are appreciated involving the  right cerebellar hemisphere posteriorly and to a lesser extent  superiorly. A cluster of small infarcts is appreciated involving  approximately 1.5 cm in size. There is an acute infarct measuring 3 mm  in size involving the medial aspect of the left cerebellar hemisphere.  An acute infarct involving the posterior and medial  aspects of the left  temporal lobe is appreciated measuring 1.3 cm in size.     There is expected flow void in the basilar artery and in the distal  aspect of the internal carotid arteries bilaterally on the axial T2  sequence. After contrast administration, there was mild-to-moderate  enhancement involving the parietal occipital and occipital temporal  acute infarct as well as enhancement involving the head of the caudate  nucleus on the right. Mild enhancement involving the medial aspect of  the thalamus on the right is appreciated corresponding to the area of  acute infarction. Mild-to-moderate small vessel ischemic disease is  noted.       Impression:      Multiple acute infarcts are appreciated, the largest of  which involves the parietal occipital and occipital temporal region on  the right posterolaterally. Smaller acute infarcts are appreciated  involving the head of the caudate nucleus on the right involving the  medial aspect of the thalamus on the right. There is mild-to-moderate  enhancement related to the infarcts noted above. Smaller infarcts  without enhancement are appreciated involving the right cerebellar  hemisphere posteriorly, medial aspect of the left cerebellar hemisphere  and the posterior and medial aspects of the left temporal lobe.     This report was finalized on 4/2/2024 12:49 PM by Dr. Vivek Gudino M.D  on Workstation: [id number]       CT Angiogram Head w AI Analysis of LVO [[id number]] Collected: 04/01/24 2250     Updated: 04/02/24 0045    Narrative:      NONCONTRAST CRANIAL CT SCAN, CT ANGIOGRAM NECK, CT ANGIOGRAM HEAD,  CRANIAL CT PERFUSION.     HISTORY: Acute CVA,      COMPARISON: None..     TECHNIQUE:  Radiation dose reduction techniques were utilized, including  automated exposure control and exposure modulation based on body size.   Initially, a routine noncontrast cranial CT performed from the skull  base through the vertex region. After review, thin-section contrast  enhanced CT  angiogram images obtained from the aortic arch through the  calvarial vertex utilizing angiographic technique. Multi projection 3-D  MIP reformatted images were supplemented and reviewed. Subsequently, CT  perfusion imaging performed with ischemia view software.     FINDINGS CRANIAL CT: No acute hemorrhage or midline shift is  demonstrated..  Ventricular size and configuration is within normal  limits for age.. There is a hyperdense appearance to the right MCA,  concerning for thrombus. There is some subtle decreased attenuation  noted within the right cerebral hemisphere, as well as some minimal  effacement of the right sylvian fissure when compared to the  contralateral side. Evolving infarct is not excluded. Postcontrast  imaging does not show any evidence of venous occlusion. Mild hyperemia  is noted within the right MCA territory. Images are somewhat degraded by  motion artifact. Bone window images mucosal thickening within the  ethmoid sinuses.     Study was placed on line at 10:13 p.m. Preliminary interpretation  telephoned to extension [phone] during interpretation at 10:18 p.m.           CERVICAL CAROTID CT ANGIOGRAM:     FINDINGS: There is atherosclerotic involvement of the aortic arch. There  is normal arch anatomy. There is dilatation of the proximal descending  aorta, measuring up to 3.4 cm. Common carotid arteries are patent  bilaterally. 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 There is some plaque involving the proximal left  internal carotid artery. It does remain patent. Stenosis is  approximately 35% there is plaque noted at the origins of the vertebral  arteries bilaterally, right greater than left. No hemodynamically  significant stenosis involving the origin of the left vertebral artery  is seen. However, there is a severe stenosis involving the origin of the  right vertebral artery. Right  vertebral artery is dominant. Left  vertebral artery appears to occlude within the distal cervical region,  at the level of C2. Images through the lung apices demonstrate  background emphysematous changes. Thyroid gland appears mildly  heterogeneous. Trachea is within normal limits.              NASCET criteria utilized in stenosis measurements. stenosis mild, 0-49%.        CRANIAL CTA ANGIOGRAM:     FINDINGS:  There is brief opacification of the proximal intracranial  left vertebral artery. There is opacification of the distal intracranial  left vertebral artery. There is a markedly irregular appearance to the  distal right vertebral artery. There does appear to be an intimal flap,  and a possible pseudoaneurysm. This measures approximately 2.6 mm in  craniocaudal dimensions and 2.4 mm in AP dimensions. Basilar artery  remains patent. Posterior cerebral arteries are patent bilaterally.  There is an anterior communicating artery. Left middle cerebral artery  and both anterior cerebral arteries are patent.   There is faint  visualization of the right M1, although it appears to have thrombus  within it. The right M2 branches appear to be patent.                CT PERFUSION:     FINDINGS: There is an area of cerebral blood flow less than 30% within  the right MCA territory, measuring up to 55 cc. There is an area of Tmax  greater than 6 seconds also within the right MCA territory, measuring up  to 173 cc. Mismatch volume is 118 cc..     Study was placed on line at 10:26 p.m. Preliminary interpretation  telephoned to extension [phone] during interpretation at 10:47 p.m.     AI analysis of LVO was utilized.     Radiation dose reduction techniques were utilized, including automated  exposure control and exposure modulation based on body size.          Impression:      1. No acute intracranial hemorrhage. However, the patient has a  hyperdense right MCA.  2. Area of cerebral blood flow less than 30% within the right  MCA  territory measuring 55 cc, with corresponding Tmax greater than 6  seconds, measuring up to 173 cc.  3. 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 The  patient has some faint opacification of the right M1, although there is  thrombus identified within it. There is opacification of the right M2  branches.  4. Marked irregularity of the intracranial distal right vertebral  artery, which may reflect dissection, as well as a bulbous outpouching  which may represent pseudoaneurysm.        This report was finalized on 4/2/2024 12:42 AM by Dr. Kristal Sanford M.D on Workstation: [id number]       CT Angiogram Neck [[id number]] Collected: 04/01/24 2250     Updated: 04/02/24 0045    Narrative:      NONCONTRAST CRANIAL CT SCAN, CT ANGIOGRAM NECK, CT ANGIOGRAM HEAD,  CRANIAL CT PERFUSION.     HISTORY: Acute CVA,      COMPARISON: None..     TECHNIQUE:  Radiation dose reduction techniques were utilized, including  automated exposure control and exposure modulation based on body size.   Initially, a routine noncontrast cranial CT performed from the skull  base through the vertex region. After review, thin-section contrast  enhanced CT angiogram images obtained from the aortic arch through the  calvarial vertex utilizing angiographic technique. Multi projection 3-D  MIP reformatted images were supplemented and reviewed. Subsequently, CT  perfusion imaging performed with ischemia view software.     FINDINGS CRANIAL CT: No acute hemorrhage or midline shift is  demonstrated..  Ventricular size and configuration is within normal  limits for age.. There is a hyperdense appearance to the right MCA,  concerning for thrombus. There is some subtle decreased attenuation  noted within the right cerebral hemisphere, as well as some minimal  effacement of the right sylvian fissure when compared to the  contralateral side. Evolving infarct is not excluded. Postcontrast  imaging does not show any evidence of venous  occlusion. Mild hyperemia  is noted within the right MCA territory. Images are somewhat degraded by  motion artifact. Bone window images mucosal thickening within the  ethmoid sinuses.     Study was placed on line at 10:13 p.m. Preliminary interpretation  telephoned to extension [phone] during interpretation at 10:18 p.m.           CERVICAL CAROTID CT ANGIOGRAM:     FINDINGS: There is atherosclerotic involvement of the aortic arch. There  is normal arch anatomy. There is dilatation of the proximal descending  aorta, measuring up to 3.4 cm. Common carotid arteries are patent  bilaterally. 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 There is some plaque involving the proximal left  internal carotid artery. It does remain patent. Stenosis is  approximately 35% there is plaque noted at the origins of the vertebral  arteries bilaterally, right greater than left. No hemodynamically  significant stenosis involving the origin of the left vertebral artery  is seen. However, there is a severe stenosis involving the origin of the  right vertebral artery. Right vertebral artery is dominant. Left  vertebral artery appears to occlude within the distal cervical region,  at the level of C2. Images through the lung apices demonstrate  background emphysematous changes. Thyroid gland appears mildly  heterogeneous. Trachea is within normal limits.              NASCET criteria utilized in stenosis measurements. stenosis mild, 0-49%.        CRANIAL CTA ANGIOGRAM:     FINDINGS:  There is brief opacification of the proximal intracranial  left vertebral artery. There is opacification of the distal intracranial  left vertebral artery. There is a markedly irregular appearance to the  distal right vertebral artery. There does appear to be an intimal flap,  and a possible pseudoaneurysm. This measures approximately 2.6 mm in  craniocaudal dimensions  and 2.4 mm in AP dimensions. Basilar artery  remains patent. Posterior cerebral arteries are patent bilaterally.  There is an anterior communicating artery. Left middle cerebral artery  and both anterior cerebral arteries are patent.   There is faint  visualization of the right M1, although it appears to have thrombus  within it. The right M2 branches appear to be patent.                CT PERFUSION:     FINDINGS: There is an area of cerebral blood flow less than 30% within  the right MCA territory, measuring up to 55 cc. There is an area of Tmax  greater than 6 seconds also within the right MCA territory, measuring up  to 173 cc. Mismatch volume is 118 cc..     Study was placed on line at 10:26 p.m. Preliminary interpretation  telephoned to extension [phone] during interpretation at 10:47 p.m.     AI analysis of LVO was utilized.     Radiation dose reduction techniques were utilized, including automated  exposure control and exposure modulation based on body size.          Impression:      1. No acute intracranial hemorrhage. However, the patient has a  hyperdense right MCA.  2. Area of cerebral blood flow less than 30% within the right MCA  territory measuring 55 cc, with corresponding Tmax greater than 6  seconds, measuring up to 173 cc.  3. 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 The  patient has some faint opacification of the right M1, although there is  thrombus identified within it. There is opacification of the right M2  branches.  4. Marked irregularity of the intracranial distal right vertebral  artery, which may reflect dissection, as well as a bulbous outpouching  which may represent pseudoaneurysm.        This report was finalized on 4/2/2024 12:42 AM by Dr. Kristal Sanford M.D on Workstation: [id number]       CT CEREBRAL PERFUSION WITH & WITHOUT CONTRAST [[id number]] Collected: 04/01/24 2250     Updated: 04/02/24 0045    Narrative:      NONCONTRAST CRANIAL CT SCAN, CT ANGIOGRAM  NECK, CT ANGIOGRAM HEAD,  CRANIAL CT PERFUSION.     HISTORY: Acute CVA,      COMPARISON: None..     TECHNIQUE:  Radiation dose reduction techniques were utilized, including  automated exposure control and exposure modulation based on body size.   Initially, a routine noncontrast cranial CT performed from the skull  base through the vertex region. After review, thin-section contrast  enhanced CT angiogram images obtained from the aortic arch through the  calvarial vertex utilizing angiographic technique. Multi projection 3-D  MIP reformatted images were supplemented and reviewed. Subsequently, CT  perfusion imaging performed with ischemia view software.     FINDINGS CRANIAL CT: No acute hemorrhage or midline shift is  demonstrated..  Ventricular size and configuration is within normal  limits for age.. There is a hyperdense appearance to the right MCA,  concerning for thrombus. There is some subtle decreased attenuation  noted within the right cerebral hemisphere, as well as some minimal  effacement of the right sylvian fissure when compared to the  contralateral side. Evolving infarct is not excluded. Postcontrast  imaging does not show any evidence of venous occlusion. Mild hyperemia  is noted within the right MCA territory. Images are somewhat degraded by  motion artifact. Bone window images mucosal thickening within the  ethmoid sinuses.     Study was placed on line at 10:13 p.m. Preliminary interpretation  telephoned to extension [phone] during interpretation at 10:18 p.m.           CERVICAL CAROTID CT ANGIOGRAM:     FINDINGS: There is atherosclerotic involvement of the aortic arch. There  is normal arch anatomy. There is dilatation of the proximal descending  aorta, measuring up to 3.4 cm. Common carotid arteries are patent  bilaterally. 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 There is some plaque involving the  proximal left  internal carotid artery. It does remain patent. Stenosis is  approximately 35% there is plaque noted at the origins of the vertebral  arteries bilaterally, right greater than left. No hemodynamically  significant stenosis involving the origin of the left vertebral artery  is seen. However, there is a severe stenosis involving the origin of the  right vertebral artery. Right vertebral artery is dominant. Left  vertebral artery appears to occlude within the distal cervical region,  at the level of C2. Images through the lung apices demonstrate  background emphysematous changes. Thyroid gland appears mildly  heterogeneous. Trachea is within normal limits.              NASCET criteria utilized in stenosis measurements. stenosis mild, 0-49%.        CRANIAL CTA ANGIOGRAM:     FINDINGS:  There is brief opacification of the proximal intracranial  left vertebral artery. There is opacification of the distal intracranial  left vertebral artery. There is a markedly irregular appearance to the  distal right vertebral artery. There does appear to be an intimal flap,  and a possible pseudoaneurysm. This measures approximately 2.6 mm in  craniocaudal dimensions and 2.4 mm in AP dimensions. Basilar artery  remains patent. Posterior cerebral arteries are patent bilaterally.  There is an anterior communicating artery. Left middle cerebral artery  and both anterior cerebral arteries are patent.   There is faint  visualization of the right M1, although it appears to have thrombus  within it. The right M2 branches appear to be patent.                CT PERFUSION:     FINDINGS: There is an area of cerebral blood flow less than 30% within  the right MCA territory, measuring up to 55 cc. There is an area of Tmax  greater than 6 seconds also within the right MCA territory, measuring up  to 173 cc. Mismatch volume is 118 cc..     Study was placed on line at 10:26 p.m. Preliminary interpretation  telephoned to extension [phone]  during interpretation at 10:47 p.m.     AI analysis of LVO was utilized.     Radiation dose reduction techniques were utilized, including automated  exposure control and exposure modulation based on body size.          Impression:      1. No acute intracranial hemorrhage. However, the patient has a  hyperdense right MCA.  2. Area of cerebral blood flow less than 30% within the right MCA  territory measuring 55 cc, with corresponding Tmax greater than 6  seconds, measuring up to 173 cc.  3. 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 The  patient has some faint opacification of the right M1, although there is  thrombus identified within it. There is opacification of the right M2  branches.  4. Marked irregularity of the intracranial distal right vertebral  artery, which may reflect dissection, as well as a bulbous outpouching  which may represent pseudoaneurysm.        This report was finalized on 4/2/2024 12:42 AM by Dr. Kristal Sanford M.D on Workstation: [id number]       XR Chest 1 View [[id number]] Collected: 04/01/24 2254     Updated: 04/01/24 2258    Narrative:      SINGLE VIEW OF THE CHEST     HISTORY: Acute stroke protocol     COMPARISON: None available.     FINDINGS:  There is cardiomegaly. There is no vascular congestion. There is  calcification of the aorta. Bibasilar atelectasis versus scarring  suspected. No pneumothorax or large effusion is seen.       Impression:      Bibasilar atelectasis versus scarring.     This report was finalized on 4/1/2024 10:55 PM by Dr. Kristal Sanford M.D on Workstation: [id number]               Results for orders placed during the hospital encounter of 04/01/24    Adult Transthoracic Echo Complete W/ Cont if Necessary Per Protocol    Interpretation Summary  •  Left ventricular systolic function is normal. Left ventricular ejection fraction appears to be 56 - 60%.  •  Left ventricular diastolic function was normal.  •  Estimated right ventricular  "systolic pressure from tricuspid regurgitation is normal (<35 mmHg).    Pertinent Labs     Results from last 7 days   Lab Units 04/17/24  0456 04/15/24  0703 04/14/24  0639 04/13/24 0445   WBC 10*3/mm3 12.07* 15.07* 15.23* 18.27*   HEMOGLOBIN g/dL 10.1* 9.5* 9.5* 10.0*   PLATELETS 10*3/mm3 731* 773* 723* 759*     Results from last 7 days   Lab Units 04/17/24  0456 04/16/24  1222 04/15/24  0703 04/14/24  0639   SODIUM mmol/L 137 136 139 144   POTASSIUM mmol/L 4.5 5.1 4.2 4.0   CHLORIDE mmol/L 103 105 105 109*   CO2 mmol/L 23.1 20.7* 24.5 25.3   BUN mg/dL 33* 30* 30* 36*   CREATININE mg/dL 0.80 0.84 0.91 0.92   GLUCOSE mg/dL 133* 146* 146* 130*   EGFR mL/min/1.73 91.2 89.8 86.8 85.7     Results from last 7 days   Lab Units 04/15/24  0703 04/14/24  0639 04/11/24  0453   ALBUMIN g/dL 3.3* 3.1* 3.4*   BILIRUBIN mg/dL 0.3 0.3  --    ALK PHOS U/L 103 94  --    AST (SGOT) U/L 104* 131*  --    ALT (SGPT) U/L 62* 71*  --      Results from last 7 days   Lab Units 04/17/24  0456 04/16/24  1222 04/15/24  0703 04/14/24  0639 04/13/24  1534 04/13/24 0445 04/11/24  0453   CALCIUM mg/dL 9.2 8.9 8.9 8.7 8.7   < > 9.1   ALBUMIN g/dL  --   --  3.3* 3.1*  --   --  3.4*   MAGNESIUM mg/dL  --   --   --  2.5* 2.6*  --   --    PHOSPHORUS mg/dL  --   --   --  2.6  --   --  3.2    < > = values in this interval not displayed.               Invalid input(s): \"LDLCALC\"          Test Results Pending at Discharge       Discharge Details        Discharge Medications        New Medications        Instructions Start Date   acetaminophen 160 MG/5ML solution  Commonly known as: TYLENOL   650 mg, Per G Tube, Every 6 Hours PRN      atorvastatin 40 MG tablet  Commonly known as: LIPITOR   40 mg, Per G Tube, Nightly      clopidogrel 75 MG tablet  Commonly known as: PLAVIX   75 mg, Per G Tube, Daily   Start Date: April 18, 2024     Ferrous Sulfate 300 (60 Fe) MG/5ML solution  Replaces: ferrous sulfate 325 (65 FE) MG tablet   300 mg, Per G Tube, Daily    "   lansoprazole 3 mg/mL in sodium bicarbonate injection 8.4%   15 mg, Per G Tube, 2 Times Daily      metoprolol tartrate 50 MG tablet  Commonly known as: LOPRESSOR   50 mg, Per G Tube, Every 12 Hours Scheduled      polyethylene glycol 17 g packet  Commonly known as: MIRALAX   17 g, Oral, Daily PRN      polyethylene Glycol 400 1 % solution ophthalmic solution   1 drop, Both Eyes, Every 3 Hours PRN      sennosides-docusate 8.6-50 MG per tablet  Commonly known as: PERICOLACE   2 tablets, Per G Tube, Nightly PRN             Changes to Medications        Instructions Start Date   amLODIPine 10 MG tablet  Commonly known as: NORVASC  What changed: how to take this   10 mg, Per G Tube, Daily      gabapentin 100 MG capsule  Commonly known as: NEURONTIN  What changed:   medication strength  how to take this   300 mg, Per G Tube, 3 Times Daily             Stop These Medications      ferrous sulfate 325 (65 FE) MG tablet  Replaced by: Ferrous Sulfate 300 (60 Fe) MG/5ML solution     indapamide 1.25 MG tablet  Commonly known as: LOZOL     lisinopril 10 MG tablet  Commonly known as: PRINIVIL,ZESTRIL     rosuvastatin 20 MG tablet  Commonly known as: CRESTOR     tamsulosin 0.4 MG capsule 24 hr capsule  Commonly known as: FLOMAX              No Known Allergies    Discharge Disposition:  Skilled Nursing Facility (DC - External)SNF      Discharge Diet:  Diet Order   Procedures   • NPO Diet NPO Type: Strict NPO       Discharge Activity:  as tolerated      CODE STATUS:    Code Status and Medical Interventions:   Ordered at: 04/01/24 9987     Code Status (Patient has no pulse and is not breathing):    CPR (Attempt to Resuscitate)     Medical Interventions (Patient has pulse or is breathing):    Full Support       Future Appointments   Date Time Provider Department Center   5/10/2024  3:30 PM Saloni Weinberg APRN [facility]   9/3/2024 11:00 [facility]   9/10/2024 11:00 AM Dago Rubio MD  [facility]      Contact information for follow-up providers       Jyothi Song PA-C .    Specialty: Family Medicine  Contact information:  [address]  [address]  [facility] [address]  [phone]                       Contact information for after-discharge care       Destination       [facility] .    Service: Skilled Nursing  Contact information:  [address] [facility] [address] [phone]                                   Time Spent on Discharge:  Greater than 45 minutes      ESCOBAR Jean  [facility]  04/17/24  12:10 EDT

## 2024-04-17 NOTE — THERAPY PROGRESS REPORT/RE-CERT
Patient Name: Alex Negron  : 1945    MRN: [id number]                              Today's Date: 2024       Admit Date: 2024    Visit Dx:     ICD-10-CM ICD-9-CM   1. Cerebrovascular accident (CVA), unspecified mechanism  I63.9 434.91   2. Coffee ground emesis  K92.0 578.0   3. Gastroesophageal reflux disease, unspecified whether esophagitis present  K21.9 530.81   4. 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 Dysphagia, unspecified type  R13.10 787.20     Patient Active Problem List   Diagnosis    Acute CVA (cerebrovascular accident)    Right-sided extracranial carotid artery stenosis    Coffee ground emesis    Gastroesophageal reflux disease    Cerebrovascular accident (CVA)    Dysphagia    Elevated LFTs    Fatty liver disease, nonalcoholic     Past Medical History:   Diagnosis Date    Chronic back pain     neuropathy rolando legs    Hypertension      Past Surgical History:   Procedure Laterality Date    ENDOSCOPY N/A 4/3/2024    Procedure: ESOPHAGOGASTRODUODENOSCOPY AT BEDSIDE;  Surgeon: Redd Guidry MD;  Location: [facility] ENDOSCOPY;  Service: Gastroenterology;  Laterality: N/A;  PRE-  GI BLEED  POST- HIATAL HERNIA, DISTAL ESOPHAGITIS ULCERS, GASTRITIS, PYLOROPLASTY    ENDOSCOPY W/ PEG TUBE PLACEMENT N/A 2024    Procedure: ESOPHAGOGASTRODUODENOSCOPY WITH PERCUTANEOUS ENDOSCOPIC GASTROSTOMY TUBE INSERTION;  Surgeon: Dimas Brand MD;  Location: [facility] ENDOSCOPY;  Service: General;  Laterality: N/A;  Dysphagia      General Information       Row Name 24 1127          Physical Therapy Time and Intention    Document Type therapy note (daily note);progress note/recertification  -DJ     Mode of Treatment individual therapy;physical therapy  -DJ       Row Name 24 1127          General Information    Patient Profile Reviewed yes  -DJ     Existing Precautions/Restrictions fall  -DJ     Barriers to Rehab language barrier   -DJ       Row Name 04/17/24 1127          Cognition    Orientation Status (Cognition) unable/difficult to assess  -DJ       Row Name 04/17/24 1127          Safety Issues, Functional Mobility    Comment, Safety Issues/Impairments (Mobility) gt belt, nonskid socks  -DJ               User Key  (r) = Recorded By, (t) = Taken By, (c) = Cosigned By      Initials Name Provider Type    Magdalena Cintron, PT Physical Therapist                   Mobility       Row Name 04/17/24 1128          Bed Mobility    Bed Mobility supine-sit;sit-supine  -DJ     Supine-Sit Boyle (Bed Mobility) moderate assist (50% patient effort);2 person assist;nonverbal cues (demo/gesture);verbal cues  -DJ     Sit-Supine Boyle (Bed Mobility) dependent (less than 25% patient effort);2 person assist;nonverbal cues (demo/gesture)  -DJ     Assistive Device (Bed Mobility) bed rails;head of bed elevated;draw sheet  -DJ     Comment, (Bed Mobility) able to use R UE On bedrail to help roll over in bed  -DJ       Row Name 04/17/24 1128          Transfers    Comment, (Transfers) sit/stand from raised EOB x 2 attempts  -DJ       Row Name 04/17/24 1128          Bed-Chair Transfer    Bed-Chair Boyle (Transfers) unable to assess  -DJ       Row Name 04/17/24 1128          Sit-Stand Transfer    Sit-Stand Boyle (Transfers) maximum assist (25% patient effort);2 person assist;verbal cues  -DJ     Assistive Device (Sit-Stand Transfers) --  no AD  -DJ     Comment, (Sit-Stand Transfer) L knee blocked, L UE hangs to side; stood <20 sec  -DJ       Row Name 04/17/24 1128          Gait/Stairs (Locomotion)    Boyle Level (Gait) unable to assess  -DJ     Boyle Level (Stairs) unable to assess  -DJ     Comment, (Gait/Stairs) Pt stood EOB x 2 with max A of 2 and L knee blocked. Unable to take any lateral steps, L LE gives out with WB  -DJ               User Key  (r) = Recorded By, (t) = Taken By, (c) = Cosigned By      Initials Name Provider  Type    Magdalena Cintron PT Physical Therapist                   Obj/Interventions       Row Name 04/17/24 1132          Motor Skills    Motor Skills functional endurance  -DJ     Functional Endurance poor - fatigues quickly  -DJ     Therapeutic Exercise other (see comments)  L sh ROM  -DJ       Row Name 04/17/24 1132          Balance    Balance Assessment sitting static balance;standing static balance  -DJ     Static Sitting Balance maximum assist;2-person assist;verbal cues  -DJ     Static Standing Balance maximum assist;2-person assist;verbal cues  -DJ     Position/Device Used, Standing Balance supported  -DJ     Balance Interventions sitting;standing;sit to stand;supported;weight shifting activity  -DJ     Comment, Balance poor sitting and standing balance; heavy L Lean  -DJ               User Key  (r) = Recorded By, (t) = Taken By, (c) = Cosigned By      Initials Name Provider Type    Magdalena Cintron PT Physical Therapist                   Goals/Plan       Row Name 04/17/24 1139          Bed Mobility Goal 1 (PT)    Time Frame (Bed Mobility Goal 1, PT) 10 days  -DJ     Progress/Outcomes (Bed Mobility Goal 1, PT) progress slower than expected;goal ongoing  -DJ       Row Name 04/17/24 1139          Transfer Goal 1 (PT)    Time Frame (Transfer Goal 1, PT) 10 days  -DJ     Progress/Outcome (Transfer Goal 1, PT) progress slower than expected;goal ongoing  -DJ       Row Name 04/17/24 1139          Problem Specific Goal 1 (PT)    Progress/Outcome (Problem Specific Goal 1, PT) progress slower than expected;goal ongoing  -DJ               User Key  (r) = Recorded By, (t) = Taken By, (c) = Cosigned By      Initials Name Provider Type    Magdalena Cintron PT Physical Therapist                   Clinical Impression       Row Name 04/17/24 1133          Pain    Pretreatment Pain Rating 0/10 - no pain  -DJ       Row Name 04/17/24 1133          Plan of Care Review    Plan of Care Reviewed With patient;son  -KOLBY     Progress no  change  -DJ     Outcome Evaluation Pt resting in bed, neck in rotation and sidebending to R, son present and helpful. Pt req mod A Of 2 to sit EOB using R UE to pull himself over. Pt dem poor sitting balance with jennifer L lean and req max A of 2 to maintain uppright posture. Pt abl eto sit briefly for 20 sssssec with CGA once weight shifted to R and WB thru R UE. Pt stood from raised EOB x 2 attempts and req max A Of 2 with L knee blocked. L LE unable to support weight and gives out with WBing. Pt unable to take any lateral steps. He was dependent to return supine and dependent to reposition in bed. Pt dem poor progress with mobility today. Cont PT to address functional deficits and prepare for d/c to facility as appropriate  -DJ       Row Name 04/17/24 1133          Therapy Assessment/Plan (PT)    Patient/Family Therapy Goals Statement (PT) rehab  -DJ     Criteria for Skilled Interventions Met (PT) skilled treatment is necessary  -DJ       Row Name 04/17/24 1133          Vital Signs    O2 Delivery Pre Treatment room air  -DJ     O2 Delivery Intra Treatment room air  -DJ     O2 Delivery Post Treatment room air  -DJ     Pre Patient Position Supine  -DJ     Intra Patient Position Standing  -DJ     Post Patient Position Supine  -DJ       Row Name 04/17/24 1133          Positioning and Restraints    Pre-Treatment Position in bed  -DJ     Post Treatment Position bed  -DJ     In Bed notified nsg;supine;call light within reach;encouraged to call for assist;exit alarm on;with family/caregiver;heels elevated;LUE elevated  -DJ               User Key  (r) = Recorded By, (t) = Taken By, (c) = Cosigned By      Initials Name Provider Type    Magdalena Cintron, PT Physical Therapist                   Outcome Measures       Row Name 04/17/24 1139          How much help from another person do you currently need...    Turning from your back to your side while in flat bed without using bedrails? 2  -DJ     Moving from lying on back to  sitting on the side of a flat bed without bedrails? 2  -DJ     Moving to and from a bed to a chair (including a wheelchair)? 1  -DJ     Standing up from a chair using your arms (e.g., wheelchair, bedside chair)? 2  -DJ     Climbing 3-5 steps with a railing? 1  -DJ     To walk in hospital room? 1  -DJ     AM-PAC 6 Clicks Score (PT) 9  -DJ     Highest Level of Mobility Goal 3 --> Sit at edge of bed  -DJ       Row Name 04/17/24 1139          Functional Assessment    Outcome Measure Options AM-PAC 6 Clicks Basic Mobility (PT)  -DJ               User Key  (r) = Recorded By, (t) = Taken By, (c) = Cosigned By      Initials Name Provider Type    Magdalena Cintron, PT Physical Therapist                                 Physical Therapy Education       Title: PT OT SLP Therapies (In Progress)       Topic: Physical Therapy (In Progress)       Point: Mobility training (In Progress)       Learning Progress Summary             Patient Nonacceptance, E, NL by DJ at 4/17/2024 1140    Acceptance, E,TB, DU,NR by PH at 4/16/2024 1148    Acceptance, E, NR by AR at 4/15/2024 1233    Acceptance, E,TB,D, NR,DU by PH at 4/12/2024 1330    Acceptance, E,TB,D, NR,DU by PH at 4/11/2024 1017    Acceptance, E,D, DU,NR by PC at 4/10/2024 1146    Acceptance, E,D, NR by PC at 4/9/2024 1045    Acceptance, E,D, NL by PC at 4/8/2024 1149    Acceptance, E, NR by EM at 4/5/2024 1622    Nonacceptance, E, NL by EN at 4/4/2024 0014    Acceptance, E,D, NR by PC at 4/3/2024 1030   Family Acceptance, E, NR by AR at 4/15/2024 1233    Acceptance, E,D, DU,NR by PC at 4/10/2024 1146    Acceptance, E,D, NR by PC at 4/3/2024 1030                         Point: Home exercise program (In Progress)       Learning Progress Summary             Patient Acceptance, E,TB, DU,NR by PH at 4/16/2024 1148    Acceptance, E, NR by AR at 4/15/2024 1233    Acceptance, E,TB,D, NR,DU by PH at 4/12/2024 1330    Acceptance, E,TB,D, NR,DU by PH at 4/11/2024 1017    Acceptance, E,D, DU,NR  by PC at 4/10/2024 1146    Acceptance, E,D, NR by PC at 4/9/2024 1045    Acceptance, E,D, NL by PC at 4/8/2024 1149    Acceptance, E,D, NR,NL by LW at 4/6/2024 1138    Nonacceptance, E, NL by EN at 4/4/2024 0014    Acceptance, E,D, NR by PC at 4/3/2024 1030   Family Acceptance, E, NR by AR at 4/15/2024 1233    Acceptance, E,D, DU,NR by PC at 4/10/2024 1146    Acceptance, E,D, NR by PC at 4/3/2024 1030                         Point: Body mechanics (In Progress)       Learning Progress Summary             Patient Nonacceptance, E, NL by DJ at 4/17/2024 1140    Acceptance, E,TB, DU,NR by PH at 4/16/2024 1148    Acceptance, E, NR by AR at 4/15/2024 1233    Acceptance, E,TB,D, NR,DU by PH at 4/12/2024 1330    Acceptance, E,TB,D, NR,DU by PH at 4/11/2024 1017    Acceptance, E,D, DU,NR by PC at 4/10/2024 1146    Acceptance, E,D, NR by PC at 4/9/2024 1045    Acceptance, E,D, NL by PC at 4/8/2024 1149    Acceptance, E,D, NR,NL by LW at 4/6/2024 1138    Nonacceptance, E, NL by EN at 4/4/2024 0014    Acceptance, E,D, NR by PC at 4/3/2024 1030   Family Acceptance, E, NR by AR at 4/15/2024 1233    Acceptance, E,D, DU,NR by PC at 4/10/2024 1146    Acceptance, E,D, NR by PC at 4/3/2024 1030                         Point: Precautions (In Progress)       Learning Progress Summary             Patient Nonacceptance, E, NL by DJ at 4/17/2024 1140    Acceptance, E,TB, DU,NR by PH at 4/16/2024 1148    Acceptance, E, NR by AR at 4/15/2024 1233    Acceptance, E,TB,D, NR,DU by PH at 4/12/2024 1330    Acceptance, E,TB,D, NR,DU by PH at 4/11/2024 1017    Acceptance, E,D, DU,NR by PC at 4/10/2024 1146    Acceptance, E,D, NR by PC at 4/9/2024 1045    Acceptance, E,D, NL by PC at 4/8/2024 1149    Acceptance, E,D, NR,NL by LW at 4/6/2024 1138    Nonacceptance, E, NL by EN at 4/4/2024 0014    Acceptance, E,D, NR by PC at 4/3/2024 1030   Family Acceptance, E, NR by AR at 4/15/2024 1233    Acceptance, E,D, DU,NR by PC at 4/10/2024 1146     Acceptance, E,D, NR by PC at 4/3/2024 1030                                         User Key       Initials Effective Dates Name Provider Type Discipline    PC 06/16/21 -  Sidra Arce, PT Physical Therapist PT    EM 06/16/21 -  Lesia Judd, PT Physical Therapist PT    AR 06/16/21 -  Gayla Ramirez, PT Physical Therapist PT    PH 06/16/21 -  Chante Frost, PTA Physical Therapist Assistant PT    EN 08/01/22 -  Monique Mary, RN Registered Nurse Nurse    DJ 10/25/19 -  Magdalena Huynh, PT Physical Therapist PT    LW 05/08/23 -  Isa Pierce, PT Physical Therapist PT                  PT Recommendation and Plan     Plan of Care Reviewed With: patient, son  Progress: no change  Outcome Evaluation: Pt resting in bed, neck in rotation and sidebending to R, son present and helpful. Pt req mod A Of 2 to sit EOB using R UE to pull himself over. Pt dem poor sitting balance with jennifer L lean and req max A of 2 to maintain uppright posture. Pt abl eto sit briefly for 20 sssssec with CGA once weight shifted to R and WB thru R UE. Pt stood from raised EOB x 2 attempts and req max A Of 2 with L knee blocked. L LE unable to support weight and gives out with WBing. Pt unable to take any lateral steps. He was dependent to return supine and dependent to reposition in bed. Pt dem poor progress with mobility today. Cont PT to address functional deficits and prepare for d/c to facility as appropriate     Time Calculation:         PT Charges       Row Name 04/17/24 1141             Time Calculation    Start Time 1050  -DJ      Stop Time 1105  -DJ      Time Calculation (min) 15 min  -DJ      PT Non-Billable Time (min) 10 min  -DJ      PT Received On 04/17/24  -DJ      PT - Next Appointment 04/18/24  -DJ      PT Goal Re-Cert Due Date 04/27/24  -DJ                User Key  (r) = Recorded By, (t) = Taken By, (c) = Cosigned By      Initials Name Provider Type    DJ Magdalena Huynh, PT Physical Therapist                  Therapy  Charges for Today       Code Description Service Date Service Provider Modifiers Qty    [id number]  PT THERAPEUTIC ACT EA 15 MIN 4/17/2024 Magdalena Huynh, PT GP 1    [id number]  PT THER SUPP EA 15 MIN 4/17/2024 Magdalena Huynh, PT GP 1            PT G-Codes  Outcome Measure Options: AM-PAC 6 Clicks Basic Mobility (PT)  AM-PAC 6 Clicks Score (PT): 9  AM-PAC 6 Clicks Score (OT): 7  Modified Lilian Scale: 4 - Moderately severe disability.  Unable to walk without assistance, and unable to attend to own bodily needs without assistance.       Magdalena Huynh, PT  4/17/2024

## 2024-04-17 NOTE — PROGRESS NOTES
Case Management Discharge Note      Final Note: Per Kike Vale [facility] can accept pt today. Pharmacy updated and packet on pt chart. [facility] scheduled at 2:00 PM. Pt's daughter updated and agreeable. Cherise Sheehan LCSW         Selected Continued Care - Admitted Since 4/1/2024       Destination Coordination complete.      Service Provider Selected Services Address Phone Fax Patient Preferred    [facility] [address] [phone] --              Durable Medical Equipment    No services have been selected for the patient.                Dialysis/Infusion    No services have been selected for the patient.                Home Medical Care    No services have been selected for the patient.                Therapy    No services have been selected for the patient.                Community Resources    No services have been selected for the patient.                Community & DME    No services have been selected for the patient.                    Transportation Services  Ambulance: [facility]    Final Discharge Disposition Code: 03 - skilled nursing facility (SNF)

## 2024-04-17 NOTE — PROGRESS NOTES
"  [facility] Progress Note    Chief Complaint: None    He is resting soundly.  There is no family in the room.  His urine is clear his irrigation is off no signs of any hematuria.    Review of Systems:    Unable to review patient has a language barrier issue.  No family is present to translate.          Vital Signs  /73 (BP Location: Left arm, Patient Position: Lying)   Pulse 79   Temp 97.9 °F (36.6 °C) (Oral)   Resp 18   Ht 180.3 cm (71\")   Wt 78.6 kg (173 lb 4.5 oz)   SpO2 98%   BMI 24.17 kg/m²     Physical Exam:     General Appearance:   NAD   HEENT:    No trauma, pupils reactive, hearing intact   Back:     No CVA tenderness   Lungs:     Respirations unlabored, no wheezing    Heart:    RRR, intact peripheral pulses   Abdomen:   Soft and benign   :  Penis normal   Extremities:   No edema, no deformity   Lymphatic:   No neck or groin LAD   Skin:   No bleeding, bruising or rashes   Neuro/Psych:           Results Review:     I reviewed the patient's new clinical results.  Lab Results (last 24 hours)       Procedure Component Value Units Date/Time    POC Glucose Once [[id number]]  (Normal) Collected: 04/17/24 0614    Specimen: Blood Updated: 04/17/24 0616     Glucose 130 mg/dL     Basic Metabolic Panel [[id number]]  (Abnormal) Collected: 04/17/24 0456    Specimen: Blood Updated: 04/17/24 0534     Glucose 133 mg/dL      BUN 33 mg/dL      Creatinine 0.80 mg/dL      Sodium 137 mmol/L      Potassium 4.5 mmol/L      Chloride 103 mmol/L      CO2 23.1 mmol/L      Calcium 9.2 mg/dL      BUN/Creatinine Ratio 41.3     Anion Gap 10.9 mmol/L      eGFR 91.2 mL/min/1.73     Narrative:      GFR Normal >60  Chronic Kidney Disease <60  Kidney Failure <15    The GFR formula is only valid for adults with stable renal function between ages 18 and 70.    CBC (No Diff) [[id number]]  (Abnormal) Collected: 04/17/24 0456    Specimen: Blood Updated: 04/17/24 0515     WBC 12.07 10*3/mm3      RBC 3.40 10*6/mm3      Hemoglobin 10.1 " g/dL      Hematocrit 30.9 %      MCV 90.9 fL      MCH 29.7 pg      MCHC 32.7 g/dL      RDW 13.3 %      RDW-SD 43.2 fl      MPV 9.2 fL      Platelets 731 10*3/mm3     POC Glucose Once [[id number]]  (Abnormal) Collected: 04/17/24 0012    Specimen: Blood Updated: 04/17/24 0013     Glucose 135 mg/dL     POC Glucose Once [[id number]]  (Normal) Collected: 04/16/24 1840    Specimen: Blood Updated: 04/16/24 1841     Glucose 128 mg/dL     Basic Metabolic Panel [[id number]]  (Abnormal) Collected: 04/16/24 1222    Specimen: Blood Updated: 04/16/24 1256     Glucose 146 mg/dL      BUN 30 mg/dL      Creatinine 0.84 mg/dL      Sodium 136 mmol/L      Potassium 5.1 mmol/L      Chloride 105 mmol/L      CO2 20.7 mmol/L      Calcium 8.9 mg/dL      BUN/Creatinine Ratio 35.7     Anion Gap 10.3 mmol/L      eGFR 89.8 mL/min/1.73     Narrative:      GFR Normal >60  Chronic Kidney Disease <60  Kidney Failure <15    The GFR formula is only valid for adults with stable renal function between ages 18 and 70.    POC Glucose Once [[id number]]  (Abnormal) Collected: 04/16/24 1116    Specimen: Blood Updated: 04/16/24 1117     Glucose 139 mg/dL           Imaging Results (Last 24 Hours)       ** No results found for the last 24 hours. **            Medication Review:   I have personally reviewed    Current Facility-Administered Medications:     acetaminophen (TYLENOL) 160 MG/5ML oral solution 650 mg, 650 mg, Oral, Q6H PRN, Dimas Brand MD, 650 mg at 04/11/24 1650    [Held by provider] acetaminophen (TYLENOL) tablet 650 mg, 650 mg, Nasogastric, TID, Dimas Brand MD, 650 mg at 04/13/24 2126    amLODIPine (NORVASC) tablet 5 mg, 5 mg, Per PEG Tube, Q24H, Dimas Brand MD, 5 mg at 04/17/24 0832    aspirin chewable tablet 81 mg, 81 mg, Nasogastric, Daily, Dimas Brand MD, 81 mg at 04/17/24 0832    atorvastatin (LIPITOR) tablet 40 mg, 40 mg, Oral, Nightly, Yissel Tong, APRN, 40 mg at 04/16/24 2036    sennosides-docusate (PERICOLACE)  8.6-50 MG per tablet 2 tablet, 2 tablet, Nasogastric, Nightly PRN **AND** polyethylene glycol (MIRALAX) packet 17 g, 17 g, Oral, Daily PRN **AND** bisacodyl (DULCOLAX) EC tablet 5 mg, 5 mg, Oral, Daily PRN **AND** bisacodyl (DULCOLAX) suppository 10 mg, 10 mg, Rectal, Daily PRN, Yissel Tong APRN    calcium carbonate (TUMS) chewable tablet 500 mg (200 mg elemental), 2 tablet, Oral, TID PRN, Dimas Brand MD, 2 tablet at 04/02/24 2009    Calcium Replacement - Follow Nurse / BPA Driven Protocol, , Does not apply, PRNSunday Brent A, MD    Calcium Replacement - Follow Nurse / BPA Driven Protocol, , Does not apply, PRN, Dimas Brand MD    Calcium Replacement - Follow Nurse / BPA Driven Protocol, , Does not apply, PRN, Dimas Brand MD    clopidogrel (PLAVIX) tablet 75 mg, 75 mg, Nasogastric, Daily, Dimas Brand MD, 75 mg at 04/17/24 0832    dextrose (D50W) (25 g/50 mL) IV injection 25 g, 25 g, Intravenous, Q15 Min PRN, Dimas Brand MD    dextrose (GLUTOSE) oral gel 15 g, 15 g, Oral, Q15 Min PRN, Dimas Brand MD    gabapentin (NEURONTIN) capsule 200 mg, 200 mg, Nasogastric, TID, Dimas Brand MD, 200 mg at 04/17/24 0832    glucagon (GLUCAGEN) injection 1 mg, 1 mg, Intramuscular, Q15 Min PRN, Dimas Brand MD    hydrALAZINE (APRESOLINE) injection 10 mg, 10 mg, Intravenous, Q4H PRN, Dimas Brand MD, 10 mg at 04/10/24 1109    insulin regular (humuLIN R,novoLIN R) injection 2-7 Units, 2-7 Units, Subcutaneous, Q6H, Dimas Brand MD, 2 Units at 04/11/24 1844    Magnesium Low Dose Replacement - Follow Nurse / BPA Driven Protocol, , Does not apply, Sunday LINDSEY Brent A, MD    Magnesium Standard Dose Replacement - Follow Nurse / BPA Driven Protocol, , Does not apply, Sunday LINDSEY Brent A, MD    Magnesium Standard Dose Replacement - Follow Nurse / BPA Driven Protocol, , Does not apply, Sunday LINDSEY Brent A, MD    metoprolol tartrate (LOPRESSOR) tablet 50 mg, 50 mg, Oral,  Q12H, Dimas Brand MD, 50 mg at 04/17/24 0832    nitroglycerin (NITROSTAT) SL tablet 0.4 mg, 0.4 mg, Sublingual, Q5 Min PRN, Dimas Brand MD    ondansetron (ZOFRAN) injection 4 mg, 4 mg, Intravenous, Q4H PRN, Dimas Brand MD, 4 mg at 04/02/24 2333    pantoprazole (PROTONIX) injection 40 mg, 40 mg, Intravenous, Daily, Dimas Brand MD, 40 mg at 04/17/24 0833    Phosphorus Replacement - Follow Nurse / BPA Driven Protocol, , Does not apply, Sunday LINDSEY Brent A, MD    Phosphorus Replacement - Follow Nurse / BPA Driven Protocol, , Does not apply, Sunday LINDSEY Brent A, MD    Phosphorus Replacement - Follow Nurse / BPA Driven Protocol, , Does not apply, Sunday LINDSEY Brent A, MD    polyethylene glycol 1% (ARTIFICIAL TEARS) ophthalmic solution, 1 drop, Both Eyes, Q8H, Yissel Tong APRN, 1 drop at 04/17/24 0609    Potassium Replacement - Follow Nurse / BPA Driven Protocol, , Does not apply, Sunday LINDSEY Brent A, MD    Potassium Replacement - Follow Nurse / BPA Driven Protocol, , Does not apply, Sunday LINDSEY Brent A, MD    Potassium Replacement - Follow Nurse / BPA Driven Protocol, , Does not apply, Sunday LINDSEY Brent A, MD    sodium chloride 0.9 % bolus 500 mL, 500 mL, Intravenous, Once, Dimas Brand MD    sodium chloride 0.9 % flush 10 mL, 10 mL, Intravenous, PRN, Dimas Brand MD    sodium chloride 0.9 % flush 10 mL, 10 mL, Intravenous, Q12H, Dimas Brand MD, 10 mL at 04/17/24 0833    sodium chloride 0.9 % flush 10 mL, 10 mL, Intravenous, PRN, Dimas Brand MD    sodium chloride 0.9 % infusion 40 mL, 40 mL, Intravenous, PRNSunday Brent A, MD    Allergies:    Patient has no known allergies.    Assessment:    Active Problems:    Acute CVA (cerebrovascular accident)    Right-sided extracranial carotid artery stenosis    Coffee ground emesis    Gastroesophageal reflux disease    Cerebrovascular accident (CVA)    Dysphagia    Elevated LFTs    Fatty liver disease,  nonalcoholic       Hematuria likely prostatic bleeding in origin.  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      Robert Haque MD    4/17/2024  09:16 EDT

## 2024-04-17 NOTE — PROGRESS NOTES
[facility] Progress Note   04/16/2024      Urine is clear and his bladder irrigations are completely off.  Talk to a family member using the  phone and total plan will be to remove this catheter likely in the morning if remains clear.    Robert Haque. MD

## 2024-05-10 ENCOUNTER — OFFICE VISIT (OUTPATIENT)
Dept: NEUROLOGY | Facility: CLINIC | Age: 79
End: 2024-05-10
Payer: MEDICAID

## 2024-05-10 VITALS — OXYGEN SATURATION: 98 % | DIASTOLIC BLOOD PRESSURE: 78 MMHG | SYSTOLIC BLOOD PRESSURE: 158 MMHG | HEART RATE: 68 BPM

## 2024-05-10 DIAGNOSIS — Z86.73 HISTORY OF EMBOLIC STROKE: Primary | ICD-10-CM

## 2024-05-16 ENCOUNTER — HOME HEALTH ADMISSION (OUTPATIENT)
Dept: HOME HEALTH SERVICES | Facility: HOME HEALTHCARE | Age: 79
End: 2024-05-16
Payer: MEDICAID

## 2024-05-22 ENCOUNTER — TELEPHONE (OUTPATIENT)
Dept: FAMILY MEDICINE CLINIC | Facility: CLINIC | Age: 79
End: 2024-05-22
Payer: MEDICAID

## 2024-05-22 ENCOUNTER — OFFICE VISIT (OUTPATIENT)
Dept: SURGERY | Facility: CLINIC | Age: 79
End: 2024-05-22
Payer: MEDICAID

## 2024-05-22 DIAGNOSIS — Z93.1 PEG (PERCUTANEOUS ENDOSCOPIC GASTROSTOMY) STATUS: Primary | ICD-10-CM

## 2024-05-22 NOTE — PROGRESS NOTES
ASSESSMENT/PLAN:    79-year-old gentleman status post PEG tube placement on 4/12/2024.  He is no longer requiring use of the tube for medications.  I removed this today in clinic with action.  The site was covered with a dry dressing.  They were given instructions to remove the bandage in 48 hours.  He can bathe normally at that time.  Recommended covering the tube site for any drainage.  If this has ongoing drainage in 4 weeks I recommended that they call me for further evaluation.    The entirety of the visit took place with the assistance of an   CC:     Chief Complaint   Patient presents with    Post-op Follow-up     EGD with percutaneous endoscopic gastrostomy tube placement 04/12/2024        HPI:    They report that he is doing much better.  He is no longer requiring the tube for feeds or for medications.  He would like to have it removed.      SOCIAL HISTORY:   Social Determinants of Health     Tobacco Use: Medium Risk (5/22/2024)    Patient History     Smoking Tobacco Use: Former     Smokeless Tobacco Use: Never     Passive Exposure: Not on file   Alcohol Use: Patient Unable To Answer (4/5/2024)    AUDIT-C     Frequency of Alcohol Consumption: Patient unable to answer     Average Number of Drinks: Patient unable to answer     Frequency of Binge Drinking: Patient unable to answer   Financial Resource Strain: Patient Unable To Answer (4/5/2024)    Overall Financial Resource Strain (CARDIA)     Difficulty of Paying Living Expenses: Patient unable to answer   Food Insecurity: Patient Unable To Answer (4/5/2024)    Hunger Vital Sign     Worried About Running Out of Food in the Last Year: Patient unable to answer     Ran Out of Food in the Last Year: Patient unable to answer   Transportation Needs: Patient Unable To Answer (4/5/2024)    PRAPARE - Transportation     Lack of Transportation (Medical): Patient unable to answer     Lack of Transportation (Non-Medical): Patient unable to answer   Physical  Activity: Patient Unable To Answer (4/5/2024)    Exercise Vital Sign     Days of Exercise per Week: Patient unable to answer     Minutes of Exercise per Session: Patient unable to answer   Stress: Patient Unable To Answer (4/5/2024)    [employer] - Occupational Stress Questionnaire     Feeling of Stress : Patient unable to answer   Social Connections: Unknown (4/5/2024)    Family and Community Support     Help with Day-to-Day Activities: Patient unable to answer     Lonely or Isolated: Patient unable to answer   Interpersonal Safety: Not At Risk (4/2/2024)    Abuse Screen     Unsafe at Home or Work/School: no     Feels Threatened by Someone?: no     Does Anyone Keep You from Contacting Others or Doint Things Outside the Home?: no     Physical Sign of Abuse Present: no   Depression: Unknown (4/5/2024)    PHQ-2     PHQ-2 Score: 98-->patient unable to answer   Housing Stability: Not At Risk (4/5/2024)    Housing Stability     Current Living Arrangements: home     Potentially Unsafe Housing Conditions: none   Utilities: Patient Unable To Answer (4/5/2024)    [facility] Utilities     Threatened with loss of utilities: Patient unable to answer   Health Literacy: Unknown (4/5/2024)    Education     Help with school or training?: Patient unable to answer     Preferred Language: English   Employment: Unknown (4/5/2024)    Employment     Do you want help finding or keeping work or a job?: Patient unable to answer   Disabilities: Unknown (4/5/2024)    Disabilities     Concentrating, Remembering, or Making Decisions Difficulty: patient unable to answer     Doing Errands Independently Difficulty: patient unable to answer        FAMILY HISTORY:    Family History   Problem Relation Age of Onset    Malig Hyperthermia Neg Hx         OTHER SURGERY:  Past Surgical History:   Procedure Laterality Date    COLONOSCOPY N/A 3/14/2024    Procedure: COLONOSCOPY TO CECUM WITH COLD SNARE POLYPECTOMIES;  Surgeon: Rajesh  Magdaleno CHAPPELL MD;  Location: [facility] ENDOSCOPY;  Service: Gastroenterology;  Laterality: N/A;  PRE OP - SCREENING  -POST OP - COLON POLYPS,HEMORRHOIDS    DENTAL PROCEDURE      ENDOSCOPY      ENDOSCOPY N/A 4/3/2024    Procedure: ESOPHAGOGASTRODUODENOSCOPY AT BEDSIDE;  Surgeon: Redd Guidry MD;  Location: [facility] ENDOSCOPY;  Service: Gastroenterology;  Laterality: N/A;  PRE-  GI BLEED  POST- HIATAL HERNIA, DISTAL ESOPHAGITIS ULCERS, GASTRITIS, PYLOROPLASTY    ENDOSCOPY W/ PEG TUBE PLACEMENT N/A 4/12/2024    Procedure: ESOPHAGOGASTRODUODENOSCOPY WITH PERCUTANEOUS ENDOSCOPIC GASTROSTOMY TUBE INSERTION;  Surgeon: Dimas Brand MD;  Location: [facility] ENDOSCOPY;  Service: General;  Laterality: N/A;  Dysphagia    TRANSURETHRAL RESECTION OF BLADDER TUMOR N/A 06/07/2023    Procedure: TRANSURETHRAL RESECTION OF BLADDER TUMOR;  Surgeon: Robert Haque MD;  Location: [facility] MAIN OR;  Service: Urology;  Laterality: N/A;    TRANSURETHRAL RESECTION OF BLADDER TUMOR N/A 10/18/2023    Procedure: CYSTOSCOPY TRANSURETHRAL RESECTION OF BLADDER TUMOR;  Surgeon: Robert Haque MD;  Location: [facility] MAIN OR;  Service: Urology;  Laterality: N/A;        PAST MEDICAL HISTORY:    Past Medical History:   Diagnosis Date    Anxiety     Aortic atherosclerosis     Bladder cancer     Bladder mass 06/07/2023    Bladder tumor 05/2023    chemical chemo in [place] office  weekly    Blind left eye     Blood in urine     TRACE    Burning with urination     Chronic back pain     neuropathy rolando legs    DDD (degenerative disc disease), lumbar     GERD (gastroesophageal reflux disease)     History of low back pain     Hyperlipidemia     Hypertension     PTSD (post-traumatic stress disorder)     [place] start of [place]    Renal insufficiency     Sleep apnea 2023    diagnosed but following up with PCP    Urine frequency         MEDICATIONS:   Current Outpatient Medications on File Prior to Visit   Medication Sig Dispense  Refill    acetaminophen (TYLENOL) 500 MG tablet Take 1 tablet by mouth Every 6 (Six) Hours As Needed for Mild Pain.      amLODIPine (NORVASC) 10 MG tablet Administer 1 tablet per G tube Daily.      amLODIPine (NORVASC) 5 MG tablet Take 1 tablet by mouth Daily. New lower dose---for BP (Patient taking differently: Administer 0.5 tablets per G tube Daily. New lower dose---for BP) 90 tablet 1    atorvastatin (LIPITOR) 40 MG tablet Administer 1 tablet per G tube Every Night.      calcium carbonate (TUMS) 500 MG chewable tablet Chew 1 tablet As Needed for Indigestion or Heartburn.      clopidogrel (PLAVIX) 75 MG tablet Administer 1 tablet per G tube Daily.      gabapentin (NEURONTIN) 300 MG capsule 1 PO THREE TIMES DAILY AS NEEDED FOR LUMBAR NERVE PAIN (Patient taking differently: 2 (Two) Times a Day. 1 PO THREE TIMES DAILY AS NEEDED FOR LUMBAR NERVE PAIN) 90 capsule 0    Menthol, Topical Analgesic, (BIOFREEZE EX) Apply  topically. Shoulder      metoprolol tartrate (LOPRESSOR) 50 MG tablet Administer 1 tablet per G tube Every 12 (Twelve) Hours.      polyethylene glycol (MIRALAX) 17 g packet Take 17 g by mouth Daily As Needed (Use if senna-docusate is ineffective).      FeroSul 325 (65 Fe) MG tablet Take 1 tablet by mouth 2 (Two) Times a Week. Indications: Iron Deficiency (Patient not taking: Reported on 5/22/2024) 30 tablet 3    Ferrous Sulfate 300 (60 Fe) MG/5ML solution Administer 5 mL per G tube Daily. (Patient not taking: Reported on 5/22/2024)      gabapentin (NEURONTIN) 100 MG capsule Administer 3 capsules per G tube 3 (Three) Times a Day for 3 days. 27 capsule 0    indapamide (LOZOL) 1.25 MG tablet TAKE 1 TABLET BY MOUTH DAILY FOR BLOOD PRESSURE (Patient not taking: Reported on 5/22/2024) 90 tablet 1    lansoprazole 3 mg/mL in sodium bicarbonate injection 8.4% Administer 5 mL per G tube 2 (Two) Times a Day. (Patient not taking: Reported on 5/22/2024)      lisinopril (PRINIVIL,ZESTRIL) 10 MG tablet Take 1 tablet by  "mouth Every Morning. For BP (Patient not taking: Reported on 5/22/2024) 90 tablet 0    NON FORMULARY Take 2 each by mouth Every Morning. Eye health (Patient not taking: Reported on 5/22/2024)      polyethylene Glycol 400 1 % solution ophthalmic solution Administer 1 drop to both eyes Every 3 (Three) Hours As Needed for Dry Eyes. (Patient not taking: Reported on 5/22/2024)      [DISCONTINUED] acetaminophen (TYLENOL) 160 MG/5ML solution Administer 20.3 mL per G tube Every 6 (Six) Hours As Needed for Mild Pain. (Patient not taking: Reported on 5/22/2024)      [DISCONTINUED] rosuvastatin (Crestor) 20 MG tablet Take 1 tablet by mouth Daily. For cholesterol (Patient not taking: Reported on 5/22/2024) 90 tablet 3    [DISCONTINUED] sennosides-docusate (PERICOLACE) 8.6-50 MG per tablet Administer 2 tablets per G tube At Night As Needed for Constipation. (Patient not taking: Reported on 5/22/2024)      [DISCONTINUED] tamsulosin (FLOMAX) 0.4 MG capsule 24 hr capsule Take 1 capsule by mouth Every Morning. (Patient not taking: Reported on 5/22/2024)       No current facility-administered medications on file prior to visit.        ALLERGIES:   No Known Allergies     Body mass index is 10.96 kg/m².  180.3 cm (71\")  35.7 kg (78 lb 9.6 oz)    PHYSICAL EXAM:   Constitutional: Well-developed well-nourished, no acute distress  Gastrointestinal: Soft, nontender, PEG tube in place in left upper quadrant        Dimas Brand M.D.  General and Endoscopic Surgery  [facility]    [address], [address]  [place], [place], [address]  P: [phone]  F: [phone]     "

## 2024-05-22 NOTE — TELEPHONE ENCOUNTER
[facility] IS CALLING BECAUSE THEY HAD FAXED OVER A REQUEST FOR AN ORDER TO GET THE PATIENT A RAMP. PLEASE ADVISE. THANK YOU.

## 2024-05-23 VITALS
SYSTOLIC BLOOD PRESSURE: 142 MMHG | HEIGHT: 71 IN | BODY MASS INDEX: 24.92 KG/M2 | WEIGHT: 178 LBS | DIASTOLIC BLOOD PRESSURE: 80 MMHG

## 2024-05-30 ENCOUNTER — OFFICE VISIT (OUTPATIENT)
Dept: FAMILY MEDICINE CLINIC | Facility: CLINIC | Age: 79
End: 2024-05-30
Payer: MEDICAID

## 2024-05-30 ENCOUNTER — REFERRAL TRIAGE (OUTPATIENT)
Dept: CASE MANAGEMENT | Facility: OTHER | Age: 79
End: 2024-05-30
Payer: MEDICAID

## 2024-05-30 VITALS
HEART RATE: 83 BPM | OXYGEN SATURATION: 96 % | SYSTOLIC BLOOD PRESSURE: 124 MMHG | DIASTOLIC BLOOD PRESSURE: 78 MMHG | TEMPERATURE: 97.1 F | BODY MASS INDEX: 22.96 KG/M2 | WEIGHT: 164 LBS | RESPIRATION RATE: 16 BRPM | HEIGHT: 71 IN

## 2024-05-30 DIAGNOSIS — I10 BENIGN ESSENTIAL HTN: ICD-10-CM

## 2024-05-30 DIAGNOSIS — I47.10 NONSUSTAINED SUPRAVENTRICULAR TACHYCARDIA: ICD-10-CM

## 2024-05-30 DIAGNOSIS — Z09 HOSPITAL DISCHARGE FOLLOW-UP: Primary | ICD-10-CM

## 2024-05-30 DIAGNOSIS — G89.29 CHRONIC LEFT SHOULDER PAIN: ICD-10-CM

## 2024-05-30 DIAGNOSIS — E55.9 VITAMIN D DEFICIENCY: ICD-10-CM

## 2024-05-30 DIAGNOSIS — C67.9 MALIGNANT NEOPLASM OF URINARY BLADDER, UNSPECIFIED SITE: ICD-10-CM

## 2024-05-30 DIAGNOSIS — Z86.73 HISTORY OF EMBOLIC STROKE: ICD-10-CM

## 2024-05-30 DIAGNOSIS — E11.9 TYPE 2 DIABETES MELLITUS WITHOUT COMPLICATION, WITHOUT LONG-TERM CURRENT USE OF INSULIN: ICD-10-CM

## 2024-05-30 DIAGNOSIS — J18.9 PNEUMONIA OF BOTH LOWER LOBES DUE TO INFECTIOUS ORGANISM: ICD-10-CM

## 2024-05-30 DIAGNOSIS — M25.512 CHRONIC LEFT SHOULDER PAIN: ICD-10-CM

## 2024-05-30 DIAGNOSIS — D50.9 IRON DEFICIENCY ANEMIA, UNSPECIFIED IRON DEFICIENCY ANEMIA TYPE: ICD-10-CM

## 2024-05-30 DIAGNOSIS — E78.2 HYPERLIPIDEMIA, MIXED: ICD-10-CM

## 2024-05-30 PROCEDURE — 3078F DIAST BP <80 MM HG: CPT | Performed by: PHYSICIAN ASSISTANT

## 2024-05-30 PROCEDURE — 1160F RVW MEDS BY RX/DR IN RCRD: CPT | Performed by: PHYSICIAN ASSISTANT

## 2024-05-30 PROCEDURE — 99214 OFFICE O/P EST MOD 30 MIN: CPT | Performed by: PHYSICIAN ASSISTANT

## 2024-05-30 PROCEDURE — 3074F SYST BP LT 130 MM HG: CPT | Performed by: PHYSICIAN ASSISTANT

## 2024-05-30 PROCEDURE — 1159F MED LIST DOCD IN RCRD: CPT | Performed by: PHYSICIAN ASSISTANT

## 2024-05-30 PROCEDURE — 1125F AMNT PAIN NOTED PAIN PRSNT: CPT | Performed by: PHYSICIAN ASSISTANT

## 2024-05-30 RX ORDER — ASPIRIN 81 MG/1
81 TABLET ORAL DAILY
Qty: 90 TABLET | Refills: 3 | Status: SHIPPED | OUTPATIENT
Start: 2024-05-30

## 2024-05-30 RX ORDER — ATORVASTATIN CALCIUM 80 MG/1
80 TABLET, FILM COATED ORAL DAILY
Qty: 90 TABLET | Refills: 3 | Status: SHIPPED | OUTPATIENT
Start: 2024-05-30

## 2024-05-30 RX ORDER — PANTOPRAZOLE SODIUM 40 MG/1
40 TABLET, DELAYED RELEASE ORAL DAILY
Qty: 90 TABLET | Refills: 3 | Status: SHIPPED | OUTPATIENT
Start: 2024-05-30

## 2024-05-30 RX ORDER — TAMSULOSIN HYDROCHLORIDE 0.4 MG/1
1 CAPSULE ORAL DAILY
COMMUNITY

## 2024-05-30 RX ORDER — ATORVASTATIN CALCIUM 40 MG/1
40 TABLET, FILM COATED ORAL NIGHTLY
Qty: 90 TABLET | Refills: 3 | Status: SHIPPED | OUTPATIENT
Start: 2024-05-30 | End: 2024-05-30

## 2024-05-30 RX ORDER — AMLODIPINE BESYLATE 10 MG/1
10 TABLET ORAL DAILY
Qty: 90 TABLET | Refills: 1 | Status: SHIPPED | OUTPATIENT
Start: 2024-05-30

## 2024-05-30 RX ORDER — CLOPIDOGREL BISULFATE 75 MG/1
75 TABLET ORAL DAILY
Qty: 90 TABLET | Refills: 3 | Status: SHIPPED | OUTPATIENT
Start: 2024-05-30

## 2024-05-30 NOTE — PROGRESS NOTES
Subjective   Alex Negron is a 79 y.o. male.     Cerebrovascular Accident  Associated symptoms include chest pain, fatigue and weakness. Pertinent negatives include no diaphoresis.   Fatigue  Associated symptoms include chest pain, fatigue and weakness. Pertinent negatives include no diaphoresis.   Chest Pain   Associated symptoms include weakness. Pertinent negatives include no diaphoresis.      Alex Negron 79 y.o. male presents today for hosptial follow up.  he was treated 4-1 to 4-17-24  for stroke  .  I reviewed all of the labs and diagnostic testing and noted:  see below  The patient's medications were changed:---now on Plavix, ferrous sulfate, metoprolol tartrate 50 mg twice daily.... Noting amlodipine 10 mg and gabapentin at 100 mg with review of meds from the rehab noting he is still on Flomax, amlodipine now at 10 mg daily,----- that he is off indapamide and off lisinopril  Current outpatient and discharge medications have been reconciled for the patient.  Reviewed by: Jyothi Song PA-C  Had middle cerebral artery syndrome Dx--MCA.     he does have a follow up appointment with a specialist:  Dr Rubio did  Cc: POD # 1 s/p ALEA Origin Stent with distal protection after M1 and A2 Mechanical Thrombectomy   Mr. Negron is a 79 y.o. who was admitted with MCA syndrome with acute stroke and was s/p TPA and underwent endovascular intervention with Dr. Rubio who placed a right carotid stent. He was admitted to the ICU. His stay had been complicated by dysphagia, pneumonia, coffee-ground emesis with EGD with distal esophageal ulcers, gross hematuria. He now has been improving and has PEG tube with TFs. Murillo removed and hopefully can continue to void on his own but can do CIC if needed while at rehab.      I performed the substantive portion of the medical decision making.     Umer Taylor MD  Mr. Negron is a 79 y.o. male  admitted with MCA syndrome with acute stroke and was s/p TPA and  underwent endovascular intervention with Dr. Rubio who placed a right carotid stent. He was admitted to the ICU then transferred to telemetry. He continues to have dysphagia and left hemiparesis.  Patient will continue on statin therapy at reduced dose due to elevated LFTs, continue aspirin and Plavix. He developed coffee-ground emesis and underwent an EGD on 4/3/2024 demonstrating esophageal ulcers but no biopsies were obtained due to anticoagulation.  He has not had no further bleeding and he will continue PPI and Carafate. His stay had been complicated by dysphagia and Klebsiella pneumonia that has been treated with Rocephin IV completed on 4/13.  Patient is to remain strict n.p.o. and has a PEG placed 4/12/2024.  Patient is also treated by urology for hematuria with urinary retention that resolved with three-way catheter and irrigation.  Voiding trial completed today but will need continued scan monitoring for possible intermittent clean catheterization.  Patient can follow-up in the outpatient setting [facility]  Cardiology has also been following for NSVT that is improved with metoprolol.  Blood pressure parameters recommended to be 130/80 or less which has been stable on amlodipine  5 mg daily.  BP is slightly elevated today, therefore, he has been resumed on his home dose of Amlodipine 10 mg but additional agents may need to be added as needed. He was also previously on lisinopril 10mg.  He has had leukocytosis since PEG placement that has been trending down slowly and suspected to be inflammatory in the absence of new infectious symptoms.  Patient has been experiencing discomfort in his left shoulder that is likely due to dependent position with left hemiparesis.  A LUE sling has been applied for comfort and support.  Encourage pulmonary toilet with incentive spirometer at rehab facility.       Also reviewed his liver ultrasound from 4/14/2024 noting fatty liver and will make sure liver enzymes  are followed up on.  Also with the stroke history will make sure LDL cholesterol is 55 or less\  MRI 4-2-24  Multiple acute infarcts are appreciated, the largest of  which involves the parietal occipital and occipital temporal region on  the right posterolaterally. Smaller acute infarcts are appreciated  involving the head of the caudate nucleus on the right involving the  medial aspect of the thalamus on the right. There is mild-to-moderate  enhancement related to the infarcts noted above. Smaller infarcts  without enhancement are appreciated involving the right cerebellar  hemisphere posteriorly, medial aspect of the left cerebellar hemisphere  and the posterior and medial aspects of the left temporal lobe.  Notes from cardiology  NSVT -electrolytes within normal range.  Normal EF on echocardiogram.  Monitor closely.  Acute CVA -status post mechanical thrombectomy with right ICC a stent on dual antiplatelet therapy.  Workup and management per neurology.  Ambulatory telemetry monitor ordered for discharge  Hypertension -initially difficult to control.  Fair control on current regimen.  Monitor closely.  Hematemesis -followed by GI with stable hemoglobin on dual antiplatelet therapy.     Gaurang Hope MD  04/14/24  08:14 EDT.  Time spent in reviewing chart, discussion and examination:        Reviewed note from Dr. Brand following up on his PEG tube General surgery from 5/22/2024/removed his PEG tube.  Gave topical instructions for care.    Has appointment with Dr. Rubio --- for follow-up neurosurgery    Was seen by neurology ESCOBAR Weinberg on 5/10/2024  Assessment/Plan:     Alex seen today for stroke.  He did have right ICA occlusion with underlying stenosis for which he underwent mechanical thrombectomy and carotid stent placement.  He also had strokes in different vascular territories so most likely etiology of stroke is embolic stroke of undetermined source.  Zio patch result is currently pending.      Recommendations:  Continue aspirin 81 mg and Plavix 75 mg daily until follow-up with neurosurgery in September 2024 at which time you will have a carotid ultrasound  Continue Lipitor 80 mg daily, goal LDL less than 70  Goal BP less than 130/80, needs better control, will defer to PCP/provider at his nursing facility  Follow-up Zio patch  Serum glucose goal less than 140  Follow-up with me in 6 months, keep upcoming appointments with oncology, cardiology, and neurosurgery.  Call 911 for any stroke symptoms     Diagnoses and all orders for this visit:     1. History of embolic stroke (Primary)        Left shoulder pain--- this started before the stroke and needs to see Ortho and I will make referral      ZIO monitor was noted as a normal study and this was posted to his chart on 4/17/2024  The following portions of the patient's history were reviewed and updated as appropriate: allergies, current medications, past family history, past medical history, past social history, past surgical history, and problem list.    Review of Systems   Constitutional:  Positive for fatigue. Negative for diaphoresis.   HENT:  Negative for nosebleeds and trouble swallowing.    Eyes:  Negative for blurred vision and visual disturbance.   Respiratory:  Negative for choking.    Cardiovascular:  Positive for chest pain.   Gastrointestinal:  Negative for blood in stool.   Allergic/Immunologic: Negative for immunocompromised state.   Neurological:  Positive for weakness. Negative for facial asymmetry and speech difficulty.   Psychiatric/Behavioral:  Negative for self-injury and suicidal ideas.        Objective   Physical Exam  Vitals and nursing note reviewed.   Constitutional:       General: He is not in acute distress.     Appearance: He is well-developed. He is not diaphoretic.   HENT:      Head: Normocephalic.   Eyes:      Conjunctiva/sclera: Conjunctivae normal.      Pupils: Pupils are equal, round, and reactive to light.    Cardiovascular:      Rate and Rhythm: Normal rate and regular rhythm.      Heart sounds: Normal heart sounds. No murmur heard.  Pulmonary:      Effort: Pulmonary effort is normal.      Breath sounds: Normal breath sounds.   Musculoskeletal:         General: Normal range of motion.      Cervical back: Normal range of motion and neck supple.   Skin:     General: Skin is warm and dry.      Findings: No rash.   Neurological:      Mental Status: He is alert and oriented to person, place, and time.      Comments: As noted by neurology he still has weakness on his left side and needs assistance to ambulate   Psychiatric:         Mood and Affect: Affect is not inappropriate.         Behavior: Behavior normal.         Thought Content: Thought content normal.         Judgment: Judgment normal.           Assessment & Plan   Diagnoses and all orders for this visit:    1. Hospital discharge follow-up (Primary)  -     Comprehensive metabolic panel  -     Lipid panel  -     CBC and Differential  -     TSH  -     Hemoglobin A1c  -     T4, Free  -     Vitamin D,25-Hydroxy  -     Vitamin B12  -     Folate  -     Magnesium  -     Ferritin  -     Iron Profile    2. Chronic left shoulder pain  -     Ambulatory Referral to Orthopedic Surgery  -     XR Shoulder 2+ View Left; Future  -     XR Chest PA & Lateral; Future  -     XR Chest PA & Lateral  -     XR Shoulder 2+ View Left  -     Ambulatory Referral to Chronic Care Management Care Coordination  -     Comprehensive metabolic panel  -     Lipid panel  -     CBC and Differential  -     TSH  -     Hemoglobin A1c  -     T4, Free  -     Vitamin D,25-Hydroxy  -     Vitamin B12  -     Folate  -     Magnesium  -     Ferritin  -     Iron Profile    3. Pneumonia of both lower lobes due to infectious organism  -     XR Shoulder 2+ View Left; Future  -     XR Chest PA & Lateral; Future  -     XR Chest PA & Lateral  -     XR Shoulder 2+ View Left  -     Ambulatory Referral to Chronic Care  Management Care Coordination  -     Comprehensive metabolic panel  -     Lipid panel  -     CBC and Differential  -     TSH  -     Hemoglobin A1c  -     T4, Free  -     Vitamin D,25-Hydroxy  -     Vitamin B12  -     Folate  -     Magnesium  -     Ferritin  -     Iron Profile    4. Benign essential HTN  -     Ambulatory Referral to Chronic Care Management Care Coordination  -     Comprehensive metabolic panel  -     Lipid panel  -     CBC and Differential  -     TSH  -     Hemoglobin A1c  -     T4, Free  -     Vitamin D,25-Hydroxy  -     Vitamin B12  -     Folate  -     Magnesium  -     Ferritin  -     Iron Profile    5. Hyperlipidemia, mixed  -     Ambulatory Referral to Chronic Care Management Care Coordination  -     Comprehensive metabolic panel  -     Lipid panel  -     CBC and Differential  -     TSH  -     Hemoglobin A1c  -     T4, Free  -     Vitamin D,25-Hydroxy  -     Vitamin B12  -     Folate  -     Magnesium  -     Ferritin  -     Iron Profile    6. Type 2 diabetes mellitus without complication, without long-term current use of insulin  -     Ambulatory Referral to Chronic Care Management Care Coordination  -     Comprehensive metabolic panel  -     Lipid panel  -     CBC and Differential  -     TSH  -     Hemoglobin A1c  -     T4, Free  -     Vitamin D,25-Hydroxy  -     Vitamin B12  -     Folate  -     Magnesium  -     Ferritin  -     Iron Profile    7. Iron deficiency anemia, unspecified iron deficiency anemia type  -     Ambulatory Referral to Chronic Care Management Care Coordination  -     Comprehensive metabolic panel  -     Lipid panel  -     CBC and Differential  -     TSH  -     Hemoglobin A1c  -     T4, Free  -     Vitamin D,25-Hydroxy  -     Vitamin B12  -     Folate  -     Magnesium  -     Ferritin  -     Iron Profile    8. History of embolic stroke  -     Ambulatory Referral to Chronic Care Management Care Coordination  -     Comprehensive metabolic panel  -     Lipid panel  -     CBC and  Differential  -     TSH  -     Hemoglobin A1c  -     T4, Free  -     Vitamin D,25-Hydroxy  -     Vitamin B12  -     Folate  -     Magnesium  -     Ferritin  -     Iron Profile    9. Malignant neoplasm of urinary bladder, unspecified site  -     Ambulatory Referral to Chronic Care Management Care Coordination  -     Comprehensive metabolic panel  -     Lipid panel  -     CBC and Differential  -     TSH  -     Hemoglobin A1c  -     T4, Free  -     Vitamin D,25-Hydroxy  -     Vitamin B12  -     Folate  -     Magnesium  -     Ferritin  -     Iron Profile    10. Nonsustained supraventricular tachycardia  -     Ambulatory Referral to Chronic Care Management Care Coordination  -     Comprehensive metabolic panel  -     Lipid panel  -     CBC and Differential  -     TSH  -     Hemoglobin A1c  -     T4, Free  -     Vitamin D,25-Hydroxy  -     Vitamin B12  -     Folate  -     Magnesium  -     Ferritin  -     Iron Profile    11. Vitamin D deficiency  -     Comprehensive metabolic panel  -     Lipid panel  -     CBC and Differential  -     TSH  -     Hemoglobin A1c  -     T4, Free  -     Vitamin D,25-Hydroxy  -     Vitamin B12  -     Folate  -     Magnesium  -     Ferritin  -     Iron Profile    Other orders  -     Discontinue: atorvastatin (LIPITOR) 40 MG tablet; Take 1 tablet by mouth Every Night. Cholesterol and this replaces generic Crestor  Dispense: 90 tablet; Refill: 3  -     amLODIPine (NORVASC) 10 MG tablet; Take 1 tablet by mouth Daily. For BP and for heart rate  Dispense: 90 tablet; Refill: 1  -     clopidogrel (PLAVIX) 75 MG tablet; Take 1 tablet by mouth Daily. For stroke  Dispense: 90 tablet; Refill: 3  -     aspirin 81 MG EC tablet; Take 1 tablet by mouth Daily.  Dispense: 90 tablet; Refill: 3  -     atorvastatin (Lipitor) 80 MG tablet; Take 1 tablet by mouth Daily. For cholesterol  Dispense: 90 tablet; Refill: 3        He does need to follow-up checks x-ray noting last one was 4-10 24 to confirm resolution of  pneumonia   Will also obtain left shoulder x-ray to prep for Ortho appointment with ESCOBAR Handy  To be followed by neurology for this stroke history and is already been to appointment  G-tube removed by Dr. Brand and has seen him for follow-up  To follow-up with urology noting his bladder cancer  To see neurosurgery for follow-up Dr Rubio 9-10-24  Also noted in notes from neurology follow-up he does have a carotid Doppler ordered for follow-up  Has appointment July 9 with Dr. 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 Mary in July 19 and sent message to Dr. Brand noting patient's not on PPI--- is this needed?  Order chronic care management to set up PT OT home health if need  Having chronic GERD and will start PPI therapy with Protonix

## 2024-05-31 ENCOUNTER — LAB (OUTPATIENT)
Facility: HOSPITAL | Age: 79
End: 2024-05-31
Payer: MEDICAID

## 2024-05-31 ENCOUNTER — TRANSCRIBE ORDERS (OUTPATIENT)
Dept: FAMILY MEDICINE CLINIC | Facility: CLINIC | Age: 79
End: 2024-05-31
Payer: MEDICAID

## 2024-05-31 ENCOUNTER — HOSPITAL ENCOUNTER (OUTPATIENT)
Facility: HOSPITAL | Age: 79
Discharge: HOME | End: 2024-05-31
Payer: MEDICAID

## 2024-05-31 ENCOUNTER — TELEPHONE (OUTPATIENT)
Dept: CASE MANAGEMENT | Facility: OTHER | Age: 79
End: 2024-05-31
Payer: MEDICAID

## 2024-05-31 ENCOUNTER — TRANSCRIBE ORDERS (OUTPATIENT)
Dept: ADMINISTRATIVE | Facility: HOSPITAL | Age: 79
End: 2024-05-31
Payer: MEDICAID

## 2024-05-31 DIAGNOSIS — R13.10 DYSPHAGIA, UNSPECIFIED TYPE: ICD-10-CM

## 2024-05-31 DIAGNOSIS — Z86.73 HISTORY OF EMBOLIC STROKE: Primary | ICD-10-CM

## 2024-05-31 LAB
25(OH)D3 SERPL-MCNC: 23.1 NG/ML (ref 30–100)
ALBUMIN SERPL-MCNC: 3.7 G/DL (ref 3.5–5.2)
ALBUMIN/GLOB SERPL: 1.1 G/DL
ALP SERPL-CCNC: 121 U/L (ref 39–117)
ALT SERPL W P-5'-P-CCNC: 18 U/L (ref 1–41)
ANION GAP SERPL CALCULATED.3IONS-SCNC: 12.4 MMOL/L (ref 5–15)
AST SERPL-CCNC: 17 U/L (ref 1–40)
BASOPHILS # BLD AUTO: 0.06 10*3/MM3 (ref 0–0.2)
BASOPHILS NFR BLD AUTO: 0.5 % (ref 0–1.5)
BILIRUB SERPL-MCNC: 0.3 MG/DL (ref 0–1.2)
BUN SERPL-MCNC: 14 MG/DL (ref 8–23)
BUN/CREAT SERPL: 16.3 (ref 7–25)
CALCIUM SPEC-SCNC: 10.2 MG/DL (ref 8.6–10.5)
CHLORIDE SERPL-SCNC: 102 MMOL/L (ref 98–107)
CHOLEST SERPL-MCNC: 110 MG/DL (ref 0–200)
CO2 SERPL-SCNC: 21.6 MMOL/L (ref 22–29)
CREAT SERPL-MCNC: 0.86 MG/DL (ref 0.76–1.27)
DEPRECATED RDW RBC AUTO: 43.1 FL (ref 37–54)
EGFRCR SERPLBLD CKD-EPI 2021: 89.2 ML/MIN/1.73
EOSINOPHIL # BLD AUTO: 0.21 10*3/MM3 (ref 0–0.4)
EOSINOPHIL NFR BLD AUTO: 1.9 % (ref 0.3–6.2)
ERYTHROCYTE [DISTWIDTH] IN BLOOD BY AUTOMATED COUNT: 13.6 % (ref 12.3–15.4)
FERRITIN SERPL-MCNC: 116 NG/ML (ref 30–400)
FOLATE SERPL-MCNC: 12.6 NG/ML (ref 4.78–24.2)
GLOBULIN UR ELPH-MCNC: 3.5 GM/DL
GLUCOSE SERPL-MCNC: 124 MG/DL (ref 65–99)
HBA1C MFR BLD: 6.1 % (ref 4.8–5.6)
HCT VFR BLD AUTO: 36 % (ref 37.5–51)
HDLC SERPL-MCNC: 26 MG/DL (ref 40–60)
HGB BLD-MCNC: 11.5 G/DL (ref 13–17.7)
IMM GRANULOCYTES # BLD AUTO: 0.03 10*3/MM3 (ref 0–0.05)
IMM GRANULOCYTES NFR BLD AUTO: 0.3 % (ref 0–0.5)
IRON 24H UR-MRATE: 27 MCG/DL (ref 59–158)
IRON SATN MFR SERPL: 9 % (ref 20–50)
LDLC SERPL CALC-MCNC: 68 MG/DL (ref 0–100)
LDLC/HDLC SERPL: 2.62 {RATIO}
LYMPHOCYTES # BLD AUTO: 2.49 10*3/MM3 (ref 0.7–3.1)
LYMPHOCYTES NFR BLD AUTO: 22.5 % (ref 19.6–45.3)
MAGNESIUM SERPL-MCNC: 1.8 MG/DL (ref 1.6–2.4)
MCH RBC QN AUTO: 27.8 PG (ref 26.6–33)
MCHC RBC AUTO-ENTMCNC: 31.9 G/DL (ref 31.5–35.7)
MCV RBC AUTO: 87.2 FL (ref 79–97)
MONOCYTES # BLD AUTO: 0.79 10*3/MM3 (ref 0.1–0.9)
MONOCYTES NFR BLD AUTO: 7.1 % (ref 5–12)
NEUTROPHILS NFR BLD AUTO: 67.7 % (ref 42.7–76)
NEUTROPHILS NFR BLD AUTO: 7.5 10*3/MM3 (ref 1.7–7)
NRBC BLD AUTO-RTO: 0 /100 WBC (ref 0–0.2)
PLATELET # BLD AUTO: 651 10*3/MM3 (ref 140–450)
PMV BLD AUTO: 8.8 FL (ref 6–12)
POTASSIUM SERPL-SCNC: 4 MMOL/L (ref 3.5–5.2)
PROT SERPL-MCNC: 7.2 G/DL (ref 6–8.5)
RBC # BLD AUTO: 4.13 10*6/MM3 (ref 4.14–5.8)
SODIUM SERPL-SCNC: 136 MMOL/L (ref 136–145)
T4 FREE SERPL-MCNC: 1.54 NG/DL (ref 0.92–1.68)
TIBC SERPL-MCNC: 292 MCG/DL (ref 298–536)
TRANSFERRIN SERPL-MCNC: 196 MG/DL (ref 200–360)
TRIGL SERPL-MCNC: 79 MG/DL (ref 0–150)
TSH SERPL DL<=0.05 MIU/L-ACNC: 1.99 UIU/ML (ref 0.27–4.2)
VIT B12 BLD-MCNC: 617 PG/ML (ref 211–946)
VLDLC SERPL-MCNC: 16 MG/DL (ref 5–40)
WBC NRBC COR # BLD AUTO: 11.08 10*3/MM3 (ref 3.4–10.8)

## 2024-05-31 PROCEDURE — 82728 ASSAY OF FERRITIN: CPT | Performed by: PHYSICIAN ASSISTANT

## 2024-05-31 PROCEDURE — 82306 VITAMIN D 25 HYDROXY: CPT | Performed by: PHYSICIAN ASSISTANT

## 2024-05-31 PROCEDURE — 80053 COMPREHEN METABOLIC PANEL: CPT | Performed by: PHYSICIAN ASSISTANT

## 2024-05-31 PROCEDURE — 83540 ASSAY OF IRON: CPT | Performed by: PHYSICIAN ASSISTANT

## 2024-05-31 PROCEDURE — 80061 LIPID PANEL: CPT | Performed by: PHYSICIAN ASSISTANT

## 2024-05-31 PROCEDURE — 84466 ASSAY OF TRANSFERRIN: CPT | Performed by: PHYSICIAN ASSISTANT

## 2024-05-31 PROCEDURE — 36415 COLL VENOUS BLD VENIPUNCTURE: CPT | Performed by: PHYSICIAN ASSISTANT

## 2024-05-31 PROCEDURE — 82607 VITAMIN B-12: CPT | Performed by: PHYSICIAN ASSISTANT

## 2024-05-31 PROCEDURE — 71046 X-RAY EXAM CHEST 2 VIEWS: CPT

## 2024-05-31 PROCEDURE — 83735 ASSAY OF MAGNESIUM: CPT | Performed by: PHYSICIAN ASSISTANT

## 2024-05-31 PROCEDURE — 73030 X-RAY EXAM OF SHOULDER: CPT

## 2024-05-31 PROCEDURE — 82746 ASSAY OF FOLIC ACID SERUM: CPT | Performed by: PHYSICIAN ASSISTANT

## 2024-05-31 PROCEDURE — 84439 ASSAY OF FREE THYROXINE: CPT | Performed by: PHYSICIAN ASSISTANT

## 2024-05-31 PROCEDURE — 83036 HEMOGLOBIN GLYCOSYLATED A1C: CPT | Performed by: PHYSICIAN ASSISTANT

## 2024-05-31 PROCEDURE — 85025 COMPLETE CBC W/AUTO DIFF WBC: CPT | Performed by: PHYSICIAN ASSISTANT

## 2024-05-31 PROCEDURE — 84443 ASSAY THYROID STIM HORMONE: CPT | Performed by: PHYSICIAN ASSISTANT

## 2024-06-03 ENCOUNTER — TELEPHONE (OUTPATIENT)
Dept: FAMILY MEDICINE CLINIC | Facility: CLINIC | Age: 79
End: 2024-06-03
Payer: MEDICAID

## 2024-06-03 ENCOUNTER — PATIENT OUTREACH (OUTPATIENT)
Dept: CASE MANAGEMENT | Facility: OTHER | Age: 79
End: 2024-06-03
Payer: MEDICAID

## 2024-06-03 DIAGNOSIS — R13.10 DYSPHAGIA, UNSPECIFIED TYPE: ICD-10-CM

## 2024-06-03 DIAGNOSIS — Z86.73 HISTORY OF EMBOLIC STROKE: Primary | ICD-10-CM

## 2024-06-03 NOTE — TELEPHONE ENCOUNTER
Caller: SARAH - [facility]    Relationship: Home Health    Best call back number: [phone]    Who are you requesting to speak with (clinical staff, provider,  specific staff member): CLINICAL     What was the call regarding: SARAH WITH [facility] STATES THEY RECEIVED A REFERRAL FOR THE PATIENT, SHE WANTED TO LET HIS PROVIDER KNOW THAT THEY ARE UNABLE TO ACCEPT THE PATIENT DUE TO HIS MEDICAID INSURANCE.

## 2024-06-03 NOTE — TELEPHONE ENCOUNTER
HAYDE using interpretor INSTRUCTING PT TO RETURN CALL TO BE READ LAB RESULTS. LETTER SENT VIA [employer]

## 2024-06-03 NOTE — OUTREACH NOTE
AMBULATORY CASE MANAGEMENT NOTE    Names and Relationships of Patient/Support Persons: Caller: JEREMÍAS GO; Relationship: Emergency Contact -     Adult Patient Profile  Questions/Answers      Flowsheet Row Most Recent Value   Symptoms/Conditions Managed at Home neurological   Barriers to Managing Health literacy, understanding health advice   Neurological Symptoms/Conditions stroke, ischemic   Neurological Management Strategies routine screening, medication therapy, other (see comments)  [Therapy/ HH needs]   Neurological Self-Management Outcome 3 (uncertain)   Neurological Comment medication review/ need for ramp, filling out MAP 1000 along with referral/ Home Health therapy needed/ HH supplies needed        CCM Interim Update    Spoke with patient daughter today.  Verified by name and date of birth.    Agreeable to ACM assistance.    Requesting assistance with medication understanding.      Requesting assistance with patient joint pain.    Discussed MAP 1000 for ramp needs, as well as needed ADL supplies.    Care Coordination    ACM introduced self and purpose for outreach.  Discussed CCM and benefits.      ACM providing active listening reassurance and encouragement while discussing the use of heat and ice for joint discomfort along with the use of OTC diclofenac use and Tylenol.      ACM collecting case management contact information regarding [facility]NA assistance for in home therapy, MAP 1000 for ramp needs, and referral.  ACM will look into [facility] for Gloves (M), Wipes, and (Lg) pull ups.    Plan: follow up 1-2 weeks  - check on discomfort  - check on ramp needs/ MAP 1000 and referral  - [facility] ADL supplies         Education Documentation  No documentation found.        Rosanne GEIGER  Ambulatory Case Management    6/3/2024, 14:06 EDT

## 2024-06-03 NOTE — TELEPHONE ENCOUNTER
----- Message from Jyothi Song sent at 5/31/2024  6:21 PM EDT -----  Iron is still low and continue taking iron. Labs show low Vitamin D levels.  I want you to add OTC Vitamin D 2,000 I.U. Daily.  LDL is  68 and the goal was to be less than 70 per neurology note.  Continue atorvastatin.  Hemoglobin A1c average glucose improved from last labs.  Hemoglobin improved from labs 1 month ago and the platelet count remains elevated and likely keep appointment with hematologist.  Magnesium is in range.  Thyroid labs are in range.  Kidney functions are in normal range.  Alkaline phosphatase is slightly above range and see this with inflammation.  Other labs are in acceptable range

## 2024-06-03 NOTE — TELEPHONE ENCOUNTER
Caller: JEREMÍAS GO    Relationship: Emergency Contact    Best call back number: [phone]     What is the best time to reach you: ANYTIME    Who are you requesting to speak with (clinical staff, provider,  specific staff member): CLINICAL    What was the call regarding: DAUGHTER, ON [facility] VER[facility] IS NEEDING SOME GUIDANCE WITH PATIENT'S MEDICATIONS.     HAS NOT HEARD FROM PHYSICAL THERAPY TO SET UP ANY APPOINTMENTS FOR PATIENT.     Is it okay if the provider responds through MyChart: DOES NOT USE MYCHART, DAUGHTER WOULD LIKE TO SPEAK WITH SOMEONE.

## 2024-06-04 ENCOUNTER — TELEPHONE (OUTPATIENT)
Dept: FAMILY MEDICINE CLINIC | Facility: CLINIC | Age: 79
End: 2024-06-04

## 2024-06-04 ENCOUNTER — PATIENT OUTREACH (OUTPATIENT)
Dept: CASE MANAGEMENT | Facility: OTHER | Age: 79
End: 2024-06-04
Payer: MEDICAID

## 2024-06-04 ENCOUNTER — TELEPHONE (OUTPATIENT)
Dept: CASE MANAGEMENT | Facility: OTHER | Age: 79
End: 2024-06-04
Payer: MEDICAID

## 2024-06-04 DIAGNOSIS — R13.10 DYSPHAGIA, UNSPECIFIED TYPE: ICD-10-CM

## 2024-06-04 DIAGNOSIS — Z86.73 HISTORY OF EMBOLIC STROKE: Primary | ICD-10-CM

## 2024-06-04 DIAGNOSIS — R53.1 WEAKNESS OF LEFT SIDE OF BODY: ICD-10-CM

## 2024-06-04 NOTE — TELEPHONE ENCOUNTER
Hub staff attempted to follow warm transfer process and was unsuccessful     Caller: JEREMÍAS GO    Relationship to patient: Emergency Contact    Best call back number: [phone]     Patient is needing: JEREMÍAS IS RETURNING A CALL ABOUT TEST RESULTS

## 2024-06-04 NOTE — OUTREACH NOTE
AMBULATORY CASE MANAGEMENT NOTE    Names and Relationships of Patient/Support Persons: Caller: Lindy Ramos; Relationship: Other  Caller: Alex Negron; Relationship: Self -     Care Coordination    Communication with patient , Lindy Ramos regarding [id number] for ramp placement at patient home.    Delivered [id number] to PCP office for signature completion and instructed staff to fax with signature to [phone] - [facility] Lindy Ramos.    Patient request for Pull ups, gloves and wipes referred to [facility].    Referral resubmission from [facility] to [facility] for in home PT/OT/SLP, will check on authorization in a couple of days.  Fax number is [phone]    Education Documentation  No documentation found.        Rosanne GEIGER  Ambulatory Case Management    6/4/2024, 09:25 EDT

## 2024-06-04 NOTE — OUTREACH NOTE
AMBULATORY CASE MANAGEMENT NOTE    Names and Relationships of Patient/Support Persons: Caller: Lindyrosangela Ramos; Relationship: Other  Caller: Alex Negron; Relationship: Self -     Care Coordination    Spoke with Annie (Enrollment assistant) Home [facility] Delivered.      ACM submitted application for supply assistance telephonically.  Requested (lg) pull up briefs, (med) gloves and wipes.    Education Documentation  No documentation found.        Rosanne GEIGER  Ambulatory Case Management    6/4/2024, 09:54 EDT

## 2024-06-10 ENCOUNTER — PATIENT OUTREACH (OUTPATIENT)
Dept: CASE MANAGEMENT | Facility: OTHER | Age: 79
End: 2024-06-10
Payer: MEDICAID

## 2024-06-10 DIAGNOSIS — R13.10 DYSPHAGIA, UNSPECIFIED TYPE: ICD-10-CM

## 2024-06-10 DIAGNOSIS — R53.1 WEAKNESS OF LEFT SIDE OF BODY: ICD-10-CM

## 2024-06-10 DIAGNOSIS — Z86.73 HISTORY OF EMBOLIC STROKE: Primary | ICD-10-CM

## 2024-06-10 NOTE — OUTREACH NOTE
AMBULATORY CASE MANAGEMENT NOTE    Names and Relationships of Patient/Support Persons: Caller: Lindy Ramos; Relationship: Other -     [facility] Interim Update    Call from Case Management through Medicaid insurance.  States that  was faxed to [phone].  Asking if that is the correct fax.    Made aware that ACM has reached out to staff regarding , and appropriate fax number.  Still awaiting confirmation, advised to attempt fax and ACM will go to the office to check on appropriate fax number.    Next outreach scheduled            Education Documentation  No documentation found.        Rosanne GEIGER  Ambulatory Case Management    6/10/2024, 15:18 EDT

## 2024-06-14 ENCOUNTER — PATIENT OUTREACH (OUTPATIENT)
Dept: CASE MANAGEMENT | Facility: OTHER | Age: 79
End: 2024-06-14
Payer: MEDICAID

## 2024-06-14 DIAGNOSIS — I10 BENIGN ESSENTIAL HTN: Primary | ICD-10-CM

## 2024-06-14 DIAGNOSIS — Z86.73 HISTORY OF EMBOLIC STROKE: Primary | ICD-10-CM

## 2024-06-14 DIAGNOSIS — R13.10 DYSPHAGIA, UNSPECIFIED TYPE: ICD-10-CM

## 2024-06-14 RX ORDER — METOPROLOL TARTRATE 50 MG/1
50 TABLET, FILM COATED ORAL EVERY 12 HOURS SCHEDULED
Status: CANCELLED
Start: 2024-06-14

## 2024-06-14 RX ORDER — FERROUS SULFATE 325(65) MG
TABLET ORAL
Qty: 30 TABLET | Refills: 5 | Status: SHIPPED | OUTPATIENT
Start: 2024-06-14

## 2024-06-14 NOTE — OUTREACH NOTE
AMBULATORY CASE MANAGEMENT NOTE    Names and Relationships of Patient/Support Persons: Caller: RADHA GO; Relationship: Emergency Contact -     [facility] Interim Update    Received incoming call from patient's daughter, Radha (on verbal release). Introduced self and role as covering RN-ACM. She requested to go over patient's medications. Reviewed current medications, dosage and instructions. She states she received a mail with patient's lab results. Reviewed labs and PCP recommendations. She verbalizes that she will  OTC Vitamin D 2,000 I.U to take Daily. Recommendation showed to continue taking iron. Radha states patient is not currently taking any iron and requests for PCP to send prescription to pharmacy if patient needs to take medication. Will relay to PCP. Concerns addressed.     Care Coordination    Sent medication request to PCP.    [facility] Interim Update    Patient's daughter returned call requesting refill for metoprolol. Notified that Iron was ordered by PCP and sent to [facility].     Care Coordination    Refill request pended and sent to PCP for review.     Education Documentation  medication management, taught by Janine Nava RN at 6/14/2024  3:22 PM.  Learner: Patient  Readiness: Acceptance  Method: Explanation  Response: Verbalizes Understanding          Janine GARCIA  Ambulatory Case Management    6/14/2024, 15:22 EDT

## 2024-06-14 NOTE — TELEPHONE ENCOUNTER
Patient's daughter called and said she received a mail with lab results and recommendations. Regarding his iron, his daughter said that he currently is not taking iron. She states if he needs to take iron, could you please send prescription to his pharmacy? Thank you.    Lindy OSBORNE

## 2024-06-17 ENCOUNTER — PATIENT OUTREACH (OUTPATIENT)
Dept: CASE MANAGEMENT | Facility: OTHER | Age: 79
End: 2024-06-17
Payer: MEDICAID

## 2024-06-17 DIAGNOSIS — I10 BENIGN ESSENTIAL HTN: Primary | ICD-10-CM

## 2024-06-17 DIAGNOSIS — Z86.73 HISTORY OF EMBOLIC STROKE: ICD-10-CM

## 2024-06-17 NOTE — OUTREACH NOTE
AMBULATORY CASE MANAGEMENT NOTE    Names and Relationships of Patient/Support Persons: Caller: Lindy Ramos; Relationship: Other -     Care Coordination    Received incoming call from  Lindy following up regarding  form. Introduced self and role as covering RN-ACM.  has been in contact with Rosanne Gomez for patient needs. Reviewed notes. Informed that media does not show the said form.  Lindy requests for RN-LISANDRAM Rosanne Gomez to reach out to her when she gets back to follow up, ok to contact via email as well. No urgent needs at this time.     Education Documentation  No documentation found.        Janine GARCIA  Ambulatory Case Management    6/17/2024, 11:15 EDT

## 2024-06-18 ENCOUNTER — OFFICE VISIT (OUTPATIENT)
Dept: ORTHOPEDIC SURGERY | Facility: CLINIC | Age: 79
End: 2024-06-18
Payer: MEDICAID

## 2024-06-18 VITALS — WEIGHT: 164 LBS | BODY MASS INDEX: 22.96 KG/M2 | HEIGHT: 71 IN | TEMPERATURE: 99 F

## 2024-06-18 DIAGNOSIS — M25.512 LEFT SHOULDER PAIN, UNSPECIFIED CHRONICITY: ICD-10-CM

## 2024-06-18 DIAGNOSIS — M75.02 ADHESIVE CAPSULITIS OF LEFT SHOULDER: Primary | ICD-10-CM

## 2024-06-18 RX ORDER — METHYLPREDNISOLONE ACETATE 80 MG/ML
80 INJECTION, SUSPENSION INTRA-ARTICULAR; INTRALESIONAL; INTRAMUSCULAR; SOFT TISSUE
Status: COMPLETED | OUTPATIENT
Start: 2024-06-18 | End: 2024-06-18

## 2024-06-18 RX ORDER — PHENAZOPYRIDINE HYDROCHLORIDE 200 MG/1
1 TABLET, FILM COATED ORAL 3 TIMES DAILY
COMMUNITY
Start: 2024-06-05

## 2024-06-18 RX ORDER — LIDOCAINE HYDROCHLORIDE 10 MG/ML
2 INJECTION, SOLUTION EPIDURAL; INFILTRATION; INTRACAUDAL; PERINEURAL
Status: COMPLETED | OUTPATIENT
Start: 2024-06-18 | End: 2024-06-18

## 2024-06-18 RX ADMIN — LIDOCAINE HYDROCHLORIDE 2 ML: 10 INJECTION, SOLUTION EPIDURAL; INFILTRATION; INTRACAUDAL; PERINEURAL at 15:08

## 2024-06-18 RX ADMIN — METHYLPREDNISOLONE ACETATE 80 MG: 80 INJECTION, SUSPENSION INTRA-ARTICULAR; INTRALESIONAL; INTRAMUSCULAR; SOFT TISSUE at 15:08

## 2024-06-18 NOTE — PROGRESS NOTES
[facility]  New Problem      Patient Name: Alex Negron  : 1945  Primary Care Physician: Jyothi Song PA-C        Chief Complaint:  Left shoulder pain    HPI:   Alex Negron is a 79 y.o. year old who presents today for evaluation. Patient is here today with his daughter and an . He is here with left shoulder pain which started back in March. He then had a stroke in 2024 which resulted in L sided paralysis and since then his symptoms have been worsening and he has lost ROM in the shoulder. He is referred from PCP. He has regained a little bit of movement on the left side but is still in a WC and only moving the lower part of his upper arm. He has tylenol for pain. They have been trying massage but he reports that this has caused some bruising. He is on plavix. Diabetic but diet controlled.      Past Medical/Surgical, Social and Family History:  I have reviewed and/or updated pertinent history as noted in the medical record including:  Past Medical History:   Diagnosis Date    Anxiety     Aortic atherosclerosis     Bladder cancer     Bladder mass 2023    Bladder tumor 2023    chemical chemo in MD office  weekly    Blind left eye     Blood in urine     TRACE    Burning with urination     Chronic back pain     neuropathy rolando legs    DDD (degenerative disc disease), lumbar     GERD (gastroesophageal reflux disease)     History of low back pain     Hyperlipidemia     Hypertension     PTSD (post-traumatic stress disorder)     [place]    Renal insufficiency     Sleep apnea     diagnosed but following up with PCP    Urine frequency      Past Surgical History:   Procedure Laterality Date    COLONOSCOPY N/A 3/14/2024    Procedure: COLONOSCOPY TO CECUM WITH COLD SNARE POLYPECTOMIES;  Surgeon: Magdaleno Mena MD;  Location: [facility] ENDOSCOPY;  Service: Gastroenterology;  Laterality: N/A;  PRE OP - SCREENING  -POST OP - COLON  POLYPS,HEMORRHOIDS    DENTAL PROCEDURE      ENDOSCOPY      ENDOSCOPY N/A 4/3/2024    Procedure: ESOPHAGOGASTRODUODENOSCOPY AT BEDSIDE;  Surgeon: Redd Guidry MD;  Location: [facility] ENDOSCOPY;  Service: Gastroenterology;  Laterality: N/A;  PRE-  GI BLEED  POST- HIATAL HERNIA, DISTAL ESOPHAGITIS ULCERS, GASTRITIS, PYLOROPLASTY    ENDOSCOPY W/ PEG TUBE PLACEMENT N/A 2024    Procedure: ESOPHAGOGASTRODUODENOSCOPY WITH PERCUTANEOUS ENDOSCOPIC GASTROSTOMY TUBE INSERTION;  Surgeon: Dimas Brand MD;  Location: [facility] ENDOSCOPY;  Service: General;  Laterality: N/A;  Dysphagia    TRANSURETHRAL RESECTION OF BLADDER TUMOR N/A 2023    Procedure: TRANSURETHRAL RESECTION OF BLADDER TUMOR;  Surgeon: Robert Haque MD;  Location: [facility] MAIN OR;  Service: Urology;  Laterality: N/A;    TRANSURETHRAL RESECTION OF BLADDER TUMOR N/A 10/18/2023    Procedure: CYSTOSCOPY TRANSURETHRAL RESECTION OF BLADDER TUMOR;  Surgeon: Robert Haque MD;  Location: [facility] MAIN OR;  Service: Urology;  Laterality: N/A;     Social History     Occupational History     Employer: RETIRED     Comment: Has been a    Tobacco Use    Smoking status: Former     Current packs/day: 0.00     Types: Cigarettes     Quit date:      Years since quittin.4    Smokeless tobacco: Never   Vaping Use    Vaping status: Never Used   Substance and Sexual Activity    Alcohol use: Not Currently    Drug use: Never    Sexual activity: Defer          Allergies: No Known Allergies    Medications:   Home Medications:  Current Outpatient Medications on File Prior to Visit   Medication Sig    acetaminophen (TYLENOL) 500 MG tablet Take 1 tablet by mouth Every 6 (Six) Hours As Needed for Mild Pain.    amLODIPine (NORVASC) 10 MG tablet Take 1 tablet by mouth Daily. For BP and for heart rate    aspirin 81 MG EC tablet Take 1 tablet by mouth Daily.    atorvastatin (Lipitor) 80 MG tablet Take 1 tablet by mouth Daily. For cholesterol     calcium carbonate (TUMS) 500 MG chewable tablet Chew 1 tablet As Needed for Indigestion or Heartburn.    clopidogrel (PLAVIX) 75 MG tablet Take 1 tablet by mouth Daily. For stroke    ferrous sulfate 325 (65 FE) MG tablet One PO daily with food for anemia    Menthol, Topical Analgesic, (BIOFREEZE EX) Apply  topically. Shoulder    metoprolol tartrate (LOPRESSOR) 50 MG tablet Administer 1 tablet per G tube Every 12 (Twelve) Hours.    NON FORMULARY Take 2 each by mouth Every Morning. Eye health    pantoprazole (Protonix) 40 MG EC tablet Take 1 tablet by mouth Daily. For GERD    phenazopyridine (PYRIDIUM) 200 MG tablet Take 1 tablet by mouth 3 times a day.    polyethylene glycol (MIRALAX) 17 g packet Take 17 g by mouth Daily As Needed (Use if senna-docusate is ineffective).    polyethylene Glycol 400 1 % solution ophthalmic solution Administer 1 drop to both eyes Every 3 (Three) Hours As Needed for Dry Eyes.    tamsulosin (FLOMAX) 0.4 MG capsule 24 hr capsule Take 1 capsule by mouth Daily.    gabapentin (NEURONTIN) 100 MG capsule Administer 3 capsules per G tube 3 (Three) Times a Day for 3 days.     No current facility-administered medications on file prior to visit.         ROS:  14 point review of systems was negative except as listed in the HPI.    Physical Exam:   79 y.o. male  Body mass index is 22.88 kg/m²., 74.4 kg (164 lb)  Vitals:    06/18/24 1432   Temp: 99 °F (37.2 °C)     General: Alert, cooperative, appears well and in no observable distress. Appears stated age and BMI as listed above.  HEENT: Normocephalic, atraumatic on external visual inspection.  CV: No significant peripheral edema.  Respiratory: Normal respiratory effort.  Skin: Warm & well perfused; appropriate skin turgor.  Psych: Appropriate mood & affect.  Neuro: Gross sensation and motor intact in affected extremity/extremities.  Vascular: Peripheral pulses palpable in affected extremity/extremities.     MSK Exam:    Left Shoulder  No obvious  deformities or wounds.   No redness or significant swelling.  Passive ROM is limited with a FIRM endpoint.  Flexion 130  ER 0  IR lateral hip  Cuff strength is difficult to assess given hemiplegia and ROM limitations.    Brief examination of the cervical spine did not reproduce any specific radicular pattern or symptoms.   Strength is reasonable and symmetric in the upper extremities.      Radiology:    The following X-rays were ordered/reviewed today to evaluate the patient's symptoms: axillary view was obtained and compared with prior films from PCP office. 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    Procedure:   See Procedure Note: The potential risks and benefits of performing a diagnostic and therapeutic injection were discussed with the patient prior to procedure. Risks include, but are not limited to infection, swelling, transient increase in pain, bleeding, bruising. Patient was advised that injections are a diagnostic and therapeutic tool meaning they may not alleviate symptoms at all, or may only provide partial or temporary relief. Injection precautions and aftercare discussed. and Patient has a history of diabetes and therefore may be at increased risk of hyperglycemia following an injection of corticosteroid. These increases in BG are usually transient and resolve within a few days. Patient was advised on the potential for hyperglycemia and encouraged to self monitor for s/sx hyperglycemia and to self monitor BG. Patient encouraged to call the office for any significant hyperglycemia and/or hyperglycemia that does not resolve within 3-5 days. Injection precautions and aftercare discussed.    [facility]. Data/Labs: N/A    Assessment & Plan:    ICD-10-CM ICD-9-CM   1. Adhesive capsulitis of left shoulder  M75.02 726.0   2. Left shoulder pain, unspecified chronicity  M25.512 719.41     No orders of the defined types were  placed in this encounter.    Orders Placed This Encounter   Procedures    Large Joint Arthrocentesis: L glenohumeral    XR Shoulder 1 View Left       I suspect this is capsulitis compounded by history of stroke and diabetes in the non-dominant arm. He is extremely limited and would probably otherwise be a good candidate for KRISTINA if not for his recent CVA. In an effort to give him some relief I will start him on the usual conservative approach but should he fail to improve I would like to have Dr. Morrison see him with me at the next visit. I will reach out to PCP about starting home health PT as well. For today, will start with a GH injection. I would wait about a week to start PT. Continue tylenol, topicals PRN.     Large Joint Arthrocentesis: L glenohumeral  Date/Time: 6/18/2024 3:08 PM  Consent given by: patient  Site marked: site marked  Timeout: Immediately prior to procedure a time out was called to verify the correct patient, procedure, equipment, support staff and site/side marked as required   Supporting Documentation  Indications: pain and joint swelling   Procedure Details  Location: shoulder - L glenohumeral  Preparation: Patient was prepped and draped in the usual sterile fashion  Needle size: 22 G  Approach: anterolateral  Medications administered: 80 mg methylPREDNISolone acetate 80 MG/ML; 2 mL lidocaine PF 1% 1 %  Patient tolerance: patient tolerated the procedure well with no immediate complications           Return in about 5 weeks (around 7/23/2024) for Recheck. If symptoms change call for sooner appt..    Patient encouraged to call with questions or concerns prior to follow up.  Recommend ICE and/or HEAT PRN as discussed.  Will discuss with attending physician as needed.  Consider additional referrals, work up and/or advanced imaging as indicated or if patient fails to respond to conservative care.        Ben Handy, APRN

## 2024-06-19 ENCOUNTER — TELEPHONE (OUTPATIENT)
Dept: CASE MANAGEMENT | Facility: OTHER | Age: 79
End: 2024-06-19
Payer: MEDICAID

## 2024-06-19 ENCOUNTER — PATIENT OUTREACH (OUTPATIENT)
Dept: CASE MANAGEMENT | Facility: OTHER | Age: 79
End: 2024-06-19
Payer: MEDICAID

## 2024-06-19 DIAGNOSIS — Z86.73 HISTORY OF EMBOLIC STROKE: Primary | ICD-10-CM

## 2024-06-19 DIAGNOSIS — R13.10 DYSPHAGIA, UNSPECIFIED TYPE: ICD-10-CM

## 2024-06-19 DIAGNOSIS — R53.1 WEAKNESS OF LEFT SIDE OF BODY: ICD-10-CM

## 2024-06-19 RX ORDER — METOPROLOL TARTRATE 50 MG/1
50 TABLET, FILM COATED ORAL EVERY 12 HOURS SCHEDULED
Qty: 180 TABLET | Refills: 1 | Status: SHIPPED | OUTPATIENT
Start: 2024-06-19 | End: 2024-06-19 | Stop reason: SDUPTHER

## 2024-06-19 RX ORDER — METOPROLOL TARTRATE 50 MG/1
50 TABLET, FILM COATED ORAL EVERY 12 HOURS SCHEDULED
Qty: 180 TABLET | Refills: 1 | Status: SHIPPED | OUTPATIENT
Start: 2024-06-19

## 2024-06-19 NOTE — TELEPHONE ENCOUNTER
Patient daughter requesting assistance reordering Lopressor.  Pharmacy stating that patient does not have prescription.

## 2024-06-20 DIAGNOSIS — M25.512 LEFT SHOULDER PAIN, UNSPECIFIED CHRONICITY: ICD-10-CM

## 2024-06-20 DIAGNOSIS — M75.02 ADHESIVE CAPSULITIS OF LEFT SHOULDER: Primary | ICD-10-CM

## 2024-06-24 ENCOUNTER — PATIENT OUTREACH (OUTPATIENT)
Age: 79
End: 2024-06-24
Payer: MEDICAID

## 2024-06-24 NOTE — OUTREACH NOTE
Social Work Assessment  Questions/Answers      Flowsheet Row Most Recent Value   Referral Source outpatient staff, outpatient clinic, nursing   Reason for Consult community resources, transportation   Preferred Language English   Advance Care Planning Reviewed no concerns identified   Current Living Arrangements home   Potentially Unsafe Housing Conditions none   In the past 12 months has the electric, gas, oil, or water company threatened to shut off services in your home? No   Primary Care Provided by self, child(maricarmen)   Quality of Family Relationships helpful, involved, supportive   Source of Income unable to assess   Application for Public Assistance not applied   Usual Activity Tolerance good   Current Activity Tolerance good   Transportation Anticipated family or friend will provide          SDOH updated and reviewed with the patient during this program:  --     Employment: Not At Risk (6/24/2024)    Employment     Do you want help finding or keeping work or a job?: I do not need or want help      Financial Resource Strain: Low Risk  (6/24/2024)    Overall Financial Resource Strain (CARDIA)     Difficulty of Paying Living Expenses: Not hard at all      --     Food Insecurity: No Food Insecurity (6/24/2024)    Hunger Vital Sign     Worried About Running Out of Food in the Last Year: Never true     Ran Out of Food in the Last Year: Never true      --     Housing Stability: Low Risk  (6/24/2024)    Housing Stability Vital Sign     Unable to Pay for Housing in the Last Year: No     Number of Times Moved in the Last Year: 1     Homeless in the Last Year: No      --     Transportation Needs: No Transportation Needs (6/24/2024)    PRAPARE - Transportation     Lack of Transportation (Medical): No     Lack of Transportation (Non-Medical): No      --     Utilities: Not At Risk (6/24/2024)    [facility] Utilities     Threatened with loss of utilities: No      Continuing Care   Community & [facility] [place]   [facility] [employer] [facility]  [address] [address]    Phone: [phone]    Resource for: Transportation Needs     Care Coordination    MSW outreach to [employer] to confirm if transportation resources available for patient. MSW spoke to Shira who states that patient is eligible for rides through [employer] and will just need to call to schedule rides. Shira states that patient has utilized past rides with [employer] to provider appointments. MSW confirmed that [employer] able to obtain [place]  for patient to schedule rides.    Patient Outreach    MSW outreach to patient's daughter Radha regarding community resource needs as requester per RN-ACM. Patient's daughter declines transportation resources stating that she already has them. Radha states she does not want patient leaving the home for therapy and states that patient will need in home therapy. Patient's daughter requesting to speak to RN-ACM regarding this miscommunication and MSW has notified RN-ACM about patient's daughters request. Patient's daughter declines further community resource information at this time. MSW to sign off.    Radha LYONS -   Ambulatory Case Management    6/24/2024, 09:32 EDT

## 2024-06-25 ENCOUNTER — HOSPITAL ENCOUNTER (OUTPATIENT)
Facility: HOSPITAL | Age: 79
Discharge: HOME | End: 2024-06-25
Admitting: PHYSICIAN ASSISTANT
Payer: MEDICAID

## 2024-06-25 DIAGNOSIS — R91.1 LUNG NODULE SEEN ON IMAGING STUDY: ICD-10-CM

## 2024-06-25 DIAGNOSIS — Z85.51 HX OF BLADDER CANCER: ICD-10-CM

## 2024-06-25 PROCEDURE — 71250 CT THORAX DX C-: CPT

## 2024-06-26 ENCOUNTER — TELEPHONE (OUTPATIENT)
Dept: CASE MANAGEMENT | Facility: OTHER | Age: 79
End: 2024-06-26
Payer: MEDICAID

## 2024-06-27 DIAGNOSIS — R91.1 LUNG NODULE SEEN ON IMAGING STUDY: Primary | ICD-10-CM

## 2024-06-28 ENCOUNTER — PATIENT OUTREACH (OUTPATIENT)
Dept: CASE MANAGEMENT | Facility: OTHER | Age: 79
End: 2024-06-28
Payer: MEDICAID

## 2024-06-28 DIAGNOSIS — I10 BENIGN ESSENTIAL HTN: ICD-10-CM

## 2024-06-28 DIAGNOSIS — R53.1 WEAKNESS OF LEFT SIDE OF BODY: ICD-10-CM

## 2024-06-28 DIAGNOSIS — R13.10 DYSPHAGIA, UNSPECIFIED TYPE: Primary | ICD-10-CM

## 2024-06-28 NOTE — OUTREACH NOTE
CCM End of Month Documentation    This Chronic Medical Management Care Plan for Alex Negron, 79 y.o. male, has been monitored and managed; reviewed; established and a new plan of care implemented for the month of June.  A cumulative time of 105  minutes was spent on this patient record this month, including chart review; phone call with care giver; phone call with other providers, Medication review/ HH needs/ ramp needs.    Regarding the patient's problems: has History of embolic stroke; Right-sided extracranial carotid artery stenosis; Gastroesophageal reflux disease; Cerebrovascular accident (CVA); Dysphagia; Elevated LFTs; Fatty liver disease, nonalcoholic; PEG (percutaneous endoscopic gastrostomy) status; Urinary retention; Benign essential HTN; Hyperlipidemia, mixed; Degeneration of lumbar or lumbosacral intervertebral disc; Iron deficiency anemia; Impaired fasting glucose; Elevated platelet count; Bladder mass; Malignant neoplasm of urinary bladder; Type 2 diabetes mellitus without complication, without long-term current use of insulin; Bladder cancer; Encounter for screening for malignant neoplasm of colon; Bilateral hip pain; Lumbar radiculopathy; Lumbar facet arthropathy; and Nonsustained supraventricular tachycardia on their problem list., the following items were addressed: medical records; medications; referrals to community service providers, patient safety/ strengthening/ medication compliance/ disease education and any changes can be found within the plan section of the note.  A detailed listing of time spent for chronic care management is tracked within each outreach encounter.  Current medications include:  has a current medication list which includes the following prescription(s): acetaminophen, amlodipine, aspirin, atorvastatin, calcium carbonate, clopidogrel, ferrous sulfate, gabapentin, menthol (topical analgesic), metoprolol tartrate, NON FORMULARY, pantoprazole, phenazopyridine,  polyethylene glycol, polyethylene glycol 400, and tamsulosin. and the patient is reported to be caregiver will take responsibility for med compliance,  Medications are reported to be effective.  Regarding these diagnoses, referrals were made to the following provider(s):  none.  All notes on chart for PCP to review.    The patient was monitored remotely for strenthening/ balance/ disease compliance.    The patient's physical needs include:  physical healthcare; DME supplies, home health therapy.     The patient's mental support needs include:  needs met, close family    The patient's cognitive support needs include:  needs assistance with ADLs    The patient's psychosocial support needs include:  needs met    The patient's functional needs include: needs assistance for ADLs; physical healthcare, ramp needs    The patient's environmental needs include:  resources for disability needs, will complete MAP 1000 for ramp placement/ need for ADL supplies/ therapy for strengthening    Care Plan overall comments:  No data recorded    Refer to previous outreach notes for more information on the areas listed above.    Monthly Billing Diagnoses  (R13.10) Dysphagia, unspecified type    (R53.1) Weakness of left side of body    (I10) Benign essential HTN    Medications   Medications have been reconciled    Care Plan progress this month:      Recently Modified Care Plans Updates made since 5/28/2024 12:00 AM       Wellness (Adult)           Problem Priority Last Modified     Healthy Nutrition (Wellness) --  6/3/2024  1:51 PM by Rosanne Gomez RN              Goal Recent Progress Last Modified     Healthy Nutrition Achieved --  6/3/2024  1:51 PM by Rosanne Gomez RN     Evidence-based guidance:   Assess patient perspective on healthy weight, weight loss or weight gain, motivation and readiness for change.   Recommend or set healthy weight goal based on body mass index.   Review current dietary intake and exercise levels.   Encourage  small steps toward making change to eating and exercising.   Provide individualized medical nutrition therapy.    Notes:            Task Due Date Last Modified     Alleviate Barriers to Healthy Eating --  6/3/2024  1:52 PM by Rosanne Gomez RN     Care Management Activities:      - not discussed during this outreach      Notes:                        Instructions   Patient was provided an electronic copy of care plan  CCM services were explained and offered and patient has accepted these services.  Patient has given their written consent to receive CCM services and understands that this includes the authorization of electronic communication of medical information with the other treating providers.  Patient understands that they may stop CCM services at any time and these changes will be effective at the end of the calendar month and will effectively revocate the agreement of CCM services.  Patient understands that only one practitioner can furnish and be paid for CCM services during one calendar month.  Patient also understands that there may be co-payment or deductible fees in association with CCM services.  Patient will continue with at least monthly follow-up calls with the Ambulatory .    Rosanne GEIGER  Ambulatory Case Management    6/28/2024, 13:25 EDT

## 2024-07-05 ENCOUNTER — TELEPHONE (OUTPATIENT)
Dept: CARDIOLOGY | Facility: CLINIC | Age: 79
End: 2024-07-05
Payer: MEDICAID

## 2024-07-05 ENCOUNTER — CALL CENTER PROGRAMS (OUTPATIENT)
Dept: CALL CENTER | Facility: HOSPITAL | Age: 79
End: 2024-07-05
Payer: MEDICAID

## 2024-07-05 NOTE — TELEPHONE ENCOUNTER
Spoke to pt's daughter and let her know someone will bring the  pad to [facility] on Tuesday.  She verbalized understanding.

## 2024-07-05 NOTE — TELEPHONE ENCOUNTER
Caller: JEREMÍAS GO    Relationship to patient: Emergency Contact    Best call back number: [phone]    Additional notes: PT'S DAUGHTER IS CALLING TO MAKE SURE THE PT IS ABLE TO HAVE AN Nigerian OR New Zealander  FOR VISIT ON 7/9/24. PLEASE ADVISE

## 2024-07-05 NOTE — OUTREACH NOTE
Stroke [place] Survey      Flowsheet Row Responses   [facility]   [facility] successful Yes   Call start time 1645   Person spoke with today (if not patient) and relationship Caregiver  [Daughter, Radha]   Call end time 1656   Patient location 30 days post discharge if known Home  [Discharged to home on 5/17 from [facility]]   Was the patient readmitted within 30 days of discharge? No   Could you live alone without any help from another person? No  [Patient requires assistance with ADL's. He is not ambulatory by self.]   Can you do everything that you were doing right before your stroke even if slower and not as much? No   Are you completely back to the way you were right before your stroke? No  [Patient has improving left sided weakness. He also has vision deficit to left side vision field. Daughter reports very mild residual issue with speech, almost back to normal.]   Can you walk from one room to another without help from another person? No  [Patient requires a walker,  one person assist]   Can the patient sit up in bed without any help? Yes   Call Center [place] Score 4   Lilian score call completed Yes   Comments Spoke to daughter today who reports patient showing continued improvement with right sided hemiplegia. Patients speech almost back to normal and can eat regular diet. Patient has vision deficit to left visual field. Patient requires assistance with all ADL's            CHILANGO FISH - Registered Nurse

## 2024-07-09 ENCOUNTER — OFFICE VISIT (OUTPATIENT)
Dept: CARDIOLOGY | Facility: CLINIC | Age: 79
End: 2024-07-09
Payer: MEDICAID

## 2024-07-09 VITALS
WEIGHT: 164 LBS | OXYGEN SATURATION: 90 % | HEIGHT: 71 IN | DIASTOLIC BLOOD PRESSURE: 80 MMHG | BODY MASS INDEX: 22.96 KG/M2 | SYSTOLIC BLOOD PRESSURE: 125 MMHG | HEART RATE: 70 BPM

## 2024-07-09 DIAGNOSIS — I47.10 NONSUSTAINED SUPRAVENTRICULAR TACHYCARDIA: Primary | ICD-10-CM

## 2024-07-09 DIAGNOSIS — I10 BENIGN ESSENTIAL HTN: ICD-10-CM

## 2024-07-09 DIAGNOSIS — I63.9 CEREBROVASCULAR ACCIDENT (CVA), UNSPECIFIED MECHANISM: ICD-10-CM

## 2024-07-09 NOTE — PROGRESS NOTES
"      CARDIOLOGY    Marshall Mayes MD    ENCOUNTER DATE:  07/09/2024    Alex Negron / 79 y.o. / male        CHIEF COMPLAINT / REASON FOR OFFICE VISIT     Follow-Up  Nonsustained SVT  Hypertension  CVA    HISTORY OF PRESENT ILLNESS       HPI  Alex Negron is a 79 y.o. male who presents today for reevaluation.  Patient was seen via a  him and his son was here send did not speak very much English.  Overall he is actually doing better.  No real residual from his stroke.  His ECG shows Q waves in his inferior leads but his echocardiogram shows no wall motion abnormality.  Patient denies any types of cardiac symptoms such as chest pain shortness of breath palpitations.      The following portions of the patient's history were reviewed and updated as appropriate: allergies, current medications, past family history, past medical history, past social history, past surgical history and problem list.      VITAL SIGNS     Visit Vitals  /80 (BP Location: Right arm, Patient Position: Sitting)   Pulse 70   Ht 180.3 cm (70.98\")   Wt 74.4 kg (164 lb)   SpO2 90%   BMI 22.89 kg/m²         Wt Readings from Last 3 Encounters:   07/09/24 74.4 kg (164 lb)   06/18/24 74.4 kg (164 lb)   05/30/24 74.4 kg (164 lb)     Body mass index is 22.89 kg/m².      REVIEW OF SYSTEMS   ROS        PHYSICAL EXAMINATION     Vitals reviewed.   Constitutional:       Appearance: Healthy appearance.   Pulmonary:      Effort: Pulmonary effort is normal.   Cardiovascular:      Normal rate. Regular rhythm. Normal S1. Normal S2.       Murmurs: There is no murmur.      No gallop.  No click. No rub.   Pulses:     Intact distal pulses.   Edema:     Peripheral edema absent.   Neurological:      Mental Status: Alert.           REVIEWED DATA       ECG 12 Lead    Date/Time: 7/9/2024 3:42 PM  Performed by: Marshall Mayes MD    Authorized by: Marshall Mayes MD  Comparison: compared with previous ECG from 4/13/2024  Similar to previous " ECG  Rhythm: sinus rhythm  T inversion: V4, V5 and V6    Clinical impression: abnormal EKG          Cardiac Procedures:      Lipid Panel          11/3/2023    10:02 4/2/2024    03:33 5/31/2024    09:49   Lipid Panel   Total Cholesterol  117  110    Total Cholesterol 134      Triglycerides 75  38  79    HDL Cholesterol 48  38  26    VLDL Cholesterol 15  10  16    LDL Cholesterol  71  69  68    LDL/HDL Ratio  1.88  2.62          ASSESSMENT & PLAN      Diagnosis Plan   1. Nonsustained supraventricular tachycardia        2. Cerebrovascular accident (CVA), unspecified mechanism        3. Benign essential HTN              SUMMARY/DISCUSSION  Hypertension blood pressures good  Nonspecific atrial arrhythmia.  Patient remained stable.  History of stroke patient is recovering nicely  Follow-up 1 year or sooner if issues.        MEDICATIONS         Discharge Medications            Accurate as of July 9, 2024  3:41 PM. If you have any questions, ask your nurse or doctor.                Continue These Medications        Instructions Start Date   acetaminophen 500 MG tablet  Commonly known as: TYLENOL   500 mg, Oral, Every 6 Hours PRN      amLODIPine 10 MG tablet  Commonly known as: NORVASC   10 mg, Oral, Daily, For BP and for heart rate      aspirin 81 MG EC tablet   81 mg, Oral, Daily      atorvastatin 80 MG tablet  Commonly known as: Lipitor   80 mg, Oral, Daily, For cholesterol      BIOFREEZE EX   Apply externally, Shoulder      calcium carbonate 500 MG chewable tablet  Commonly known as: TUMS   1 tablet, Oral, As Needed      clopidogrel 75 MG tablet  Commonly known as: PLAVIX   75 mg, Oral, Daily, For stroke      ferrous sulfate 325 (65 FE) MG tablet   One PO daily with food for anemia      gabapentin 100 MG capsule  Commonly known as: NEURONTIN   300 mg, Per G Tube, 3 Times Daily      metoprolol tartrate 50 MG tablet  Commonly known as: LOPRESSOR   50 mg, Oral, Every 12 Hours Scheduled      NON FORMULARY   2 each, Oral,  Every Morning, Eye health      pantoprazole 40 MG EC tablet  Commonly known as: Protonix   40 mg, Oral, Daily, For GERD      phenazopyridine 200 MG tablet  Commonly known as: PYRIDIUM   1 tablet, Oral, 3 times daily      polyethylene glycol 17 g packet  Commonly known as: MIRALAX   17 g, Oral, Daily PRN      polyethylene Glycol 400 1 % solution ophthalmic solution   1 drop, Both Eyes, Every 3 Hours PRN      tamsulosin 0.4 MG capsule 24 hr capsule  Commonly known as: FLOMAX   1 capsule, Oral, Daily                   **Dragon Disclaimer:   Much of this encounter note is an electronic transcription/translation of spoken language to printed text. The electronic translation of spoken language may permit erroneous, or at times, nonsensical words or phrases to be inadvertently transcribed. Although I have reviewed the note for such errors, some may still exist.

## 2024-07-10 ENCOUNTER — PATIENT OUTREACH (OUTPATIENT)
Dept: CASE MANAGEMENT | Facility: OTHER | Age: 79
End: 2024-07-10
Payer: MEDICAID

## 2024-07-10 DIAGNOSIS — R53.1 WEAKNESS OF LEFT SIDE OF BODY: ICD-10-CM

## 2024-07-10 DIAGNOSIS — I10 BENIGN ESSENTIAL HTN: ICD-10-CM

## 2024-07-10 DIAGNOSIS — R13.10 DYSPHAGIA, UNSPECIFIED TYPE: Primary | ICD-10-CM

## 2024-07-10 NOTE — OUTREACH NOTE
AMBULATORY CASE MANAGEMENT NOTE    Names and Relationships of Patient/Support Persons: Contact: Lindy Ramos; Relationship: Other -     [facility] Interim Update    Per Lindy from [email or web address] all supplies and care needs have been met for patient.    Spoke with patient daughter/ HCS stating that all needs have been met.  In home nursing and therapy services have been met.    ACM will move to monitor and graduate with no further needs.        Education Documentation  No documentation found.        Rosanne GEIGER  Ambulatory Case Management    7/10/2024, 13:47 EDT

## 2024-07-15 NOTE — PROGRESS NOTES
Subjective     REASON FOR CONSULTATION: Elevated platelet count  Provide an opinion on any further workup or treatment                             Requesting practitioner: Jyothi Song    RECORDS OBTAINED:  Records of the patients history including those obtained from the referring provider were reviewed and summarized in detail.      History of Present Illness   This is a 78-year-old man with hypertension, hyperlipidemia, degenerative disc disease of the lumbar spine, impaired fasting glucose and mild renal insufficiency seen today at the request of his primary care provider for evaluation of an elevated platelet count.  Reviewing his recent data since 3/22/2023 the patient has had a mildly elevated platelet count in the upper 400s/lower 500,000 range.  He has been mildly anemic with hemoglobin 12.1-12.5 MCV 88-89.  White blood cell count and differential unremarkable.  Additional labs have recently shown iron saturation 30%, ferritin 37, B12 453, folate 19.2, creatinine 1.26/BUN 34, total protein 7.2/globulin 2.5.    The patient recently immigrated from [facility].  He feels well denies fevers, chills, shortness of breath, cough, weight loss, lymphadenopathy, changes in the bowel habits, blood in the stool.   He smoked but quit smoking approximately 20 years ago.  He has never had a colonoscopy.  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  He is undergoing intravesicular therapy.    At the time of his initial visit 5/15/2023 the patient was noted to be mildly iron deficient and started oral iron therapy which she has been tolerating well.    The patient returned today for follow-up.  He had recurrence of noninvasive bladder cancer treated with transurethral resection 10/18/2023 now receiving intravesicular chemotherapy.  The  patient stopped oral iron because of a metallic taste in the mouth.    Patient was admitted April 2024 with acute stroke requiring TPA and carotid stent on right.  He is on plavix and aspirin.    Past Medical History:   Diagnosis Date    Anxiety     Aortic atherosclerosis     Bladder cancer     Bladder mass 06/07/2023    Bladder tumor 05/2023    chemical chemo in MD office  weekly    Blind left eye     Blood in urine     TRACE    Burning with urination     Chronic back pain     neuropathy rolando legs    DDD (degenerative disc disease), lumbar     GERD (gastroesophageal reflux disease)     History of low back pain     Hyperlipidemia     Hypertension     PTSD (post-traumatic stress disorder)     [place] of [place]    Renal insufficiency     Sleep apnea 2023    diagnosed but following up with PCP    Urine frequency         Past Surgical History:   Procedure Laterality Date    COLONOSCOPY N/A 3/14/2024    Procedure: COLONOSCOPY TO CECUM WITH COLD SNARE POLYPECTOMIES;  Surgeon: Magdaleno Mena MD;  Location: [facility] ENDOSCOPY;  Service: Gastroenterology;  Laterality: N/A;  PRE OP - SCREENING  -POST OP - COLON POLYPS,HEMORRHOIDS    DENTAL PROCEDURE      ENDOSCOPY      ENDOSCOPY N/A 4/3/2024    Procedure: ESOPHAGOGASTRODUODENOSCOPY AT BEDSIDE;  Surgeon: Redd Guidry MD;  Location: [facility] ENDOSCOPY;  Service: Gastroenterology;  Laterality: N/A;  PRE-  GI BLEED  POST- HIATAL HERNIA, DISTAL ESOPHAGITIS ULCERS, GASTRITIS, PYLOROPLASTY    ENDOSCOPY W/ PEG TUBE PLACEMENT N/A 4/12/2024    Procedure: ESOPHAGOGASTRODUODENOSCOPY WITH PERCUTANEOUS ENDOSCOPIC GASTROSTOMY TUBE INSERTION;  Surgeon: Dimas Brand MD;  Location: [facility] ENDOSCOPY;  Service: General;  Laterality: N/A;  Dysphagia    TRANSURETHRAL RESECTION OF BLADDER TUMOR N/A 06/07/2023    Procedure: TRANSURETHRAL RESECTION OF BLADDER TUMOR;  Surgeon: Robert Haque MD;  Location: [facility] MAIN OR;  Service: Urology;  Laterality:  N/A;    TRANSURETHRAL RESECTION OF BLADDER TUMOR N/A 10/18/2023    Procedure: CYSTOSCOPY TRANSURETHRAL RESECTION OF BLADDER TUMOR;  Surgeon: Robert Haque MD;  Location: [facility];  Service: Urology;  Laterality: N/A;        Current Outpatient Medications on File Prior to Visit   Medication Sig Dispense Refill    acetaminophen (TYLENOL) 500 MG tablet Take 1 tablet by mouth Every 6 (Six) Hours As Needed for Mild Pain.      amLODIPine (NORVASC) 10 MG tablet Take 1 tablet by mouth Daily. For BP and for heart rate 90 tablet 1    aspirin 81 MG EC tablet Take 1 tablet by mouth Daily. 90 tablet 3    atorvastatin (Lipitor) 80 MG tablet Take 1 tablet by mouth Daily. For cholesterol 90 tablet 3    calcium carbonate (TUMS) 500 MG chewable tablet Chew 1 tablet As Needed for Indigestion or Heartburn.      clopidogrel (PLAVIX) 75 MG tablet Take 1 tablet by mouth Daily. For stroke 90 tablet 3    ferrous sulfate 325 (65 FE) MG tablet One PO daily with food for anemia 30 tablet 5    Menthol, Topical Analgesic, (BIOFREEZE EX) Apply  topically. Shoulder      metoprolol tartrate (LOPRESSOR) 50 MG tablet Take 1 tablet by mouth Every 12 (Twelve) Hours. 180 tablet 1    NON FORMULARY Take 2 each by mouth Every Morning. Eye health      pantoprazole (Protonix) 40 MG EC tablet Take 1 tablet by mouth Daily. For GERD 90 tablet 3    phenazopyridine (PYRIDIUM) 200 MG tablet Take 1 tablet by mouth 3 times a day.      polyethylene glycol (MIRALAX) 17 g packet Take 17 g by mouth Daily As Needed (Use if senna-docusate is ineffective).      polyethylene Glycol 400 1 % solution ophthalmic solution Administer 1 drop to both eyes Every 3 (Three) Hours As Needed for Dry Eyes.      tamsulosin (FLOMAX) 0.4 MG capsule 24 hr capsule Take 1 capsule by mouth Daily.      gabapentin (NEURONTIN) 100 MG capsule Administer 3 capsules per G tube 3 (Three) Times a Day for 3 days. 27 capsule 0     No current facility-administered medications on file prior  "to visit.        ALLERGIES:  No Known Allergies     Social History     Socioeconomic History    Marital status:    Tobacco Use    Smoking status: Former     Current packs/day: 0.00     Types: Cigarettes     Quit date:      Years since quittin.5    Smokeless tobacco: Never   Vaping Use    Vaping status: Never Used   Substance and Sexual Activity    Alcohol use: Not Currently    Drug use: Never    Sexual activity: Defer        Family History   Problem Relation Age of Onset    Malig Hyperthermia Neg Hx         Review of Systems   HENT: Negative.     Eyes: Negative.    Respiratory: Negative.     Cardiovascular: Negative.    Gastrointestinal: Negative.    Genitourinary:  Positive for frequency. Negative for hematuria and urgency.   Musculoskeletal: Negative.    Allergic/Immunologic: Negative.    Neurological:  Positive for weakness.   Hematological: Negative.    Psychiatric/Behavioral: Negative.           Objective     Vitals:    24 1543   BP: 109/73   Pulse: 56   Resp: 16   Temp: 98 °F (36.7 °C)   TempSrc: Oral   SpO2: 94%   Weight: Comment: patient in wheelchair   Height: 180.3 cm (70.98\")             2024     3:44 PM   Current Status   ECOG score 0       Physical Exam    CONSTITUTIONAL: pleasant well-developed adult man  HEENT: no icterus, no thrush, moist membranes  LYMPH: no cervical or supraclavicular lad  CV: RRR, S1S2, no murmur  RESP: cta bilat, no wheezing, no rales  GI: soft, non-tender, no splenomegaly, +bs  MUSC: no edema, normal gait  NEURO: alert and oriented x3, mod global weakness, in wheelchair  PSYCH: normal mood and affect     RECENT LABS:  Hematology WBC   Date Value Ref Range Status   2024 8.15 3.40 - 10.80 10*3/mm3 Final   2023 8.56 3.40 - 10.80 10*3/mm3 Final     RBC   Date Value Ref Range Status   2024 3.77 (L) 4.14 - 5.80 10*6/mm3 Final   2023 4.19 4.14 - 5.80 10*6/mm3 Final     Hemoglobin   Date Value Ref Range Status   2024 10.3 (L) 13.0 " - 17.7 g/dL Final     Hematocrit   Date Value Ref Range Status   07/19/2024 33.2 (L) 37.5 - 51.0 % Final     Platelets   Date Value Ref Range Status   07/19/2024 725 (H) 140 - 450 10*3/mm3 Final        Lab Results   Component Value Date    GLUCOSE 124 (H) 05/31/2024    BUN 14 05/31/2024    CREATININE 0.86 05/31/2024    EGFRRESULT 68 11/03/2023    EGFR 89.2 05/31/2024    BCR 16.3 05/31/2024    K 4.0 05/31/2024    CO2 21.6 (L) 05/31/2024    CALCIUM 10.2 05/31/2024    PROTENTOTREF 7.3 11/03/2023    ALBUMIN 3.7 05/31/2024    BILITOT 0.3 05/31/2024    AST 17 05/31/2024    ALT 18 05/31/2024         Assessment & Plan     *Mild thrombocytosis-platelet count elevated again 725  *Mild normocytic anemia-hemoglobin worse 10.3  Ferritin 46 and sat 11% despite oral iron replacement  *Low iron stores-as above  *High-grade 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  Status post transurethral resection 6/7/2025 and again 10/18/2023 nonmuscle invasive disease  S/p intravesicular therapy with gemcitabine by urology  *CVA 4/2004 s/p TPA, right ICA stent  On aspirin/plavix    Hematology plan/recommendations:  Will confirm with pt /family he has been taking oral iron and if so will need IV iron replacement  Given CVA and thrombocytosis will check BCR-ABL FISH, MAGDA [id number] with reflex to CALR, exon 12 and MPL  Continue aspirin/plavix  F/u 3 months cbc ferritin iron profile MD SMILEY spent 45 minutes of time on this case today including reviewing medical records/imaging consults etc from April admission face to face time documenting care writing orders etc.

## 2024-07-19 ENCOUNTER — OFFICE VISIT (OUTPATIENT)
Dept: ONCOLOGY | Facility: CLINIC | Age: 79
End: 2024-07-19
Payer: MEDICAID

## 2024-07-19 ENCOUNTER — LAB (OUTPATIENT)
Dept: LAB | Facility: HOSPITAL | Age: 79
End: 2024-07-19
Payer: MEDICAID

## 2024-07-19 VITALS
HEIGHT: 71 IN | RESPIRATION RATE: 16 BRPM | HEART RATE: 56 BPM | DIASTOLIC BLOOD PRESSURE: 73 MMHG | BODY MASS INDEX: 22.89 KG/M2 | OXYGEN SATURATION: 94 % | SYSTOLIC BLOOD PRESSURE: 109 MMHG | TEMPERATURE: 98 F

## 2024-07-19 DIAGNOSIS — D50.0 IRON DEFICIENCY ANEMIA DUE TO CHRONIC BLOOD LOSS: ICD-10-CM

## 2024-07-19 DIAGNOSIS — I63.9 CEREBROVASCULAR ACCIDENT (CVA), UNSPECIFIED MECHANISM: Primary | ICD-10-CM

## 2024-07-19 DIAGNOSIS — R79.89 ELEVATED PLATELET COUNT: ICD-10-CM

## 2024-07-19 DIAGNOSIS — D50.9 IRON DEFICIENCY ANEMIA, UNSPECIFIED IRON DEFICIENCY ANEMIA TYPE: ICD-10-CM

## 2024-07-19 LAB
BASOPHILS # BLD AUTO: 0.03 10*3/MM3 (ref 0–0.2)
BASOPHILS NFR BLD AUTO: 0.4 % (ref 0–1.5)
DEPRECATED RDW RBC AUTO: 53.5 FL (ref 37–54)
EOSINOPHIL # BLD AUTO: 0.25 10*3/MM3 (ref 0–0.4)
EOSINOPHIL NFR BLD AUTO: 3.1 % (ref 0.3–6.2)
ERYTHROCYTE [DISTWIDTH] IN BLOOD BY AUTOMATED COUNT: 16.4 % (ref 12.3–15.4)
ERYTHROCYTE [SEDIMENTATION RATE] IN BLOOD: 3 MM/HR (ref 0–20)
FERRITIN SERPL-MCNC: 46.9 NG/ML (ref 30–400)
HCT VFR BLD AUTO: 33.2 % (ref 37.5–51)
HGB BLD-MCNC: 10.3 G/DL (ref 13–17.7)
IMM GRANULOCYTES # BLD AUTO: 0.02 10*3/MM3 (ref 0–0.05)
IMM GRANULOCYTES NFR BLD AUTO: 0.2 % (ref 0–0.5)
IRON 24H UR-MRATE: 38 MCG/DL (ref 59–158)
IRON SATN MFR SERPL: 11 % (ref 20–50)
LYMPHOCYTES # BLD AUTO: 2.38 10*3/MM3 (ref 0.7–3.1)
LYMPHOCYTES NFR BLD AUTO: 29.2 % (ref 19.6–45.3)
MCH RBC QN AUTO: 27.3 PG (ref 26.6–33)
MCHC RBC AUTO-ENTMCNC: 31 G/DL (ref 31.5–35.7)
MCV RBC AUTO: 88.1 FL (ref 79–97)
MONOCYTES # BLD AUTO: 0.5 10*3/MM3 (ref 0.1–0.9)
MONOCYTES NFR BLD AUTO: 6.1 % (ref 5–12)
NEUTROPHILS NFR BLD AUTO: 4.97 10*3/MM3 (ref 1.7–7)
NEUTROPHILS NFR BLD AUTO: 61 % (ref 42.7–76)
NRBC BLD AUTO-RTO: 0 /100 WBC (ref 0–0.2)
PLATELET # BLD AUTO: 725 10*3/MM3 (ref 140–450)
PMV BLD AUTO: 7.9 FL (ref 6–12)
RBC # BLD AUTO: 3.77 10*6/MM3 (ref 4.14–5.8)
TIBC SERPL-MCNC: 343 MCG/DL (ref 298–536)
TRANSFERRIN SERPL-MCNC: 230 MG/DL (ref 200–360)
WBC NRBC COR # BLD AUTO: 8.15 10*3/MM3 (ref 3.4–10.8)

## 2024-07-19 PROCEDURE — 85025 COMPLETE CBC W/AUTO DIFF WBC: CPT

## 2024-07-19 PROCEDURE — 82728 ASSAY OF FERRITIN: CPT

## 2024-07-19 PROCEDURE — 85652 RBC SED RATE AUTOMATED: CPT

## 2024-07-19 PROCEDURE — 36415 COLL VENOUS BLD VENIPUNCTURE: CPT

## 2024-07-19 PROCEDURE — 84466 ASSAY OF TRANSFERRIN: CPT

## 2024-07-19 PROCEDURE — 83540 ASSAY OF IRON: CPT

## 2024-07-22 ENCOUNTER — TELEPHONE (OUTPATIENT)
Dept: ONCOLOGY | Facility: CLINIC | Age: 79
End: 2024-07-22
Payer: MEDICAID

## 2024-07-22 NOTE — TELEPHONE ENCOUNTER
----- Message from Emil Mary sent at 7/21/2024  1:08 PM EDT -----  PIP /family iron stores are low--if he has been taking oral iron then needs IV replacement.  If he has been off oral iron and can tolerate then begin daily oral iron.

## 2024-07-23 ENCOUNTER — OFFICE VISIT (OUTPATIENT)
Dept: ORTHOPEDIC SURGERY | Facility: CLINIC | Age: 79
End: 2024-07-23
Payer: MEDICAID

## 2024-07-23 VITALS — HEIGHT: 71 IN | WEIGHT: 164 LBS | BODY MASS INDEX: 22.96 KG/M2 | TEMPERATURE: 97.8 F

## 2024-07-23 DIAGNOSIS — M25.512 LEFT SHOULDER PAIN, UNSPECIFIED CHRONICITY: ICD-10-CM

## 2024-07-23 DIAGNOSIS — M75.02 ADHESIVE CAPSULITIS OF LEFT SHOULDER: Primary | ICD-10-CM

## 2024-07-23 DIAGNOSIS — G81.94 LEFT HEMIPLEGIA: ICD-10-CM

## 2024-07-23 NOTE — PROGRESS NOTES
[facility]              Follow Up      Patient Name: Alex Negron  : 1945  Primary Care Physician: Jyothi Song, LUIS        Chief Complaint:  Left shoulder pain    HPI:   Alex Negron is a 79 y.o. year old who presents today for evaluation. Patient is here today for a follow up on left shoulder pain. He is here with left shoulder pain which started back in March. He then had a stroke in 2024 which resulted in L sided paralysis and since then his symptoms have been worsening and he has lost ROM in the shoulder. He was diagnosed with capsulitis of the shoulder at the last visit and we did a GH injection and referred him for PT. He is here today with family and we have [place]  available for the visit.    He reports about 50% improvement in pain symptoms. They have not started PT but he has a massage therapist who comes to the home and help with his ROM. They report she was a PT or OT in the [facility] and has been helping him with soft tissue work and ROM exercises. He is moving his arm more. He is still in the wheelchair but is learning to stand and pivot.     Patient is also reporting some knee pain. Family has questions about botox injections for muscle spasms. He is followed by his PCP and Neurosurgery for post stroke care.       Past Medical/Surgical, Social and Family History:  I have reviewed and/or updated pertinent history as noted in the medical record including:  Past Medical History:   Diagnosis Date    Anxiety     Aortic atherosclerosis     Bladder cancer     Bladder mass 2023    Bladder tumor 2023    chemical chemo in MD office  weekly    Blind left eye     Blood in urine     TRACE    Burning with urination     Chronic back pain     neuropathy rolando legs    DDD (degenerative disc disease), lumbar     GERD (gastroesophageal reflux disease)     History of low back pain     Hyperlipidemia     Hypertension     PTSD (post-traumatic stress disorder)     [place] of [place]    Renal insufficiency     Sleep apnea     diagnosed but following up with PCP    Urine frequency      Past Surgical History:   Procedure Laterality Date    COLONOSCOPY N/A 3/14/2024    Procedure: COLONOSCOPY TO CECUM WITH COLD SNARE POLYPECTOMIES;  Surgeon: Magdaleno Mena MD;  Location: [facility] ENDOSCOPY;  Service: Gastroenterology;  Laterality: N/A;  PRE OP - SCREENING  -POST OP - COLON POLYPS,HEMORRHOIDS    DENTAL PROCEDURE      ENDOSCOPY      ENDOSCOPY N/A 4/3/2024    Procedure: ESOPHAGOGASTRODUODENOSCOPY AT BEDSIDE;  Surgeon: Redd Guidry MD;  Location[facility] ENDOSCOPY;  Service: Gastroenterology;  Laterality: N/A;  PRE-  GI BLEED  POST- HIATAL HERNIA, DISTAL ESOPHAGITIS ULCERS, GASTRITIS, PYLOROPLASTY    ENDOSCOPY W/ PEG TUBE PLACEMENT N/A 2024    Procedure: ESOPHAGOGASTRODUODENOSCOPY WITH PERCUTANEOUS ENDOSCOPIC GASTROSTOMY TUBE INSERTION;  Surgeon: Dimas Brand MD;  Location: [facility] ENDOSCOPY;  Service: General;  Laterality: N/A;  Dysphagia    TRANSURETHRAL RESECTION OF BLADDER TUMOR N/A 2023    Procedure: TRANSURETHRAL RESECTION OF BLADDER TUMOR;  Surgeon: Robert Haque MD;  Location: [facility] MAIN OR;  Service: Urology;  Laterality: N/A;    TRANSURETHRAL RESECTION OF BLADDER TUMOR N/A 10/18/2023    Procedure: CYSTOSCOPY TRANSURETHRAL RESECTION OF BLADDER TUMOR;  Surgeon: Robert Haque MD;  Location: [facility] MAIN OR;  Service: Urology;  Laterality: N/A;     Social History     Occupational History     Employer: RETIRED     Comment: Has been a    Tobacco Use    Smoking status: Former     Current packs/day: 0.00     Types: Cigarettes     Quit date: 2000     Years since quittin.5     Passive exposure: Never    Smokeless tobacco: Never   Vaping Use    Vaping status: Never Used   Substance and Sexual Activity    Alcohol use: Not Currently    Drug use: Never    Sexual activity: Defer          Allergies: No  Known Allergies    Medications:   Home Medications:  Current Outpatient Medications on File Prior to Visit   Medication Sig    acetaminophen (TYLENOL) 500 MG tablet Take 1 tablet by mouth Every 6 (Six) Hours As Needed for Mild Pain.    amLODIPine (NORVASC) 10 MG tablet Take 1 tablet by mouth Daily. For BP and for heart rate    aspirin 81 MG EC tablet Take 1 tablet by mouth Daily.    atorvastatin (Lipitor) 80 MG tablet Take 1 tablet by mouth Daily. For cholesterol    calcium carbonate (TUMS) 500 MG chewable tablet Chew 1 tablet As Needed for Indigestion or Heartburn.    clopidogrel (PLAVIX) 75 MG tablet Take 1 tablet by mouth Daily. For stroke    ferrous sulfate 325 (65 FE) MG tablet One PO daily with food for anemia    Menthol, Topical Analgesic, (BIOFREEZE EX) Apply  topically. Shoulder    metoprolol tartrate (LOPRESSOR) 50 MG tablet Take 1 tablet by mouth Every 12 (Twelve) Hours.    NON FORMULARY Take 2 each by mouth Every Morning. Eye health    pantoprazole (Protonix) 40 MG EC tablet Take 1 tablet by mouth Daily. For GERD    phenazopyridine (PYRIDIUM) 200 MG tablet Take 1 tablet by mouth 3 times a day.    polyethylene glycol (MIRALAX) 17 g packet Take 17 g by mouth Daily As Needed (Use if senna-docusate is ineffective).    polyethylene Glycol 400 1 % solution ophthalmic solution Administer 1 drop to both eyes Every 3 (Three) Hours As Needed for Dry Eyes.    tamsulosin (FLOMAX) 0.4 MG capsule 24 hr capsule Take 1 capsule by mouth Daily.     No current facility-administered medications on file prior to visit.         ROS:  ROS negative except as listed in the HPI.    Physical Exam:   79 y.o. male  Body mass index is 22.87 kg/m²., 74.4 kg (164 lb)  Vitals:    07/23/24 1322   Temp: 97.8 °F (36.6 °C)     General: Alert, cooperative, appears well and in no observable distress. Appears stated age and BMI as listed above.  HEENT: Normocephalic, atraumatic on external visual inspection.  CV: No significant peripheral  edema.  Respiratory: Normal respiratory effort.  Skin: Warm & well perfused; appropriate skin turgor.  Psych: Appropriate mood & affect.  Neuro: Gross sensation and motor intact in affected extremity/extremities.  Vascular: Peripheral pulses palpable in affected extremity/extremities.     MSK Exam:  Left Shoulder  No obvious deformities or wounds.   No redness or significant swelling.  Passive ROM is limited with a FIRM endpoint.  Flexion 140  ER 5  IR lateral hip  Cuff strength is difficult to assess given hemiplegia and ROM limitations. He does have more AROM on exam today and is able to lift the arm from the shoulder. He is able to reach his face and mobilize the elbow and wrist better today.     Brief examination of the cervical spine did not reproduce any specific radicular pattern or symptoms.      Radiology:    No new images were needed at the visit today.     Procedure:   N/A      Misc. Data/Labs: N/A    Assessment & Plan:    ICD-10-CM ICD-9-CM   1. Adhesive capsulitis of left shoulder  M75.02 726.0   2. Left shoulder pain, unspecified chronicity  M25.512 719.41   3. Left hemiplegia  G81.94 342.90     No orders of the defined types were placed in this encounter.    No orders of the defined types were placed in this encounter.    I spent more than 40 minutes in direct contact with the patient today addressing all questions and concerns. We talked about the nature of capsulitis in the setting of his stroke. We will continue to manage this conservatively. We also talked about botox injections and this is possibly something that could help him with muscle spasms but we do not offer than from and ortho standpoint. I think he would be better served with a referral to PM&R for a more comprehensive evaluation and rehab care. I will discuss this with his PCP and have informed the patient and family of this plan. I also think he needs to be in some formal outpatient PT and they are willing to go to therapy as an  outpatient. I think what he is doing at home is probably helpful but not sufficient enough for the sort of rehab he needs. He also mentioned some knee pain and I would be happy to evaluate his knee pain at another visit if they would like to schedule.     He will not be a great candidate for a KRISTINA. WE can inject his shoulder again in 9 more weeks if needed for symptomatic control of pain if needed. I will plan to see him back for that as well. I will plan to reach out to the patient's daughter with any updates or changes to the plan of care.    Return in about 8 weeks (around 9/17/2024) for Recheck. If symptoms change call for sooner appt..    Patient encouraged to call with questions or concerns prior to follow up.  Recommend ICE and/or HEAT PRN as discussed.  Will discuss with attending physician as needed.  Consider additional referrals, work up and/or advanced imaging as indicated or if patient fails to respond to conservative care.        ESCOBAR Lo      Dictated Utilizing Dragon Dictation

## 2024-07-23 NOTE — PROGRESS NOTES
Dottie Negron is a 79 y.o. male.     History of Present Illness      Since the last visit, he has overall felt  better .  He has Primary Hypertension and well controlled on current medication, DMII well controlled on medication and will continue regimen, GERD controlled on PPI Rx, Hyperlipidemia with goals met with current Rx, and Vitamin D deficiency and will update labs for continued management.  he has been compliant with current medications have reviewed them.  The patient denies medication side effects.  Will refill medications. There were no vitals taken for this visit..  BMI is within normal parameters. No other follow-up for BMI required.    Lab Results   Component Value Date    HGBA1C 6.10 (H) 05/31/2024     Lab Results   Component Value Date    [id number] 617 05/31/2024     Lab Results   Component Value Date    TSH 1.990 05/31/2024     Lab Results   Component Value Date    CHOL 110 05/31/2024    CHLPL 134 11/03/2023    TRIG 79 05/31/2024    HDL 26 (L) 05/31/2024    LDL 68 05/31/2024         Results for orders placed or performed in visit on 07/19/24   CBC Auto Differential    Specimen: Blood   Result Value Ref Range    WBC 8.15 3.40 - 10.80 10*3/mm3    RBC 3.77 (L) 4.14 - 5.80 10*6/mm3    Hemoglobin 10.3 (L) 13.0 - 17.7 g/dL    Hematocrit 33.2 (L) 37.5 - 51.0 %    MCV 88.1 79.0 - 97.0 fL    MCH 27.3 26.6 - 33.0 pg    MCHC 31.0 (L) 31.5 - 35.7 g/dL    RDW 16.4 (H) 12.3 - 15.4 %    RDW-SD 53.5 37.0 - 54.0 fl    MPV 7.9 6.0 - 12.0 fL    Platelets 725 (H) 140 - 450 10*3/mm3    Neutrophil % 61.0 42.7 - 76.0 %    Lymphocyte % 29.2 19.6 - 45.3 %    Monocyte % 6.1 5.0 - 12.0 %    Eosinophil % 3.1 0.3 - 6.2 %    Basophil % 0.4 0.0 - 1.5 %    Immature Grans % 0.2 0.0 - 0.5 %    Neutrophils, Absolute 4.97 1.70 - 7.00 10*3/mm3    Lymphocytes, Absolute 2.38 0.70 - 3.10 10*3/mm3    Monocytes, Absolute 0.50 0.10 - 0.90 10*3/mm3    Eosinophils, Absolute 0.25 0.00 - 0.40 10*3/mm3    Basophils, Absolute 0.03  0.00 - 0.20 10*3/mm3    Immature Grans, Absolute 0.02 0.00 - 0.05 10*3/mm3    nRBC 0.0 0.0 - 0.2 /100 WBC   Sedimentation Rate    Specimen: Blood   Result Value Ref Range    Sed Rate 3 0 - 20 mm/hr   Ferritin    Specimen: Blood   Result Value Ref Range    Ferritin 46.90 30.00 - 400.00 ng/mL   Iron Profile    Specimen: Blood   Result Value Ref Range    Iron 38 (L) 59 - 158 mcg/dL    Iron Saturation (TSAT) 11 (L) 20 - 50 %    Transferrin 230 200 - 360 mg/dL    TIBC 343 298 - 536 mcg/dL   Last urine microalbumin creatinine ratio was 26 on 11/3/2024  Lab Results   Component Value Date    HGBA1C 6.10 (H) 05/31/2024     Lab Results   Component Value Date    CHOL 110 05/31/2024    CHLPL 134 11/03/2023    TRIG 79 05/31/2024    HDL 26 (L) 05/31/2024    LDL 68 05/31/2024           Did see Dr. Mary for hematology consult on 7/19/2024 for evaluation of mild thrombocytopenia and mild normocytic anemia.  And noted patient had failed oral iron therapy and ordered IV iron replacement.  Also did additional labs due to his thrombocytosis and stroke history--BCR-ABL FISH, MAGDA V617F with reflex to CALR, exon 12 and MPL   Noted to continue aspirin and Plavix patient has history of CVA from April 2024 with status post tPA, right ICA stent  Sees Dr. 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  Had transurethral resections X 2  Was seen by cardiology Dr. Mayes for reevaluation.  Noting EKG with Q waves in inferior leads but echo was negative for wall motion abnormality.  Noted patient history of nonsustained supraventricular tachycardia, CVA, benign hypertension with plans to continue current medications for hypertension follow-up 1 year  Was seen by ESCOBAR Handy for left shoulder pain injected with methylprednisone  I saw him for hospital follow-up on 5/30/2024 noting the stroke history with surgical intervention.  PEG placed 4/12/2024.  Klebsiella pneumonia also treated while  inpatient in April.  Last EGD 4/3/2024 noting esophageal ulcers.  Also noted liver ultrasound 4/14/2024 noting fatty liver and watching liver enzymes.  Also noted by neurology still has weakness on his left side and needs assistance to ambulate.  Ordered follow-up chest x-ray to confirm resolution of pneumonia, referred to Ortho for the shoulder pain.  G-tube removed by Dr. Brand general surgeon.  To follow-up with neurology for their recent stroke.  Also next appointment with neurosurgery Dr. Rubio from the thrombectomy is scheduled for 9/10/2024.  Also noted chronic GERD and started PPI therapy with Protonix.  Saw ESCOBAR Handy yesterday f/u left shoulder--no pain since steroid injection!!!!  Chest x-ray for follow-up pneumonia was performed on 5/31/2024 noting FINDINGS: There is improved lung expansion with nearly complete  resolution of patchy pneumonia at the right base noted on the study of  4/10/2024. There remains some minimal residual atelectasis or scarring.  The lungs are otherwise clear. The heart remains enlarged.:  Also has follow-up CT of chest expected 12/24/2024 for 6-month follow-up for lung nodules  Was seen by his urologist on June 4, 2024 Dr. 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  Continued Murillo catheter with plans to repeat cystoscopy in 3 to 4 weeks and then remove Murillo possibly at that time.      Trying to use left arm more----gets spasms in leon   The following portions of the patient's history were reviewed and updated as appropriate: allergies, current medications, past family history, past medical history, past social history, past surgical history, and problem list.    Review of Systems   Constitutional:  Negative for diaphoresis.   HENT:  Negative for nosebleeds and trouble swallowing.    Eyes:  Negative for blurred vision and visual disturbance.   Respiratory:  Negative for  choking.    Gastrointestinal:  Negative for blood in stool.   Allergic/Immunologic: Negative for immunocompromised state.   Neurological:  Positive for numbness. Negative for facial asymmetry and speech difficulty.   Psychiatric/Behavioral:  Negative for self-injury and suicidal ideas.        Objective   Physical Exam  Vitals and nursing note reviewed.   Constitutional:       General: He is not in acute distress.     Appearance: Normal appearance. He is well-developed. He is not diaphoretic.   HENT:      Head: Normocephalic.   Eyes:      Conjunctiva/sclera: Conjunctivae normal.      Pupils: Pupils are equal, round, and reactive to light.   Cardiovascular:      Rate and Rhythm: Normal rate and regular rhythm.      Heart sounds: Normal heart sounds. No murmur heard.  Pulmonary:      Effort: Pulmonary effort is normal.      Breath sounds: Normal breath sounds.   Abdominal:      Hernia: A hernia is present.      Comments: Abd wall hernia RUQ---no pain---3cm   Musculoskeletal:         General: Normal range of motion.      Cervical back: Normal range of motion and neck supple.      Right lower leg: No edema.      Left lower leg: No edema.      Comments: Hold left arm   Skin:     General: Skin is warm and dry.      Findings: No rash.   Neurological:      General: No focal deficit present.      Mental Status: He is alert and oriented to person, place, and time.      Comments: As noted by neurology he still has weakness on his left side and needs assistance to ambulate           Psychiatric:         Mood and Affect: Affect is not inappropriate.         Behavior: Behavior normal.         Thought Content: Thought content normal.         Judgment: Judgment normal.           Assessment & Plan   Diagnoses and all orders for this visit:    1. Benign essential HTN (Primary)  -     Comprehensive metabolic panel  -     Lipid panel  -     TSH  -     Hemoglobin A1c  -     T4, Free  -     Vitamin B12  -     Folate  -     Vitamin  D,25-Hydroxy  -     Magnesium  -     Microalbumin / Creatinine Urine Ratio - Urine, Clean Catch    2. Hyperlipidemia, mixed  -     Comprehensive metabolic panel  -     Lipid panel  -     TSH  -     Hemoglobin A1c  -     T4, Free  -     Vitamin B12  -     Folate  -     Vitamin D,25-Hydroxy  -     Magnesium  -     Microalbumin / Creatinine Urine Ratio - Urine, Clean Catch    3. Gastroesophageal reflux disease without esophagitis  -     Comprehensive metabolic panel  -     Lipid panel  -     TSH  -     Hemoglobin A1c  -     T4, Free  -     Vitamin B12  -     Folate  -     Vitamin D,25-Hydroxy  -     Magnesium  -     Microalbumin / Creatinine Urine Ratio - Urine, Clean Catch    4. Fatty liver disease, nonalcoholic  -     Comprehensive metabolic panel  -     Lipid panel  -     TSH  -     Hemoglobin A1c  -     T4, Free  -     Vitamin B12  -     Folate  -     Vitamin D,25-Hydroxy  -     Magnesium  -     Microalbumin / Creatinine Urine Ratio - Urine, Clean Catch    5. Type 2 diabetes mellitus without complication, without long-term current use of insulin  -     Comprehensive metabolic panel  -     Lipid panel  -     TSH  -     Hemoglobin A1c  -     T4, Free  -     Vitamin B12  -     Folate  -     Vitamin D,25-Hydroxy  -     Magnesium  -     Microalbumin / Creatinine Urine Ratio - Urine, Clean Catch    6. Malignant neoplasm of urinary bladder, unspecified site  -     Comprehensive metabolic panel  -     Lipid panel  -     TSH  -     Hemoglobin A1c  -     T4, Free  -     Vitamin B12  -     Folate  -     Vitamin D,25-Hydroxy  -     Magnesium  -     Microalbumin / Creatinine Urine Ratio - Urine, Clean Catch    7. Iron deficiency anemia, unspecified iron deficiency anemia type  -     Comprehensive metabolic panel  -     Lipid panel  -     TSH  -     Hemoglobin A1c  -     T4, Free  -     Vitamin B12  -     Folate  -     Vitamin D,25-Hydroxy  -     Magnesium  -     Microalbumin / Creatinine Urine Ratio - Urine, Clean Catch    8.  History of embolic stroke  -     Comprehensive metabolic panel  -     Lipid panel  -     TSH  -     Hemoglobin A1c  -     T4, Free  -     Vitamin B12  -     Folate  -     Vitamin D,25-Hydroxy  -     Magnesium  -     Microalbumin / Creatinine Urine Ratio - Urine, Clean Catch    9. Lung nodule seen on imaging study  -     Comprehensive metabolic panel  -     Lipid panel  -     TSH  -     Hemoglobin A1c  -     T4, Free  -     Vitamin B12  -     Folate  -     Vitamin D,25-Hydroxy  -     Magnesium  -     Microalbumin / Creatinine Urine Ratio - Urine, Clean Catch    10. H/O stroke without residual deficits  -     Ambulatory Referral to Physical Therapy for Evaluation & Treatment  -     Ambulatory Referral to Occupational Therapy  -     Comprehensive metabolic panel  -     Lipid panel  -     TSH  -     Hemoglobin A1c  -     T4, Free  -     Vitamin B12  -     Folate  -     Vitamin D,25-Hydroxy  -     Magnesium  -     Microalbumin / Creatinine Urine Ratio - Urine, Clean Catch    11. Lumbar radiculopathy  -     gabapentin (NEURONTIN) 100 MG capsule; Take 2 capsules by mouth 2 (Two) Times a Day. For neuropathy pain  Dispense: 120 capsule; Refill: 5  -     Comprehensive metabolic panel  -     Lipid panel  -     TSH  -     Hemoglobin A1c  -     T4, Free  -     Vitamin B12  -     Folate  -     Vitamin D,25-Hydroxy  -     Magnesium  -     Microalbumin / Creatinine Urine Ratio - Urine, Clean Catch        Plan, Alex Negron, was seen today.  he was seen for HTN and continue medication, DMII managed with diet and exercise, GERD and will continue on PPI medication, Hyperlipidemia and will continue current medication, and Vitamin D deficiency and supplemented.    Shoulder pain is resolved and more active. Was not able to get home PT---will order outpatient PT and OT. ---able to walk some. ---has walker. ---has to be supervised  Cont [facility] bed-  Does have appointment with Dr. Rubio 9/10/2024 and to see hematology Dr. Mary for  follow-up 10/25/2024 and is seeing neurology for stroke clinic follow-up 11/20/2024  Also sees cardiology July 15, 2025  Continues to take gabapentin for the neuropathy pain from his lumbar spine and does help   Consider Baclofen for cramping left arm  Labs due Sept  Chest CT to follow-up on lung nodules scheduled for December

## 2024-07-24 ENCOUNTER — OFFICE VISIT (OUTPATIENT)
Dept: FAMILY MEDICINE CLINIC | Facility: CLINIC | Age: 79
End: 2024-07-24
Payer: MEDICAID

## 2024-07-24 VITALS
WEIGHT: 152 LBS | HEART RATE: 60 BPM | TEMPERATURE: 97 F | RESPIRATION RATE: 16 BRPM | SYSTOLIC BLOOD PRESSURE: 126 MMHG | HEIGHT: 71 IN | DIASTOLIC BLOOD PRESSURE: 66 MMHG | BODY MASS INDEX: 21.28 KG/M2 | OXYGEN SATURATION: 95 %

## 2024-07-24 DIAGNOSIS — R91.1 LUNG NODULE SEEN ON IMAGING STUDY: Chronic | ICD-10-CM

## 2024-07-24 DIAGNOSIS — Z86.73 H/O STROKE WITHOUT RESIDUAL DEFICITS: ICD-10-CM

## 2024-07-24 DIAGNOSIS — C67.9 MALIGNANT NEOPLASM OF URINARY BLADDER, UNSPECIFIED SITE: Chronic | ICD-10-CM

## 2024-07-24 DIAGNOSIS — K76.0 FATTY LIVER DISEASE, NONALCOHOLIC: Chronic | ICD-10-CM

## 2024-07-24 DIAGNOSIS — D50.9 IRON DEFICIENCY ANEMIA, UNSPECIFIED IRON DEFICIENCY ANEMIA TYPE: Chronic | ICD-10-CM

## 2024-07-24 DIAGNOSIS — I10 BENIGN ESSENTIAL HTN: Primary | Chronic | ICD-10-CM

## 2024-07-24 DIAGNOSIS — Z86.73 HISTORY OF EMBOLIC STROKE: Chronic | ICD-10-CM

## 2024-07-24 DIAGNOSIS — K21.9 GASTROESOPHAGEAL REFLUX DISEASE WITHOUT ESOPHAGITIS: Chronic | ICD-10-CM

## 2024-07-24 DIAGNOSIS — E11.9 TYPE 2 DIABETES MELLITUS WITHOUT COMPLICATION, WITHOUT LONG-TERM CURRENT USE OF INSULIN: Chronic | ICD-10-CM

## 2024-07-24 DIAGNOSIS — M54.16 LUMBAR RADICULOPATHY: ICD-10-CM

## 2024-07-24 DIAGNOSIS — E78.2 HYPERLIPIDEMIA, MIXED: Chronic | ICD-10-CM

## 2024-07-24 PROCEDURE — 3078F DIAST BP <80 MM HG: CPT | Performed by: PHYSICIAN ASSISTANT

## 2024-07-24 PROCEDURE — 1159F MED LIST DOCD IN RCRD: CPT | Performed by: PHYSICIAN ASSISTANT

## 2024-07-24 PROCEDURE — 1125F AMNT PAIN NOTED PAIN PRSNT: CPT | Performed by: PHYSICIAN ASSISTANT

## 2024-07-24 PROCEDURE — 3074F SYST BP LT 130 MM HG: CPT | Performed by: PHYSICIAN ASSISTANT

## 2024-07-24 PROCEDURE — 99214 OFFICE O/P EST MOD 30 MIN: CPT | Performed by: PHYSICIAN ASSISTANT

## 2024-07-24 PROCEDURE — 1160F RVW MEDS BY RX/DR IN RCRD: CPT | Performed by: PHYSICIAN ASSISTANT

## 2024-07-24 RX ORDER — GABAPENTIN 100 MG/1
200 CAPSULE ORAL 2 TIMES DAILY
Qty: 120 CAPSULE | Refills: 5 | Status: SHIPPED | OUTPATIENT
Start: 2024-07-24

## 2024-07-24 RX ORDER — GABAPENTIN 100 MG/1
200 CAPSULE ORAL 2 TIMES DAILY
COMMUNITY
End: 2024-07-24 | Stop reason: SDUPTHER

## 2024-07-29 ENCOUNTER — TELEPHONE (OUTPATIENT)
Dept: ORTHOPEDIC SURGERY | Facility: CLINIC | Age: 79
End: 2024-07-29
Payer: MEDICAID

## 2024-07-29 NOTE — TELEPHONE ENCOUNTER
Called the patient's daughter today as requested. No answer but left detailed message after verifying release.    Per discussion at the previous visit, I have talked with patient's PCP and we will refer Mr. Negron to PM&R for ongoing care and rehabilitation for his recent stroke. I will still plan to see him in follow up in September for the shoulder but felt like PM&R will be better able to help with his questions and requests about botox, general rehab etc.

## 2024-08-06 RX ORDER — FERROUS SULFATE 325(65) MG
325 TABLET ORAL
Qty: 30 TABLET | Refills: 3 | Status: SHIPPED | OUTPATIENT
Start: 2024-08-06

## 2024-08-06 NOTE — TELEPHONE ENCOUNTER
Daughter does not know when the patient last tried oral iron. She thinks he did not tolerate it, but is not positive. She is willing to have him try again. Educated her to have him take with food and report intolerance.

## 2024-08-14 RX ORDER — ROSUVASTATIN CALCIUM 20 MG/1
20 TABLET, COATED ORAL DAILY
Qty: 90 TABLET | Refills: 3 | OUTPATIENT
Start: 2024-08-14

## 2024-08-14 RX ORDER — INDAPAMIDE 1.25 MG/1
1.25 TABLET ORAL DAILY
Qty: 90 TABLET | Refills: 1 | OUTPATIENT
Start: 2024-08-14

## 2024-08-15 ENCOUNTER — TELEPHONE (OUTPATIENT)
Dept: NEUROLOGY | Facility: CLINIC | Age: 79
End: 2024-08-15

## 2024-08-15 NOTE — TELEPHONE ENCOUNTER
Provider: ALCIRA JOHNSON    Caller: JEREMÍAS    Relationship to Patient: DAUGHTER    Phone Number: [phone]    Reason for Call: STATES PATIENT IS HAVING ISSUES WITH MUSCLE SPASMS IN HIS LEFT HAND & ARM. STATES HIS ORTHOPEDIC PROVIDER & PCP RECOMMENDED TO SEE IF HIS NEUROLOGIST COULD SEE HIM SOONER FOR THIS ISSUE. HIS OTHRO PROVIDER SUGGESTED BOTOX THERAPY MAY BE HELPFUL. WANTED TO GET PROVIDER'S OPINION. PLEASE REVIEW & ADVISE, THANK YOU.

## 2024-08-15 NOTE — PROGRESS NOTES
Subjective   Patient ID: [id number] Negron is a 79 y.o. male is here today for follow-up for 6 mo hosp f/up with carotid doppler prior. Duplex Carotid US was completed on 09-.  History of Present Illness  79 y.o. male refugee from [facility] with no significant prior medical history who presented to the emergency department after a witnessed onset of right MCA syndrome.  His daughter reported that he collapsed with left-sided weakness.  He was brought to the emergency department and underwent team D imaging with CT head, CT perfusion and CTA.  The studies revealed a right proximal internal carotid artery occlusion with terminal ICA occlusion extending into the middle cerebral artery.  Perfusion showed approximately 55 cc core infarct.  Patient received tenecteplase.  He underwent endovascular intervention with a right carotid stent placed after suction thrombectomy cleared the ICA and MCA with TICI 3C flow seen.  Postoperatively he was extubated and subsequently went to rehab with improvement in his left-sided weakness.  He now returns for a carotid Doppler follow-up to confirm stent patency and no progression of disease on the left.  Despite his improvement physically there is still concern by the family of his memory and they are interested in trying Aricept.  He is currently on Plavix and aspirin for his stent in addition to Lipitor as well as blood pressure medications.      The following portions of the patient's history were reviewed and updated as appropriate: He  has a past medical history of Anxiety, Aortic atherosclerosis, Bladder cancer, Bladder mass (06/07/2023), Bladder tumor (05/2023), Blind left eye, Blood in urine, Burning with urination, Chronic back pain, DDD (degenerative disc disease), lumbar, GERD (gastroesophageal reflux disease), History of low back pain, Hyperlipidemia, Hypertension, PTSD (post-traumatic stress disorder), Renal insufficiency, Sleep apnea (2023), and Urine frequency.  He  does not have any pertinent problems on file.  He  has a past surgical history that includes Dental surgery; Transurethral resection of bladder tumor (N/A, 06/07/2023); Esophagogastroduodenoscopy; Transurethral resection of bladder tumor (N/A, 10/18/2023); Colonoscopy (N/A, 3/14/2024); Esophagogastroduodenoscopy (N/A, 4/3/2024); and Esophagogastroduodenoscopy w/ PEG (N/A, 4/12/2024).  His family history is not on file.  He  reports that he quit smoking about 24 years ago. His smoking use included cigarettes. He has never been exposed to tobacco smoke. He has never used smokeless tobacco. He reports that he does not currently use alcohol. He reports that he does not use drugs.  Current Outpatient Medications   Medication Sig Dispense Refill    aspirin 81 MG EC tablet Take 1 tablet by mouth Daily. 90 tablet 3    atorvastatin (Lipitor) 80 MG tablet Take 1 tablet by mouth Daily. For cholesterol 90 tablet 3    ferrous sulfate 325 (65 FE) MG tablet One PO daily with food for anemia 30 tablet 5    ferrous sulfate 325 (65 FE) MG tablet Take 1 tablet by mouth Daily With Breakfast. 30 tablet 3    gabapentin (NEURONTIN) 100 MG capsule Take 2 capsules by mouth 2 (Two) Times a Day. For neuropathy pain 120 capsule 5    Menthol, Topical Analgesic, (BIOFREEZE EX) Apply  topically. Shoulder      metoprolol tartrate (LOPRESSOR) 50 MG tablet Take 1 tablet by mouth Every 12 (Twelve) Hours. 180 tablet 1    NON FORMULARY Take 2 each by mouth Every Morning. Eye health      pantoprazole (Protonix) 40 MG EC tablet Take 1 tablet by mouth Daily. For GERD 90 tablet 3    phenazopyridine (PYRIDIUM) 200 MG tablet Take 1 tablet by mouth 3 times a day.      polyethylene glycol (MIRALAX) 17 g packet Take 17 g by mouth Daily As Needed (Use if senna-docusate is ineffective).      polyethylene Glycol 400 1 % solution ophthalmic solution Administer 1 drop to both eyes Every 3 (Three) Hours As Needed for Dry Eyes.      tamsulosin (FLOMAX) 0.4 MG capsule  24 hr capsule Take 1 capsule by mouth Daily.      acetaminophen (TYLENOL) 500 MG tablet Take 1 tablet by mouth Every 6 (Six) Hours As Needed for Mild Pain. (Patient not taking: Reported on 9/10/2024)      calcium carbonate (TUMS) 500 MG chewable tablet Chew 1 tablet As Needed for Indigestion or Heartburn.      donepezil (Aricept) 5 MG tablet Take 1 tablet by mouth Every Night. 30 tablet 6     No current facility-administered medications for this visit.     Current Outpatient Medications on File Prior to Visit   Medication Sig    aspirin 81 MG EC tablet Take 1 tablet by mouth Daily.    atorvastatin (Lipitor) 80 MG tablet Take 1 tablet by mouth Daily. For cholesterol    ferrous sulfate 325 (65 FE) MG tablet One PO daily with food for anemia    ferrous sulfate 325 (65 FE) MG tablet Take 1 tablet by mouth Daily With Breakfast.    gabapentin (NEURONTIN) 100 MG capsule Take 2 capsules by mouth 2 (Two) Times a Day. For neuropathy pain    Menthol, Topical Analgesic, (BIOFREEZE EX) Apply  topically. Shoulder    metoprolol tartrate (LOPRESSOR) 50 MG tablet Take 1 tablet by mouth Every 12 (Twelve) Hours.    NON FORMULARY Take 2 each by mouth Every Morning. Eye health    pantoprazole (Protonix) 40 MG EC tablet Take 1 tablet by mouth Daily. For GERD    phenazopyridine (PYRIDIUM) 200 MG tablet Take 1 tablet by mouth 3 times a day.    polyethylene glycol (MIRALAX) 17 g packet Take 17 g by mouth Daily As Needed (Use if senna-docusate is ineffective).    polyethylene Glycol 400 1 % solution ophthalmic solution Administer 1 drop to both eyes Every 3 (Three) Hours As Needed for Dry Eyes.    tamsulosin (FLOMAX) 0.4 MG capsule 24 hr capsule Take 1 capsule by mouth Daily.    [DISCONTINUED] amLODIPine (NORVASC) 10 MG tablet Take 1 tablet by mouth Daily. For BP and for heart rate    [DISCONTINUED] clopidogrel (PLAVIX) 75 MG tablet Take 1 tablet by mouth Daily. For stroke    acetaminophen (TYLENOL) 500 MG tablet Take 1 tablet by mouth Every  "6 (Six) Hours As Needed for Mild Pain. (Patient not taking: Reported on 9/10/2024)    calcium carbonate (TUMS) 500 MG chewable tablet Chew 1 tablet As Needed for Indigestion or Heartburn.     No current facility-administered medications on file prior to visit.     He has No Known Allergies..    Review of Systems   Musculoskeletal:  Positive for neck pain and neck stiffness.   Neurological:  Negative for headaches.   All other systems reviewed and are negative.    Objective     Vitals:    09/10/24 1124   BP: 140/70   Pulse: 66   Temp: 96.9 °F (36.1 °C)   SpO2: 98%   Height: 180 cm (70.87\")     Body mass index is 21.28 kg/m².      Physical Exam  Vitals and nursing note reviewed.   Constitutional:       General: He is not in acute distress.     Appearance: He is normal weight.   Eyes:      Extraocular Movements: Extraocular movements intact.   Cardiovascular:      Rate and Rhythm: Normal rate.   Pulmonary:      Effort: Pulmonary effort is normal.   Musculoskeletal:         General: Normal range of motion.      Cervical back: Normal range of motion.   Skin:     General: Skin is warm and dry.   Neurological:      Mental Status: He is alert and oriented to person, place, and time. Mental status is at baseline.      Cranial Nerves: No cranial nerve deficit.      Sensory: Sensory deficit present.      Motor: Weakness present.      Coordination: Coordination normal.       Neurologic Exam     Mental Status   Oriented to person, place, and time.     Assessment & Plan   Independent Review of Radiographic Studies:      I personally reviewed the images from the following studies.    The carotid Doppler sonogram dated 9/3/2024 shows a widely patent right ICA stent with no evidence of in-stent stenosis.  Antegrade flow seen in the vertebral arteries bilaterally and no significant stenosis is present on the left.    Medical Decision Makin-year-old male with a CVA and subsequent right carotid stent which remains widely " patent.  Patient was on DAPT and can now stop his Plavix and continue on aspirin.  There is concern about his memory and the family wishes to try Aricept which has shown some benefit in the stroke patients.  We will give this a try and he can be evaluated for any improvement at his neurology appointment in November 2024.  Patient continues to work on his deficits and weakness with physical therapy and uses a cane for ambulation.  He is to return in 2 years for another carotid Doppler sonogram evaluation unless new symptoms arise prior.  He continues to have some neglect on the left.  Diagnoses and all orders for this visit:    1. Benign essential HTN (Primary)    2. Hyperlipidemia, mixed    3. Right-sided extracranial carotid artery stenosis    Other orders  -     donepezil (Aricept) 5 MG tablet; Take 1 tablet by mouth Every Night.  Dispense: 30 tablet; Refill: 6      Return in about 2 years (around 9/10/2026) for Recheck, Carotid Doppler Ultrasound.  Keep his stroke neurology follow-up appointment in November 2024.  He may stop his Plavix and continue on aspirin indefinitely.

## 2024-08-16 ENCOUNTER — TELEPHONE (OUTPATIENT)
Dept: FAMILY MEDICINE CLINIC | Facility: CLINIC | Age: 79
End: 2024-08-16

## 2024-08-16 NOTE — TELEPHONE ENCOUNTER
I spoke with pt's daughter (Radha) and she says that she does not need an .  I communicated the provider's response to her most recent call. She voices understanding and says that she will call our office back if she needs to.  Phone number provided with this phone call.

## 2024-08-19 ENCOUNTER — TELEPHONE (OUTPATIENT)
Dept: CASE MANAGEMENT | Facility: OTHER | Age: 79
End: 2024-08-19
Payer: MEDICAID

## 2024-08-19 ENCOUNTER — HOSPITAL ENCOUNTER (OUTPATIENT)
Dept: OCCUPATIONAL THERAPY | Facility: HOSPITAL | Age: 79
Setting detail: THERAPIES SERIES
Discharge: HOME | End: 2024-08-19
Payer: MEDICAID

## 2024-08-19 ENCOUNTER — HOSPITAL ENCOUNTER (OUTPATIENT)
Dept: PHYSICAL THERAPY | Facility: HOSPITAL | Age: 79
Discharge: HOME | End: 2024-08-19
Admitting: PHYSICIAN ASSISTANT
Payer: MEDICAID

## 2024-08-19 ENCOUNTER — PATIENT OUTREACH (OUTPATIENT)
Dept: CASE MANAGEMENT | Facility: OTHER | Age: 79
End: 2024-08-19
Payer: MEDICAID

## 2024-08-19 DIAGNOSIS — M62.89 ABNORMAL INCREASED MUSCLE TONE: ICD-10-CM

## 2024-08-19 DIAGNOSIS — R27.8 LOSS OF COORDINATION: ICD-10-CM

## 2024-08-19 DIAGNOSIS — R26.89 FUNCTIONAL GAIT ABNORMALITY: ICD-10-CM

## 2024-08-19 DIAGNOSIS — I69.334 MONOPLEGIA OF UPPER EXTREMITY FOLLOWING CEREBRAL INFARCTION AFFECTING LEFT NON-DOMINANT SIDE: ICD-10-CM

## 2024-08-19 DIAGNOSIS — Z86.73 HISTORY OF EMBOLIC STROKE: ICD-10-CM

## 2024-08-19 DIAGNOSIS — R53.1 DECREASED RANGE OF MOTION WITH DECREASED STRENGTH: ICD-10-CM

## 2024-08-19 DIAGNOSIS — Z71.89 COMPLEX CARE COORDINATION: ICD-10-CM

## 2024-08-19 DIAGNOSIS — I63.89 CEREBROVASCULAR ACCIDENT (CVA) DUE TO OTHER MECHANISM: Primary | ICD-10-CM

## 2024-08-19 DIAGNOSIS — Z74.09 DECREASED INDEPENDENCE WITH TRANSFERS: ICD-10-CM

## 2024-08-19 DIAGNOSIS — M25.60 DECREASED RANGE OF MOTION WITH DECREASED STRENGTH: ICD-10-CM

## 2024-08-19 DIAGNOSIS — I69.30 H/O: STROKE WITH RESIDUAL EFFECTS: Primary | ICD-10-CM

## 2024-08-19 DIAGNOSIS — R53.1 WEAKNESS OF LEFT SIDE OF BODY: Primary | ICD-10-CM

## 2024-08-19 PROCEDURE — 97166 OT EVAL MOD COMPLEX 45 MIN: CPT

## 2024-08-19 PROCEDURE — 97162 PT EVAL MOD COMPLEX 30 MIN: CPT

## 2024-08-19 NOTE — THERAPY EVALUATION
.Outpatient Physical Therapy Neuro Initial Evaluation  [facility]     Patient Name: Alex Negron  : 1945  MRN: [id number]  Today's Date: 2024      Visit Date: 2024    Patient Active Problem List   Diagnosis    History of embolic stroke    Right-sided extracranial carotid artery stenosis    Gastroesophageal reflux disease    Cerebrovascular accident (CVA)    Dysphagia    Elevated LFTs    Fatty liver disease, nonalcoholic    PEG (percutaneous endoscopic gastrostomy) status    Urinary retention    Benign essential HTN    Hyperlipidemia, mixed    Degeneration of lumbar or lumbosacral intervertebral disc    Iron deficiency anemia    Impaired fasting glucose    Elevated platelet count    Bladder mass    Malignant neoplasm of urinary bladder    Type 2 diabetes mellitus without complication, without long-term current use of insulin    Bladder cancer    Encounter for screening for malignant neoplasm of colon    Bilateral hip pain    Lumbar radiculopathy    Lumbar facet arthropathy    Nonsustained supraventricular tachycardia        Past Medical History:   Diagnosis Date    Anxiety     Aortic atherosclerosis     Bladder cancer     Bladder mass 2023    Bladder tumor 2023    chemical chemo in [place] office  weekly    Blind left eye     Blood in urine     TRACE    Burning with urination     Chronic back pain     neuropathy rolando legs    DDD (degenerative disc disease), lumbar     GERD (gastroesophageal reflux disease)     History of low back pain     Hyperlipidemia     Hypertension     PTSD (post-traumatic stress disorder)     [place]    Renal insufficiency     Sleep apnea     diagnosed but following up with PCP    Urine frequency         Past Surgical History:   Procedure Laterality Date    COLONOSCOPY N/A 3/14/2024    Procedure: COLONOSCOPY TO CECUM WITH COLD SNARE POLYPECTOMIES;  Surgeon: Magdaleno Mena MD;  Location: [facility] ENDOSCOPY;  Service:  Gastroenterology;  Laterality: N/A;  PRE OP - SCREENING  -POST OP - COLON POLYPS,HEMORRHOIDS    DENTAL PROCEDURE      ENDOSCOPY      ENDOSCOPY N/A 4/3/2024    Procedure: ESOPHAGOGASTRODUODENOSCOPY AT BEDSIDE;  Surgeon: Redd Guidry MD;  Location: [facility] ENDOSCOPY;  Service: Gastroenterology;  Laterality: N/A;  PRE-  GI BLEED  POST- HIATAL HERNIA, DISTAL ESOPHAGITIS ULCERS, GASTRITIS, PYLOROPLASTY    ENDOSCOPY W/ PEG TUBE PLACEMENT N/A 4/12/2024    Procedure: ESOPHAGOGASTRODUODENOSCOPY WITH PERCUTANEOUS ENDOSCOPIC GASTROSTOMY TUBE INSERTION;  Surgeon: Dimas Brand MD;  Location: [facility] ENDOSCOPY;  Service: General;  Laterality: N/A;  Dysphagia    TRANSURETHRAL RESECTION OF BLADDER TUMOR N/A 06/07/2023    Procedure: TRANSURETHRAL RESECTION OF BLADDER TUMOR;  Surgeon: Robert Haque MD;  Location: [facility] MAIN OR;  Service: Urology;  Laterality: N/A;    TRANSURETHRAL RESECTION OF BLADDER TUMOR N/A 10/18/2023    Procedure: CYSTOSCOPY TRANSURETHRAL RESECTION OF BLADDER TUMOR;  Surgeon: Robert Haque MD;  Location: [facility] MAIN OR;  Service: Urology;  Laterality: N/A;         Visit Dx:     ICD-10-CM ICD-9-CM   1. H/O: stroke with residual effects  I69.30 438.9   2. Abnormal increased muscle tone  M62.89 728.85   3. Functional gait abnormality  R26.89 781.2   4. Decreased independence with transfers  Z74.09 V62.89        Patient History       Row Name 08/19/24 0920             History    Chief Complaint Balance Problems;Difficulty Walking  -LB      Date Current Problem(s) Began 04/01/24  -LB      Brief Description of Current Complaint Mr. Negron is 80 y/o male who sustained a MCA stroke with left residual effects  -LB      Patient/Caregiver Goals Improve mobility  -LB      Patient/Caregiver Goals Comment Improve tone and mobility  -LB         Fall Risk Assessment    Any falls in the past year: Yes  -LB      Number of falls reported in the last 12 months caregiver was not certain of number but  reports no injury from the falls  -LB         Services    Are you currently receiving Home Health services No  -LB      Do you plan to receive Home Health services in the near future No  -LB         Daily Activities    Primary Language [place]  [facility]  -LB      Action taken if English not primary language Interpretor pad  -LB      Pt Participated in POC and Goals Yes  -LB                User Key  (r) = Recorded By, (t) = Taken By, (c) = Cosigned By      Initials Name Provider Type    LB Jacquelyn Woods, PT Physical Therapist                         PT Neuro       Row Name 08/19/24 0606             Subjective    Subjective Comments Caregiver present with Cuauhtemoc.   pad was utilized for [place] per caregiver request  -LB         Precautions and Contraindications    Precautions/Limitations fall precautions  -LB         Subjective Pain    Able to rate subjective pain? no  Not easily today due to limited time  -LB      Subjective Pain Comment Uncertain if it was pain or just tightness in the left knee and reports of left shoulder pain.  -LB         Home Living    Home Accessibility stairs to enter home  -LB      Stairs Comment, Main Entrance Caregiver reports steps but that the patient performs with the use of handrails  -LB         Vision-Basic Assessment    Current Vision --  Reports there are glasses to help with his vision and balance.  Patient was not wearing any glasses  -LB         Cognition    Overall Cognitive Status Unable to assess  -LB      Arousal/Alertness Delayed responses to stimuli  -LB      Memory Unable to assess  -LB      Orientation Level Oriented to person;Oriented to situation  -LB      Safety Judgment Decreased awareness of need for assistance  -LB      Deficits Decreased awareness of deficits  -LB         Sensation    Additional Comments Denies numbness and tingling  -LB         Perception    Inattention/Neglect Cues to attend to left side of body;Cues to attend left visual field  -LB          Posture/Observations    Posture/Observations Comments Pt arrived to clinic, walking up with cargiver no AD.  -LB         General ROM    LT Lower Ext Lt Hip Flexion;Lt Knee Extension/Flexion;Lt Ankle Dorsiflexion  -LB      GENERAL ROM COMMENTS Right LE is WFL  -LB         Left Lower Ext    Lt Hip Flexion AROM WFL  -LB      Lt Knee Extension/Flexion AROM roughly 15 degrees from full extension; flexion WFL  -LB      Lt Ankle Dorsiflexion PROM WFL  -LB         MMT (Manual Muscle Testing)    Rt Lower Ext Rt Hip WFL;Rt Knee WFL;Rt Ankle WFL  -LB      Lt Lower Ext Lt Hip Flexion;Lt Knee Extension;Lt Knee Flexion;Lt Ankle Dorsiflexion;Lt Hip ABduction  -LB         MMT Left Lower Ext    Lt Hip Flexion MMT, Gross Movement (4-/5) good minus  -LB      Lt Hip ABduction MMT, Gross Movement (3+/5) fair plus  -LB      Lt Knee Extension MMT, Gross Movement (4/5) good  -LB      Lt Knee Flexion MMT, Gross Movement (4/5) good  -LB      Lt Ankle Dorsiflexion MMT, Gross Movement (4-/5) good minus  -LB      Lt Lower Extremity Comments  Increased difficulty to manual muscle test due to language barrier even with the use of   -LB         Bed Mobility    Comment, (Bed Mobility) Did not have time to complete  -LB         Transfers    Sit-Stand Scott (Transfers) standby assist;contact guard  Patient keeps left foot forward slightly  -LB      Stand-Sit Scott (Transfers) contact guard  Manual and verbal cues to line up with chair.  Caregiver voiced this is an issue at home  -LB      Transfers, Sit-Stand-Sit, Assist Device --  No assistive device  -LB      Transfer, Impairments impaired balance;muscle tone abnormal  -LB      Comment, (Transfers) Patient utilizing right lower extremity more than left lower extremity during activity.  Decreased forward weight shift  -LB         Gait/Stairs (Locomotion)    Scott Level (Gait) verbal cues;minimum assist (75% patient effort);nonverbal cues (demo/gesture);contact  guard  -LB      Assistive Device (Gait) --  no AD  -LB      Distance in Feet (Gait) 100  50  -LB      Pattern (Gait) step-to  -LB      Deviations/Abnormal Patterns (Gait) bilateral deviations;base of support, narrow;gait speed decreased;stride length decreased;weight shifting decreased  -LB      Bilateral Gait Deviations forward flexed posture  left UE pulls into flexion synergy  -LB      Left Sided Gait Deviations decreased knee extension;heel strike decreased  decreased stride length; decreased stance time; left hip retraction  -LB      Gait Assessment/Intervention Attempted a trial with a quad tip cane.  Patient had difficulty with sequencing with a cane, increased base of support, increased left hip retraction and step length.  -LB         Balance Skills Training    Standing-Level of Assistance Contact guard;Minimum assistance  -LB      Static Standing Balance Support No upper extremity supported  -LB      Standing-Balance Activities Reaching across midline  -LB      Standing Balance # of Minutes Patient weightbearing on right lower extremity more during activity; left knee flexed  -LB      Balance Comments See TUG and Tinetti  -LB                User Key  (r) = Recorded By, (t) = Taken By, (c) = Cosigned By      Initials Name Provider Type    Jacquelyn Florentino, PT Physical Therapist                            Therapy Education  Education Details: PT POC with therapy 2X a week, goals to improve balance  Given: Other (comment)  Program: New  How Provided: Verbal, Demonstration  Provided to: Patient, Caregiver  Level of Understanding: Verbalized     PT OP Goals       Row Name 08/19/24 1200          PT Short Term Goals    STG 1 Patient and caregiverto be independent with beginner HEP including balance and strength.  -LB     STG 2 Patient ambulate 80 feet contact-guard with AAD and CGA with increased amplitude of left LE  -LB     STG 3 Patient a send descend 6 inch curb contact-guard assist with least restrictive  device.  -LB     STG 4 Patient to perform sit to stand from armchair with equal weight shift contact-guard assist.  -LB     STG 5 Pt to stand with increased knee extension and weight bearing on left while performing reaching activities  -LB     Additional STG's STG 6  -LB     STG 6 Patient to complete a TUG and less than 25 seconds indicating less risk for falls  -LB        Long Term Goals    LTG Date to Achieve 11/11/24  -LB     LTG 1 Patient and caregiver to be independent with advanced HEP including lower extremity strength and balance.  -LB     LTG 2 Pt to improve Tinetti to 15/28 indicate improved standing balance  -LB     LTG 3 Patient to complete a TUG less than 15 seconds indicating less risk for falls  -LB     LTG 4 Patient to ambulate 150 feet with appropriate assistive device and standby assist with equal step length at least 50% of the time  -LB     LTG 5 Patient to negotiate steps with 1 handrail and CGA  -LB     LTG 6 Patient to stand and Romberg for 30 seconds with CGA  -LB     LTG 7 Increase left knee ROM to -5 degrees from extension  -LB        Time Calculation    PT Goal Re-Cert Due Date 09/30/24  -LB               User Key  (r) = Recorded By, (t) = Taken By, (c) = Cosigned By      Initials Name Provider Type    LB Jacquelyn Woods, PT Physical Therapist                     PT Assessment/Plan       Row Name 08/19/24 1050          PT Assessment    Functional Limitations Impaired gait;Performance in self-care ADL;Limitation in home management;Decreased safety during functional activities;Limitations in community activities;Performance in sport activities  -LB     Impairments Balance;Coordination;Endurance;Gait;Impaired attention;Impaired muscle length;Locomotion;Motor function;Muscle strength  -LB     Assessment Comments Alex is a pleasant 80 y/o male with history of stroke in April of this year with residual left sided tone, weakness and decreased indep  with functional mobility.  PMH includes HTN,  hyperlipidemia, type II DM, bladder cancer, and GERD. CVA was complicated with dysplasia/placement of PEG. PNA, and distal esophageal ulcers. Patient presents with left-sided weakness and tone. The patient does not speak English and interpertor pad was utilized for patient and caregiver.   The patient has decreased awareness of left side and decreased scanning to the left. The patient does have increased tone on left side but UE affected more than LE. The patient is at risk for falls as indicated by scores on Tinetti and TUG. Per caregiver the patient has shown good progress from initially having to use wheelchair to now ambulating with assist of 1 person and anticipate more progress with ongoing therapy. Plans for possible Botox injections to assist with tone.  The patient will benefit from therapy for strengthening, gait and balance.  -LB     Rehab Potential Good  -LB     Patient/caregiver participated in establishment of treatment plan and goals Yes  -LB     Patient would benefit from skilled therapy intervention Yes  -LB        PT Plan    PT Frequency 2x/week  -LB     Predicted Duration of Therapy Intervention (PT) 24 visits; 12-14 weeks  -LB     Planned CPT's? PT EVAL MOD COMPLELITY: 85855;PT THER PROC EA 15 MIN: 23468;PT THER ACT EA 15 MIN: 91116;PT NEUROMUSC RE-EDUCATION EA 15 MIN: 73693;PT GAIT TRAINING EA 15 MIN: 39458;PT RE-EVAL: 51583;PT SELF CARE/HOME MGMT/TRAIN EA 15: 84645  -LB     Physical Therapy Interventions (Optional Details) bed mobility training;gait training;gross motor skills;home exercise program;neuromuscular re-education;motor coordination training;patient/family education;ROM (Range of Motion);stair training;strengthening;transfer training  -LB               User Key  (r) = Recorded By, (t) = Taken By, (c) = Cosigned By      Initials Name Provider Type    LB Jacquelyn Woods, PT Physical Therapist                        OP Exercises       Row Name 08/19/24 0968             Subjective     "Subjective Comments Caregiver present with Cuauhtemoc.   pad was utilized for [place] per caregiver request  -LB         Subjective Pain    Able to rate subjective pain? no  Not easily today due to limited time  -LB      Subjective Pain Comment Uncertain if it was pain or just tightness in the left knee and reports of left shoulder pain.  -LB                User Key  (r) = Recorded By, (t) = Taken By, (c) = Cosigned By      Initials Name Provider Type    Jacquelyn Florentino, PT Physical Therapist                                Outcome Measure Options: Tinetti, Timed Up and Go (TUG)  Tinetti Assessment  Sitting Balance: Leans or slides in chair  Arises: Unable without help  Attempts to Rise: Able, requires > 1 attempt  Immediate Standing Balance (first 5 sec): Unsteady (sway/stagger/feet move)  Standing Balance: Unsteady  Sternal Nudge (feet close together): Begins to fall  Eyes Closed (feet close together): Steady  Turning 360 Degrees- Steps: Discontinuous steps  Turning 360 Degrees- Steadiness: Unsteady (staggers, grabs)  Sitting Down: Unsafe (misjudges distance, falls)  Tinetti Balance Score: 2  Gait Initiation (immediate after told \"go\"): Any hesitancy, multiple attempts to start  Step Length- Right Swing: Right swing foot passes Left stance leg  Step Length- Left Swing: Left swing foot does not pass Right  Foot Clearance- Right Foot: Right foot completely clears floor  Foot Clearance- Left Foot: Left foot does not completely clear floor  Step Symmetry: Right and Left step length unequal  Step Continuity: Steps appear continuous  Path (excursion): Mild/moderate deviation or use of aid  Trunk: Marked sway or uses device  Base of Support: Heels apart  Gait Score: 4  Tinetti Total Score: 6  Tinetti Assistive Device:  (CGA but not AD)  Timed Up and Go (TUG)  TUG Test 1: 33 seconds  Timed Up and Go Comments: CGA but no AD    Time Calculation:   Start Time: 0900  Stop Time: 0930  Time Calculation (min): 30 " min  Untimed Charges  PT Eval/Re-eval Minutes: 30  Total Minutes  Untimed Charges Total Minutes: 30   Total Minutes: 30   Therapy Charges for Today       Code Description Service Date Service Provider Modifiers Qty    30563813248 HC PT EVAL MOD COMPLEXITY 3 8/19/2024 Jacquelyn Woods, PT GP 1            PT G-Codes  Outcome Measure Options: Tinetti, Timed Up and Go (TUG)  Tinetti Total Score: 6  TUG Test 1: 33 seconds         Jacquelyn Woods, PT  8/19/2024

## 2024-08-19 NOTE — THERAPY EVALUATION
Outpatient Occupational Therapy Neuro Initial Evaluation  [facility]     Patient Name: Alex Negron  : 1945  MRN: [id number]  Today's Date: 2024      Visit Date: 2024    Patient Active Problem List   Diagnosis    History of embolic stroke    Right-sided extracranial carotid artery stenosis    Gastroesophageal reflux disease    Cerebrovascular accident (CVA)    Dysphagia    Elevated LFTs    Fatty liver disease, nonalcoholic    PEG (percutaneous endoscopic gastrostomy) status    Urinary retention    Benign essential HTN    Hyperlipidemia, mixed    Degeneration of lumbar or lumbosacral intervertebral disc    Iron deficiency anemia    Impaired fasting glucose    Elevated platelet count    Bladder mass    Malignant neoplasm of urinary bladder    Type 2 diabetes mellitus without complication, without long-term current use of insulin    Bladder cancer    Encounter for screening for malignant neoplasm of colon    Bilateral hip pain    Lumbar radiculopathy    Lumbar facet arthropathy    Nonsustained supraventricular tachycardia        Past Medical History:   Diagnosis Date    Anxiety     Aortic atherosclerosis     Bladder cancer     Bladder mass 2023    Bladder tumor 2023    chemical chemo in [place] office  weekly    Blind left eye     Blood in urine     TRACE    Burning with urination     Chronic back pain     neuropathy rolando legs    DDD (degenerative disc disease), lumbar     GERD (gastroesophageal reflux disease)     History of low back pain     Hyperlipidemia     Hypertension     PTSD (post-traumatic stress disorder)     [place] start of [place]    Renal insufficiency     Sleep apnea     diagnosed but following up with PCP    Urine frequency         Past Surgical History:   Procedure Laterality Date    COLONOSCOPY N/A 3/14/2024    Procedure: COLONOSCOPY TO CECUM WITH COLD SNARE POLYPECTOMIES;  Surgeon: Magdaleno Mena MD;  Location: [facility] ENDOSCOPY;  Service:  Gastroenterology;  Laterality: N/A;  PRE OP - SCREENING  -POST OP - COLON POLYPS,HEMORRHOIDS    DENTAL PROCEDURE      ENDOSCOPY      ENDOSCOPY N/A 4/3/2024    Procedure: ESOPHAGOGASTRODUODENOSCOPY AT BEDSIDE;  Surgeon: Redd Guidry MD;  Location: [facility] ENDOSCOPY;  Service: Gastroenterology;  Laterality: N/A;  PRE-  GI BLEED  POST- HIATAL HERNIA, DISTAL ESOPHAGITIS ULCERS, GASTRITIS, PYLOROPLASTY    ENDOSCOPY W/ PEG TUBE PLACEMENT N/A 4/12/2024    Procedure: ESOPHAGOGASTRODUODENOSCOPY WITH PERCUTANEOUS ENDOSCOPIC GASTROSTOMY TUBE INSERTION;  Surgeon: Dimas Brand MD;  Location: [facility] ENDOSCOPY;  Service: General;  Laterality: N/A;  Dysphagia    TRANSURETHRAL RESECTION OF BLADDER TUMOR N/A 06/07/2023    Procedure: TRANSURETHRAL RESECTION OF BLADDER TUMOR;  Surgeon: Robert Haque MD;  Location: [facility] MAIN OR;  Service: Urology;  Laterality: N/A;    TRANSURETHRAL RESECTION OF BLADDER TUMOR N/A 10/18/2023    Procedure: CYSTOSCOPY TRANSURETHRAL RESECTION OF BLADDER TUMOR;  Surgeon: Robert Haque MD;  Location: [facility] MAIN OR;  Service: Urology;  Laterality: N/A;         Visit Dx:      ICD-10-CM ICD-9-CM   1. Cerebrovascular accident (CVA) due to other mechanism  I63.89 434.91   2. 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 Loss of coordination  R27.8 781.3   4. Decreased range of motion with decreased strength  M25.60 719.50    R53.1 780.79            OT Neuro       Row Name 08/19/24 1000 08/19/24 0900          Precautions and Contraindications    Precautions/Limitations -- fall precautions  -BC        Home Living    Current Living Arrangements -- home  -BC     Home Accessibility -- stairs to enter home  -BC        Cognitive Assessment/Intervention    Current Cognitive/Communication Assessment -- impaired  -BC     Orientation Status (Cognition) -- oriented to;person;place;situation;verbal cues/prompts needed for  orientation  -BC     Follows Commands (Cognition) -- follows one-step commands;50-74% accuracy;delayed response/completion;increased processing time needed;initiation impaired;physical/tactile prompts required;follows two-step commands  -BC     Safety Issues Affecting Function (Mobility) -- judgment;problem-solving  -BC     Impairments Affecting Function (Mobility) -- balance;cognition;coordination;endurance/activity tolerance  -BC        Sensation    Sensation WNL? -- WFL  verb'd WFLs, to be futher assessed in future POC  -BC        Perception    Inattention/Neglect -- Cues to attend left visual field;Cues to attend to left side of body  -BC     Initiation -- Hand over hand to initiate tasks  -BC        Coordination    Coordination Tests -- Finger to nose eyes open  increased time, compensation w/ head to finger L side  -BC     Finger to Nose Eyes Open -- Left:  -BC     Box and Blocks Left --  -BC 4  -BC     Box and Blocks Right --  -BC 21  -BC        General ROM    LT Upper Ext -- Lt Shoulder ABduction;Lt Shoulder Extension;Lt Shoulder Flexion;Lt Elbow Extension/Flexion;Lt Elbow Supination;Lt Elbow Pronation  -BC        Left Upper Ext    Lt Shoulder Abduction AROM -- 65  -BC     Lt Shoulder Abduction PROM -- 75  -BC     Lt Shoulder Extension AROM -- 35  -BC     Lt Shoulder Flexion AROM -- 35  -BC     Lt Shoulder Flexion PROM -- 50  -BC     Lt Elbow Extension/Flexion AROM -- 110-45  85* at rest  -BC     Lt Elbow Extension/Flexion PROM -- 25  -BC     Lt Elbow Supination PROM -- impaired; wrist pronated able to bring to neutral grosslyAROM  -BC     Lt Upper Extremity Comments  -- increased pain w/ IR/ER, sh abd with mvmts, deffered portions of assessment. Able to oppose thumb to all digits w/ cues and increased time  -BC        MMT (Manual Muscle Testing)    General MMT Comments -- LUE impaired sh>hand MMT, scap/sh unable to fully range. flexor synergy pattern. 2+ to 3-/5 with active wrist/hand mvmts 3/5,   strength 4-/5  -BC        Tone    UE Tone -- left:;Hypertonic  -BC               User Key  (r) = Recorded By, (t) = Taken By, (c) = Cosigned By      Initials Name Provider Type    Candi Dee OT Occupational Therapist                    Hand Therapy (Last 24 Hours)       Hand Eval       Row Name 08/19/24 0900             Subjective    Subjective Comments interpretor services: [id number] increased time for evaluation today due to processing, and communication between interpretor to pt and caregiver. Towards end of session caregiver reports he has to go--eval ended 4 mins early.  -BC         Subjective Pain    Able to rate subjective pain? yes  -BC      Pre-Treatment Pain Level 0  -BC      Post-Treatment Pain Level 0  -BC      Subjective Pain Comment increased pain in sh joint with ROM at sh abd, ER/IR supine and sitting.  -BC         Hand  Strength     Strength Affected Side Bilateral  -BC          Strength Right    # Reps 3  -BC      Right Rung 2  -BC      Right  Test 1 51  -BC      Right  Test 2 50  -BC      Right  Test 3 48  -BC       Strength Average Right 49.67  -BC          Strength Left    # Reps 3  -BC      Left Rung 2  -BC      Left  Test 1 20  -BC      Left  Test 2 20  -BC      Left  Test 3 21  -BC       Strength Average Left 20.33  -BC         Pinch Strength    Affected Side Bilateral  -BC         Right Hand Strength - Pinch (lbs)    Lateral 7 lbs  -BC      Tip (2 point) 3 lbs  -BC         Left Hand Strength - Pinch (lbs)    Lateral 1 lbs  -BC      Tip (2 point) 0 lbs  -BC                User Key  (r) = Recorded By, (t) = Taken By, (c) = Cosigned By      Initials Name Provider Type    Candi Dee OT Occupational Therapist                        Therapy Education  Education Details: HEP with caregiver present on stretching/PROM/AROM program, POC, goal setting. Cont teaching  Given: HEP, Pain management  Program: Reinforced, New  How Provided:  Verbal, Demonstration, Written  Provided to: Patient, Caregiver  Level of Understanding: Verbalized, Demonstrated     OT Goals       Row Name 08/19/24 1000          OT Short Term Goals    STG Date to Achieve 09/18/24  -BC     STG 1 Pt will increase L sh flex AROM >15* for increased fxnl use for ADLs.  -BC     STG 1 Progress New  -BC     STG 2 Pt will increase  strength L hand to 25# or more to increase fxnl use.  -BC     STG 2 Progress New  -BC     STG 3 Pt will be SUP with HEP w/ min cues for increased LUE ROM for fxn  -BC     STG 3 Progress New  -BC     STG 4 Pt will increase L box and blocks to 8 or more for increased LUE fxnl use  -BC     STG 4 Progress New  -BC        Long Term Goals    LTG Date to Achieve 11/11/24  -BC     LTG 1 Pt will increase LUE sh AROM 90* sh flex and abd for increased fxnl use/return for ADLs  -BC     LTG 1 Progress New  -BC     LTG 2 Pt will improve LUE box and blocks to 13 or more for increased LUE fxnl use  -BC     LTG 2 Progress New  -BC     LTG 3 Pt will complete 9 HPT in 3 mins or less LUE for increased LUE coordination for fxnl use  -BC     LTG 3 Progress New  -BC     LTG 4 Pt will be SUP to doff/don UBD including for increased LUE fxnl use  -BC     LTG 4 Progress New  -BC     LTG 4 Progress Comments per Pt/fcaregiver report  -BC     LTG 5 Pt will increase L  strength to 30# or more to increase L  for fxnl use  -BC     LTG 5 Progress New  -BC        Time Calculation    OT Goal Re-Cert Due Date 09/18/24  -BC               User Key  (r) = Recorded By, (t) = Taken By, (c) = Cosigned By      Initials Name Provider Type    Candi Dee OT Occupational Therapist                            OT Assessment/Plan       Row Name 08/19/24 1013          OT Assessment    Functional Limitations Decreased safety during functional activities;Limitations in community activities;Limitations in functional capacity and performance;Performance in self-care ADL  -BC     Impairments  Balance;Cognition;Dexterity;Coordination;Endurance;Gait;Impaired attention;Impaired flexibility;Impaired muscle endurance;Impaired postural alignment;Joint integrity;Muscle strength;Motor function;Posture;Range of motion  -BC     Assessment Comments Pt is a 80 y/o M admitted to [facility] OP OT today with hx of CVA (embolic stroke) in April 2024. Pt speaks Ukranian with Ipad interpretor used and ambulating to therapy gym with caregiver present. Pt is able to walk w/o AD and partial A today in gym and practiced removing jacket for UBD with min A required for task completion. Per pt/caregiver goals are to return to highest level of IND including dressing, toileting, ADLs. Pt PMH: also sign for GIB, HLD, DM, cancer, DDD. Pt would benefit from OP OT to address deficits from CVA with noted impairments listed above including w/ LUE AROM, strength, coordination and fxn. pt w/ flexor synergy tone and unable to fully range passively LUE today. Rec 2x/week to improve fxnl performance and use of LUE/LLE, for NMR, balance, strengthening, and coordination retraining.  -BC     Please refer to paper survey for additional self-reported information Yes  -BC     OT Diagnosis Presents w/ impairments with ADL dressing IND, balance, coordination, LUE fxnl use, and hypertonicity with pain with PROM stretch.  -BC     OT Rehab Potential Good  -BC     Patient would benefit from skilled therapy intervention Yes  -BC        OT Plan    OT Frequency 2x/week  -BC     Predicted Duration of Therapy Intervention (OT) 12 weeks  -BC     Planned CPT's? OT EVAL MOD COMPLEXITY: 76477;OT RE-EVAL: 08549;OT THER ACT EA 15 MIN: 97070KQ;OT THER PROC EA 15 MIN: 68187DN;OT NEUROMUSC RE EDUCATION EA 15 MIN: 02877;OT SELF CARE/MGMT/TRAIN 15 MIN: 72313;OT HOT/COLD PACK;OT ULTRASOUND EA 15 MIN: 48224;OT IONTOPHORESIS EA 15 MIN: 24161;OT ELECTRIC STIM ATTD EA 15 MIN: 30441;OT ELECTRICAL STIM UNATTENDED: ;OT MANUAL THERAPY EA 15 MIN: 02799;OT CARE PLAN EA 15 MIN;OT  DEV OF COGN SKILLS EACH 15 MIN: 97968;OT ORTHOTIC MGMT/TRAIN EA 15 MIN: 55329;OT ORTHO/PROSTHET CHECKOUT EA 15 MIN: 15943;OT SENS INTEGRATIVE TECH EACH 15 MIN: 35497;OT THER SUPP EA 15 MIN:  -BC     Planned Therapy Interventions (Optional Details) balance training;bed mobility training;home exercise program;joint mobilization;manual therapy techniques;motor coordination training;neuromuscular re-education;patient/family education;postural re-education;prosthetic fitting/training;ROM (Range of Motion);strengthening;stretching;transfer training  -BC               User Key  (r) = Recorded By, (t) = Taken By, (c) = Cosigned By      Initials Name Provider Type    BC Candi Paez OT Occupational Therapist                        Outcome Measure Options: Box and Blocks (unable to complete 9HPT today due to time constraints; TBE next POC opportunity.)  Box and Blocks  Box and Blocks Left: 4  Box and Blocks Right: 21         Time Calculation:   OT Start Time: 0930  OT Stop Time: 1011  OT Time Calculation (min): 41 min  Total Timed Code Minutes- OT: 41 minute(s)  Untimed Charges  OT Eval/Re-eval Minutes: 41  Total Minutes  Untimed Charges Total Minutes: 41   Total Minutes: 41     Therapy Charges for Today       Code Description Service Date Service Provider Modifiers Qty    [id number]  OT EVAL MOD COMPLEXITY 3 8/19/2024 Candi Paez OT GO 1                       Candi Paez OT  8/19/2024

## 2024-08-19 NOTE — OUTREACH NOTE
AMBULATORY CASE MANAGEMENT NOTE    Names and Relationships of Patient/Support Persons: Contact: JEREMÍAS GO; Relationship: Emergency Contact -     [facility] Interim Update    Call from patient daughter today, verified by name and date of birth.    States that patient has been ordered to participate in Botox therapy and then he may begin PT/OT.  Requesting assistance as she has been redirected several times and has become confused with a lot of what the patient is needing and who is supposed to provide the assistance.    ACM did provide active listening, reassurance and encouragement.    ACM will need to find out who [facility] is and find out where Botox therapy is and who offers it for patient post stroke weakness to participate in PT/OT.    Next outreach has been scheduled.        Education Documentation  No documentation found.        Rosanne GEIGER  Ambulatory Case Management    8/19/2024, 16:22 EDT

## 2024-08-20 ENCOUNTER — PATIENT OUTREACH (OUTPATIENT)
Dept: CASE MANAGEMENT | Facility: OTHER | Age: 79
End: 2024-08-20
Payer: MEDICAID

## 2024-08-20 DIAGNOSIS — R53.1 WEAKNESS OF LEFT SIDE OF BODY: Primary | ICD-10-CM

## 2024-08-20 DIAGNOSIS — Z71.89 COMPLEX CARE COORDINATION: ICD-10-CM

## 2024-08-20 DIAGNOSIS — Z86.73 HISTORY OF EMBOLIC STROKE: ICD-10-CM

## 2024-08-20 NOTE — OUTREACH NOTE
AMBULATORY CASE MANAGEMENT NOTE    Names and Relationships of Patient/Support Persons: Contact: Joelle Sinclair; Relationship: Other -     Care Coordination    Outreach yesterday resulting in communication with current Physical Therapist regarding patient need for Botox Therapy before he continues any further therapies.  Referred to referrals specialist for ESCOB[facility] [place] who sent original referral to Dr. Govea.      Message to referral coordinator regarding further assistance to help this patient and his daughter to secure the botox treatment requested to prevent further delay and worsening outcome of s/p weakness of arm resulting in further loss of arm use.    Facilitated communication and was reminded that [facility] can take 7-10 days to process referrals.  Referral coordinator to assist by checking on soonest availability for scheduling.    Made daughter aware of communication.  Next outreach has been scheduled.    Education Documentation  No documentation found.        Rosanne GEIGER  Ambulatory Case Management    8/20/2024, 08:53 EDT

## 2024-08-21 ENCOUNTER — TELEPHONE (OUTPATIENT)
Dept: CASE MANAGEMENT | Facility: OTHER | Age: 79
End: 2024-08-21
Payer: MEDICAID

## 2024-08-21 NOTE — TELEPHONE ENCOUNTER
LM, with instruction to f/u if Dr. Govea's office does not call to schedule Botox injections 7-10 days.

## 2024-08-22 ENCOUNTER — PATIENT OUTREACH (OUTPATIENT)
Dept: CASE MANAGEMENT | Facility: OTHER | Age: 79
End: 2024-08-22
Payer: MEDICAID

## 2024-08-22 DIAGNOSIS — Z71.89 COMPLEX CARE COORDINATION: Primary | ICD-10-CM

## 2024-08-22 DIAGNOSIS — R53.1 WEAKNESS OF LEFT SIDE OF BODY: ICD-10-CM

## 2024-08-22 NOTE — OUTREACH NOTE
AMBULATORY CASE MANAGEMENT NOTE    Names and Relationships of Patient/Support Persons: Contact: JEREMÍAS GO; Relationship: Emergency Contact -     [facility] Interim Update    Spoke with patient daughter today, verified by name.    Patient Botox Therapy has been scheduled for Tuesday 8/27/24.    No further needs at this time.    Next outreach has been scheduled.        Education Documentation  No documentation found.        Rosanne GEIGER  Ambulatory Case Management    8/22/2024, 14:29 EDT

## 2024-08-26 ENCOUNTER — APPOINTMENT (OUTPATIENT)
Dept: PHYSICAL THERAPY | Facility: HOSPITAL | Age: 79
End: 2024-08-26
Payer: MEDICAID

## 2024-08-26 ENCOUNTER — TELEPHONE (OUTPATIENT)
Dept: FAMILY MEDICINE CLINIC | Facility: CLINIC | Age: 79
End: 2024-08-26

## 2024-08-28 ENCOUNTER — TELEPHONE (OUTPATIENT)
Dept: FAMILY MEDICINE CLINIC | Facility: CLINIC | Age: 79
End: 2024-08-28
Payer: MEDICAID

## 2024-08-28 NOTE — TELEPHONE ENCOUNTER
Form received from [facility].  Form filled out and faxed back to facility for medical supplies

## 2024-08-30 ENCOUNTER — PATIENT OUTREACH (OUTPATIENT)
Dept: CASE MANAGEMENT | Facility: OTHER | Age: 79
End: 2024-08-30
Payer: MEDICAID

## 2024-08-30 DIAGNOSIS — I63.9 CEREBROVASCULAR ACCIDENT (CVA), UNSPECIFIED MECHANISM: ICD-10-CM

## 2024-08-30 DIAGNOSIS — R53.1 WEAKNESS OF LEFT SIDE OF BODY: ICD-10-CM

## 2024-08-30 DIAGNOSIS — Z71.89 COMPLEX CARE COORDINATION: Primary | ICD-10-CM

## 2024-08-30 NOTE — OUTREACH NOTE
CCM End of Month Documentation    This Chronic Medical Management Care Plan for Alex Negron, 79 y.o. male, has been monitored and managed; reviewed; established and a new plan of care implemented for the month of August.  A cumulative time of 28  minutes was spent on this patient record this month, including chart review; phone call with care giver; phone call with other providers, Botox therapy, PT/OT will checkon.    Regarding the patient's problems: has History of embolic stroke; Right-sided extracranial carotid artery stenosis; Gastroesophageal reflux disease; Cerebrovascular accident (CVA); Dysphagia; Elevated LFTs; Fatty liver disease, nonalcoholic; PEG (percutaneous endoscopic gastrostomy) status; Urinary retention; Benign essential HTN; Hyperlipidemia, mixed; Degeneration of lumbar or lumbosacral intervertebral disc; Iron deficiency anemia; Impaired fasting glucose; Elevated platelet count; Bladder mass; Malignant neoplasm of urinary bladder; Type 2 diabetes mellitus without complication, without long-term current use of insulin; Bladder cancer; Encounter for screening for malignant neoplasm of colon; Bilateral hip pain; Lumbar radiculopathy; Lumbar facet arthropathy; and Nonsustained supraventricular tachycardia on their problem list., the following items were addressed: referrals to community service providers, patient safety/ strengthening/ medication compliance/ disease education and any changes can be found within the plan section of the note.  A detailed listing of time spent for chronic care management is tracked within each outreach encounter.  Current medications include:  has a current medication list which includes the following prescription(s): acetaminophen, amlodipine, aspirin, atorvastatin, calcium carbonate, clopidogrel, ferrous sulfate, ferrous sulfate, gabapentin, menthol (topical analgesic), metoprolol tartrate, NON FORMULARY, pantoprazole, phenazopyridine, polyethylene glycol,  polyethylene glycol 400, and tamsulosin. and the patient is reported to be caregiver will take responsibility for med compliance,  Medications are reported to be effective.  Regarding these diagnoses, referrals were made to the following provider(s):  referral assistance.  All notes on chart for PCP to review.    The patient was monitored remotely for disease compliance.    The patient's physical needs include:  physical healthcare; DME supplies, home health therapy/ Botox therapy.     The patient's mental support needs include:  needs met, close family    The patient's cognitive support needs include:  needs assistance with ADLs    The patient's psychosocial support needs include:  needs met    The patient's functional needs include: needs assistance for ADLs; physical healthcare    The patient's environmental needs include:  resources for disability needs, assistance with care coordination    Care Plan overall comments:  No data recorded    Refer to previous outreach notes for more information on the areas listed above.    Monthly Billing Diagnoses  (Z71.89) Complex care coordination    (R53.1) Weakness of left side of body    (I63.9) Cerebrovascular accident (CVA), unspecified mechanism    Medications   Medications have been reconciled    Care Plan progress this month:      Recently Modified Care Plans Updates made since 7/30/2024 12:00 AM      No recently modified care plans.          Instructions   Patient was provided an electronic copy of care plan  CCM services were explained and offered and patient has accepted these services.  Patient has given their written consent to receive CCM services and understands that this includes the authorization of electronic communication of medical information with the other treating providers.  Patient understands that they may stop CCM services at any time and these changes will be effective at the end of the calendar month and will effectively revocate the agreement of  CCM services.  Patient understands that only one practitioner can furnish and be paid for CCM services during one calendar month.  Patient also understands that there may be co-payment or deductible fees in association with CCM services.  Patient will continue with at least monthly follow-up calls with the Ambulatory .    Rosanne GEIGER  Ambulatory Case Management    8/30/2024, 11:00 EDT

## 2024-09-03 ENCOUNTER — HOSPITAL ENCOUNTER (OUTPATIENT)
Dept: CARDIOLOGY | Facility: HOSPITAL | Age: 79
Discharge: HOME | End: 2024-09-03
Admitting: NURSE PRACTITIONER
Payer: MEDICAID

## 2024-09-03 DIAGNOSIS — I63.9 ACUTE CVA (CEREBROVASCULAR ACCIDENT): ICD-10-CM

## 2024-09-03 DIAGNOSIS — I65.21 RIGHT-SIDED EXTRACRANIAL CAROTID ARTERY STENOSIS: ICD-10-CM

## 2024-09-03 LAB
BH CV VAS CAROTID RIGHT DISTAL STENT EDV: 27.2 CM/S
BH CV VAS CAROTID RIGHT DISTAL STENT PSV: 79 CM/S
BH CV VAS CAROTID RIGHT DISTAL TO STENT NATIVE VESSEL E: 17.7 CM/S
BH CV VAS CAROTID RIGHT DISTAL TO STENT PSV: 68.3 CM/S
BH CV VAS CAROTID RIGHT MID STENT EDV: 17.9 CM/S
BH CV VAS CAROTID RIGHT MID STENT PSV: 63.1 CM/S
BH CV VAS CAROTID RIGHT PROXIMAL STENT EDV: 11.6 CM/S
BH CV VAS CAROTID RIGHT PROXIMAL STENT PSV: 42 CM/S
BH CV VAS CAROTID RIGHT STENT NATIVE VESSEL PROXIMAL EDV: 8.8 CM/S
BH CV VAS CAROTID RIGHT STENT NATIVE VESSEL PROXIMAL PS: 37.9 CM/S
BH CV XLRA MEAS LEFT DIST CCA EDV: -9.1 CM/SEC
BH CV XLRA MEAS LEFT DIST CCA PSV: -36.8 CM/SEC
BH CV XLRA MEAS LEFT DIST ICA EDV: -32.2 CM/SEC
BH CV XLRA MEAS LEFT DIST ICA PSV: -122.6 CM/SEC
BH CV XLRA MEAS LEFT ICA/CCA RATIO: 3.3
BH CV XLRA MEAS LEFT MID ICA EDV: -14 CM/SEC
BH CV XLRA MEAS LEFT MID ICA PSV: -77.8 CM/SEC
BH CV XLRA MEAS LEFT PROX CCA EDV: 13.7 CM/SEC
BH CV XLRA MEAS LEFT PROX CCA PSV: 82 CM/SEC
BH CV XLRA MEAS LEFT PROX ECA EDV: -7.2 CM/SEC
BH CV XLRA MEAS LEFT PROX ECA PSV: -49.9 CM/SEC
BH CV XLRA MEAS LEFT PROX ICA EDV: -34.3 CM/SEC
BH CV XLRA MEAS LEFT PROX ICA PSV: -122.6 CM/SEC
BH CV XLRA MEAS LEFT PROX SCLA PSV: 141.6 CM/SEC
BH CV XLRA MEAS LEFT VERTEBRAL A EDV: 13.3 CM/SEC
BH CV XLRA MEAS LEFT VERTEBRAL A PSV: 38.8 CM/SEC
BH CV XLRA MEAS RIGHT DIST CCA EDV: -8.8 CM/SEC
BH CV XLRA MEAS RIGHT DIST CCA PSV: -37.9 CM/SEC
BH CV XLRA MEAS RIGHT DIST ICA EDV: -21.5 CM/SEC
BH CV XLRA MEAS RIGHT DIST ICA PSV: -79 CM/SEC
BH CV XLRA MEAS RIGHT ICA/CCA RATIO: 2.08
BH CV XLRA MEAS RIGHT MID ICA EDV: -27.2 CM/SEC
BH CV XLRA MEAS RIGHT MID ICA PSV: -79 CM/SEC
BH CV XLRA MEAS RIGHT PROX CCA EDV: 10.8 CM/SEC
BH CV XLRA MEAS RIGHT PROX CCA PSV: 57.5 CM/SEC
BH CV XLRA MEAS RIGHT PROX ECA EDV: -8.5 CM/SEC
BH CV XLRA MEAS RIGHT PROX ECA PSV: -54.9 CM/SEC
BH CV XLRA MEAS RIGHT PROX ICA EDV: -11.6 CM/SEC
BH CV XLRA MEAS RIGHT PROX ICA PSV: -42 CM/SEC
BH CV XLRA MEAS RIGHT PROX SCLA PSV: 87.8 CM/SEC
BH CV XLRA MEAS RIGHT VERTEBRAL A EDV: -18.6 CM/SEC
BH CV XLRA MEAS RIGHT VERTEBRAL A PSV: -57.8 CM/SEC
BH CVPROX RIGHT ICA HIDDEN LRR: 1 CM

## 2024-09-03 PROCEDURE — 93880 EXTRACRANIAL BILAT STUDY: CPT | Performed by: SURGERY

## 2024-09-03 PROCEDURE — 93880 EXTRACRANIAL BILAT STUDY: CPT

## 2024-09-05 ENCOUNTER — APPOINTMENT (OUTPATIENT)
Dept: PHYSICAL THERAPY | Facility: HOSPITAL | Age: 79
End: 2024-09-05
Payer: MEDICAID

## 2024-09-05 ENCOUNTER — APPOINTMENT (OUTPATIENT)
Dept: OCCUPATIONAL THERAPY | Facility: HOSPITAL | Age: 79
End: 2024-09-05
Payer: MEDICAID

## 2024-09-09 ENCOUNTER — APPOINTMENT (OUTPATIENT)
Dept: OCCUPATIONAL THERAPY | Facility: HOSPITAL | Age: 79
End: 2024-09-09
Payer: MEDICAID

## 2024-09-09 ENCOUNTER — APPOINTMENT (OUTPATIENT)
Dept: PHYSICAL THERAPY | Facility: HOSPITAL | Age: 79
End: 2024-09-09
Payer: MEDICAID

## 2024-09-10 ENCOUNTER — OFFICE VISIT (OUTPATIENT)
Dept: NEUROSURGERY | Facility: CLINIC | Age: 79
End: 2024-09-10
Payer: MEDICAID

## 2024-09-10 VITALS
OXYGEN SATURATION: 98 % | TEMPERATURE: 96.9 F | DIASTOLIC BLOOD PRESSURE: 70 MMHG | HEIGHT: 71 IN | HEART RATE: 66 BPM | SYSTOLIC BLOOD PRESSURE: 140 MMHG | BODY MASS INDEX: 21.28 KG/M2

## 2024-09-10 DIAGNOSIS — I10 BENIGN ESSENTIAL HTN: Primary | ICD-10-CM

## 2024-09-10 DIAGNOSIS — E78.2 HYPERLIPIDEMIA, MIXED: ICD-10-CM

## 2024-09-10 DIAGNOSIS — I65.21 RIGHT-SIDED EXTRACRANIAL CAROTID ARTERY STENOSIS: ICD-10-CM

## 2024-09-10 PROCEDURE — 3078F DIAST BP <80 MM HG: CPT | Performed by: RADIOLOGY

## 2024-09-10 PROCEDURE — 99214 OFFICE O/P EST MOD 30 MIN: CPT | Performed by: RADIOLOGY

## 2024-09-10 PROCEDURE — 3077F SYST BP >= 140 MM HG: CPT | Performed by: RADIOLOGY

## 2024-09-10 PROCEDURE — 1160F RVW MEDS BY RX/DR IN RCRD: CPT | Performed by: RADIOLOGY

## 2024-09-10 PROCEDURE — 1159F MED LIST DOCD IN RCRD: CPT | Performed by: RADIOLOGY

## 2024-09-10 RX ORDER — DONEPEZIL HYDROCHLORIDE 5 MG/1
5 TABLET, FILM COATED ORAL NIGHTLY
Qty: 30 TABLET | Refills: 6 | Status: SHIPPED | OUTPATIENT
Start: 2024-09-10

## 2024-09-12 ENCOUNTER — APPOINTMENT (OUTPATIENT)
Dept: PHYSICAL THERAPY | Facility: HOSPITAL | Age: 79
End: 2024-09-12
Payer: MEDICAID

## 2024-09-12 ENCOUNTER — TELEPHONE (OUTPATIENT)
Dept: FAMILY MEDICINE CLINIC | Facility: CLINIC | Age: 79
End: 2024-09-12
Payer: MEDICAID

## 2024-09-12 ENCOUNTER — APPOINTMENT (OUTPATIENT)
Dept: OCCUPATIONAL THERAPY | Facility: HOSPITAL | Age: 79
End: 2024-09-12
Payer: MEDICAID

## 2024-09-12 LAB
25(OH)D3+25(OH)D2 SERPL-MCNC: 56.4 NG/ML (ref 30–100)
ALBUMIN SERPL-MCNC: 4.1 G/DL (ref 3.5–5.2)
ALBUMIN/CREAT UR: 13 MG/G CREAT (ref 0–29)
ALBUMIN/GLOB SERPL: 1.5 G/DL
ALP SERPL-CCNC: 132 U/L (ref 39–117)
ALT SERPL-CCNC: 9 U/L (ref 1–41)
AST SERPL-CCNC: 12 U/L (ref 1–40)
BILIRUB SERPL-MCNC: 0.7 MG/DL (ref 0–1.2)
BUN SERPL-MCNC: 20 MG/DL (ref 8–23)
BUN/CREAT SERPL: 19.8 (ref 7–25)
CALCIUM SERPL-MCNC: 10.4 MG/DL (ref 8.6–10.5)
CHLORIDE SERPL-SCNC: 107 MMOL/L (ref 98–107)
CHOLEST SERPL-MCNC: 107 MG/DL (ref 0–200)
CO2 SERPL-SCNC: 26.6 MMOL/L (ref 22–29)
CREAT SERPL-MCNC: 1.01 MG/DL (ref 0.76–1.27)
CREAT UR-MCNC: 130.4 MG/DL
EGFRCR SERPLBLD CKD-EPI 2021: 76.6 ML/MIN/1.73
FOLATE SERPL-MCNC: 6 NG/ML (ref 4.78–24.2)
GLOBULIN SER CALC-MCNC: 2.7 GM/DL
GLUCOSE SERPL-MCNC: 101 MG/DL (ref 65–99)
HBA1C MFR BLD: 5.2 % (ref 4.8–5.6)
HDLC SERPL-MCNC: 32 MG/DL (ref 40–60)
LDLC SERPL CALC-MCNC: 59 MG/DL (ref 0–100)
MAGNESIUM SERPL-MCNC: 1.9 MG/DL (ref 1.6–2.4)
MICROALBUMIN UR-MCNC: 17.3 UG/ML
POTASSIUM SERPL-SCNC: 4.9 MMOL/L (ref 3.5–5.2)
PROT SERPL-MCNC: 6.8 G/DL (ref 6–8.5)
SODIUM SERPL-SCNC: 140 MMOL/L (ref 136–145)
T4 FREE SERPL-MCNC: 1.62 NG/DL (ref 0.92–1.68)
TRIGL SERPL-MCNC: 76 MG/DL (ref 0–150)
TSH SERPL DL<=0.005 MIU/L-ACNC: 1.67 UIU/ML (ref 0.27–4.2)
VIT B12 SERPL-MCNC: 539 PG/ML (ref 211–946)
VLDLC SERPL CALC-MCNC: 16 MG/DL (ref 5–40)

## 2024-09-12 NOTE — TELEPHONE ENCOUNTER
Caller: JORJE    Relationship to patient: Home [facility]    Best call back number: [phone]    Patient is needing: THEY NEED A ICD10 CODE STATING WHAT TYPE OF INCONTENENCE HE HAS.  PLEASE FAX IT -[phone]

## 2024-09-16 ENCOUNTER — HOSPITAL ENCOUNTER (OUTPATIENT)
Dept: PHYSICAL THERAPY | Facility: HOSPITAL | Age: 79
Setting detail: THERAPIES SERIES
Discharge: HOME | End: 2024-09-16
Payer: MEDICAID

## 2024-09-16 ENCOUNTER — HOSPITAL ENCOUNTER (OUTPATIENT)
Dept: OCCUPATIONAL THERAPY | Facility: HOSPITAL | Age: 79
Setting detail: THERAPIES SERIES
Discharge: HOME | End: 2024-09-16
Payer: MEDICAID

## 2024-09-16 ENCOUNTER — APPOINTMENT (OUTPATIENT)
Dept: OCCUPATIONAL THERAPY | Facility: HOSPITAL | Age: 79
End: 2024-09-16
Payer: MEDICAID

## 2024-09-16 ENCOUNTER — APPOINTMENT (OUTPATIENT)
Dept: PHYSICAL THERAPY | Facility: HOSPITAL | Age: 79
End: 2024-09-16
Payer: MEDICAID

## 2024-09-16 DIAGNOSIS — Z74.09 DECREASED INDEPENDENCE WITH TRANSFERS: ICD-10-CM

## 2024-09-16 DIAGNOSIS — I63.89 CEREBROVASCULAR ACCIDENT (CVA) DUE TO OTHER MECHANISM: Primary | ICD-10-CM

## 2024-09-16 DIAGNOSIS — M25.60 DECREASED RANGE OF MOTION WITH DECREASED STRENGTH: ICD-10-CM

## 2024-09-16 DIAGNOSIS — I69.30 H/O: STROKE WITH RESIDUAL EFFECTS: Primary | ICD-10-CM

## 2024-09-16 DIAGNOSIS — M62.89 ABNORMAL INCREASED MUSCLE TONE: ICD-10-CM

## 2024-09-16 DIAGNOSIS — R26.89 FUNCTIONAL GAIT ABNORMALITY: ICD-10-CM

## 2024-09-16 DIAGNOSIS — I69.334 MONOPLEGIA OF UPPER EXTREMITY FOLLOWING CEREBRAL INFARCTION AFFECTING LEFT NON-DOMINANT SIDE: ICD-10-CM

## 2024-09-16 DIAGNOSIS — R27.8 LOSS OF COORDINATION: ICD-10-CM

## 2024-09-16 DIAGNOSIS — R53.1 DECREASED RANGE OF MOTION WITH DECREASED STRENGTH: ICD-10-CM

## 2024-09-16 PROCEDURE — 97112 NEUROMUSCULAR REEDUCATION: CPT

## 2024-09-16 PROCEDURE — 97530 THERAPEUTIC ACTIVITIES: CPT

## 2024-09-16 PROCEDURE — 97110 THERAPEUTIC EXERCISES: CPT

## 2024-09-18 ENCOUNTER — TELEPHONE (OUTPATIENT)
Dept: FAMILY MEDICINE CLINIC | Facility: CLINIC | Age: 79
End: 2024-09-18

## 2024-09-18 ENCOUNTER — HOSPITAL ENCOUNTER (OUTPATIENT)
Dept: OCCUPATIONAL THERAPY | Facility: HOSPITAL | Age: 79
Setting detail: THERAPIES SERIES
Discharge: HOME | End: 2024-09-18
Payer: MEDICAID

## 2024-09-18 ENCOUNTER — HOSPITAL ENCOUNTER (OUTPATIENT)
Dept: PHYSICAL THERAPY | Facility: HOSPITAL | Age: 79
Setting detail: THERAPIES SERIES
Discharge: HOME | End: 2024-09-18
Payer: MEDICAID

## 2024-09-18 DIAGNOSIS — M25.60 DECREASED RANGE OF MOTION WITH DECREASED STRENGTH: ICD-10-CM

## 2024-09-18 DIAGNOSIS — I69.334 MONOPLEGIA OF UPPER EXTREMITY FOLLOWING CEREBRAL INFARCTION AFFECTING LEFT NON-DOMINANT SIDE: ICD-10-CM

## 2024-09-18 DIAGNOSIS — I63.89 CEREBROVASCULAR ACCIDENT (CVA) DUE TO OTHER MECHANISM: Primary | ICD-10-CM

## 2024-09-18 DIAGNOSIS — R27.8 LOSS OF COORDINATION: ICD-10-CM

## 2024-09-18 DIAGNOSIS — R53.1 DECREASED RANGE OF MOTION WITH DECREASED STRENGTH: ICD-10-CM

## 2024-09-18 PROCEDURE — 97112 NEUROMUSCULAR REEDUCATION: CPT

## 2024-09-18 PROCEDURE — 97530 THERAPEUTIC ACTIVITIES: CPT

## 2024-09-18 PROCEDURE — 97110 THERAPEUTIC EXERCISES: CPT

## 2024-09-18 NOTE — TELEPHONE ENCOUNTER
Caller: [facility]    Relationship to patient:     Best call back number: [phone]    Patient is needing: THEY ARE NEEDING THE FORM CERTIFICATE OF MEDICAL NECESSITY CORRECTED TO ADD THE ICD 10 - TYPE OF INCONTENENCE.  PLEASE ADD AND FAX BACK [phone]

## 2024-09-23 ENCOUNTER — HOSPITAL ENCOUNTER (OUTPATIENT)
Dept: PHYSICAL THERAPY | Facility: HOSPITAL | Age: 79
Setting detail: THERAPIES SERIES
Discharge: HOME | End: 2024-09-23
Payer: MEDICAID

## 2024-09-23 ENCOUNTER — OFFICE VISIT (OUTPATIENT)
Dept: ORTHOPEDIC SURGERY | Facility: CLINIC | Age: 79
End: 2024-09-23
Payer: MEDICAID

## 2024-09-23 ENCOUNTER — HOSPITAL ENCOUNTER (OUTPATIENT)
Dept: OCCUPATIONAL THERAPY | Facility: HOSPITAL | Age: 79
Setting detail: THERAPIES SERIES
Discharge: HOME | End: 2024-09-23
Payer: MEDICAID

## 2024-09-23 VITALS — TEMPERATURE: 98.7 F | BODY MASS INDEX: 24.49 KG/M2 | HEIGHT: 69 IN | WEIGHT: 165.34 LBS

## 2024-09-23 DIAGNOSIS — M75.02 ADHESIVE CAPSULITIS OF LEFT SHOULDER: ICD-10-CM

## 2024-09-23 DIAGNOSIS — I69.334 MONOPLEGIA OF UPPER EXTREMITY FOLLOWING CEREBRAL INFARCTION AFFECTING LEFT NON-DOMINANT SIDE: ICD-10-CM

## 2024-09-23 DIAGNOSIS — R26.89 FUNCTIONAL GAIT ABNORMALITY: ICD-10-CM

## 2024-09-23 DIAGNOSIS — M25.60 DECREASED RANGE OF MOTION WITH DECREASED STRENGTH: ICD-10-CM

## 2024-09-23 DIAGNOSIS — M25.562 CHRONIC PAIN OF LEFT KNEE: ICD-10-CM

## 2024-09-23 DIAGNOSIS — I69.30 H/O: STROKE WITH RESIDUAL EFFECTS: Primary | ICD-10-CM

## 2024-09-23 DIAGNOSIS — I63.89 CEREBROVASCULAR ACCIDENT (CVA) DUE TO OTHER MECHANISM: Primary | ICD-10-CM

## 2024-09-23 DIAGNOSIS — R53.1 DECREASED RANGE OF MOTION WITH DECREASED STRENGTH: ICD-10-CM

## 2024-09-23 DIAGNOSIS — G89.29 CHRONIC PAIN OF LEFT KNEE: ICD-10-CM

## 2024-09-23 DIAGNOSIS — M62.89 ABNORMAL INCREASED MUSCLE TONE: ICD-10-CM

## 2024-09-23 DIAGNOSIS — M25.562 MECHANICAL KNEE PAIN, LEFT: Primary | ICD-10-CM

## 2024-09-23 DIAGNOSIS — R27.8 LOSS OF COORDINATION: ICD-10-CM

## 2024-09-23 DIAGNOSIS — Z74.09 DECREASED INDEPENDENCE WITH TRANSFERS: ICD-10-CM

## 2024-09-23 PROCEDURE — 97112 NEUROMUSCULAR REEDUCATION: CPT

## 2024-09-23 PROCEDURE — 97110 THERAPEUTIC EXERCISES: CPT

## 2024-09-23 PROCEDURE — 97530 THERAPEUTIC ACTIVITIES: CPT

## 2024-09-23 RX ORDER — METHYLPREDNISOLONE ACETATE 80 MG/ML
80 INJECTION, SUSPENSION INTRA-ARTICULAR; INTRALESIONAL; INTRAMUSCULAR; SOFT TISSUE
Status: COMPLETED | OUTPATIENT
Start: 2024-09-23 | End: 2024-09-23

## 2024-09-23 RX ORDER — LIDOCAINE HYDROCHLORIDE 10 MG/ML
2 INJECTION, SOLUTION EPIDURAL; INFILTRATION; INTRACAUDAL; PERINEURAL
Status: COMPLETED | OUTPATIENT
Start: 2024-09-23 | End: 2024-09-23

## 2024-09-23 RX ADMIN — METHYLPREDNISOLONE ACETATE 80 MG: 80 INJECTION, SUSPENSION INTRA-ARTICULAR; INTRALESIONAL; INTRAMUSCULAR; SOFT TISSUE at 11:30

## 2024-09-23 RX ADMIN — LIDOCAINE HYDROCHLORIDE 2 ML: 10 INJECTION, SOLUTION EPIDURAL; INFILTRATION; INTRACAUDAL; PERINEURAL at 11:30

## 2024-09-25 ENCOUNTER — HOSPITAL ENCOUNTER (OUTPATIENT)
Dept: PHYSICAL THERAPY | Facility: HOSPITAL | Age: 79
Setting detail: THERAPIES SERIES
Discharge: HOME | End: 2024-09-25
Payer: MEDICAID

## 2024-09-25 ENCOUNTER — HOSPITAL ENCOUNTER (OUTPATIENT)
Dept: OCCUPATIONAL THERAPY | Facility: HOSPITAL | Age: 79
Setting detail: THERAPIES SERIES
Discharge: HOME | End: 2024-09-25
Payer: MEDICAID

## 2024-09-25 DIAGNOSIS — M62.89 ABNORMAL INCREASED MUSCLE TONE: ICD-10-CM

## 2024-09-25 DIAGNOSIS — M25.60 DECREASED RANGE OF MOTION WITH DECREASED STRENGTH: ICD-10-CM

## 2024-09-25 DIAGNOSIS — R26.89 FUNCTIONAL GAIT ABNORMALITY: ICD-10-CM

## 2024-09-25 DIAGNOSIS — I69.30 H/O: STROKE WITH RESIDUAL EFFECTS: Primary | ICD-10-CM

## 2024-09-25 DIAGNOSIS — R27.8 LOSS OF COORDINATION: ICD-10-CM

## 2024-09-25 DIAGNOSIS — I69.334 MONOPLEGIA OF UPPER EXTREMITY FOLLOWING CEREBRAL INFARCTION AFFECTING LEFT NON-DOMINANT SIDE: ICD-10-CM

## 2024-09-25 DIAGNOSIS — I63.89 CEREBROVASCULAR ACCIDENT (CVA) DUE TO OTHER MECHANISM: Primary | ICD-10-CM

## 2024-09-25 DIAGNOSIS — R53.1 DECREASED RANGE OF MOTION WITH DECREASED STRENGTH: ICD-10-CM

## 2024-09-25 DIAGNOSIS — Z74.09 DECREASED INDEPENDENCE WITH TRANSFERS: ICD-10-CM

## 2024-09-25 PROCEDURE — 97112 NEUROMUSCULAR REEDUCATION: CPT

## 2024-09-25 PROCEDURE — 97110 THERAPEUTIC EXERCISES: CPT

## 2024-09-25 PROCEDURE — 97530 THERAPEUTIC ACTIVITIES: CPT

## 2024-09-26 ENCOUNTER — PATIENT OUTREACH (OUTPATIENT)
Dept: CASE MANAGEMENT | Facility: OTHER | Age: 79
End: 2024-09-26
Payer: MEDICAID

## 2024-09-26 NOTE — TELEPHONE ENCOUNTER
Hub staff attempted to follow warm transfer process and was unsuccessful     Caller: [facility]    Relationship to patient:     Best call back number: [phone]    Patient is needing: SHE WAS CALLING BACK TO CHECK THE STATUS.  PLEASE GET THIS FAXED OVER ASAP.

## 2024-09-30 ENCOUNTER — HOSPITAL ENCOUNTER (OUTPATIENT)
Dept: PHYSICAL THERAPY | Facility: HOSPITAL | Age: 79
Setting detail: THERAPIES SERIES
Discharge: HOME | End: 2024-09-30
Payer: MEDICAID

## 2024-09-30 ENCOUNTER — HOSPITAL ENCOUNTER (OUTPATIENT)
Dept: OCCUPATIONAL THERAPY | Facility: HOSPITAL | Age: 79
Setting detail: THERAPIES SERIES
Discharge: HOME | End: 2024-09-30
Payer: MEDICAID

## 2024-09-30 DIAGNOSIS — I63.89 CEREBROVASCULAR ACCIDENT (CVA) DUE TO OTHER MECHANISM: Primary | ICD-10-CM

## 2024-09-30 DIAGNOSIS — I69.334 MONOPLEGIA OF UPPER EXTREMITY FOLLOWING CEREBRAL INFARCTION AFFECTING LEFT NON-DOMINANT SIDE: ICD-10-CM

## 2024-09-30 DIAGNOSIS — R53.1 DECREASED RANGE OF MOTION WITH DECREASED STRENGTH: ICD-10-CM

## 2024-09-30 DIAGNOSIS — M25.60 DECREASED RANGE OF MOTION WITH DECREASED STRENGTH: ICD-10-CM

## 2024-09-30 DIAGNOSIS — R27.8 LOSS OF COORDINATION: ICD-10-CM

## 2024-09-30 DIAGNOSIS — R26.89 FUNCTIONAL GAIT ABNORMALITY: ICD-10-CM

## 2024-09-30 DIAGNOSIS — M62.89 ABNORMAL INCREASED MUSCLE TONE: ICD-10-CM

## 2024-09-30 DIAGNOSIS — I69.30 H/O: STROKE WITH RESIDUAL EFFECTS: Primary | ICD-10-CM

## 2024-09-30 DIAGNOSIS — Z74.09 DECREASED INDEPENDENCE WITH TRANSFERS: ICD-10-CM

## 2024-09-30 PROCEDURE — 97530 THERAPEUTIC ACTIVITIES: CPT

## 2024-09-30 PROCEDURE — 97112 NEUROMUSCULAR REEDUCATION: CPT

## 2024-09-30 PROCEDURE — 97110 THERAPEUTIC EXERCISES: CPT

## 2024-09-30 NOTE — THERAPY TREATMENT NOTE
Outpatient Occupational Therapy Neuro Treatment Note  Psychiatric     Patient Name: Alex Negron  : 1945  MRN: [id number]  Today's Date: 2024       Visit Date: 2024    Patient Active Problem List   Diagnosis    History of embolic stroke    Right-sided extracranial carotid artery stenosis    Gastroesophageal reflux disease    Cerebrovascular accident (CVA)    Dysphagia    Elevated LFTs    Fatty liver disease, nonalcoholic    PEG (percutaneous endoscopic gastrostomy) status    Urinary retention    Benign essential HTN    Hyperlipidemia, mixed    Degeneration of lumbar or lumbosacral intervertebral disc    Iron deficiency anemia    Impaired fasting glucose    Elevated platelet count    Bladder mass    Malignant neoplasm of urinary bladder    Type 2 diabetes mellitus without complication, without long-term current use of insulin    Bladder cancer    Encounter for screening for malignant neoplasm of colon    Bilateral hip pain    Lumbar radiculopathy    Lumbar facet arthropathy    Nonsustained supraventricular tachycardia        Past Medical History:   Diagnosis Date    Anxiety     Aortic atherosclerosis     Bladder cancer     Bladder mass 2023    Bladder tumor 2023    chemical chemo in [place]  weekly    Blind left eye     Blood in urine     TRACE    Burning with urination     Chronic back pain     neuropathy rolando legs    DDD (degenerative disc disease), lumbar     GERD (gastroesophageal reflux disease)     History of low back pain     Hyperlipidemia     Hypertension     PTSD (post-traumatic stress disorder)     [place] start of [place]    Renal insufficiency     Sleep apnea     diagnosed but following up with PCP    Urine frequency         Past Surgical History:   Procedure Laterality Date    COLONOSCOPY N/A 3/14/2024    Procedure: COLONOSCOPY TO CECUM WITH COLD SNARE POLYPECTOMIES;  Surgeon: Magdaleno Mena MD;  Location: [facility] ENDOSCOPY;  Service:  Gastroenterology;  Laterality: N/A;  PRE OP - SCREENING  -POST OP - COLON POLYPS,HEMORRHOIDS    DENTAL PROCEDURE      ENDOSCOPY      ENDOSCOPY N/A 4/3/2024    Procedure: ESOPHAGOGASTRODUODENOSCOPY AT BEDSIDE;  Surgeon: Redd Guidry MD;  Location: [facility] ENDOSCOPY;  Service: Gastroenterology;  Laterality: N/A;  PRE-  GI BLEED  POST- HIATAL HERNIA, DISTAL ESOPHAGITIS ULCERS, GASTRITIS, PYLOROPLASTY    ENDOSCOPY W/ PEG TUBE PLACEMENT N/A 4/12/2024    Procedure: ESOPHAGOGASTRODUODENOSCOPY WITH PERCUTANEOUS ENDOSCOPIC GASTROSTOMY TUBE INSERTION;  Surgeon: Dimas Brand MD;  Location: [facility] ENDOSCOPY;  Service: General;  Laterality: N/A;  Dysphagia    TRANSURETHRAL RESECTION OF BLADDER TUMOR N/A 06/07/2023    Procedure: TRANSURETHRAL RESECTION OF BLADDER TUMOR;  Surgeon: Robert Haque MD;  Location: [facility] MAIN OR;  Service: Urology;  Laterality: N/A;    TRANSURETHRAL RESECTION OF BLADDER TUMOR N/A 10/18/2023    Procedure: CYSTOSCOPY TRANSURETHRAL RESECTION OF BLADDER TUMOR;  Surgeon: Robert Haque MD;  Location: [facility] MAIN OR;  Service: Urology;  Laterality: N/A;         Visit Dx:    ICD-10-CM ICD-9-CM   1. Cerebrovascular accident (CVA) due to other mechanism  I63.89 434.91   2. Decreased range of motion with decreased strength  M25.60 719.50    R53.1 780.79   3. 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 Loss of coordination  R27.8 781.3                                  Therapy Education  Education Details: L sided awareness, reviewed theraputty HEP, estim unit use, Pt v/u. Caregiver not present at end of session with requesting someone to call and schedule out more therapy visits. Pt v/u  Given: HEP, Symptoms/condition management, Posture/body mechanics  Program: Reinforced  How Provided: Verbal, Demonstration  Provided to: Patient  Level of Understanding: Verbalized     Modalities       Row Name 09/30/24 1100              ELECTRICAL STIMULATION    Attended/Unattended Attended  -BC      Stimulation Type FES  -BC      Max mAmp 44  -BC      Location/Electrode Placement/Other triceps  -BC      81832 - OT E-Stim Attended Minutes 20  -BC                User Key  (r) = Recorded By, (t) = Taken By, (c) = Cosigned By      Initials Name Provider Type    BC Candi Paez OT Occupational Therapist                   OT Exercises       Row Name 09/30/24 1100             Precautions    Existing Precautions/Restrictions fall  -BC         Subjective Comments    Subjective Comments interpretor services ID# [id number]. Pt reports the K tape felt good but peeled off in the bath, no s/s of irrtation noted.  -BC         Subjective Pain    Able to rate subjective pain? yes  -BC      Pre-Treatment Pain Level 0  -BC      Post-Treatment Pain Level 0  -BC         Exercise 1    Exercise Name 1 seated PROM in conjunction with estim to faciliate active elbow ext/improved tone mgmt for increased function. w/ cont'd stretch improved to ~5* lacking, improved from previous sessions. Cues for active elbow ext in conjunction w/ estim  -BC      Cueing 1 Demo;Verbal  -BC      Sets 1 2  -BC      Reps 1 10  -BC         Exercise 2    Exercise Name 2 seated foam squeezes/open as pt ayaan'd unable to make fist w/ putty/open, reports will bring putty to next session to work on.  -BC      Cueing 2 Verbal  -BC      Sets 2 2  -BC      Reps 2 10  -BC         Exercise 3    Exercise Name 3 seated LUE extension seated EOM faciliated with [place] WB'ing w/ emphasis on wrist/digit ext, and scooting for icnreased L sided use/attention. Speaking with interpretor on Lside for improved awareness. Also OT on L side. Mod cues  -BC      Cueing 3 Tactile;Verbal;Demo  -BC         Exercise 4    Exercise Name 4 standing reaching task w/ LUE to  squigz from mirror on L side and place in OTs hand, increased standing tolerance, faciliation LUE use and attention. Min to mod A for reach to   in standing CGA for balance. Progressed to task in seated as fatigued. Min A in seated W LUE reach. Cues for awareness of L side as initially missing all colors.  -BC      Cueing 4 Verbal  -BC      Sets 4 2  -BC      Reps 4 8  -BC         Exercise 5    Exercise Name 5 CGA w/ RUE forearm crutch from PT>OT side of gym at start of session, From OT side of gym out to waiting room. Pt required mod cues for L sided awareness/turning head to L and returning to waiting room w primarily L turns. Cues for LLE advancement w/ steps. Able to demo some teachback w/ cues.  -BC      Cueing 5 Tactile;Verbal  -BC                User Key  (r) = Recorded By, (t) = Taken By, (c) = Cosigned By      Initials Name Provider Type    BC Candi Paez OT Occupational Therapist                                Time Calculation:   OT Start Time: 1100  OT Stop Time: 1155  OT Time Calculation (min): 55 min  Total Timed Code Minutes- OT: 55 minute(s)  Timed Charges  88858 - OT E-Stim Attended Minutes: 20  37649 -  OT Neuromuscular Reeducation Minutes: 30  17441 - OT Therapeutic Activity Minutes: 25  Total Minutes  Timed Charges Total Minutes: 55   Total Minutes: 55     Therapy Charges for Today       Code Description Service Date Service Provider Modifiers Qty    57920723909  OT NEUROMUSC RE EDUCATION EA 15 MIN 9/30/2024 Candi Paez OT GO 2    [id number]  OT THERAPEUTIC ACT EA 15 MIN 9/30/2024 Candi Paez OT GO 2                      Candi Paez OT  9/30/2024

## 2024-09-30 NOTE — THERAPY TREATMENT NOTE
Outpatient Physical Therapy Neuro Treatment Note  [facility]     Patient Name: Alex Negron  : 1945  MRN: [id number]  Today's Date: 2024      Visit Date: 2024    Visit Dx:    ICD-10-CM ICD-9-CM   1. H/O: stroke with residual effects  I69.30 438.9   2. Abnormal increased muscle tone  M62.89 728.85   3. Functional gait abnormality  R26.89 781.2   4. Decreased independence with transfers  Z74.09 V62.89       Patient Active Problem List   Diagnosis    History of embolic stroke    Right-sided extracranial carotid artery stenosis    Gastroesophageal reflux disease    Cerebrovascular accident (CVA)    Dysphagia    Elevated LFTs    Fatty liver disease, nonalcoholic    PEG (percutaneous endoscopic gastrostomy) status    Urinary retention    Benign essential HTN    Hyperlipidemia, mixed    Degeneration of lumbar or lumbosacral intervertebral disc    Iron deficiency anemia    Impaired fasting glucose    Elevated platelet count    Bladder mass    Malignant neoplasm of urinary bladder    Type 2 diabetes mellitus without complication, without long-term current use of insulin    Bladder cancer    Encounter for screening for malignant neoplasm of colon    Bilateral hip pain    Lumbar radiculopathy    Lumbar facet arthropathy    Nonsustained supraventricular tachycardia            PT Neuro       Row Name 24 1047             Subjective    Subjective Comments doing okay today  -LB         Precautions and Contraindications    Precautions/Limitations fall precautions  -LB         Subjective Pain    Able to rate subjective pain? yes  -LB      Subjective Pain Comment no complaints of pain today  -LB         Cognition    Overall Cognitive Status WFL  -LB      Arousal/Alertness --  -LB      Memory --  -LB      Orientation Level --  -LB      Safety Judgment --  -LB      Deficits --  -LB         Perception    Inattention/Neglect Cues to attend left visual field;Cues to attend to left side of body  -LB          Posture/Observations    Posture/Observations Comments Pt arrived to clinic, walking up with cargiver.  Did have [place] crutch with him today  -LB         Transfers    Sit-Stand [place] (Transfers) standby assist  -LB      Stand-Sit [place] (Transfers) contact guard  -LB      Transfer, Impairments impaired balance;muscle tone abnormal;sensory feedback impaired  -LB      Comment, (Transfers) Due to decreased attention left side patient does not line up with the chair with left lower extremity.  Needs verbal and tactile cues to bring left leg back  -LB         Gait/Stairs (Locomotion)    [place] Level (Gait) verbal cues;contact guard  -LB      Assistive Device (Gait) crutches, forearm  on the right  -LB      Distance in Feet (Gait) 150  -LB      Deviations/Abnormal Patterns (Gait) bilateral deviations;base of support, narrow;gait speed decreased;stride length decreased;weight shifting decreased  -LB      Bilateral Gait Deviations forward flexed posture  -LB      Left Sided Gait Deviations decreased knee extension;heel strike decreased  decreased step length  -LB      Gait Assessment/Intervention Verbal cues for increased left lower extremity step length.  Patient able to maintain left stride length at least 50% of the time today.  Pt looking down and to the right.  -LB         Balance Skills Training    Standing-Level of Assistance Contact guard;Minimum assistance  -LB      Static Standing Balance Support Right upper extremity supported;No upper extremity supported  -LB      Standing-Balance Activities Foam square  -LB      Standing Balance # of Minutes scanning and reaching to the left while standing on foam.  Blaze pods placed on floor for the patient to react and tap with left LE.  Pt needed vc and manual cues to look to the pad on the left away from midline.  CGA with UE support  -LB      Gait Balance-Level of Assistance Contact guard  -LB      Gait Balance Support Right upper extremity supported   -LB      Gait Balance Activities stepping over object;backwards  squares placed on floor for patient to place each foot in the square, cues to look left  -LB                User Key  (r) = Recorded By, (t) = Taken By, (c) = Cosigned By      Initials Name Provider Type    Jacquelyn Florentino PT Physical Therapist                             PT Assessment/Plan       Row Name 09/30/24 1237          PT Assessment    Assessment Comments With ambulation, attempting to keep left hip in more nuetral position with less hip retraction to allow increased step length on the left.  The patient is sequencing much better with single right forearm crutch but needs moderate reminders to step forward with left LE.  Attempted blaze pods as a visual assist for the patient to scan to the left but was difficult to find the pod placed to the left of midline.  -LB               User Key  (r) = Recorded By, (t) = Taken By, (c) = Cosigned By      Initials Name Provider Type    Jacquelyn Florentino PT Physical Therapist                        OP Exercises       Row Name 09/30/24 1241 09/30/24 1047          Subjective    Subjective Comments -- doing okay today  -LB        Subjective Pain    Able to rate subjective pain? -- yes  -LB     Subjective Pain Comment -- no complaints of pain today  -LB        Total Minutes    70786 - PT Therapeutic Exercise Minutes 10  -LB --     03496 -  PT Neuromuscular Reeducation Minutes 22  -LB --     45797 - PT Therapeutic Activity Minutes 10  -LB --        Exercise 1    Exercise Name 1 -- standing left hip flexion to a 6 inch step  -LB     Reps 1 -- 15  -LB     Additional Comments -- UE support  left hip retracted  -LB        Exercise 8    Exercise Name 8 -- right hip flexion to 6 inch step  -LB     Reps 8 -- 5  -LB     Additional Comments -- manual cues for left hip placement; no UE support  -LB               User Key  (r) = Recorded By, (t) = Taken By, (c) = Cosigned By      Initials Name Provider Type    VICKI  Jacquelyn Woods PT Physical Therapist                                    Therapy Education  Education Details: looking to the left with all functional mobility  Given: Mobility training, Fall prevention and home safety  Program: Reinforced  How Provided: Verbal  Provided to: Patient  Level of Understanding: Other (comment) (needs reinforcement)              Time Calculation:   Start Time: 1018  Stop Time: 1100  Time Calculation (min): 42 min  Timed Charges  83365 - PT Therapeutic Exercise Minutes: 10  65777 -  PT Neuromuscular Reeducation Minutes: 22  94321 - PT Therapeutic Activity Minutes: 10  Total Minutes  Timed Charges Total Minutes: 42   Total Minutes: 42   Therapy Charges for Today       Code Description Service Date Service Provider Modifiers Qty    04216981701  PT NEUROMUSC RE EDUCATION EA 15 MIN 9/30/2024 Jacquelyn Woods, PT GP 1    [id number]  PT THER PROC EA 15 MIN 9/30/2024 Jacquelyn Woods, PT GP 1    [id number]  PT THERAPEUTIC ACT EA 15 MIN 9/30/2024 Jacquelyn Woods, PT GP 1                      Jacquelyn Woods PT  9/30/2024

## 2024-10-02 ENCOUNTER — HOSPITAL ENCOUNTER (OUTPATIENT)
Dept: OCCUPATIONAL THERAPY | Facility: HOSPITAL | Age: 79
Setting detail: THERAPIES SERIES
Discharge: HOME | End: 2024-10-02
Payer: MEDICAID

## 2024-10-02 ENCOUNTER — HOSPITAL ENCOUNTER (OUTPATIENT)
Dept: PHYSICAL THERAPY | Facility: HOSPITAL | Age: 79
Setting detail: THERAPIES SERIES
Discharge: HOME | End: 2024-10-02
Payer: MEDICAID

## 2024-10-02 DIAGNOSIS — M25.60 DECREASED RANGE OF MOTION WITH DECREASED STRENGTH: ICD-10-CM

## 2024-10-02 DIAGNOSIS — I69.30 H/O: STROKE WITH RESIDUAL EFFECTS: Primary | ICD-10-CM

## 2024-10-02 DIAGNOSIS — Z74.09 DECREASED INDEPENDENCE WITH TRANSFERS: ICD-10-CM

## 2024-10-02 DIAGNOSIS — R26.89 FUNCTIONAL GAIT ABNORMALITY: ICD-10-CM

## 2024-10-02 DIAGNOSIS — R27.8 LOSS OF COORDINATION: ICD-10-CM

## 2024-10-02 DIAGNOSIS — M62.89 ABNORMAL INCREASED MUSCLE TONE: ICD-10-CM

## 2024-10-02 DIAGNOSIS — I63.89 CEREBROVASCULAR ACCIDENT (CVA) DUE TO OTHER MECHANISM: Primary | ICD-10-CM

## 2024-10-02 DIAGNOSIS — I69.334 MONOPLEGIA OF UPPER EXTREMITY FOLLOWING CEREBRAL INFARCTION AFFECTING LEFT NON-DOMINANT SIDE: ICD-10-CM

## 2024-10-02 DIAGNOSIS — R53.1 DECREASED RANGE OF MOTION WITH DECREASED STRENGTH: ICD-10-CM

## 2024-10-02 PROCEDURE — 97112 NEUROMUSCULAR REEDUCATION: CPT

## 2024-10-02 PROCEDURE — 97530 THERAPEUTIC ACTIVITIES: CPT

## 2024-10-02 NOTE — THERAPY TREATMENT NOTE
Outpatient Occupational Therapy Neuro Treatment Note  [facility]     Patient Name: Alex Negron  : 1945  MRN: [id number]  Today's Date: 10/2/2024       Visit Date: 10/02/2024    Patient Active Problem List   Diagnosis    History of embolic stroke    Right-sided extracranial carotid artery stenosis    Gastroesophageal reflux disease    Cerebrovascular accident (CVA)    Dysphagia    Elevated LFTs    Fatty liver disease, nonalcoholic    PEG (percutaneous endoscopic gastrostomy) status    Urinary retention    Benign essential HTN    Hyperlipidemia, mixed    Degeneration of lumbar or lumbosacral intervertebral disc    Iron deficiency anemia    Impaired fasting glucose    Elevated platelet count    Bladder mass    Malignant neoplasm of urinary bladder    Type 2 diabetes mellitus without complication, without long-term current use of insulin    Bladder cancer    Encounter for screening for malignant neoplasm of colon    Bilateral hip pain    Lumbar radiculopathy    Lumbar facet arthropathy    Nonsustained supraventricular tachycardia        Past Medical History:   Diagnosis Date    Anxiety     Aortic atherosclerosis     Bladder cancer     Bladder mass 2023    Bladder tumor 2023    chemical chemo in [place] office  weekly    Blind left eye     Blood in urine     TRACE    Burning with urination     Chronic back pain     neuropathy rolando legs    DDD (degenerative disc disease), lumbar     GERD (gastroesophageal reflux disease)     History of low back pain     Hyperlipidemia     Hypertension     PTSD (post-traumatic stress disorder)     [place]    Renal insufficiency     Sleep apnea     diagnosed but following up with PCP    Urine frequency         Past Surgical History:   Procedure Laterality Date    COLONOSCOPY N/A 3/14/2024    Procedure: COLONOSCOPY TO CECUM WITH COLD SNARE POLYPECTOMIES;  Surgeon: Magdaleno Mena MD;  Location: [facility] ENDOSCOPY;  Service:  Gastroenterology;  Laterality: N/A;  PRE OP - SCREENING  -POST OP - COLON POLYPS,HEMORRHOIDS    DENTAL PROCEDURE      ENDOSCOPY      ENDOSCOPY N/A 4/3/2024    Procedure: ESOPHAGOGASTRODUODENOSCOPY AT BEDSIDE;  Surgeon: Redd Guidry MD;  Location: [facility] ENDOSCOPY;  Service: Gastroenterology;  Laterality: N/A;  PRE-  GI BLEED  POST- HIATAL HERNIA, DISTAL ESOPHAGITIS ULCERS, GASTRITIS, PYLOROPLASTY    ENDOSCOPY W/ PEG TUBE PLACEMENT N/A 4/12/2024    Procedure: ESOPHAGOGASTRODUODENOSCOPY WITH PERCUTANEOUS ENDOSCOPIC GASTROSTOMY TUBE INSERTION;  Surgeon: Dimas Brand MD;  [facility] ENDOSCOPY;  Service: General;  Laterality: N/A;  Dysphagia    TRANSURETHRAL RESECTION OF BLADDER TUMOR N/A 06/07/2023    Procedure: TRANSURETHRAL RESECTION OF BLADDER TUMOR;  Surgeon: Robert Haque MD;  Location: [facility] MAIN OR;  Service: Urology;  Laterality: N/A;    TRANSURETHRAL RESECTION OF BLADDER TUMOR N/A 10/18/2023    Procedure: CYSTOSCOPY TRANSURETHRAL RESECTION OF BLADDER TUMOR;  Surgeon: Robert Haque MD;  Location: [facility] MAIN OR;  Service: Urology;  Laterality: N/A;         Visit Dx:    ICD-10-CM ICD-9-CM   1. Cerebrovascular accident (CVA) due to other mechanism  I63.89 434.91   2. Decreased range of motion with decreased strength  M25.60 719.50    R53.1 780.79   3. 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 Loss of coordination  R27.8 781.3                    OT Assessment/Plan       Row Name 10/02/24 1228          OT Assessment    Assessment Comments Pt participated well in OP OT session today, but still continues to require cues and reminders for L sided attention/awareness. CGA for transfer to close sup A in standing w/o AD and mod cues for L sided attention and scanning when leaving therapy gym to waiting area. Pt was receptive to theraputty HEP, continue OP OT services with emphasis on L sided attention, tone mgmt,  /strength, coordination. Reapplied k tape and cont'd educ on wear time/use, and removal if any irritation present. Pt v/u.  -BC               User Key  (r) = Recorded By, (t) = Taken By, (c) = Cosigned By      Initials Name Provider Type    Candi Dee OT Occupational Therapist                               Therapy Education  Education Details: cues for L sided attention, LUE HEP with theraputty reviewed.  Given: HEP, Posture/body mechanics  Program: Reinforced, Modified  How Provided: Verbal  Provided to: Patient  Level of Understanding: Verbalized, Other (comment) (needs cont'd education)       OT Exercises       Row Name 10/02/24 1100             Precautions    Existing Precautions/Restrictions fall  -BC         Subjective Comments    Subjective Comments Interpretor services ID [id number]. Pt receptive to teaching but needed some cues for redirection/attention back to tasks.  -BC         Subjective Pain    Able to rate subjective pain? yes  -BC      Pre-Treatment Pain Level 0  -BC      Post-Treatment Pain Level 0  -BC      Subjective Pain Comment 0  -BC         Exercise 1    Exercise Name 1 Pt brought theraputty for HEP, ayaan'd  he can squeeze better today. High reps, squeeze/open, and putty manipulation. Practice pinch w/ decreased IND w/ task, min to sup A w/ cues, mod A for [place] A practice rolling out.  -BC      Cueing 1 Demo;Verbal  -BC      Time (Minutes) 1 15  -BC         Exercise 2    Exercise Name 2 K tape applied L sh for pain mgmt, proprioceptive input, and increased functional use/return at scap/sh for mvmts, educ on wear time, monitori s/s of redness.  -BC      Cueing 2 Verbal  -BC      Time (Minutes) 2 5  -BC         Exercise 3    Exercise Name 3 seated LUE extension PROM at elbow. Als cont'd w/ WB'ing w/ emphasis on wrist/digit ext. Visual aides and speaking with interpretor on L side for improved awareness. Able to PROM w/ increased time/prolonged stretch ~5* lacking  -BC      Cueing 3 Verbal   -BC      Sets 3 2  -BC      Reps 3 10  -BC         Exercise 4    Exercise Name 4 standing reaching task w/ LUE to hit blaze pods for increased standing tolerance, faciliation LUE use and attention to L. Pt initially only seeing 4 pods in field of view, cues to find all 6. 3 pods hit w/ LUE w/ mod cues in 1 min intervals, improved to 6 hits w/ mod A last time (3x completed). Cont'd w RUE reaching across midline. Progressed to task in seated as fatigued. Mod A and mod to max cues for task completion.  -BC      Cueing 4 Verbal  -BC      Time (Minutes) 14 8  -BC         Exercise 5    Exercise Name 5 within therapy gym w/o AD and CGA w/ mod cues for L sided attention, Pt continuously turning R and heading in wrong direction multiple times. Max cues and also reminders w LLE foot placement and progression. Pt was able to demo some teachback.  -BC      Cueing 5 Tactile;Verbal  -BC      Time (Seconds) 5 3  -BC                User Key  (r) = Recorded By, (t) = Taken By, (c) = Cosigned By      Initials Name Provider Type    BC Candi Paez OT Occupational Therapist                                Time Calculation:   OT Start Time: 1100  OT Stop Time: 1145  OT Time Calculation (min): 45 min  Total Timed Code Minutes- OT: 45 minute(s)  Timed Charges  87484 -  OT Neuromuscular Reeducation Minutes: 30  10213 - OT Therapeutic Activity Minutes: 15  Total Minutes  Timed Charges Total Minutes: 45   Total Minutes: 45     Therapy Charges for Today       Code Description Service Date Service Provider Modifiers Qty    [id number]  OT NEUROMUSC RE EDUCATION EA 15 MIN 10/2/2024 Candi Paez OT GO 2    [id number]  OT THERAPEUTIC ACT EA 15 MIN 10/2/2024 Candi Paez OT GO 1                      Candi Paez OT  10/2/2024

## 2024-10-02 NOTE — THERAPY TREATMENT NOTE
Outpatient Physical Therapy Neuro Treatment Note  [facility]     Patient Name: Alex Negron  : 1945  MRN: [id number]  Today's Date: 10/2/2024      Visit Date: 10/02/2024    Visit Dx:    ICD-10-CM ICD-9-CM   1. H/O: stroke with residual effects  I69.30 438.9   2. Abnormal increased muscle tone  M62.89 728.85   3. Functional gait abnormality  R26.89 781.2   4. Decreased independence with transfers  Z74.09 V62.89       Patient Active Problem List   Diagnosis    History of embolic stroke    Right-sided extracranial carotid artery stenosis    Gastroesophageal reflux disease    Cerebrovascular accident (CVA)    Dysphagia    Elevated LFTs    Fatty liver disease, nonalcoholic    PEG (percutaneous endoscopic gastrostomy) status    Urinary retention    Benign essential HTN    Hyperlipidemia, mixed    Degeneration of lumbar or lumbosacral intervertebral disc    Iron deficiency anemia    Impaired fasting glucose    Elevated platelet count    Bladder mass    Malignant neoplasm of urinary bladder    Type 2 diabetes mellitus without complication, without long-term current use of insulin    Bladder cancer    Encounter for screening for malignant neoplasm of colon    Bilateral hip pain    Lumbar radiculopathy    Lumbar facet arthropathy    Nonsustained supraventricular tachycardia            PT Neuro       Row Name 10/02/24 1100             Subjective    Subjective Comments Pt agreeable to therapy  -LB         Precautions and Contraindications    Precautions/Limitations fall precautions  -LB         Subjective Pain    Subjective Pain Comment No complaints of pain during session  -LB         Perception    Inattention/Neglect Cues to attend left visual field;Cues to attend to left side of body  -LB         Posture/Observations    Posture/Observations Comments Patient arrived to clinic with caregiver.  -LB         Transfers    Sit-Stand [place] (Transfers) standby assist  -LB      Stand-Sit [place] (Transfers)  contact guard  -LB      Transfer, Impairments impaired balance;muscle tone abnormal;sensory feedback impaired  -LB      Comment, (Transfers) Manual and verbal cues to bring the left lower extremity  in line up to the chair.  -LB         Gait/Stairs (Locomotion)    [place] Level (Gait) verbal cues;contact guard;minimum assist (75% patient effort)  -LB      Assistive Device (Gait) --  No AD  -LB      Distance in Feet (Gait) 100  -LB      Deviations/Abnormal Patterns (Gait) bilateral deviations;base of support, narrow;gait speed decreased;stride length decreased;weight shifting decreased  -LB      Bilateral Gait Deviations forward flexed posture  -LB      Left Sided Gait Deviations decreased knee extension;heel strike decreased  Decreased step length  -LB      Gait Assessment/Intervention Manual and verbal cues for increased step length on the left.  Cues for patient to scan past midline to the left, even trying to look at feet for placement of left lower extremity  -LB         Balance Skills Training    Standing-Level of Assistance Contact guard;Minimum assistance  -LB      Static Standing Balance Support Right upper extremity supported;No upper extremity supported  -LB      Standing-Balance Activities Weight Shift R-L  -LB      Standing Balance # of Minutes Patient stepping over 3 inch wide object and back with left lower extremity.  Cues for patient to scan and look at left lower extremity placement  -LB      Gait Balance-Level of Assistance Contact guard  -LB      Gait Balance Support Right upper extremity supported  -LB      Gait Balance # of Minutes Squares placed on floor for patient to step into with lower extremity.  -LB      Hip Strategy Assessment (Balance) While standing on foam with bedside table in front.  Worked on scanning right midline into the left while reacting to blaze pods.  Patient needed verbal cues to scan to the left 75% of the time to react to lighted pod.  -LB                 User Key  (r) = Recorded By, (t) = Taken By, (c) = Cosigned By      Initials Name Provider Type    Jacquelyn Florentino PT Physical Therapist                             PT Assessment/Plan       Row Name 10/02/24 1540          PT Assessment    Assessment Comments Patient was able to walk 20 feet x 4 with improved step length and heel strike on the left with no distractions and moderate verbal cues.  Carryover limited when working on walking around a busy clinic.  Continue to work on activities to increase scanning and attention to midline and to the left which is the biggest obstacle for the patient.  Patient with good participation during therapy.  -LB               User Key  (r) = Recorded By, (t) = Taken By, (c) = Cosigned By      Initials Name Provider Type    Jacquelyn Florentino PT Physical Therapist                        OP Exercises       Row Name 10/02/24 1541 10/02/24 1100          Subjective    Subjective Comments -- Pt agreeable to therapy  -LB        Subjective Pain    Subjective Pain Comment -- No complaints of pain during session  -LB        Total Minutes    46371 -  PT Neuromuscular Reeducation Minutes 30  -LB --     38020 - PT Therapeutic Activity Minutes 10  -LB --               User Key  (r) = Recorded By, (t) = Taken By, (c) = Cosigned By      Initials Name Provider Type    Jacquelyn Florentino PT Physical Therapist                                    Therapy Education  Education Details: To decrease twisting on left knee educated to line up and taking a step back with left lower extremity prior to sitting down  Given: Mobility training  Program: Reinforced  How Provided: Verbal, Demonstration  Provided to: Patient  Level of Understanding:  (Needs reinforcement)              Time Calculation:   Start Time: 1020  Stop Time: 1100  Time Calculation (min): 40 min  Timed Charges  69311 -  PT Neuromuscular Reeducation Minutes: 30  01199 - PT Therapeutic Activity Minutes: 10  Total Minutes  Timed Charges Total  Minutes: 40   Total Minutes: 40   Therapy Charges for Today       Code Description Service Date Service Provider Modifiers Qty    41003072083  PT NEUROMUSC RE EDUCATION EA 15 MIN 10/2/2024 Jacquelyn Woods, PT GP 2    09356924320  PT THERAPEUTIC ACT EA 15 MIN 10/2/2024 Jacquelyn Woods, PT GP 1                      Jacquelyn Woods, PT  10/2/2024

## 2024-10-07 ENCOUNTER — HOSPITAL ENCOUNTER (OUTPATIENT)
Dept: OCCUPATIONAL THERAPY | Facility: HOSPITAL | Age: 79
Discharge: HOME | End: 2024-10-07
Admitting: PHYSICIAN ASSISTANT
Payer: MEDICAID

## 2024-10-07 ENCOUNTER — HOSPITAL ENCOUNTER (OUTPATIENT)
Dept: PHYSICAL THERAPY | Facility: HOSPITAL | Age: 79
Discharge: HOME | End: 2024-10-07
Admitting: PHYSICIAN ASSISTANT
Payer: MEDICAID

## 2024-10-07 DIAGNOSIS — I69.334 MONOPLEGIA OF UPPER EXTREMITY FOLLOWING CEREBRAL INFARCTION AFFECTING LEFT NON-DOMINANT SIDE: ICD-10-CM

## 2024-10-07 DIAGNOSIS — Z74.09 DECREASED INDEPENDENCE WITH TRANSFERS: ICD-10-CM

## 2024-10-07 DIAGNOSIS — M62.89 ABNORMAL INCREASED MUSCLE TONE: ICD-10-CM

## 2024-10-07 DIAGNOSIS — R27.8 LOSS OF COORDINATION: ICD-10-CM

## 2024-10-07 DIAGNOSIS — R26.89 FUNCTIONAL GAIT ABNORMALITY: ICD-10-CM

## 2024-10-07 DIAGNOSIS — M25.60 DECREASED RANGE OF MOTION WITH DECREASED STRENGTH: ICD-10-CM

## 2024-10-07 DIAGNOSIS — I69.30 H/O: STROKE WITH RESIDUAL EFFECTS: Primary | ICD-10-CM

## 2024-10-07 DIAGNOSIS — I63.89 CEREBROVASCULAR ACCIDENT (CVA) DUE TO OTHER MECHANISM: Primary | ICD-10-CM

## 2024-10-07 DIAGNOSIS — R53.1 DECREASED RANGE OF MOTION WITH DECREASED STRENGTH: ICD-10-CM

## 2024-10-07 PROCEDURE — 97112 NEUROMUSCULAR REEDUCATION: CPT

## 2024-10-07 PROCEDURE — 97530 THERAPEUTIC ACTIVITIES: CPT

## 2024-10-07 NOTE — THERAPY TREATMENT NOTE
Outpatient Physical Therapy Neuro Treatment Note  [facility]     Patient Name: Alex Negron  : 1945  MRN: [id number]  Today's Date: 10/7/2024      Visit Date: 10/07/2024    Visit Dx:    ICD-10-CM ICD-9-CM   1. H/O: stroke with residual effects  I69.30 438.9   2. Abnormal increased muscle tone  M62.89 728.85   3. Functional gait abnormality  R26.89 781.2   4. Decreased independence with transfers  Z74.09 V62.89       Patient Active Problem List   Diagnosis    History of embolic stroke    Right-sided extracranial carotid artery stenosis    Gastroesophageal reflux disease    Cerebrovascular accident (CVA)    Dysphagia    Elevated LFTs    Fatty liver disease, nonalcoholic    PEG (percutaneous endoscopic gastrostomy) status    Urinary retention    Benign essential HTN    Hyperlipidemia, mixed    Degeneration of lumbar or lumbosacral intervertebral disc    Iron deficiency anemia    Impaired fasting glucose    Elevated platelet count    Bladder mass    Malignant neoplasm of urinary bladder    Type 2 diabetes mellitus without complication, without long-term current use of insulin    Bladder cancer    Encounter for screening for malignant neoplasm of colon    Bilateral hip pain    Lumbar radiculopathy    Lumbar facet arthropathy    Nonsustained supraventricular tachycardia            PT Neuro       Row Name 10/07/24 0908             Subjective    Subjective Comments no complaints today.  States knee feels normal  -LB         Precautions and Contraindications    Precautions/Limitations fall precautions  -LB         Subjective Pain    Able to rate subjective pain? yes  -LB      Pre-Treatment Pain Level 0  -LB      Post-Treatment Pain Level 0  -LB         Cognition    Overall Cognitive Status WFL  -LB         Perception    Inattention/Neglect Cues to attend left visual field;Cues to attend to left side of body  with walking and scanning activities during the session  -LB         Posture/Observations     Posture/Observations Comments Patient arrived to clinic with caregiver.  -LB         Transfers    Sit-Stand [place] (Transfers) standby assist  -LB      Stand-Sit [place] (Transfers) contact guard  -LB      Comment, (Transfers) Manual and verbal cues to bring the left lower extremity  in line up to the chair.  -LB         Gait/Stairs (Locomotion)    [place] Level (Gait) verbal cues;contact guard;minimum assist (75% patient effort)  -LB      Assistive Device (Gait) --  HHA on the right  -LB      Distance in Feet (Gait) 50  100; 120  -LB      Pattern (Gait) step-to;step-through  -LB      Deviations/Abnormal Patterns (Gait) bilateral deviations;base of support, narrow;gait speed decreased;stride length decreased;weight shifting decreased  -LB      Bilateral Gait Deviations forward flexed posture  -LB      Left Sided Gait Deviations decreased knee extension;heel strike decreased  decreased step length  -LB      Gait Assessment/Intervention Manual and verbal cues for increased step length on the left.  Cues for patient to scan past midline to the left, even trying to look at feet for placement of left lower extremity  -LB         Balance Skills Training    Standing-Level of Assistance Contact guard;Close supervision  -LB      Static Standing Balance Support No upper extremity supported  -LB      Standing-Balance Activities Weight Shift R-L;Weight Shift A-P;Reaching across midline;Retrieve object from floor  -LB      Standing Balance # of Minutes Pt stood at bedside table with 4 blaze spots on table having patient scanned both right and left for the activity.  First round patient was completed 8 hips with an average reaction time of 4677.  Second cycle patient completed  with an average reaction time 3535.  Provided 1 verbal cue during each cycle to look to the left  -LB      Stepping Strategy Assessment (Balance) To encourage longer step length particularly on the left challenged patient to walk  particular distance in a certain number of steps.  Performed 25 feet x 4.  -LB                User Key  (r) = Recorded By, (t) = Taken By, (c) = Cosigned By      Initials Name Provider Type    Jacquelyn Florentino PT Physical Therapist                             PT Assessment/Plan       Row Name 10/07/24 1158          PT Assessment    Assessment Comments Patient is doing better with his [place] crutch with sequencing.  With cueing patient is able to increase left step length while maintaining a di but when encouraged to look up and left the left patient has a tendency to lose his sequencing and demonstrates a smaller step length.  Patient improved with tabletop scanning activity but difficult to carryover to functional mobility.  -LB               User Key  (r) = Recorded By, (t) = Taken By, (c) = Cosigned By      Initials Name Provider Type    Jacquelyn Florentino PT Physical Therapist                        OP Exercises       Row Name 10/07/24 1200 10/07/24 0908          Subjective    Subjective Comments -- no complaints today.  States knee feels normal  -LB        Subjective Pain    Able to rate subjective pain? -- yes  -LB     Pre-Treatment Pain Level -- 0  -LB     Post-Treatment Pain Level -- 0  -LB        Total Minutes    97214 - PT Therapeutic Exercise Minutes 8  -LB --     11414 -  PT Neuromuscular Reeducation Minutes 28  -LB --     52809 - PT Therapeutic Activity Minutes 9  -LB --        Exercise 1    Exercise Name 1 -- standing left hip flexion to a 6 inch step; right hip  flexion to step to increase weight bearing on left LE  -LB     Reps 1 -- 10  -LB     Additional Comments -- R UE support  -LB        Exercise 6    Exercise Name 6 -- alternating toe taps  -LB     Reps 6 -- 10  -LB     Additional Comments -- right UE support  -LB        Exercise 8    Exercise Name 8 -- Seated left hip flexion  -LB     Reps 8 -- 10  -LB     Additional Comments -- BTB  -LB               User Key  (r) = Recorded By, (t) =  Taken By, (c) = Cosigned By      Initials Name Provider Type    Jacquelyn Florentino, PT Physical Therapist                                    Therapy Education  Education Details: Reinforced the need to turn in line up with a chair to decrease strain on knee and  Given: Mobility training  Program: Reinforced  How Provided: Verbal, Demonstration  Provided to: Patient  Level of Understanding: Teach back education performed, Verbalized              Time Calculation:   Start Time: 0845  Stop Time: 0930  Time Calculation (min): 45 min  Timed Charges  48381 - PT Therapeutic Exercise Minutes: 8  46423 -  PT Neuromuscular Reeducation Minutes: 28  82419 - PT Therapeutic Activity Minutes: 9  Total Minutes  Timed Charges Total Minutes: 45   Total Minutes: 45   Therapy Charges for Today       Code Description Service Date Service Provider Modifiers Qty    85219801760 HC PT NEUROMUSC RE EDUCATION EA 15 MIN 10/7/2024 Jacquelyn Woods, PT GP 2    23503923485 HC PT THERAPEUTIC ACT EA 15 MIN 10/7/2024 Jacquelyn Woods, PT GP 1                      Jacquelyn Woods PT  10/7/2024

## 2024-10-07 NOTE — THERAPY TREATMENT NOTE
Outpatient Occupational Therapy Neuro Treatment Note  [facility]     Patient Name: Alex Negron  : 1945  MRN: [id number]  Today's Date: 10/7/2024       Visit Date: 10/07/2024    Patient Active Problem List   Diagnosis    History of embolic stroke    Right-sided extracranial carotid artery stenosis    Gastroesophageal reflux disease    Cerebrovascular accident (CVA)    Dysphagia    Elevated LFTs    Fatty liver disease, nonalcoholic    PEG (percutaneous endoscopic gastrostomy) status    Urinary retention    Benign essential HTN    Hyperlipidemia, mixed    Degeneration of lumbar or lumbosacral intervertebral disc    Iron deficiency anemia    Impaired fasting glucose    Elevated platelet count    Bladder mass    Malignant neoplasm of urinary bladder    Type 2 diabetes mellitus without complication, without long-term current use of insulin    Bladder cancer    Encounter for screening for malignant neoplasm of colon    Bilateral hip pain    Lumbar radiculopathy    Lumbar facet arthropathy    Nonsustained supraventricular tachycardia        Past Medical History:   Diagnosis Date    Anxiety     Aortic atherosclerosis     Bladder cancer     Bladder mass 2023    Bladder tumor 2023    chemical chemo in [place] office  weekly    Blind left eye     Blood in urine     TRACE    Burning with urination     Chronic back pain     neuropathy rolando legs    DDD (degenerative disc disease), lumbar     GERD (gastroesophageal reflux disease)     History of low back pain     Hyperlipidemia     Hypertension     PTSD (post-traumatic stress disorder)     [place] of [place]    Renal insufficiency     Sleep apnea     diagnosed but following up with PCP    Urine frequency         Past Surgical History:   Procedure Laterality Date    COLONOSCOPY N/A 3/14/2024    Procedure: COLONOSCOPY TO CECUM WITH COLD SNARE POLYPECTOMIES;  Surgeon: Magdaleno Mena MD;  Location: [facility] ENDOSCOPY;  Service:  Gastroenterology;  Laterality: N/A;  PRE OP - SCREENING  -POST OP - COLON POLYPS,HEMORRHOIDS    DENTAL PROCEDURE      ENDOSCOPY      ENDOSCOPY N/A 4/3/2024    Procedure: ESOPHAGOGASTRODUODENOSCOPY AT BEDSIDE;  Surgeon: Redd Guidry MD;  Location: [facility] ENDOSCOPY;  Service: Gastroenterology;  Laterality: N/A;  PRE-  GI BLEED  POST- HIATAL HERNIA, DISTAL ESOPHAGITIS ULCERS, GASTRITIS, PYLOROPLASTY    ENDOSCOPY W/ PEG TUBE PLACEMENT N/A 4/12/2024    Procedure: ESOPHAGOGASTRODUODENOSCOPY WITH PERCUTANEOUS ENDOSCOPIC GASTROSTOMY TUBE INSERTION;  Surgeon: Dimas Brand MD;  Location: [facility] ENDOSCOPY;  Service: General;  Laterality: N/A;  Dysphagia    TRANSURETHRAL RESECTION OF BLADDER TUMOR N/A 06/07/2023    Procedure: TRANSURETHRAL RESECTION OF BLADDER TUMOR;  Surgeon: Robert Haque MD;  Location: [facility] MAIN OR;  Service: Urology;  Laterality: N/A;    TRANSURETHRAL RESECTION OF BLADDER TUMOR N/A 10/18/2023    Procedure: CYSTOSCOPY TRANSURETHRAL RESECTION OF BLADDER TUMOR;  Surgeon: Robert Haque MD;  Location: [facility] MAIN OR;  Service: Urology;  Laterality: N/A;         Visit Dx:    ICD-10-CM ICD-9-CM   1. Cerebrovascular accident (CVA) due to other mechanism  I63.89 434.91   2. Decreased range of motion with decreased strength  M25.60 719.50    R53.1 780.79   3. 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 Loss of coordination  R27.8 781.3                    OT Assessment/Plan       Row Name 10/07/24 1207          OT Assessment    Assessment Comments Pt participated in OP OT today. He does arrive late to session. Pt reports he tolerated KT to L shoulder without issue last session and requests therapist to apply again today. POC continues to involve activities to address tone management, LUE control/coordination, LUE ROM for ADLs, L sided attention. Pt currently requires assist with all ADLs and mobility and required  assistance to don/doff jacket today 2/2 L tone/weakness.  -SM               User Key  (r) = Recorded By, (t) = Taken By, (c) = Cosigned By      Initials Name Provider Type    Yasmine Kirkpatrick OT Occupational Therapist                               Therapy Education  Education Details: discussed bringing home NMES next session for therapist to educate pt on home set up, appropriate parameters.  Given: Other (comment)  Program: New  How Provided: Verbal  Provided to: Patient  Level of Understanding: Verbalized     Modalities       Row Name 10/07/24 1100             ELECTRICAL STIMULATION    Attended/Unattended Attended  -SM      Stimulation Type FES  -SM      Max mAmp --  47  -SM      Location/Electrode Placement/Other triceps  goal to faciliate elbow extension during reaching activites. No skin issues noted following.  -      05245 - OT E-Stim Attended Minutes 15  -SM                User Key  (r) = Recorded By, (t) = Taken By, (c) = Cosigned By      Initials Name Provider Type    Yasmine Kirkpatrick OT Occupational Therapist                   OT Exercises       Row Name 10/07/24 0800             Precautions    Existing Precautions/Restrictions fall  -         Subjective Comments    Subjective Comments Interpretor utilized (Russian). Pt flat during session but agreeable to all exercises.  -SM         Subjective Pain    Able to rate subjective pain? yes  -SM      Subjective Pain Comment Reports R anterior to scapular pain L shoulder which he doesnt rate but reports feels improved with Kinesio tapping  -SM         Exercise 1    Exercise Name 1 Weight bearing down to L elbow/forearm sitting EOB for inhibitation of tone technique, improve proprioceptive feedback to LUE. Pt noted with tone in trunk and has limited lateral trunk flexion.  -SM      Cueing 1 Demo;Verbal;Tactile  -SM      Reps 1 10  -SM         Exercise 2    Exercise Name 2 LUE reaching activity with use of NMES on L triceps to promote elbow  extension during reaching activity to grasp cones in various planes and stack onto dowel. Moderate cues required for attending to cones in L visual field.  -SM      Cueing 2 Demo;Verbal  -SM         Exercise 3    Exercise Name 3 Supine AAROM of L shoulder- flex/ abd/add, horizontal abd, ER/IR with short air cast to for inhibition of L elbow flexor tone during shoulder ROM exercises. Pt needs mod/max A to perform bed mobility supine>sit>supine. AROM of scapular movements in prep to therapist providing AAROM of shoulder.  -SM      Cueing 3 Demo;Verbal;Tactile  -SM         Exercise 4    Exercise Name 4 Kineso Tape applied to L shoulder inferiorly to superiorly with lateral strap across middle deltoid for pain control and support to shoulder girdle.  -SM      Cueing 4 Verbal  -SM                User Key  (r) = Recorded By, (t) = Taken By, (c) = Cosigned By      Initials Name Provider Type    SM Yasmine Martin OT Occupational Therapist                                Time Calculation:   OT Start Time: 0811  OT Stop Time: 0845  OT Time Calculation (min): 34 min  Total Timed Code Minutes- OT: 34 minute(s)  Timed Charges  55290 - OT E-Stim Attended Minutes: 15  30591 -  OT Neuromuscular Reeducation Minutes: 34  Total Minutes  Timed Charges Total Minutes: 34   Total Minutes: 34     Therapy Charges for Today       Code Description Service Date Service Provider Modifiers Qty    [id number]  OT NEUROMUSC RE EDUCATION EA 15 MIN 10/7/2024 Yasmine Martin OT GO 2                      Yasmine Martin OT  10/7/2024

## 2024-10-09 ENCOUNTER — HOSPITAL ENCOUNTER (OUTPATIENT)
Dept: PHYSICAL THERAPY | Facility: HOSPITAL | Age: 79
Discharge: HOME | End: 2024-10-09
Payer: MEDICAID

## 2024-10-09 ENCOUNTER — HOSPITAL ENCOUNTER (OUTPATIENT)
Dept: OCCUPATIONAL THERAPY | Facility: HOSPITAL | Age: 79
Setting detail: THERAPIES SERIES
Discharge: HOME | End: 2024-10-09
Payer: MEDICAID

## 2024-10-09 DIAGNOSIS — Z74.09 DECREASED INDEPENDENCE WITH TRANSFERS: ICD-10-CM

## 2024-10-09 DIAGNOSIS — I63.89 CEREBROVASCULAR ACCIDENT (CVA) DUE TO OTHER MECHANISM: Primary | ICD-10-CM

## 2024-10-09 DIAGNOSIS — I69.334 MONOPLEGIA OF UPPER EXTREMITY FOLLOWING CEREBRAL INFARCTION AFFECTING LEFT NON-DOMINANT SIDE: ICD-10-CM

## 2024-10-09 DIAGNOSIS — M62.89 ABNORMAL INCREASED MUSCLE TONE: ICD-10-CM

## 2024-10-09 DIAGNOSIS — I69.30 H/O: STROKE WITH RESIDUAL EFFECTS: Primary | ICD-10-CM

## 2024-10-09 DIAGNOSIS — R53.1 DECREASED RANGE OF MOTION WITH DECREASED STRENGTH: ICD-10-CM

## 2024-10-09 DIAGNOSIS — R27.8 LOSS OF COORDINATION: ICD-10-CM

## 2024-10-09 DIAGNOSIS — R26.89 FUNCTIONAL GAIT ABNORMALITY: ICD-10-CM

## 2024-10-09 DIAGNOSIS — M25.60 DECREASED RANGE OF MOTION WITH DECREASED STRENGTH: ICD-10-CM

## 2024-10-09 PROCEDURE — 97110 THERAPEUTIC EXERCISES: CPT

## 2024-10-09 PROCEDURE — 97112 NEUROMUSCULAR REEDUCATION: CPT

## 2024-10-09 PROCEDURE — 97032 APPL MODALITY 1+ESTIM EA 15: CPT

## 2024-10-09 NOTE — THERAPY TREATMENT NOTE
Outpatient Occupational Therapy Neuro Treatment Note  [facility]     Patient Name: Alex Negron  : 1945  MRN: [id number]  Today's Date: 10/9/2024       Visit Date: 10/09/2024    Patient Active Problem List   Diagnosis    History of embolic stroke    Right-sided extracranial carotid artery stenosis    Gastroesophageal reflux disease    Cerebrovascular accident (CVA)    Dysphagia    Elevated LFTs    Fatty liver disease, nonalcoholic    PEG (percutaneous endoscopic gastrostomy) status    Urinary retention    Benign essential HTN    Hyperlipidemia, mixed    Degeneration of lumbar or lumbosacral intervertebral disc    Iron deficiency anemia    Impaired fasting glucose    Elevated platelet count    Bladder mass    Malignant neoplasm of urinary bladder    Type 2 diabetes mellitus without complication, without long-term current use of insulin    Bladder cancer    Encounter for screening for malignant neoplasm of colon    Bilateral hip pain    Lumbar radiculopathy    Lumbar facet arthropathy    Nonsustained supraventricular tachycardia        Past Medical History:   Diagnosis Date    Anxiety     Aortic atherosclerosis     Bladder cancer     Bladder mass 2023    Bladder tumor 2023    chemical chemo in [place]  weekly    Blind left eye     Blood in urine     TRACE    Burning with urination     Chronic back pain     neuropathy rolando legs    DDD (degenerative disc disease), lumbar     GERD (gastroesophageal reflux disease)     History of low back pain     Hyperlipidemia     Hypertension     PTSD (post-traumatic stress disorder)     [place]    Renal insufficiency     Sleep apnea     diagnosed but following up with PCP    Urine frequency         Past Surgical History:   Procedure Laterality Date    COLONOSCOPY N/A 3/14/2024    Procedure: COLONOSCOPY TO CECUM WITH COLD SNARE POLYPECTOMIES;  Surgeon: Magdaleno Mena MD;  Location: [facility] ENDOSCOPY;  Service:  Gastroenterology;  Laterality: N/A;  PRE OP - SCREENING  -POST OP - COLON POLYPS,HEMORRHOIDS    DENTAL PROCEDURE      ENDOSCOPY      ENDOSCOPY N/A 4/3/2024    Procedure: ESOPHAGOGASTRODUODENOSCOPY AT BEDSIDE;  Surgeon: Redd Guidry MD;  Location: [facility] ENDOSCOPY;  Service: Gastroenterology;  Laterality: N/A;  PRE-  GI BLEED  POST- HIATAL HERNIA, DISTAL ESOPHAGITIS ULCERS, GASTRITIS, PYLOROPLASTY    ENDOSCOPY W/ PEG TUBE PLACEMENT N/A 4/12/2024    Procedure: ESOPHAGOGASTRODUODENOSCOPY WITH PERCUTANEOUS ENDOSCOPIC GASTROSTOMY TUBE INSERTION;  Surgeon: Dimas Brand MD;  Location: [facility] ENDOSCOPY;  Service: General;  Laterality: N/A;  Dysphagia    TRANSURETHRAL RESECTION OF BLADDER TUMOR N/A 06/07/2023    Procedure: TRANSURETHRAL RESECTION OF BLADDER TUMOR;  Surgeon: Robert Haque MD;  Location: [facility] MAIN OR;  Service: Urology;  Laterality: N/A;    TRANSURETHRAL RESECTION OF BLADDER TUMOR N/A 10/18/2023    Procedure: CYSTOSCOPY TRANSURETHRAL RESECTION OF BLADDER TUMOR;  Surgeon: Robert Haque MD;  Location: [facility] MAIN OR;  Service: Urology;  Laterality: N/A;         Visit Dx:    ICD-10-CM ICD-9-CM   1. Cerebrovascular accident (CVA) due to other mechanism  I63.89 434.91   2. 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 Loss of coordination  R27.8 781.3   4. Decreased range of motion with decreased strength  M25.60 719.50    R53.1 780.79                    OT Assessment/Plan       Row Name 10/09/24 1321          OT Assessment    Assessment Comments Pt participated fair with OP OT today, chad his home estim unit in and requesting OT to faciliate use/fxn. Pt tolerated estim to L triceps in conjunction with AAROM/reaching tasks. Pt still requires assist w/ doff/don jacket (mod A), and has decreased L sided awareness, increased tone, and fxn. Cont OP OT to address deficits.  -BC               User Key  (r) = Recorded By,  (t) = Taken By, (c) = Cosigned By      Initials Name Provider Type    Candi Dee, OT Occupational Therapist                               Therapy Education  Education Details: attention to L side, home estim unit function. Pt v/u  Given: HEP  Program: Reinforced  How Provided: Verbal, Demonstration  Provided to: Patient     Modalities       Row Name 10/09/24 1100             ELECTRICAL STIMULATION    Attended/Unattended Attended  -BC      Stimulation Type FES  -BC      Max mAmp 42  -BC      Location/Electrode Placement/Other triceps  -BC      71607 - OT E-Stim Attended Minutes 20  -BC                User Key  (r) = Recorded By, (t) = Taken By, (c) = Cosigned By      Initials Name Provider Type    BC Candi Paez, OT Occupational Therapist                   OT Exercises       Row Name 10/09/24 1100             Precautions    Existing Precautions/Restrictions fall  -BC         Subjective Comments    Subjective Comments interpretor services used during tx session. Pt bringing in home estim unit requesting to assess function.  -BC         Subjective Pain    Able to rate subjective pain? yes  -BC      Subjective Pain Comment Pt denies any pain  -BC         Exercise 1    Exercise Name 1 LUE AAROM/PROM stretching in conjunction w home e stim unit for facilitate elbow ext, also completed supinatino. Education on use at home, faciliated active use/fxn  -BC      Cueing 1 Demo;Verbal;Tactile  -BC      Time (Minutes) 1 15  -BC         Exercise 2    Exercise Name 2 LUE reaching activity  w estim and cont'd w/o during reaching activity w Moderate cues required for attending to tasks in L visual field. Mod A for task completion  -BC      Cueing 2 Demo;Verbal  -BC      Time (Minutes) 2 8  -BC         Exercise 3    Exercise Name 3 standing and cont'd seated blaze pods for increased L sided awareness, and faciliate LUE use. Cont'd w/ RUE reaching in standing, and seated LUE and then RUE. Poor attention, difficulty w/  hitting targets. CGA in standing, sup A seated. up to 6 hits.  -BC      Cueing 3 Demo;Verbal;Tactile  -BC      Time (Minutes) 3 15  -BC         Exercise 4    Exercise Name 4 K tape removed, skin integrity intact, no redness/irritation. Plan for re-application next service date.  -BC      Cueing 4 Verbal  -BC      Time (Minutes) 14 --  -BC         Exercise 5    Exercise Name 5 from therapy gym w/o AD and CGA w/ mod cues for L sided attention, Pt continuously turning R and heading in wrong direction multiple times. Max cues and also reminders w LLE foot placement and progression. Pt was able to demo some teachback.  -BC      Cueing 5 Tactile;Verbal  -BC                User Key  (r) = Recorded By, (t) = Taken By, (c) = Cosigned By      Initials Name Provider Type    Candi Dee OT Occupational Therapist                                Time Calculation:   OT Start Time: 1105  OT Stop Time: 1145  OT Time Calculation (min): 40 min  Total Timed Code Minutes- OT: 40 minute(s)  Timed Charges  74406 - OT E-Stim Attended Minutes: 20  63721 -  OT Neuromuscular Reeducation Minutes: 40  Total Minutes  Timed Charges Total Minutes: 40   Total Minutes: 40     Therapy Charges for Today       Code Description Service Date Service Provider Modifiers Qty    99241174524 HC OT NEUROMUSC RE EDUCATION EA 15 MIN 10/9/2024 Candi Paez OT GO 3                      Candi Paez OT  10/9/2024

## 2024-10-09 NOTE — THERAPY PROGRESS REPORT/RE-CERT
Outpatient Physical Therapy Neuro Progress Note  [facility]     Patient Name: Alex Negron  : 1945  MRN: [id number]  Today's Date: 10/9/2024      Visit Date: 10/09/2024    Visit Dx:    ICD-10-CM ICD-9-CM   1. H/O: stroke with residual effects  I69.30 438.9   2. Abnormal increased muscle tone  M62.89 728.85   3. Functional gait abnormality  R26.89 781.2   4. Decreased independence with transfers  Z74.09 V62.89       Patient Active Problem List   Diagnosis    History of embolic stroke    Right-sided extracranial carotid artery stenosis    Gastroesophageal reflux disease    Cerebrovascular accident (CVA)    Dysphagia    Elevated LFTs    Fatty liver disease, nonalcoholic    PEG (percutaneous endoscopic gastrostomy) status    Urinary retention    Benign essential HTN    Hyperlipidemia, mixed    Degeneration of lumbar or lumbosacral intervertebral disc    Iron deficiency anemia    Impaired fasting glucose    Elevated platelet count    Bladder mass    Malignant neoplasm of urinary bladder    Type 2 diabetes mellitus without complication, without long-term current use of insulin    Bladder cancer    Encounter for screening for malignant neoplasm of colon    Bilateral hip pain    Lumbar radiculopathy    Lumbar facet arthropathy    Nonsustained supraventricular tachycardia            PT Neuro       Row Name 10/09/24 1050             Subjective    Subjective Comments Pt agreeable to therapy, pt did bring in Estim for OT.  Interpretor utilized during session  -LB         Precautions and Contraindications    Precautions/Limitations fall precautions  -LB         Subjective Pain    Able to rate subjective pain? yes  -LB      Pre-Treatment Pain Level 0  -LB      Post-Treatment Pain Level 0  -LB         Cognition    Overall Cognitive Status WFL  -LB         Perception    Inattention/Neglect Cues to attend left visual field;Cues to attend to left side of body  -LB         Posture/Observations    Posture/Observations  Comments Patient arrived to clinic with caregiver.  -LB         Transfers    Sit-Stand [place] (Transfers) standby assist  -LB      Stand-Sit [place] (Transfers) contact guard  -LB      Transfer, Impairments impaired balance;muscle tone abnormal;sensory feedback impaired  -LB      Comment, (Transfers) Manual and verbal cues to bring the left lower extremity  in line up to the chair.  -LB         Gait/Stairs (Locomotion)    [place] Level (Gait) verbal cues;contact guard;minimum assist (75% patient effort)  -LB      Assistive Device (Gait) --  HHA on the right; mn A to weight shift to right  -LB      Distance in Feet (Gait) 100  60 x 2  -LB      Pattern (Gait) step-to;step-through  -LB      Bilateral Gait Deviations forward flexed posture  -LB      Left Sided Gait Deviations decreased knee extension;heel strike decreased;leans left  decreased step length  -LB      Right Sided Gait Deviations --  Right LE does not always step past left LE  -LB      Gait Assessment/Intervention Manual and verbal cues for increased step length on the left.  Cues for patient to scan past midline to the left, even trying to look at feet for placement of left lower extremity  -LB         Balance Skills Training    Standing-Level of Assistance Contact guard;Minimum assistance  -LB      Static Standing Balance Support No upper extremity supported  -LB      Standing-Balance Activities Weight Shift R-L  -LB      Standing Balance # of Minutes Attempting to step with left LE onto a elevated surface but the patient could not see the height, placed a flat [place] on the floor and the patient was able to step forward and backward with left LE times 5.  Stood on foam square with suction cups placed on a mirror with the patient having to scan to the left and retrive the objects.  Pt needed cues to scan past midline with all objects on the left.  -LB                User Key  (r) = Recorded By, (t) = Taken By, (c) = Cosigned By       Initials Name Provider Type    Jacquelyn Florentino PT Physical Therapist                             PT Assessment/Plan       Row Name 10/09/24 1229          PT Assessment    Assessment Comments The patient was able to increase step length on the left when provided Min A to weight shift to the right particularly with the use of hand-held assist on the right.  Patient did seem to be more aware when he was taken a longer step with left during today's session send in previous sessions.  Carryover is limited without the cueing.  Left hip extensors are weak and does have difficulty taking a step backwards, performed and bridges to help with hip strengthening.  Progress toward goals have been limited by the neglect and inattention to the patient's left side.  -LB               User Key  (r) = Recorded By, (t) = Taken By, (c) = Cosigned By      Initials Name Provider Type    Jacquelyn Florentino PT Physical Therapist                        OP Exercises       Row Name 10/09/24 1234 10/09/24 1050          Subjective    Subjective Comments -- Pt agreeable to therapy, pt did bring in Estim for OT.  Interpretor utilized during session  -LB        Subjective Pain    Able to rate subjective pain? -- yes  -LB     Pre-Treatment Pain Level -- 0  -LB     Post-Treatment Pain Level -- 0  -LB        Total Minutes    22178 - PT Therapeutic Exercise Minutes 10  -LB --     62389 -  PT Neuromuscular Reeducation Minutes 30  -LB --        Exercise 9    Exercise Name 9 -- bridges with LEs on physioball  -LB     Reps 9 -- 10  -LB               User Key  (r) = Recorded By, (t) = Taken By, (c) = Cosigned By      Initials Name Provider Type    Jacquelyn Florentino PT Physical Therapist                                 PT OP Goals       Row Name 10/09/24 1100          PT Short Term Goals    STG 1 Patient and caregiverto be independent with beginner HEP including balance and strength.  -LB     STG 1 Progress Met  -LB     STG 2 Patient ambulate 80 feet  contact-guard with AAD and CGA with increased amplitude of left LE  -LB     STG 2 Progress Progressing  -LB     STG 3 Patient a send descend 6 inch curb contact-guard assist with least restrictive device.  -LB     STG 4 Patient to perform sit to stand from armchair with equal weight shift contact-guard assist.  -LB     STG 4 Progress Ongoing  -LB     STG 4 Progress Comments Needs both manual and verbal cues to bring left lower extremity back underneath  -LB     STG 5 Pt to stand with increased knee extension and weight bearing on left while performing reaching activities  -LB     STG 5 Progress Met  -LB     STG 6 Patient to complete a TUG and less than 25 seconds indicating less risk for falls  -LB        Long Term Goals    LTG Date to Achieve 11/11/24  -LB     LTG 1 Patient and caregiver to be independent with advanced HEP including lower extremity strength and balance.  -LB     LTG 1 Progress Ongoing  -LB     LTG 2 Pt to improve [place] to 15/28 indicate improved standing balance  -LB     LTG 2 Progress Ongoing  -LB     LTG 3 Patient to complete a TUG less than 15 seconds indicating less risk for falls  -LB     LTG 3 Progress Ongoing  -LB     LTG 4 Patient to ambulate 150 feet with appropriate assistive device and standby assist with equal step length at least 50% of the time  -LB     LTG 4 Progress Ongoing  -LB     LTG 5 Patient to negotiate steps with 1 handrail and CGA  -LB     LTG 5 Progress Ongoing  -LB     LTG 6 Patient to stand and [place] for 30 seconds with CGA  -LB     LTG 6 Progress Ongoing  -LB     LTG 7 Increase left knee ROM to -5 degrees from extension  -LB     LTG 7 Progress Ongoing  -LB               User Key  (r) = Recorded By, (t) = Taken By, (c) = Cosigned By      Initials Name Provider Type    Jacquelyn Florentino, PT Physical Therapist                    Therapy Education  Education Details: Cues to turn head and eyes to the left  Given: Fall prevention and home safety, Mobility  "training  Program: Reinforced  How Provided: Verbal, Demonstration  Provided to: Patient  Level of Understanding:  (Needs reinforcement)       [place]etti Assessment  Sitting Balance: Steady,safe  Arises: Unable without help  Attempts to Rise: Able, requires > 1 attempt  Immediate Standing Balance (first 5 sec): Steady, with support  Standing Balance: Unsteady  Sternal Nudge (feet close together): Begins to fall  Eyes Closed (feet close together): Steady  Turning 360 Degrees- Steps: Discontinuous steps  Turning 360 Degrees- Steadiness: Unsteady (staggers, grabs)  Sitting Down: Unsafe (misjudges distance, falls)  [place]etti Balance Score: 4  Gait Initiation (immediate after told \"go\"): Any hesitancy, multiple attempts to start  Step Length- Right Swing: Right swing foot passes Left stance leg  Step Length- Left Swing: Left swing foot does not pass Right  Foot Clearance- Right Foot: Right foot completely clears floor  Foot Clearance- Left Foot: Left foot does not completely clear floor  Step Symmetry: Right and Left step length unequal  Step Continuity: Steps appear continuous  Path (excursion): Mild/moderate deviation or use of aid  Trunk: Marked sway or uses device  Base of Support: Heels apart  Gait Score: 4  [place] Total Score: 8  Timed Up and Go (TUG)  TUG Test 1: 30 seconds      Time Calculation:   Start Time: 1025  Stop Time: 1105  Time Calculation (min): 40 min  Timed Charges  36377 - PT Therapeutic Exercise Minutes: 10  88470 -  PT Neuromuscular Reeducation Minutes: 30  Total Minutes  Timed Charges Total Minutes: 40   Total Minutes: 40   Therapy Charges for Today       Code Description Service Date Service Provider Modifiers Qty    73084337390 HC PT NEUROMUSC RE EDUCATION EA 15 MIN 10/9/2024 Jacquelyn Woods, PT GP 2    92187266470 HC PT THER PROC EA 15 MIN 10/9/2024 Jacquelyn Woods, PT GP 1            PT G-Codes  [place] Total Score: 8  TUG Test 1: 30 seconds         Jacquelyn Woods PT  10/9/2024     "

## 2024-10-14 ENCOUNTER — HOSPITAL ENCOUNTER (OUTPATIENT)
Dept: OCCUPATIONAL THERAPY | Facility: HOSPITAL | Age: 79
Setting detail: THERAPIES SERIES
Discharge: HOME | End: 2024-10-14
Payer: MEDICAID

## 2024-10-14 ENCOUNTER — HOSPITAL ENCOUNTER (OUTPATIENT)
Dept: PHYSICAL THERAPY | Facility: HOSPITAL | Age: 79
Setting detail: THERAPIES SERIES
Discharge: HOME | End: 2024-10-14
Payer: MEDICAID

## 2024-10-14 DIAGNOSIS — M25.60 DECREASED RANGE OF MOTION WITH DECREASED STRENGTH: ICD-10-CM

## 2024-10-14 DIAGNOSIS — R27.8 LOSS OF COORDINATION: ICD-10-CM

## 2024-10-14 DIAGNOSIS — I63.89 CEREBROVASCULAR ACCIDENT (CVA) DUE TO OTHER MECHANISM: Primary | ICD-10-CM

## 2024-10-14 DIAGNOSIS — R53.1 DECREASED RANGE OF MOTION WITH DECREASED STRENGTH: ICD-10-CM

## 2024-10-14 DIAGNOSIS — I69.334 MONOPLEGIA OF UPPER EXTREMITY FOLLOWING CEREBRAL INFARCTION AFFECTING LEFT NON-DOMINANT SIDE: ICD-10-CM

## 2024-10-14 PROCEDURE — 97530 THERAPEUTIC ACTIVITIES: CPT

## 2024-10-14 PROCEDURE — 97112 NEUROMUSCULAR REEDUCATION: CPT

## 2024-10-14 PROCEDURE — 97110 THERAPEUTIC EXERCISES: CPT

## 2024-10-14 NOTE — THERAPY TREATMENT NOTE
Outpatient Occupational Therapy Neuro Treatment Note  [facility]     Patient Name: Alex Negron  : 1945  MRN: [id number]  Today's Date: 10/14/2024       Visit Date: 10/14/2024    Patient Active Problem List   Diagnosis    History of embolic stroke    Right-sided extracranial carotid artery stenosis    Gastroesophageal reflux disease    Cerebrovascular accident (CVA)    Dysphagia    Elevated LFTs    Fatty liver disease, nonalcoholic    PEG (percutaneous endoscopic gastrostomy) status    Urinary retention    Benign essential HTN    Hyperlipidemia, mixed    Degeneration of lumbar or lumbosacral intervertebral disc    Iron deficiency anemia    Impaired fasting glucose    Elevated platelet count    Bladder mass    Malignant neoplasm of urinary bladder    Type 2 diabetes mellitus without complication, without long-term current use of insulin    Bladder cancer    Encounter for screening for malignant neoplasm of colon    Bilateral hip pain    Lumbar radiculopathy    Lumbar facet arthropathy    Nonsustained supraventricular tachycardia        Past Medical History:   Diagnosis Date    Anxiety     Aortic atherosclerosis     Bladder cancer     Bladder mass 2023    Bladder tumor 2023    chemical chemo in [place]  weekly    Blind left eye     Blood in urine     TRACE    Burning with urination     Chronic back pain     neuropathy rolando legs    DDD (degenerative disc disease), lumbar     GERD (gastroesophageal reflux disease)     History of low back pain     Hyperlipidemia     Hypertension     PTSD (post-traumatic stress disorder)     [place]    Renal insufficiency     Sleep apnea     diagnosed but following up with PCP    Urine frequency         Past Surgical History:   Procedure Laterality Date    COLONOSCOPY N/A 3/14/2024    Procedure: COLONOSCOPY TO CECUM WITH COLD SNARE POLYPECTOMIES;  Surgeon: Magdaleno Mena MD;  Location: [facility] ENDOSCOPY;  Service:  Gastroenterology;  Laterality: N/A;  PRE OP - SCREENING  -POST OP - COLON POLYPS,HEMORRHOIDS    DENTAL PROCEDURE      ENDOSCOPY      ENDOSCOPY N/A 4/3/2024    Procedure: ESOPHAGOGASTRODUODENOSCOPY AT BEDSIDE;  Surgeon: Redd Guidry MD;  Location: [facility] ENDOSCOPY;  Service: Gastroenterology;  Laterality: N/A;  PRE-  GI BLEED  POST- HIATAL HERNIA, DISTAL ESOPHAGITIS ULCERS, GASTRITIS, PYLOROPLASTY    ENDOSCOPY W/ PEG TUBE PLACEMENT N/A 4/12/2024    Procedure: ESOPHAGOGASTRODUODENOSCOPY WITH PERCUTANEOUS ENDOSCOPIC GASTROSTOMY TUBE INSERTION;  Surgeon: Dimas Brand MD;  [facility];  Service: General;  Laterality: N/A;  Dysphagia    TRANSURETHRAL RESECTION OF BLADDER TUMOR N/A 06/07/2023    Procedure: TRANSURETHRAL RESECTION OF BLADDER TUMOR;  Surgeon: Robert Haque MD;  Location: [facility] MAIN OR;  Service: Urology;  Laterality: N/A;    TRANSURETHRAL RESECTION OF BLADDER TUMOR N/A 10/18/2023    Procedure: CYSTOSCOPY TRANSURETHRAL RESECTION OF BLADDER TUMOR;  Surgeon: Robert Haque MD;  Location: [facility] MAIN OR;  Service: Urology;  Laterality: N/A;         Visit Dx:    ICD-10-CM ICD-9-CM   1. Cerebrovascular accident (CVA) due to other mechanism  I63.89 434.91   2. 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 Loss of coordination  R27.8 781.3   4. Decreased range of motion with decreased strength  M25.60 719.50    R53.1 780.79                    OT Assessment/Plan       Row Name 10/14/24 0851          OT Assessment    Assessment Comments Pt participated in OP OT well but concerns w/ teachback methods for increased L sided attention and performance w/ fxnl transfers and carryout for ADL dressing tasks. Pt was able to perform supine exer program with fair participation, some pain at prox shoulder w/ SROM. Cont OP OT to address deficits, regain highest level of IND/function. Pt was able to  foam cones  with sueprvision today and place in basket w/ mod cues.  -BC               User Key  (r) = Recorded By, (t) = Taken By, (c) = Cosigned By      Initials Name Provider Type    Candi Dee OT Occupational Therapist                               Therapy Education  Education Details: Reviewed theraputty exercises, HEP. L sided use/attention.  Given: HEP  Program: Reinforced  How Provided: Verbal, Demonstration  Provided to: Patient       OT Exercises       Row Name 10/14/24 0800             Precautions    Existing Precautions/Restrictions fall  -BC         Subjective Comments    Subjective Comments interpretor services used : [id number]  -BC         Subjective Pain    Able to rate subjective pain? yes  -BC      Pre-Treatment Pain Level 0  -BC      Post-Treatment Pain Level 0  -BC         Exercise 1    Exercise Name 1 fxnl mobility w/o AD to gym w CGA and mod cues for attention, navigation. Difficulty w/ attending to L side still, and continues to require cues for mvmts LLE and for backwards step LLE before sit.  -BC      Cueing 1 Verbal  -BC      Time (Minutes) 1 8  -BC         Exercise 2    Exercise Name 2 seated UBD doff jacket for UBD ADL IND. Sup A once unzipped w/ cues.  -BC      Cueing 2 Demo;Verbal  -BC         Exercise 3    Exercise Name 3 semi supine aircast donned LUE and faciliated elbow extension and cont'd w supine exer program for increased LUE use/function for ADLs. Reps of 5-10. Cont'd w/ unweighted dowel sh FF. Cont'd w/ sh Abd w/ min to mod A for mvmts and adduct, 5 reps.  -BC      Cueing 3 Demo;Verbal;Tactile  -BC      Sets 3 2  -BC      Reps 3 10  -BC         Exercise 4    Exercise Name 4 seated EOM LUE reaching to grasp foam cones and place laterally in basket for increased LUE function and reach, and for L sided attention. Mod cues, good performance.  -BC      Cueing 4 Verbal;Tactile  -BC      Sets 4 3  -BC      Reps 4 5  -BC                User Key  (r) = Recorded By, (t) = Taken By, (c) =  Cosigned By      Initials Name Provider Type    BC Candi Paez OT Occupational Therapist                                Time Calculation:   OT Start Time: 0806  OT Stop Time: 0845  OT Time Calculation (min): 39 min  Total Timed Code Minutes- OT: 39 minute(s)  Timed Charges  01405 -  OT Neuromuscular Reeducation Minutes: 30  02705 - OT Therapeutic Activity Minutes: 9  Total Minutes  Timed Charges Total Minutes: 39   Total Minutes: 39     Therapy Charges for Today       Code Description Service Date Service Provider Modifiers Qty    89740214838  OT NEUROMUSC RE EDUCATION EA 15 MIN 10/14/2024 Candi Paez OT GO 2    [id number]  OT THERAPEUTIC ACT EA 15 MIN 10/14/2024 Candi Paez OT GO 1                      Candi Paez OT  10/14/2024

## 2024-10-14 NOTE — THERAPY TREATMENT NOTE
Outpatient Physical Therapy Neuro Treatment Note  [facility]     Patient Name: Alex Negron  : 1945  MRN: [id number]  Today's Date: 10/14/2024      Visit Date: 10/14/2024    Visit Dx:  No diagnosis found.    Patient Active Problem List   Diagnosis    History of embolic stroke    Right-sided extracranial carotid artery stenosis    Gastroesophageal reflux disease    Cerebrovascular accident (CVA)    Dysphagia    Elevated LFTs    Fatty liver disease, nonalcoholic    PEG (percutaneous endoscopic gastrostomy) status    Urinary retention    Benign essential HTN    Hyperlipidemia, mixed    Degeneration of lumbar or lumbosacral intervertebral disc    Iron deficiency anemia    Impaired fasting glucose    Elevated platelet count    Bladder mass    Malignant neoplasm of urinary bladder    Type 2 diabetes mellitus without complication, without long-term current use of insulin    Bladder cancer    Encounter for screening for malignant neoplasm of colon    Bilateral hip pain    Lumbar radiculopathy    Lumbar facet arthropathy    Nonsustained supraventricular tachycardia            PT Neuro       Row Name 10/14/24 09             Subjective    Subjective Comments Agreeable to PT.  Reported he was very active yesterday and R hip is sore.  -KP         Precautions and Contraindications    Precautions/Limitations fall precautions  -KP         Subjective Pain    Able to rate subjective pain? yes  -KP      Pre-Treatment Pain Level 3  -KP      Post-Treatment Pain Level 3  -KP      Subjective Pain Comment Aching in R hip form inc activity, amb with cane yesterday at [facility]  -         Cognition    Overall Cognitive Status WFL  -KP         Perception    Inattention/Neglect Cues to attend left visual field;Cues to attend to left side of body  -KP         Bed Mobility    Supine-Sit [place] (Bed Mobility) standby assist;verbal cues;nonverbal cues (demo/gesture)  -KP      Sit-Supine [place] (Bed Mobility)  standby assist;verbal cues;nonverbal cues (demo/gesture)  -KP      Comment, (Bed Mobility) VC to attend to L LE and orient self to midline on mat.  -KP         Transfers    Sit-Stand [place] (Transfers) standby assist  -KP      Stand-Sit [place] (Transfers) contact guard  -KP      Transfer, Impairments impaired balance;muscle tone abnormal;sensory feedback impaired  -KP      Comment, (Transfers) TC  for chair alignment  -KP         Gait/Stairs (Locomotion)    [place] Level (Gait) minimum assist (75% patient effort);contact guard;verbal cues;nonverbal cues (demo/gesture)  -KP      Assistive Device (Gait) --  HHA R, min MC for WS R  -KP      Distance in Feet (Gait) 120  80, 100  -KP      Pattern (Gait) step-to;step-through  step thru 80 % at end of session  -KP      Deviations/Abnormal Patterns (Gait) bilateral deviations;base of support, narrow;gait speed decreased;stride length decreased;weight shifting decreased  -KP      Bilateral Gait Deviations forward flexed posture  -KP      Left Sided Gait Deviations decreased knee extension;heel strike decreased;leans left  dec step length  -KP      Gait Assessment/Intervention MC, TC for WS, step length, attention L.  -KP         Balance Skills Training    Standing-Level of Assistance Minimum assistance;Contact guard  -KP      Static Standing Balance Support No upper extremity supported  -KP      Standing-Balance Activities Foam square;Reaching across midline  Placing rings on PVC apparatus to L,  VC to attend L and for improved WS  -KP      Gait Balance-Level of Assistance Contact guard  -KP      Gait Balance Support Right upper extremity supported  -KP      Gait Balance Activities stepping over object;side-stepping;scanning environment R/L;backwards  stepping over / back 1/2 roll, sidestepping over 1/2 roll. Sidestepping with yellow tband around thighs.  Mod MC.  -KP                User Key  (r) = Recorded By, (t) = Taken By, (c) = Cosigned By       Initials Name Provider Type    Junie Varela PT Physical Therapist                             PT Assessment/Plan       Row Name 10/14/24 0955          PT Assessment    Assessment Comments Improved step length but note inconsistenct wtill which reinforces fall risk.  Pt req Max VC to attend to L and he does not do it unless cued and he needs cues to maintain.  -               User Key  (r) = Recorded By, (t) = Taken By, (c) = Cosigned By      Initials Name Provider Type    Junie Varela PT Physical Therapist                        OP Exercises       Row Name 10/14/24 0956 10/14/24 0902          Subjective    Subjective Comments -- Agreeable to PT.  Reported he was very active yesterday and R hip is sore.  -        Subjective Pain    Able to rate subjective pain? -- yes  -KP     Pre-Treatment Pain Level -- 3  -KP     Post-Treatment Pain Level -- 3  -KP     Subjective Pain Comment -- Aching in R hip form inc activity, amb with cane yesterday at [facility]  -        Total Minutes    17266 - PT Therapeutic Exercise Minutes 16  -KP --     75685 - PT Therapeutic Activity Minutes 30  -KP --        Exercise 9    Exercise Name 9 -- bridges with LEs on physioball  -     Cueing 9 -- Verbal;Demo  -     Reps 9 -- 15  -KP     Time 9 -- YTB  -        Exercise 11    Exercise Name 11 -- MIni squat with ball b/w knee.  Max VC, pt struggled to perform  -     Cueing 11 -- Verbal;Demo;Tactile  -     Reps 11 -- 10  -KP               User Key  (r) = Recorded By, (t) = Taken By, (c) = Cosigned By      Initials Name Provider Type    Junie Varela PT Physical Therapist                                    Therapy Education  Education Details: L inattention.  Given: Symptoms/condition management  Program: Reinforced  How Provided: Verbal, Demonstration  Provided to: Patient              Time Calculation:   Start Time: 0930  Stop Time: 1016  Time Calculation (min): 46 min  Timed Charges  76218 - PT  Therapeutic Exercise Minutes: 16  60373 - PT Therapeutic Activity Minutes: 30  Total Minutes  Timed Charges Total Minutes: 46   Total Minutes: 46   Therapy Charges for Today       Code Description Service Date Service Provider Modifiers Qty    60711301182  PT THER PROC EA 15 MIN 10/14/2024 Junie Liu, PT GP 1    82156099394  PT THERAPEUTIC ACT EA 15 MIN 10/14/2024 Junie Liu, PT GP 2                      Junie Liu, PT  10/14/2024

## 2024-10-16 ENCOUNTER — HOSPITAL ENCOUNTER (OUTPATIENT)
Dept: PHYSICAL THERAPY | Facility: HOSPITAL | Age: 79
Setting detail: THERAPIES SERIES
Discharge: HOME | End: 2024-10-16
Payer: MEDICAID

## 2024-10-16 ENCOUNTER — HOSPITAL ENCOUNTER (OUTPATIENT)
Dept: OCCUPATIONAL THERAPY | Facility: HOSPITAL | Age: 79
Setting detail: THERAPIES SERIES
Discharge: HOME | End: 2024-10-16
Payer: MEDICAID

## 2024-10-16 DIAGNOSIS — R27.8 LOSS OF COORDINATION: ICD-10-CM

## 2024-10-16 DIAGNOSIS — I69.30 H/O: STROKE WITH RESIDUAL EFFECTS: Primary | ICD-10-CM

## 2024-10-16 DIAGNOSIS — R26.89 FUNCTIONAL GAIT ABNORMALITY: ICD-10-CM

## 2024-10-16 DIAGNOSIS — Z74.09 DECREASED INDEPENDENCE WITH TRANSFERS: ICD-10-CM

## 2024-10-16 DIAGNOSIS — M62.89 ABNORMAL INCREASED MUSCLE TONE: ICD-10-CM

## 2024-10-16 DIAGNOSIS — I69.334 MONOPLEGIA OF UPPER EXTREMITY FOLLOWING CEREBRAL INFARCTION AFFECTING LEFT NON-DOMINANT SIDE: ICD-10-CM

## 2024-10-16 DIAGNOSIS — I63.89 CEREBROVASCULAR ACCIDENT (CVA) DUE TO OTHER MECHANISM: Primary | ICD-10-CM

## 2024-10-16 PROCEDURE — 97112 NEUROMUSCULAR REEDUCATION: CPT

## 2024-10-16 PROCEDURE — 97530 THERAPEUTIC ACTIVITIES: CPT

## 2024-10-16 PROCEDURE — 97110 THERAPEUTIC EXERCISES: CPT

## 2024-10-16 NOTE — THERAPY DISCHARGE NOTE
Outpatient Physical Therapy Neuro Treatment Note/Discharge Summary  [facility]     Patient Name: Alex Negron  : 1945  MRN: [id number]  Today's Date: 10/16/2024      Visit Date: 10/16/2024    Visit Dx:    ICD-10-CM ICD-9-CM   1. H/O: stroke with residual effects  I69.30 438.9   2. Abnormal increased muscle tone  M62.89 728.85   3. Functional gait abnormality  R26.89 781.2   4. Decreased independence with transfers  Z74.09 V62.89       Patient Active Problem List   Diagnosis    History of embolic stroke    Right-sided extracranial carotid artery stenosis    Gastroesophageal reflux disease    Cerebrovascular accident (CVA)    Dysphagia    Elevated LFTs    Fatty liver disease, nonalcoholic    PEG (percutaneous endoscopic gastrostomy) status    Urinary retention    Benign essential HTN    Hyperlipidemia, mixed    Degeneration of lumbar or lumbosacral intervertebral disc    Iron deficiency anemia    Impaired fasting glucose    Elevated platelet count    Bladder mass    Malignant neoplasm of urinary bladder    Type 2 diabetes mellitus without complication, without long-term current use of insulin    Bladder cancer    Encounter for screening for malignant neoplasm of colon    Bilateral hip pain    Lumbar radiculopathy    Lumbar facet arthropathy    Nonsustained supraventricular tachycardia             PT Neuro       Row Name 10/16/24 0911             Subjective    Subjective Comments No complaints, pt more talkative today about [place]  -LB         Precautions and Contraindications    Precautions/Limitations fall precautions  -LB         Subjective Pain    Able to rate subjective pain? yes  -LB      Subjective Pain Comment pt states he was fine today  -LB         Cognition    Overall Cognitive Status WFL  -LB         Perception    Inattention/Neglect Cues to attend left visual field;Cues to attend to left side of body  -LB         Posture/Observations    Posture/Observations Comments Patient arrived to  clinic with caregiver.  -LB         Transfers    Sit-Stand [place] (Transfers) standby assist  -LB      Stand-Sit [place] (Transfers) contact guard;standby assist  cues to line up to chair  -LB      Transfer, Impairments impaired balance;muscle tone abnormal;sensory feedback impaired  -LB      Comment, (Transfers) VC and manual cues to line up to the chair with left LE  -LB         Gait/Stairs (Locomotion)    [place] Level (Gait) nonverbal cues (demo/gesture);contact guard;verbal cues  -LB      Distance in Feet (Gait) 100  -LB      Pattern (Gait) step-to;step-through  -LB      Deviations/Abnormal Patterns (Gait) bilateral deviations;base of support, narrow;gait speed decreased;stride length decreased;weight shifting decreased  -LB      Bilateral Gait Deviations forward flexed posture  -LB      Left Sided Gait Deviations decreased knee extension;heel strike decreased;leans left  decreased step length; left hip retracted  -LB      Gait Assessment/Intervention Verbal and manual cues to increase step length and attention to left side  -LB         Balance Skills Training    Standing-Level of Assistance Contact guard;Minimum assistance  -LB      Static Standing Balance Support Right upper extremity supported;Left upper extremity supported;No upper extremity supported  -LB      Standing-Balance Activities Foam square;Weight Shift R-L;Reaching across midline  -LB      Standing Balance # of Minutes Standing on foam pad at table top with placing items on 4 different pegs from left to right encouraging patient to scan from left to right; once pt scanned to the right, needed vc cues to scan back to the left.  Second time pt's right foot placed on small step and performed scanning and reaching to left; dificult to keep hip in alignment  -LB      Gait Balance-Level of Assistance Contact guard  -LB      Gait Balance Support Right upper extremity supported  -LB      Gait Balance Activities scanning environment R/L   -LB      Gait Balance # of Minutes Having pt scan environment finding on the items on the left.  Pt needed at least 4 cues for find 5 items  -LB                User Key  (r) = Recorded By, (t) = Taken By, (c) = Cosigned By      Initials Name Provider Type    Jacquelyn Florentino PT Physical Therapist                             PT Assessment/Plan       Row Name 10/16/24 1552          PT Assessment    Assessment Comments Improvements noted with the patient being able to scan more to his left when performing a standing activity at a table top but does continue to require cues if having to scan back and forth from left to right. Increased awareness of left side during functional mobility has been limited carryover including having patient display a more even step length on the left.  Educated each session, for the patient to line up to a chair with moving left LE back.  -LB               User Key  (r) = Recorded By, (t) = Taken By, (c) = Cosigned By      Initials Name Provider Type    Jacquelyn Florentino PT Physical Therapist                          OP Exercises       Row Name 10/16/24 1559 10/16/24 0911          Subjective    Subjective Comments -- No complaints, pt more talkative today about [place]  -LB        Subjective Pain    Able to rate subjective pain? -- yes  -LB     Subjective Pain Comment -- pt states he was fine today  -LB        Total Minutes    12163 - PT Therapeutic Exercise Minutes 15  -LB --     23196 -  PT Neuromuscular Reeducation Minutes 30  -LB --        Exercise 6    Exercise Name 6 -- alternating toe taps  -LB     Reps 6 -- 10  -LB               User Key  (r) = Recorded By, (t) = Taken By, (c) = Cosigned By      Initials Name Provider Type    Jacquelyn Florentino PT Physical Therapist                                   PT OP Goals       Row Name 10/16/24 1500          Long Term Goals    LTG 1 Patient and caregiver to be independent with advanced HEP including lower extremity strength and balance.  -LB   "   LTG 1 Progress Met  -LB     LTG 1 Progress Comments pt reports performing exercises daily at home  -LB     LTG 2 Pt to improve [place] to 15/28 indicate improved standing balance  -LB     LTG 2 Progress Not Met  -LB     LTG 3 Patient to complete a TUG less than 15 seconds indicating less risk for falls  -LB     LTG 3 Progress Not Met  -LB     LTG 4 Patient to ambulate 150 feet with appropriate assistive device and standby assist with equal step length at least 50% of the time  -LB     LTG 4 Progress Not Met  -LB     LTG 5 Patient to negotiate steps with 1 handrail and CGA  -LB     LTG 5 Progress Not Met  -LB     LTG 6 Patient to stand and [place] for 30 seconds with CGA  -LB     LTG 6 Progress Not Met  -LB     LTG 7 Increase left knee ROM to -5 degrees from extension  -LB     LTG 7 Progress Not Met  -LB               User Key  (r) = Recorded By, (t) = Taken By, (c) = Cosigned By      Initials Name Provider Type    Jacquelyn Florentino, PT Physical Therapist                            [place] Assessment  Sitting Balance: Steady,safe  Arises: Able, uses arms  Attempts to Rise: Able, requires > 1 attempt  Immediate Standing Balance (first 5 sec): Steady, with support  Standing Balance: Steady, stance > 4 inch JOYCE & requires support  Sternal Nudge (feet close together): Begins to fall  Eyes Closed (feet close together): Steady  Turning 360 Degrees- Steps: Discontinuous steps  Turning 360 Degrees- Steadiness: Unsteady (staggers, grabs)  Sitting Down: Unsafe (misjudges distance, falls)  [place] Balance Score: 6  Gait Initiation (immediate after told \"go\"): Any hesitancy, multiple attempts to start  Step Length- Right Swing: Right swing foot passes Left stance leg  Step Length- Left Swing: Left swing foot does not pass Right  Foot Clearance- Right Foot: Right foot completely clears floor  Foot Clearance- Left Foot: Left foot does not completely clear floor  Step Symmetry: Right and Left step length unequal  Step Continuity: " Steps appear continuous  Path (excursion): Mild/moderate deviation or use of aid  Trunk: Marked sway or uses device  Base of Support: Heels apart  Gait Score: 4  [place] Total Score: 10    Time Calculation:   Start Time: 0845  Stop Time: 0930  Time Calculation (min): 45 min  Timed Charges  35562 - PT Therapeutic Exercise Minutes: 15  47008 -  PT Neuromuscular Reeducation Minutes: 30  Total Minutes  Timed Charges Total Minutes: 45   Total Minutes: 45     Therapy Charges for Today       Code Description Service Date Service Provider Modifiers Qty    06451721896 HC PT NEUROMUSC RE EDUCATION EA 15 MIN 10/16/2024 Jacquelyn Woods, PT GP 2    91372585501 HC PT THER PROC EA 15 MIN 10/16/2024 Jacquelyn Woods, PT GP 1            PT G-Codes  [place]etti Total Score: 10     OP PT Discharge Summary  Date of Discharge: 10/16/24  Reason for Discharge: Unable to pay/insurance denied care  Outcomes Achieved: Refer to plan of care for updates on goals achieved  Discharge Destination: Home with home program        Jacquelyn Woods, PT  10/16/2024

## 2024-10-16 NOTE — THERAPY DISCHARGE NOTE
Outpatient Occupational Therapy Neuro Treatment Note/Discharge Summary  [facility]     Patient Name: Alex Negron  : 1945  MRN: [id number]  Today's Date: 10/16/2024      Visit Date: 10/16/2024    Patient Active Problem List   Diagnosis    History of embolic stroke    Right-sided extracranial carotid artery stenosis    Gastroesophageal reflux disease    Cerebrovascular accident (CVA)    Dysphagia    Elevated LFTs    Fatty liver disease, nonalcoholic    PEG (percutaneous endoscopic gastrostomy) status    Urinary retention    Benign essential HTN    Hyperlipidemia, mixed    Degeneration of lumbar or lumbosacral intervertebral disc    Iron deficiency anemia    Impaired fasting glucose    Elevated platelet count    Bladder mass    Malignant neoplasm of urinary bladder    Type 2 diabetes mellitus without complication, without long-term current use of insulin    Bladder cancer    Encounter for screening for malignant neoplasm of colon    Bilateral hip pain    Lumbar radiculopathy    Lumbar facet arthropathy    Nonsustained supraventricular tachycardia        Past Medical History:   Diagnosis Date    Anxiety     Aortic atherosclerosis     Bladder cancer     Bladder mass 2023    Bladder tumor 2023    chemical chemo in MD office  weekly    Blind left eye     Blood in urine     TRACE    Burning with urination     Chronic back pain     neuropathy rolando legs    DDD (degenerative disc disease), lumbar     GERD (gastroesophageal reflux disease)     History of low back pain     Hyperlipidemia     Hypertension     PTSD (post-traumatic stress disorder)     [place]    Renal insufficiency     Sleep apnea     diagnosed but following up with PCP    Urine frequency         Past Surgical History:   Procedure Laterality Date    COLONOSCOPY N/A 3/14/2024    Procedure: COLONOSCOPY TO CECUM WITH COLD SNARE POLYPECTOMIES;  Surgeon: Magdaleno Mena MD;  Location: [facility]  ENDOSCOPY;  Service: Gastroenterology;  Laterality: N/A;  PRE OP - SCREENING  -POST OP - COLON POLYPS,HEMORRHOIDS    DENTAL PROCEDURE      ENDOSCOPY      ENDOSCOPY N/A 4/3/2024    Procedure: ESOPHAGOGASTRODUODENOSCOPY AT BEDSIDE;  Surgeon: Redd Guidry MD;  Location: [facility] ENDOSCOPY;  Service: Gastroenterology;  Laterality: N/A;  PRE-  GI BLEED  POST- HIATAL HERNIA, DISTAL ESOPHAGITIS ULCERS, GASTRITIS, PYLOROPLASTY    ENDOSCOPY W/ PEG TUBE PLACEMENT N/A 4/12/2024    Procedure: ESOPHAGOGASTRODUODENOSCOPY WITH PERCUTANEOUS ENDOSCOPIC GASTROSTOMY TUBE INSERTION;  Surgeon: Dimas Brand MD;  Location: [facility] ENDOSCOPY;  Service: General;  Laterality: N/A;  Dysphagia    TRANSURETHRAL RESECTION OF BLADDER TUMOR N/A 06/07/2023    Procedure: TRANSURETHRAL RESECTION OF BLADDER TUMOR;  Surgeon: Robert Haque MD;  Location: [facility] MAIN OR;  Service: Urology;  Laterality: N/A;    TRANSURETHRAL RESECTION OF BLADDER TUMOR N/A 10/18/2023    Procedure: CYSTOSCOPY TRANSURETHRAL RESECTION OF BLADDER TUMOR;  Surgeon: Robert Haque MD;  Location: [facility] MAIN OR;  Service: Urology;  Laterality: N/A;         Visit Dx:    ICD-10-CM ICD-9-CM   1. Cerebrovascular accident (CVA) due to other mechanism  I63.89 434.91   2. 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 Loss of coordination  R27.8 781.3                    OT Assessment/Plan       Row Name 10/16/24 3027          OT Assessment    Assessment Comments Pt arriving to therapy today a few mins late with caregiver. Unfortunately Pt with last approved therapy visit today discovered following OT session with no goals met and completed seated reaching exer program and NMR training. Pt participates well in OT but has difficulty w/ teachback/carryout of techniques for ADL performance, impaired L sided attention/neglect continues to be biggest barrier to carryout. Pt requires continuous tactile,  and verbal cues for dressing practice/participation. Pt is arriving to OT w/o AD, and was close SBA to CGA for safety to/from therapy but did have an instance of walking into the wall w/ mod cues for attention during navigation today. Pt continues to present w/ deficits w/ LUE function, impaired strength, coordination, endurance, and balance. Recommending home w/ Home program. Phoned daughter regarding OT sessions and activities Pt is working towards, left voicemail w encouraged for Pt to look to the left and remember to bring LLE back when going to sit down.  -BC               User Key  (r) = Recorded By, (t) = Taken By, (c) = Cosigned By      Initials Name Provider Type    Candi Dee OT Occupational Therapist                      OT Goals       Row Name 10/16/24 1300          OT Short Term Goals    STG Date to Achieve 10/23/24  -BC     STG 1 Pt will increase L sh flex AROM >15* for increased fxnl use for ADLs.  -BC     STG 1 Progress Not Met  -BC     STG 2 Pt will increase  strength L hand to 25# or more to increase fxnl use.  -BC     STG 2 Progress Not Met  -BC     STG 3 Pt will be SUP with HEP w/ min cues for increased LUE ROM for fxn  -BC     STG 3 Progress Not Met  -BC     STG 4 Pt will increase L box and blocks to 8 or more for increased LUE fxnl use  -BC     STG 4 Progress Not Met  -BC        Long Term Goals    LTG Date to Achieve 11/11/24  -BC     LTG 1 Pt will increase LUE sh AROM 90* sh flex and abd for increased fxnl use/return for ADLs  -BC     LTG 1 Progress Not Met  -BC     LTG 2 Pt will improve LUE box and blocks to 13 or more for increased LUE fxnl use  -BC     LTG 2 Progress Not Met  -BC     LTG 3 Pt will place 2 pegs in 3 mins or less LUE for 9HPT increased LUE coordination for fxnl use and L sided awareness.  -BC     LTG 3 Progress Not Met  -BC     LTG 4 Pt will be SUP to doff/don UBD including for increased LUE fxnl use  -BC     LTG 4 Progress Not Met  -BC     LTG 5 Pt will increase  L  strength to 30# or more to increase L  for fxnl use  -BC     LTG 5 Progress Not Met  -BC        Time Calculation    OT Goal Re-Cert Due Date 10/23/24  -BC               User Key  (r) = Recorded By, (t) = Taken By, (c) = Cosigned By      Initials Name Provider Type    Candi Dee OT Occupational Therapist                    Therapy Education  Education Details: ERIBERTO BRISENO. Voicemail left with daughter regarding OT/PT recs for encouragement pt to look to L and for cont'd ROM program.  Given: Symptoms/condition management, Fall prevention and home safety  Program: Reinforced  How Provided: Verbal, Demonstration  Provided to: Patient  Level of Understanding: Verbalized     Modalities       Row Name 10/16/24 0800             ELECTRICAL STIMULATION    Attended/Unattended Attended  -BC      Stimulation Type FES  -BC      Max mAmp 40  -BC      Location/Electrode Placement/Other triceps  -BC      31486 - OT E-Stim Attended Minutes 18  -BC                User Key  (r) = Recorded By, (t) = Taken By, (c) = Cosigned By      Initials Name Provider Type    Candi Dee OT Occupational Therapist                     OT Exercises       Row Name 10/16/24 0800             Precautions    Existing Precautions/Restrictions fall  -BC         Subjective Comments    Subjective Comments Pt presenting initially w/ caregiver, arriving a few mins later. Interpretor ID#[id number].Pt pleasant, a little distractable but thankful and cooperative.  -BC         Subjective Pain    Able to rate subjective pain? yes  -BC      Pre-Treatment Pain Level 0  -BC      Post-Treatment Pain Level 0  -BC         Exercise 1    Exercise Name 1 CGA for safety w/ transition from office to therapy gym, within gym. Max cues for navigation and Pt hitting wall on L side once w visual awareness to  reposition. W/o AD within gym  -BC      Cueing 1 Verbal;Tactile  -BC      Time (Minutes) 1 5  -BC         Exercise 2    Exercise Name 2 seated UBD doff jacket  for UBD ADL IND. Min A for help removing from RUE with mod cues, tactile assist for theresa techniques  -BC      Cueing 2 Demo;Verbal  -BC         Exercise 3    Exercise Name 3 seated EOM in conjuction w/ estim closed chained act w LUE on incline table to move towel up/down and in various positions for increased return, ROM, and attention to L side. Sup to mod A for reaching, no c/o pain.  -BC      Cueing 3 Demo;Verbal;Tactile  -BC      Sets 3 3  -BC      Reps 3 10  -BC                User Key  (r) = Recorded By, (t) = Taken By, (c) = Cosigned By      Initials Name Provider Type    BC Candi Paez OT Occupational Therapist                                  Time Calculation:   OT Start Time: 0814  OT Stop Time: 0845  OT Time Calculation (min): 31 min  Total Timed Code Minutes- OT: 31 minute(s)  Timed Charges  77472 - OT E-Stim Attended Minutes: 18  10105 -  OT Neuromuscular Reeducation Minutes: 20  97530 - OT Therapeutic Activity Minutes: 11  Total Minutes  Timed Charges Total Minutes: 31   Total Minutes: 31     Therapy Charges for Today       Code Description Service Date Service Provider Modifiers Qty    [id number]  OT NEUROMUSC RE EDUCATION EA 15 MIN 10/16/2024 Candi Paez OT GO 1    [id number]  OT THERAPEUTIC ACT EA 15 MIN 10/16/2024 Candi Paez OT GO 1                  OP OT Discharge Summary  Date of Discharge: 10/16/24  Reason for Discharge: Unable to pay/insurance denied care  Outcomes Achieved: Unable to make functional progress toward goals at this time  Discharge Destination: Home with home program, Home with caregiver assist  Discharge Instructions: continue to encourage Pt to scan/look to Left side. Continue Left arm exercises.        Candi Paez OT  10/16/2024

## 2024-10-17 ENCOUNTER — HOSPITAL ENCOUNTER (OUTPATIENT)
Dept: MRI IMAGING | Facility: HOSPITAL | Age: 79
Discharge: HOME | End: 2024-10-17
Admitting: NURSE PRACTITIONER
Payer: MEDICAID

## 2024-10-17 DIAGNOSIS — M25.562 MECHANICAL KNEE PAIN, LEFT: ICD-10-CM

## 2024-10-17 DIAGNOSIS — G89.29 CHRONIC PAIN OF LEFT KNEE: ICD-10-CM

## 2024-10-17 DIAGNOSIS — M25.562 CHRONIC PAIN OF LEFT KNEE: ICD-10-CM

## 2024-10-17 PROCEDURE — 73721 MRI JNT OF LWR EXTRE W/O DYE: CPT

## 2024-10-21 ENCOUNTER — OFFICE VISIT (OUTPATIENT)
Dept: ORTHOPEDIC SURGERY | Facility: CLINIC | Age: 79
End: 2024-10-21
Payer: MEDICAID

## 2024-10-21 ENCOUNTER — APPOINTMENT (OUTPATIENT)
Dept: PHYSICAL THERAPY | Facility: HOSPITAL | Age: 79
End: 2024-10-21
Payer: MEDICAID

## 2024-10-21 ENCOUNTER — APPOINTMENT (OUTPATIENT)
Dept: OCCUPATIONAL THERAPY | Facility: HOSPITAL | Age: 79
End: 2024-10-21
Payer: MEDICAID

## 2024-10-21 VITALS — WEIGHT: 138 LBS | HEIGHT: 69 IN | BODY MASS INDEX: 20.44 KG/M2 | TEMPERATURE: 97.8 F

## 2024-10-21 DIAGNOSIS — M25.662 DECREASED RANGE OF MOTION OF LEFT KNEE: ICD-10-CM

## 2024-10-21 DIAGNOSIS — M17.12 PRIMARY OSTEOARTHRITIS OF LEFT KNEE: Primary | ICD-10-CM

## 2024-10-21 DIAGNOSIS — M25.562 CHRONIC PAIN OF LEFT KNEE: ICD-10-CM

## 2024-10-21 DIAGNOSIS — G89.29 CHRONIC PAIN OF LEFT KNEE: ICD-10-CM

## 2024-10-21 PROCEDURE — 1160F RVW MEDS BY RX/DR IN RCRD: CPT | Performed by: NURSE PRACTITIONER

## 2024-10-21 PROCEDURE — 99213 OFFICE O/P EST LOW 20 MIN: CPT | Performed by: NURSE PRACTITIONER

## 2024-10-21 PROCEDURE — 1159F MED LIST DOCD IN RCRD: CPT | Performed by: NURSE PRACTITIONER

## 2024-10-21 RX ORDER — AMLODIPINE BESYLATE 10 MG/1
10 TABLET ORAL DAILY
COMMUNITY

## 2024-10-21 RX ORDER — CLOPIDOGREL BISULFATE 75 MG/1
75 TABLET ORAL DAILY
COMMUNITY
End: 2024-10-24

## 2024-10-21 NOTE — PROGRESS NOTES
[facility]              Follow Up      Patient Name: Alex Negron  : 1945  Primary Care Physician: Jyothi Song, LUIS      HPI:   Alex Negron is a 79 y.o. year old who presents today for evaluation.  History of Present Illness  The patient is here today for a follow-up on his left knee MRI.    He reports that his knee pain has lessened over the past month due to occupational physical therapy and home exercises. The pain is located under the kneecap. He discontinued physical therapy last week due to insurance issues. He has been using Voltaren gel for pain relief, which he finds helpful. He also used patches in the past, but not currently.    During his last visit, he reported knee pain, which started after he fell out of bed while in the hospital post-stroke. His family requested further imaging, including an MRI. Previously, he was wheelchair-bound, but he has shown significant improvement in mobility with physical therapy.    He has a history of stroke that occurred in 2024, which left him with some residual deficits on his left side. He has been under my care for his left shoulder since the summer. We have been treating him for capsulitis, which has shown good improvements. He has also undergone Botox therapy. His shoulder condition has improved and is not causing him any discomfort at present.    He is accompanied by his daughter who is translating for him. We verified with the patient he is agreeable to this, which he is.      Past Medical/Surgical, Social and Family History:  I have reviewed and/or updated pertinent history as noted in the medical record including:  Past Medical History:   Diagnosis Date    Anxiety     Aortic atherosclerosis     Bladder cancer     Bladder mass 2023    Bladder tumor 2023    chemical chemo in [place] office  weekly    Blind left eye     Blood in urine     TRACE    Burning with urination     Chronic back pain     neuropathy rolando legs    DDD  (degenerative disc disease), lumbar     GERD (gastroesophageal reflux disease)     History of low back pain     Hyperlipidemia     Hypertension     PTSD (post-traumatic stress disorder)     [place]    Renal insufficiency     Sleep apnea     diagnosed but following up with PCP    Urine frequency      Past Surgical History:   Procedure Laterality Date    COLONOSCOPY N/A 3/14/2024    Procedure: COLONOSCOPY TO CECUM WITH COLD SNARE POLYPECTOMIES;  Surgeon: Magdaleno Mena MD;  Location: [facility] ENDOSCOPY;  Service: Gastroenterology;  Laterality: N/A;  PRE OP - SCREENING  -POST OP - COLON POLYPS,HEMORRHOIDS    DENTAL PROCEDURE      ENDOSCOPY      ENDOSCOPY N/A 4/3/2024    Procedure: ESOPHAGOGASTRODUODENOSCOPY AT BEDSIDE;  Surgeon: Redd Guidry MD;  Location: [facility] ENDOSCOPY;  Service: Gastroenterology;  Laterality: N/A;  PRE-  GI BLEED  POST- HIATAL HERNIA, DISTAL ESOPHAGITIS ULCERS, GASTRITIS, PYLOROPLASTY    ENDOSCOPY W/ PEG TUBE PLACEMENT N/A 2024    Procedure: ESOPHAGOGASTRODUODENOSCOPY WITH PERCUTANEOUS ENDOSCOPIC GASTROSTOMY TUBE INSERTION;  Surgeon: Dimas Brand MD;  Location: [facility] ENDOSCOPY;  Service: General;  Laterality: N/A;  Dysphagia    TRANSURETHRAL RESECTION OF BLADDER TUMOR N/A 2023    Procedure: TRANSURETHRAL RESECTION OF BLADDER TUMOR;  Surgeon: Robert Haque MD;  Location: [facility] OR;  Service: Urology;  Laterality: N/A;    TRANSURETHRAL RESECTION OF BLADDER TUMOR N/A 10/18/2023    Procedure: CYSTOSCOPY TRANSURETHRAL RESECTION OF BLADDER TUMOR;  Surgeon: Robert Haque MD;  Location: [facility] OR;  Service: Urology;  Laterality: N/A;     Social History     Occupational History     Employer: RETIRED     Comment: Has been a    Tobacco Use    Smoking status: Former     Current packs/day: 0.00     Types: Cigarettes     Quit date: 2000     Years since quittin.8     Passive exposure: Never     Smokeless tobacco: Never   Vaping Use    Vaping status: Never Used   Substance and Sexual Activity    Alcohol use: Not Currently    Drug use: Never    Sexual activity: Defer          Allergies: No Known Allergies    Medications:   Home Medications:  Current Outpatient Medications on File Prior to Visit   Medication Sig    amLODIPine (NORVASC) 10 MG tablet Take 1 tablet by mouth Daily.    aspirin 81 MG EC tablet Take 1 tablet by mouth Daily.    atorvastatin (Lipitor) 80 MG tablet Take 1 tablet by mouth Daily. For cholesterol    clopidogrel (PLAVIX) 75 MG tablet Take 1 tablet by mouth Daily.    ferrous sulfate 325 (65 FE) MG tablet One PO daily with food for anemia    ferrous sulfate 325 (65 FE) MG tablet Take 1 tablet by mouth Daily With Breakfast.    gabapentin (NEURONTIN) 100 MG capsule Take 2 capsules by mouth 2 (Two) Times a Day. For neuropathy pain    metoprolol tartrate (LOPRESSOR) 50 MG tablet Take 1 tablet by mouth Every 12 (Twelve) Hours.    pantoprazole (Protonix) 40 MG EC tablet Take 1 tablet by mouth Daily. For GERD    tamsulosin (FLOMAX) 0.4 MG capsule 24 hr capsule Take 1 capsule by mouth Daily.    acetaminophen (TYLENOL) 500 MG tablet Take 1 tablet by mouth Every 6 (Six) Hours As Needed for Mild Pain. (Patient not taking: Reported on 10/21/2024)    calcium carbonate (TUMS) 500 MG chewable tablet Chew 1 tablet As Needed for Indigestion or Heartburn. (Patient not taking: Reported on 10/21/2024)    donepezil (Aricept) 5 MG tablet Take 1 tablet by mouth Every Night. (Patient not taking: Reported on 10/21/2024)    Menthol, Topical Analgesic, (BIOFREEZE EX) Apply  topically. Shoulder (Patient not taking: Reported on 10/21/2024)    NON FORMULARY Take 2 each by mouth Every Morning. Eye health (Patient not taking: Reported on 10/21/2024)    phenazopyridine (PYRIDIUM) 200 MG tablet Take 1 tablet by mouth 3 times a day. (Patient not taking: Reported on 10/21/2024)    polyethylene glycol (MIRALAX) 17 g packet Take  17 g by mouth Daily As Needed (Use if senna-docusate is ineffective). (Patient not taking: Reported on 10/21/2024)    polyethylene Glycol 400 1 % solution ophthalmic solution Administer 1 drop to both eyes Every 3 (Three) Hours As Needed for Dry Eyes. (Patient not taking: Reported on 10/21/2024)     No current facility-administered medications on file prior to visit.         ROS:  ROS negative except as listed in the HPI.    Physical Exam:   79 y.o. male  Body mass index is 20.38 kg/m²., 62.6 kg (138 lb)  Vitals:    10/21/24 1312   Temp: 97.8 °F (36.6 °C)     General: Alert, cooperative, appears well and in no observable distress. Appears stated age and BMI as listed above.  HEENT: Normocephalic, atraumatic on external visual inspection.  CV: No significant peripheral edema.  Respiratory: Normal respiratory effort.  Skin: Warm & well perfused; appropriate skin turgor.  Psych: Appropriate mood & affect.  Neuro: Gross sensation and motor intact in affected extremity/extremities.  Vascular: Peripheral pulses palpable in affected extremity/extremities.   Physical Exam      MSK Exam:  Left Knee  No deformity or wounds appreciated. No significant redness or warmth.  Trace effusion noted  Tenderness along the joint line appreciated medial compartment  PROM  with pain at terminal motion.   Ligamentous exam grossly stable  Quad strength 3+/5     Right Knee  No deformity or wounds appreciated. No significant redness or warmth.  No significant effusion noted.  No significant tenderness appreciated about the joint.  ROM 0-130 and painless.  Ligamentous exam grossly stable  Quad strength 4+ to 5/5     Brief hip exam in the affected extremity(ies) grossly unremarkable.  Moves ankle and toes up and down, no significant pain or swelling in the foot, ankle or calf.      Radiology:    No new images were needed at the visit today.   9/23/2024: Single Knee: AP standing and sunrise views of both knees, and lateral view of painful  knee show some mild degenerative changes but he has reasonable maintenance of joint space.  I see no obvious changes reflective of previous trauma.       MRI Knee Left Without Contrast  Order date: 10/17/2024  Authorizing: eBn Handy APRN  Ordered by Ben Handy APRN on 10/17/2024.     Narrative & Impression    MRI KNEE LEFT  WO CONTRAST-     Date of Exam: 10/17/2024 3:30 PM     Indication: Concern for MMT. History of a fall months ago. Knee pain  with weight bearing. Chronic left knee pain since fall in April 2024     Comparison: None available.     Technique: Multiplanar, multisequence MR imaging of the knee was  performed without contrast.     FINDINGS:     Soft tissues:  Small to moderate joint effusion with mild diffuse synovial thickening.  Mild nonspecific thickening of the suprapatellar and medial plicae. No  popliteal cyst. Mild patchy anterior soft tissue edema. No solid soft  tissue mass.     Menisci: The medial meniscus is intact.  The lateral meniscus is intact.     Cruciate Ligaments: The ACL is intact.  The PCL is intact.     Collateral ligaments: The MCL is intact.  The LCL complex is intact.     Extensor Mechanism: The quadriceps tendon is intact.  The patellar  tendon is intact.     Articular cartilage: Mild diffuse grade II chondromalacia in the lateral  compartment with focal partial-thickness chondral fissuring at the  posterior weightbearing portion of the lateral femoral condyle. The  articular cartilage in the medial and patellofemoral compartments is  fairly well-maintained.     Bones: Normal marrow signal. No fracture. No concerning bone marrow  lesions or marrow replacing process.     IMPRESSION:     1. Mild chondromalacia in the lateral compartment.  2. Partially imaged small to moderate joint effusion with mild diffuse  synovial thickening and mild nonspecific thickening of the suprapatellar  and medial plica.  3. The menisci, cruciate ligaments, and collateral ligaments are  intact.  Correlate with radiographs.           This report was finalized on 10/17/2024 5:42 PM by Parth Wasserman MD on  Workstation: [id number]        Results      Procedure:   N/A      Misc. Data/Labs: N/A    Assessment & Plan:  Assessment & Plan  1. Left knee pain.  The patient's left knee pain is attributed to arthritis and limited range of motion, exacerbated by prolonged immobilization following a stroke. The MRI showed a little arthritis in the knee. He was advised to perform stretching exercises three times daily, with 10 repetitions each time, holding for 10 seconds. Continuation of Voltaren gel application was also recommended. If there is no improvement in his condition within the next six weeks, consideration will be given to administering an injection.    2. Left shoulder pain.  The patient's left shoulder pain has improved since the injection on September 23, 2024. He was encouraged to continue with his stretching regimen, including full flexion, external rotation, and internal rotation exercises.    Follow-up  Return in 3 months for follow up.      ICD-10-CM ICD-9-CM   1. Primary osteoarthritis of left knee  M17.12 715.16   2. Chronic pain of left knee  M25.562 719.46    G89.29 338.29   3. Decreased range of motion of left knee  M25.662 719.56     No orders of the defined types were placed in this encounter.    No orders of the defined types were placed in this encounter.        Continue with activity modifications as needed/discussed.  Continue ICE and/or HEAT PRN.  Recommend to continue activity as tolerated, focus on quad and hip/core strengthening as well as gentle stretching.  Recommend lowering or maintaining BMI within a healthy range to reduce symptoms.   Patient encouraged to call with questions or concerns prior to follow up.  Will discuss with attending as needed.   Consider additional referrals, work up and/or advanced imaging as indicated or if patient fails to respond to conservative  care.    Return in about 3 months (around 1/21/2025).    Patient or patient representative verbalized consent for the use of Ambient Listening during the visit with  ESCOBAR Chi for chart documentation. 10/21/2024  13:58 EDT        ESCOBAR Lo    Dictation software was used to complete a portion or all of this note.

## 2024-10-22 DIAGNOSIS — R52 PAIN: Primary | ICD-10-CM

## 2024-10-22 NOTE — PROGRESS NOTES
Subjective     REASON FOR CONSULTATION: Elevated platelet count  Provide an opinion on any further workup or treatment                             Requesting practitioner: Jyothi Song    RECORDS OBTAINED:  Records of the patients history including those obtained from the referring provider were reviewed and summarized in detail.      History of Present Illness   This is a 78-year-old man with hypertension, hyperlipidemia, degenerative disc disease of the lumbar spine, impaired fasting glucose and mild renal insufficiency seen today at the request of his primary care provider for evaluation of an elevated platelet count.  Reviewing his recent data since 3/22/2023 the patient has had a mildly elevated platelet count in the upper 400s/lower 500,000 range.  He has been mildly anemic with hemoglobin 12.1-12.5 MCV 88-89.  White blood cell count and differential unremarkable.  Additional labs have recently shown iron saturation 30%, ferritin 37, B12 453, folate 19.2, creatinine 1.26/BUN 34, total protein 7.2/globulin 2.5.    The patient recently immigrated from [facility].  He feels well denies fevers, chills, shortness of breath, cough, weight loss, lymphadenopathy, changes in the bowel habits, blood in the stool.   He smoked but quit smoking approximately 20 years ago.  He has never had a colonoscopy.  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  He is undergoing intravesicular therapy.    At the time of his initial visit 5/15/2023 the patient was noted to be mildly iron deficient and started oral iron therapy which she has been tolerating well.    The patient returned today for follow-up.  He had recurrence of noninvasive bladder cancer treated with transurethral resection 10/18/2023 now receiving intravesicular chemotherapy.  The  patient stopped oral iron because of a metallic taste in the mouth.    Patient was admitted April 2024 with acute stroke requiring TPA and carotid stent on right.  He is on plavix and aspirin.    The patient returned today for follow-up.  He has been taking oral iron daily since his last visit.  This is caused some mild mouth sores no significant nausea or constipation.    Past Medical History:   Diagnosis Date    Anxiety     Aortic atherosclerosis     Bladder cancer     Bladder mass 06/07/2023    Bladder tumor 05/2023    chemical chemo in MD office  weekly    Blind left eye     Blood in urine     TRACE    Burning with urination     Chronic back pain     neuropathy rolando legs    DDD (degenerative disc disease), lumbar     GERD (gastroesophageal reflux disease)     History of low back pain     Hyperlipidemia     Hypertension     PTSD (post-traumatic stress disorder)     [place] of [place]    Renal insufficiency     Sleep apnea 2023    diagnosed but following up with PCP    Urine frequency         Past Surgical History:   Procedure Laterality Date    COLONOSCOPY N/A 3/14/2024    Procedure: COLONOSCOPY TO CECUM WITH COLD SNARE POLYPECTOMIES;  Surgeon: Magdaleno Mena MD;  Location: [facility] ENDOSCOPY;  Service: Gastroenterology;  Laterality: N/A;  PRE OP - SCREENING  -POST OP - COLON POLYPS,HEMORRHOIDS    DENTAL PROCEDURE      ENDOSCOPY      ENDOSCOPY N/A 4/3/2024    Procedure: ESOPHAGOGASTRODUODENOSCOPY AT BEDSIDE;  Surgeon: Redd Guidry MD;  Location: [facility] ENDOSCOPY;  Service: Gastroenterology;  Laterality: N/A;  PRE-  GI BLEED  POST- HIATAL HERNIA, DISTAL ESOPHAGITIS ULCERS, GASTRITIS, PYLOROPLASTY    ENDOSCOPY W/ PEG TUBE PLACEMENT N/A 4/12/2024    Procedure: ESOPHAGOGASTRODUODENOSCOPY WITH PERCUTANEOUS ENDOSCOPIC GASTROSTOMY TUBE INSERTION;  Surgeon: Dimas Brand MD;  Location: [facility] ENDOSCOPY;  Service: General;  Laterality: N/A;  Dysphagia    TRANSURETHRAL RESECTION OF  BLADDER TUMOR N/A 06/07/2023    Procedure: TRANSURETHRAL RESECTION OF BLADDER TUMOR;  Surgeon: Robert Haque MD;  Location: [facility] MAIN OR;  Service: Urology;  Laterality: N/A;    TRANSURETHRAL RESECTION OF BLADDER TUMOR N/A 10/18/2023    Procedure: CYSTOSCOPY TRANSURETHRAL RESECTION OF BLADDER TUMOR;  Surgeon: Robert Haque MD;  Location: [facility] MAIN OR;  Service: Urology;  Laterality: N/A;        Current Outpatient Medications on File Prior to Visit   Medication Sig Dispense Refill    acetaminophen (TYLENOL) 500 MG tablet Take 1 tablet by mouth Every 6 (Six) Hours As Needed for Mild Pain.      amLODIPine (NORVASC) 10 MG tablet Take 1 tablet by mouth Daily.      aspirin 81 MG EC tablet Take 1 tablet by mouth Daily. 90 tablet 3    atorvastatin (Lipitor) 80 MG tablet Take 1 tablet by mouth Daily. For cholesterol 90 tablet 3    calcium carbonate (TUMS) 500 MG chewable tablet Chew 1 tablet As Needed for Indigestion or Heartburn.      donepezil (Aricept) 5 MG tablet Take 1 tablet by mouth Every Night. 30 tablet 6    ferrous sulfate 325 (65 FE) MG tablet One PO daily with food for anemia 30 tablet 5    gabapentin (NEURONTIN) 100 MG capsule Take 2 capsules by mouth 2 (Two) Times a Day. For neuropathy pain 120 capsule 5    Menthol, Topical Analgesic, (BIOFREEZE EX) Apply  topically. Shoulder      metoprolol tartrate (LOPRESSOR) 50 MG tablet Take 1 tablet by mouth Every 12 (Twelve) Hours. 180 tablet 1    NON FORMULARY Take 2 each by mouth Every Morning. Eye health      pantoprazole (Protonix) 40 MG EC tablet Take 1 tablet by mouth Daily. For GERD 90 tablet 3    phenazopyridine (PYRIDIUM) 200 MG tablet Take 1 tablet by mouth 3 times a day.      polyethylene glycol (MIRALAX) 17 g packet Take 17 g by mouth Daily As Needed (Use if senna-docusate is ineffective).      polyethylene Glycol 400 1 % solution ophthalmic solution Administer 1 drop to both eyes Every 3 (Three) Hours As Needed for Dry Eyes.       "tamsulosin (FLOMAX) 0.4 MG capsule 24 hr capsule Take 1 capsule by mouth Daily.       No current facility-administered medications on file prior to visit.        ALLERGIES:  No Known Allergies     Social History     Socioeconomic History    Marital status:    Tobacco Use    Smoking status: Former     Current packs/day: 0.00     Types: Cigarettes     Quit date:      Years since quittin.8     Passive exposure: Never    Smokeless tobacco: Never   Vaping Use    Vaping status: Never Used   Substance and Sexual Activity    Alcohol use: Not Currently    Drug use: Never    Sexual activity: Defer        Family History   Problem Relation Age of Onset    Malig Hyperthermia Neg Hx         Review of Systems   HENT:  Positive for mouth sores.    Eyes: Negative.    Respiratory: Negative.     Cardiovascular: Negative.    Gastrointestinal: Negative.    Genitourinary:  Positive for frequency. Negative for hematuria and urgency.   Musculoskeletal: Negative.    Allergic/Immunologic: Negative.    Neurological:  Positive for weakness.   Hematological: Negative.    Psychiatric/Behavioral: Negative.           Objective     Vitals:    10/25/24 1127   BP: 132/78   Pulse: (!) 46   SpO2: 98%   Weight: 77.1 kg (170 lb)   Height: 175.3 cm (69.02\")   PainSc: 0-No pain               10/25/2024    11:33 AM   Current Status   ECOG score 0       Physical Exam    CONSTITUTIONAL: pleasant well-developed adult man  HEENT: no icterus, no thrush, moist membranes  LYMPH: no cervical or supraclavicular lad  CV: RRR, S1S2, no murmur  RESP: cta bilat, no wheezing, no rales  GI: soft, non-tender, no splenomegaly, +bs  MUSC: no edema, normal gait  NEURO: alert and oriented x3, mod global weakness, ambulating with assistance of his son today  PSYCH: normal mood and somewhat flat affect     RECENT LABS:  Hematology WBC   Date Value Ref Range Status   10/25/2024 7.05 3.40 - 10.80 10*3/mm3 Final   2023 8.56 3.40 - 10.80 10*3/mm3 Final     RBC "   Date Value Ref Range Status   10/25/2024 3.95 (L) 4.14 - 5.80 10*6/mm3 Final   04/12/2023 4.19 4.14 - 5.80 10*6/mm3 Final     Hemoglobin   Date Value Ref Range Status   10/25/2024 11.8 (L) 13.0 - 17.7 g/dL Final     Hematocrit   Date Value Ref Range Status   10/25/2024 37.2 (L) 37.5 - 51.0 % Final     Platelets   Date Value Ref Range Status   10/25/2024 541 (H) 140 - 450 10*3/mm3 Final        Lab Results   Component Value Date    GLUCOSE 105 (H) 10/25/2024    BUN 16 10/25/2024    CREATININE 1.05 10/25/2024    EGFRRESULT 76.6 09/11/2024    EGFR 73.1 10/25/2024    BCR 15.2 10/25/2024    K 4.5 10/25/2024    CO2 27.4 10/25/2024    CALCIUM 9.8 10/25/2024    PROTENTOTREF 6.8 09/11/2024    ALBUMIN 4.0 10/25/2024    BILITOT 0.6 10/25/2024    AST 17 10/25/2024    ALT 8 10/25/2024         Assessment & Plan     *Mild thrombocytosis-platelet count improved to 541 after iron replacement but still elevated  *Mild normocytic anemia  Ferritin 46 and sat 11%   The patient has been taking oral iron daily since his last visit with hemoglobin improved 11.8 today  *Low iron stores-as above.  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

## 2024-10-23 ENCOUNTER — APPOINTMENT (OUTPATIENT)
Dept: OCCUPATIONAL THERAPY | Facility: HOSPITAL | Age: 79
End: 2024-10-23
Payer: MEDICAID

## 2024-10-23 ENCOUNTER — APPOINTMENT (OUTPATIENT)
Dept: PHYSICAL THERAPY | Facility: HOSPITAL | Age: 79
End: 2024-10-23
Payer: MEDICAID

## 2024-10-24 ENCOUNTER — OFFICE VISIT (OUTPATIENT)
Dept: FAMILY MEDICINE CLINIC | Facility: CLINIC | Age: 79
End: 2024-10-24
Payer: MEDICAID

## 2024-10-24 VITALS
HEART RATE: 52 BPM | BODY MASS INDEX: 21.03 KG/M2 | TEMPERATURE: 97.4 F | HEIGHT: 69 IN | SYSTOLIC BLOOD PRESSURE: 118 MMHG | RESPIRATION RATE: 16 BRPM | WEIGHT: 142 LBS | DIASTOLIC BLOOD PRESSURE: 60 MMHG | OXYGEN SATURATION: 96 %

## 2024-10-24 DIAGNOSIS — E78.2 HYPERLIPIDEMIA, MIXED: ICD-10-CM

## 2024-10-24 DIAGNOSIS — E11.9 TYPE 2 DIABETES MELLITUS WITHOUT COMPLICATION, WITHOUT LONG-TERM CURRENT USE OF INSULIN: ICD-10-CM

## 2024-10-24 DIAGNOSIS — K21.9 GASTROESOPHAGEAL REFLUX DISEASE WITHOUT ESOPHAGITIS: ICD-10-CM

## 2024-10-24 DIAGNOSIS — I10 BENIGN ESSENTIAL HTN: ICD-10-CM

## 2024-10-24 DIAGNOSIS — Z86.73 HISTORY OF EMBOLIC STROKE: Primary | ICD-10-CM

## 2024-10-24 DIAGNOSIS — K76.0 FATTY LIVER DISEASE, NONALCOHOLIC: ICD-10-CM

## 2024-10-24 PROCEDURE — 99214 OFFICE O/P EST MOD 30 MIN: CPT | Performed by: PHYSICIAN ASSISTANT

## 2024-10-24 PROCEDURE — 1160F RVW MEDS BY RX/DR IN RCRD: CPT | Performed by: PHYSICIAN ASSISTANT

## 2024-10-24 PROCEDURE — 3078F DIAST BP <80 MM HG: CPT | Performed by: PHYSICIAN ASSISTANT

## 2024-10-24 PROCEDURE — 1159F MED LIST DOCD IN RCRD: CPT | Performed by: PHYSICIAN ASSISTANT

## 2024-10-24 PROCEDURE — 3074F SYST BP LT 130 MM HG: CPT | Performed by: PHYSICIAN ASSISTANT

## 2024-10-24 PROCEDURE — 1125F AMNT PAIN NOTED PAIN PRSNT: CPT | Performed by: PHYSICIAN ASSISTANT

## 2024-10-24 NOTE — PROGRESS NOTES
"Dottie Negron is a 79 y.o. male.     History of Present Illness    Since the last visit, he has overall felt fairly well.  He has Primary Hypertension and well controlled on current medication, DMII well controlled on medication and will continue regimen, GERD controlled on PPI Rx, Hyperlipidemia with goals met with current Rx, and Vitamin D deficiency and labs are at goal >30 ng/mL.  he has been compliant with current medications have reviewed them.  The patient denies medication side effects.  Will refill medications. /60   Pulse 52   Temp 97.4 °F (36.3 °C) (Temporal)   Resp 16   Ht 175.3 cm (69\")   Wt 64.4 kg (142 lb)   SpO2 96%   BMI 20.97 kg/m² .  BMI is within normal parameters. No other follow-up for BMI required.  LDL was at goal of less than 70 he was at 59  Lab Results   Component Value Date    GLUCOSE 101 (H) 09/11/2024    BUN 20 09/11/2024    CREATININE 1.01 09/11/2024     09/11/2024    K 4.9 09/11/2024     09/11/2024    CALCIUM 10.4 09/11/2024    PROTEINTOT 7.2 05/31/2024    ALBUMIN 4.1 09/11/2024    ALT 9 09/11/2024    AST 12 09/11/2024    ALKPHOS 132 (H) 09/11/2024    BILITOT 0.7 09/11/2024    GLOB 3.5 05/31/2024    AGRATIO 1.1 05/31/2024    BCR 19.8 09/11/2024    ANIONGAP 12.4 05/31/2024    EGFR 89.2 05/31/2024     Lab Results   Component Value Date    HGBA1C 5.20 09/11/2024     Lab Results   Component Value Date    PQLBUQIP84 539 09/11/2024     Lab Results   Component Value Date    CHOL 110 05/31/2024    CHLPL 107 09/11/2024    TRIG 76 09/11/2024    HDL 32 (L) 09/11/2024    LDL 59 09/11/2024     Lab Results   Component Value Date    TSH 1.670 09/11/2024   Also same date of labs vitamin D at goal at 56.4  Urine microalbumin creatinine ratio normal at 13  B12 and folic acid in desirable range.      Weight down--not always hungry----skips meals---need to add supplement    Also fill out paperwork for his urinary incontinence since his bladder cancer surgery and " the nurse coordinator helped me with the coding that was needed for that.  We had to state that it was medically necessary that he have disposable briefs due to incontinence.  Also noted that he is under care of hematology oncology Dr. Mary with last visit 7/19/2024 and evaluated him for mild thrombocytopenia and mild normocytic anemia and made note of failing oral therapy with iron and has had iron infusions.  Discussed the laboratory workup that he is done and noted his history of high-grade nonmuscle invasive papillary urethral cancer of the bladder.  Noting his transurethral resection is x 2 with Dr. Karthikeyan Haque.---last visit 8-22-24---in remission---cancer  Hematology oncology continues to monitor his labs  Last saw cardiologist Dr. Mayes 7-9-24 for follow-up and concern for his prior EKG abnormality --- I was concerned there were Q waves in the inferior leads and his prior echo was negative for wall motion abnormalities.  He noted to have history of nonsustained supraventricular tachycardia, CVA, benign hypertension and continued same medications for hypertension stable.  Has been seeing Ortho for shoulder pain  Continues to see Dr. Karthikeyan Haque for follow-up of his bladder cancer.  Ordered OT PT and speech for home last visit due to his history of CVA and also was concerned of his lack of use left side body.---just finished and doing home exercise.    Had follow-up with neurosurgeon Dr. Rubio on 9/10/2024 noting his history of stroke with imaging including CT and CTA with right proximal internal carotid artery occlusion with terminal ICA occlusion extending into the middle cerebral artery he received tenecteplase. He underwent endovascular intervention with a right carotid stent placed after suction thrombectomy cleared the ICA and MCA with TICI 3C flow seen.  And reviewed the carotid Doppler to follow-up to confirm stent patency and no progression of disease on the left---- also noted his stroke induced  residual deficit at this left side of body and memory.--The carotid Doppler was 9/3/2024.  He made note to follow-up in 2 years with carotid Doppler ultrasound and keep appointment with stroke neurology clinic.  He did stop the Plavix and noted to continue the aspirin  Has also been seen [facility] mult[facility]pecialty clinic in having injections for the spasticity on his left side and that is helping and that was set up by Ortho and note that Dr. Matos is a physiologist    Some pain at times in abd wall hernia right side abd.    The following portions of the patient's history were reviewed and updated as appropriate: allergies, current medications, past family history, past medical history, past social history, past surgical history, and problem list.    Review of Systems   Constitutional:  Negative for diaphoresis.   HENT:  Negative for nosebleeds and trouble swallowing.    Eyes:  Positive for visual disturbance. Negative for blurred vision.   Respiratory:  Negative for choking.    Gastrointestinal:  Negative for blood in stool.   Musculoskeletal:  Positive for gait problem.   Allergic/Immunologic: Negative for immunocompromised state.   Neurological:  Negative for facial asymmetry and speech difficulty.   Psychiatric/Behavioral:  Negative for self-injury and suicidal ideas.        Objective   Physical Exam  Vitals and nursing note reviewed.   Constitutional:       General: He is not in acute distress.     Appearance: He is well-developed. He is not diaphoretic.   HENT:      Head: Normocephalic.   Eyes:      Conjunctiva/sclera: Conjunctivae normal.      Pupils: Pupils are equal, round, and reactive to light.   Cardiovascular:      Rate and Rhythm: Normal rate and regular rhythm.      Heart sounds: Normal heart sounds. No murmur heard.  Pulmonary:      Effort: Pulmonary effort is normal.      Breath sounds: Normal breath sounds.   Abdominal:      Palpations: Mass: abd wall.      Hernia: A hernia is  present.   Musculoskeletal:         General: Normal range of motion.      Cervical back: Normal range of motion and neck supple.   Skin:     General: Skin is warm and dry.      Findings: No rash.   Neurological:      Mental Status: He is alert and oriented to person, place, and time.   Psychiatric:         Mood and Affect: Affect is not inappropriate.         Behavior: Behavior normal.         Thought Content: Thought content normal.         Judgment: Judgment normal.           Assessment & Plan   Diagnoses and all orders for this visit:    1. History of embolic stroke (Primary)  -     Ambulatory Referral to Neurology  -     Comprehensive metabolic panel; Future  -     Lipid panel; Future  -     Hemoglobin A1c; Future    2. Benign essential HTN  -     Comprehensive metabolic panel; Future  -     Lipid panel; Future  -     Hemoglobin A1c; Future    3. Hyperlipidemia, mixed  -     Comprehensive metabolic panel; Future  -     Lipid panel; Future  -     Hemoglobin A1c; Future    4. Gastroesophageal reflux disease without esophagitis  -     Comprehensive metabolic panel; Future  -     Lipid panel; Future  -     Hemoglobin A1c; Future    5. Fatty liver disease, nonalcoholic  -     Comprehensive metabolic panel; Future  -     Lipid panel; Future  -     Hemoglobin A1c; Future    6. Type 2 diabetes mellitus without complication, without long-term current use of insulin  -     Comprehensive metabolic panel; Future  -     Lipid panel; Future  -     Hemoglobin A1c; Future      Plan labs again in March  Continues to see [facility] for shoulder pain as needed  Followed by Dr. Mary hematology oncology for the history of bladder cancer and his normocytic anemia and thrombocytopenia  Has follow-up yearly with cardiology Dr. Mayes for his history of nonsustained supraventricular tachycardia and hypertension  Needs follow-up appointment with stroke clinic  Does have follow-up with physiology with [facility]  treating his spasticity from the stroke with Dr. Matos  Due for CT chest to f/u lung nodule---set end Dec  DR Rubio Rx Aricept  Labs March  If miss meal--drink protein supplement---watch weight   Can do Prevnar 20  Plan, Alex Negron, was seen today.  he was seen for HTN and continue medication, DMII stable and refilled medications, GERD and will continue on PPI medication, Hyperlipidemia and will continue current medication, and Vitamin D deficiency and supplemented.

## 2024-10-25 ENCOUNTER — LAB (OUTPATIENT)
Dept: LAB | Facility: HOSPITAL | Age: 79
End: 2024-10-25
Payer: MEDICAID

## 2024-10-25 ENCOUNTER — OFFICE VISIT (OUTPATIENT)
Dept: ONCOLOGY | Facility: CLINIC | Age: 79
End: 2024-10-25
Payer: MEDICAID

## 2024-10-25 VITALS
WEIGHT: 170 LBS | BODY MASS INDEX: 25.18 KG/M2 | DIASTOLIC BLOOD PRESSURE: 78 MMHG | HEART RATE: 46 BPM | SYSTOLIC BLOOD PRESSURE: 132 MMHG | OXYGEN SATURATION: 98 % | HEIGHT: 69 IN

## 2024-10-25 DIAGNOSIS — D50.9 IRON DEFICIENCY ANEMIA, UNSPECIFIED IRON DEFICIENCY ANEMIA TYPE: Primary | ICD-10-CM

## 2024-10-25 DIAGNOSIS — D50.9 IRON DEFICIENCY ANEMIA, UNSPECIFIED IRON DEFICIENCY ANEMIA TYPE: ICD-10-CM

## 2024-10-25 LAB
ALBUMIN SERPL-MCNC: 4 G/DL (ref 3.5–5.2)
ALBUMIN/GLOB SERPL: 1.3 G/DL
ALP SERPL-CCNC: 138 U/L (ref 39–117)
ALT SERPL W P-5'-P-CCNC: 8 U/L (ref 1–41)
ANION GAP SERPL CALCULATED.3IONS-SCNC: 10.6 MMOL/L (ref 5–15)
AST SERPL-CCNC: 17 U/L (ref 1–40)
BASOPHILS # BLD AUTO: 0.05 10*3/MM3 (ref 0–0.2)
BASOPHILS NFR BLD AUTO: 0.7 % (ref 0–1.5)
BILIRUB SERPL-MCNC: 0.6 MG/DL (ref 0–1.2)
BUN SERPL-MCNC: 16 MG/DL (ref 8–23)
BUN/CREAT SERPL: 15.2 (ref 7–25)
CALCIUM SPEC-SCNC: 9.8 MG/DL (ref 8.6–10.5)
CHLORIDE SERPL-SCNC: 100 MMOL/L (ref 98–107)
CO2 SERPL-SCNC: 27.4 MMOL/L (ref 22–29)
CREAT SERPL-MCNC: 1.05 MG/DL (ref 0.76–1.27)
DEPRECATED RDW RBC AUTO: 53.4 FL (ref 37–54)
EGFRCR SERPLBLD CKD-EPI 2021: 73.1 ML/MIN/1.73
EOSINOPHIL # BLD AUTO: 0.22 10*3/MM3 (ref 0–0.4)
EOSINOPHIL NFR BLD AUTO: 3.1 % (ref 0.3–6.2)
ERYTHROCYTE [DISTWIDTH] IN BLOOD BY AUTOMATED COUNT: 15.4 % (ref 12.3–15.4)
FERRITIN SERPL-MCNC: 78.5 NG/ML (ref 30–400)
GLOBULIN UR ELPH-MCNC: 3.1 GM/DL
GLUCOSE SERPL-MCNC: 105 MG/DL (ref 65–99)
HCT VFR BLD AUTO: 37.2 % (ref 37.5–51)
HGB BLD-MCNC: 11.8 G/DL (ref 13–17.7)
IMM GRANULOCYTES # BLD AUTO: 0.02 10*3/MM3 (ref 0–0.05)
IMM GRANULOCYTES NFR BLD AUTO: 0.3 % (ref 0–0.5)
IRON 24H UR-MRATE: 119 MCG/DL (ref 59–158)
IRON SATN MFR SERPL: 35 % (ref 20–50)
LYMPHOCYTES # BLD AUTO: 2.3 10*3/MM3 (ref 0.7–3.1)
LYMPHOCYTES NFR BLD AUTO: 32.6 % (ref 19.6–45.3)
MCH RBC QN AUTO: 29.9 PG (ref 26.6–33)
MCHC RBC AUTO-ENTMCNC: 31.7 G/DL (ref 31.5–35.7)
MCV RBC AUTO: 94.2 FL (ref 79–97)
MONOCYTES # BLD AUTO: 0.42 10*3/MM3 (ref 0.1–0.9)
MONOCYTES NFR BLD AUTO: 6 % (ref 5–12)
NEUTROPHILS NFR BLD AUTO: 4.04 10*3/MM3 (ref 1.7–7)
NEUTROPHILS NFR BLD AUTO: 57.3 % (ref 42.7–76)
NRBC BLD AUTO-RTO: 0 /100 WBC (ref 0–0.2)
PLATELET # BLD AUTO: 541 10*3/MM3 (ref 140–450)
PMV BLD AUTO: 8.6 FL (ref 6–12)
POTASSIUM SERPL-SCNC: 4.5 MMOL/L (ref 3.5–5.2)
PROT SERPL-MCNC: 7.1 G/DL (ref 6–8.5)
RBC # BLD AUTO: 3.95 10*6/MM3 (ref 4.14–5.8)
SODIUM SERPL-SCNC: 138 MMOL/L (ref 136–145)
TIBC SERPL-MCNC: 344 MCG/DL (ref 298–536)
TRANSFERRIN SERPL-MCNC: 231 MG/DL (ref 200–360)
WBC NRBC COR # BLD AUTO: 7.05 10*3/MM3 (ref 3.4–10.8)

## 2024-10-25 PROCEDURE — 36415 COLL VENOUS BLD VENIPUNCTURE: CPT

## 2024-10-25 PROCEDURE — 1126F AMNT PAIN NOTED NONE PRSNT: CPT | Performed by: INTERNAL MEDICINE

## 2024-10-25 PROCEDURE — 3078F DIAST BP <80 MM HG: CPT | Performed by: INTERNAL MEDICINE

## 2024-10-25 PROCEDURE — 82728 ASSAY OF FERRITIN: CPT

## 2024-10-25 PROCEDURE — 84466 ASSAY OF TRANSFERRIN: CPT

## 2024-10-25 PROCEDURE — 80053 COMPREHEN METABOLIC PANEL: CPT

## 2024-10-25 PROCEDURE — 3075F SYST BP GE 130 - 139MM HG: CPT | Performed by: INTERNAL MEDICINE

## 2024-10-25 PROCEDURE — 99214 OFFICE O/P EST MOD 30 MIN: CPT | Performed by: INTERNAL MEDICINE

## 2024-10-25 PROCEDURE — 85025 COMPLETE CBC W/AUTO DIFF WBC: CPT

## 2024-10-25 PROCEDURE — 83540 ASSAY OF IRON: CPT

## 2024-10-28 ENCOUNTER — APPOINTMENT (OUTPATIENT)
Dept: OCCUPATIONAL THERAPY | Facility: HOSPITAL | Age: 79
End: 2024-10-28
Payer: MEDICAID

## 2024-10-28 ENCOUNTER — APPOINTMENT (OUTPATIENT)
Dept: PHYSICAL THERAPY | Facility: HOSPITAL | Age: 79
End: 2024-10-28
Payer: MEDICAID

## 2024-11-12 RX ORDER — LISINOPRIL 10 MG/1
10 TABLET ORAL EVERY MORNING
Qty: 90 TABLET | Refills: 0 | Status: SHIPPED | OUTPATIENT
Start: 2024-11-12

## 2024-11-13 NOTE — PROGRESS NOTES
CC: Stroke follow-up    HPI:  Alex Negron is a  79 y.o.  right-handed male, former smoker, with hypertension, hyperlipidemia, degenerative disc disease of the lumbar spine, previously reported mild renal insufficiency, noninvasive bladder cancer status post transurethral resection and intravesicular chemotherapy is being seen in follow-up today for stroke.  He is accompanied by his son.  Squarespace video  used.     In April 2024 he presented with right MCA syndrome and underwent CT/CTA/CTP that showed right MCA proximal occlusion with terminal ICA occlusion extending into the MCA.  He received IV TNK and was taken for mechanical thrombectomy.  He was found to have 85% stenosis and also underwent right ICA stent which was initially treated with DAPT.  He was also started on Lipitor 80 mg.  Antiphospholipid syndrome panel was unremarkable. Zio patch was negative for A-fib/a flutter.  Hospitalization was complicated by dysphagia for which she underwent PEG as well as coffee-ground emesis status post EGD which showed esophageal ulcers.    Carotid ultrasound completed in September 2024 showed patent right carotid stent without evidence of in-stent restenosis, left ICA less than 50% stenosis.  He saw Dr. Navarrete in September 2024 and Plavix was stopped, patient recommended to continue on aspirin.  Aricept 5 mg daily was started for concern about his memory.  Follow-up in 2 years with carotid ultrasound recommended (9/2026).    Since I last saw him he has gotten out of rehab.  He has finished outpatient physical therapy but now works with a private physiotherapist on his left arm weakness and spasticity for which he receives massage.  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  Left neglect and visual field cut continue to be an issue.  Patient son reports he is frequently bumping into things on the left side.  They  indicate he has seen an eye doctor since his stroke but are unsure who.    He is currently taking aspirin 81 mg and Lipitor 80 mg.  No new TIA or symptoms since I last saw him.  Son reports some improvement in memory on Aricept 5 mg daily.  They are not interested in increasing the dosage.    He previously had a sleep study in September 2023 with impression of positional sleep apnea but per narrative text sleep study was negative for BLAZE however there was like positional component and it was recommended patient avoid sleeping in supine position.    He quit smoking approximately 17 years ago.  He does not drink alcohol.  Prior to moving here from [place] over a year ago he was very active still working as a .  Past Medical History:   Diagnosis Date    Anxiety     Aortic atherosclerosis     Bladder cancer     Bladder mass 06/07/2023    Bladder tumor 05/2023    chemical chemo in [place] office  weekly    Blind left eye     Blood in urine     TRACE    Burning with urination     Chronic back pain     neuropathy rolando legs    DDD (degenerative disc disease), lumbar     GERD (gastroesophageal reflux disease)     History of low back pain     Hyperlipidemia     Hypertension     PTSD (post-traumatic stress disorder)     [place]    Renal insufficiency     Sleep apnea 2023    diagnosed but following up with PCP    Urine frequency          Past Surgical History:   Procedure Laterality Date    COLONOSCOPY N/A 3/14/2024    Procedure: COLONOSCOPY TO CECUM WITH COLD SNARE POLYPECTOMIES;  Surgeon: Magdaleno Mena MD;  Location: [facility] ENDOSCOPY;  Service: Gastroenterology;  Laterality: N/A;  PRE OP - SCREENING  -POST OP - COLON POLYPS,HEMORRHOIDS    DENTAL PROCEDURE      ENDOSCOPY      ENDOSCOPY N/A 4/3/2024    Procedure: ESOPHAGOGASTRODUODENOSCOPY AT BEDSIDE;  Surgeon: Redd Guidry MD;  Location: [facility] ENDOSCOPY;  Service: Gastroenterology;  Laterality: N/A;  PRE-  GI  BLEED  POST- HIATAL HERNIA, DISTAL ESOPHAGITIS ULCERS, GASTRITIS, PYLOROPLASTY    ENDOSCOPY W/ PEG TUBE PLACEMENT N/A 4/12/2024    Procedure: ESOPHAGOGASTRODUODENOSCOPY WITH PERCUTANEOUS ENDOSCOPIC GASTROSTOMY TUBE INSERTION;  Surgeon: Dimas Brand MD;  Location: [facility] ENDOSCOPY;  Service: General;  Laterality: N/A;  Dysphagia    TRANSURETHRAL RESECTION OF BLADDER TUMOR N/A 06/07/2023    Procedure: TRANSURETHRAL RESECTION OF BLADDER TUMOR;  Surgeon: Robert Haque MD;  Location: [facility] MAIN OR;  Service: Urology;  Laterality: N/A;    TRANSURETHRAL RESECTION OF BLADDER TUMOR N/A 10/18/2023    Procedure: CYSTOSCOPY TRANSURETHRAL RESECTION OF BLADDER TUMOR;  Surgeon: Robert Haque MD;  Location: [facility] MAIN OR;  Service: Urology;  Laterality: N/A;           Current Outpatient Medications:     amLODIPine (NORVASC) 10 MG tablet, Take 1 tablet by mouth Daily., Disp: , Rfl:     aspirin 81 MG EC tablet, Take 1 tablet by mouth Daily., Disp: 90 tablet, Rfl: 3    atorvastatin (Lipitor) 80 MG tablet, Take 1 tablet by mouth Daily. For cholesterol, Disp: 90 tablet, Rfl: 3    Cholecalciferol 25 MCG (1000 UT) tablet, Take 1 tablet by mouth Daily. 2000 u, Disp: , Rfl:     donepezil (Aricept) 5 MG tablet, Take 1 tablet by mouth Every Night., Disp: 30 tablet, Rfl: 6    gabapentin (NEURONTIN) 100 MG capsule, Take 2 capsules by mouth 2 (Two) Times a Day. For neuropathy pain, Disp: 120 capsule, Rfl: 5    metoprolol tartrate (LOPRESSOR) 50 MG tablet, Take 1 tablet by mouth Every 12 (Twelve) Hours., Disp: 180 tablet, Rfl: 1    pantoprazole (Protonix) 40 MG EC tablet, Take 1 tablet by mouth Daily. For GERD, Disp: 90 tablet, Rfl: 3    tamsulosin (FLOMAX) 0.4 MG capsule 24 hr capsule, Take 1 capsule by mouth Daily., Disp: , Rfl:     acetaminophen (TYLENOL) 500 MG tablet, Take 1 tablet by mouth Every 6 (Six) Hours As Needed for Mild Pain. (Patient not taking: Reported on 11/20/2024), Disp: , Rfl:     lisinopril  "(PRINIVIL,ZESTRIL) 10 MG tablet, TAKE 1 TABLET BY MOUTH EVERY MORNING FOR BLOOD PRESSURE (Patient not taking: Reported on 2024), Disp: 90 tablet, Rfl: 0    Menthol, Topical Analgesic, (BIOFREEZE EX), Apply  topically. Shoulder (Patient not taking: Reported on 2024), Disp: , Rfl:     phenazopyridine (PYRIDIUM) 200 MG tablet, Take 1 tablet by mouth 3 times a day. (Patient not taking: Reported on 2024), Disp: , Rfl:     polyethylene glycol (MIRALAX) 17 g packet, Take 17 g by mouth Daily As Needed (Use if senna-docusate is ineffective). (Patient not taking: Reported on 2024), Disp: , Rfl:       Family History   Problem Relation Age of Onset    Malig Hyperthermia Neg Hx          Social History     Socioeconomic History    Marital status:    Tobacco Use    Smoking status: Former     Current packs/day: 0.00     Types: Cigarettes     Quit date:      Years since quittin.9     Passive exposure: Never    Smokeless tobacco: Never   Vaping Use    Vaping status: Never Used   Substance and Sexual Activity    Alcohol use: Not Currently    Drug use: Never    Sexual activity: Defer         No Known Allergies      Pain Scale:        ROS:  Review of Systems   Musculoskeletal:  Positive for gait problem (cane sometimes).   Neurological:  Positive for weakness (arm and leg left side).   Psychiatric/Behavioral:  Positive for decreased concentration.        ROS completed by MA reviewed by me and I agree.    Physical Exam:  Vitals:    24 1000   BP: 110/64   Pulse: 66   SpO2: 97%   Weight: 65.2 kg (143 lb 12.8 oz)   Height: 175.3 cm (69\")     Orthostatic BP:    Body mass index is 21.24 kg/m².    General appearance: Well developed, well nourished, well groomed, alert and cooperative.   HEENT: Normocephalic.   Cardiac: Regular rate and rhythm. No murmurs.   Chest Exam: Clear to auscultation bilaterally, no wheezes, no rhonchi.  Extremities: Normal, no edema.     Higher integrative function: Awake " "and alert, oriented x 3, good attention/concentration, speech fluent per son.  Cranial nerves: Left homonymous hemianopsia, PERRL, normal facial sensation, mild flattening left nasolabial fold, tongue midline, no dysarthria.  Motor: Right arm and leg 5 out of 5, left arm 4- out of 5, left leg 4/5 with spasticity.   Sensation: Intact to light touch in all extremities.  Station and gait: Left hemiparetic gait, mildly unsteady.        Results:      Lab Results   Component Value Date    GLUCOSE 105 (H) 10/25/2024    BUN 16 10/25/2024    CREATININE 1.05 10/25/2024    BCR 15.2 10/25/2024    CO2 27.4 10/25/2024    CALCIUM 9.8 10/25/2024    PROTENTOTREF 6.8 09/11/2024    ALBUMIN 4.0 10/25/2024    LABIL2 1.5 09/11/2024    AST 17 10/25/2024    ALT 8 10/25/2024       Lab Results   Component Value Date    WBC 7.05 10/25/2024    HGB 11.8 (L) 10/25/2024    HCT 37.2 (L) 10/25/2024    MCV 94.2 10/25/2024     (H) 10/25/2024         .No results found for: \"RPR\"      Lab Results   Component Value Date    TSH 1.670 09/11/2024         Lab Results   Component Value Date    [id number] 539 09/11/2024         Lab Results   Component Value Date    FOLATE 6.00 09/11/2024         Lab Results   Component Value Date    HGBA1C 5.20 09/11/2024         Lab Results   Component Value Date    GLUCOSE 105 (H) 10/25/2024    BUN 16 10/25/2024    CREATININE 1.05 10/25/2024    BCR 15.2 10/25/2024    K 4.5 10/25/2024    CO2 27.4 10/25/2024    CALCIUM 9.8 10/25/2024    PROTENTOTREF 6.8 09/11/2024    ALBUMIN 4.0 10/25/2024    LABIL2 1.5 09/11/2024    AST 17 10/25/2024    ALT 8 10/25/2024         Lab Results   Component Value Date    WBC 7.05 10/25/2024    HGB 11.8 (L) 10/25/2024    HCT 37.2 (L) 10/25/2024    MCV 94.2 10/25/2024     (H) 10/25/2024             Assessment/Plan:        Diagnoses and all orders for this visit:    1. History of embolic stroke (Primary)  -     Ambulatory Referral to Optometry    2. 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 Homonymous hemianopia, left      Alex was seen today for history of right MCA stroke with residual deficits.  Overall he is doing very well with rehab but continues to struggle with left homonymous hemianopia.  Will refer him to optometry for evaluation for vision therapy and prisms.    For stroke prevention:  Continue aspirin 81 mg daily  Blood pressure control to <130/80  Goal LDL <70-recommend high dose statins-continue Lipitor 80 mg   Serum glucose < 140  Call 911 for stroke any stroke symptoms    Keep follow-up with neurosurgery September 2026 with carotid ultrasound     Follow-up with me in 1 year or sooner if needed      Total time: Greater than 40 minutes          Dictated utilizing Dragon dictation.

## 2024-11-19 ENCOUNTER — TELEPHONE (OUTPATIENT)
Dept: FAMILY MEDICINE CLINIC | Facility: CLINIC | Age: 79
End: 2024-11-19
Payer: MEDICAID

## 2024-11-19 NOTE — TELEPHONE ENCOUNTER
Caller: JEREMÍAS GO    Relationship: Emergency Contact    Best call back number: [phone]     What is the best time to reach you: ANY    Who are you requesting to speak with (clinical staff, provider,  specific staff member): LOPEZ OROSCO         What was the call regarding: PATIENTS DAUGHTER IN REGARDS TO A BIOPSY THAT IS SCHEDULED ON NOVEMBER 21,2024. SHE IS UNAWARE OF THE BIOPSY, AND WOULD LIKE A CALL TO DISCUSS FURTHER.

## 2024-11-20 ENCOUNTER — OFFICE VISIT (OUTPATIENT)
Dept: NEUROLOGY | Facility: CLINIC | Age: 79
End: 2024-11-20
Payer: MEDICAID

## 2024-11-20 VITALS
WEIGHT: 143.8 LBS | HEIGHT: 69 IN | HEART RATE: 66 BPM | SYSTOLIC BLOOD PRESSURE: 110 MMHG | BODY MASS INDEX: 21.3 KG/M2 | DIASTOLIC BLOOD PRESSURE: 64 MMHG | OXYGEN SATURATION: 97 %

## 2024-11-20 DIAGNOSIS — I69.398 SPASTICITY AS LATE EFFECT OF CEREBROVASCULAR ACCIDENT (CVA): ICD-10-CM

## 2024-11-20 DIAGNOSIS — H53.462 HOMONYMOUS HEMIANOPIA, LEFT: ICD-10-CM

## 2024-11-20 DIAGNOSIS — R25.2 SPASTICITY AS LATE EFFECT OF CEREBROVASCULAR ACCIDENT (CVA): ICD-10-CM

## 2024-11-20 DIAGNOSIS — Z86.73 HISTORY OF EMBOLIC STROKE: Primary | ICD-10-CM

## 2024-11-20 RX ORDER — CHOLECALCIFEROL (VITAMIN D3) 25 MCG
1000 TABLET ORAL DAILY
COMMUNITY

## 2024-11-20 NOTE — TELEPHONE ENCOUNTER
That is correct for the blood pressure medications because they were changed when he was in the hospital for the stroke

## 2024-11-20 NOTE — TELEPHONE ENCOUNTER
I called and spoke with Radha, Pt's daughter. She states that they did figure out what the appt was for. It is with Urology, so that is cleared up.  Radha would like to make sure that her father is taking the correct BP medication. His list shows Lisinopril 10 mg and Metoprolol Tartrate 50 mg.  I told her that I would verify and call her back to let her know.  Please advise

## 2024-11-20 NOTE — LETTER
November 20, 2024     Jyothi Song PA-C  [id number] [facility]  [address]  [place] [id number]    Patient: Alex Negron   YOB: 1945   Date of Visit: 11/20/2024     Dear Jyothi Song PA-C:       Thank you for referring Alex Negron to me for evaluation. Below are the relevant portions of my assessment and plan of care.    If you have questions, please do not hesitate to call me. I look forward to following Alex along with you.         Sincerely,        ESCOBAR Pop        CC: No Recipients    Saloni Weinberg APRN  11/20/24 1048  Sign when Signing Visit  CC: Stroke follow-up    HPI:  Alex Negron is a  79 y.o.  right-handed male, former smoker, with hypertension, hyperlipidemia, degenerative disc disease of the lumbar spine, previously reported mild renal insufficiency, noninvasive bladder cancer status post transurethral resection and intravesicular chemotherapy is being seen in follow-up today for stroke.  He is accompanied by his son.  Emirati video  used.     In April 2024 he presented with right MCA syndrome and underwent CT/CTA/CTP that showed right MCA proximal occlusion with terminal ICA occlusion extending into the MCA.  He received IV TNK and was taken for mechanical thrombectomy.  He was found to have 85% stenosis and also underwent right ICA stent which was initially treated with DAPT.  He was also started on Lipitor 80 mg.  Antiphospholipid syndrome panel was unremarkable. Zio patch was negative for A-fib/a flutter.  Hospitalization was complicated by dysphagia for which she underwent PEG as well as coffee-ground emesis status post EGD which showed esophageal ulcers.    Carotid ultrasound completed in September 2024 showed patent right carotid stent without evidence of in-stent restenosis, left ICA less than 50% stenosis.  He saw Dr. Navarrete in September 2024 and Plavix was stopped, patient recommended to continue on aspirin.  Aricept 5 mg daily  was started for concern about his memory.  Follow-up in 2 years with carotid ultrasound recommended (9/2026).    Since I last saw him he has gotten out of rehab.  He has finished outpatient physical therapy but now works with a private physiotherapist on his left arm weakness and spasticity for which he receives massage.  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  Left neglect and visual field cut continue to be an issue.  Patient son reports he is frequently bumping into things on the left side.  They indicate he has seen an eye doctor since his stroke but are unsure who.    He is currently taking aspirin 81 mg and Lipitor 80 mg.  No new TIA or symptoms since I last saw him.  Son reports some improvement in memory on Aricept 5 mg daily.  They are not interested in increasing the dosage.    He previously had a sleep study in September 2023 with impression of positional sleep apnea but per narrative text sleep study was negative for BLAZE however there was like positional component and it was recommended patient avoid sleeping in supine position.    He quit smoking approximately 17 years ago.  He does not drink alcohol.  Prior to moving here from [facility] over a year ago he was very active still working as a .  Past Medical History:   Diagnosis Date   • Anxiety    • Aortic atherosclerosis    • Bladder cancer    • Bladder mass 06/07/2023   • Bladder tumor 05/2023    chemical chemo in [place] office  weekly   • Blind left eye    • Blood in urine     TRACE   • Burning with urination    • Chronic back pain     neuropathy rolando legs   • DDD (degenerative disc disease), lumbar    • GERD (gastroesophageal reflux disease)    • History of low back pain    • Hyperlipidemia    • Hypertension    • PTSD (post-traumatic stress disorder)     [place] start of [place]   • Renal insufficiency    • Sleep apnea  2023    diagnosed but following up with PCP   • Urine frequency          Past Surgical History:   Procedure Laterality Date   • COLONOSCOPY N/A 3/14/2024    Procedure: COLONOSCOPY TO CECUM WITH COLD SNARE POLYPECTOMIES;  Surgeon: Magdaleno Mena MD;  Location: [facility] ENDOSCOPY;  Service: Gastroenterology;  Laterality: N/A;  PRE OP - SCREENING  -POST OP - COLON POLYPS,HEMORRHOIDS   • DENTAL PROCEDURE     • ENDOSCOPY     • ENDOSCOPY N/A 4/3/2024    Procedure: ESOPHAGOGASTRODUODENOSCOPY AT BEDSIDE;  Surgeon: Redd Guidry MD;  Location: [facility] ENDOSCOPY;  Service: Gastroenterology;  Laterality: N/A;  PRE-  GI BLEED  POST- HIATAL HERNIA, DISTAL ESOPHAGITIS ULCERS, GASTRITIS, PYLOROPLASTY   • ENDOSCOPY W/ PEG TUBE PLACEMENT N/A 4/12/2024    Procedure: ESOPHAGOGASTRODUODENOSCOPY WITH PERCUTANEOUS ENDOSCOPIC GASTROSTOMY TUBE INSERTION;  Surgeon: Dimas Brand MD;  Location: [facility] ENDOSCOPY;  Service: General;  Laterality: N/A;  Dysphagia   • TRANSURETHRAL RESECTION OF BLADDER TUMOR N/A 06/07/2023    Procedure: TRANSURETHRAL RESECTION OF BLADDER TUMOR;  Surgeon: Robert Haque MD;  Location: [facility] MAIN OR;  Service: Urology;  Laterality: N/A;   • TRANSURETHRAL RESECTION OF BLADDER TUMOR N/A 10/18/2023    Procedure: CYSTOSCOPY TRANSURETHRAL RESECTION OF BLADDER TUMOR;  Surgeon: Robert Haque MD;  Location: [facility] MAIN OR;  Service: Urology;  Laterality: N/A;           Current Outpatient Medications:   •  amLODIPine (NORVASC) 10 MG tablet, Take 1 tablet by mouth Daily., Disp: , Rfl:   •  aspirin 81 MG EC tablet, Take 1 tablet by mouth Daily., Disp: 90 tablet, Rfl: 3  •  atorvastatin (Lipitor) 80 MG tablet, Take 1 tablet by mouth Daily. For cholesterol, Disp: 90 tablet, Rfl: 3  •  Cholecalciferol 25 MCG (1000 UT) tablet, Take 1 tablet by mouth Daily. 2000 u, Disp: , Rfl:   •  donepezil (Aricept) 5 MG tablet, Take 1 tablet by mouth Every Night., Disp: 30 tablet, Rfl: 6  •  gabapentin (NEURONTIN)  100 MG capsule, Take 2 capsules by mouth 2 (Two) Times a Day. For neuropathy pain, Disp: 120 capsule, Rfl: 5  •  metoprolol tartrate (LOPRESSOR) 50 MG tablet, Take 1 tablet by mouth Every 12 (Twelve) Hours., Disp: 180 tablet, Rfl: 1  •  pantoprazole (Protonix) 40 MG EC tablet, Take 1 tablet by mouth Daily. For GERD, Disp: 90 tablet, Rfl: 3  •  tamsulosin (FLOMAX) 0.4 MG capsule 24 hr capsule, Take 1 capsule by mouth Daily., Disp: , Rfl:   •  acetaminophen (TYLENOL) 500 MG tablet, Take 1 tablet by mouth Every 6 (Six) Hours As Needed for Mild Pain. (Patient not taking: Reported on 2024), Disp: , Rfl:   •  lisinopril (PRINIVIL,ZESTRIL) 10 MG tablet, TAKE 1 TABLET BY MOUTH EVERY MORNING FOR BLOOD PRESSURE (Patient not taking: Reported on 2024), Disp: 90 tablet, Rfl: 0  •  Menthol, Topical Analgesic, (BIOFREEZE EX), Apply  topically. Shoulder (Patient not taking: Reported on 2024), Disp: , Rfl:   •  phenazopyridine (PYRIDIUM) 200 MG tablet, Take 1 tablet by mouth 3 times a day. (Patient not taking: Reported on 2024), Disp: , Rfl:   •  polyethylene glycol (MIRALAX) 17 g packet, Take 17 g by mouth Daily As Needed (Use if senna-docusate is ineffective). (Patient not taking: Reported on 2024), Disp: , Rfl:       Family History   Problem Relation Age of Onset   • Malig Hyperthermia Neg Hx          Social History     Socioeconomic History   • Marital status:    Tobacco Use   • Smoking status: Former     Current packs/day: 0.00     Types: Cigarettes     Quit date:      Years since quittin.9     Passive exposure: Never   • Smokeless tobacco: Never   Vaping Use   • Vaping status: Never Used   Substance and Sexual Activity   • Alcohol use: Not Currently   • Drug use: Never   • Sexual activity: Defer         No Known Allergies      Pain Scale:        ROS:  Review of Systems   Musculoskeletal:  Positive for gait problem (cane sometimes).   Neurological:  Positive for weakness (arm and  "leg left side).   Psychiatric/Behavioral:  Positive for decreased concentration.        ROS completed by MA reviewed by me and I agree.    Physical Exam:  Vitals:    11/20/24 1000   BP: 110/64   Pulse: 66   SpO2: 97%   Weight: 65.2 kg (143 lb 12.8 oz)   Height: 175.3 cm (69\")     Orthostatic BP:    Body mass index is 21.24 kg/m².    General appearance: Well developed, well nourished, well groomed, alert and cooperative.   HEENT: Normocephalic.   Cardiac: Regular rate and rhythm. No murmurs.   Chest Exam: Clear to auscultation bilaterally, no wheezes, no rhonchi.  Extremities: Normal, no edema.     Higher integrative function: Awake and alert, oriented x 3, good attention/concentration, speech fluent per son.  Cranial nerves: Left homonymous hemianopsia, PERRL, normal facial sensation, mild flattening left nasolabial fold, tongue midline, no dysarthria.  Motor: Right arm and leg 5 out of 5, left arm 4- out of 5, left leg 4/5 with spasticity.   Sensation: Intact to light touch in all extremities.  Station and gait: Left hemiparetic gait, mildly unsteady.        Results:      Lab Results   Component Value Date    GLUCOSE 105 (H) 10/25/2024    BUN 16 10/25/2024    CREATININE 1.05 10/25/2024    BCR 15.2 10/25/2024    CO2 27.4 10/25/2024    CALCIUM 9.8 10/25/2024    PROTENTOTREF 6.8 09/11/2024    ALBUMIN 4.0 10/25/2024    LABIL2 1.5 09/11/2024    AST 17 10/25/2024    ALT 8 10/25/2024       Lab Results   Component Value Date    WBC 7.05 10/25/2024    HGB 11.8 (L) 10/25/2024    HCT 37.2 (L) 10/25/2024    MCV 94.2 10/25/2024     (H) 10/25/2024         .No results found for: \"RPR\"      Lab Results   Component Value Date    TSH 1.670 09/11/2024         Lab Results   Component Value Date    [id number] 539 09/11/2024         Lab Results   Component Value Date    FOLATE 6.00 09/11/2024         Lab Results   Component Value Date    HGBA1C 5.20 09/11/2024         Lab Results   Component Value Date    GLUCOSE 105 (H) " 10/25/2024    BUN 16 10/25/2024    CREATININE 1.05 10/25/2024    BCR 15.2 10/25/2024    K 4.5 10/25/2024    CO2 27.4 10/25/2024    CALCIUM 9.8 10/25/2024    PROTENTOTREF 6.8 09/11/2024    ALBUMIN 4.0 10/25/2024    LABIL2 1.5 09/11/2024    AST 17 10/25/2024    ALT 8 10/25/2024         Lab Results   Component Value Date    WBC 7.05 10/25/2024    HGB 11.8 (L) 10/25/2024    HCT 37.2 (L) 10/25/2024    MCV 94.2 10/25/2024     (H) 10/25/2024             Assessment/Plan:        Diagnoses and all orders for this visit:    1. History of embolic stroke (Primary)  -     Ambulatory Referral to Optometry    2. Spasticity as late effect of cerebrovascular accident (CVA)    3. Homonymous hemianopia, left      Alex was seen today for history of right MCA stroke with residual deficits.  Overall he is doing very well with rehab but continues to struggle with left homonymous hemianopia.  Will refer him to optometry for evaluation for vision therapy and prisms.    For stroke prevention:  Continue aspirin 81 mg daily  Blood pressure control to <130/80  Goal LDL <70-recommend high dose statins-continue Lipitor 80 mg   Serum glucose < 140  Call 911 for stroke any stroke symptoms    Keep follow-up with neurosurgery September 2026 with carotid ultrasound     Follow-up with me in 1 year or sooner if needed      Total time: Greater than 40 minutes          Dictated utilizing Dragon dictation.

## 2024-11-21 ENCOUNTER — TELEPHONE (OUTPATIENT)
Dept: FAMILY MEDICINE CLINIC | Facility: CLINIC | Age: 79
End: 2024-11-21

## 2024-11-21 NOTE — TELEPHONE ENCOUNTER
Caller: CLEOPATRA Fisher FREEDOM DAY    Relationship to patient:     Best call back number:[phone]    Patient is needing: SHE FAXED OVER A FORM FOR A PRESCRIPTION FOR THE BOOST. THEY NEED ADDITIONAL INFORMATION TO FILL IT.  CAN YOU PLEASE RETURN THE FORM TO HER.  PLEASE FAX IT [phone]

## 2024-12-09 RX ORDER — AMLODIPINE BESYLATE 10 MG/1
TABLET ORAL
Qty: 90 TABLET | Refills: 0 | Status: SHIPPED | OUTPATIENT
Start: 2024-12-09

## 2024-12-18 RX ORDER — METOPROLOL TARTRATE 50 MG
50 TABLET ORAL EVERY 12 HOURS SCHEDULED
Qty: 180 TABLET | Refills: 1 | Status: SHIPPED | OUTPATIENT
Start: 2024-12-18 | End: 2024-12-19

## 2024-12-18 NOTE — TELEPHONE ENCOUNTER
Caller: JEREMÍAS GO    Relationship: Emergency Contact    Best call back number: [phone]     Requested Prescriptions:   Requested Prescriptions     Pending Prescriptions Disp Refills    metoprolol tartrate (LOPRESSOR) 50 MG tablet 180 tablet 1     Sig: Take 1 tablet by mouth Every 12 (Twelve) Hours.        Pharmacy where request should be sent: [id number] [address] AT [facility] [phone] [facility] [phone]      Last office visit with prescribing clinician: 10/24/2024   Last telemedicine visit with prescribing clinician: Visit date not found   Next office visit with prescribing clinician: Visit date not found     Additional details provided by patient: PATIENT NEEDS REFILLS    Does the patient have less than a 3 day supply:  [x] Yes  [] No    Would you like a call back once the refill request has been completed: [x] Yes [] No    If the office needs to give you a call back, can they leave a voicemail: [x] Yes [] No    Marcia Birmingham Rep   12/18/24 08:50 EST

## 2024-12-19 RX ORDER — METOPROLOL TARTRATE 50 MG
50 TABLET ORAL EVERY 12 HOURS
Qty: 180 TABLET | Refills: 1 | Status: SHIPPED | OUTPATIENT
Start: 2024-12-19

## 2025-01-02 ENCOUNTER — PATIENT OUTREACH (OUTPATIENT)
Dept: CASE MANAGEMENT | Facility: OTHER | Age: 80
End: 2025-01-02
Payer: MEDICAID

## 2025-01-02 NOTE — OUTREACH NOTE
AMBULATORY CASE MANAGEMENT NOTE    Names and Relationships of Patient/Support Persons: Contact: JEREMÍAS GO; Relationship: Emergency Contact -     Patient Outreach    Incoming call from patient daughter.  Verified by verbal release.    Stating that she has recently received notification that patient's daughter in [place] passed away.  Stressing concern for patients health history and asking if single dose prescription could be ordered for family to give him the news.    We will forward this to PCP and medical staff for recommendations.    ACM did provide active listening, reassurance, disease recommendations and encouragement.    Education Documentation  No documentation found.        Rosanne GEIGER  Ambulatory Case Management    1/2/2025, 12:41 EST

## 2025-01-03 ENCOUNTER — TELEPHONE (OUTPATIENT)
Dept: FAMILY MEDICINE CLINIC | Facility: CLINIC | Age: 80
End: 2025-01-03
Payer: MEDICAID

## 2025-01-03 ENCOUNTER — TELEPHONE (OUTPATIENT)
Dept: CASE MANAGEMENT | Facility: OTHER | Age: 80
End: 2025-01-03
Payer: MEDICAID

## 2025-01-03 DIAGNOSIS — E78.2 HYPERLIPIDEMIA, MIXED: ICD-10-CM

## 2025-01-03 DIAGNOSIS — K21.9 GASTROESOPHAGEAL REFLUX DISEASE WITHOUT ESOPHAGITIS: ICD-10-CM

## 2025-01-03 DIAGNOSIS — I10 BENIGN ESSENTIAL HTN: ICD-10-CM

## 2025-01-03 DIAGNOSIS — Z86.73 HISTORY OF EMBOLIC STROKE: ICD-10-CM

## 2025-01-03 DIAGNOSIS — K76.0 FATTY LIVER DISEASE, NONALCOHOLIC: ICD-10-CM

## 2025-01-03 DIAGNOSIS — E11.9 TYPE 2 DIABETES MELLITUS WITHOUT COMPLICATION, WITHOUT LONG-TERM CURRENT USE OF INSULIN: ICD-10-CM

## 2025-01-03 RX ORDER — HYDROXYZINE HYDROCHLORIDE 10 MG/1
TABLET, FILM COATED ORAL
Qty: 45 TABLET | Refills: 5 | Status: SHIPPED | OUTPATIENT
Start: 2025-01-03

## 2025-01-03 NOTE — TELEPHONE ENCOUNTER
Daughter calling this morning.  Due to an unexpected close family loss, living in [facility] and patients chronic health history daughter is requesting a short-term treatment intervention or recommendation to help patient with his nerves once family make him aware.

## 2025-01-03 NOTE — TELEPHONE ENCOUNTER
JEREMÍAS FOR DAD CALLED AGAIN ABOUT HER MESSAGES THAT SHE HAS ASKED TO BE FORWARDED OVER TO PCP [facility]. ADVISED PT DAUGHTER JEREMÍAS THAT PCP [facility] HAS BEEN NOTIFIED AND WILL REVIEW MESSAGE AND THAT SHE WILL RECEIVE A CALLBACK. I ADVISED PT THAT OLVIN LOREDO HAS SENT OVER REQUEST TO [facility] AS WELL.

## 2025-01-06 ENCOUNTER — TELEPHONE (OUTPATIENT)
Dept: CASE MANAGEMENT | Facility: OTHER | Age: 80
End: 2025-01-06
Payer: MEDICAID

## 2025-01-06 ENCOUNTER — PATIENT OUTREACH (OUTPATIENT)
Dept: CASE MANAGEMENT | Facility: OTHER | Age: 80
End: 2025-01-06
Payer: MEDICAID

## 2025-01-06 NOTE — OUTREACH NOTE
AMBULATORY CASE MANAGEMENT NOTE    Names and Relationships of Patient/Support Persons: Contact: JEREMÍAS GO; Relationship: Emergency Contact -     Patient Outreach    Spoke with daughter this morning, verified by name.    States that she did not receive a call from office that medication had been ordered.    Made aware of new prescription, ACM providing appropriate dosing instructions and affects of medication use.    Daughter expressing gratitude for assistance and communication.  Verbalizing concern for patient with upcoming news.  ACM providing active listening along with reassurance.     No further needs at this time. Next outreach along with chart review scheduled. With no further chronic nursing or social needs we expect to close with next outreach.    Education Documentation  No documentation found.        Rosanne GEIGER  Ambulatory Case Management    1/6/2025, 09:24 EST

## 2025-01-07 NOTE — OUTREACH NOTE
I called and spoke with Radha, Pt's daughter and she will be picking up RX. She has not been able to get it until today due to the weather and pharmacy being closed.

## 2025-01-14 ENCOUNTER — HOSPITAL ENCOUNTER (OUTPATIENT)
Facility: HOSPITAL | Age: 80
Discharge: HOME | End: 2025-01-14
Admitting: PHYSICIAN ASSISTANT
Payer: MEDICAID

## 2025-01-14 DIAGNOSIS — R91.1 LUNG NODULE SEEN ON IMAGING STUDY: ICD-10-CM

## 2025-01-14 PROCEDURE — 71250 CT THORAX DX C-: CPT

## 2025-01-16 DIAGNOSIS — I77.819 DILATION OF DESCENDING AORTA: ICD-10-CM

## 2025-01-16 DIAGNOSIS — I77.810 ASCENDING AORTA DILATATION: ICD-10-CM

## 2025-01-16 DIAGNOSIS — E78.2 HYPERLIPIDEMIA, MIXED: ICD-10-CM

## 2025-01-16 DIAGNOSIS — D64.9 LOW HEMOGLOBIN: ICD-10-CM

## 2025-01-16 DIAGNOSIS — I10 BENIGN ESSENTIAL HTN: ICD-10-CM

## 2025-01-16 DIAGNOSIS — R91.1 LUNG NODULE SEEN ON IMAGING STUDY: Primary | ICD-10-CM

## 2025-01-16 DIAGNOSIS — Z85.51 HX OF BLADDER CANCER: Primary | ICD-10-CM

## 2025-01-16 DIAGNOSIS — R73.01 IMPAIRED FASTING GLUCOSE: ICD-10-CM

## 2025-01-17 ENCOUNTER — LAB (OUTPATIENT)
Facility: HOSPITAL | Age: 80
End: 2025-01-17
Payer: MEDICAID

## 2025-01-17 LAB
ALBUMIN SERPL-MCNC: 3.4 G/DL (ref 3.5–5.2)
ALBUMIN/GLOB SERPL: 0.9 G/DL
ALP SERPL-CCNC: 122 U/L (ref 39–117)
ALT SERPL W P-5'-P-CCNC: 13 U/L (ref 1–41)
ANION GAP SERPL CALCULATED.3IONS-SCNC: 9.6 MMOL/L (ref 5–15)
AST SERPL-CCNC: 15 U/L (ref 1–40)
BASOPHILS # BLD AUTO: 0.03 10*3/MM3 (ref 0–0.2)
BASOPHILS NFR BLD AUTO: 0.4 % (ref 0–1.5)
BILIRUB SERPL-MCNC: 0.6 MG/DL (ref 0–1.2)
BUN SERPL-MCNC: 20 MG/DL (ref 8–23)
BUN/CREAT SERPL: 16.3 (ref 7–25)
CALCIUM SPEC-SCNC: 9.7 MG/DL (ref 8.6–10.5)
CHLORIDE SERPL-SCNC: 102 MMOL/L (ref 98–107)
CHOLEST SERPL-MCNC: 122 MG/DL (ref 0–200)
CO2 SERPL-SCNC: 27.4 MMOL/L (ref 22–29)
CREAT SERPL-MCNC: 1.23 MG/DL (ref 0.76–1.27)
DEPRECATED RDW RBC AUTO: 43.6 FL (ref 37–54)
EGFRCR SERPLBLD CKD-EPI 2021: 60.1 ML/MIN/1.73
EOSINOPHIL # BLD AUTO: 0.17 10*3/MM3 (ref 0–0.4)
EOSINOPHIL NFR BLD AUTO: 2.1 % (ref 0.3–6.2)
ERYTHROCYTE [DISTWIDTH] IN BLOOD BY AUTOMATED COUNT: 13.4 % (ref 12.3–15.4)
FERRITIN SERPL-MCNC: 54.6 NG/ML (ref 30–400)
GLOBULIN UR ELPH-MCNC: 3.8 GM/DL
GLUCOSE SERPL-MCNC: 100 MG/DL (ref 65–99)
HBA1C MFR BLD: 6.5 % (ref 4.8–5.6)
HCT VFR BLD AUTO: 34.3 % (ref 37.5–51)
HDLC SERPL-MCNC: 43 MG/DL (ref 40–60)
HGB BLD-MCNC: 11.6 G/DL (ref 13–17.7)
IMM GRANULOCYTES # BLD AUTO: 0.03 10*3/MM3 (ref 0–0.05)
IMM GRANULOCYTES NFR BLD AUTO: 0.4 % (ref 0–0.5)
IRON 24H UR-MRATE: 53 MCG/DL (ref 59–158)
IRON SATN MFR SERPL: 15 % (ref 20–50)
LDLC SERPL CALC-MCNC: 62 MG/DL (ref 0–100)
LDLC/HDLC SERPL: 1.44 {RATIO}
LYMPHOCYTES # BLD AUTO: 2.3 10*3/MM3 (ref 0.7–3.1)
LYMPHOCYTES NFR BLD AUTO: 27.9 % (ref 19.6–45.3)
MCH RBC QN AUTO: 30.2 PG (ref 26.6–33)
MCHC RBC AUTO-ENTMCNC: 33.8 G/DL (ref 31.5–35.7)
MCV RBC AUTO: 89.3 FL (ref 79–97)
MONOCYTES # BLD AUTO: 0.41 10*3/MM3 (ref 0.1–0.9)
MONOCYTES NFR BLD AUTO: 5 % (ref 5–12)
NEUTROPHILS NFR BLD AUTO: 5.31 10*3/MM3 (ref 1.7–7)
NEUTROPHILS NFR BLD AUTO: 64.2 % (ref 42.7–76)
NRBC BLD AUTO-RTO: 0 /100 WBC (ref 0–0.2)
PLATELET # BLD AUTO: 530 10*3/MM3 (ref 140–450)
PMV BLD AUTO: 9.3 FL (ref 6–12)
POTASSIUM SERPL-SCNC: 4.8 MMOL/L (ref 3.5–5.2)
PROT SERPL-MCNC: 7.2 G/DL (ref 6–8.5)
RBC # BLD AUTO: 3.84 10*6/MM3 (ref 4.14–5.8)
SODIUM SERPL-SCNC: 139 MMOL/L (ref 136–145)
TIBC SERPL-MCNC: 356 MCG/DL (ref 298–536)
TRANSFERRIN SERPL-MCNC: 239 MG/DL (ref 200–360)
TRIGL SERPL-MCNC: 85 MG/DL (ref 0–150)
VLDLC SERPL-MCNC: 17 MG/DL (ref 5–40)
WBC NRBC COR # BLD AUTO: 8.25 10*3/MM3 (ref 3.4–10.8)

## 2025-01-17 PROCEDURE — 82728 ASSAY OF FERRITIN: CPT | Performed by: PHYSICIAN ASSISTANT

## 2025-01-17 PROCEDURE — 80053 COMPREHEN METABOLIC PANEL: CPT | Performed by: PHYSICIAN ASSISTANT

## 2025-01-17 PROCEDURE — 83036 HEMOGLOBIN GLYCOSYLATED A1C: CPT | Performed by: PHYSICIAN ASSISTANT

## 2025-01-17 PROCEDURE — 80061 LIPID PANEL: CPT | Performed by: PHYSICIAN ASSISTANT

## 2025-01-17 PROCEDURE — 36415 COLL VENOUS BLD VENIPUNCTURE: CPT | Performed by: PHYSICIAN ASSISTANT

## 2025-01-17 PROCEDURE — 83540 ASSAY OF IRON: CPT | Performed by: PHYSICIAN ASSISTANT

## 2025-01-17 PROCEDURE — 85025 COMPLETE CBC W/AUTO DIFF WBC: CPT | Performed by: PHYSICIAN ASSISTANT

## 2025-01-17 PROCEDURE — 84466 ASSAY OF TRANSFERRIN: CPT | Performed by: PHYSICIAN ASSISTANT

## 2025-01-21 ENCOUNTER — OFFICE VISIT (OUTPATIENT)
Dept: ORTHOPEDIC SURGERY | Facility: CLINIC | Age: 80
End: 2025-01-21
Payer: MEDICAID

## 2025-01-21 VITALS — TEMPERATURE: 97.7 F | HEIGHT: 69 IN | BODY MASS INDEX: 21.18 KG/M2 | WEIGHT: 143 LBS

## 2025-01-21 DIAGNOSIS — M25.662 DECREASED RANGE OF MOTION OF LEFT KNEE: ICD-10-CM

## 2025-01-21 DIAGNOSIS — M17.12 PRIMARY OSTEOARTHRITIS OF LEFT KNEE: Primary | ICD-10-CM

## 2025-01-21 NOTE — PROGRESS NOTES
[facility]              Follow Up      Patient Name: Alex Negron  : 1945  Primary Care Physician: Jyothi Song, LUIS        Chief Complaint:  Left knee    HPI:   Alex Negron is a 80 y.o. year old who presents today for evaluation. I saw him last about 3 months ago. We sent him for an MRI of his left knee his family had some concerns about a potential trauma that may have occurred while he was recuperating from a stroke back in 2024.  His MRI was reassuring that he had a little bit of an effusion some mild arthritis and thickening of the suprapatellar and medial plica.  I really thought his biggest issue was lack of range of motion which was attributed to residual effects from his CVA.     He is here today with his son and on the ipad . He reports he has no pain. The son has concerns about sometimes the patient seems to be dragging his foot some. He is getting botox therapies for his upper extremity. He contines to work with therapy or on his home exercises for stretching. He is now ambulatory with minimal assist. When I first saw him he was wheelchair bound.  History of Present Illness        Past Medical/Surgical, Social and Family History:  I have reviewed and/or updated pertinent history as noted in the medical record including:  Past Medical History:   Diagnosis Date    Anxiety     Aortic atherosclerosis     Bladder cancer     Bladder mass 2023    Bladder tumor 2023    chemical chemo in MD office  weekly    Blind left eye     Blood in urine     TRACE    Burning with urination     Chronic back pain     neuropathy rolando legs    DDD (degenerative disc disease), lumbar     GERD (gastroesophageal reflux disease)     History of low back pain     Hyperlipidemia     Hypertension     PTSD (post-traumatic stress disorder)     [place]    Renal insufficiency     Sleep apnea     diagnosed but following up with PCP    Urine frequency       Past Surgical History:   Procedure Laterality Date    COLONOSCOPY N/A 3/14/2024    Procedure: COLONOSCOPY TO CECUM WITH COLD SNARE POLYPECTOMIES;  Surgeon: Magdaleno Mena MD;  Location: [facility];  Service: Gastroenterology;  Laterality: N/A;  PRE OP - SCREENING  -POST OP - COLON POLYPS,HEMORRHOIDS    DENTAL PROCEDURE      ENDOSCOPY      ENDOSCOPY N/A 4/3/2024    Procedure: ESOPHAGOGASTRODUODENOSCOPY AT BEDSIDE;  Surgeon: Redd Guidry MD;  Location: [facility];  Service: Gastroenterology;  Laterality: N/A;  PRE-  GI BLEED  POST- HIATAL HERNIA, DISTAL ESOPHAGITIS ULCERS, GASTRITIS, PYLOROPLASTY    ENDOSCOPY W/ PEG TUBE PLACEMENT N/A 2024    Procedure: ESOPHAGOGASTRODUODENOSCOPY WITH PERCUTANEOUS ENDOSCOPIC GASTROSTOMY TUBE INSERTION;  Surgeon: Dimas Brand MD;  Location: [facility];  Service: General;  Laterality: N/A;  Dysphagia    TRANSURETHRAL RESECTION OF BLADDER TUMOR N/A 2023    Procedure: TRANSURETHRAL RESECTION OF BLADDER TUMOR;  Surgeon: Robert Haque MD;  Location: [facility];  Service: Urology;  Laterality: N/A;    TRANSURETHRAL RESECTION OF BLADDER TUMOR N/A 10/18/2023    Procedure: CYSTOSCOPY TRANSURETHRAL RESECTION OF BLADDER TUMOR;  Surgeon: Robert Haque MD;  Location: [facility];  Service: Urology;  Laterality: N/A;     Social History     Occupational History     Employer: RETIRED     Comment: Has been a    Tobacco Use    Smoking status: Former     Current packs/day: 0.00     Types: Cigarettes     Quit date:      Years since quittin.0     Passive exposure: Never    Smokeless tobacco: Never   Vaping Use    Vaping status: Never Used   Substance and Sexual Activity    Alcohol use: Not Currently    Drug use: Never    Sexual activity: Defer          Allergies: No Known Allergies    Medications:   Home Medications:  Current Outpatient Medications on File Prior to Visit   Medication Sig    acetaminophen  (TYLENOL) 500 MG tablet Take 1 tablet by mouth Every 6 (Six) Hours As Needed for Mild Pain.    amLODIPine (NORVASC) 10 MG tablet TAKE 1 TABLET BY MOUTH DAILY FOR BLOOD PRESSURE AND HEART RATE    aspirin 81 MG EC tablet Take 1 tablet by mouth Daily.    atorvastatin (Lipitor) 80 MG tablet Take 1 tablet by mouth Daily. For cholesterol    Cholecalciferol 25 MCG (1000 UT) tablet Take 1 tablet by mouth Daily. 2000 u    donepezil (Aricept) 5 MG tablet Take 1 tablet by mouth Every Night.    gabapentin (NEURONTIN) 100 MG capsule Take 2 capsules by mouth 2 (Two) Times a Day. For neuropathy pain    hydrOXYzine (ATARAX) 10 MG tablet 1-2 tabs PO Q 8 hours PRN anxiety    lisinopril (PRINIVIL,ZESTRIL) 10 MG tablet TAKE 1 TABLET BY MOUTH EVERY MORNING FOR BLOOD PRESSURE    Menthol, Topical Analgesic, (BIOFREEZE EX) Apply  topically. Shoulder    metoprolol tartrate (LOPRESSOR) 50 MG tablet TAKE 1 TABLET BY MOUTH EVERY 12 HOURS    pantoprazole (Protonix) 40 MG EC tablet Take 1 tablet by mouth Daily. For GERD    phenazopyridine (PYRIDIUM) 200 MG tablet Take 1 tablet by mouth 3 times a day.    polyethylene glycol (MIRALAX) 17 g packet Take 17 g by mouth Daily As Needed (Use if senna-docusate is ineffective).    tamsulosin (FLOMAX) 0.4 MG capsule 24 hr capsule Take 1 capsule by mouth Daily.     No current facility-administered medications on file prior to visit.         ROS:  ROS negative except as listed in the HPI.    Physical Exam:   80 y.o. male  Body mass index is 21.12 kg/m²., 64.9 kg (143 lb)  Vitals:    01/21/25 1523   Temp: 97.7 °F (36.5 °C)     General: Alert, cooperative, appears well and in no observable distress. Appears stated age and BMI as listed above.  HEENT: Normocephalic, atraumatic on external visual inspection.  CV: No significant peripheral edema.  Respiratory: Normal respiratory effort.  Skin: Warm & well perfused; appropriate skin turgor.  Psych: Appropriate mood & affect.  Neuro: Gross sensation and motor  intact in affected extremity/extremities.  Vascular: Peripheral pulses palpable in affected extremity/extremities.   Physical Exam      MSK Exam:  Left Knee  No deformity or wounds appreciated. No significant redness or warmth.  Trace effusion noted  Tenderness along the joint line appreciated: none  PROM 5-130 without pain at terminal motion.   Ligamentous exam grossly stable  Quad strength 4/5, ham 4/5     Right Knee  No deformity or wounds appreciated. No significant redness or warmth.  No significant effusion noted.  No significant tenderness appreciated about the joint.  ROM 0-130 and painless.  Ligamentous exam grossly stable  Quad strength 4+ to 5/5     Brief hip exam in the affected extremity(ies) grossly unremarkable.  Moves ankle and toes up and down, no significant pain or swelling in the foot, ankle or calf. I do not appreciate any obvious foot drag during his gait. He has some mild weak with plantar flexion, DF strength is well preserved.     Radiology:    No new images were needed at the visit today.     9/23/2024: Single Knee: AP standing and sunrise views of both knees, and lateral view of painful knee show some mild degenerative changes but he has reasonable maintenance of joint space.  I see no obvious changes reflective of previous trauma.      MRI as above.  Results      Procedure:   N/A      Misc. Data/Labs: N/A    Assessment & Plan:      ICD-10-CM ICD-9-CM   1. Primary osteoarthritis of left knee  M17.12 715.16   2. Decreased range of motion of left knee  M25.662 719.56     No orders of the defined types were placed in this encounter.    No orders of the defined types were placed in this encounter.    Right know his knee is asymptomatic and he denies pain. His Rom has certainly improved. I think he has residual affects from his stroke that obviously alter his gait but he continues to make improvements. I would recommend as needed follow up. Continue to follow with PM&R and Neurology as  scheduled. Follow up if there is recurrent knee pain. Patient and family are agreeable.  Assessment & Plan      Continue with activity modifications as needed/discussed.  Continue ICE and/or HEAT PRN.  Recommend to continue activity as tolerated, focus on quad and hip/core strengthening as well as gentle stretching.  Recommend lowering or maintaining BMI within a healthy range to reduce symptoms.   Patient encouraged to call with questions or concerns prior to follow up.  Will discuss with attending as needed.   Consider additional referrals, work up and/or advanced imaging as indicated or if patient fails to respond to conservative care.    Return if symptoms worsen or fail to improve.            ESCOBAR Lo    Dictation software was used to complete a portion or all of this note.

## 2025-02-07 ENCOUNTER — PATIENT OUTREACH (OUTPATIENT)
Dept: CASE MANAGEMENT | Facility: OTHER | Age: 80
End: 2025-02-07
Payer: MEDICAID

## 2025-02-07 DIAGNOSIS — I10 BENIGN ESSENTIAL HTN: ICD-10-CM

## 2025-02-07 DIAGNOSIS — R13.10 DYSPHAGIA, UNSPECIFIED TYPE: ICD-10-CM

## 2025-02-07 DIAGNOSIS — I63.9 CEREBROVASCULAR ACCIDENT (CVA), UNSPECIFIED MECHANISM: Primary | ICD-10-CM

## 2025-02-07 RX ORDER — LISINOPRIL 10 MG/1
10 TABLET ORAL EVERY MORNING
Qty: 90 TABLET | Refills: 0 | Status: SHIPPED | OUTPATIENT
Start: 2025-02-07

## 2025-02-07 NOTE — OUTREACH NOTE
AMBULATORY CASE MANAGEMENT NOTE    Names and Relationships of Patient/Support Persons: Contact: JEREMÍAS GO; Relationship: POA/Surrogate/Guardian -     Adult Patient Profile  Questions/Answers      Flowsheet Row Most Recent Value   Symptoms/Conditions Managed at Home neurological, behavioral health   Barriers to Managing Health understanding health advice, stress of chronic illness, social support, literacy   Neurological Symptoms/Conditions stroke, ischemic   Neurological Management Strategies routine screening, medication therapy, diet modification, exercise, adequate rest   Neurological Self-Management Outcome well   Neurological Comment Working with therapy to improve dysphagia and muscle strength.   Behavioral Health Symptoms/Conditions depression, other (see comments)  [loss of adult child]   Behavioral Management Strategies medication therapy, support group, support system   Behavioral Health Self-Management Outcome unsure   Behavioral Health Comment close family support/ medication/ concern for another stroke d/t his chronic disease history        [facility] Interim Update    [facility] spoke with patient daughter, agreeable to continuing [facility] assistance through [facility]. Has previously worked with [facility] to assist patient with chronic disease needs.     We will continue working with patient to assist with coordination of care, and emotional strain.    No further needs at this time. [facility] has scheduled upcoming outreach.        Education Documentation  No documentation found.        Rosanne GEIGER  Ambulatory Case Management    2/7/2025, 09:02 EST

## 2025-02-24 ENCOUNTER — TELEPHONE (OUTPATIENT)
Dept: FAMILY MEDICINE CLINIC | Facility: CLINIC | Age: 80
End: 2025-02-24
Payer: MEDICAID

## 2025-02-24 DIAGNOSIS — M25.512 CHRONIC LEFT SHOULDER PAIN: Primary | ICD-10-CM

## 2025-02-24 DIAGNOSIS — G89.29 CHRONIC LEFT SHOULDER PAIN: Primary | ICD-10-CM

## 2025-02-24 NOTE — TELEPHONE ENCOUNTER
Caller: JEREMÍAS GO    Relationship: Emergency Contact    Best call back number: [phone] (Mobile)     What is the best time to reach you: ANYTIME    Who are you requesting to speak with (clinical staff, provider,  specific staff member): CLINICAL STAFF     Do you know the name of the person who called:       What was the call regarding:  PATIENT'S DAUGHTER JEREMÍAS CALLED TO  REQUEST A REFERRAL TO SEE DR. CRUZ SHE WORKS IN ORTHO.    PLEASE CONTACT JEREMÍAS TO ADVISE.     Is it okay if the provider responds through MyChart:          THANKS

## 2025-02-24 NOTE — TELEPHONE ENCOUNTER
I cannot send referral unless I know what the diagnosis is?????  Is he having right or left shoulder pain???

## 2025-02-25 NOTE — PROGRESS NOTES
Subjective     REASON FOR CONSULTATION: Elevated platelet count  Provide an opinion on any further workup or treatment                             Requesting practitioner: Jyothi Song    RECORDS OBTAINED:  Records of the patients history including those obtained from the referring provider were reviewed and summarized in detail.      History of Present Illness   This is a 78-year-old man with hypertension, hyperlipidemia, degenerative disc disease of the lumbar spine, impaired fasting glucose and mild renal insufficiency seen today at the request of his primary care provider for evaluation of an elevated platelet count.  Reviewing his recent data since 3/22/2023 the patient has had a mildly elevated platelet count in the upper 400s/lower 500,000 range.  He has been mildly anemic with hemoglobin 12.1-12.5 MCV 88-89.  White blood cell count and differential unremarkable.  Additional labs have recently shown iron saturation 30%, ferritin 37, B12 453, folate 19.2, creatinine 1.26/BUN 34, total protein 7.2/globulin 2.5.    The patient recently immigrated from [facility] ([place]  He feels well denies fevers, chills, shortness of breath, cough, weight loss, lymphadenopathy, changes in the bowel habits, blood in the stool.   He smoked but quit smoking approximately 20 years ago.  He has never had a colonoscopy.  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  He is undergoing intravesicular therapy.    At the time of his initial visit 5/15/2023 the patient was noted to be mildly iron deficient and started oral iron therapy which she has been tolerating well.    The patient returned today for follow-up.  He had recurrence of noninvasive bladder cancer treated with transurethral resection 10/18/2023 now receiving intravesicular chemotherapy.  The  patient stopped oral iron because of a metallic taste in the mouth.    Patient was admitted April 2024 with acute stroke requiring TPA and carotid stent on right.  He is on plavix and aspirin.    The patient returned today for follow-up.  He has been taking oral iron daily since his last visit.  He states that is causing a bitter taste in the mouth at the end of the day.  He denies blood in the bowels or urine.  He denies lightheadedness shortness of breath above his baseline.    Past Medical History:   Diagnosis Date    Anxiety     Aortic atherosclerosis     Bladder cancer     Bladder mass 06/07/2023    Bladder tumor 05/2023    chemical chemo in MD office  weekly    Blind left eye     Blood in urine     TRACE    Burning with urination     Chronic back pain     neuropathy rolando legs    DDD (degenerative disc disease), lumbar     GERD (gastroesophageal reflux disease)     History of low back pain     Hyperlipidemia     Hypertension     PTSD (post-traumatic stress disorder)     [place] of [place]    Renal insufficiency     Sleep apnea 2023    diagnosed but following up with PCP    Urine frequency         Past Surgical History:   Procedure Laterality Date    COLONOSCOPY N/A 3/14/2024    Procedure: COLONOSCOPY TO CECUM WITH COLD SNARE POLYPECTOMIES;  Surgeon: Magdaleno Mena MD;  Location: [facility] ENDOSCOPY;  Service: Gastroenterology;  Laterality: N/A;  PRE OP - SCREENING  -POST OP - COLON POLYPS,HEMORRHOIDS    DENTAL PROCEDURE      ENDOSCOPY      ENDOSCOPY N/A 4/3/2024    Procedure: ESOPHAGOGASTRODUODENOSCOPY AT BEDSIDE;  Surgeon: Redd Guidry MD;  Location: [facility] ENDOSCOPY;  Service: Gastroenterology;  Laterality: N/A;  PRE-  GI BLEED  POST- HIATAL HERNIA, DISTAL ESOPHAGITIS ULCERS, GASTRITIS, PYLOROPLASTY    ENDOSCOPY W/ PEG TUBE PLACEMENT N/A 4/12/2024    Procedure: ESOPHAGOGASTRODUODENOSCOPY WITH PERCUTANEOUS ENDOSCOPIC GASTROSTOMY TUBE INSERTION;  Surgeon: Dimas Brand,  MD;  [place] [facility];  Service: General;  Laterality: N/A;  Dysphagia    TRANSURETHRAL RESECTION OF BLADDER TUMOR N/A 06/07/2023    Procedure: TRANSURETHRAL RESECTION OF BLADDER TUMOR;  Surgeon: Robert Haque MD;  [place] [facility] [place];  Service: Urology;  Laterality: N/A;    TRANSURETHRAL RESECTION OF BLADDER TUMOR N/A 10/18/2023    Procedure: CYSTOSCOPY TRANSURETHRAL RESECTION OF BLADDER TUMOR;  Surgeon: Robert Haque MD;  Location[place] [facility];  Service: Urology;  Laterality: N/A;        Current Outpatient Medications on File Prior to Visit   Medication Sig Dispense Refill    acetaminophen (TYLENOL) 500 MG tablet Take 1 tablet by mouth Every 6 (Six) Hours As Needed for Mild Pain.      amLODIPine (NORVASC) 10 MG tablet TAKE 1 TABLET BY MOUTH DAILY FOR BLOOD PRESSURE AND HEART RATE 90 tablet 0    aspirin 81 MG EC tablet Take 1 tablet by mouth Daily. 90 tablet 3    atorvastatin (Lipitor) 80 MG tablet Take 1 tablet by mouth Daily. For cholesterol 90 tablet 3    Cholecalciferol 25 MCG (1000 UT) tablet Take 1 tablet by mouth Daily. 2000 u      donepezil (Aricept) 5 MG tablet Take 1 tablet by mouth Every Night. 30 tablet 6    gabapentin (NEURONTIN) 100 MG capsule Take 2 capsules by mouth 2 (Two) Times a Day. For neuropathy pain 120 capsule 5    hydrOXYzine (ATARAX) 10 MG tablet 1-2 tabs PO Q 8 hours PRN anxiety 45 tablet 5    lisinopril (PRINIVIL,ZESTRIL) 10 MG tablet TAKE 1 TABLET BY MOUTH EVERY MORNING FOR BLOOD PRESSURE 90 tablet 0    Menthol, Topical Analgesic, (BIOFREEZE EX) Apply  topically. Shoulder      metoprolol tartrate (LOPRESSOR) 50 MG tablet TAKE 1 TABLET BY MOUTH EVERY 12 HOURS 180 tablet 1    pantoprazole (Protonix) 40 MG EC tablet Take 1 tablet by mouth Daily. For GERD 90 tablet 3    phenazopyridine (PYRIDIUM) 200 MG tablet Take 1 tablet by mouth 3 times a day.      polyethylene glycol (MIRALAX) 17 g packet Take 17 g by mouth Daily As Needed (Use if senna-docusate is  "ineffective).      tamsulosin (FLOMAX) 0.4 MG capsule 24 hr capsule Take 1 capsule by mouth Daily.       No current facility-administered medications on file prior to visit.        ALLERGIES:  No Known Allergies     Social History     Socioeconomic History    Marital status:    Tobacco Use    Smoking status: Former     Current packs/day: 0.00     Types: Cigarettes     Quit date:      Years since quittin.1     Passive exposure: Never    Smokeless tobacco: Never   Vaping Use    Vaping status: Never Used   Substance and Sexual Activity    Alcohol use: Not Currently    Drug use: Never    Sexual activity: Defer        Family History   Problem Relation Age of Onset    Malig Hyperthermia Neg Hx         Review of Systems   Constitutional:  Positive for appetite change.   HENT:  Positive for mouth sores.    Eyes: Negative.    Respiratory: Negative.     Cardiovascular: Negative.    Gastrointestinal: Negative.    Genitourinary:  Positive for frequency. Negative for hematuria and urgency.   Musculoskeletal:  Positive for gait problem.   Allergic/Immunologic: Negative.    Neurological:  Positive for weakness.   Hematological: Negative.    Psychiatric/Behavioral: Negative.           Objective     Vitals:    25 1112   BP: 147/77   Pulse: 54   Resp: 16   Temp: 97.3 °F (36.3 °C)   TempSrc: Oral   SpO2: 94%   Weight: 68 kg (150 lb)   Height: 175.3 cm (69.02\")   PainSc: 0-No pain                 3/3/2025    11:24 AM   Current Status   ECOG score 0       Physical Exam    CONSTITUTIONAL: pleasant well-developed adult man  HEENT: no icterus, no thrush, moist membranes  LYMPH: no cervical or supraclavicular lad  CV: RRR, S1S2, no murmur  RESP: cta bilat, no wheezing, no rales  GI: soft, non-tender, no splenomegaly, +bs  MUSC: no edema, normal gait  NEURO: alert and oriented x3, mod global weakness, ambulating with assistance of his son today  PSYCH: normal mood and somewhat flat affect  Unchanged 3/5/2025  RECENT " LABS:  Hematology WBC   Date Value Ref Range Status   03/03/2025 7.53 3.40 - 10.80 10*3/mm3 Final   04/12/2023 8.56 3.40 - 10.80 10*3/mm3 Final     RBC   Date Value Ref Range Status   03/03/2025 3.71 (L) 4.14 - 5.80 10*6/mm3 Final   04/12/2023 4.19 4.14 - 5.80 10*6/mm3 Final     Hemoglobin   Date Value Ref Range Status   03/03/2025 10.9 (L) 13.0 - 17.7 g/dL Final     Hematocrit   Date Value Ref Range Status   03/03/2025 33.7 (L) 37.5 - 51.0 % Final     Platelets   Date Value Ref Range Status   03/03/2025 523 (H) 140 - 450 10*3/mm3 Final        Lab Results   Component Value Date    GLUCOSE 100 (H) 01/17/2025    BUN 20 01/17/2025    CREATININE 1.23 01/17/2025    EGFR 60.1 01/17/2025    BCR 16.3 01/17/2025    K 4.8 01/17/2025    CO2 27.4 01/17/2025    CALCIUM 9.7 01/17/2025    ALBUMIN 3.4 (L) 01/17/2025    BILITOT 0.6 01/17/2025    AST 15 01/17/2025    ALT 13 01/17/2025         Assessment & Plan     *Mild thrombocytosis-platelet count improved to 523 after iron replacement but still elevated  *Mild normocytic anemia  The patient has been taking oral iron daily since his last visit with hemoglobin improved 10.9 today  *Low iron stores-as above.  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

## 2025-02-25 NOTE — TELEPHONE ENCOUNTER
Called and spoke with Radha and she states that it is left shoulder pain. I told her that I will call her back to let her know if Jyothi Song is able to put in referral

## 2025-02-27 ENCOUNTER — TELEPHONE (OUTPATIENT)
Dept: ORTHOPEDIC SURGERY | Facility: CLINIC | Age: 80
End: 2025-02-27

## 2025-02-27 NOTE — TELEPHONE ENCOUNTER
Caller: JEREMÍAS GO    Relationship to patient: Emergency Contact    Best call back number: [phone] (home)       Patient is needing: PT WILL REQUIRE [place]  AT UPCOMING 03.17 VISIT

## 2025-03-03 ENCOUNTER — PRIOR AUTHORIZATION (OUTPATIENT)
Dept: ONCOLOGY | Facility: CLINIC | Age: 80
End: 2025-03-03
Payer: MEDICAID

## 2025-03-03 ENCOUNTER — OFFICE VISIT (OUTPATIENT)
Dept: ONCOLOGY | Facility: CLINIC | Age: 80
End: 2025-03-03
Payer: MEDICAID

## 2025-03-03 ENCOUNTER — LAB (OUTPATIENT)
Dept: LAB | Facility: HOSPITAL | Age: 80
End: 2025-03-03
Payer: MEDICAID

## 2025-03-03 VITALS
DIASTOLIC BLOOD PRESSURE: 77 MMHG | HEIGHT: 69 IN | HEART RATE: 54 BPM | RESPIRATION RATE: 16 BRPM | SYSTOLIC BLOOD PRESSURE: 147 MMHG | BODY MASS INDEX: 22.22 KG/M2 | WEIGHT: 150 LBS | TEMPERATURE: 97.3 F | OXYGEN SATURATION: 94 %

## 2025-03-03 DIAGNOSIS — C67.8 MALIGNANT NEOPLASM OF OVERLAPPING SITES OF BLADDER: ICD-10-CM

## 2025-03-03 DIAGNOSIS — D49.4 BLADDER TUMOR: Primary | ICD-10-CM

## 2025-03-03 DIAGNOSIS — D50.9 IRON DEFICIENCY ANEMIA, UNSPECIFIED IRON DEFICIENCY ANEMIA TYPE: ICD-10-CM

## 2025-03-03 DIAGNOSIS — D49.4 BLADDER TUMOR: ICD-10-CM

## 2025-03-03 LAB
ALBUMIN SERPL-MCNC: 3.7 G/DL (ref 3.5–5.2)
ALBUMIN/GLOB SERPL: 1.1 G/DL
ALP SERPL-CCNC: 131 U/L (ref 39–117)
ALT SERPL W P-5'-P-CCNC: 17 U/L (ref 1–41)
ANION GAP SERPL CALCULATED.3IONS-SCNC: 10 MMOL/L (ref 5–15)
AST SERPL-CCNC: 18 U/L (ref 1–40)
BASOPHILS # BLD AUTO: 0.04 10*3/MM3 (ref 0–0.2)
BASOPHILS NFR BLD AUTO: 0.5 % (ref 0–1.5)
BILIRUB SERPL-MCNC: 0.4 MG/DL (ref 0–1.2)
BUN SERPL-MCNC: 21 MG/DL (ref 8–23)
BUN/CREAT SERPL: 21.2 (ref 7–25)
CALCIUM SPEC-SCNC: 9.6 MG/DL (ref 8.6–10.5)
CHLORIDE SERPL-SCNC: 103 MMOL/L (ref 98–107)
CO2 SERPL-SCNC: 27 MMOL/L (ref 22–29)
CREAT SERPL-MCNC: 0.99 MG/DL (ref 0.76–1.27)
DEPRECATED RDW RBC AUTO: 50.9 FL (ref 37–54)
EGFRCR SERPLBLD CKD-EPI 2021: 78 ML/MIN/1.73
EOSINOPHIL # BLD AUTO: 0.19 10*3/MM3 (ref 0–0.4)
EOSINOPHIL NFR BLD AUTO: 2.5 % (ref 0.3–6.2)
ERYTHROCYTE [DISTWIDTH] IN BLOOD BY AUTOMATED COUNT: 15.3 % (ref 12.3–15.4)
FERRITIN SERPL-MCNC: 48.3 NG/ML (ref 30–400)
GLOBULIN UR ELPH-MCNC: 3.3 GM/DL
GLUCOSE SERPL-MCNC: 102 MG/DL (ref 65–99)
HCT VFR BLD AUTO: 33.7 % (ref 37.5–51)
HGB BLD-MCNC: 10.9 G/DL (ref 13–17.7)
IMM GRANULOCYTES # BLD AUTO: 0.02 10*3/MM3 (ref 0–0.05)
IMM GRANULOCYTES NFR BLD AUTO: 0.3 % (ref 0–0.5)
IRON 24H UR-MRATE: 49 MCG/DL (ref 59–158)
IRON SATN MFR SERPL: 15 % (ref 20–50)
LYMPHOCYTES # BLD AUTO: 2.17 10*3/MM3 (ref 0.7–3.1)
LYMPHOCYTES NFR BLD AUTO: 28.8 % (ref 19.6–45.3)
MCH RBC QN AUTO: 29.4 PG (ref 26.6–33)
MCHC RBC AUTO-ENTMCNC: 32.3 G/DL (ref 31.5–35.7)
MCV RBC AUTO: 90.8 FL (ref 79–97)
MONOCYTES # BLD AUTO: 0.45 10*3/MM3 (ref 0.1–0.9)
MONOCYTES NFR BLD AUTO: 6 % (ref 5–12)
NEUTROPHILS NFR BLD AUTO: 4.66 10*3/MM3 (ref 1.7–7)
NEUTROPHILS NFR BLD AUTO: 61.9 % (ref 42.7–76)
NRBC BLD AUTO-RTO: 0 /100 WBC (ref 0–0.2)
PLATELET # BLD AUTO: 523 10*3/MM3 (ref 140–450)
PMV BLD AUTO: 8.5 FL (ref 6–12)
POTASSIUM SERPL-SCNC: 4.6 MMOL/L (ref 3.5–5.2)
PROT SERPL-MCNC: 7 G/DL (ref 6–8.5)
RBC # BLD AUTO: 3.71 10*6/MM3 (ref 4.14–5.8)
SODIUM SERPL-SCNC: 140 MMOL/L (ref 136–145)
TIBC SERPL-MCNC: 331 MCG/DL (ref 298–536)
TRANSFERRIN SERPL-MCNC: 222 MG/DL (ref 200–360)
WBC NRBC COR # BLD AUTO: 7.53 10*3/MM3 (ref 3.4–10.8)

## 2025-03-03 PROCEDURE — 82728 ASSAY OF FERRITIN: CPT

## 2025-03-03 PROCEDURE — 85025 COMPLETE CBC W/AUTO DIFF WBC: CPT

## 2025-03-03 PROCEDURE — 36415 COLL VENOUS BLD VENIPUNCTURE: CPT

## 2025-03-03 PROCEDURE — 84466 ASSAY OF TRANSFERRIN: CPT

## 2025-03-03 PROCEDURE — 99214 OFFICE O/P EST MOD 30 MIN: CPT | Performed by: INTERNAL MEDICINE

## 2025-03-03 PROCEDURE — 80053 COMPREHEN METABOLIC PANEL: CPT

## 2025-03-03 PROCEDURE — 83540 ASSAY OF IRON: CPT

## 2025-03-03 NOTE — TELEPHONE ENCOUNTER
No PA required for BCR/ABL Fish [id number], [id number] and Jak2 Mut [id number] per Medicaid at [phone]. Ref # [id number]. Ok'd lab to send to [facility].

## 2025-03-06 LAB — JAK2 V617F RESULT: NORMAL

## 2025-03-07 ENCOUNTER — PATIENT OUTREACH (OUTPATIENT)
Dept: CASE MANAGEMENT | Facility: OTHER | Age: 80
End: 2025-03-07
Payer: MEDICAID

## 2025-03-07 DIAGNOSIS — R53.1 WEAKNESS OF LEFT SIDE OF BODY: ICD-10-CM

## 2025-03-07 DIAGNOSIS — Z86.73 HISTORY OF EMBOLIC STROKE: ICD-10-CM

## 2025-03-07 DIAGNOSIS — R13.10 DYSPHAGIA, UNSPECIFIED TYPE: ICD-10-CM

## 2025-03-07 DIAGNOSIS — I10 BENIGN ESSENTIAL HTN: Primary | ICD-10-CM

## 2025-03-07 LAB — TARGETGENE RESULT: NORMAL

## 2025-03-07 NOTE — PLAN OF CARE
Problem: Depression  Goal: Track My Symptoms  Outcome: Progressing  Goal: Manage My Symptoms  Outcome: Progressing  Goal: Optimal Care Coordination of a Patient Experiencing Depression  Outcome: Progressing  Intervention: Alleviate Barriers to Depression Management  Flowsheets (Taken 3/7/2025 1528)  Alleviate Barriers to Depression Management:   healthy lifestyle promoted   emotional support provided   response to mental health treatment monitored  Intervention: Identify and Reduce Risk to Safety  Flowsheets (Taken 3/7/2025 1527)  Identify and Reduce Risk to Safety:   balance, gait and fall risk reviewed   barriers to safety identified and addressed   family/caregiver participation in care promoted

## 2025-03-07 NOTE — OUTREACH NOTE
AMBULATORY CASE MANAGEMENT NOTE    Names and Relationships of Patient/Support Persons: Contact: JEREMÍAS GO; Relationship: Emergency Contact -     [facility] Interim Update    Spoke with patient daughter today, verified by name.    States that patient is getting stronger. Continues that patient does walk slower and does require standby assist x1 ambulating with much improvement.     Continues that patient swallow is stronger and doing very well. Continuing to closely monitor blood pressures (Dr. Mayes). Continues Plavix and ASA 81mgs.     She further reports that the patient has had cataract surgery on one eye this past Tuesday and will have his next cataract removed this coming Tuesday.    Last A1C 6.5, last PCP office visit was 10/24/24 with labs ordered. Has remained compliant with all f/u speciality visits.  No further needs at this time. Next outreach has been scheduled.        Education Documentation  No documentation found.        Rosanne EGIGER  Ambulatory Case Management    3/7/2025, 14:44 EST

## 2025-03-11 LAB — JAK2 V617F RESULT: NORMAL

## 2025-03-12 RX ORDER — AMLODIPINE BESYLATE 10 MG/1
TABLET ORAL
Qty: 90 TABLET | Refills: 0 | Status: SHIPPED | OUTPATIENT
Start: 2025-03-12

## 2025-03-17 ENCOUNTER — OFFICE VISIT (OUTPATIENT)
Dept: ORTHOPEDIC SURGERY | Facility: CLINIC | Age: 80
End: 2025-03-17
Payer: MEDICAID

## 2025-03-17 VITALS — TEMPERATURE: 98.6 F | HEIGHT: 69 IN | WEIGHT: 146 LBS | BODY MASS INDEX: 21.62 KG/M2

## 2025-03-17 DIAGNOSIS — R52 PAIN: Primary | ICD-10-CM

## 2025-03-17 PROCEDURE — 1159F MED LIST DOCD IN RCRD: CPT | Performed by: NURSE PRACTITIONER

## 2025-03-17 PROCEDURE — 1160F RVW MEDS BY RX/DR IN RCRD: CPT | Performed by: NURSE PRACTITIONER

## 2025-03-17 NOTE — PROGRESS NOTES
Patient was here today and reported he did not need to be seen. He and his son were not sure how the appointment got scheduled. He has no pain in his shoulder or elsewhere. Nothing he would like to address today. I am not going to charge for the visit as it seems this was scheduled in error. No exam or decision making was performed today. He knows he can come back anytime if his pain returns.     This was all communicated via . Patient and his son both expressed understanding.

## 2025-03-28 ENCOUNTER — TELEPHONE (OUTPATIENT)
Dept: FAMILY MEDICINE CLINIC | Facility: CLINIC | Age: 80
End: 2025-03-28
Payer: MEDICAID

## 2025-03-28 NOTE — TELEPHONE ENCOUNTER
Spoke with patient daughter she stated that you wanted patient to have his lab draw but no active orders in his chart. Patient most recent lab draw was 1/2025. Please advise

## 2025-04-07 DIAGNOSIS — M54.16 LUMBAR RADICULOPATHY: ICD-10-CM

## 2025-04-07 RX ORDER — DONEPEZIL HYDROCHLORIDE 5 MG/1
5 TABLET, FILM COATED ORAL NIGHTLY
Qty: 30 TABLET | Refills: 6 | Status: SHIPPED | OUTPATIENT
Start: 2025-04-07

## 2025-04-07 RX ORDER — GABAPENTIN 100 MG/1
CAPSULE ORAL
Qty: 120 CAPSULE | Refills: 0 | Status: SHIPPED | OUTPATIENT
Start: 2025-04-07

## 2025-04-07 NOTE — TELEPHONE ENCOUNTER
Rx Refill Note  Requested Prescriptions     Pending Prescriptions Disp Refills    gabapentin (NEURONTIN) 100 MG capsule [Pharmacy Med Name: GABAPENTIN 100MG CAPSULES] 120 capsule      Sig: TAKE 2 CAPSULES BY MOUTH TWICE DAILY FOR NERVE PAIN      Last office visit with prescribing clinician: 10/24/2024   Last telemedicine visit with prescribing clinician: Visit date not found   Next office visit with prescribing clinician: Visit date not found                         Would you like a call back once the refill request has been completed: [] Yes [] No    If the office needs to give you a call back, can they leave a voicemail: [] Yes [] No    Nayeli Silva MA  04/07/25, 11:51 EDT

## 2025-04-14 ENCOUNTER — PATIENT OUTREACH (OUTPATIENT)
Dept: CASE MANAGEMENT | Facility: OTHER | Age: 80
End: 2025-04-14
Payer: MEDICAID

## 2025-04-14 DIAGNOSIS — Z71.89 COMPLEX CARE COORDINATION: ICD-10-CM

## 2025-04-14 DIAGNOSIS — I63.9 CEREBROVASCULAR ACCIDENT (CVA), UNSPECIFIED MECHANISM: ICD-10-CM

## 2025-04-14 DIAGNOSIS — I10 BENIGN ESSENTIAL HTN: Primary | ICD-10-CM

## 2025-04-14 NOTE — OUTREACH NOTE
AMBULATORY CASE MANAGEMENT NOTE    Names and Relationships of Patient/Support Persons: Contact: Alex Negron; Relationship: Self -     CCM Interim Update    Spoke with patient daughter/POA.      ACM completed 30 day chart review with patient daughter.   Advance Care Planning   ACP discussion was declined by the patient. Patient does not have an advance directive, declines further assistance.    Per daughter patient has met healthcare goals and doing well. HTN is stable and he is compliant with all treatments.    Patient and daughter are agreeable to continue to move to graduate.  Daughter encouraged to reach out with any needs that she may have. Daughter agreeable and expressing gratitude with all healthcare assistance.    No new needs at this time. Next outreach has been scheduled.             Education Documentation  No documentation found.        Rosanne GEIGER  Ambulatory Case Management    4/14/2025, 15:12 EDT

## 2025-04-14 NOTE — PLAN OF CARE
Problem: Anxiety  Goal: Track My Symptoms  Outcome: Adequate for Care Transition  Intervention: My Anxiety Symptom Tracking To Do List  Description:   Why is this important?  Tracking symptoms can help manage anxiety.    Goal: Manage My Symptoms  Outcome: Adequate for Care Transition  Intervention: My Anxiety Symptom Mangement To Do List  Description:   Why is this important?  Fear and feeling out of control can make you feel worse.  Self-calming is a skill you can learn to help manage your symptoms.  To calm yourself, listen to music, deep breathe, relax, get a massage, use aromatherapy or meditate.    Goal: Define Levels of Anxiety  Outcome: Adequate for Care Transition  Intervention: My Anxiety Level To Do List  Description:   Why is this important?  You can make an action plan to manage your anxiety.    Goal: Optimal Care Coordination of a Patient Experiencing Anxiety  Outcome: Adequate for Care Transition  Intervention: Identify Anxiety Symptoms and Facilitate Treatment  Intervention: Alleviate Barriers to Anxiety Management  Intervention: Identify and Reduce Risks for Harm or Injury     Problem: Depression  Goal: Track My Symptoms  Outcome: Adequate for Care Transition  Intervention: My Depression Symptom Tracking To Do List  Description:   Why is this important?  Tracking your symptoms can help you to understand your condition.  Day-to-day changes in depression symptoms are normal.    Goal: Manage My Symptoms  Outcome: Adequate for Care Transition  Intervention: My Depression Symptom Management To Do List  Description:   Why is this important?  Find ways to manage depression that work for you.    Goal: Go To Counseling  Outcome: Adequate for Care Transition  Intervention: My Counseling To Do List  Description:   Why is this important?  It may take time for you to feel better.  If you don't feel better right away, don't give up on your treatment plan.    Goal: Create Depression Action Plan  Outcome: Adequate  for Care Transition  Intervention: My Depression Action Plan To Do List  Description:   Why is this important?  You can make an action plan to manage your depression.    Goal: Optimal Care Coordination of a Patient Experiencing Depression  Outcome: Adequate for Care Transition  Intervention: Alleviate Barriers to Depression Management  Intervention: Identify and Reduce Risk to Safety  Intervention: Maximize Response to Treatment

## 2025-04-29 RX ORDER — LISINOPRIL 10 MG/1
10 TABLET ORAL EVERY MORNING
Qty: 90 TABLET | Refills: 0 | Status: SHIPPED | OUTPATIENT
Start: 2025-04-29

## 2025-05-14 ENCOUNTER — TELEPHONE (OUTPATIENT)
Dept: CASE MANAGEMENT | Facility: OTHER | Age: 80
End: 2025-05-14
Payer: MEDICAID

## 2025-05-14 NOTE — TELEPHONE ENCOUNTER
No new nursing needs at this time. Daughter continues to closely monitor all healthcare needs of patient.  Patient needs are being well met at this time. Continues to receive therapy.

## 2025-05-20 RX ORDER — PANTOPRAZOLE SODIUM 40 MG/1
40 TABLET, DELAYED RELEASE ORAL DAILY
Qty: 90 TABLET | Refills: 3 | Status: SHIPPED | OUTPATIENT
Start: 2025-05-20

## 2025-05-20 RX ORDER — ATORVASTATIN CALCIUM 80 MG/1
80 TABLET, FILM COATED ORAL DAILY
Qty: 90 TABLET | Refills: 3 | Status: SHIPPED | OUTPATIENT
Start: 2025-05-20

## 2025-06-08 RX ORDER — AMLODIPINE BESYLATE 10 MG/1
TABLET ORAL
Qty: 90 TABLET | Refills: 0 | Status: SHIPPED | OUTPATIENT
Start: 2025-06-08

## 2025-06-11 RX ORDER — AMLODIPINE BESYLATE 10 MG/1
TABLET ORAL
Qty: 90 TABLET | Refills: 0 | Status: SHIPPED | OUTPATIENT
Start: 2025-06-11

## 2025-06-17 ENCOUNTER — PATIENT OUTREACH (OUTPATIENT)
Dept: CASE MANAGEMENT | Facility: OTHER | Age: 80
End: 2025-06-17
Payer: MEDICAID

## 2025-06-17 DIAGNOSIS — E11.9 TYPE 2 DIABETES MELLITUS WITHOUT COMPLICATION, WITHOUT LONG-TERM CURRENT USE OF INSULIN: Primary | ICD-10-CM

## 2025-06-17 DIAGNOSIS — I63.9 CEREBROVASCULAR ACCIDENT (CVA), UNSPECIFIED MECHANISM: ICD-10-CM

## 2025-06-17 DIAGNOSIS — R13.10 DYSPHAGIA, UNSPECIFIED TYPE: ICD-10-CM

## 2025-06-17 NOTE — OUTREACH NOTE
CCM Interim Update    ACM completed thorough chart review. No new needs noted Chronic needs have remained stable and well controlled.     We will close at this time.

## 2025-06-24 ENCOUNTER — TELEPHONE (OUTPATIENT)
Dept: FAMILY MEDICINE CLINIC | Facility: CLINIC | Age: 80
End: 2025-06-24
Payer: MEDICAID

## 2025-06-24 NOTE — TELEPHONE ENCOUNTER
I do not know if he has been seen by Ortho or had an evaluation for this? I cannot just order physical therapy without a diagnosis

## 2025-06-24 NOTE — TELEPHONE ENCOUNTER
Relationship: Emergency Contact    Best call back number:   [phone]    What orders are you requesting (i.e. lab or imaging): PHYSICAL THERAPY    In what timeframe would the patient need to come in: ASAP    Where will you receive your lab/imaging services:  PHYSICAL THERAPY ON [address]    Additional notes: PATIENT REQUESTING PT ON HIS LEFT ARM AND HAND

## 2025-06-26 NOTE — TELEPHONE ENCOUNTER
Called and spoke with Radha (pt daughter). She is going to call the Ortho office and schedule an appt for him to be seen for this and get a referral for PT from them

## 2025-06-27 NOTE — PROGRESS NOTES
RM:________     PCP: Jyothi Song PA-C Last EKG : 24  : 1945  AGE: 80 y.o.    REASON FOR VISIT: __________________________________________    ____________________________________________________________                                                  Wt Readings from Last 3 Encounters:   25 66.2 kg (146 lb)   25 68 kg (150 lb)   25 64.9 kg (143 lb)      BP Readings from Last 3 Encounters:   25 147/77   24 110/64   10/25/24 132/78      WT: ____________ HT: ______ BP: __________ HR ______   02% _______               Lipid Panel          2024    11:59 2025    12:27   Lipid Panel   Total Cholesterol  122    Total Cholesterol 107     Triglycerides 76  85    HDL Cholesterol 32  43    VLDL Cholesterol 16  17    LDL Cholesterol  59  62    LDL/HDL Ratio  1.44

## 2025-07-03 DIAGNOSIS — Z85.51 HX OF BLADDER CANCER: ICD-10-CM

## 2025-07-03 DIAGNOSIS — K76.0 FATTY LIVER DISEASE, NONALCOHOLIC: ICD-10-CM

## 2025-07-03 DIAGNOSIS — E78.2 HYPERLIPIDEMIA, MIXED: ICD-10-CM

## 2025-07-03 DIAGNOSIS — E55.9 VITAMIN D DEFICIENCY: ICD-10-CM

## 2025-07-03 DIAGNOSIS — I10 BENIGN ESSENTIAL HTN: Primary | ICD-10-CM

## 2025-07-03 DIAGNOSIS — E11.9 TYPE 2 DIABETES MELLITUS WITHOUT COMPLICATION, WITHOUT LONG-TERM CURRENT USE OF INSULIN: ICD-10-CM

## 2025-07-03 DIAGNOSIS — K21.9 GASTROESOPHAGEAL REFLUX DISEASE WITHOUT ESOPHAGITIS: ICD-10-CM

## 2025-07-08 ENCOUNTER — RESULTS FOLLOW-UP (OUTPATIENT)
Dept: FAMILY MEDICINE CLINIC | Facility: CLINIC | Age: 80
End: 2025-07-08
Payer: MEDICAID

## 2025-07-08 DIAGNOSIS — R79.89 ELEVATED SERUM CREATININE: Primary | ICD-10-CM

## 2025-07-08 LAB
25(OH)D3+25(OH)D2 SERPL-MCNC: 69.4 NG/ML (ref 30–100)
ALBUMIN SERPL-MCNC: 3.8 G/DL (ref 3.8–4.8)
ALP SERPL-CCNC: 116 IU/L (ref 44–121)
ALT SERPL-CCNC: 15 IU/L (ref 0–44)
AST SERPL-CCNC: 15 IU/L (ref 0–40)
BASOPHILS # BLD AUTO: 0 X10E3/UL (ref 0–0.2)
BASOPHILS NFR BLD AUTO: 0 %
BILIRUB SERPL-MCNC: 0.3 MG/DL (ref 0–1.2)
BUN SERPL-MCNC: 30 MG/DL (ref 8–27)
BUN/CREAT SERPL: 21 (ref 10–24)
CALCIUM SERPL-MCNC: 9.9 MG/DL (ref 8.6–10.2)
CHLORIDE SERPL-SCNC: 103 MMOL/L (ref 96–106)
CHOLEST SERPL-MCNC: 107 MG/DL (ref 100–199)
CO2 SERPL-SCNC: 21 MMOL/L (ref 20–29)
CREAT SERPL-MCNC: 1.4 MG/DL (ref 0.76–1.27)
EGFRCR SERPLBLD CKD-EPI 2021: 51 ML/MIN/1.73
EOSINOPHIL # BLD AUTO: 0.2 X10E3/UL (ref 0–0.4)
EOSINOPHIL NFR BLD AUTO: 3 %
ERYTHROCYTE [DISTWIDTH] IN BLOOD BY AUTOMATED COUNT: 13 % (ref 11.6–15.4)
FOLATE SERPL-MCNC: 16.6 NG/ML
GLOBULIN SER CALC-MCNC: 3.1 G/DL (ref 1.5–4.5)
GLUCOSE SERPL-MCNC: 134 MG/DL (ref 70–99)
HBA1C MFR BLD: 6.5 % (ref 4.8–5.6)
HCT VFR BLD AUTO: 37.2 % (ref 37.5–51)
HDLC SERPL-MCNC: 42 MG/DL
HGB BLD-MCNC: 11.7 G/DL (ref 13–17.7)
IMM GRANULOCYTES # BLD AUTO: 0 X10E3/UL (ref 0–0.1)
IMM GRANULOCYTES NFR BLD AUTO: 0 %
LDLC SERPL CALC-MCNC: 53 MG/DL (ref 0–99)
LYMPHOCYTES # BLD AUTO: 2.1 X10E3/UL (ref 0.7–3.1)
LYMPHOCYTES NFR BLD AUTO: 27 %
MAGNESIUM SERPL-MCNC: 2 MG/DL (ref 1.6–2.3)
MCH RBC QN AUTO: 29.5 PG (ref 26.6–33)
MCHC RBC AUTO-ENTMCNC: 31.5 G/DL (ref 31.5–35.7)
MCV RBC AUTO: 94 FL (ref 79–97)
MONOCYTES # BLD AUTO: 0.5 X10E3/UL (ref 0.1–0.9)
MONOCYTES NFR BLD AUTO: 6 %
NEUTROPHILS # BLD AUTO: 4.8 X10E3/UL (ref 1.4–7)
NEUTROPHILS NFR BLD AUTO: 64 %
PLATELET # BLD AUTO: 420 X10E3/UL (ref 150–450)
POTASSIUM SERPL-SCNC: 4.8 MMOL/L (ref 3.5–5.2)
PROT SERPL-MCNC: 6.9 G/DL (ref 6–8.5)
RBC # BLD AUTO: 3.97 X10E6/UL (ref 4.14–5.8)
SODIUM SERPL-SCNC: 140 MMOL/L (ref 134–144)
TRIGL SERPL-MCNC: 47 MG/DL (ref 0–149)
TSH SERPL DL<=0.005 MIU/L-ACNC: 1.92 UIU/ML (ref 0.45–4.5)
VIT B12 SERPL-MCNC: 679 PG/ML (ref 232–1245)
VLDLC SERPL CALC-MCNC: 12 MG/DL (ref 5–40)
WBC # BLD AUTO: 7.6 X10E3/UL (ref 3.4–10.8)

## 2025-07-08 NOTE — LETTER
Alex Negron  [address] [place] [id number]    July 10, 2025     Dear Mr. Negron:    Below are the results from your recent visit:    Resulted Orders   Comprehensive metabolic panel   Result Value Ref Range    Glucose 134 (H) 70 - 99 mg/dL    BUN 30 (H) 8 - 27 mg/dL    Creatinine 1.40 (H) 0.76 - 1.27 mg/dL    EGFR Result 51 (L) >59 mL/min/1.73    BUN/Creatinine Ratio 21 10 - 24    Sodium 140 134 - 144 mmol/L    Potassium 4.8 3.5 - 5.2 mmol/L    Chloride 103 96 - 106 mmol/L    Total CO2 21 20 - 29 mmol/L    Calcium 9.9 8.6 - 10.2 mg/dL    Total Protein 6.9 6.0 - 8.5 g/dL    Albumin 3.8 3.8 - 4.8 g/dL    Globulin 3.1 1.5 - 4.5 g/dL    Total Bilirubin 0.3 0.0 - 1.2 mg/dL    Alkaline Phosphatase 116 44 - 121 IU/L    AST (SGOT) 15 0 - 40 IU/L    ALT (SGPT) 15 0 - 44 IU/L   Lipid panel   Result Value Ref Range    Total Cholesterol 107 100 - 199 mg/dL    Triglycerides 47 0 - 149 mg/dL    HDL Cholesterol 42 >39 mg/dL    VLDL Cholesterol Christian 12 5 - 40 mg/dL    LDL Chol Calc (NIH) 53 0 - 99 mg/dL   CBC and Differential   Result Value Ref Range    WBC 7.6 3.4 - 10.8 x10E3/uL    RBC 3.97 (L) 4.14 - 5.80 x10E6/uL    Hemoglobin 11.7 (L) 13.0 - 17.7 g/dL    Hematocrit 37.2 (L) 37.5 - 51.0 %    MCV 94 79 - 97 fL    MCH 29.5 26.6 - 33.0 pg    MCHC 31.5 31.5 - 35.7 g/dL    RDW 13.0 11.6 - 15.4 %    Platelets 420 150 - 450 x10E3/uL    Neutrophil Rel % 64 Not Estab. %    Lymphocyte Rel % 27 Not Estab. %    Monocyte Rel % 6 Not Estab. %    Eosinophil Rel % 3 Not Estab. %    Basophil Rel % 0 Not Estab. %    Neutrophils Absolute 4.8 1.4 - 7.0 x10E3/uL    Lymphocytes Absolute 2.1 0.7 - 3.1 x10E3/uL    Monocytes Absolute 0.5 0.1 - 0.9 x10E3/uL    Eosinophils Absolute 0.2 0.0 - 0.4 x10E3/uL    Basophils Absolute 0.0 0.0 - 0.2 x10E3/uL    Immature Granulocyte Rel % 0 Not Estab. %    Immature Grans Absolute 0.0 0.0 - 0.1 x10E3/uL   TSH   Result Value Ref Range    TSH 1.920 0.450 - 4.500 uIU/mL   Hemoglobin A1c   Result Value  Ref Range    Hemoglobin A1C 6.5 (H) 4.8 - 5.6 %      Comment:               Prediabetes: 5.7 - 6.4           Diabetes: >6.4           Glycemic control for adults with diabetes: <7.0     Magnesium   Result Value Ref Range    Magnesium 2.0 1.6 - 2.3 mg/dL   Vitamin D,25-Hydroxy   Result Value Ref Range    25 Hydroxy, Vitamin D 69.4 30.0 - 100.0 ng/mL      Comment:      Vitamin D deficiency has been defined by the [place] of  Medicine and an Endocrine Society practice guideline as a  level of serum 25-OH vitamin D less than 20 ng/mL (1,2).  The Endocrine Society went on to further define vitamin D  insufficiency as a level between 21 and 29 ng/mL (2).  1. IOM ([place] of [employer]). 2010. Dietary reference     intakes for calcium and D. [place]: The     National Academies Press.  2. Raffi MURDOCK, Marin STEWART, Rosetta SUAZO, et al.     Evaluation, treatment, and prevention of vitamin D     deficiency: an Endocrine Society clinical practice     guideline. JCEM. 2011 Jul; 96(7):1911-30.     Vitamin B12   Result Value Ref Range    Vitamin B-12 679 232 - 1,245 pg/mL   Folate   Result Value Ref Range    Folate 16.6 >3.0 ng/mL      Comment:      A serum folate concentration of less than 3.1 ng/mL is  considered to represent clinical deficiency.             ??? ??? ?????? ?? ???? ?? ??????. ??????? ???? ????? ????????, ???????, ?? ?????? ???????????. ?????????? ???????????? ?? ???????? ???? ????????? ?????????? ????? ??? ??????? ??????????, ? ? ?????? ???????????, ??? ???????? ?????????? ?? ??????, ? ?? ?????????? ????? ???????.  Byron patiñoyytolegario rod na pryyom. crescencio Rizo, carmen prosto znevodnewilberya. Rekomenduyu rehidratatsiyu ta provesty piotrs shelby garciasku bez pryyomu manuel, shelly ivey zroblyu pryznachennyrosangela, lizzeth gracia zapysatysya na pryyom, i misa pulliam analizolegario.    If you have any questions or concerns, please don't hesitate to call.          Sincerely,        Jyothi Song PA-C

## 2025-07-15 ENCOUNTER — OFFICE VISIT (OUTPATIENT)
Dept: CARDIOLOGY | Age: 80
End: 2025-07-15
Payer: MEDICAID

## 2025-07-15 VITALS
SYSTOLIC BLOOD PRESSURE: 120 MMHG | BODY MASS INDEX: 22.45 KG/M2 | OXYGEN SATURATION: 94 % | WEIGHT: 152 LBS | DIASTOLIC BLOOD PRESSURE: 60 MMHG | HEART RATE: 55 BPM

## 2025-07-15 DIAGNOSIS — I63.9 CEREBROVASCULAR ACCIDENT (CVA), UNSPECIFIED MECHANISM: ICD-10-CM

## 2025-07-15 DIAGNOSIS — I10 BENIGN ESSENTIAL HTN: ICD-10-CM

## 2025-07-15 DIAGNOSIS — I47.10 NONSUSTAINED SUPRAVENTRICULAR TACHYCARDIA: Primary | ICD-10-CM

## 2025-07-15 NOTE — PROGRESS NOTES
CARDIOLOGY        Patient Name: Alex Negron  :1945  Age: 80 y.o.  Primary Cardiologist: Marshall Mayes MD  Encounter Provider:  ESCOBAR Velazquez    Date of Service: 07/15/2025      CHIEF COMPLAINT / REASON FOR OFFICE VISIT     Follow-up (HTN)    [facility]  Used [id number]    HPI  Alex Negron is a 80 y.o. male who presents today for annual evaluation.     Pt has a  history significant for CVA, HTN, nonsustained SVT.    Patient presents today with his son.  Reports that he has done well since last assessment.  Blood pressure has been controlled.  Patient has been tolerating all medications without adverse effects.  He does exercise routinely without any exertional symptoms.  He is currently asymptomatic and denies any episodes of chest pain, shortness of breath, palpitations, lightheadedness, swelling or fatigue.       HISTORY OF PRESENT ILLNESS     Carotid duplex 9/3/2024.  Right carotid stent is patent without evidence of in-stent stenosis.  Left internal carotid artery demonstrates less than 50% stenosis.    Echocardiogram 24.  LVEF 56/60%.  Diastolic function was normal.  Estimated RVSP less than 35 mmHg.    Zio monitor 5/15/2024.  Normal monitor study.      The following portions of the patient's history were reviewed and updated as appropriate: allergies, current medications, past family history, past medical history, past social history, past surgical history and problem list.      VITAL SIGNS     Visit Vitals  /60   Pulse 55   Wt 68.9 kg (152 lb)   SpO2 94%   BMI 22.45 kg/m²         Wt Readings from Last 3 Encounters:   07/15/25 68.9 kg (152 lb)   25 66.2 kg (146 lb)   25 68 kg (150 lb)     Body mass index is 22.45 kg/m².      REVIEW OF SYSTEMS     Review of Systems   Constitutional: Negative for chills, fever, weight gain and weight loss.   Cardiovascular:  Negative for leg swelling.   Respiratory:  Negative for cough, snoring and wheezing.     Hematologic/Lymphatic: Negative for bleeding problem. Does not bruise/bleed easily.   Skin:  Negative for color change.   Musculoskeletal:  Negative for falls, joint pain and myalgias.   Gastrointestinal:  Negative for melena.   Genitourinary:  Negative for hematuria.   Neurological:  Negative for excessive daytime sleepiness.   Psychiatric/Behavioral:  Negative for depression. The patient is not nervous/anxious.            PHYSICAL EXAMINATION     Constitutional:       Appearance: Normal appearance. Well-developed.   Eyes:      Conjunctiva/sclera: Conjunctivae normal.   Neck:      Vascular: No carotid bruit.   Pulmonary:      Effort: Pulmonary effort is normal.      Breath sounds: Normal breath sounds.   Cardiovascular:      Normal rate. Regular rhythm. Normal S1. Normal S2.       Murmurs: There is no murmur.      No gallop.  No click. No rub.   Edema:     Peripheral edema absent.   Musculoskeletal: Normal range of motion. Skin:     General: Skin is warm and dry.   Neurological:      Mental Status: Alert and oriented to person, place, and time.      GCS: GCS eye subscore is 4. GCS verbal subscore is 5. GCS motor subscore is 6.   Psychiatric:         Speech: Speech normal.         Behavior: Behavior normal.         Thought Content: Thought content normal.         Judgment: Judgment normal.           REVIEWED DATA       ECG 12 Lead    Date/Time: 7/15/2025 1:19 PM  Performed by: Isa Godfrey APRN    Authorized by: Isa Godfrey APRN  Comparison: compared with previous ECG from 7/9/2024  Rhythm: sinus bradycardia  Rate: bradycardic  BPM: 46  Conduction: conduction normal  T inversion: V3, aVR, aVF, II and III  QRS axis: normal              Lipid Panel          9/11/2024    11:59 1/17/2025    12:27 7/7/2025    12:00   Lipid Panel   Total Cholesterol  122     Total Cholesterol 107   107    Triglycerides 76  85  47    HDL Cholesterol 32  43  42    VLDL Cholesterol 16  17  12    LDL Cholesterol  59  62  53   "  LDL/HDL Ratio  1.44         Lab Results   Component Value Date     07/07/2025     03/03/2025    K 4.8 07/07/2025    K 4.6 03/03/2025     07/07/2025     03/03/2025    CO2 21 07/07/2025    CO2 27.0 03/03/2025    BUN 30 (H) 07/07/2025    BUN 21 03/03/2025    CREATININE 1.40 (H) 07/07/2025    CREATININE 0.99 03/03/2025    GLUCOSE 134 (H) 07/07/2025    GLUCOSE 102 (H) 03/03/2025    CALCIUM 9.9 07/07/2025    CALCIUM 9.6 03/03/2025    ALBUMIN 3.8 07/07/2025    ALBUMIN 3.7 03/03/2025    BILITOT 0.3 07/07/2025    BILITOT 0.4 03/03/2025    AST 15 07/07/2025    AST 18 03/03/2025    ALT 15 07/07/2025    ALT 17 03/03/2025     Lab Results   Component Value Date    WBC 7.6 07/07/2025    WBC 7.53 03/03/2025    HGB 11.7 (L) 07/07/2025    HGB 10.9 (L) 03/03/2025    HCT 37.2 (L) 07/07/2025    HCT 33.7 (L) 03/03/2025    MCV 94 07/07/2025    MCV 90.8 03/03/2025     07/07/2025     (H) 03/03/2025     No results found for: \"PROBNP\", \"BNP\"  Lab Results   Component Value Date    TROPONINT 12 04/01/2024     Lab Results   Component Value Date    TSH 1.920 07/07/2025    TSH 1.670 09/11/2024             ASSESSMENT & PLAN     Diagnoses and all orders for this visit:    1. Nonsustained supraventricular tachycardia (Primary)  Overview:  This occurred in the hospital    Assessment & Plan:  Recurrence of tachycardia or palpitations    Orders:  -     ECG 12 Lead    2. Benign essential HTN  Overview:  Amlodipine 10 mg/day  Metoprolol tartrate 50 mg twice daily    Assessment & Plan:  Hypertension is stable and controlled  Continue current treatment regimen.  Dietary sodium restriction.  Regular aerobic exercise.  Ambulatory blood pressure monitoring.  Blood pressure will be reassessed in 1 year.    Orders:  -     ECG 12 Lead    3. Cerebrovascular accident (CVA), unspecified mechanism        Return in about 1 year (around 7/15/2026) for Dr. Mayes- Routine.    Future Appointments         Provider Department Center "    7/28/2025 10:00 AM LAB [facility]IR [facility] [place]    7/28/2025 10:20 AM Emil Mary MD [facility] HEMATOLOGY & ONCOLOGY [place]    11/11/2025 11:00 AM (Arrive by 10:45 AM) Saloni Weinberg APRN [facility] NEUROLOGY [place]    7/20/2026 10:20 AM (Arrive by 10:05 AM) Marshall Mayes MD [facility] CARDIOLOGY [place]              MEDICATIONS         Discharge Medications            Accurate as of July 15, 2025  1:39 PM. If you have any questions, ask your nurse or doctor.                Continue These Medications        Instructions Start Date   amLODIPine 10 MG tablet  Commonly known as: NORVASC   TAKE 1 TABLET BY MOUTH DAILY FOR BLOOD PRESSURE AND HEART RATE      aspirin 81 MG EC tablet   81 mg, Oral, Daily      atorvastatin 80 MG tablet  Commonly known as: LIPITOR   80 mg, Oral, Daily      cholecalciferol 25 MCG (1000 UT) tablet  Commonly known as: VITAMIN D3   1,000 Units, Daily      donepezil 5 MG tablet  Commonly known as: ARICEPT   5 mg, Oral, Nightly      gabapentin 100 MG capsule  Commonly known as: NEURONTIN   TAKE 2 CAPSULES BY MOUTH TWICE DAILY FOR NERVE PAIN      metoprolol tartrate 50 MG tablet  Commonly known as: LOPRESSOR   50 mg, Oral, Every 12 Hours      pantoprazole 40 MG EC tablet  Commonly known as: PROTONIX   40 mg, Oral, Daily      tamsulosin 0.4 MG capsule 24 hr capsule  Commonly known as: FLOMAX   1 capsule, Daily             Stop These Medications      acetaminophen 500 MG tablet  Commonly known as: TYLENOL  Stopped by: ESCOBAR Dickinson     BIOFREEZE EX  Stopped by: ESCOBAR Dickinson     hydrOXYzine 10 MG tablet  Commonly known as: ATARAX  Stopped by: ESCOBAR Dickinson     lisinopril 10 MG tablet  Commonly known as: PRINIVIL,ZESTRIL  Stopped by: ESCOBAR Dickinson     phenazopyridine 200 MG tablet  Commonly known as: PYRIDIUM  Stopped by: ESCOBAR Dickinson     polyethylene glycol  17 g packet  Commonly known as: MIRALAX  Stopped by: ESCOBAR Dickinson                  **Dragon Disclaimer:   Much of this encounter note is an electronic transcription/translation of spoken language to printed text. The electronic translation of spoken language may permit erroneous, or at times, nonsensical words or phrases to be inadvertently transcribed. Although I have reviewed the note for such errors, some may still exist.

## 2025-07-28 ENCOUNTER — LAB (OUTPATIENT)
Dept: LAB | Facility: HOSPITAL | Age: 80
End: 2025-07-28
Payer: MEDICAID

## 2025-07-28 ENCOUNTER — OFFICE VISIT (OUTPATIENT)
Dept: ONCOLOGY | Facility: CLINIC | Age: 80
End: 2025-07-28
Payer: MEDICAID

## 2025-07-28 VITALS
OXYGEN SATURATION: 93 % | RESPIRATION RATE: 16 BRPM | DIASTOLIC BLOOD PRESSURE: 81 MMHG | HEIGHT: 69 IN | HEART RATE: 56 BPM | SYSTOLIC BLOOD PRESSURE: 125 MMHG | WEIGHT: 152.9 LBS | BODY MASS INDEX: 22.64 KG/M2 | TEMPERATURE: 98.5 F

## 2025-07-28 DIAGNOSIS — C67.8 MALIGNANT NEOPLASM OF OVERLAPPING SITES OF BLADDER: ICD-10-CM

## 2025-07-28 DIAGNOSIS — D49.4 BLADDER TUMOR: ICD-10-CM

## 2025-07-28 DIAGNOSIS — D50.9 IRON DEFICIENCY ANEMIA, UNSPECIFIED IRON DEFICIENCY ANEMIA TYPE: Primary | ICD-10-CM

## 2025-07-28 LAB
BASOPHILS # BLD AUTO: 0.03 10*3/MM3 (ref 0–0.2)
BASOPHILS NFR BLD AUTO: 0.4 % (ref 0–1.5)
DEPRECATED RDW RBC AUTO: 47.4 FL (ref 37–54)
EOSINOPHIL # BLD AUTO: 0.28 10*3/MM3 (ref 0–0.4)
EOSINOPHIL NFR BLD AUTO: 3.3 % (ref 0.3–6.2)
ERYTHROCYTE [DISTWIDTH] IN BLOOD BY AUTOMATED COUNT: 14.3 % (ref 12.3–15.4)
FERRITIN SERPL-MCNC: 76.9 NG/ML (ref 30–400)
HCT VFR BLD AUTO: 35.4 % (ref 37.5–51)
HGB BLD-MCNC: 11.5 G/DL (ref 13–17.7)
IMM GRANULOCYTES # BLD AUTO: 0.03 10*3/MM3 (ref 0–0.05)
IMM GRANULOCYTES NFR BLD AUTO: 0.4 % (ref 0–0.5)
IRON 24H UR-MRATE: 56 MCG/DL (ref 59–158)
IRON SATN MFR SERPL: 17 % (ref 20–50)
LYMPHOCYTES # BLD AUTO: 3.07 10*3/MM3 (ref 0.7–3.1)
LYMPHOCYTES NFR BLD AUTO: 36.6 % (ref 19.6–45.3)
MCH RBC QN AUTO: 29.4 PG (ref 26.6–33)
MCHC RBC AUTO-ENTMCNC: 32.5 G/DL (ref 31.5–35.7)
MCV RBC AUTO: 90.5 FL (ref 79–97)
MONOCYTES # BLD AUTO: 0.49 10*3/MM3 (ref 0.1–0.9)
MONOCYTES NFR BLD AUTO: 5.8 % (ref 5–12)
NEUTROPHILS NFR BLD AUTO: 4.48 10*3/MM3 (ref 1.7–7)
NEUTROPHILS NFR BLD AUTO: 53.5 % (ref 42.7–76)
NRBC BLD AUTO-RTO: 0 /100 WBC (ref 0–0.2)
PLATELET # BLD AUTO: 405 10*3/MM3 (ref 140–450)
PMV BLD AUTO: 8.6 FL (ref 6–12)
RBC # BLD AUTO: 3.91 10*6/MM3 (ref 4.14–5.8)
TIBC SERPL-MCNC: 337 MCG/DL (ref 298–536)
TRANSFERRIN SERPL-MCNC: 226 MG/DL (ref 200–360)
WBC NRBC COR # BLD AUTO: 8.38 10*3/MM3 (ref 3.4–10.8)

## 2025-07-28 PROCEDURE — 82728 ASSAY OF FERRITIN: CPT

## 2025-07-28 PROCEDURE — 85025 COMPLETE CBC W/AUTO DIFF WBC: CPT

## 2025-07-28 PROCEDURE — 1126F AMNT PAIN NOTED NONE PRSNT: CPT | Performed by: INTERNAL MEDICINE

## 2025-07-28 PROCEDURE — 83540 ASSAY OF IRON: CPT

## 2025-07-28 PROCEDURE — 3074F SYST BP LT 130 MM HG: CPT | Performed by: INTERNAL MEDICINE

## 2025-07-28 PROCEDURE — 3079F DIAST BP 80-89 MM HG: CPT | Performed by: INTERNAL MEDICINE

## 2025-07-28 PROCEDURE — 84466 ASSAY OF TRANSFERRIN: CPT

## 2025-07-28 PROCEDURE — 99214 OFFICE O/P EST MOD 30 MIN: CPT | Performed by: INTERNAL MEDICINE

## 2025-07-28 PROCEDURE — 36415 COLL VENOUS BLD VENIPUNCTURE: CPT

## 2025-07-28 NOTE — PROGRESS NOTES
Subjective     REASON FOR CONSULTATION: Elevated platelet count  Provide an opinion on any further workup or treatment                             Requesting practitioner: Jyothi Song    RECORDS OBTAINED:  Records of the patients history including those obtained from the referring provider were reviewed and summarized in detail.      History of Present Illness   This is a 78-year-old man with hypertension, hyperlipidemia, degenerative disc disease of the lumbar spine, impaired fasting glucose and mild renal insufficiency seen today at the request of his primary care provider for evaluation of an elevated platelet count.  Reviewing his recent data since 3/22/2023 the patient has had a mildly elevated platelet count in the upper 400s/lower 500,000 range.  He has been mildly anemic with hemoglobin 12.1-12.5 MCV 88-89.  White blood cell count and differential unremarkable.  Additional labs have recently shown iron saturation 30%, ferritin 37, B12 453, folate 19.2, creatinine 1.26/BUN 34, total protein 7.2/globulin 2.5.    The patient recently immigrated from [facility].  He feels well denies fevers, chills, shortness of breath, cough, weight loss, lymphadenopathy, changes in the bowel habits, blood in the stool.   He smoked but quit smoking approximately 20 years ago.  He has never had a colonoscopy.  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  He is undergoing intravesicular therapy.    At the time of his initial visit 5/15/2023 the patient was noted to be mildly iron deficient and started oral iron therapy which she has been tolerating well.    The patient returned today for follow-up.  He had recurrence of noninvasive bladder cancer treated with transurethral resection 10/18/2023 now receiving intravesicular chemotherapy.  The  patient stopped oral iron because of a metallic taste in the mouth.    Patient was admitted April 2024 with acute stroke requiring TPA and carotid stent on right.  He is on plavix and aspirin.    The patient returned today for follow-up.  He has been taking oral iron daily since his last visit despite discussing discontinuation previously.  He denies hematochezia or hematuria.    Past Medical History:   Diagnosis Date    Anxiety     Aortic atherosclerosis     Bladder cancer     Bladder mass 06/07/2023    Bladder tumor 05/2023    chemical chemo in MD office  weekly    Blind left eye     Blood in urine     TRACE    Burning with urination     Chronic back pain     neuropathy rolando legs    DDD (degenerative disc disease), lumbar     GERD (gastroesophageal reflux disease)     History of low back pain     Hyperlipidemia     Hypertension     PTSD (post-traumatic stress disorder)     [place] start of [place]    Renal insufficiency     Sleep apnea 2023    diagnosed but following up with PCP    Urine frequency         Past Surgical History:   Procedure Laterality Date    CATARACT EXTRACTION Bilateral     COLONOSCOPY N/A 03/14/2024    Procedure: COLONOSCOPY TO CECUM WITH COLD SNARE POLYPECTOMIES;  Surgeon: Magdaleno Mena MD;  Location: [facility] ENDOSCOPY;  Service: Gastroenterology;  Laterality: N/A;  PRE OP - SCREENING  -POST OP - COLON POLYPS,HEMORRHOIDS    DENTAL PROCEDURE      ENDOSCOPY      ENDOSCOPY N/A 04/03/2024    Procedure: ESOPHAGOGASTRODUODENOSCOPY AT BEDSIDE;  Surgeon: Redd Guidry MD;  Location: [facility] ENDOSCOPY;  Service: Gastroenterology;  Laterality: N/A;  PRE-  GI BLEED  POST- HIATAL HERNIA, DISTAL ESOPHAGITIS ULCERS, GASTRITIS, PYLOROPLASTY    ENDOSCOPY W/ PEG TUBE PLACEMENT N/A 04/12/2024    Procedure: ESOPHAGOGASTRODUODENOSCOPY WITH PERCUTANEOUS ENDOSCOPIC GASTROSTOMY TUBE INSERTION;  Surgeon: Dimas Brand MD;  Location: [facility] ENDOSCOPY;  Service: General;  Laterality:  N/A;  Dysphagia    TRANSURETHRAL RESECTION OF BLADDER TUMOR N/A 2023    Procedure: TRANSURETHRAL RESECTION OF BLADDER TUMOR;  Surgeon: Robert Haque MD;  Location: [facility];  Service: Urology;  Laterality: N/A;    TRANSURETHRAL RESECTION OF BLADDER TUMOR N/A 10/18/2023    Procedure: CYSTOSCOPY TRANSURETHRAL RESECTION OF BLADDER TUMOR;  Surgeon: Robert Haque MD;  Location: [facility];  Service: Urology;  Laterality: N/A;        Current Outpatient Medications on File Prior to Visit   Medication Sig Dispense Refill    amLODIPine (NORVASC) 10 MG tablet TAKE 1 TABLET BY MOUTH DAILY FOR BLOOD PRESSURE AND HEART RATE 90 tablet 0    aspirin 81 MG EC tablet Take 1 tablet by mouth Daily. 90 tablet 3    atorvastatin (LIPITOR) 80 MG tablet TAKE 1 TABLET BY MOUTH EVERY DAY 90 tablet 3    Cholecalciferol 25 MCG (1000 UT) tablet Take 1 tablet by mouth Daily. 2000 u      donepezil (ARICEPT) 5 MG tablet TAKE 1 TABLET BY MOUTH EVERY NIGHT 30 tablet 6    gabapentin (NEURONTIN) 100 MG capsule TAKE 2 CAPSULES BY MOUTH TWICE DAILY FOR NERVE PAIN 120 capsule 0    metoprolol tartrate (LOPRESSOR) 50 MG tablet TAKE 1 TABLET BY MOUTH EVERY 12 HOURS 180 tablet 1    pantoprazole (PROTONIX) 40 MG EC tablet TAKE 1 TABLET BY MOUTH EVERY DAY 90 tablet 3    tamsulosin (FLOMAX) 0.4 MG capsule 24 hr capsule Take 1 capsule by mouth Daily.       No current facility-administered medications on file prior to visit.        ALLERGIES:  No Known Allergies     Social History     Socioeconomic History    Marital status:    Tobacco Use    Smoking status: Former     Current packs/day: 0.00     Types: Cigarettes     Quit date:      Years since quittin.     Passive exposure: Never    Smokeless tobacco: Never   Vaping Use    Vaping status: Never Used   Substance and Sexual Activity    Alcohol use: Not Currently    Drug use: Never    Sexual activity: Defer        Family History   Problem Relation Age of Onset     "Malig Hyperthermia Neg Hx         Review of Systems   Constitutional:  Positive for appetite change.   HENT:  Negative for mouth sores.    Eyes: Negative.    Respiratory: Negative.     Cardiovascular: Negative.    Gastrointestinal: Negative.    Genitourinary:  Positive for frequency. Negative for hematuria and urgency.   Musculoskeletal:  Positive for gait problem.   Allergic/Immunologic: Negative.    Neurological:  Positive for weakness.   Hematological: Negative.    Psychiatric/Behavioral: Negative.           Objective     Vitals:    07/28/25 1025   BP: 125/81   Pulse: 56   Resp: 16   Temp: 98.5 °F (36.9 °C)   TempSrc: Infrared   SpO2: 93%   Weight: 69.4 kg (152 lb 14.4 oz)   Height: 175.3 cm (69.02\")   PainSc: 0-No pain           7/28/2025    10:30 AM   Current Status   ECOG score 0       Physical Exam    CONSTITUTIONAL: pleasant well-developed adult man  HEENT: no icterus, no thrush, moist membranes  LYMPH: no cervical or supraclavicular lad  CV: RRR, S1S2, no murmur  RESP: cta bilat, no wheezing, no rales  GI: soft, non-tender, no splenomegaly, +bs  MUSC: no edema, normal gait  NEURO: alert and oriented x3, mod global weakness, ambulating with assistance of his son today  PSYCH: normal mood and somewhat flat affect  Unchanged 7/28/2025  RECENT LABS:  Hematology WBC   Date Value Ref Range Status   07/28/2025 8.38 3.40 - 10.80 10*3/mm3 Final   07/07/2025 7.6 3.4 - 10.8 x10E3/uL Final     RBC   Date Value Ref Range Status   07/28/2025 3.91 (L) 4.14 - 5.80 10*6/mm3 Final   07/07/2025 3.97 (L) 4.14 - 5.80 x10E6/uL Final     Hemoglobin   Date Value Ref Range Status   07/28/2025 11.5 (L) 13.0 - 17.7 g/dL Final     Hematocrit   Date Value Ref Range Status   07/28/2025 35.4 (L) 37.5 - 51.0 % Final     Platelets   Date Value Ref Range Status   07/28/2025 405 140 - 450 10*3/mm3 Final        Lab Results   Component Value Date    GLUCOSE 134 (H) 07/07/2025    BUN 30 (H) 07/07/2025    CREATININE 1.40 (H) 07/07/2025    EGFR " 51 (L) 07/07/2025    BCR 21 07/07/2025    K 4.8 07/07/2025    CO2 21 07/07/2025    CALCIUM 9.9 07/07/2025    ALBUMIN 3.8 07/07/2025    BILITOT 0.3 07/07/2025    AST 15 07/07/2025    ALT 15 07/07/2025         Assessment & Plan     *Mild thrombocytosis  platelet count improved to 523 after iron replacement but still elevated  Negative BCR-ABL FISH and JAK2 V6 17F on 3/3/2025  7/28/2025-platelets 405  *Mild normocytic anemia  The patient has been taking oral iron daily since his last visit with hemoglobin improved to 11.5  *Low iron stores-replaced with oral iron  *High-grade 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

## 2025-08-03 RX ORDER — LISINOPRIL 10 MG/1
10 TABLET ORAL EVERY MORNING
Qty: 90 TABLET | Refills: 0 | OUTPATIENT
Start: 2025-08-03

## 2025-08-11 RX ORDER — LISINOPRIL 10 MG/1
10 TABLET ORAL EVERY MORNING
Qty: 90 TABLET | Refills: 0 | OUTPATIENT
Start: 2025-08-11

## (undated) DEVICE — GLV SURG BIOGEL LTX PF 7

## (undated) DEVICE — HF-RESECTION ELECTRODE PLASMALOOP LOOP, MEDIUM, 24 FR., 12°-30°, ESG TURIS: Brand: OLYMPUS

## (undated) DEVICE — ADAPT CLN BIOGUARD AIR/H2O DISP

## (undated) DEVICE — TUBING, SUCTION, 1/4" X 10', STRAIGHT: Brand: MEDLINE

## (undated) DEVICE — SENSR O2 OXIMAX FNGR A/ 18IN NONSTR

## (undated) DEVICE — TIDISHIELD UROLOGY DRAIN BAGS FROSTY VINYL STERILE FITS SIEMENS UROSKOP ACCESS 20 PER CASE: Brand: TIDISHIELD

## (undated) DEVICE — TUBING, SUCTION, 1/4" X 20', STRAIGHT: Brand: MEDLINE INDUSTRIES, INC.

## (undated) DEVICE — BLCK/BITE BLOX W/DENTL/RIM W/STRAP 54F

## (undated) DEVICE — SYR LUERLOK 5CC

## (undated) DEVICE — CANN O2 ETCO2 FITS ALL CONN CO2 SMPL A/ 7IN DISP LF

## (undated) DEVICE — BNDR ABD PREMIUM/UNIV 10IN 27TO48IN

## (undated) DEVICE — LN SMPL CO2 SHTRM SD STREAM W/M LUER

## (undated) DEVICE — KT ORCA ORCAPOD DISP STRL

## (undated) DEVICE — TBG FEED PULL FLOW20 NO/DRUG 20F 4.47MM 150CM

## (undated) DEVICE — SNAR POLYP CAPTIVATOR RND STFF 2.4 240CM 10MM 1P/U

## (undated) DEVICE — THE SINGLE USE ETRAP – POLYP TRAP IS USED FOR SUCTION RETRIEVAL OF ENDOSCOPICALLY REMOVED POLYPS.: Brand: ETRAP

## (undated) DEVICE — LOU TUR: Brand: MEDLINE INDUSTRIES, INC.

## (undated) DEVICE — CATH FOL SIMPLASTIC 3WY 24F 30CC

## (undated) DEVICE — CATH COUVALAIRE SIMPLASTIC 3WY 24F 30CC

## (undated) DEVICE — BNDR ABD 4PANEL 12IN MED/LG

## (undated) DEVICE — NDL HYPO ECLPS SFTY 25G 1 1/2IN

## (undated) DEVICE — KT PEG ENDOVIVE ENFIT SFTY PUSH 20F 1P/U